# Patient Record
Sex: FEMALE | Race: WHITE | Employment: OTHER | URBAN - METROPOLITAN AREA
[De-identification: names, ages, dates, MRNs, and addresses within clinical notes are randomized per-mention and may not be internally consistent; named-entity substitution may affect disease eponyms.]

---

## 2018-03-09 ENCOUNTER — HOSPITAL ENCOUNTER (OUTPATIENT)
Dept: ULTRASOUND IMAGING | Age: 73
Discharge: HOME | End: 2018-03-09
Attending: INTERNAL MEDICINE
Payer: MEDICARE

## 2018-03-09 DIAGNOSIS — R74.8 ABNORMAL LEVELS OF OTHER SERUM ENZYMES: ICD-10-CM

## 2018-03-09 PROCEDURE — 76705 ECHO EXAM OF ABDOMEN: CPT

## 2018-09-29 ENCOUNTER — HOSPITAL ENCOUNTER (EMERGENCY)
Age: 73
Discharge: HOME | End: 2018-09-29
Attending: STUDENT IN AN ORGANIZED HEALTH CARE EDUCATION/TRAINING PROGRAM | Admitting: STUDENT IN AN ORGANIZED HEALTH CARE EDUCATION/TRAINING PROGRAM
Payer: MEDICARE

## 2018-09-29 VITALS
BODY MASS INDEX: 29.35 KG/M2 | SYSTOLIC BLOOD PRESSURE: 128 MMHG | HEIGHT: 67 IN | HEART RATE: 88 BPM | TEMPERATURE: 98.4 F | WEIGHT: 187 LBS | OXYGEN SATURATION: 99 % | RESPIRATION RATE: 16 BRPM | DIASTOLIC BLOOD PRESSURE: 72 MMHG

## 2018-09-29 DIAGNOSIS — E13.65 OTHER SPECIFIED DIABETES MELLITUS WITH HYPERGLYCEMIA, WITHOUT LONG-TERM CURRENT USE OF INSULIN (HCC): Primary | ICD-10-CM

## 2018-09-29 LAB
ALBUMIN SERPL-MCNC: 3.7 G/DL (ref 3.5–5)
ALBUMIN/GLOB SERPL: 0.9 {RATIO} (ref 1.1–2.2)
ALP SERPL-CCNC: 196 U/L (ref 45–117)
ALT SERPL-CCNC: 25 U/L (ref 12–78)
ANION GAP SERPL CALC-SCNC: 13 MMOL/L (ref 5–15)
ARTERIAL PATENCY WRIST A: ABNORMAL
AST SERPL-CCNC: 12 U/L (ref 15–37)
B-OH-BUTYR SERPL-SCNC: >4.42 MMOL/L
BASE EXCESS BLDV CALC-SCNC: 1 MMOL/L
BASOPHILS # BLD: 0 K/UL (ref 0–0.1)
BASOPHILS NFR BLD: 1 % (ref 0–1)
BDY SITE: ABNORMAL
BILIRUB SERPL-MCNC: 0.7 MG/DL (ref 0.2–1)
BUN SERPL-MCNC: 15 MG/DL (ref 6–20)
BUN/CREAT SERPL: 14 (ref 12–20)
CALCIUM SERPL-MCNC: 8.9 MG/DL (ref 8.5–10.1)
CHLORIDE SERPL-SCNC: 93 MMOL/L (ref 97–108)
CO2 SERPL-SCNC: 24 MMOL/L (ref 21–32)
COMMENT, HOLDF: NORMAL
CREAT SERPL-MCNC: 1.05 MG/DL (ref 0.55–1.02)
DIFFERENTIAL METHOD BLD: ABNORMAL
EOSINOPHIL # BLD: 0.1 K/UL (ref 0–0.4)
EOSINOPHIL NFR BLD: 1 % (ref 0–7)
ERYTHROCYTE [DISTWIDTH] IN BLOOD BY AUTOMATED COUNT: 12.9 % (ref 11.5–14.5)
EST. AVERAGE GLUCOSE BLD GHB EST-MCNC: 298 MG/DL
GAS FLOW.O2 O2 DELIVERY SYS: ABNORMAL L/MIN
GLOBULIN SER CALC-MCNC: 4.1 G/DL (ref 2–4)
GLUCOSE BLD STRIP.AUTO-MCNC: 445 MG/DL (ref 65–100)
GLUCOSE SERPL-MCNC: 440 MG/DL (ref 65–100)
HBA1C MFR BLD: 12 % (ref 4.2–6.3)
HCO3 BLDV-SCNC: 26.3 MMOL/L (ref 23–28)
HCT VFR BLD AUTO: 42.4 % (ref 35–47)
HGB BLD-MCNC: 14.3 G/DL (ref 11.5–16)
IMM GRANULOCYTES # BLD: 0 K/UL (ref 0–0.04)
IMM GRANULOCYTES NFR BLD AUTO: 0 % (ref 0–0.5)
LYMPHOCYTES # BLD: 1.1 K/UL (ref 0.8–3.5)
LYMPHOCYTES NFR BLD: 12 % (ref 12–49)
MCH RBC QN AUTO: 29.7 PG (ref 26–34)
MCHC RBC AUTO-ENTMCNC: 33.7 G/DL (ref 30–36.5)
MCV RBC AUTO: 88.1 FL (ref 80–99)
MONOCYTES # BLD: 0.6 K/UL (ref 0–1)
MONOCYTES NFR BLD: 8 % (ref 5–13)
NEUTS SEG # BLD: 6.7 K/UL (ref 1.8–8)
NEUTS SEG NFR BLD: 79 % (ref 32–75)
NRBC # BLD: 0 K/UL (ref 0–0.01)
NRBC BLD-RTO: 0 PER 100 WBC
O2/TOTAL GAS SETTING VFR VENT: 21 %
PCO2 BLDV: 46.2 MMHG (ref 41–51)
PH BLDV: 7.36 [PH] (ref 7.32–7.42)
PLATELET # BLD AUTO: 391 K/UL (ref 150–400)
PMV BLD AUTO: 11.8 FL (ref 8.9–12.9)
PO2 BLDV: 19 MMHG (ref 25–40)
POTASSIUM SERPL-SCNC: 3.6 MMOL/L (ref 3.5–5.1)
PROT SERPL-MCNC: 7.8 G/DL (ref 6.4–8.2)
RBC # BLD AUTO: 4.81 M/UL (ref 3.8–5.2)
SAMPLES BEING HELD,HOLD: NORMAL
SAO2 % BLDV: 26 % (ref 65–88)
SERVICE CMNT-IMP: ABNORMAL
SODIUM SERPL-SCNC: 130 MMOL/L (ref 136–145)
SPECIMEN TYPE: ABNORMAL
TOTAL RESP. RATE, ITRR: 16
UR CULT HOLD, URHOLD: NORMAL
WBC # BLD AUTO: 8.5 K/UL (ref 3.6–11)

## 2018-09-29 PROCEDURE — 83036 HEMOGLOBIN GLYCOSYLATED A1C: CPT | Performed by: STUDENT IN AN ORGANIZED HEALTH CARE EDUCATION/TRAINING PROGRAM

## 2018-09-29 PROCEDURE — 74011250636 HC RX REV CODE- 250/636: Performed by: STUDENT IN AN ORGANIZED HEALTH CARE EDUCATION/TRAINING PROGRAM

## 2018-09-29 PROCEDURE — 80053 COMPREHEN METABOLIC PANEL: CPT | Performed by: STUDENT IN AN ORGANIZED HEALTH CARE EDUCATION/TRAINING PROGRAM

## 2018-09-29 PROCEDURE — 36415 COLL VENOUS BLD VENIPUNCTURE: CPT | Performed by: STUDENT IN AN ORGANIZED HEALTH CARE EDUCATION/TRAINING PROGRAM

## 2018-09-29 PROCEDURE — 82010 KETONE BODYS QUAN: CPT | Performed by: STUDENT IN AN ORGANIZED HEALTH CARE EDUCATION/TRAINING PROGRAM

## 2018-09-29 PROCEDURE — 82962 GLUCOSE BLOOD TEST: CPT

## 2018-09-29 PROCEDURE — 99284 EMERGENCY DEPT VISIT MOD MDM: CPT

## 2018-09-29 PROCEDURE — 96360 HYDRATION IV INFUSION INIT: CPT

## 2018-09-29 PROCEDURE — 85025 COMPLETE CBC W/AUTO DIFF WBC: CPT | Performed by: STUDENT IN AN ORGANIZED HEALTH CARE EDUCATION/TRAINING PROGRAM

## 2018-09-29 PROCEDURE — 82803 BLOOD GASES ANY COMBINATION: CPT

## 2018-09-29 RX ORDER — METFORMIN HYDROCHLORIDE 500 MG/1
500 TABLET ORAL 2 TIMES DAILY WITH MEALS
Qty: 60 TAB | Refills: 0 | Status: SHIPPED | OUTPATIENT
Start: 2018-09-29 | End: 2018-10-08

## 2018-09-29 RX ADMIN — SODIUM CHLORIDE 1000 ML: 900 INJECTION, SOLUTION INTRAVENOUS at 13:13

## 2018-09-29 NOTE — ED PROVIDER NOTES
HPI Comments: 72 y.o. female with past medical history significant for HTN and hypercholesterolemia who presents from home with chief complaint of hyperglycemia. Patient states that she has had intermittent epigastric pain for the past several weeks. She notes that her symptoms became progressively worse 2 weeks ago and now complains of \"dry mouth\" and increased thirst.  She also notes that when bending over, she develops some light-headedness. Patient adds that she visited Dr. Maude Solares for her symptoms yesterday. She was advised to visit the ED today due to lab results indicating her blood sugar was 700. Patient notes that she had \"mashed potatoes\" and a \"frosty\" prior to having her blood work done yesterday. She claims that she has been informed that she is a pre-diabetic but denies h/o diabetes as well as family h/o diabetes. Patient claims she has a CT scheduled in 2 days. Of note, patient states that she has lost weight recently. She denies recent steroid medications, change in urinary frequency, dizziness, chest pain, and SOB. There are no other acute medical concerns at this time. Social hx: negative tobacco use; occasional alcohol use; negative drug use PCP: Zohra Shi MD 
 
Note written by Yoshi Chase, as dictated by Chen Cantu MD 10:19 AM 
 
The history is provided by the patient. No  was used. No past medical history on file. No past surgical history on file. No family history on file. Social History Social History  Marital status:  Spouse name: N/A  
 Number of children: N/A  
 Years of education: N/A Occupational History  Not on file. Social History Main Topics  Smoking status: Not on file  Smokeless tobacco: Not on file  Alcohol use Not on file  Drug use: Not on file  Sexual activity: Not on file Other Topics Concern  Not on file Social History Narrative ALLERGIES: Amoxicillin Review of Systems Constitutional: Positive for unexpected weight change. Negative for activity change, diaphoresis, fatigue and fever. HENT: Negative for congestion and sore throat. Eyes: Negative for photophobia and visual disturbance. Respiratory: Negative for chest tightness and shortness of breath. Cardiovascular: Negative for chest pain, palpitations and leg swelling. Gastrointestinal: Positive for abdominal pain (epigastric pain). Negative for blood in stool, constipation, diarrhea, nausea and vomiting. Endocrine: Positive for polydipsia. Genitourinary: Negative for difficulty urinating, dysuria, flank pain, frequency and hematuria. Musculoskeletal: Negative for back pain. Neurological: Negative for dizziness, syncope, numbness and headaches. All other systems reviewed and are negative. Vitals:  
 09/29/18 1013 BP: 128/81 Pulse: (!) 111 Resp: 16 Temp: 98.7 °F (37.1 °C) SpO2: 98% Weight: 84.8 kg (187 lb) Height: 5' 7\" (1.702 m) Physical Exam  
Constitutional: She is oriented to person, place, and time. She appears well-developed and well-nourished. No distress. HENT:  
Head: Normocephalic and atraumatic. Nose: Nose normal.  
Mouth/Throat: Oropharynx is clear and moist. No oropharyngeal exudate. Eyes: Conjunctivae and EOM are normal. Right eye exhibits no discharge. Left eye exhibits no discharge. No scleral icterus. Neck: Normal range of motion. Neck supple. No JVD present. No tracheal deviation present. No thyromegaly present. Cardiovascular: Normal rate, regular rhythm, normal heart sounds and intact distal pulses. Exam reveals no gallop and no friction rub. No murmur heard. Pulmonary/Chest: Effort normal and breath sounds normal. No stridor. No respiratory distress. She has no wheezes. She has no rales. She exhibits no tenderness.   
Abdominal: Bowel sounds are normal. She exhibits no distension and no mass. There is no tenderness. There is no rebound. Musculoskeletal: Normal range of motion. She exhibits no edema or tenderness. Lymphadenopathy:  
  She has no cervical adenopathy. Neurological: She is alert and oriented to person, place, and time. No cranial nerve deficit. Coordination normal.  
Skin: Skin is warm and dry. No rash noted. She is not diaphoretic. No erythema. No pallor. Psychiatric: She has a normal mood and affect. Her behavior is normal. Judgment and thought content normal.  
Nursing note and vitals reviewed. Note written by Yoshi Underwood, as dictated by Misha Meza MD 10:19 AM 
 
MDM Number of Diagnoses or Management Options Other specified diabetes mellitus with hyperglycemia, without long-term current use of insulin ([facility] [address]): Amount and/or Complexity of Data Reviewed Clinical lab tests: ordered and reviewed Review and summarize past medical records: yes Independent visualization of images, tracings, or specimens: yes Risk of Complications, Morbidity, and/or Mortality Presenting problems: moderate Diagnostic procedures: moderate Management options: moderate Patient Progress Patient progress: improved ED Course Procedures PROGRESS NOTE: 
10:19 AM 
Point of Care testing of blood sugar in ED was found to be 445. PROGRESS NOTE: 
3:29 PM 
Patient offered admission for further management and workup. Patient appreciates this but would prefer to go home. She will follow-up with PCP on Monday. In interim, she will be started on Metformin 500 mg BID.   
 
4:34 PM 
The patient has been reevaluated. The patient is ready for discharge. The patient's signs, symptoms, diagnosis, and discharge instructions have been discussed and the patient/ family has conveyed their understanding. The patient is to follow up as recommended or return to the ED should their symptoms worsen. Plan has been discussed and the patient is in agreement. LABORATORY TESTS: 
No results found for this or any previous visit (from the past 12 hour(s)). IMAGING RESULTS: 
No orders to display No results found. MEDICATIONS GIVEN: 
Medications  
sodium chloride 0.9 % bolus infusion 1,000 mL (0 mL IntraVENous IV Completed 9/29/18 1413) IMPRESSION: 
1. Other specified diabetes mellitus with hyperglycemia, without long-term current use of insulin ([facility] [address] PLAN: 
1. Discharge Medication List as of 9/29/2018  3:31 PM  
  
START taking these medications Details  
metFORMIN (GLUCOPHAGE) 500 mg tablet Take 1 Tab by mouth two (2) times daily (with meals) for 30 days. , Print, Disp-60 Tab, R-0  
  
  
 
2. Follow-up Information Follow up With Details Comments Contact Info Latanya Echevarria MD  If symptoms worsen [address] [facility] [address] [facility] [phone] 
[phone] [address] [place] [address] EMERGENCY DEP  If symptoms worsen [address] [place] [id number] 
[phone] Return to ED for new or worsening symptoms Aislinn Chilel MD

## 2018-09-29 NOTE — ED TRIAGE NOTES
Pt reports having blood work done yesterday at her PCP. Pt states she was called this mrpari and told to come to ER due to blood sugar of 700.

## 2018-09-29 NOTE — DISCHARGE INSTRUCTIONS
Noninsulin Medicines for Type 2 Diabetes: Care Instructions  Your Care Instructions    There are different types of noninsulin medicines for diabetes. Each works in a different way. But they all help you control your blood sugar. Some types help your body make insulin to lower your blood sugar. Others lower how much insulin your body needs. Some can slow how fast your body digests sugars. And some can remove extra glucose through your urine. · Alpha-glucosidase inhibitors. These keep starches from breaking down. This means that they lower the amount of glucose absorbed when you eat. They don't help your body make more insulin. So they will not cause low blood sugar unless you use them with other medicines for diabetes. They include acarbose and miglitol. · DPP-4 inhibitors. These help your body raise the level of insulin after you eat. They also help your body make less of a hormone that raises blood sugar. They include linagliptin, saxagliptin, and sitagliptin. · Incretin hormones (GLP-1 receptor agonists). Your body makes a protein that can raise your insulin level. It also can lower your blood sugar and make you less hungry. You can get shots of hormones that work the same way. They include exenatide and liraglutide. · Meglitinides. These help your body release insulin. They also help slow how your body digests sugars. So they can keep your blood sugar from rising too fast after you eat. They include nateglinide and repaglinide. · Metformin. This lowers how much glucose your liver makes. And it helps you respond better to insulin. It also lowers the amount of stored sugar that your liver releases when you are not eating. · SGLT2 inhibitors. These help to remove extra glucose through your urine. They may also help some people lose weight. They include canagliflozin, dapagliflozin, and empagliflozin. · Sulfonylureas. These help your body release more insulin. Some work for many hours.  They can cause low blood sugar if you don't eat as you planned. They include glipizide and glyburide. · Thiazolidinediones. These reduce the amount of blood glucose. They also help you respond better to insulin. They include pioglitazone and rosiglitazone. You may need to take more than one medicine for diabetes. Two or more medicines may work better to lower your blood sugar level than just one does. Follow-up care is a key part of your treatment and safety. Be sure to make and go to all appointments, and call your doctor if you are having problems. It's also a good idea to know your test results and keep a list of the medicines you take. How can you care for yourself at home? · Eat a healthy diet. Get some exercise each day. This may help you to reduce how much medicine you need. · Do not take other prescription or over-the-counter medicines, vitamins, herbal products, or supplements without talking to your doctor first. Some medicines for type 2 diabetes can cause problems with other medicines or supplements. · Tell your doctor if you plan to get pregnant. Some of these drugs are not safe for pregnant women. · Be safe with medicines. Take your medicines exactly as prescribed. Meglitinides and sulfonylureas can cause your blood sugar to drop very low. Call your doctor if you think you are having a problem with your medicine. · Check your blood sugar often. You can use a glucose monitor. Keeping track can help you know how certain foods, activities, and medicines affect your blood sugar. And it can help you keep your blood sugar from getting so low that it's not safe. When should you call for help? Call 911 anytime you think you may need emergency care.  For example, call if:    · You passed out (lost consciousness).     · You are confused or cannot think clearly.     · Your blood sugar is very high or very low.    Watch closely for changes in your health, and be sure to contact your doctor if:    · Your blood sugar stays outside the level your doctor set for you.     · You have any problems. Where can you learn more? Go to http://jovan-leslie.info/. Enter H153 in the search box to learn more about \"Noninsulin Medicines for Type 2 Diabetes: Care Instructions. \"  Current as of: December 7, 2017  Content Version: 11.7  © 1197-5403 [employer]. Care instructions adapted under license by [employer] (which disclaims liability or warranty for this information). If you have questions about a medical condition or this instruction, always ask your healthcare professional. [address] any warranty or liability for your use of this information. Learning About Diabetes Food Guidelines  Your Care Instructions    Meal planning is important to manage diabetes. It helps keep your blood sugar at a target level (which you set with your doctor). You don't have to eat special foods. You can eat what your family eats, including sweets once in a while. But you do have to pay attention to how often you eat and how much you eat of certain foods. You may want to work with a dietitian or a certified diabetes educator (CDE) to help you plan meals and snacks. A dietitian or CDE can also help you lose weight if that is one of your goals. What should you know about eating carbs? Managing the amount of carbohydrate (carbs) you eat is an important part of healthy meals when you have diabetes. Carbohydrate is found in many foods. · Learn which foods have carbs. And learn the amounts of carbs in different foods. ¨ Bread, cereal, pasta, and rice have about 15 grams of carbs in a serving. A serving is 1 slice of bread (1 ounce), ½ cup of cooked cereal, or 1/3 cup of cooked pasta or rice. ¨ Fruits have 15 grams of carbs in a serving.  A serving is 1 small fresh fruit, such as an apple or orange; ½ of a banana; ½ cup of cooked or canned fruit; ½ cup of fruit juice; 1 cup of melon or raspberries; or 2 tablespoons of dried fruit. ¨ Milk and no-sugar-added yogurt have 15 grams of carbs in a serving. A serving is 1 cup of milk or 2/3 cup of no-sugar-added yogurt. ¨ Starchy vegetables have 15 grams of carbs in a serving. A serving is ½ cup of mashed potatoes or sweet potato; 1 cup winter squash; ½ of a small baked potato; ½ cup of cooked beans; or ½ cup cooked corn or green peas. · Learn how much carbs to eat each day and at each meal. A dietitian or CDE can teach you how to keep track of the amount of carbs you eat. This is called carbohydrate counting. · If you are not sure how to count carbohydrate grams, use the Plate Method to plan meals. It is a good, quick way to make sure that you have a balanced meal. It also helps you spread carbs throughout the day. ¨ Divide your plate by types of foods. Put non-starchy vegetables on half the plate, meat or other protein food on one-quarter of the plate, and a grain or starchy vegetable in the final quarter of the plate. To this you can add a small piece of fruit and 1 cup of milk or yogurt, depending on how many carbs you are supposed to eat at a meal.  · Try to eat about the same amount of carbs at each meal. Do not \"save up\" your daily allowance of carbs to eat at one meal.  · Proteins have very little or no carbs per serving. Examples of proteins are beef, chicken, turkey, fish, eggs, tofu, cheese, cottage cheese, and peanut butter. A serving size of meat is 3 ounces, which is about the size of a deck of cards. Examples of meat substitute serving sizes (equal to 1 ounce of meat) are 1/4 cup of cottage cheese, 1 egg, 1 tablespoon of peanut butter, and ½ cup of tofu. How can you eat out and still eat healthy? · Learn to estimate the serving sizes of foods that have carbohydrate. If you measure food at home, it will be easier to estimate the amount in a serving of restaurant food.   · If the meal you order has too much carbohydrate (such as potatoes, corn, or baked beans), ask to have a low-carbohydrate food instead. Ask for a salad or green vegetables. · If you use insulin, check your blood sugar before and after eating out to help you plan how much to eat in the future. · If you eat more carbohydrate at a meal than you had planned, take a walk or do other exercise. This will help lower your blood sugar. What else should you know? · Limit saturated fat, such as the fat from meat and dairy products. This is a healthy choice because people who have diabetes are at higher risk of heart disease. So choose lean cuts of meat and nonfat or low-fat dairy products. Use olive or canola oil instead of butter or shortening when cooking. · Don't skip meals. Your blood sugar may drop too low if you skip meals and take insulin or certain medicines for diabetes. · Check with your doctor before you drink alcohol. Alcohol can cause your blood sugar to drop too low. Alcohol can also cause a bad reaction if you take certain diabetes medicines. Follow-up care is a key part of your treatment and safety. Be sure to make and go to all appointments, and call your doctor if you are having problems. It's also a good idea to know your test results and keep a list of the medicines you take. Where can you learn more? Go to http://jovan-leslie.info/. Enter S686 in the search box to learn more about \"Learning About Diabetes Food Guidelines. \"  Current as of: December 7, 2017  Content Version: 11.7  © 5385-1441 [employer]. Care instructions adapted under license by [employer] (which disclaims liability or warranty for this information). If you have questions about a medical condition or this instruction, always ask your healthcare professional. [place] [address] any warranty or liability for your use of this information.

## 2018-10-01 ENCOUNTER — HOSPITAL ENCOUNTER (OUTPATIENT)
Dept: CT IMAGING | Age: 73
Discharge: HOME | End: 2018-10-01
Attending: INTERNAL MEDICINE
Payer: MEDICARE

## 2018-10-01 DIAGNOSIS — R63.4 WEIGHT LOSS: ICD-10-CM

## 2018-10-01 DIAGNOSIS — R10.9 ABDOMINAL PAIN: ICD-10-CM

## 2018-10-01 PROCEDURE — 74011636320 HC RX REV CODE- 636/320: Performed by: INTERNAL MEDICINE

## 2018-10-01 PROCEDURE — 74011000258 HC RX REV CODE- 258: Performed by: INTERNAL MEDICINE

## 2018-10-01 PROCEDURE — 74160 CT ABDOMEN W/CONTRAST: CPT

## 2018-10-01 RX ORDER — SODIUM CHLORIDE 0.9 % (FLUSH) 0.9 %
10 SYRINGE (ML) INJECTION
Status: COMPLETED | OUTPATIENT
Start: 2018-10-01 | End: 2018-10-01

## 2018-10-01 RX ADMIN — SODIUM CHLORIDE 100 ML: 900 INJECTION, SOLUTION INTRAVENOUS at 14:02

## 2018-10-01 RX ADMIN — Medication 10 ML: at 14:02

## 2018-10-01 RX ADMIN — IOPAMIDOL 100 ML: 755 INJECTION, SOLUTION INTRAVENOUS at 14:02

## 2018-10-01 RX ADMIN — IOHEXOL 50 ML: 240 INJECTION, SOLUTION INTRATHECAL; INTRAVASCULAR; INTRAVENOUS; ORAL at 14:16

## 2018-10-03 ENCOUNTER — ANESTHESIA EVENT (OUTPATIENT)
Dept: ENDOSCOPY | Age: 73
End: 2018-10-03
Payer: MEDICARE

## 2018-10-04 ENCOUNTER — HOSPITAL ENCOUNTER (OUTPATIENT)
Age: 73
Setting detail: OUTPATIENT SURGERY
Discharge: HOME | End: 2018-10-04
Attending: INTERNAL MEDICINE | Admitting: INTERNAL MEDICINE
Payer: MEDICARE

## 2018-10-04 ENCOUNTER — APPOINTMENT (OUTPATIENT)
Dept: ULTRASOUND IMAGING | Age: 73
End: 2018-10-04
Attending: INTERNAL MEDICINE
Payer: MEDICARE

## 2018-10-04 ENCOUNTER — ANESTHESIA (OUTPATIENT)
Dept: ENDOSCOPY | Age: 73
End: 2018-10-04
Payer: MEDICARE

## 2018-10-04 VITALS
HEART RATE: 80 BPM | RESPIRATION RATE: 16 BRPM | BODY MASS INDEX: 28.41 KG/M2 | WEIGHT: 181 LBS | SYSTOLIC BLOOD PRESSURE: 134 MMHG | HEIGHT: 67 IN | TEMPERATURE: 98.5 F | OXYGEN SATURATION: 98 % | DIASTOLIC BLOOD PRESSURE: 79 MMHG

## 2018-10-04 DIAGNOSIS — K86.89 MASS OF PANCREAS: Primary | ICD-10-CM

## 2018-10-04 LAB
GLUCOSE BLD STRIP.AUTO-MCNC: 316 MG/DL (ref 65–100)
SERVICE CMNT-IMP: ABNORMAL

## 2018-10-04 PROCEDURE — 88173 CYTOPATH EVAL FNA REPORT: CPT | Performed by: INTERNAL MEDICINE

## 2018-10-04 PROCEDURE — 76040000007: Performed by: INTERNAL MEDICINE

## 2018-10-04 PROCEDURE — 88305 TISSUE EXAM BY PATHOLOGIST: CPT | Performed by: INTERNAL MEDICINE

## 2018-10-04 PROCEDURE — 88172 CYTP DX EVAL FNA 1ST EA SITE: CPT | Performed by: INTERNAL MEDICINE

## 2018-10-04 PROCEDURE — 74011250636 HC RX REV CODE- 250/636

## 2018-10-04 PROCEDURE — 82962 GLUCOSE BLOOD TEST: CPT

## 2018-10-04 PROCEDURE — 77030036673 HC NDL BIOP ENDOSC SHRKCOR PRELD COVD -E: Performed by: INTERNAL MEDICINE

## 2018-10-04 PROCEDURE — 76060000032 HC ANESTHESIA 0.5 TO 1 HR: Performed by: INTERNAL MEDICINE

## 2018-10-04 RX ORDER — OMEPRAZOLE 20 MG/1
20 TABLET, DELAYED RELEASE ORAL DAILY
COMMUNITY
End: 2018-10-18

## 2018-10-04 RX ORDER — SODIUM CHLORIDE 9 MG/ML
INJECTION, SOLUTION INTRAVENOUS
Status: DISCONTINUED | OUTPATIENT
Start: 2018-10-04 | End: 2018-10-04 | Stop reason: HOSPADM

## 2018-10-04 RX ORDER — SODIUM CHLORIDE 0.9 % (FLUSH) 0.9 %
5-10 SYRINGE (ML) INJECTION AS NEEDED
Status: DISCONTINUED | OUTPATIENT
Start: 2018-10-04 | End: 2018-10-04 | Stop reason: HOSPADM

## 2018-10-04 RX ORDER — SODIUM CHLORIDE 9 MG/ML
100 INJECTION, SOLUTION INTRAVENOUS CONTINUOUS
Status: DISCONTINUED | OUTPATIENT
Start: 2018-10-04 | End: 2018-10-04 | Stop reason: HOSPADM

## 2018-10-04 RX ORDER — FENTANYL CITRATE 50 UG/ML
200 INJECTION, SOLUTION INTRAMUSCULAR; INTRAVENOUS
Status: DISCONTINUED | OUTPATIENT
Start: 2018-10-04 | End: 2018-10-04 | Stop reason: HOSPADM

## 2018-10-04 RX ORDER — MIDAZOLAM HYDROCHLORIDE 1 MG/ML
.25-1 INJECTION, SOLUTION INTRAMUSCULAR; INTRAVENOUS
Status: DISCONTINUED | OUTPATIENT
Start: 2018-10-04 | End: 2018-10-04 | Stop reason: HOSPADM

## 2018-10-04 RX ORDER — HYDROCODONE BITARTRATE AND ACETAMINOPHEN 5; 325 MG/1; MG/1
1 TABLET ORAL
Qty: 20 TAB | Refills: 0 | Status: SHIPPED | OUTPATIENT
Start: 2018-10-04 | End: 2018-10-18

## 2018-10-04 RX ORDER — TELMISARTAN 80 MG/1
80 TABLET ORAL DAILY
COMMUNITY
End: 2019-01-01

## 2018-10-04 RX ORDER — EPINEPHRINE 0.1 MG/ML
1 INJECTION INTRACARDIAC; INTRAVENOUS
Status: DISCONTINUED | OUTPATIENT
Start: 2018-10-04 | End: 2018-10-04 | Stop reason: HOSPADM

## 2018-10-04 RX ORDER — SODIUM CHLORIDE 0.9 % (FLUSH) 0.9 %
5-10 SYRINGE (ML) INJECTION EVERY 8 HOURS
Status: DISCONTINUED | OUTPATIENT
Start: 2018-10-04 | End: 2018-10-04 | Stop reason: HOSPADM

## 2018-10-04 RX ORDER — SIMVASTATIN 10 MG/1
10 TABLET, FILM COATED ORAL
COMMUNITY
End: 2019-01-01

## 2018-10-04 RX ORDER — LIDOCAINE HYDROCHLORIDE 20 MG/ML
INJECTION, SOLUTION EPIDURAL; INFILTRATION; INTRACAUDAL; PERINEURAL AS NEEDED
Status: DISCONTINUED | OUTPATIENT
Start: 2018-10-04 | End: 2018-10-04 | Stop reason: HOSPADM

## 2018-10-04 RX ORDER — NALOXONE HYDROCHLORIDE 0.4 MG/ML
0.4 INJECTION, SOLUTION INTRAMUSCULAR; INTRAVENOUS; SUBCUTANEOUS
Status: DISCONTINUED | OUTPATIENT
Start: 2018-10-04 | End: 2018-10-04 | Stop reason: HOSPADM

## 2018-10-04 RX ORDER — FLUMAZENIL 0.1 MG/ML
0.2 INJECTION INTRAVENOUS
Status: DISCONTINUED | OUTPATIENT
Start: 2018-10-04 | End: 2018-10-04 | Stop reason: HOSPADM

## 2018-10-04 RX ORDER — PROPOFOL 10 MG/ML
INJECTION, EMULSION INTRAVENOUS AS NEEDED
Status: DISCONTINUED | OUTPATIENT
Start: 2018-10-04 | End: 2018-10-04 | Stop reason: HOSPADM

## 2018-10-04 RX ORDER — HYDROCHLOROTHIAZIDE 12.5 MG/1
12.5 CAPSULE ORAL DAILY
COMMUNITY
End: 2019-01-01

## 2018-10-04 RX ORDER — ATROPINE SULFATE 0.1 MG/ML
0.5 INJECTION INTRAVENOUS
Status: DISCONTINUED | OUTPATIENT
Start: 2018-10-04 | End: 2018-10-04 | Stop reason: HOSPADM

## 2018-10-04 RX ORDER — DEXTROMETHORPHAN/PSEUDOEPHED 2.5-7.5/.8
1.2 DROPS ORAL
Status: DISCONTINUED | OUTPATIENT
Start: 2018-10-04 | End: 2018-10-04 | Stop reason: HOSPADM

## 2018-10-04 RX ADMIN — PROPOFOL 30 MG: 10 INJECTION, EMULSION INTRAVENOUS at 16:20

## 2018-10-04 RX ADMIN — PROPOFOL 30 MG: 10 INJECTION, EMULSION INTRAVENOUS at 16:05

## 2018-10-04 RX ADMIN — PROPOFOL 40 MG: 10 INJECTION, EMULSION INTRAVENOUS at 16:02

## 2018-10-04 RX ADMIN — PROPOFOL 30 MG: 10 INJECTION, EMULSION INTRAVENOUS at 16:14

## 2018-10-04 RX ADMIN — LIDOCAINE HYDROCHLORIDE 40 MG: 20 INJECTION, SOLUTION EPIDURAL; INFILTRATION; INTRACAUDAL; PERINEURAL at 15:59

## 2018-10-04 RX ADMIN — PROPOFOL 30 MG: 10 INJECTION, EMULSION INTRAVENOUS at 16:23

## 2018-10-04 RX ADMIN — PROPOFOL 40 MG: 10 INJECTION, EMULSION INTRAVENOUS at 16:01

## 2018-10-04 RX ADMIN — PROPOFOL 30 MG: 10 INJECTION, EMULSION INTRAVENOUS at 16:08

## 2018-10-04 RX ADMIN — PROPOFOL 30 MG: 10 INJECTION, EMULSION INTRAVENOUS at 16:11

## 2018-10-04 RX ADMIN — SODIUM CHLORIDE: 9 INJECTION, SOLUTION INTRAVENOUS at 15:43

## 2018-10-04 RX ADMIN — PROPOFOL 30 MG: 10 INJECTION, EMULSION INTRAVENOUS at 16:26

## 2018-10-04 RX ADMIN — PROPOFOL 100 MG: 10 INJECTION, EMULSION INTRAVENOUS at 15:59

## 2018-10-04 RX ADMIN — PROPOFOL 30 MG: 10 INJECTION, EMULSION INTRAVENOUS at 16:17

## 2018-10-04 NOTE — ANESTHESIA PREPROCEDURE EVALUATION
Anesthetic History No history of anesthetic complications Review of Systems / Medical History Patient summary reviewed, nursing notes reviewed and pertinent labs reviewed Pulmonary Within defined limits Neuro/Psych Within defined limits Cardiovascular Hypertension Hyperlipidemia GI/Hepatic/Renal 
  
GERD Renal disease: CRI Endo/Other Diabetes: type 2 Other Findings Physical Exam 
 
Airway Mallampati: II 
TM Distance: > 6 cm Neck ROM: normal range of motion Mouth opening: Normal 
 
 Cardiovascular Regular rate and rhythm,  S1 and S2 normal,  no murmur, click, rub, or gallop Dental 
 
Dentition: Caps/crowns Pulmonary Breath sounds clear to auscultation Abdominal 
GI exam deferred Other Findings Anesthetic Plan ASA: 2 Anesthesia type: MAC Induction: Intravenous Anesthetic plan and risks discussed with: Patient

## 2018-10-04 NOTE — PROCEDURES
[address] Endoscopic Ultrasound NAME:  Milad Jones :   1945 MRN:   [id number] Procedure Type: Endoscopic Ultrasound Indications: Abnormal CT scan showing mass in the pancreas with liver spots, epigastric pain Pre-operative Diagnosis: see indication above Post-operative Diagnosis:  See findings below : Edyta Devlin MD 
 
Referring Provider: --Beatriz Onofre MD 
 
Procedure Details:   
 
Anethesia/Sedation:  MAC anesthesia Propofol Procedure Details After infom consent was obtained for the procedure, with all risks and benefits of procedure explained the patient was taken to the endoscopy suite and placed in the left lateral decubitus position. Following sequential administration of sedation as per above, the EGD then linear echoendoscope was inserted into the mouth and advanced under direct vision to third portion of the duodenum. A careful inspection was made as the gastroscope was withdrawn, including a retroflexed view of the proximal stomach; findings and interventions are described below. Findings:  
 
Endoscopic: 
 Esophagus:hiatal hernia 3 cm in size Stomach: normal  
 Duodenum/jejunum: normal 
 
 
Ultrasound: 
 Esophagus: normal findings Stomach: normal findings Pancreas:  
  Areas examined: the entire gland Parenchyma: -tumor/mass, located in  uncinate process, 3.5x2 cm in size, hypoechoic, no vascular invasion seen, then I performed FNB ( fine needle biopsy ) with 22 gauge then 25 gauge needle( QingCloud), multiple passes obtained, preliminary reading by Dr. Ashlyn Marshall= cancer cells consistent with adenocarcinoma Pancreatic Duct: normal findings Liver:  
  Parenchyma: normal examined portion of the liver Gallbladder: normal 
  Bile Duct: the entire biliary tree, normal 
             Lymph Node: no adenopathy Specimen Removed:  as above Complications: None. EBL:  None. Interventions: as above Recommendations: follow up on final pathology Will need medical oncology referral, will discuss with her PCP Discussed with pt's family Given norco for pain( 20 pills) Signed By: Anna Davison MD   
 10/4/2018  4:35 PM

## 2018-10-04 NOTE — IP AVS SNAPSHOT
[address] [facility]. [address] 
[phone] Patient: France Dubin MRN: [id number] LBI:[birth year] A check patel indicates which time of day the medication should be taken. My Medications START taking these medications Instructions Each Dose to Equal  
 Morning Noon Evening Bedtime HYDROcodone-acetaminophen 5-325 mg per tablet Commonly known as:  Ravenalec Jordan Your last dose was: Your next dose is: Take 1 Tab by mouth every six (6) hours as needed for Pain. Max Daily Amount: 4 Tabs. Indications: Pain 1 Tab CONTINUE taking these medications Instructions Each Dose to Equal  
 Morning Noon Evening Bedtime  
 hydroCHLOROthiazide 12.5 mg capsule Commonly known as:  Venice Millard Your last dose was: Your next dose is: Take 12.5 mg by mouth daily. 12.5 mg  
    
   
   
   
  
 metFORMIN 500 mg tablet Commonly known as:  GLUCOPHAGE Your last dose was: Your next dose is: Take 1 Tab by mouth two (2) times daily (with meals) for 30 days. 500 mg PriLOSEC OTC 20 mg tablet Generic drug:  omeprazole Your last dose was: Your next dose is: Take 20 mg by mouth daily. 20 mg  
    
   
   
   
  
 simvastatin 10 mg tablet Commonly known as:  ZOCOR Your last dose was: Your next dose is: Take  by mouth nightly. telmisartan 80 mg tablet Commonly known as:  Shania Letters Your last dose was: Your next dose is: Take 80 mg by mouth daily. 80 mg  
    
   
   
   
  
 TRESIBA FLEXTOUCH U-100 SC Your last dose was: Your next dose is:    
   
   
 10 Units by SubCUTAneous route. 10 Units Where to Get Your Medications Information on where to get these meds will be given to you by the nurse or doctor. ! Ask your nurse or doctor about these medications HYDROcodone-acetaminophen 5-325 mg per tablet

## 2018-10-04 NOTE — DISCHARGE INSTRUCTIONS
[address]  [address] [facility], [address] [place] [address]    Endoscopic ultrasound DISCHARGE INSTRUCTIONS    David Tesfaye  [id number]  1945    Discomfort:  Sore throat- throat lozenges or warm salt water gargle  redness at IV site- apply warm compress to area; if redness or soreness persist- contact your physician  Gaseous discomfort- walking, belching will help relieve any discomfort  You may not operate a vehicle for 12 hours  You may not engage in an occupation involving machinery or appliances for rest of today  You may not drink alcoholic beverages for at least 12 hours  Avoid making any critical decisions for at least 24 hour  DIET  You may eat and drink now and after you leave. You may resume your regular diet - however -  remember your colon is empty and a heavy meal will produce gas. Avoid these foods:  vegetables, fried / greasy foods, carbonated drinks    ACTIVITY  You may resume your normal daily activities   Spend the remainder of the day resting -  avoid any strenuous activity. CALL M.D. ANY SIGN OF   Increasing pain, nausea, vomiting  Abdominal distension (swelling)  New increased bleeding (oral or rectal)  Fever (chills)  Pain in chest area  Bloody discharge from nose or mouth  Shortness of breath    Follow-up Instructions:   Call Dr. Anna Davison for any questions or problems. Telephone # [phone]    ENDOSCOPY FINDINGS:   Your endoscopy showed pancreas mass, I biopsied.             Signed By: Anna Davison MD     10/4/2018  4:43 PM

## 2018-10-04 NOTE — IP AVS SNAPSHOT
[address] [facility] [address] 
[phone] Patient: Furman Sever MRN: [id number] ARQ:[id number] About your hospitalization You were admitted on:  October 4, 2018 You last received care in the:  [facility] ENDOSCOPY You were discharged on:  October 4, 2018 Why you were hospitalized Your primary diagnosis was:  Not on File Follow-up Information None Your Scheduled Appointments Monday October 08, 2018  4:00 PM EDT New Patient with Odessa Chowdary MD  
[facility] at [address] [facility], [address]  
[phone] Discharge Orders None A check patel indicates which time of day the medication should be taken. My Medications START taking these medications Instructions Each Dose to Equal  
 Morning Noon Evening Bedtime HYDROcodone-acetaminophen 5-325 mg per tablet Commonly known as:  Raman Uribe Your last dose was: Your next dose is: Take 1 Tab by mouth every six (6) hours as needed for Pain. Max Daily Amount: 4 Tabs. Indications: Pain 1 Tab CONTINUE taking these medications Instructions Each Dose to Equal  
 Morning Noon Evening Bedtime  
 hydroCHLOROthiazide 12.5 mg capsule Commonly known as:  Ermclary Suero Your last dose was: Your next dose is: Take 12.5 mg by mouth daily. 12.5 mg  
    
   
   
   
  
 metFORMIN 500 mg tablet Commonly known as:  GLUCOPHAGE Your last dose was: Your next dose is: Take 1 Tab by mouth two (2) times daily (with meals) for 30 days. 500 mg PriLOSEC OTC 20 mg tablet Generic drug:  omeprazole Your last dose was: Your next dose is: Take 20 mg by mouth daily. 20 mg  
    
   
   
   
  
 simvastatin 10 mg tablet Commonly known as:  ZOCOR Your last dose was: Your next dose is: Take  by mouth nightly. telmisartan 80 mg tablet Commonly known as:  Shania Holman Your last dose was: Your next dose is: Take 80 mg by mouth daily. 80 mg  
    
   
   
   
  
 TRESIBA FLEXTOUCH U-100 SC Your last dose was: Your next dose is:    
   
   
 10 Units by SubCUTAneous route. 10 Units Where to Get Your Medications Information on where to get these meds will be given to you by the nurse or doctor. ! Ask your nurse or doctor about these medications HYDROcodone-acetaminophen 5-325 mg per tablet Opioid Education Prescription Opioids: What You Need to Know: 
 
Prescription opioids can be used to help relieve moderate-to-severe pain and are often prescribed following a surgery or injury, or for certain health conditions. These medications can be an important part of treatment but also come with serious risks. Opioids are strong pain medicines. Examples include hydrocodone, oxycodone, fentanyl, and morphine. Heroin is an example of an illegal opioid. It is important to work with your health care provider to make sure you are getting the safest, most effective care. WHAT ARE THE RISKS AND SIDE EFFECTS OF OPIOID USE? Prescription opioids carry serious risks of addiction and overdose, especially with prolonged use. An opioid overdose, often marked by slow breathing, can cause sudden death. The use of prescription opioids can have a number of side effects as well, even when taken as directed. · Tolerance-meaning you might need to take more of a medication for the same pain relief · Physical dependence-meaning you have symptoms of withdrawal when the medication is stopped.   Withdrawal symptoms can include nausea, sweating, chills, diarrhea, stomach cramps, and muscle aches. Withdrawal can last up to several weeks, depending on which drug you took and how long you took it. · Increased sensitivity to pain · Constipation · Nausea, vomiting, and dry mouth · Sleepiness and dizziness · Confusion · Depression · Low levels of testosterone that can result in lower sex drive, energy, and strength · Itching and sweating RISKS ARE GREATER WITH:      
· History of drug misuse, substance use disorder, or overdose · Mental health conditions (such as depression or anxiety) · Sleep apnea · Older age (72 years or older) · Pregnancy Avoid alcohol while taking prescription opioids. Also, unless specifically advised by your health care provider, medications to avoid include: · Benzodiazepines (such as Xanax or Valium) · Muscle relaxants (such as Soma or Flexeril) · Hypnotics (such as Ambien or Lunesta) · Other prescription opioids KNOW YOUR OPTIONS Talk to your health care provider about ways to manage your pain that don't involve prescription opioids. Some of these options may actually work better and have fewer risks and side effects. Consult your physician before adding or stopping any medications, treatments, or physical activity. Options may include: 
· Pain relievers such as acetaminophen, ibuprofen, and naproxen · Some medications that are also used for depression or seizures · Physical therapy and exercise · Counseling to help patients learn how to cope better with triggers of pain and stress. · Application of heat or cold compress · Massage therapy · Relaxation techniques Be Informed Make sure you know the name of your medication, how much and how often to take it, and its potential risks & side effects. IF YOU ARE PRESCRIBED OPIOIDS FOR PAIN: 
· Never take opioids in greater amounts or more often than prescribed. Remember the goal is not to be pain-free but to manage your pain at a tolerable level. · Follow up with your primary care provider to: · Work together to create a plan on how to manage your pain. · Talk about ways to help manage your pain that don't involve prescription opioids. · Talk about any and all concerns and side effects. · Help prevent misuse and abuse. · Never sell or share prescription opioids · Help prevent misuse and abuse. · Store prescription opioids in a secure place and out of reach of others (this may include visitors, children, friends, and family). · Safely dispose of unused/unwanted prescription opioids: Find your community drug take-back program or your pharmacy mail-back program, or flush them down the toilet, following guidance from the Food and Drug Administration (www.fda.gov/Drugs/ResourcesForYou). · Visit www.cdc.gov/drugoverdose to learn about the risks of opioid abuse and overdose. · If you believe you may be struggling with addiction, tell your health care provider and ask for guidance or call [address] [place] [address] at 5-100-728-HELP. Discharge Instructions [address] 
[address] Endoscopic ultrasound DISCHARGE INSTRUCTIONS Amado Vital [id number] 
1945 Discomfort: 
Sore throat- throat lozenges or warm salt water gargle 
redness at IV site- apply warm compress to area; if redness or soreness persist- contact your physician Gaseous discomfort- walking, belching will help relieve any discomfort You may not operate a vehicle for 12 hours You may not engage in an occupation involving machinery or appliances for rest of today You may not drink alcoholic beverages for at least 12 hours Avoid making any critical decisions for at least 24 hour DIET You may eat and drink now and after you leave. You may resume your regular diet  however -  remember your colon is empty and a heavy meal will produce gas. Avoid these foods:  vegetables, fried / greasy foods, carbonated drinks ACTIVITY You may resume your normal daily activities Spend the remainder of the day resting -  avoid any strenuous activity. CALL M.D. ANY SIGN OF Increasing pain, nausea, vomiting Abdominal distension (swelling) New increased bleeding (oral or rectal) Fever (chills) Pain in chest area Bloody discharge from nose or mouth Shortness of breath Follow-up Instructions: 
 Call Dr. Farnaz Mccarthy for any questions or problems. Telephone # [phone] ENDOSCOPY FINDINGS: 
 Your endoscopy showed pancreas mass, I biopsied. Signed By: Farnaz Mccarthy MD   
 10/4/2018  4:43 PM 
  
 
 
 
 
  
  
  
Introducing [facility]! [employer] introduces ConferenceEdge patient portal. Now you can access parts of your medical record, email your doctor's office, and request medication refills online. 1. In your internet browser, go to [email or web address] Y[employer] Creationt 2. Click on the First Time User? Click Here link in the Sign In box. You will see the New Member Sign Up page. 3. Enter your ConferenceEdge Access Code exactly as it appears below. You will not need to use this code after youve completed the sign-up process. If you do not sign up before the expiration date, you must request a new code. · ConferenceEdge Access Code: [email or web address]- Expires: 12/27/2018  1:11 PM 
 
4. Enter the last four digits of your Social Security Number (xxxx) and Date of Birth (mm/dd/yyyy) as indicated and click Submit. You will be taken to the next sign-up page. 5. Create a FashFoliot ID. This will be your ConferenceEdge login ID and cannot be changed, so think of one that is secure and easy to remember. 6. Create a FashFoliot password. You can change your password at any time. 7. Enter your Password Reset Question and Answer. This can be used at a later time if you forget your password. 8. Enter your e-mail address. You will receive e-mail notification when new information is available in [address] 9. Click Sign Up. You can now view and download portions of your medical record. 10. Click the Download Summary menu link to download a portable copy of your medical information. If you have questions, please visit the Frequently Asked Questions section of the Sciencehart website. Remember, [email or web address] is NOT to be used for urgent needs. For medical emergencies, dial 911. Now available from your iPhone and Android! Introducing Mark Stewart As a [employer] patient, I wanted to make you aware of our electronic visit tool called Mark Stewart. [employer] 24/7 allows you to connect within minutes with a medical provider 24 hours a day, seven days a week via a mobile device or tablet or logging into a secure website from your computer. You can access Mark Stewart from anywhere in the [place]. A virtual visit might be right for you when you have a simple condition and feel like you just dont want to get out of bed, or cant get away from work for an appointment, when your regular [employer] provider is not available (evenings, weekends or holidays), or when youre out of town and need minor care. Electronic visits cost only $49 and if the [employer] 24/7 provider determines a prescription is needed to treat your condition, one can be electronically transmitted to a nearby pharmacy*. Please take a moment to enroll today if you have not already done so. The enrollment process is free and takes just a few minutes. To enroll, please download the [employer] 24/7 tunde to your tablet or phone, or visit [email or web address] to enroll on your computer.    
And, as an [address] [place] [address] patient with a [employer] account, the results of your visits will be scanned into your electronic medical record and your primary care provider will be able to view the scanned results. We urge you to continue to see your regular [employer] provider for your ongoing medical care. And while your primary care provider may not be the one available when you seek a [employer] virtual visit, the peace of mind you get from getting a real diagnosis real time can be priceless. For more information on [employer], view our Frequently Asked Questions (FAQs) at [email or web address]. Sincerely, 
 
Marcial Mace MD 
Chief Medical Officer [address] *:  certain medications cannot be prescribed via [employer] Unresulted Labs-Please follow up with your PCP about these lab tests Order Current Status US ENDO ENDOSCOPIC ULTRASOUND In process Providers Seen During Your Hospitalization Provider Specialty Primary office phone Damián Marion MD Gastroenterology [phone] Your Primary Care Physician (PCP) Primary Care Physician Office Phone Office Fax Vidient Proud [phone] You are allergic to the following Allergen Reactions Amoxicillin Hives Recent Documentation Height Weight BMI OB Status Smoking Status 1.702 m 82.1 kg 28.35 kg/m2 Menopause Never Smoker Emergency Contacts Name Discharge Info Relation Home Work Mobile Dejon Strange DISCHARGE CAREGIVER [3] Son [22] [phone] Patient Belongings The following personal items are in your possession at time of discharge: 
  Dental Appliances: None  Visual Aid: Glasses, Sent home Please provide this summary of care documentation to your next provider. Signatures-by signing, you are acknowledging that this After Visit Summary has been reviewed with you and you have received a copy. Patient Signature:  ____________________________________________________________ Date:  ____________________________________________________________  
  
Jilda Staple Provider Signature:  ____________________________________________________________ Date:  ____________________________________________________________

## 2018-10-04 NOTE — ANESTHESIA POSTPROCEDURE EVALUATION
Post-Anesthesia Evaluation and Assessment Patient: Aileen Baltazar MRN: [id number]  SSN: [id number] YOB: 1945  Age: 72 y.o. Sex: female Cardiovascular Function/Vital Signs Visit Vitals  /79  Pulse 80  Temp 36.9 °C (98.5 °F)  Resp 16  
 Ht 5' 7\" (1.702 m)  Wt 82.1 kg (181 lb)  SpO2 98%  BMI 28.35 kg/m2 Patient is status post MAC anesthesia for Procedure(s): ENDOSCOPIC ULTRASOUND (EUS) ESOPHAGOGASTRODUODENOSCOPY (EGD) FINE NEEDLE ASPIRATION. Nausea/Vomiting: None Postoperative hydration reviewed and adequate. Pain: 
Pain Scale 1: Numeric (0 - 10) (10/04/18 1645) Pain Intensity 1: 3 (10/04/18 1645) Managed Neurological Status: At baseline Mental Status and Level of Consciousness: Arousable Pulmonary Status:  
O2 Device: Room air (10/04/18 1707) Adequate oxygenation and airway patent Complications related to anesthesia: None Post-anesthesia assessment completed. No concerns Signed By: Guilherme Mane MD   
 October 4, 2018

## 2018-10-04 NOTE — PROGRESS NOTES

## 2018-10-04 NOTE — H&P
The patient is a 72year old female who presents with a complaint of Pancreatic Mass. The patient presents due to new symptoms. Surgical history: none. The pancreatic mass has been occurring for days. Associated features include The symptoms have been associated with abdominal pain, nausea, weight loss and change in bowel habits,  while the symptoms have not been associated with anemia, melena or history of acute pancreatitis. Aggravating factors include food. Previous diagnostic tests have included CT scan and Abdominal ultrasound. Medications have included PPI's. Note for \"Pancreatic Mass\": CT 10/1/18 3.8 x 1.9 cm mass involving the uncinate process and possibly invading the duodenum; 1 cm hypodensity in the dome of the liver Additional reasons for visit: 
 
Abdominal Pain is described as the following: The patient presents for new symptoms. The onset of the abdominal pain has been gradual and has been occurring in a persistent pattern for 1 month. The abdominal pain is described as moderate pressure sensation and is described as being located in the epigastrium . The abdominal pain does not radiate. The symptoms are aggravated by nothing. The symptoms have no relieving factors. The symptoms have been associated with constipation, early satiety and weight loss (25 pounds over the past 6 months; no appetite), while the symptoms have not been associated with bloody stools, diarrhea, heartburn, melena or nausea. The patient had a colonoscopy 5 years (Dr. Danny Bernardo; history of polyps) ago . The patient has been using omeprazole (Prilosec). Lab results show: CBC normal, Amylase/lipase and LFT's normal. Note for \"Abdominal Pain\": She reports decreased energy. Problem List/Past Medical (Zuly Krishnan; 10/3/2018 11:57 AM) Hyperlipidemia   
Hypertension   
 
Past Surgical History (Zuly Krishnan; 10/3/2018 11:45 AM) Tonsillectomy   
 
Allergies (Zuly Krishnan; 10/3/2018 11:45 AM) Amoxicillin *PENICILLINS*   Hives. Medication History (Zuly Krishnan; 10/3/2018 11:55 AM) Diclofenac Sodium  (1% Gel, Transdermal) Active. Fish Oil  (1200MG Capsule DR, Oral daily) Active. HydroCHLOROthiazide  (12.5MG Capsule, Oral daily) Active. Simvastatin  (20MG Tablet, Oral daily) Active. Telmisartan  (80MG Tablet, Oral daily) Active. Casimer Clas FlexTouch  (100UNIT/ML Soln Pen-inj, 10 units Subcutaneous daily) Active. Vitamin D  (2000UNIT Capsule, Oral daily) Active. PriLOSEC OTC  (20MG Tablet DR, Oral daily) Active. Social History (Zuly Krishnan; 10/3/2018 11:48 AM) Blood Transfusion   No. 
Alcohol Use   Occasional alcohol use. Tobacco Use   Never smoker. Diagnostic Studies History (Zuly Krishnan; 10/3/2018 11:48 AM) Colonoscopy   2013, Dr. Zhao Lind Review of Systems (Enxdebbi Krishnan; 10/3/2018 11:39 AM) General Not Present- Chronic Fatigue, Poor Appetite, Weight Gain and Weight Loss. Skin Not Present- Itching, Rash and Skin Color Changes. HEENT Not Present- Hearing Loss and Vertigo. Respiratory Not Present- Difficulty Breathing and TB exposure. Cardiovascular Not Present- Chest Pain, Use of Antibiotics before Dental Procedures and Use of Blood Thinners. Gastrointestinal Present- See HPI. Musculoskeletal Not Present- Arthritis, Hip Replacement Surgery and Knee Replacement Surgery. Neurological Not Present- Weakness. Psychiatric Not Present- Depression. Endocrine Not Present- Diabetes and Thyroid Problems. Hematology Not Present- Anemia. Vitals (Zuly Sandy; 10/3/2018 11:51 AM) 10/3/2018 11:49 AM 
Weight: 181 lb   Height: 67 in  
Body Surface Area: 1.94 m²   Body Mass Index: 28.35 kg/m²   
Pulse: 90 (Regular)    
BP: 112/70 (Sitting, Left Arm, Standard) Physical Exam Julianna Carey AGACNP; 10/3/2018 12:20 PM) General 
Mental Status - Alert. General Appearance - Cooperative, Pleasant, Not in acute distress. Orientation - Oriented X3. Build & Nutrition - Well nourished and Well developed. Integumentary General Characteristics Color - normal coloration of skin. Temperature - normal warmth is noted. Mobility & Turgor - normal mobility and turgor. Head and Neck Head - normocephalic, atraumatic with no lesions or palpable masses. Neck Global Assessment - full range of motion and supple. Trachea - midline. Eye Eyeball - Bilateral - No Exophthalmos. Sclera/Conjunctiva - Left - No Jaundice. Sclera/Conjunctiva - Right - No Jaundice. Chest and Lung Exam 
Chest and lung exam reveals  - quiet, even and easy respiratory effort with no use of accessory muscles. Auscultation Breath sounds - Normal. Adventitious sounds - No Adventitious sounds. Cardiovascular Auscultation Rhythm - Regular, No Tachycardia, No Bradycardia . Heart Sounds - Normal heart sounds , S1 WNL and S2 WNL, No S3, No Summation Gallop. Murmurs & Other Heart Sounds - Auscultation of the heart reveals - No Murmurs. Abdomen Inspection Inspection of the abdomen reveals - Soft. Palpation/Percussion Tenderness - Non-Tender. Rebound tenderness - No rebound. Rigidity (guarding) - No Rigidity. Dullness to percussion - No abnormal dullness to percussion. Liver - No hepatosplenomegaly. Abdominal Mass Palpable - No masses. Other Characteristics - No Ascites. Auscultation Auscultation of the abdomen reveals - Bowel sounds normal, No Abdominal bruits and No Succussion splash. Rectal - Did not examine. Peripheral Vascular Upper Extremity Inspection - Left - Normal - No Clubbing, No Cyanosis, No Edema, Pulses Intact. Right - Normal - No Clubbing, No Cyanosis, No Edema, Pulses Intact. Palpation - Edema - Left - No edema. Right - No edema. Neurologic Neurologic evaluation reveals  - Cranial nerves grossly intact, no focal neurologic deficits. Musculoskeletal 
Global Assessment Gait and Station - normal gait and station. Assessment & Plan (Shiv Pan Cherry AGACNP; 10/3/2018 2:31 PM) Pancreatic mass (577.9  K86.9) Story: CT 10/1/18 3.8 x 1.9 cm mass involving the uncinate process and possibly invading the duodenum; 1 cm hypodensity in the dome of the liver Colonoscopy 2013 by Dr. Lily Burton; history of polyps Impression: She reports generalized epigastric discomfort, decreased appetite, and 25 pound unintentional weight loss. Concern for pancreatic carcinoma on CT, will further evaluate with EUS. Current Plans Pt Education - How to access health information online: discussed with patient and provided information. EUS (Endoscopic Ultrasound) (17396) Patient is to call me for any questions or concerns. Case discussed personally with a physician in the office regarding the presentation, pertinent physical findings and development of a diagnostic and therapeutic plan. Follow up office visit PRN This patient was reviewed and seen in collaboration with Dr. Tana Cline. Constipation (564.00  K59.00) Epigastric discomfort (789.06  R10.13) Date of Surgery Update: Yrn Last was seen and examined. History and physical has been reviewed. The patient has been examined.  There have been no significant clinical changes since the completion of the originally dated History and Physical. 
 
Signed By: Tana Cline MD   
 October 4, 2018 3:46 PM

## 2018-10-08 ENCOUNTER — OFFICE VISIT (OUTPATIENT)
Dept: ONCOLOGY | Age: 73
End: 2018-10-08

## 2018-10-08 ENCOUNTER — DOCUMENTATION ONLY (OUTPATIENT)
Dept: ONCOLOGY | Age: 73
End: 2018-10-08

## 2018-10-08 VITALS
HEART RATE: 72 BPM | SYSTOLIC BLOOD PRESSURE: 140 MMHG | WEIGHT: 183.5 LBS | TEMPERATURE: 97.4 F | DIASTOLIC BLOOD PRESSURE: 81 MMHG | OXYGEN SATURATION: 98 % | BODY MASS INDEX: 28.8 KG/M2 | HEIGHT: 67 IN | RESPIRATION RATE: 16 BRPM

## 2018-10-08 DIAGNOSIS — C25.7 MALIGNANT NEOPLASM OF OTHER PARTS OF PANCREAS (HCC): Primary | ICD-10-CM

## 2018-10-08 RX ORDER — DICLOFENAC SODIUM 10 MG/G
GEL TOPICAL
COMMUNITY
End: 2018-10-18

## 2018-10-08 NOTE — PROGRESS NOTES
[facility] [place] [facility] [place] [address] [address] [facility] [address] [facility]  Patrice Edwardstz: [phone]  F: [phone]      Reason for Visit:   Donna Jimenez is a 72 y.o. female who is seen in self-referral for evaluation of pancreatic cancer. Treatment History:   · 10/4/18 pancreatic head mass FNA, pancreatic adenocarcinoma    History of Present Illness:   She started having abdominal pain, gradual and persistent, x 1 month, pressure sensation, located in epigastrium, no radiation, no aggravating symptoms, no relieving factors. 25 lb weight loss over 6 months.  + nausea. Complains of insomnia as well    Past Medical History:   Diagnosis Date    Arthritis     Arthritis     Constipation     Diabetes ([address]     Hemorrhoids     Hiatal hernia     High cholesterol     Hypertension     Poor appetite       Past Surgical History:   Procedure Laterality Date    HX KNEE ARTHROSCOPY        Social History   Substance Use Topics    Smoking status: Never Smoker    Smokeless tobacco: Never Used    Alcohol use 1.2 - 2.4 oz/week     1 - 2 Cans of beer, 1 - 2 Shots of liquor per week      Family History   Problem Relation Age of Onset    Cancer Mother     Hypertension Mother     Stroke Mother     Heart Disease Mother     Cancer Father     Heart Disease Father     Stroke Father      Current Outpatient Prescriptions   Medication Sig    diclofenac (VOLTAREN) 1 % gel diclofenac 1 % topical gel    simvastatin (ZOCOR) 10 mg tablet Take  by mouth nightly.  telmisartan (MICARDIS) 80 mg tablet Take 80 mg by mouth daily.  hydroCHLOROthiazide (MICROZIDE) 12.5 mg capsule Take 12.5 mg by mouth daily.  insulin degludec (TRESIBA FLEXTOUCH U-100 SC) 10 Units by SubCUTAneous route.  HYDROcodone-acetaminophen (NORCO) 5-325 mg per tablet Take 1 Tab by mouth every six (6) hours as needed for Pain. Max Daily Amount: 4 Tabs.  Indications: Pain    omeprazole (PRILOSEC OTC) 20 mg tablet Take 20 mg by mouth daily.  metFORMIN (GLUCOPHAGE) 500 mg tablet Take 1 Tab by mouth two (2) times daily (with meals) for 30 days. No current facility-administered medications for this visit. Allergies   Allergen Reactions    Amoxicillin Hives        Review of Systems: A complete review of systems was obtained, negative except as described above. Physical Exam:     Visit Vitals    /81 (BP 1 Location: Right arm, BP Patient Position: Sitting)    Pulse 72    Temp 97.4 °F (36.3 °C) (Oral)    Resp 16    Ht 5' 7\" (1.702 m)    Wt 183 lb 8 oz (83.2 kg)    SpO2 98%    BMI 28.74 kg/m2     ECOG PS: 0  General: No distress  Eyes: PERRLA, anicteric sclerae  HENT: Atraumatic, OP clear  Neck: Supple  Lymphatic: No cervical, supraclavicular, or inguinal adenopathy  Respiratory: CTAB, normal respiratory effort  CV: Normal rate, regular rhythm, no murmurs, no peripheral edema  GI: Soft, nontender, nondistended, no masses, no hepatomegaly, no splenomegaly  MS: Normal gait and station. Digits without clubbing or cyanosis. Skin: No rashes, ecchymoses, or petechiae. Normal temperature, turgor, and texture. Psych: Alert, oriented, appropriate affect, normal judgment/insight    Results:     Lab Results   Component Value Date/Time    WBC 8.5 09/29/2018 10:51 AM    HGB 14.3 09/29/2018 10:51 AM    HCT 42.4 09/29/2018 10:51 AM    PLATELET 835 19/57/8846 10:51 AM    MCV 88.1 09/29/2018 10:51 AM    ABS.  NEUTROPHILS 6.7 09/29/2018 10:51 AM     Lab Results   Component Value Date/Time    Sodium 130 (L) 09/29/2018 10:51 AM    Potassium 3.6 09/29/2018 10:51 AM    Chloride 93 (L) 09/29/2018 10:51 AM    CO2 24 09/29/2018 10:51 AM    Glucose 440 (H) 09/29/2018 10:51 AM    BUN 15 09/29/2018 10:51 AM    Creatinine 1.05 (H) 09/29/2018 10:51 AM    GFR est AA >60 09/29/2018 10:51 AM    GFR est non-AA 52 (L) 09/29/2018 10:51 AM    Calcium 8.9 09/29/2018 10:51 AM    Glucose (POC) 316 (H) 10/04/2018 03:45 PM     Lab Results   Component Value Date/Time    Bilirubin, total 0.7 09/29/2018 10:51 AM    ALT (SGPT) 25 09/29/2018 10:51 AM    AST (SGOT) 12 (L) 09/29/2018 10:51 AM    Alk. phosphatase 196 (H) 09/29/2018 10:51 AM    Protein, total 7.8 09/29/2018 10:51 AM    Albumin 3.7 09/29/2018 10:51 AM    Globulin 4.1 (H) 09/29/2018 10:51 AM     10/1/18 CT abd  There is a 3.8 x 1.9 cm mass involving the uncinate process and possibly  invading the duodenum. Findings are nonspecific but typical of pancreatic  carcinoma. There is no biliary or pancreatic ductal dilatation.     There is a poorly defined irregular hypodensity in segment IVb of the liver  measuring 3.7 x 1.9 cm. There appears to be focal biliary dilatation in the left  lobe behind this abnormality. Metastatic disease is suspected. There is a small,  1 cm hypodensity in the dome of the liver, likely segment 8. There is a 1 cm  hypodensity in segment 4 of the liver as well, also likely metastasis. Small  hypodensity measuring less than 1 cm in segment 6 at the tip laterally is also  nonspecific but probable metastasis.     Spleen kidneys and adrenals are unremarkable.     No free fluid or focal fluid collection.     Lung bases are clear.     Bone windows are unremarkable.     IMPRESSION  IMPRESSION:  1. 3.8 x 1.9 cm mass involving the uncinate process of the pancreas and possibly  invading the duodenum, this likely represents pancreatic carcinoma. 2. Likely metastatic disease in the liver. There is a 3.8 x 1.9 cm mass involving the uncinate process and possibly  invading the duodenum. Findings are nonspecific but typical of pancreatic  carcinoma. There is no biliary or pancreatic ductal dilatation.     There is a poorly defined irregular hypodensity in segment IVb of the liver  measuring 3.7 x 1.9 cm. There appears to be focal biliary dilatation in the left  lobe behind this abnormality. Metastatic disease is suspected.  There is a small,  1 cm hypodensity in the dome of the liver, likely segment 8. There is a 1 cm  hypodensity in segment 4 of the liver as well, also likely metastasis. Small  hypodensity measuring less than 1 cm in segment 6 at the tip laterally is also  nonspecific but probable metastasis.     Spleen kidneys and adrenals are unremarkable.     No free fluid or focal fluid collection.     Lung bases are clear.     Bone windows are unremarkable.     IMPRESSION  IMPRESSION:  1. 3.8 x 1.9 cm mass involving the uncinate process of the pancreas and possibly  invading the duodenum, this likely represents pancreatic carcinoma. 2. Likely metastatic disease in the liver. Records reviewed and summarized above. Pathology report(s) reviewed above. Radiology report(s) reviewed above. Assessment/plan:   1. Uncinate pancreatic adenocarcinoma, mets to liver, stage IV:  cT2 cN0 cM1    Discussed with radiology. Will order biopsy of liver, central segment IV. Discussed that if this is metastatic disease, while this is treatable, it is incurable, and the goal of therapy would be palliative. Will need a CT of chest and pelvis, but will hold off to see what biopsy results are, as she may be a candidate for the BBI-608 clinical trial, and I do not want to accidentally start a clock for expiration of scans and have to rescan her. Discussed that initial therapy would likely be with chemotherapy, IV, the exact nature somewhat dependent on the results of the liver biopsy above. As an example of likely chemotherapy, we discussed the risks and benefits of Gemcitabine and Abraxane chemotherapy. Potential side effects include, but are not limited to, nausea, vomiting, diarrhea, constipation, mucositis, taste changes, myelosuppression, infection, fatigue, alopecia, skin and nail changes, pulmonary toxicity, neuropathy, allergic reactions, infertility, and rarely, death. We also discussed the risks and benefits of FOLFIRINOX chemotherapy, including potential side effects.  These include but are not limited to fatigue, nausea vomiting, diarrhea, neuropathy, taste changes, cold intolerance, esophageal spasm, allergic reactions, alopecia, mucositis, myelosuppression, risk for infection, infertility, and rarely, death. Rarely, a patient may have a condition where they do not metabolize fluorouracil appropriately (called DPD deficiency), and they may have excessive toxicity. A Port-A-Cath will be required in order to deliver the continuous infusion. Discussed the BBI-608 study and gave her a sample consent form. Will see her back next week to finalize the therapy plan    2. DM2:  Holding metformin; on insulin    3. Emotional well being:  She has excellent support and is coping well with her disease    4. Abdominal pain:  Due to cancer, will monitor for now, may need palliative care referral    5. Nutrition:  Will have her meet Roya Pratt at her next visit    6. Insomnia:  She has lunesta; she may also try melatonin    > 60 min were spent with this patient with > 50% of that time spent in face to face counseling. I appreciate the opportunity to participate in Ms. Sadaf Strange's care.     Signed By: José Miguel Murdock MD

## 2018-10-08 NOTE — MR AVS SNAPSHOT
Tu Bee 
 
 
 [address] [facility], [address] 
[phone] Patient: [facility] MRN: [id number] VNZ:[id number] Visit Information Date & Time Provider Department Dept. Phone Encounter #  
 10/8/2018  4:00 PM Madhuri Hollingsworth MD [facility] at [address] [place] [address] [phone] [id number] Follow-up Instructions Return in about 9 days (around 10/17/2018). Follow-up and Disposition History Your Appointments 10/17/2018  2:30 PM  
Any with Dawna Barragan NP [facility] at [address] [place] [address]-[facility]) Appt Note:   
 [address] [facility], [address] [facility] [address]  
[phone]  
  
   
 [address] [place] [address], [facility] [address] Upcoming Health Maintenance Date Due Hepatitis C Screening 1945 DTaP/Tdap/Td series (1 - Tdap) 3/25/1968 Shingrix Vaccine Age 50> (1 of 2) 3/25/1997 BREAST CANCER SCRN MAMMOGRAM 3/25/1997 FOBT Q 1 YEAR AGE 50-75 3/25/1997 GLAUCOMA SCREENING Q2Y 3/25/2012 Bone Densitometry (Dexa) Screening 3/25/2012 Pneumococcal 65+ High/Highest Risk (1 of 2 - PCV13) 3/25/2012 MEDICARE YEARLY EXAM 3/20/2018 Influenza Age 5 to Adult 8/1/2018 Allergies as of 10/8/2018  Review Complete On: 10/8/2018 By: Madhuri Hollingsworth MD  
  
 Severity Noted Reaction Type Reactions Amoxicillin  09/29/2018    Hives Current Immunizations  Never Reviewed No immunizations on file. Not reviewed this visit You Were Diagnosed With   
  
 Codes Comments Malignant neoplasm of other parts of pancreas ([address]    -  Primary ICD-10-CM: C25.7 ICD-9-CM: 157.8 Vitals BP Pulse Temp Resp Height(growth percentile) Weight(growth percentile) 140/81 (BP 1 Location: Right arm, BP Patient Position: Sitting) 72 97.4 °F (36.3 °C) (Oral) 16 5' 7\" (1.702 m) 183 lb 8 oz (83.2 kg) SpO2 BMI OB Status Smoking Status 98% 28.74 kg/m2 Menopause Never Smoker Vitals History BMI and BSA Data Body Mass Index Body Surface Area 28.74 kg/m 2 1.98 m 2 Preferred Pharmacy Pharmacy Name Phone [facility]/PHARMACY #[facility] [phone] Your Updated Medication List  
  
   
This list is accurate as of 10/8/18  5:18 PM.  Always use your most recent med list.  
  
  
  
  
 diclofenac 1 % Gel Commonly known as:  VOLTAREN  
diclofenac 1 % topical gel  
  
 hydroCHLOROthiazide 12.5 mg capsule Commonly known as:  Bretta Mule Take 12.5 mg by mouth daily. HYDROcodone-acetaminophen 5-325 mg per tablet Commonly known as:  [address] Take 1 Tab by mouth every six (6) hours as needed for Pain. Max Daily Amount: 4 Tabs. Indications: Pain PriLOSEC OTC 20 mg tablet Generic drug:  omeprazole Take 20 mg by mouth daily. simvastatin 10 mg tablet Commonly known as:  ZOCOR Take  by mouth nightly. telmisartan 80 mg tablet Commonly known as:  Primo Porteous Take 80 mg by mouth daily. TRESIBA FLEXTOUCH U-100 SC  
10 Units by SubCUTAneous route. Follow-up Instructions Return in about 9 days (around 10/17/2018). To-Do List   
 10/10/2018 Imaging:  CT BX LIVER NDL PERC Patient Instructions Common Side Effects of Chemotherapy Decreased Blood Counts Your blood counts can decrease temporarily due to chemotherapy, they will recover over time. This is an expected side effect that your Doctor will be monitoring. 
- If you experience fevers (temperature >100.4°F), bleeding or unexplained bruising, please call the office right away Risk of Infection Your white blood cells can decrease temporarily due to chemotherapy and can put you at higher risk of infection. Washing hands frequently with soap and avoiding sick contacts can reduce your risk of infection. - If you experience fevers (temperature >100.4°F), shaking chills, or any signs of infection, please call the office immediately Anemia Chemotherapy can cause your red blood cells to temporarily decrease; this is an expected side effect that your Doctor will be monitoring. 
- You may experience fatigue if this occurs, please notify the office if you experience bleeding, shortness of breath with minimal exertion or at rest, rapid heartbeat, or feeling as though you may lose consciousness. Hair Loss Chemotherapy can affect your hair follicles and cause you to lose hair. This can occur on your scalp hair but also all over your body including eyebrows and eye lashes Nausea  You have been prescribed nausea medication to take if needed. Please follow the directions given to you by your Doctor. - Please call the office if the medications you have been given are not relieving nausea. Vomiting Make sure you are taking anti-nausea medication as prescribed. Eating small amounts of bland foods frequently can help. - Please call the office right away if you are vomiting more than 4 times per day or are unable to keep down food or fluids Diarrhea Eating small amounts of bland foods frequently can help, increase your fluid intake. It is usually ok to take Imodium for diarrhea. - Please call the office right away if you experience more than 4 episodes of watery diarrhea or if you are feeling dehydrated. Female patients of childbearing age need to avoid pregnancy during chemotherapy. You can reach Medical Oncology at [facility] with further questions or concerns at: [phone]. 
- Calls during normal business hours will reach our office. 
- Calls after hours or on the weekend will reach an answering service and the on-call Oncologist will return your call. Introducing [facility] [place] SERVICES!    
 Timothy Bird introduces [employer] patient portal. Now you can access parts of your medical record, email your doctor's office, and request medication refills online. 1. In your internet browser, go to [email or web address] 2. Click on the First Time User? Click Here link in the Sign In box. You will see the New Member Sign Up page. 3. Enter your [employer] Access Code exactly as it appears below. You will not need to use this code after youve completed the sign-up process. If you do not sign up before the expiration date, you must request a new code. · [employer] Access Code: [email or web address] Expires: 12/27/2018  1:11 PM 
 
4. Enter the last four digits of your Social Security Number (xxxx) and Date of Birth (mm/dd/yyyy) as indicated and click Submit. You will be taken to the next sign-up page. 5. Create a [employer] ID. This will be your [employer] login ID and cannot be changed, so think of one that is secure and easy to remember. 6. Create a [employer] password. You can change your password at any time. 7. Enter your Password Reset Question and Answer. This can be used at a later time if you forget your password. 8. Enter your e-mail address. You will receive e-mail notification when new information is available in [address] 9. Click Sign Up. You can now view and download portions of your medical record. 10. Click the Download Summary menu link to download a portable copy of your medical information. If you have questions, please visit the Frequently Asked Questions section of the [employer] website. Remember, [employer] is NOT to be used for urgent needs. For medical emergencies, dial 911. Now available from your iPhone and Android! Please provide this summary of care documentation to your next provider. Your primary care clinician is listed as Sarah Celaya. If you have any questions after today's visit, please call [phone].

## 2018-10-08 NOTE — PATIENT INSTRUCTIONS
Common Side Effects of Chemotherapy  Decreased Blood Counts Your blood counts can decrease temporarily due to chemotherapy, they will recover over time. This is an expected side effect that your Doctor will be monitoring.  - If you experience fevers (temperature >100.4°F), bleeding or unexplained bruising, please call the office right away   Risk of Infection Your white blood cells can decrease temporarily due to chemotherapy and can put you at higher risk of infection. Washing hands frequently with soap and avoiding sick contacts can reduce your risk of infection.  - If you experience fevers (temperature >100.4°F), shaking chills, or any signs of infection, please call the office immediately   Anemia Chemotherapy can cause your red blood cells to temporarily decrease; this is an expected side effect that your Doctor will be monitoring.  - You may experience fatigue if this occurs, please notify the office if you experience bleeding, shortness of breath with minimal exertion or at rest, rapid heartbeat, or feeling as though you may lose consciousness. Hair Loss Chemotherapy can affect your hair follicles and cause you to lose hair. This can occur on your scalp hair but also all over your body including eyebrows and eye lashes   Nausea  You have been prescribed nausea medication to take if needed. Please follow the directions given to you by your Doctor. - Please call the office if the medications you have been given are not relieving nausea. Vomiting Make sure you are taking anti-nausea medication as prescribed. Eating small amounts of bland foods frequently can help. - Please call the office right away if you are vomiting more than 4 times per day or are unable to keep down food or fluids   Diarrhea Eating small amounts of bland foods frequently can help, increase your fluid intake. It is usually ok to take Imodium for diarrhea.   - Please call the office right away if you experience more than 4 episodes of watery diarrhea or if you are feeling dehydrated. Female patients of childbearing age need to avoid pregnancy during chemotherapy. You can reach Medical Oncology at [address] [place] [address] with further questions or concerns at: [phone].  - Calls during normal business hours will reach our office.  - Calls after hours or on the weekend will reach an answering service and the on-call Oncologist will return your call.

## 2018-10-10 NOTE — PROGRESS NOTES
Oncology Navigator Psychosocial Assessment Reason for Assessment:   
[]Depression  []Anxiety  []Caregiver Price  []Maladaptive Coping with Serious Illness   [x]Other: new dx: pancreatic cancer Sources of Information:   
[x]Patient  [x]Family  [x]Staff  [x]Medical Record Advance Care Planning: No flowsheet data found. Mental Status:   
[x]Alert  []Lethargic  []Unresponsive Oriented to:  [x]Person  [x]Place  [x]Time  [x]Situation Barriers to Learning:   
[]Language  []Developmental  []Cognitive  []Altered Mental Status  []Visual/Hearing Impairment  []Unable to Read/Write  []Motivational   [x]No Barriers Identified  []Other: 
 
Relationship Status: 
[]Single  [x]  []Significant Other/Life Partner  []  []  [] Living Circumstances: 
[]Lives Alone  [x]Family/Significant Other in Household  []Roommates  []Children in the Home  []Paid Caregivers  []Assisted Living Facility/Group Home  []Skilled [address]  []Homeless  []Incarcerated  []Environmental/Care Concerns  []Other: 
 
Support System:   
[x]Strong  []Fair  []Limited Financial/Legal Concerns:   
[]Uninsured  []Limited Income/Resources  []Non-Citizen  [x]No Concerns Identified  []Financial POA:   
[]Other: 
 
[employer]/Spiritual/Existential: 
[]Strong Sense of Spirituality  []Involved in [employer] []Request  Visit  []Expressing Spiritual/Existential Angst  [x]No Concerns Identified Coping with Illness:       
 Patient: Family/Caregiver:  
Understanding and Acceptance of Illness/Prognosis  [x] [x] Strong Sense of Resilience [] []  
Self Reflection [] [] Engaged Support System [x] [] Does not Readily Discuss Illness [] [] Denial of Terminal Status [] [] Anger [] [] Depression [] [] Anxiety/Fear [] []  
Bargaining [] [] Recent Diagnosis/Prognosis [x] [] Difficulties with Body Image [] [] Loss of Identity [] [] Excessive Substance Use [] [] Mental Health History [] []  
Enmeshed Relationships [] [] History of Loss [] [] Anticipatory Grief [] [] Concern for Complicated Grief [] [] Suicidal Ideation or Plan [] [] Unable to assess [] []  
 
            
Narrative: Met with patient and her cousin, Jef Lewis, to introduce social work navigator role and supports. Patient live alone in a private residence. He son Enoch Whittaker) and cousin Winston Greenwood) live close to her. She tells me she is well supported by family and friends. Patient has adequate transportation to medical appointments. Patient has health insurance and rx coverage. Patient is actively coping with diagnosis and tells me kade is important to her. Reviewed supportive resources including ACS, integrative therapies offered at [facility], and the [facility]. Patient agrees no barriers at this times. Encouraged patient to contact me at needed and provided contact information. Referrals:  
 
I. Transportation Medicaid ([place]) [] ACS [facility] [] Regional organization  [] Financial Assistance/Medication Access Patient assistance program (Care Card) [] Co-pay assistance  [] Leukemia & Lymphoma Society [] [address]  [] Patient [address] [] CancerCare  [] Emotional support Peer support group [] Local counseling [] Online support group [] Coordination of psychiatry consult [] Goals/Plan:  
1. Information provided about integrative therapies at [facility] 2.  Ongoing psychosocial support as needed 
 
-JONE Artuhr

## 2018-10-15 ENCOUNTER — HOSPITAL ENCOUNTER (OUTPATIENT)
Dept: CT IMAGING | Age: 73
Discharge: HOME | End: 2018-10-15
Attending: INTERNAL MEDICINE
Payer: MEDICARE

## 2018-10-15 VITALS
WEIGHT: 184 LBS | HEIGHT: 67 IN | OXYGEN SATURATION: 96 % | HEART RATE: 78 BPM | SYSTOLIC BLOOD PRESSURE: 120 MMHG | BODY MASS INDEX: 28.88 KG/M2 | RESPIRATION RATE: 16 BRPM | DIASTOLIC BLOOD PRESSURE: 66 MMHG

## 2018-10-15 DIAGNOSIS — C25.7 MALIGNANT NEOPLASM OF OTHER PARTS OF PANCREAS (HCC): ICD-10-CM

## 2018-10-15 LAB
APTT PPP: 27.4 SEC (ref 22.1–32)
BASOPHILS # BLD: 0 K/UL (ref 0–0.1)
BASOPHILS NFR BLD: 1 % (ref 0–1)
DIFFERENTIAL METHOD BLD: NORMAL
EOSINOPHIL # BLD: 0.1 K/UL (ref 0–0.4)
EOSINOPHIL NFR BLD: 1 % (ref 0–7)
ERYTHROCYTE [DISTWIDTH] IN BLOOD BY AUTOMATED COUNT: 13.2 % (ref 11.5–14.5)
FIBRINOGEN PPP-MCNC: 596 MG/DL (ref 200–475)
HCT VFR BLD AUTO: 40.6 % (ref 35–47)
HGB BLD-MCNC: 13.8 G/DL (ref 11.5–16)
IMM GRANULOCYTES # BLD: 0 K/UL (ref 0–0.04)
IMM GRANULOCYTES NFR BLD AUTO: 0 % (ref 0–0.5)
INR PPP: 1 (ref 0.9–1.1)
LYMPHOCYTES # BLD: 1.1 K/UL (ref 0.8–3.5)
LYMPHOCYTES NFR BLD: 13 % (ref 12–49)
MCH RBC QN AUTO: 29 PG (ref 26–34)
MCHC RBC AUTO-ENTMCNC: 34 G/DL (ref 30–36.5)
MCV RBC AUTO: 85.3 FL (ref 80–99)
MONOCYTES # BLD: 0.9 K/UL (ref 0–1)
MONOCYTES NFR BLD: 11 % (ref 5–13)
NEUTS SEG # BLD: 6.3 K/UL (ref 1.8–8)
NEUTS SEG NFR BLD: 75 % (ref 32–75)
NRBC # BLD: 0 K/UL (ref 0–0.01)
NRBC BLD-RTO: 0 PER 100 WBC
PLATELET # BLD AUTO: 361 K/UL (ref 150–400)
PMV BLD AUTO: 10.7 FL (ref 8.9–12.9)
PROTHROMBIN TIME: 10 SEC (ref 9–11.1)
RBC # BLD AUTO: 4.76 M/UL (ref 3.8–5.2)
THERAPEUTIC RANGE,PTTT: ABNORMAL SECS (ref 58–77)
WBC # BLD AUTO: 8.4 K/UL (ref 3.6–11)

## 2018-10-15 PROCEDURE — 85025 COMPLETE CBC W/AUTO DIFF WBC: CPT | Performed by: RADIOLOGY

## 2018-10-15 PROCEDURE — 74011250636 HC RX REV CODE- 250/636: Performed by: RADIOLOGY

## 2018-10-15 PROCEDURE — 99153 MOD SED SAME PHYS/QHP EA: CPT

## 2018-10-15 PROCEDURE — 85610 PROTHROMBIN TIME: CPT | Performed by: RADIOLOGY

## 2018-10-15 PROCEDURE — 47000 NEEDLE BIOPSY OF LIVER PERQ: CPT

## 2018-10-15 PROCEDURE — 88173 CYTOPATH EVAL FNA REPORT: CPT | Performed by: INTERNAL MEDICINE

## 2018-10-15 PROCEDURE — 99152 MOD SED SAME PHYS/QHP 5/>YRS: CPT

## 2018-10-15 PROCEDURE — 88333 PATH CONSLTJ SURG CYTO XM 1: CPT | Performed by: INTERNAL MEDICINE

## 2018-10-15 PROCEDURE — 77030003503 HC NDL BIOP TISS BD -B

## 2018-10-15 PROCEDURE — 88334 PATH CONSLTJ SURG CYTO XM EA: CPT

## 2018-10-15 PROCEDURE — 36415 COLL VENOUS BLD VENIPUNCTURE: CPT | Performed by: RADIOLOGY

## 2018-10-15 PROCEDURE — 88307 TISSUE EXAM BY PATHOLOGIST: CPT | Performed by: INTERNAL MEDICINE

## 2018-10-15 PROCEDURE — 77030014115

## 2018-10-15 RX ORDER — FENTANYL CITRATE 50 UG/ML
100 INJECTION, SOLUTION INTRAMUSCULAR; INTRAVENOUS
Status: DISCONTINUED | OUTPATIENT
Start: 2018-10-15 | End: 2018-10-15

## 2018-10-15 RX ORDER — MIDAZOLAM HYDROCHLORIDE 1 MG/ML
5 INJECTION, SOLUTION INTRAMUSCULAR; INTRAVENOUS
Status: DISCONTINUED | OUTPATIENT
Start: 2018-10-15 | End: 2018-10-15

## 2018-10-15 RX ORDER — LIDOCAINE HYDROCHLORIDE 10 MG/ML
INJECTION, SOLUTION EPIDURAL; INFILTRATION; INTRACAUDAL; PERINEURAL
Status: DISPENSED
Start: 2018-10-15 | End: 2018-10-15

## 2018-10-15 RX ADMIN — FENTANYL CITRATE 25 MCG: 50 INJECTION, SOLUTION INTRAMUSCULAR; INTRAVENOUS at 10:06

## 2018-10-15 RX ADMIN — FENTANYL CITRATE 25 MCG: 50 INJECTION, SOLUTION INTRAMUSCULAR; INTRAVENOUS at 10:09

## 2018-10-15 RX ADMIN — MIDAZOLAM 1 MG: 1 INJECTION INTRAMUSCULAR; INTRAVENOUS at 10:09

## 2018-10-15 RX ADMIN — MIDAZOLAM 1 MG: 1 INJECTION INTRAMUSCULAR; INTRAVENOUS at 10:06

## 2018-10-15 RX ADMIN — MIDAZOLAM 1 MG: 1 INJECTION INTRAMUSCULAR; INTRAVENOUS at 10:19

## 2018-10-15 RX ADMIN — FENTANYL CITRATE 25 MCG: 50 INJECTION, SOLUTION INTRAMUSCULAR; INTRAVENOUS at 10:15

## 2018-10-15 RX ADMIN — MIDAZOLAM 1 MG: 1 INJECTION INTRAMUSCULAR; INTRAVENOUS at 10:39

## 2018-10-15 NOTE — DISCHARGE INSTRUCTIONS
Sedation for a Medical Procedure: After Your Visit  Instructions. Sedatives are used to relax you for a procedure. You may or may not be awake during the procedure. Common side effects of sedation medications include:                   Drowsiness, dizziness, euphoria, sleepiness or confusion. I              Unsteady gait. Loss of fine muscle control and delayed reaction time. Visual                     disturbances, difficulty focusing and blurred vision. Impaired memory recall. Follow-up care is a key component for your health and safety. Be sure to make and go to all medical appointments. Call your doctor if you are having problems. It's also a good idea to keep a list of your allergies, medicines with doses and test results on hand    Home care following your sedation procedure: You may experience some of these side effects or you may not be aware of subtle changes in your behavior or reaction time. Because you received these medications, we are giving you the following instructions: Activity   For your safety, you should not drive until the medicine wears off and you can think clearly and react easily. Do not drive for 24 hours. Rest when you feel tired. Getting enough sleep will help you recover. Diet    You can eat your normal diet. If your stomach is upset, try bland, low-fat foods like plain rice, broiled chicken, toast, and yogurt. Drink plenty of fluids unless your doctor advised you to limit your fluids. Do not consume alcoholic beverages for 24 hours. Instructions  Do not make important personal, business, or legal decisions for 24 hours. Move slowly and carefully, do not make sudden position changes. Be alert for dizziness or lightheadedness and move accordingly. Have a responsible person assist you. Do not drive for 24 hours. Do not operate equipment for 24 hours. Bramasol, power tools, kitchen appliances, etc.)    Discharge medications:  Resume prior to test medications as prescribed by your personal physician. If you have any questions or concerns call Radiology RN at [phone]  After hours call Radiology on-call at [phone]    Call 911 any time you think you may need emergency care. For example:                Call if you passed out (lost consciousness). The medicine is not wearing off and you cannot think clearly. Watch closely for changes in your health, and be sure to contact your doctor if                  you have any problems. Where can you learn more? Go to [email or web address]   Enter G817 in the search box to learn more about \"Sedation for a Medical Procedure: After Your Visit. \"         Percutaneous Liver Biopsy: What to Expect at [facility]  Percutaneous liver biopsy is a procedure to take a tiny sample (biopsy) of your liver tissue. Percutaneous (say \"per-bookerw-DALIA-nee-us) means \"through the skin. \" The procedure is also called aspiration biopsy or fine-needle aspiration. The tissue sample is looked at under a microscope. Your doctor can look for infection or other liver problems. You may have some pain where the biopsy needle entered your skin (the puncture site). You may also have pain in your shoulder. This is called referred pain. It is caused by pain traveling along a nerve near the biopsy site. The referred pain usually lasts less than 12 hours. You may have a small amount of bleeding from the puncture site. You will need to take it easy at home for 1 or 2 days after the procedure. You will probably be able to return to work and most of your usual activities after that. This care sheet gives you a general idea about how long it will take for you to recover. But each person recovers at a different pace. Follow the steps below to get better as quickly as possible.   How can you care for yourself at home? Activity    · Rest when you feel tired. Getting enough sleep will help you recover.     · Try to walk each day. Start by walking a little more than you did the day before. Bit by bit, increase the amount you walk. Walking boosts blood flow and helps prevent pneumonia and constipation.     · Avoid exercises that use your belly muscles and strenuous activities, such as bicycle riding, jogging, weight lifting, or aerobic exercise, for 1 week or until your doctor says it is okay.     · Ask your doctor when you can drive again.     · You will probably need to take 1 or 2 days off from work. It depends on the type of work you do and how you feel.     · You will probably be able to shower the same day as the test, if your doctor says it is okay. Pat the puncture site dry. Do not take a bath for at least 2 days after the test, or until your doctor tells you it is okay. Diet    · You can eat your normal diet. If your stomach is upset, try bland, low-fat foods like plain rice, broiled chicken, toast, and yogurt.     · Drink plenty of fluids (unless your doctor tells you not to). Medicines    · Your doctor will tell you if and when you can restart your medicines. He or she will also give you instructions about taking any new medicines.     · If you take blood thinners, such as warfarin (Coumadin), clopidogrel (Plavix), or aspirin, be sure to talk to your doctor. He or she will tell you if and when to start taking those medicines again. Make sure that you understand exactly what your doctor wants you to do.     · Be safe with medicines. Take pain medicines exactly as directed. ¨ If the doctor gave you a prescription medicine for pain, take it as prescribed. ¨ If you are not taking a prescription pain medicine, take an over-the-counter medicine that your doctor recommends. Read and follow all instructions on the label.   ¨ Do not take aspirin, ibuprofen (Advil, Motrin), naproxen (Aleve), or other nonsteroidal anti-inflammatory drugs (NSAIDs) unless your doctor says it is okay.     · If you think your pain medicine is making you sick to your stomach:  ¨ Take your medicine after meals (unless your doctor has told you not to). ¨ Ask your doctor for a different kind of pain medicine.    Care of the puncture site    · Keep a bandage over the puncture site for the first 1 or 2 days. Follow-up care is a key part of your treatment and safety. Be sure to make and go to all appointments, and call your doctor if you are having problems. It's also a good idea to know your test results and keep a list of the medicines you take. When should you call for help? Call 911 anytime you think you may need emergency care. For example, call if:    · You passed out (lost consciousness).     · You have severe trouble breathing.     · You have sudden chest pain and shortness of breath, or you cough up blood.     · You have severe pain in your chest, shoulder, or belly.    Call your doctor now or seek immediate medical care if:    · You have new or worse shortness of breath.     · Bright red blood has soaked through the bandage over the puncture site.     · You have pain that does not get better after you take your pain medicine.     · You are sick to your stomach or cannot keep fluids down.     · You have a fever, chills, or body aches.     · You have signs of infection, such as:  ¨ Increased pain, swelling, warmth, or redness. ¨ Red streaks leading from the puncture site. ¨ Pus draining from the puncture site. ¨ A fever.     · You have new or worse pain at the puncture site.     · You have new or worse belly swelling or bloating.     · You have trouble passing urine or stool.     · Your stools are black and tarlike or have streaks of blood.     · You have pale-colored stools along with dark urine and itching.    Watch closely for changes in your health, and be sure to contact your doctor if you have any problems.   Where can you learn more?  Go to http://jovan-leslie.info/. Enter L973 in the search box to learn more about \"Percutaneous Liver Biopsy: What to Expect at Home. \"  Current as of: September 10, 2017  Content Version: 11.8  © 0223-8001 [employer], [employer]. Care instructions adapted under license by [employer] (which disclaims liability or warranty for this information). If you have questions about a medical condition or this instruction, always ask your healthcare professional. [place] [address] any warranty or liability for your use of this information.

## 2018-10-15 NOTE — PROGRESS NOTES
Pt received to [facility] ambulatory. Changed to gown and assessed. IV started in L AC, blood drawn/sent. Vitals taken, stable. LS clear, HS S1, S2.  mallampati 2 with 3 fingers. Confirmed NPO and allergies. Dr Tristin Olvera here for consent, questions answered. Pt's son requesting copy of imagining from today and last appt. 1000--To CT, timeout at 1020. Start time [phone]. Stop time [id number], pt tolerated well, total of 4 mg versed and 75 mcg fentanyl. Dressing CDI applied to R upper abdomen. Returned to [facility] in stable condition, alert, family with pt.    1300--Pt spoke with Dr Kelsie Cespedes, IV removed, dressed, and reviewed discharge directions. Taken out to [facility] for discharge.

## 2018-10-15 NOTE — IP AVS SNAPSHOT
[address] [facility] 
 
 
 [address] [facility] 
[phone] Patient: Roberta Ybarra MRN: [id number] ETZ:[id number] About your hospitalization You were admitted on:  October 15, 2018 You last received care in the:  [facility] RAD CT You were discharged on:  October 15, 2018 Why you were hospitalized Your primary diagnosis was:  Not on File Follow-up Information None Your Scheduled Appointments Wednesday October 17, 2018  2:30 PM EDT Any with Zane Fan NP [employer] at [facility] [address] [facility], [address] [facility] [address] [facility]  
[phone] Discharge Orders None A check patel indicates which time of day the medication should be taken. My Medications ASK your doctor about these medications Instructions Each Dose to Equal  
 Morning Noon Evening Bedtime  
 diclofenac 1 % Gel Commonly known as:  VOLTAREN Your last dose was: Your next dose is:    
   
   
 diclofenac 1 % topical gel  
     
   
   
   
  
 hydroCHLOROthiazide 12.5 mg capsule Commonly known as:  Jhonathan Lemus Your last dose was: Your next dose is: Take 12.5 mg by mouth daily. 12.5 mg  
    
   
   
   
  
 HYDROcodone-acetaminophen 5-325 mg per tablet Commonly known as:  Li Shankar Your last dose was: Your next dose is: Take 1 Tab by mouth every six (6) hours as needed for Pain. Max Daily Amount: 4 Tabs. Indications: Pain 1 Tab PriLOSEC OTC 20 mg tablet Generic drug:  omeprazole Your last dose was: Your next dose is: Take 20 mg by mouth daily. 20 mg  
    
   
   
   
  
 simvastatin 10 mg tablet Commonly known as:  ZOCOR Your last dose was: Your next dose is: Take  by mouth nightly. telmisartan 80 mg tablet Commonly known as:  Anabel Nash Your last dose was: Your next dose is: Take 80 mg by mouth daily. 80 mg  
    
   
   
   
  
 TRESIBA FLEXTOUCH U-100 SC Your last dose was: Your next dose is:    
   
   
 10 Units by SubCUTAneous route. 10 Units Opioid Education Prescription Opioids: What You Need to Know: 
 
 
            Drowsiness, dizziness, euphoria, sleepiness or confusion. I 
            Unsteady gait. Loss of fine muscle control and delayed reaction time. Visual                     disturbances, difficulty focusing and blurred vision. Impaired memory recall. Follow-up care is a key component for your health and safety. Be sure to make and go to all medical appointments. Call your doctor if you are having problems. It's also a good idea to keep a list of your allergies, medicines with doses and test results on hand Home care following your sedation procedure: You may experience some of these side effects or you may not be aware of subtle changes in your behavior or reaction time. Because you received these medications, we are giving you the following instructions: Activity For your safety, you should not drive until the medicine wears off and you can think clearly and react easily. Do not drive for 24 hours. Rest when you feel tired. Getting enough sleep will help you recover. Diet You can eat your normal diet. If your stomach is upset, try bland, low-fat foods like plain rice, broiled chicken, toast, and yogurt. Drink plenty of fluids unless your doctor advised you to limit your fluids. Do not consume alcoholic beverages for 24 hours. Instructions Do not make important personal, business, or legal decisions for 24 hours. Move slowly and carefully, do not make sudden position changes. Be alert for dizziness or lightheadedness and move accordingly. Have a responsible person assist you. Do not drive for 24 hours. Do not operate equipment for 24 hours. [employer] mowers, power tools, kitchen appliances, etc.) Discharge medications: 
Resume prior to test medications as prescribed by your personal physician. If you have any questions or concerns call Radiology RN at [phone] After hours call Radiology on-call at [phone] Call [phone] any time you think you may need emergency care. For example:  
             Call if you passed out (lost consciousness). The medicine is not wearing off and you cannot think clearly. Watch closely for changes in your health, and be sure to contact your doctor if                  you have any problems. Where can you learn more? Go to [employer].be Enter U841 in the search box to learn more about \"Sedation for a Medical Procedure: After Your Visit. \" Percutaneous Liver Biopsy: What to Expect at Home Your Recovery Percutaneous liver biopsy is a procedure to take a tiny sample (biopsy) of your liver tissue. Percutaneous (say \"per-veronica-DALIA-nee-) means \"through the skin. \" The procedure is also called aspiration biopsy or fine-needle aspiration. The tissue sample is looked at under a microscope.  Your doctor can look for infection or other liver problems. You may have some pain where the biopsy needle entered your skin (the puncture site). You may also have pain in your shoulder. This is called referred pain. It is caused by pain traveling along a nerve near the biopsy site. The referred pain usually lasts less than 12 hours. You may have a small amount of bleeding from the puncture site. You will need to take it easy at home for 1 or 2 days after the procedure. You will probably be able to return to work and most of your usual activities after that. This care sheet gives you a general idea about how long it will take for you to recover. But each person recovers at a different pace. Follow the steps below to get better as quickly as possible. How can you care for yourself at home? Activity 
  · Rest when you feel tired. Getting enough sleep will help you recover.  
  · Try to walk each day. Start by walking a little more than you did the day before. Bit by bit, increase the amount you walk. Walking boosts blood flow and helps prevent pneumonia and constipation.  
  · Avoid exercises that use your belly muscles and strenuous activities, such as bicycle riding, jogging, weight lifting, or aerobic exercise, for 1 week or until your doctor says it is okay.  
  · Ask your doctor when you can drive again.  
  · You will probably need to take 1 or 2 days off from work. It depends on the type of work you do and how you feel.  
  · You will probably be able to shower the same day as the test, if your doctor says it is okay. Pat the puncture site dry. Do not take a bath for at least 2 days after the test, or until your doctor tells you it is okay. Diet 
  · You can eat your normal diet. If your stomach is upset, try bland, low-fat foods like plain rice, broiled chicken, toast, and yogurt.  
  · Drink plenty of fluids (unless your doctor tells you not to). Medicines   · Your doctor will tell you if and when you can restart your medicines. He or she will also give you instructions about taking any new medicines.  
  · If you take blood thinners, such as warfarin (Coumadin), clopidogrel (Plavix), or aspirin, be sure to talk to your doctor. He or she will tell you if and when to start taking those medicines again. Make sure that you understand exactly what your doctor wants you to do.  
  · Be safe with medicines. Take pain medicines exactly as directed. ¨ If the doctor gave you a prescription medicine for pain, take it as prescribed. ¨ If you are not taking a prescription pain medicine, take an over-the-counter medicine that your doctor recommends. Read and follow all instructions on the label. ¨ Do not take aspirin, ibuprofen (Advil, Motrin), naproxen (Aleve), or other nonsteroidal anti-inflammatory drugs (NSAIDs) unless your doctor says it is okay.  
  · If you think your pain medicine is making you sick to your stomach: 
¨ Take your medicine after meals (unless your doctor has told you not to). ¨ Ask your doctor for a different kind of pain medicine.  
 Care of the puncture site 
  · Keep a bandage over the puncture site for the first 1 or 2 days. Follow-up care is a key part of your treatment and safety. Be sure to make and go to all appointments, and call your doctor if you are having problems. It's also a good idea to know your test results and keep a list of the medicines you take. When should you call for help? Call 911 anytime you think you may need emergency care. For example, call if: 
  · You passed out (lost consciousness).  
  · You have severe trouble breathing.  
  · You have sudden chest pain and shortness of breath, or you cough up blood.  
  · You have severe pain in your chest, shoulder, or belly.  
 Call your doctor now or seek immediate medical care if: 
  · You have new or worse shortness of breath.   · Bright red blood has soaked through the bandage over the puncture site.  
  · You have pain that does not get better after you take your pain medicine.  
  · You are sick to your stomach or cannot keep fluids down.  
  · You have a fever, chills, or body aches.  
  · You have signs of infection, such as: 
¨ Increased pain, swelling, warmth, or redness. ¨ Red streaks leading from the puncture site. ¨ Pus draining from the puncture site. ¨ A fever.  
  · You have new or worse pain at the puncture site.  
  · You have new or worse belly swelling or bloating.  
  · You have trouble passing urine or stool.  
  · Your stools are black and tarlike or have streaks of blood.  
  · You have pale-colored stools along with dark urine and itching.  
 Watch closely for changes in your health, and be sure to contact your doctor if you have any problems. Where can you learn more? Go to http://jovan-leslie.info/. Enter A995 in the search box to learn more about \"Percutaneous Liver Biopsy: What to Expect at Home. \" Current as of: September 10, 2017 Content Version: 11.8 © 6627-3994 [employer]. Care instructions adapted under license by [employer] (which disclaims liability or warranty for this information). If you have questions about a medical condition or this instruction, always ask your healthcare professional. [address] any warranty or liability for your use of this information. Introducing [facility]! [employer] introduces [employer] patient portal. Now you can access parts of your medical record, email your doctor's office, and request medication refills online. 1. In your internet browser, go to [email or web address] 2. Click on the First Time User? Click Here link in the Sign In box. You will see the New Member Sign Up page. 3. Enter your [employer] Access Code exactly as it appears below.  You will not need to use this code after youve completed the sign-up process. If you do not sign up before the expiration date, you must request a new code. · [employer] Access Code: [email or web address] Expires: 12/27/2018  1:11 PM 
 
4. Enter the last four digits of your Social Security Number (xxxx) and Date of Birth (mm/dd/yyyy) as indicated and click Submit. You will be taken to the next sign-up page. 5. Create a [employer] ID. This will be your [employer] login ID and cannot be changed, so think of one that is secure and easy to remember. 6. Create a [employer] password. You can change your password at any time. 7. Enter your Password Reset Question and Answer. This can be used at a later time if you forget your password. 8. Enter your e-mail address. You will receive e-mail notification when new information is available in [address] 9. Click Sign Up. You can now view and download portions of your medical record. 10. Click the Download Summary menu link to download a portable copy of your medical information. If you have questions, please visit the Frequently Asked Questions section of the [employer] website. Remember, [employer] is NOT to be used for urgent needs. For medical emergencies, dial 911. Now available from your iPhone and Android! Introducing Mark Stewart As a [employer] patient, I wanted to make you aware of our electronic visit tool called Mark Terry. [employer] 24/7 allows you to connect within minutes with a medical provider 24 hours a day, seven days a week via a mobile device or tablet or logging into a secure website from your computer. You can access Mark Stewart from anywhere in the [place].  
 
A virtual visit might be right for you when you have a simple condition and feel like you just dont want to get out of bed, or cant get away from work for an appointment, when your regular [employer] provider is not available (evenings, weekends or holidays), or when youre out of town and need minor care. Electronic visits cost only $49 and if the [place] CARD[facility]com [facility] 24/7 provider determines a prescription is needed to treat your condition, one can be electronically transmitted to a nearby pharmacy*. Please take a moment to enroll today if you have not already done so. The enrollment process is free and takes just a few minutes. To enroll, please download the mFoundry tunde to your tablet or phone, or visit [email or web address] to enroll on your computer. And, as an [address] [place] [address] patient with a [employer] account, the results of your visits will be scanned into your electronic medical record and your primary care provider will be able to view the scanned results. We urge you to continue to see your regular [facility] provider for your ongoing medical care. And while your primary care provider may not be the one available when you seek a [facility] virtual visit, the peace of mind you get from getting a real diagnosis real time can be priceless. For more information on [employer], view our Frequently Asked Questions (FAQs) at [email or web address]. Sincerely, 
 
Willma Hodgkins, MD 
Chief Medical Officer [address] *:  certain medications cannot be prescribed via [facility] Unresulted Labs-Please follow up with your PCP about these lab tests Order Current Status CT BX LIVER NDL PERC In process Providers Seen During Your Hospitalization Provider Specialty Primary office phone Tejas Randolph MD Hematology and Oncology [phone] Your Primary Care Physician (PCP) Primary Care Physician Office Phone Office Fax Myra Garcia [phone] You are allergic to the following Allergen Reactions Amoxicillin Hives Recent Documentation Height Weight Breastfeeding? BMI OB Status Smoking Status 1.702 m 83.5 kg No 28.82 kg/m2 Menopause Never Smoker Emergency Contacts Name Discharge Info Relation Home Work Mobile Dejon Strange DISCHARGE CAREGIVER [3] Son [22] [phone] Patient Belongings The following personal items are in your possession at time of discharge: 
     Visual Aid: Glasses Please provide this summary of care documentation to your next provider. Signatures-by signing, you are acknowledging that this After Visit Summary has been reviewed with you and you have received a copy. Patient Signature:  ____________________________________________________________ Date:  ____________________________________________________________  
  
William Bone Provider Signature:  ____________________________________________________________ Date:  ____________________________________________________________

## 2018-10-16 DIAGNOSIS — C25.7 MALIGNANT NEOPLASM OF OTHER PARTS OF PANCREAS (HCC): Primary | ICD-10-CM

## 2018-10-17 ENCOUNTER — OFFICE VISIT (OUTPATIENT)
Dept: ONCOLOGY | Age: 73
End: 2018-10-17

## 2018-10-17 ENCOUNTER — TELEPHONE (OUTPATIENT)
Dept: ENDOCRINOLOGY | Age: 73
End: 2018-10-17

## 2018-10-17 VITALS
RESPIRATION RATE: 16 BRPM | TEMPERATURE: 98.1 F | HEIGHT: 67 IN | OXYGEN SATURATION: 98 % | HEART RATE: 80 BPM | WEIGHT: 183.8 LBS | DIASTOLIC BLOOD PRESSURE: 74 MMHG | BODY MASS INDEX: 28.85 KG/M2 | SYSTOLIC BLOOD PRESSURE: 128 MMHG

## 2018-10-17 DIAGNOSIS — C25.7 MALIGNANT NEOPLASM OF OTHER PARTS OF PANCREAS (HCC): Primary | ICD-10-CM

## 2018-10-17 RX ORDER — INSULIN DEGLUDEC 100 U/ML
24 INJECTION, SOLUTION SUBCUTANEOUS DAILY
COMMUNITY
Start: 2018-10-17 | End: 2018-10-18 | Stop reason: DRUGHIGH

## 2018-10-17 NOTE — H&P
Radiology History and Physical    Patient: Sonny Burk 72 y.o. female       Chief Complaint: No chief complaint on file. History of Present Illness: liver mass lesion    History:    Past Medical History:   Diagnosis Date    Arthritis     Arthritis     Constipation     Diabetes ([address]     Hemorrhoids     Hiatal hernia     High cholesterol     Hypertension     Poor appetite      Family History   Problem Relation Age of Onset    Cancer Mother     Hypertension Mother     Stroke Mother     Heart Disease Mother     Cancer Father     Heart Disease Father     Stroke Father      Social History     Socioeconomic History    Marital status:      Spouse name: Not on file    Number of children: Not on file    Years of education: Not on file    Highest education level: Not on file   Social Needs    Financial resource strain: Not on file    Food insecurity - worry: Not on file    Food insecurity - inability: Not on file   [employer] needs - medical: Not on file   [employer] needs - non-medical: Not on file   Occupational History    Not on file   Tobacco Use    Smoking status: Never Smoker    Smokeless tobacco: Never Used   Substance and Sexual Activity    Alcohol use: Yes     Alcohol/week: 1.2 - 2.4 oz     Types: 1 - 2 Cans of beer, 1 - 2 Shots of liquor per week    Drug use: No    Sexual activity: No   Other Topics Concern    Not on file   Social History Narrative    Not on file       Allergies: Allergies   Allergen Reactions    Amoxicillin Hives       Current Medications:  Current Outpatient Medications   Medication Sig    diclofenac (VOLTAREN) 1 % gel diclofenac 1 % topical gel    simvastatin (ZOCOR) 10 mg tablet Take  by mouth nightly.  omeprazole (PRILOSEC OTC) 20 mg tablet Take 20 mg by mouth daily.  telmisartan (MICARDIS) 80 mg tablet Take 80 mg by mouth daily.  hydroCHLOROthiazide (MICROZIDE) 12.5 mg capsule Take 12.5 mg by mouth daily.     insulin degludec (TRESIBA FLEXTOUCH U-100 SC) 10 Units by SubCUTAneous route.  HYDROcodone-acetaminophen (NORCO) 5-325 mg per tablet Take 1 Tab by mouth every six (6) hours as needed for Pain. Max Daily Amount: 4 Tabs. Indications: Pain     No current facility-administered medications for this encounter. Physical Exam:  Blood pressure 120/66, pulse 78, resp. rate 16, height 5' 7\" (1.702 m), weight 83.5 kg (184 lb), SpO2 96 %, not currently breastfeeding. GENERAL: alert, cooperative, no distress, appears stated age, LUNG: clear to auscultation bilaterally, HEART: regular rate and rhythm, S1, S2 normal, no murmur, click, rub or gallop      Alerts:    Hospital Problems  Date Reviewed: 10/17/2018    None          Laboratory:      Recent Labs     10/15/18  0909   HGB 13.8   HCT 40.6   WBC 8.4      INR 1.0         Plan of Care/Planned Procedure:  Risks, benefits, and alternatives reviewed with patient and she agrees to proceed with the procedure.        Terence Wilks MD

## 2018-10-17 NOTE — TELEPHONE ENCOUNTER
----- Message from Alexis Brooke MD sent at 10/17/2018  3:42 PM EDT -----  Ale Barba, this is a very sweet lady with labile BS due to pancreatic cancer. Her cousin is on our board of directors. Can you get her in to see you or someone in your office ASAP?   thanks

## 2018-10-17 NOTE — TELEPHONE ENCOUNTER
Spoke with her tonight. She states she never had a diagnosis of diabetes until recently when her sugar was over 700 on labs drawn at PCP's office and was started on Tresiba 10 units by her PCP and has progressively increased her dose up to 18 units and took this dose this morning but sugars are still staying over 200. I advised her to take a dose of 6 units now and then take 24 units tomorrow morning and come for a visit with me at 2:50pm tomorrow. she voiced understanding of this plan.

## 2018-10-17 NOTE — PROGRESS NOTES
[facility] [place] at [facility]  [address] [facility]  Stephie Basque: [phone]  F: [phone]    Medical Nutrition Therapy      Reason for nutrition visit:  Supportive visit with patient and family member to introduce self and discussed role of oncology dietitian in providing supportive nutrition care. Explained that RD is available to be a resource for managing symptoms, minimizing weight changes and maintaining optimal nutrition status during and after cancer treatment. She reports decreased appetite but making more effort to eat. Her biggest challenge has been elevated BG levels. She reports some mild constipation, has taken miralax occasionally. Denies any changes in stool. Results:   Diagnosis: Pancreatic cancer   Wt Readings from Last 8 Encounters:   10/17/18 183 lb 12.8 oz (83.4 kg)   10/15/18 184 lb (83.5 kg)   10/08/18 183 lb 8 oz (83.2 kg)   10/04/18 181 lb (82.1 kg)   09/29/18 187 lb (84.8 kg)       Estimated Nutrition Needs:   Calorie Range: 1660-2075kcal/day      Protein Range: 66-83     Fluid Needs: 2000ml     Assessment:   Altered GI Function related to pancreatic cancer as evidence by weight loss. Plan:   - Reviewed carbohydrate counting and provided handouts   - Will continue to be available for assisting in any nutrition related questions or concerns. I appreciate the opportunity to participate in Ms. Ysabel Strange's care.     Signed By: Alhaji Walter, [address] [place] [address], [address] [place] [address] , [address]     Contact: [phone]

## 2018-10-17 NOTE — TELEPHONE ENCOUNTER
Mullin,    Please schedule her for a new patient visit on 10/18/18 at 2:50pm for diabetes per Dr. Chris Whittaker. Thanks.

## 2018-10-17 NOTE — PROGRESS NOTES
[facility] [place] [facility] [place] [address] [address] [facility] [address] [facility]  Debby Bernal: [phone]  F: [phone]      Reason for Visit:   France Dubin is a 72 y.o. female who is seen in self-referral for evaluation of pancreatic cancer. Treatment History:   · 10/4/18 pancreatic head mass FNA, pancreatic adenocarcinoma    10/15/18  Liver, Core Biopsy w/Touch Prep CYTOLOGIC INTERPRETATION:   · Numerous cores and fragments of benign hepatic parenchyma     History of Present Illness:   She started having abdominal pain, gradual and persistent, x 1 month, pressure sensation, located in epigastrium, no radiation, no aggravating symptoms, no relieving factors. 25 lb weight loss over 6 months.  + nausea. Interval history:  Complains of gr 1 loss of appetite, gr 1 constipation, gr 1 fatigue, gr 1 insomnia, 1/10 abdominal pain    Having labile blood sugars    Past Medical History:   Diagnosis Date    Arthritis     Arthritis     Constipation     Diabetes ([address]     Hemorrhoids     Hiatal hernia     High cholesterol     Hypertension     Poor appetite       Past Surgical History:   Procedure Laterality Date    HX KNEE ARTHROSCOPY        Social History     Tobacco Use    Smoking status: Never Smoker    Smokeless tobacco: Never Used   Substance Use Topics    Alcohol use: Yes     Alcohol/week: 1.2 - 2.4 oz     Types: 1 - 2 Cans of beer, 1 - 2 Shots of liquor per week      Family History   Problem Relation Age of Onset    Cancer Mother     Hypertension Mother     Stroke Mother     Heart Disease Mother     Cancer Father     Heart Disease Father     Stroke Father      Current Outpatient Medications   Medication Sig    diclofenac (VOLTAREN) 1 % gel diclofenac 1 % topical gel    simvastatin (ZOCOR) 10 mg tablet Take  by mouth nightly.  telmisartan (MICARDIS) 80 mg tablet Take 80 mg by mouth daily.     hydroCHLOROthiazide (MICROZIDE) 12.5 mg capsule Take 12.5 mg by mouth daily.    insulin degludec (TRESIBA FLEXTOUCH U-100 SC) 10 Units by SubCUTAneous route.  HYDROcodone-acetaminophen (NORCO) 5-325 mg per tablet Take 1 Tab by mouth every six (6) hours as needed for Pain. Max Daily Amount: 4 Tabs. Indications: Pain    omeprazole (PRILOSEC OTC) 20 mg tablet Take 20 mg by mouth daily. No current facility-administered medications for this visit. Allergies   Allergen Reactions    Amoxicillin Hives        Review of Systems: A complete review of systems was obtained, negative except as described above. Physical Exam:     Visit Vitals  /74 (BP 1 Location: Right arm, BP Patient Position: Sitting)   Pulse 80   Temp 98.1 °F (36.7 °C) (Oral)   Resp 16   Ht 5' 7\" (1.702 m)   Wt 183 lb 12.8 oz (83.4 kg)   SpO2 98%   BMI 28.79 kg/m²     ECOG PS: 0  General: No distress  Respiratory: Normal respiratory effort  CV: No peripheral edema  Skin: No rashes, ecchymoses, or petechiae  Psych: Alert, oriented, normal mood/affect      Results:     Lab Results   Component Value Date/Time    WBC 8.4 10/15/2018 09:09 AM    HGB 13.8 10/15/2018 09:09 AM    HCT 40.6 10/15/2018 09:09 AM    PLATELET 282 22/94/4188 09:09 AM    MCV 85.3 10/15/2018 09:09 AM    ABS. NEUTROPHILS 6.3 10/15/2018 09:09 AM     Lab Results   Component Value Date/Time    Sodium 130 (L) 09/29/2018 10:51 AM    Potassium 3.6 09/29/2018 10:51 AM    Chloride 93 (L) 09/29/2018 10:51 AM    CO2 24 09/29/2018 10:51 AM    Glucose 440 (H) 09/29/2018 10:51 AM    BUN 15 09/29/2018 10:51 AM    Creatinine 1.05 (H) 09/29/2018 10:51 AM    GFR est AA >60 09/29/2018 10:51 AM    GFR est non-AA 52 (L) 09/29/2018 10:51 AM    Calcium 8.9 09/29/2018 10:51 AM    Glucose (POC) 316 (H) 10/04/2018 03:45 PM     Lab Results   Component Value Date/Time    Bilirubin, total 0.7 09/29/2018 10:51 AM    ALT (SGPT) 25 09/29/2018 10:51 AM    AST (SGOT) 12 (L) 09/29/2018 10:51 AM    Alk.  phosphatase 196 (H) 09/29/2018 10:51 AM    Protein, total 7.8 09/29/2018 10:51 AM    Albumin 3.7 09/29/2018 10:51 AM    Globulin 4.1 (H) 09/29/2018 10:51 AM     10/1/18 CT abd  There is a 3.8 x 1.9 cm mass involving the uncinate process and possibly  invading the duodenum. Findings are nonspecific but typical of pancreatic  carcinoma. There is no biliary or pancreatic ductal dilatation.     There is a poorly defined irregular hypodensity in segment IVb of the liver  measuring 3.7 x 1.9 cm. There appears to be focal biliary dilatation in the left  lobe behind this abnormality. Metastatic disease is suspected. There is a small,  1 cm hypodensity in the dome of the liver, likely segment 8. There is a 1 cm  hypodensity in segment 4 of the liver as well, also likely metastasis. Small  hypodensity measuring less than 1 cm in segment 6 at the tip laterally is also  nonspecific but probable metastasis.     Spleen kidneys and adrenals are unremarkable.     No free fluid or focal fluid collection.     Lung bases are clear.     Bone windows are unremarkable.     IMPRESSION  IMPRESSION:  1. 3.8 x 1.9 cm mass involving the uncinate process of the pancreas and possibly  invading the duodenum, this likely represents pancreatic carcinoma. 2. Likely metastatic disease in the liver. There is a 3.8 x 1.9 cm mass involving the uncinate process and possibly  invading the duodenum. Findings are nonspecific but typical of pancreatic  carcinoma. There is no biliary or pancreatic ductal dilatation.     There is a poorly defined irregular hypodensity in segment IVb of the liver  measuring 3.7 x 1.9 cm. There appears to be focal biliary dilatation in the left  lobe behind this abnormality. Metastatic disease is suspected. There is a small,  1 cm hypodensity in the dome of the liver, likely segment 8. There is a 1 cm  hypodensity in segment 4 of the liver as well, also likely metastasis.  Small  hypodensity measuring less than 1 cm in segment 6 at the tip laterally is also  nonspecific but probable metastasis.     Spleen kidneys and adrenals are unremarkable.     No free fluid or focal fluid collection.     Lung bases are clear.     Bone windows are unremarkable.     IMPRESSION  IMPRESSION:  1. 3.8 x 1.9 cm mass involving the uncinate process of the pancreas and possibly  invading the duodenum, this likely represents pancreatic carcinoma. 2. Likely metastatic disease in the liver. Records reviewed and summarized above. Pathology report(s) reviewed above. Radiology report(s) reviewed above. Assessment/plan:   1. Uncinate pancreatic adenocarcinoma, possible mets to liver, possible stage IV:  cT2 cN0 cM1    Discussed with Dr. Ashley Hua. FNA of liver was negative, will get MRI of liver, ordered yesterday. If still suspicious, will get US biopsy, if not suspicious, will move towards surgery consult. Will refer to Dr. Murray Meter    Will need a CT of chest and pelvis, but will hold off to see what biopsy results are, as she may be a candidate for the BBI-608 clinical trial, and I do not want to accidentally start a clock for expiration of scans and have to rescan her. Discussed that initial therapy would likely be with chemotherapy, IV, the exact nature somewhat dependent on the results of the MRI/liver biopsy above. As an example of likely chemotherapy, we discussed the risks and benefits of Gemcitabine and Abraxane chemotherapy. Potential side effects include, but are not limited to, nausea, vomiting, diarrhea, constipation, mucositis, taste changes, myelosuppression, infection, fatigue, alopecia, skin and nail changes, pulmonary toxicity, neuropathy, allergic reactions, infertility, and rarely, death. We also discussed the risks and benefits of FOLFIRINOX chemotherapy, including potential side effects.  These include but are not limited to fatigue, nausea vomiting, diarrhea, neuropathy, taste changes, cold intolerance, esophageal spasm, allergic reactions, alopecia, mucositis, myelosuppression, risk for infection, infertility, and rarely, death. Rarely, a patient may have a condition where they do not metabolize fluorouracil appropriately (called DPD deficiency), and they may have excessive toxicity. A Port-A-Cath will be required in order to deliver the continuous infusion. Discussed the BBI-608 study and gave her a sample consent form. Discussed 2nd opinion at [place], [place]. 2. DM2:  Holding metformin; on insulin; at her request, referred to endocrinology    3. Emotional well being:  She has excellent support and is coping well with her disease    4. Abdominal pain:  Due to cancer, will monitor for now, may need palliative care referral    5. Nutrition:  Forrest Romero RD met with her today    6. Insomnia:  She has lunesta; she may also try melatonin    > 40 min were spent with this patient with > 50% of that time spent in face to face counseling, including nutritional counseling. I appreciate the opportunity to participate in Ms. Rudi Strange's care.     Signed By: Burnard Babinski, MD

## 2018-10-18 ENCOUNTER — OFFICE VISIT (OUTPATIENT)
Dept: ENDOCRINOLOGY | Age: 73
End: 2018-10-18

## 2018-10-18 VITALS
HEIGHT: 67 IN | SYSTOLIC BLOOD PRESSURE: 115 MMHG | BODY MASS INDEX: 28.22 KG/M2 | HEART RATE: 75 BPM | WEIGHT: 179.8 LBS | DIASTOLIC BLOOD PRESSURE: 63 MMHG

## 2018-10-18 RX ORDER — INSULIN ASPART 100 [IU]/ML
INJECTION, SOLUTION INTRAVENOUS; SUBCUTANEOUS
Qty: 1 PEN | Refills: 0 | Status: SHIPPED | COMMUNITY
Start: 2018-10-18 | End: 2018-10-26 | Stop reason: SDUPTHER

## 2018-10-18 RX ORDER — INSULIN DEGLUDEC 200 U/ML
30 INJECTION, SOLUTION SUBCUTANEOUS DAILY
Qty: 1 PEN | Refills: 0 | Status: SHIPPED | COMMUNITY
Start: 2018-10-18 | End: 2018-10-26 | Stop reason: SDUPTHER

## 2018-10-18 NOTE — PATIENT INSTRUCTIONS
1) Tonight, take a dose of 6 more units of tresiba (blue/green pen--long acting) for a total of 30 units for the day. 2) Tomorrow, take 30 units of tresiba in the morning. Give this 4 days and if by Monday morning your sugar when you wake up is still over 130, plan on increasing your tresiba by 4 units every 4 days until you reach the dose that keeps your sugar under 130 every morning. 3) Check your sugar before breakfast and before dinner. We will use novolog (orange pen--fast acting) as needed for sugars over 180 as below: 
Blood sugar 181-230  4 units 231-280  6 units 281-330  8 units 331-380  10 units 381-430  12 units 4) Call your insurance and find out which long acting insulin is covered (tresiba, lantus, toujeo, basaglar or levemir) and which fast acting insulin is covered (novolog, humalog or apidra) 5) If you can, please sing up for [email or web address] so you can send me a message with your sugars and any questions you may have. Please let me know if your sugars are still over 200 by next Friday.

## 2018-10-18 NOTE — PROGRESS NOTES
Chief Complaint Patient presents with  Diabetes  
  pcp and pharmacy confirmed History of Present Illness: Roberta Ybarra is a 72 y.o. female who is a new patient for evaluation of diabetes. Prior to September was never told she had full blown diabetes but does recall Dr. Xenia Woodson stating her sugar was trending up slightly over the past 1-2 years. Three weeks ago was having some epigastric discomfort and fatigue and saw Dr. Debby Silva and she did labs and her partner was notified that her sugar was over 700 and was told to go to the ER which she did. Was treated with IVF and insulin and they wanted to admit her but she asked to go home. She saw Dr. Saba Hearn again on 10/2/18 and was started on tresiba 10 units in the morning and she has been in contact with her about her sugars as they have remained in the 200-400 range with one up to 453 on 10/7/18 and her dose has been progressivly increased up to 18 units as of yesterday. I called her last night to come in for a visit today per referral from Dr. Kareem Schulz and her sugar was 367 at 7:30pm and advised her to take 6 units last night and her sugar was 311 fasting this morning and she took 24 units of tresiba this morning. Aside from fatigue and dry mouth has not felt too thirsty or urinated too excessively. Checks blood sugars 1-2 times per day. Most recent Hgb A1c was 12% in 9/18 at the ER. A typical day is as follows: 
- breakfast: peanut butter on 1 slice of toast or eggs or leftovers 
- lunch: soup with 4 crackers or sandwich  
- dinner: chicken salad sandwich, not much rice or potato or pasta, may have had some dessert a few times a week but none since diagnosis - beverages: diet ginger ale and diet pepsi and water and unsweet tea 
- snacks: not much Exercise consists of mowing the lawn and taking care of the house. No history of vascular disease. No history of retinopathy, neuropathy, or nephropathy. Last eye exam was spring of 2018. Past Medical History:  
Diagnosis Date  Arthritis  Constipation  Diabetes ([facility]  Hemorrhoids  Hiatal hernia  High cholesterol  Hypertension  [facility] Past Surgical History:  
Procedure Laterality Date  HX KNEE ARTHROSCOPY Right  HX ORTHOPAEDIC    
 left wrist surgery  HX TONSILLECTOMY Current Outpatient Medications Medication Sig  
 insulin degludec (TRESIBA FLEXTOUCH U-100) 100 unit/mL (3 mL) inpn 24 Units daily.  simvastatin (ZOCOR) 10 mg tablet Take  by mouth nightly.  telmisartan (MICARDIS) 80 mg tablet Take 80 mg by mouth daily.  hydroCHLOROthiazide (MICROZIDE) 12.5 mg capsule Take 12.5 mg by mouth daily. No current facility-administered medications for this visit. Allergies Allergen Reactions  Amoxicillin Hives Family History Problem Relation Age of Onset  Cancer Mother   
     skin  Hypertension Mother  Heart Disease Mother  Cancer Father   
     skin  Heart Disease Father  Stroke Father  Diabetes Neg Hx Social History Socioeconomic History  Marital status:  Spouse name: Not on file  Number of children: Not on file  Years of education: Not on file  Highest education level: Not on file Social Needs  Financial resource strain: Not on file  Food insecurity - worry: Not on file  Food insecurity - inability: Not on file  Transportation needs - medical: Not on file  Transportation needs - non-medical: Not on file Occupational History  Not on file Tobacco Use  Smoking status: Never Smoker  Smokeless tobacco: Never Used Substance and Sexual Activity  Alcohol use: Yes Alcohol/week: 1.2 - 2.4 oz Types: 1 - 2 Cans of beer, 1 - 2 Shots of liquor per week Frequency: 2-4 times a month Drinks per session: 1 or 2  
 Drug use: No  
 Sexual activity: No  
Other Topics Concern  Not on file Social History Narrative Lives in [facility]. Has one son.  since 2007. Used to stay home with her son and fixed things around the house. Likes to garden. Review of Systems: 
- Constitutional Symptoms: no fevers, chills, (+) 30 lb weight loss over the past 5 months - Eyes: no blurry vision or double vision - Cardiovascular: no chest pain or palpitations - Respiratory: no cough or shortness of breath 
- Gastrointestinal: no dysphagia, (+) intermittent abdominal discomfort - Musculoskeletal: (+) joint pains in hands and feet - Integumentary: no rashes - Neurological: no numbness, tingling, or headaches - Psychiatric: no depression or anxiety - Endocrine: no heat or cold intolerance, no polyuria or polydipsia Physical Examination: 
Blood pressure 115/63, pulse 75, height 5' 7\" (1.702 m), weight 179 lb 12.8 oz (81.6 kg). - General: pleasant, no distress, good eye contact 
- HEENT: no exopthalmos, no periorbital edema, no scleral/conjunctival injection, EOMI, no lid lag or stare 
- Neck: supple, no thyromegaly, masses, lymph nodes, or carotid bruits, no supraclavicular or dorsocervical fat pads - Cardiovascular: regular, normal rate, normal S1 and S2, no murmurs/rubs/gallops, 
- Respiratory: clear to auscultation bilaterally - Gastrointestinal: soft, nontender, nondistended, no masses, no hepatosplenomegaly - Musculoskeletal: no proximal muscle weakness in upper or lower extremities - Integumentary: no acanthosis nigricans, no abdominal striae, no rashes, no edema, no foot ulcers - Neurological: see foot exam 
- Psychiatric: normal mood and affect Diabetic foot exam:  
 
Left Foot: 
 Visual Exam: normal  
 Pulse DP: 2+ (normal) Filament test: normal sensation Vibratory sensation: Vibratory sensation: normal  
   
Right Foot: 
 Visual Exam: normal  
 Pulse DP: 2+ (normal) Filament test: normal sensation Vibratory sensation: Vibratory sensation: normal 
 
 
 
 
 
Data Reviewed:  
Component Latest Ref Rng & Units 9/29/2018 9/29/2018 10:51 AM 10:51 AM  
Sodium 136 - 145 mmol/L 130 (L) Potassium 3.5 - 5.1 mmol/L 3.6 Chloride 97 - 108 mmol/L 93 (L)   
CO2 
    21 - 32 mmol/L 24 Anion gap 5 - 15 mmol/L 13 Glucose 65 - 100 mg/dL 440 (H) BUN 
    6 - 20 MG/DL 15 Creatinine 
    0.55 - 1.02 MG/DL 1.05 (H) BUN/Creatinine ratio 12 - 20   14 GFR est AA 
    >60 ml/min/1.73m2 >60   
GFR est non-AA 
    >60 ml/min/1.73m2 52 (L) Calcium 8.5 - 10.1 MG/DL 8.9 Bilirubin, total 
    0.2 - 1.0 MG/DL 0.7 ALT (SGPT) 12 - 78 U/L 25 AST 
    15 - 37 U/L 12 (L) Alk. phosphatase 45 - 117 U/L 196 (H) Protein, total 
    6.4 - 8.2 g/dL 7.8 Albumin 3.5 - 5.0 g/dL 3.7 Globulin 2.0 - 4.0 g/dL 4.1 (H) A-G Ratio 1.1 - 2.2   0.9 (L) Hemoglobin A1c, (calculated) 4.2 - 6.3 %  12.0 (H) Est. average glucose 
    mg/dL  298 Assessment/Plan: 1. Uncontrolled type 2 diabetes mellitus without complication, with long-term current use of insulin ([facility]): her most recent Hgb A1c was 12% in 9/18 when recently diagnosed with full blown diabetes due to newly diagnosed pancreatic cancer. I explained that the cancer has caused destruction of her beta cells and therefore will need more basal insulin to try and bring her sugars under control. She is also in need of fast-acting insulin to correct for high sugars and eventually may need this to be dosed before meals. I explained how to titrate her tresiba to get fasting sugars under 130 and will have her call her insurance to find out which insulins are covered under her insurance. We spent 60 minutes of face to face time together and > 50% of the time was spent in counseling on diabetes management and determining what changes to make to her insulin regimen. - increase tresiba to 30 units in the morning - begin novolog before breakfast and dinner for bs 181-230 = 4 units and up by 2 units for every 50 mg/dl - check bs bid before breakfast and dinner 
- foot exam done 10/18 
- will need eye exam in the future - will need microalbumin in the future - blood pressure at goal < 140/90 not on any meds Patient Instructions 1) Tonight, take a dose of 6 more units of tresiba (blue/green pen--long acting) for a total of 30 units for the day. 2) Tomorrow, take 30 units of tresiba in the morning. Give this 4 days and if by Monday morning your sugar when you wake up is still over 130, plan on increasing your tresiba by 4 units every 4 days until you reach the dose that keeps your sugar under 130 every morning. 3) Check your sugar before breakfast and before dinner. We will use novolog (orange pen--fast acting) as needed for sugars over 180 as below: 
Blood sugar 181-230  4 units 231-280  6 units 281-330  8 units 331-380  10 units 381-430  12 units 4) Call your insurance and find out which long acting insulin is covered (tresiba, lantus, toujeo, basaglar or levemir) and which fast acting insulin is covered (novolog, humalog or apidra) 5) If you can, please sing up for [email or web address] so you can send me a message with your sugars and any questions you may have. Please let me know if your sugars are still over 200 by next Friday. Follow-up Disposition: 
Return for 11/15/18 at 2:10pm. 
 
Copy sent to: 
Patient Care Team: 
Alvarado Helms MD as PCP - General (Internal Medicine) Miranda Gonzáles MD (Hematology and Oncology)

## 2018-10-18 NOTE — TELEPHONE ENCOUNTER
Patient has been scheduled as a New Patient for Diabetes in [address] [place] [address] today at 2:50 PM.

## 2018-10-22 ENCOUNTER — HOSPITAL ENCOUNTER (OUTPATIENT)
Dept: MRI IMAGING | Age: 73
Discharge: HOME | End: 2018-10-22
Payer: MEDICARE

## 2018-10-22 VITALS — BODY MASS INDEX: 28.19 KG/M2 | WEIGHT: 180 LBS

## 2018-10-22 DIAGNOSIS — C25.7 MALIGNANT NEOPLASM OF OTHER PARTS OF PANCREAS (HCC): ICD-10-CM

## 2018-10-22 PROCEDURE — 74011250636 HC RX REV CODE- 250/636: Performed by: INTERNAL MEDICINE

## 2018-10-22 PROCEDURE — A9585 GADOBUTROL INJECTION: HCPCS | Performed by: INTERNAL MEDICINE

## 2018-10-22 PROCEDURE — 74183 MRI ABD W/O CNTR FLWD CNTR: CPT

## 2018-10-22 RX ADMIN — GADOBUTROL 7 ML: 604.72 INJECTION INTRAVENOUS at 10:12

## 2018-10-23 DIAGNOSIS — C25.9 MALIGNANT NEOPLASM OF PANCREAS, UNSPECIFIED LOCATION OF MALIGNANCY (HCC): Primary | ICD-10-CM

## 2018-10-24 ENCOUNTER — HOSPITAL ENCOUNTER (OUTPATIENT)
Dept: ULTRASOUND IMAGING | Age: 73
Discharge: HOME | End: 2018-10-24
Attending: INTERNAL MEDICINE
Payer: MEDICARE

## 2018-10-24 ENCOUNTER — TELEPHONE (OUTPATIENT)
Dept: ONCOLOGY | Age: 73
End: 2018-10-24

## 2018-10-24 VITALS
BODY MASS INDEX: 28.72 KG/M2 | SYSTOLIC BLOOD PRESSURE: 104 MMHG | DIASTOLIC BLOOD PRESSURE: 59 MMHG | WEIGHT: 183 LBS | HEART RATE: 75 BPM | RESPIRATION RATE: 12 BRPM | HEIGHT: 67 IN | OXYGEN SATURATION: 96 %

## 2018-10-24 DIAGNOSIS — C25.9 MALIGNANT NEOPLASM OF PANCREAS, UNSPECIFIED LOCATION OF MALIGNANCY (HCC): ICD-10-CM

## 2018-10-24 PROCEDURE — 88307 TISSUE EXAM BY PATHOLOGIST: CPT | Performed by: INTERNAL MEDICINE

## 2018-10-24 PROCEDURE — 99152 MOD SED SAME PHYS/QHP 5/>YRS: CPT

## 2018-10-24 PROCEDURE — 77030003503 HC NDL BIOP TISS BD -B

## 2018-10-24 PROCEDURE — 47000 NEEDLE BIOPSY OF LIVER PERQ: CPT

## 2018-10-24 PROCEDURE — 88173 CYTOPATH EVAL FNA REPORT: CPT | Performed by: INTERNAL MEDICINE

## 2018-10-24 PROCEDURE — 99153 MOD SED SAME PHYS/QHP EA: CPT

## 2018-10-24 PROCEDURE — 74011250636 HC RX REV CODE- 250/636: Performed by: RADIOLOGY

## 2018-10-24 RX ORDER — FENTANYL CITRATE 50 UG/ML
100 INJECTION, SOLUTION INTRAMUSCULAR; INTRAVENOUS
Status: DISCONTINUED | OUTPATIENT
Start: 2018-10-24 | End: 2018-10-24

## 2018-10-24 RX ORDER — MIDAZOLAM HYDROCHLORIDE 1 MG/ML
5 INJECTION, SOLUTION INTRAMUSCULAR; INTRAVENOUS
Status: DISCONTINUED | OUTPATIENT
Start: 2018-10-24 | End: 2018-10-24

## 2018-10-24 RX ADMIN — FENTANYL CITRATE 25 MCG: 50 INJECTION, SOLUTION INTRAMUSCULAR; INTRAVENOUS at 11:05

## 2018-10-24 RX ADMIN — FENTANYL CITRATE 25 MCG: 50 INJECTION, SOLUTION INTRAMUSCULAR; INTRAVENOUS at 11:08

## 2018-10-24 RX ADMIN — MIDAZOLAM HYDROCHLORIDE 1 MG: 1 INJECTION, SOLUTION INTRAMUSCULAR; INTRAVENOUS at 11:17

## 2018-10-24 RX ADMIN — MIDAZOLAM HYDROCHLORIDE 1 MG: 1 INJECTION, SOLUTION INTRAMUSCULAR; INTRAVENOUS at 11:33

## 2018-10-24 RX ADMIN — FENTANYL CITRATE 25 MCG: 50 INJECTION, SOLUTION INTRAMUSCULAR; INTRAVENOUS at 11:17

## 2018-10-24 RX ADMIN — FENTANYL CITRATE 25 MCG: 50 INJECTION, SOLUTION INTRAMUSCULAR; INTRAVENOUS at 11:58

## 2018-10-24 RX ADMIN — MIDAZOLAM HYDROCHLORIDE 1 MG: 1 INJECTION, SOLUTION INTRAMUSCULAR; INTRAVENOUS at 11:05

## 2018-10-24 RX ADMIN — MIDAZOLAM HYDROCHLORIDE 1 MG: 1 INJECTION, SOLUTION INTRAMUSCULAR; INTRAVENOUS at 11:58

## 2018-10-24 RX ADMIN — FENTANYL CITRATE 25 MCG: 50 INJECTION, SOLUTION INTRAMUSCULAR; INTRAVENOUS at 11:33

## 2018-10-24 RX ADMIN — MIDAZOLAM HYDROCHLORIDE 1 MG: 1 INJECTION, SOLUTION INTRAMUSCULAR; INTRAVENOUS at 11:42

## 2018-10-24 RX ADMIN — MIDAZOLAM HYDROCHLORIDE 1 MG: 1 INJECTION, SOLUTION INTRAMUSCULAR; INTRAVENOUS at 11:08

## 2018-10-24 NOTE — DISCHARGE INSTRUCTIONS
Sedation for a Medical Procedure: After Your Visit  Instructions. Sedatives are used to relax you for a procedure. You may or may not be awake during the procedure. Common side effects of sedation medications include:                   Drowsiness, dizziness, euphoria, sleepiness or confusion. I              Unsteady gait. Loss of fine muscle control and delayed reaction time. Visual                     disturbances, difficulty focusing and blurred vision. Impaired memory recall. Follow-up care is a key component for your health and safety. Be sure to make and go to all medical appointments. Call your doctor if you are having problems. It's also a good idea to keep a list of your allergies, medicines with doses and test results on hand    Home care following your sedation procedure: You may experience some of these side effects or you may not be aware of subtle changes in your behavior or reaction time. Because you received these medications, we are giving you the following instructions: Activity   For your safety, you should not drive until the medicine wears off and you can think clearly and react easily. Do not drive for 24 hours. Rest when you feel tired. Getting enough sleep will help you recover. Diet    You can eat your normal diet. If your stomach is upset, try bland, low-fat foods like plain rice, broiled chicken, toast, and yogurt. Drink plenty of fluids unless your doctor advised you to limit your fluids. Do not consume alcoholic beverages for 24 hours. Instructions  Do not make important personal, business, or legal decisions for 24 hours. Move slowly and carefully, do not make sudden position changes. Be alert for dizziness or lightheadedness and move accordingly. Have a responsible person assist you. Do not drive for 24 hours. Do not operate equipment for 24 hours. [employer], power tools, kitchen appliances, etc.)    Discharge medications:  Resume prior to test medications as prescribed by your personal physician. If you have any questions or concerns call Radiology RN at [phone]  After hours call Radiology on-call at [phone]    Call [phone] any time you think you may need emergency care. For example:                Call if you passed out (lost consciousness). The medicine is not wearing off and you cannot think clearly. Watch closely for changes in your health, and be sure to contact your doctor if                  you have any problems. Where can you learn more? Go to [employer]   Enter G817 in the search box to learn more about \"Sedation for a Medical Procedure: After Your Visit. \"         Percutaneous Liver Biopsy: What to Expect at [facility]  Percutaneous liver biopsy is a procedure to take a tiny sample (biopsy) of your liver tissue. Percutaneous (say \"per-bookerw-DALIA-nee-us) means \"through the skin. \" The procedure is also called aspiration biopsy or fine-needle aspiration. The tissue sample is looked at under a microscope. Your doctor can look for infection or other liver problems. You may have some pain where the biopsy needle entered your skin (the puncture site). You may also have pain in your shoulder. This is called referred pain. It is caused by pain traveling along a nerve near the biopsy site. The referred pain usually lasts less than 12 hours. You may have a small amount of bleeding from the puncture site. You will need to take it easy at home for 1 or 2 days after the procedure. You will probably be able to return to work and most of your usual activities after that. This care sheet gives you a general idea about how long it will take for you to recover. But each person recovers at a different pace. Follow the steps below to get better as quickly as possible.   How can you care for yourself at home? Activity    · Rest when you feel tired. Getting enough sleep will help you recover.     · Try to walk each day. Start by walking a little more than you did the day before. Bit by bit, increase the amount you walk. Walking boosts blood flow and helps prevent pneumonia and constipation.     · Avoid exercises that use your belly muscles and strenuous activities, such as bicycle riding, jogging, weight lifting, or aerobic exercise, for 1 week or until your doctor says it is okay.     · Ask your doctor when you can drive again.     · You will probably need to take 1 or 2 days off from work. It depends on the type of work you do and how you feel.     · You will probably be able to shower the same day as the test, if your doctor says it is okay. Pat the puncture site dry. Do not take a bath for at least 2 days after the test, or until your doctor tells you it is okay. Diet    · You can eat your normal diet. If your stomach is upset, try bland, low-fat foods like plain rice, broiled chicken, toast, and yogurt.     · Drink plenty of fluids (unless your doctor tells you not to). Medicines    · Your doctor will tell you if and when you can restart your medicines. He or she will also give you instructions about taking any new medicines.     · If you take blood thinners, such as warfarin (Coumadin), clopidogrel (Plavix), or aspirin, be sure to talk to your doctor. He or she will tell you if and when to start taking those medicines again. Make sure that you understand exactly what your doctor wants you to do.     · Be safe with medicines. Take pain medicines exactly as directed. ? If the doctor gave you a prescription medicine for pain, take it as prescribed. ? If you are not taking a prescription pain medicine, take an over-the-counter medicine that your doctor recommends. Read and follow all instructions on the label.   ? Do not take aspirin, ibuprofen (Advil, Motrin), naproxen (Aleve), or other nonsteroidal anti-inflammatory drugs (NSAIDs) unless your doctor says it is okay.     · If you think your pain medicine is making you sick to your stomach:  ? Take your medicine after meals (unless your doctor has told you not to). ? Ask your doctor for a different kind of pain medicine.    Care of the puncture site    · Keep a bandage over the puncture site for the first 1 or 2 days. Follow-up care is a key part of your treatment and safety. Be sure to make and go to all appointments, and call your doctor if you are having problems. It's also a good idea to know your test results and keep a list of the medicines you take. When should you call for help? Call 911 anytime you think you may need emergency care. For example, call if:    · You passed out (lost consciousness).     · You have severe trouble breathing.     · You have sudden chest pain and shortness of breath, or you cough up blood.     · You have severe pain in your chest, shoulder, or belly.    Call your doctor now or seek immediate medical care if:    · You have new or worse shortness of breath.     · Bright red blood has soaked through the bandage over the puncture site.     · You have pain that does not get better after you take your pain medicine.     · You are sick to your stomach or cannot keep fluids down.     · You have a fever, chills, or body aches.     · You have signs of infection, such as:  ? Increased pain, swelling, warmth, or redness. ? Red streaks leading from the puncture site. ? Pus draining from the puncture site. ? A fever.     · You have new or worse pain at the puncture site.     · You have new or worse belly swelling or bloating.     · You have trouble passing urine or stool.     · Your stools are black and tarlike or have streaks of blood.     · You have pale-colored stools along with dark urine and itching.    Watch closely for changes in your health, and be sure to contact your doctor if you have any problems.   Where can you learn more?  Go to http://jovan-leslie.info/. Enter Q454 in the search box to learn more about \"Percutaneous Liver Biopsy: What to Expect at Home. \"  Current as of: September 10, 2017  Content Version: 11.8  © 5681-4743 Healthwise, [facility]. Care instructions adapted under license by [employer] (which disclaims liability or warranty for this information). If you have questions about a medical condition or this instruction, always ask your healthcare professional. [place] [address] any warranty or liability for your use of this information.

## 2018-10-24 NOTE — PROGRESS NOTES
Assumed care of Pt who is resting and reading a book. Pt taking fluids and waiting for lunch. VS being monitored and no complaints at this time.

## 2018-10-24 NOTE — TELEPHONE ENCOUNTER
Called the patient and verified ID x 2. Informed the patient that Dr. Winston Moya recommends that she cancel her appointment with Dr. Farzaneh Crockett since the final pathology from her liver biopsy is not back and that he will call as soon as the liver biopsy results are available. The patient verbalized understanding and stated that she will call and cancel her appointment with Dr. Farzaneh Crockett and denied any further questions or concerns.

## 2018-10-24 NOTE — PROGRESS NOTES
Pt received to [facility] ambulatory. Changed to gown and assessed. Confirmed allergies and NPO. Per Dr Pennie Thomas, no labs needed as they were drawn for same procedure last week. IV started in R AC, vitals taken, stable. Mallampati 2 with 3 fingers, LS clear HS S1, S2.  Dr Pennie Thomas here for consent. 1100--To US, timeout at 1143, start time 1144. Stop time 1210. Pt tolerated well, total of 6 mg versed and 125 mcg fentanyl given for procedure. Clean dressing applied to RUQ abdomen. Returned to [facility] in stable condition, tolerating PO, ordered lunch tray. No pain. Family notified of pt's status. 1330--Pt provided discharge directions, reviewed with son and pt. Lunch tray received. 1415--Pt IV removed, dressed, and taken out to [facility] for discharge.

## 2018-10-26 ENCOUNTER — PATIENT MESSAGE (OUTPATIENT)
Dept: ENDOCRINOLOGY | Age: 73
End: 2018-10-26

## 2018-10-26 RX ORDER — INSULIN DEGLUDEC 200 U/ML
42 INJECTION, SOLUTION SUBCUTANEOUS DAILY
Qty: 9 ML | Refills: 11 | Status: SHIPPED | OUTPATIENT
Start: 2018-10-26 | End: 2018-11-02

## 2018-10-26 RX ORDER — INSULIN ASPART 100 [IU]/ML
INJECTION, SOLUTION INTRAVENOUS; SUBCUTANEOUS
Qty: 15 ML | Refills: 11 | Status: SHIPPED | OUTPATIENT
Start: 2018-10-26 | End: 2018-11-02

## 2018-10-26 NOTE — TELEPHONE ENCOUNTER
From: Shahzad Strange  To: Vale Murdock MD  Sent: 10/26/2018 3:02 PM EDT  Subject: Update Medical Information    Dr. Dee Ruiz,  I have checked with my health insurance. Aragon Prosper and Novolog are covered. I will need you to call in those prescriptions to [facility]  My glucose levels are all over the place. Since my appointment last Thursday, the lowest reading has been 249 morning fasting and the highest 456 before dinner. a little over 50% have been in the low to mid and upper 300's. So, nowhere near under 200. Do I increase Tesiba to 38 units for the next 4 days?   Thank you,  Miguel Coles

## 2018-10-28 NOTE — H&P
Radiology History and Physical    Patient: Jessica Ferrera 72 y.o. female       Chief Complaint: No chief complaint on file. History of Present Illness: Liver mass    History:    Past Medical History:   Diagnosis Date    Arthritis     Constipation     Diabetes ([facility] [address]     Hemorrhoids     Hiatal hernia     High cholesterol     Hypertension     Pancreatic cancer ([facility] [address]      Family History   Problem Relation Age of Onset    Cancer Mother         skin    Hypertension Mother     Heart Disease Mother     Cancer Father         skin    Heart Disease Father     Stroke Father     Diabetes Neg Hx      Social History     Socioeconomic History    Marital status:      Spouse name: Not on file    Number of children: Not on file    Years of education: Not on file    Highest education level: Not on file   Social Needs    Financial resource strain: Not on file    Food insecurity - worry: Not on file    Food insecurity - inability: Not on file   [employer] needs - medical: Not on file   [employer] needs - non-medical: Not on file   Occupational History    Not on file   Tobacco Use    Smoking status: Never Smoker    Smokeless tobacco: Never Used   Substance and Sexual Activity    Alcohol use: Yes     Alcohol/week: 1.2 - 2.4 oz     Types: 1 - 2 Cans of beer, 1 - 2 Shots of liquor per week     Frequency: 2-4 times a month     Drinks per session: 1 or 2    Drug use: No    Sexual activity: No   Other Topics Concern    Not on file   Social History Narrative    Lives in [facility] alone. Has one son.  since 2007. Used to stay home with her son and fixed things around the house. Likes to garden. Allergies: Allergies   Allergen Reactions    Amoxicillin Hives       Current Medications:  Current Outpatient Medications   Medication Sig    insulin degludec (TRESIBA FLEXTOUCH U-200) 200 unit/mL (3 mL) inpn 42 Units by SubCUTAneous route daily.  Or as directed up to 60 units daily    insulin aspart U-100 (NOVOLOG FLEXPEN U-100 INSULIN) 100 unit/mL inpn Inject 10 units before breakfast and dinner + 2 units for every 50 mg/dl above 150 mg/dl. Max 50 units per day    simvastatin (ZOCOR) 10 mg tablet Take  by mouth nightly.  telmisartan (MICARDIS) 80 mg tablet Take 80 mg by mouth daily.  hydroCHLOROthiazide (MICROZIDE) 12.5 mg capsule Take 12.5 mg by mouth daily. No current facility-administered medications for this encounter. Physical Exam:  Blood pressure 104/59, pulse 75, resp. rate 12, height 5' 7\" (1.702 m), weight 83 kg (183 lb), SpO2 96 %, not currently breastfeeding. GENERAL: alert, cooperative, no distress, appears stated age, LUNG: clear to auscultation bilaterally, HEART: regular rate and rhythm, S1, S2 normal, no murmur, click, rub or gallop      Alerts:    Hospital Problems  Date Reviewed: 10/18/2018    None          Laboratory:    No results for input(s): HGB, HCT, WBC, PLT, INR, BUN, CREA, K, CRCLT, HGBEXT, HCTEXT, PLTEXT in the last 72 hours. No lab exists for component: PTT, PT, INREXT      Plan of Care/Planned Procedure:  Risks, benefits, and alternatives reviewed with patient and she agrees to proceed with the procedure.        Erin Staples MD

## 2018-10-30 ENCOUNTER — OFFICE VISIT (OUTPATIENT)
Dept: ONCOLOGY | Age: 73
End: 2018-10-30

## 2018-10-30 ENCOUNTER — RESEARCH ENCOUNTER (OUTPATIENT)
Dept: ONCOLOGY | Age: 73
End: 2018-10-30

## 2018-10-30 VITALS
WEIGHT: 183.6 LBS | BODY MASS INDEX: 28.82 KG/M2 | SYSTOLIC BLOOD PRESSURE: 131 MMHG | RESPIRATION RATE: 18 BRPM | HEIGHT: 67 IN | HEART RATE: 82 BPM | TEMPERATURE: 97.8 F | OXYGEN SATURATION: 98 % | DIASTOLIC BLOOD PRESSURE: 68 MMHG

## 2018-10-30 DIAGNOSIS — C25.7 MALIGNANT NEOPLASM OF OTHER PARTS OF PANCREAS (HCC): Primary | ICD-10-CM

## 2018-10-30 DIAGNOSIS — G47.00 INSOMNIA, UNSPECIFIED TYPE: ICD-10-CM

## 2018-10-30 DIAGNOSIS — R10.9 ABDOMINAL PAIN, UNSPECIFIED ABDOMINAL LOCATION: ICD-10-CM

## 2018-10-30 RX ORDER — ONDANSETRON HYDROCHLORIDE 8 MG/1
8 TABLET, FILM COATED ORAL
Qty: 24 TAB | Refills: 3 | Status: SHIPPED | OUTPATIENT
Start: 2018-10-30 | End: 2019-01-30 | Stop reason: SDUPTHER

## 2018-10-30 RX ORDER — PROCHLORPERAZINE MALEATE 10 MG
10 TABLET ORAL
Qty: 50 TAB | Refills: 5 | Status: SHIPPED | OUTPATIENT
Start: 2018-10-30 | End: 2018-12-13

## 2018-10-30 RX ORDER — LIDOCAINE AND PRILOCAINE 25; 25 MG/G; MG/G
CREAM TOPICAL AS NEEDED
Qty: 30 G | Refills: 0 | Status: SHIPPED | OUTPATIENT
Start: 2018-10-30 | End: 2019-01-01 | Stop reason: SDUPTHER

## 2018-10-30 NOTE — PROGRESS NOTES
Pt has been screened for all eligibility/ineligibility criteria and has been determined eligible for this protocol. Informed consent was reviewed by RN with patient prior to signing. Possible side effects were discussed in detail, with patient being advised to inform RN or Dr. Jonathan Sanchez immediately in the event of any side effects once drug is started. All questions were answered adequately by RN or Dr. Jonathan Sanchez. Patient signed consent today for study [id number]. A copy of ICF given to patient. No additional questions at this time. Usual wake up time- 0730 Usual bed time- 2200

## 2018-10-30 NOTE — PROGRESS NOTES
[facility] [place] at [address], [address] [facility]  Joshua Cirri: [phone]  F: [phone]      Reason for Visit:   Noemy Vasquez is a 72 y.o. female who is seen in self-referral for evaluation of pancreatic cancer. Treatment History:   · 10/4/18 pancreatic head mass FNA, pancreatic adenocarcinoma    10/15/18  Liver, Core Biopsy w/Touch Prep CYTOLOGIC INTERPRETATION:   · Numerous cores and fragments of benign hepatic parenchyma     10/24/18  Liver, Fine Needle Aspiration CYTOLOGIC INTERPRETATION:   Metastatic adenocarcinoma (see Comment). General Categorization   Positive for malignancy. Comment: The morphologic appearance is nonspecific with regard to site of origin but compatible with metastatic pancreatic carcinoma in this patient with known pancreatic adenocarcinoma ([id number]). History of Present Illness:   She started having abdominal pain, gradual and persistent, x 1 month, pressure sensation, located in epigastrium, no radiation, no aggravating symptoms, no relieving factors. 25 lb weight loss over 6 months.  + nausea. Interval history:  In today for follow up. Complains of gr 1 loss of appetite, gr 1 constipation, gr 1 fatigue, gr 1 insomnia, and reports 1/10 pain to abdomen. Past Medical History:   Diagnosis Date    Arthritis     Constipation     Diabetes ([address]     Hemorrhoids     Hiatal hernia     High cholesterol     Hypertension     Pancreatic cancer ([address]       Past Surgical History:   Procedure Laterality Date    HX KNEE ARTHROSCOPY Right     HX ORTHOPAEDIC      left wrist surgery    HX TONSILLECTOMY        Social History     Tobacco Use    Smoking status: Never Smoker    Smokeless tobacco: Never Used   Substance Use Topics    Alcohol use:  Yes     Alcohol/week: 1.2 - 2.4 oz     Types: 1 - 2 Cans of beer, 1 - 2 Shots of liquor per week     Frequency: 2-4 times a month     Drinks per session: 1 or 2      Family History   Problem Relation Age of Onset    Cancer Mother         skin    Hypertension Mother     Heart Disease Mother     Cancer Father         skin    Heart Disease Father     Stroke Father     Diabetes Neg Hx      Current Outpatient Medications   Medication Sig    insulin degludec (TRESIBA FLEXTOUCH U-200) 200 unit/mL (3 mL) inpn 42 Units by SubCUTAneous route daily. Or as directed up to 60 units daily    insulin aspart U-100 (NOVOLOG FLEXPEN U-100 INSULIN) 100 unit/mL inpn Inject 10 units before breakfast and dinner + 2 units for every 50 mg/dl above 150 mg/dl. Max 50 units per day    simvastatin (ZOCOR) 10 mg tablet Take  by mouth nightly.  telmisartan (MICARDIS) 80 mg tablet Take 80 mg by mouth daily.  hydroCHLOROthiazide (MICROZIDE) 12.5 mg capsule Take 12.5 mg by mouth daily. No current facility-administered medications for this visit. Allergies   Allergen Reactions    Amoxicillin Hives        Review of Systems: A comprehensive review of systems was performed and all systems were negative except for HPI and for the symptom review form, reviewed and scanned in. Physical Exam:     Visit Vitals  /68   Pulse 82   Temp 97.8 °F (36.6 °C) (Temporal)   Resp 18   Ht 5' 7\" (1.702 m)   Wt 183 lb 9.6 oz (83.3 kg)   SpO2 98%   BMI 28.76 kg/m²     ECOG PS: 0  General: No distress  Eyes: PERRLA, anicteric sclerae  HENT: Atraumatic, OP clear  Neck: Supple  Lymphatic: No cervical, supraclavicular, or inguinal adenopathy  Respiratory: CTAB, normal respiratory effort  CV: Normal rate, regular rhythm, no murmurs, no peripheral edema  GI: Soft, nontender, nondistended, no masses, no hepatomegaly, no splenomegaly  MS: Normal gait and station. Digits without clubbing or cyanosis. Skin: No rashes, ecchymoses, or petechiae. Normal temperature, turgor, and texture.   Psych: Alert, oriented, appropriate affect, normal judgment/insight        Results:     Lab Results   Component Value Date/Time WBC 8.4 10/15/2018 09:09 AM    HGB 13.8 10/15/2018 09:09 AM    HCT 40.6 10/15/2018 09:09 AM    PLATELET 464 78/22/6597 09:09 AM    MCV 85.3 10/15/2018 09:09 AM    ABS. NEUTROPHILS 6.3 10/15/2018 09:09 AM     Lab Results   Component Value Date/Time    Sodium 130 (L) 09/29/2018 10:51 AM    Potassium 3.6 09/29/2018 10:51 AM    Chloride 93 (L) 09/29/2018 10:51 AM    CO2 24 09/29/2018 10:51 AM    Glucose 440 (H) 09/29/2018 10:51 AM    BUN 15 09/29/2018 10:51 AM    Creatinine 1.05 (H) 09/29/2018 10:51 AM    GFR est AA >60 09/29/2018 10:51 AM    GFR est non-AA 52 (L) 09/29/2018 10:51 AM    Calcium 8.9 09/29/2018 10:51 AM    Glucose (POC) 316 (H) 10/04/2018 03:45 PM     Lab Results   Component Value Date/Time    Bilirubin, total 0.7 09/29/2018 10:51 AM    ALT (SGPT) 25 09/29/2018 10:51 AM    AST (SGOT) 12 (L) 09/29/2018 10:51 AM    Alk. phosphatase 196 (H) 09/29/2018 10:51 AM    Protein, total 7.8 09/29/2018 10:51 AM    Albumin 3.7 09/29/2018 10:51 AM    Globulin 4.1 (H) 09/29/2018 10:51 AM     10/1/18 CT abd  There is a 3.8 x 1.9 cm mass involving the uncinate process and possibly  invading the duodenum. Findings are nonspecific but typical of pancreatic  carcinoma. There is no biliary or pancreatic ductal dilatation.     There is a poorly defined irregular hypodensity in segment IVb of the liver  measuring 3.7 x 1.9 cm. There appears to be focal biliary dilatation in the left  lobe behind this abnormality. Metastatic disease is suspected. There is a small,  1 cm hypodensity in the dome of the liver, likely segment 8. There is a 1 cm  hypodensity in segment 4 of the liver as well, also likely metastasis.  Small  hypodensity measuring less than 1 cm in segment 6 at the tip laterally is also  nonspecific but probable metastasis.     Spleen kidneys and adrenals are unremarkable.     No free fluid or focal fluid collection.     Lung bases are clear.     Bone windows are unremarkable.     IMPRESSION  IMPRESSION:  1. 3.8 x 1.9 cm mass involving the uncinate process of the pancreas and possibly  invading the duodenum, this likely represents pancreatic carcinoma. 2. Likely metastatic disease in the liver. There is a 3.8 x 1.9 cm mass involving the uncinate process and possibly  invading the duodenum. Findings are nonspecific but typical of pancreatic  carcinoma. There is no biliary or pancreatic ductal dilatation.     There is a poorly defined irregular hypodensity in segment IVb of the liver  measuring 3.7 x 1.9 cm. There appears to be focal biliary dilatation in the left  lobe behind this abnormality. Metastatic disease is suspected. There is a small,  1 cm hypodensity in the dome of the liver, likely segment 8. There is a 1 cm  hypodensity in segment 4 of the liver as well, also likely metastasis. Small  hypodensity measuring less than 1 cm in segment 6 at the tip laterally is also  nonspecific but probable metastasis.     Spleen kidneys and adrenals are unremarkable.     No free fluid or focal fluid collection.     Lung bases are clear.     Bone windows are unremarkable.     IMPRESSION  IMPRESSION:  1. 3.8 x 1.9 cm mass involving the uncinate process of the pancreas and possibly  invading the duodenum, this likely represents pancreatic carcinoma. 2. Likely metastatic disease in the liver. Records reviewed and summarized above. Pathology report(s) reviewed above. Radiology report(s) reviewed above. MRI abdomen 10/22/18: IMPRESSION:       1. Pancreatic uncinate process mass suspicious for pancreatic neoplasm, possibly  adenocarcinoma. Mass abuts the superior mesenteric artery with about 20%  circumference involvement but no luminal distortion or perivascular stranding. 2. Multiple hepatic lesions suspicious for metastatic neoplasm with segment IVb  lesion showing patchy areas of surrounding steatosis likely secondary to locally  altered intrahepatic hemodynamics and likely responsible for biopsy result.   3. Cholecystolithiasis and small gallbladder polyp.     Assessment/plan:   1. Uncinate pancreatic adenocarcinoma,  mets to liver, stage IV:  cT2 cN0 pM1    Discussed that while this is treatable, it is not curable, the goal of therapy is palliative. Discussed that without therapy, life expectancy would be 3-6 months, with therapy 12-18 months on average. As an example of likely chemotherapy, we discussed the risks and benefits of Gemcitabine and Abraxane chemotherapy. Potential side effects include, but are not limited to, nausea, vomiting, diarrhea, constipation, mucositis, taste changes, myelosuppression, infection, fatigue, alopecia, skin and nail changes, pulmonary toxicity, neuropathy, allergic reactions, infertility, and rarely, death. We also discussed the risks and benefits of FOLFIRINOX chemotherapy, including potential side effects. These include but are not limited to fatigue, nausea vomiting, diarrhea, neuropathy, taste changes, cold intolerance, esophageal spasm, allergic reactions, alopecia, mucositis, myelosuppression, risk for infection, infertility, and rarely, death. Rarely, a patient may have a condition where they do not metabolize fluorouracil appropriately (called DPD deficiency), and they may have excessive toxicity. A Port-A-Cath will be required in order to deliver the continuous infusion. Discussed the [id number] study and the research nurse met with her today and she signed informed consent. Dr. Antonia Valenzuela also assessed her performance status. · Gemcitabine (1000 mg/m2) and Abraxane (125 mg/m2) given on days 1,8,15 every 28 days. · Labs: CBC, BMP prior to each treatment. Hepatic function panel every 4 weeks. CA 19.9 prior to day 1  · Antiemetic prophylaxis: Dexamethasone prior to each treatment  · PRN antiemetics: Ondansetron and Prochlorperazine at home  · EMLA cream for port    CT c/a/p ordered. Port ordered. PCP notified of study    rx emla cream, compazine, zofran    2.  DM2:  Holding metformin; on insulin; saw Dr. Veronica Mccullough    3. Emotional well being:  She has excellent support and is coping well with her disease    4. Abdominal pain:  Due to cancer, will monitor for now, palliative care referral    5. Nutrition:  Shahriar Vigil RD met with her today; holding on enzymes    6. Insomnia:  She has lunesta; she may also try melatonin    > 40 min were spent with this patient with > 50% of that time spent in face to face counseling, including nutritional counseling. I appreciate the opportunity to participate in Ms. Yogesh Strange's care.     Signed By: Jesenia Michel MD

## 2018-11-01 ENCOUNTER — NURSE NAVIGATOR (OUTPATIENT)
Dept: PALLATIVE CARE | Age: 73
End: 2018-11-01

## 2018-11-01 ENCOUNTER — APPOINTMENT (OUTPATIENT)
Dept: INFUSION THERAPY | Age: 73
End: 2018-11-01
Payer: MEDICARE

## 2018-11-02 ENCOUNTER — APPOINTMENT (OUTPATIENT)
Dept: INFUSION THERAPY | Age: 73
End: 2018-11-02
Payer: MEDICARE

## 2018-11-02 ENCOUNTER — HOSPITAL ENCOUNTER (OUTPATIENT)
Dept: NON INVASIVE DIAGNOSTICS | Age: 73
Discharge: HOME | End: 2018-11-02
Attending: INTERNAL MEDICINE
Payer: MEDICARE

## 2018-11-02 ENCOUNTER — HOSPITAL ENCOUNTER (OUTPATIENT)
Dept: INFUSION THERAPY | Age: 73
Discharge: HOME | End: 2018-11-02
Payer: MEDICARE

## 2018-11-02 ENCOUNTER — HOSPITAL ENCOUNTER (OUTPATIENT)
Dept: CT IMAGING | Age: 73
Discharge: HOME | End: 2018-11-02
Attending: INTERNAL MEDICINE
Payer: MEDICARE

## 2018-11-02 VITALS
TEMPERATURE: 97.7 F | RESPIRATION RATE: 18 BRPM | HEART RATE: 68 BPM | SYSTOLIC BLOOD PRESSURE: 122 MMHG | DIASTOLIC BLOOD PRESSURE: 79 MMHG

## 2018-11-02 DIAGNOSIS — C25.7 MALIGNANT NEOPLASM OF OTHER PARTS OF PANCREAS (HCC): ICD-10-CM

## 2018-11-02 LAB
ALBUMIN SERPL-MCNC: 3 G/DL (ref 3.5–5)
ALBUMIN/GLOB SERPL: 0.8 {RATIO} (ref 1.1–2.2)
ALP SERPL-CCNC: 130 U/L (ref 45–117)
ALT SERPL-CCNC: 42 U/L (ref 12–78)
ANION GAP SERPL CALC-SCNC: 9 MMOL/L (ref 5–15)
APPEARANCE UR: CLEAR
APTT PPP: 28.3 SEC (ref 22.1–32)
AST SERPL-CCNC: 31 U/L (ref 15–37)
BACTERIA URNS QL MICRO: NEGATIVE /HPF
BASOPHILS # BLD: 0 K/UL (ref 0–0.1)
BASOPHILS NFR BLD: 0 % (ref 0–1)
BILIRUB SERPL-MCNC: 0.6 MG/DL (ref 0.2–1)
BILIRUB UR QL: NEGATIVE
BUN SERPL-MCNC: 9 MG/DL (ref 6–20)
BUN/CREAT SERPL: 15 (ref 12–20)
CALCIUM SERPL-MCNC: 8.4 MG/DL (ref 8.5–10.1)
CHLORIDE SERPL-SCNC: 99 MMOL/L (ref 97–108)
CO2 SERPL-SCNC: 28 MMOL/L (ref 21–32)
COLOR UR: ABNORMAL
CREAT SERPL-MCNC: 0.62 MG/DL (ref 0.55–1.02)
DIFFERENTIAL METHOD BLD: NORMAL
EOSINOPHIL # BLD: 0.1 K/UL (ref 0–0.4)
EOSINOPHIL NFR BLD: 1 % (ref 0–7)
EPITH CASTS URNS QL MICRO: ABNORMAL /LPF
ERYTHROCYTE [DISTWIDTH] IN BLOOD BY AUTOMATED COUNT: 14.4 % (ref 11.5–14.5)
GLOBULIN SER CALC-MCNC: 3.7 G/DL (ref 2–4)
GLUCOSE SERPL-MCNC: 138 MG/DL (ref 65–100)
GLUCOSE UR STRIP.AUTO-MCNC: NEGATIVE MG/DL
HCT VFR BLD AUTO: 38.2 % (ref 35–47)
HGB BLD-MCNC: 12.8 G/DL (ref 11.5–16)
HGB UR QL STRIP: NEGATIVE
IMM GRANULOCYTES # BLD: 0 K/UL (ref 0–0.04)
IMM GRANULOCYTES NFR BLD AUTO: 0 % (ref 0–0.5)
INR PPP: 1 (ref 0.9–1.1)
KETONES UR QL STRIP.AUTO: NEGATIVE MG/DL
LDH SERPL L TO P-CCNC: 189 U/L (ref 81–246)
LEUKOCYTE ESTERASE UR QL STRIP.AUTO: ABNORMAL
LYMPHOCYTES # BLD: 1.5 K/UL (ref 0.8–3.5)
LYMPHOCYTES NFR BLD: 18 % (ref 12–49)
MAGNESIUM SERPL-MCNC: 2.1 MG/DL (ref 1.6–2.4)
MCH RBC QN AUTO: 30 PG (ref 26–34)
MCHC RBC AUTO-ENTMCNC: 33.5 G/DL (ref 30–36.5)
MCV RBC AUTO: 89.5 FL (ref 80–99)
MONOCYTES # BLD: 0.8 K/UL (ref 0–1)
MONOCYTES NFR BLD: 10 % (ref 5–13)
NEUTS SEG # BLD: 5.8 K/UL (ref 1.8–8)
NEUTS SEG NFR BLD: 71 % (ref 32–75)
NITRITE UR QL STRIP.AUTO: NEGATIVE
NRBC # BLD: 0 K/UL (ref 0–0.01)
NRBC BLD-RTO: 0 PER 100 WBC
PH UR STRIP: 6 [PH] (ref 5–8)
PHOSPHATE SERPL-MCNC: 2.7 MG/DL (ref 2.6–4.7)
PLATELET # BLD AUTO: 378 K/UL (ref 150–400)
PMV BLD AUTO: 10.8 FL (ref 8.9–12.9)
POTASSIUM SERPL-SCNC: 3.2 MMOL/L (ref 3.5–5.1)
PROT SERPL-MCNC: 6.7 G/DL (ref 6.4–8.2)
PROT UR STRIP-MCNC: NEGATIVE MG/DL
PROTHROMBIN TIME: 10 SEC (ref 9–11.1)
RBC # BLD AUTO: 4.27 M/UL (ref 3.8–5.2)
RBC #/AREA URNS HPF: ABNORMAL /HPF (ref 0–5)
SODIUM SERPL-SCNC: 136 MMOL/L (ref 136–145)
SP GR UR REFRACTOMETRY: 1.03 (ref 1–1.03)
THERAPEUTIC RANGE,PTTT: NORMAL SECS (ref 58–77)
UA: UC IF INDICATED,UAUC: ABNORMAL
UROBILINOGEN UR QL STRIP.AUTO: 0.2 EU/DL (ref 0.2–1)
WBC # BLD AUTO: 8.2 K/UL (ref 3.6–11)
WBC URNS QL MICRO: ABNORMAL /HPF (ref 0–4)

## 2018-11-02 PROCEDURE — 83615 LACTATE (LD) (LDH) ENZYME: CPT | Performed by: INTERNAL MEDICINE

## 2018-11-02 PROCEDURE — 71260 CT THORAX DX C+: CPT

## 2018-11-02 PROCEDURE — 36415 COLL VENOUS BLD VENIPUNCTURE: CPT | Performed by: INTERNAL MEDICINE

## 2018-11-02 PROCEDURE — 74011636320 HC RX REV CODE- 636/320: Performed by: INTERNAL MEDICINE

## 2018-11-02 PROCEDURE — 80053 COMPREHEN METABOLIC PANEL: CPT | Performed by: INTERNAL MEDICINE

## 2018-11-02 PROCEDURE — 87086 URINE CULTURE/COLONY COUNT: CPT | Performed by: INTERNAL MEDICINE

## 2018-11-02 PROCEDURE — 86301 IMMUNOASSAY TUMOR CA 19-9: CPT | Performed by: INTERNAL MEDICINE

## 2018-11-02 PROCEDURE — 36415 COLL VENOUS BLD VENIPUNCTURE: CPT

## 2018-11-02 PROCEDURE — 85730 THROMBOPLASTIN TIME PARTIAL: CPT | Performed by: INTERNAL MEDICINE

## 2018-11-02 PROCEDURE — 74011000258 HC RX REV CODE- 258: Performed by: INTERNAL MEDICINE

## 2018-11-02 PROCEDURE — 84100 ASSAY OF PHOSPHORUS: CPT | Performed by: INTERNAL MEDICINE

## 2018-11-02 PROCEDURE — 74177 CT ABD & PELVIS W/CONTRAST: CPT

## 2018-11-02 PROCEDURE — 85025 COMPLETE CBC W/AUTO DIFF WBC: CPT | Performed by: INTERNAL MEDICINE

## 2018-11-02 PROCEDURE — 85610 PROTHROMBIN TIME: CPT | Performed by: INTERNAL MEDICINE

## 2018-11-02 PROCEDURE — 93005 ELECTROCARDIOGRAM TRACING: CPT

## 2018-11-02 PROCEDURE — 83735 ASSAY OF MAGNESIUM: CPT | Performed by: INTERNAL MEDICINE

## 2018-11-02 PROCEDURE — 81001 URINALYSIS AUTO W/SCOPE: CPT | Performed by: INTERNAL MEDICINE

## 2018-11-02 RX ORDER — INSULIN ASPART 100 [IU]/ML
INJECTION, SOLUTION INTRAVENOUS; SUBCUTANEOUS
Qty: 15 ML | Refills: 11
Start: 2018-11-02 | End: 2018-11-08

## 2018-11-02 RX ORDER — SODIUM CHLORIDE 0.9 % (FLUSH) 0.9 %
10 SYRINGE (ML) INJECTION
Status: COMPLETED | OUTPATIENT
Start: 2018-11-02 | End: 2018-11-02

## 2018-11-02 RX ORDER — INSULIN DEGLUDEC 200 U/ML
46 INJECTION, SOLUTION SUBCUTANEOUS DAILY
Qty: 9 ML | Refills: 11 | COMMUNITY
Start: 2018-11-02 | End: 2018-12-13

## 2018-11-02 RX ADMIN — SODIUM CHLORIDE 100 ML: 900 INJECTION, SOLUTION INTRAVENOUS at 14:17

## 2018-11-02 RX ADMIN — IOPAMIDOL 100 ML: 755 INJECTION, SOLUTION INTRAVENOUS at 14:17

## 2018-11-02 RX ADMIN — Medication 10 ML: at 14:17

## 2018-11-02 NOTE — PROGRESS NOTES
[id number] Pt arrived to [facility] ambulatory and in no distress for peripheral lab work. Labs drawn peripherally with no difficulty. Pt tolerated well and discharged ambulatory and in no distress. Blood pressure 122/79, pulse 68, temperature 97.7 °F (36.5 °C), resp. rate 18. Recent Results (from the past 12 hour(s)) EKG, 12 LEAD, INITIAL Collection Time: 11/02/18  2:50 PM  
Result Value Ref Range Ventricular Rate 66 BPM  
 Atrial Rate 66 BPM  
 P-R Interval 172 ms QRS Duration 92 ms Q-T Interval 448 ms QTC Calculation (Bezet) 469 ms Calculated P Axis 40 degrees Calculated R Axis 11 degrees Calculated T Axis 15 degrees Diagnosis Normal sinus rhythm Normal ECG No previous ECGs available METABOLIC PANEL, COMPREHENSIVE Collection Time: 11/02/18  3:14 PM  
Result Value Ref Range Sodium 136 136 - 145 mmol/L Potassium 3.2 (L) 3.5 - 5.1 mmol/L Chloride 99 97 - 108 mmol/L  
 CO2 28 21 - 32 mmol/L Anion gap 9 5 - 15 mmol/L Glucose 138 (H) 65 - 100 mg/dL BUN 9 6 - 20 MG/DL Creatinine 0.62 0.55 - 1.02 MG/DL  
 BUN/Creatinine ratio 15 12 - 20 GFR est AA >60 >60 ml/min/1.73m2 GFR est non-AA >60 >60 ml/min/1.73m2 Calcium 8.4 (L) 8.5 - 10.1 MG/DL Bilirubin, total 0.6 0.2 - 1.0 MG/DL  
 ALT (SGPT) 42 12 - 78 U/L  
 AST (SGOT) 31 15 - 37 U/L Alk. phosphatase 130 (H) 45 - 117 U/L Protein, total 6.7 6.4 - 8.2 g/dL Albumin 3.0 (L) 3.5 - 5.0 g/dL Globulin 3.7 2.0 - 4.0 g/dL A-G Ratio 0.8 (L) 1.1 - 2.2    
CBC WITH AUTOMATED DIFF Collection Time: 11/02/18  3:14 PM  
Result Value Ref Range WBC 8.2 3.6 - 11.0 K/uL  
 RBC 4.27 3.80 - 5.20 M/uL  
 HGB 12.8 11.5 - 16.0 g/dL HCT 38.2 35.0 - 47.0 % MCV 89.5 80.0 - 99.0 FL  
 MCH 30.0 26.0 - 34.0 PG  
 MCHC 33.5 30.0 - 36.5 g/dL  
 RDW 14.4 11.5 - 14.5 % PLATELET 779 932 - 388 K/uL MPV 10.8 8.9 - 12.9 FL  
 NRBC 0.0 0  WBC ABSOLUTE NRBC 0.00 0.00 - 0.01 K/uL NEUTROPHILS 71 32 - 75 % LYMPHOCYTES 18 12 - 49 % MONOCYTES 10 5 - 13 % EOSINOPHILS 1 0 - 7 % BASOPHILS 0 0 - 1 % IMMATURE GRANULOCYTES 0 0.0 - 0.5 % ABS. NEUTROPHILS 5.8 1.8 - 8.0 K/UL  
 ABS. LYMPHOCYTES 1.5 0.8 - 3.5 K/UL  
 ABS. MONOCYTES 0.8 0.0 - 1.0 K/UL  
 ABS. EOSINOPHILS 0.1 0.0 - 0.4 K/UL  
 ABS. BASOPHILS 0.0 0.0 - 0.1 K/UL  
 ABS. IMM. GRANS. 0.0 0.00 - 0.04 K/UL  
 DF AUTOMATED MAGNESIUM Collection Time: 11/02/18  3:14 PM  
Result Value Ref Range Magnesium 2.1 1.6 - 2.4 mg/dL PHOSPHORUS Collection Time: 11/02/18  3:14 PM  
Result Value Ref Range Phosphorus 2.7 2.6 - 4.7 MG/DL  
LD Collection Time: 11/02/18  3:14 PM  
Result Value Ref Range  81 - 246 U/L  
PROTHROMBIN TIME + INR Collection Time: 11/02/18  3:14 PM  
Result Value Ref Range INR 1.0 0.9 - 1.1 Prothrombin time 10.0 9.0 - 11.1 sec PTT Collection Time: 11/02/18  3:14 PM  
Result Value Ref Range aPTT 28.3 22.1 - 32.0 sec  
 aPTT, therapeutic range     58.0 - 77.0 SECS  
URINALYSIS W/ REFLEX CULTURE Collection Time: 11/02/18  3:14 PM  
Result Value Ref Range Color YELLOW/STRAW Appearance CLEAR CLEAR Specific gravity 1.029 1.003 - 1.030    
 pH (UA) 6.0 5.0 - 8.0 Protein NEGATIVE  NEG mg/dL Glucose NEGATIVE  NEG mg/dL Ketone NEGATIVE  NEG mg/dL Bilirubin NEGATIVE  NEG Blood NEGATIVE  NEG Urobilinogen 0.2 0.2 - 1.0 EU/dL Nitrites NEGATIVE  NEG Leukocyte Esterase MODERATE (A) NEG    
 WBC 5-10 0 - 4 /hpf  
 RBC 0-5 0 - 5 /hpf Epithelial cells FEW FEW /lpf Bacteria NEGATIVE  NEG /hpf  
 UA:UC IF INDICATED URINE CULTURE ORDERED (A) CNI

## 2018-11-03 LAB
ATRIAL RATE: 66 BPM
CALCULATED P AXIS, ECG09: 40 DEGREES
CALCULATED R AXIS, ECG10: 11 DEGREES
CALCULATED T AXIS, ECG11: 15 DEGREES
DIAGNOSIS, 93000: NORMAL
P-R INTERVAL, ECG05: 172 MS
Q-T INTERVAL, ECG07: 448 MS
QRS DURATION, ECG06: 92 MS
QTC CALCULATION (BEZET), ECG08: 469 MS
VENTRICULAR RATE, ECG03: 66 BPM

## 2018-11-04 LAB
BACTERIA SPEC CULT: NORMAL
CANCER AG19-9 SERPL-ACNC: ABNORMAL U/ML (ref 0–35)
CC UR VC: NORMAL
SERVICE CMNT-IMP: NORMAL

## 2018-11-05 ENCOUNTER — HOSPITAL ENCOUNTER (OUTPATIENT)
Dept: INFUSION THERAPY | Age: 73
Discharge: HOME | End: 2018-11-05
Payer: MEDICARE

## 2018-11-05 ENCOUNTER — OFFICE VISIT (OUTPATIENT)
Dept: ONCOLOGY | Age: 73
End: 2018-11-05

## 2018-11-05 ENCOUNTER — RESEARCH ENCOUNTER (OUTPATIENT)
Dept: ONCOLOGY | Age: 73
End: 2018-11-05

## 2018-11-05 VITALS
SYSTOLIC BLOOD PRESSURE: 123 MMHG | WEIGHT: 186.4 LBS | OXYGEN SATURATION: 97 % | HEART RATE: 73 BPM | TEMPERATURE: 98 F | HEIGHT: 67 IN | BODY MASS INDEX: 29.26 KG/M2 | DIASTOLIC BLOOD PRESSURE: 71 MMHG | RESPIRATION RATE: 18 BRPM

## 2018-11-05 VITALS
TEMPERATURE: 98 F | HEART RATE: 73 BPM | SYSTOLIC BLOOD PRESSURE: 123 MMHG | RESPIRATION RATE: 18 BRPM | DIASTOLIC BLOOD PRESSURE: 71 MMHG

## 2018-11-05 DIAGNOSIS — R10.9 ABDOMINAL PAIN, UNSPECIFIED ABDOMINAL LOCATION: ICD-10-CM

## 2018-11-05 DIAGNOSIS — C25.7 MALIGNANT NEOPLASM OF OTHER PARTS OF PANCREAS (HCC): Primary | ICD-10-CM

## 2018-11-05 DIAGNOSIS — K59.00 CONSTIPATION, UNSPECIFIED CONSTIPATION TYPE: ICD-10-CM

## 2018-11-05 DIAGNOSIS — G47.00 INSOMNIA, UNSPECIFIED TYPE: ICD-10-CM

## 2018-11-05 LAB
ALBUMIN SERPL-MCNC: 3 G/DL (ref 3.5–5)
BILIRUB SERPL-MCNC: 0.5 MG/DL (ref 0.2–1)
POTASSIUM SERPL-SCNC: 3.5 MMOL/L (ref 3.5–5.1)

## 2018-11-05 PROCEDURE — 36415 COLL VENOUS BLD VENIPUNCTURE: CPT | Performed by: NURSE PRACTITIONER

## 2018-11-05 PROCEDURE — 84132 ASSAY OF SERUM POTASSIUM: CPT | Performed by: NURSE PRACTITIONER

## 2018-11-05 PROCEDURE — 82040 ASSAY OF SERUM ALBUMIN: CPT | Performed by: NURSE PRACTITIONER

## 2018-11-05 PROCEDURE — 82247 BILIRUBIN TOTAL: CPT | Performed by: NURSE PRACTITIONER

## 2018-11-05 RX ORDER — HYDROCODONE BITARTRATE AND ACETAMINOPHEN 5; 325 MG/1; MG/1
TABLET ORAL
Refills: 0 | COMMUNITY
Start: 2018-10-04 | End: 2018-12-05

## 2018-11-05 RX ORDER — INSULIN DEGLUDEC 100 U/ML
INJECTION, SOLUTION SUBCUTANEOUS
COMMUNITY
End: 2018-11-05 | Stop reason: ALTCHOICE

## 2018-11-05 NOTE — PROGRESS NOTES
[facility] Lab Visit: 
 
6309:  Pt arrived ambulatory and in no distress, labs drawn per peripheral stick with no difficulty. 1350: Departed [facility] ambulatory and in no distress. Visit Vitals /71 Pulse 73 Temp 98 °F (36.7 °C) Resp 18 Labs available in CC once resulted.

## 2018-11-05 NOTE — PROGRESS NOTES
[facility] [place] [facility] [place] [address] [facility]  Sabi Henle: [phone]  F: [phone]      Reason for Visit:   Lucia Butterfield is a 72 y.o. female who is seen in self-referral for evaluation of pancreatic cancer. Treatment History:   · 10/4/18 pancreatic head mass FNA, pancreatic adenocarcinoma    10/15/18  Liver, Core Biopsy w/Touch Prep CYTOLOGIC INTERPRETATION:   · Numerous cores and fragments of benign hepatic parenchyma     10/24/18  Liver, Fine Needle Aspiration CYTOLOGIC INTERPRETATION:   Metastatic adenocarcinoma (see Comment). General Categorization   Positive for malignancy. Comment: The morphologic appearance is nonspecific with regard to site of origin but compatible with metastatic pancreatic carcinoma in this patient with known pancreatic adenocarcinoma ([id number]). History of Present Illness:   She started having abdominal pain, gradual and persistent, x 1 month, pressure sensation, located in epigastrium, no radiation, no aggravating symptoms, no relieving factors. 25 lb weight loss over 6 months.  + nausea. Interval history:  In today for follow up. Complains of gr 1 loss of appetite, gr 1 constipation, gr 1 fatigue, gr 1 insomnia, 1/10 pain to abdomen. Past Medical History:   Diagnosis Date    Arthritis     Constipation     Diabetes ([address]     Hemorrhoids     Hiatal hernia     High cholesterol     Hypertension     Pancreatic cancer ([address]       Past Surgical History:   Procedure Laterality Date    HX KNEE ARTHROSCOPY Right     HX ORTHOPAEDIC      left wrist surgery    HX TONSILLECTOMY        Social History     Tobacco Use    Smoking status: Never Smoker    Smokeless tobacco: Never Used   Substance Use Topics    Alcohol use:  Yes     Alcohol/week: 1.2 - 2.4 oz     Types: 1 - 2 Cans of beer, 1 - 2 Shots of liquor per week     Frequency: 2-4 times a month     Drinks per session: 1 or 2      Family History   Problem Relation Age of Onset    Cancer Mother         skin    Hypertension Mother     Heart Disease Mother     Cancer Father         skin    Heart Disease Father     Stroke Father     Diabetes Neg Hx      Current Outpatient Medications   Medication Sig    insulin degludec (TRESIBA FLEXTOUCH U-200) 200 unit/mL (3 mL) inpn 46 Units by SubCUTAneous route daily. Or as directed up to 60 units daily--Dose change 11/2/18--updated med list--did not send prescription to the pharmacy    insulin aspart U-100 (NOVOLOG FLEXPEN U-100 INSULIN) 100 unit/mL inpn Inject 14 units before breakfast and dinner + 2 units for every 50 mg/dl above 150 mg/dl. Max 50 units per day--Dose change 11/2/18--updated med list--did not send prescription to the pharmacy    prochlorperazine (COMPAZINE) 10 mg tablet Take 1 Tab by mouth every six (6) hours as needed for Nausea.  ondansetron hcl (ZOFRAN) 8 mg tablet Take 1 Tab by mouth every eight (8) hours as needed for Nausea.  lidocaine-prilocaine (EMLA) topical cream Apply  to affected area as needed for Pain.  simvastatin (ZOCOR) 10 mg tablet Take  by mouth nightly.  telmisartan (MICARDIS) 80 mg tablet Take 80 mg by mouth daily.  hydroCHLOROthiazide (MICROZIDE) 12.5 mg capsule Take 12.5 mg by mouth daily. No current facility-administered medications for this visit. Allergies   Allergen Reactions    Amoxicillin Hives        Review of Systems: A comprehensive review of systems was performed and all systems were negative except for HPI and for the symptom review form, reviewed and scanned in.       Physical Exam:     Visit Vitals  /71   Pulse 73   Temp 98 °F (36.7 °C)   Resp 18   Ht 5' 7\" (1.702 m)   Wt 186 lb 6.4 oz (84.6 kg)   BMI 29.19 kg/m²     ECOG PS: 0  General: No distress  Eyes: PERRLA, anicteric sclerae  HENT: Atraumatic, OP clear  Neck: Supple  Lymphatic: No cervical, supraclavicular, or inguinal adenopathy  Respiratory: CTAB, normal respiratory effort  CV: Normal rate, regular rhythm, no murmurs, no peripheral edema  GI: Soft, nontender, nondistended, no masses, no hepatomegaly, no splenomegaly  MS: Normal gait and station. Digits without clubbing or cyanosis. Skin: No rashes, ecchymoses, or petechiae. Normal temperature, turgor, and texture. Psych: Alert, oriented, appropriate affect, normal judgment/insight        Results:     Lab Results   Component Value Date/Time    WBC 8.2 11/02/2018 03:14 PM    HGB 12.8 11/02/2018 03:14 PM    HCT 38.2 11/02/2018 03:14 PM    PLATELET 117 82/92/3890 03:14 PM    MCV 89.5 11/02/2018 03:14 PM    ABS. NEUTROPHILS 5.8 11/02/2018 03:14 PM     Lab Results   Component Value Date/Time    Sodium 136 11/02/2018 03:14 PM    Potassium 3.2 (L) 11/02/2018 03:14 PM    Chloride 99 11/02/2018 03:14 PM    CO2 28 11/02/2018 03:14 PM    Glucose 138 (H) 11/02/2018 03:14 PM    BUN 9 11/02/2018 03:14 PM    Creatinine 0.62 11/02/2018 03:14 PM    GFR est AA >60 11/02/2018 03:14 PM    GFR est non-AA >60 11/02/2018 03:14 PM    Calcium 8.4 (L) 11/02/2018 03:14 PM    Glucose (POC) 316 (H) 10/04/2018 03:45 PM     Lab Results   Component Value Date/Time    Bilirubin, total 0.6 11/02/2018 03:14 PM    ALT (SGPT) 42 11/02/2018 03:14 PM    AST (SGOT) 31 11/02/2018 03:14 PM    Alk. phosphatase 130 (H) 11/02/2018 03:14 PM    Protein, total 6.7 11/02/2018 03:14 PM    Albumin 3.0 (L) 11/02/2018 03:14 PM    Globulin 3.7 11/02/2018 03:14 PM     10/1/18 CT abd  There is a 3.8 x 1.9 cm mass involving the uncinate process and possibly  invading the duodenum. Findings are nonspecific but typical of pancreatic  carcinoma. There is no biliary or pancreatic ductal dilatation.     There is a poorly defined irregular hypodensity in segment IVb of the liver  measuring 3.7 x 1.9 cm. There appears to be focal biliary dilatation in the left  lobe behind this abnormality. Metastatic disease is suspected. There is a small,  1 cm hypodensity in the dome of the liver, likely segment 8. There is a 1 cm  hypodensity in segment 4 of the liver as well, also likely metastasis. Small  hypodensity measuring less than 1 cm in segment 6 at the tip laterally is also  nonspecific but probable metastasis.     Spleen kidneys and adrenals are unremarkable.     No free fluid or focal fluid collection.     Lung bases are clear.     Bone windows are unremarkable.     IMPRESSION  IMPRESSION:  1. 3.8 x 1.9 cm mass involving the uncinate process of the pancreas and possibly  invading the duodenum, this likely represents pancreatic carcinoma. 2. Likely metastatic disease in the liver. There is a 3.8 x 1.9 cm mass involving the uncinate process and possibly  invading the duodenum. Findings are nonspecific but typical of pancreatic  carcinoma. There is no biliary or pancreatic ductal dilatation.     There is a poorly defined irregular hypodensity in segment IVb of the liver  measuring 3.7 x 1.9 cm. There appears to be focal biliary dilatation in the left  lobe behind this abnormality. Metastatic disease is suspected. There is a small,  1 cm hypodensity in the dome of the liver, likely segment 8. There is a 1 cm  hypodensity in segment 4 of the liver as well, also likely metastasis. Small  hypodensity measuring less than 1 cm in segment 6 at the tip laterally is also  nonspecific but probable metastasis.     Spleen kidneys and adrenals are unremarkable.     No free fluid or focal fluid collection.     Lung bases are clear.     Bone windows are unremarkable.     IMPRESSION  IMPRESSION:  1. 3.8 x 1.9 cm mass involving the uncinate process of the pancreas and possibly  invading the duodenum, this likely represents pancreatic carcinoma. 2. Likely metastatic disease in the liver. Records reviewed and summarized above. Pathology report(s) reviewed above. Radiology report(s) reviewed above. MRI abdomen 10/22/18: IMPRESSION:       1.  Pancreatic uncinate process mass suspicious for pancreatic neoplasm, possibly  adenocarcinoma. Mass abuts the superior mesenteric artery with about 20%  circumference involvement but no luminal distortion or perivascular stranding. 2. Multiple hepatic lesions suspicious for metastatic neoplasm with segment IVb  lesion showing patchy areas of surrounding steatosis likely secondary to locally  altered intrahepatic hemodynamics and likely responsible for biopsy result. 3. Cholecystolithiasis and small gallbladder polyp. CT c/a/p 11/2/18:  RECIST   Baseline examination     TARGET LESIONS:         Lesion (description)         Location (series/slice)                Size                                              1. Pancreatic uncinate mass lesion                3-68   36 x 31 mm in size.     2. Segment IVb liver mass lesion mass         3-53 18 x 21 mm     3. Inferior right hepatic lobe lesion                   3-64 16 x 11 mm.     Of note areas of hepatic steatosis adjacent to the gallbladder fundus, and at  the hepatic hilum. IMPRESSION:  Pancreatic mass lesion with associated widespread hepatic metastatic disease. RECIST  criteria measurements are provided above.      Assessment/plan:   1. Uncinate pancreatic adenocarcinoma,  mets to liver, stage IV:  cT2 cN0 pM1    Discussed that while this is treatable, it is not curable, the goal of therapy is palliative. Discussed that without therapy, life expectancy would be 3-6 months, with therapy 12-18 months on average. As an example of likely chemotherapy, we discussed the risks and benefits of Gemcitabine and Abraxane chemotherapy. Potential side effects include, but are not limited to, nausea, vomiting, diarrhea, constipation, mucositis, taste changes, myelosuppression, infection, fatigue, alopecia, skin and nail changes, pulmonary toxicity, neuropathy, allergic reactions, infertility, and rarely, death. We also discussed the risks and benefits of FOLFIRINOX chemotherapy, including potential side effects.  These include but are not limited to fatigue, nausea vomiting, diarrhea, neuropathy, taste changes, cold intolerance, esophageal spasm, allergic reactions, alopecia, mucositis, myelosuppression, risk for infection, infertility, and rarely, death. Rarely, a patient may have a condition where they do not metabolize fluorouracil appropriately (called DPD deficiency), and they may have excessive toxicity. A Port-A-Cath will be required in order to deliver the continuous infusion. Discussed the BBI-608 study and the research nurse met with her today and she signed informed consent. Dr. Jessica Mar also assessed her performance status today. · Gemcitabine (1000 mg/m2) and Abraxane (125 mg/m2) given on days 1,8,15 every 28 days. · Labs: CBC, BMP prior to each treatment. Hepatic function panel every 4 weeks. CA 19.9 prior to day 1  · Antiemetic prophylaxis: Dexamethasone prior to each treatment  · PRN antiemetics: Ondansetron and Prochlorperazine at home  · EMLA cream for port     CT c/a/p on 11/2/18 shows pancreatic mass lesion with widespread hepatic disease. Port ordered, scheduled for 11/6/18. PCP has been notified of study    rx emla cream, compazine, zofran    2. DM2:  Holding metformin; on insulin; saw Dr. Sandee Diaz    3. Emotional well being:  She has excellent support and is coping well with her disease    4. Abdominal pain:  Due to cancer, will monitor for now, palliative care referral previously placed. Set for 11/8/18 with Dr. Yvon Dominguez    5. Nutrition:  Eyal Roth RD met with her today; holding on enzymes    6. Insomnia:  She has lunesta; she may also try melatonin    7. Constipation: Recommend taking Miralax every day as well as adding docusate/senna. Also encouraged patient to drink at least 1-1.5 L of water/day. However, also discussed with her that if she were to get the investigational drug in the study, she may have diarrhea. Will continue to monitor.      > 40 min were spent with this patient with > 50% of that time spent in face to face counseling, including nutritional counseling. I appreciate the opportunity to participate in Ms. Lucy Edyta Strange's care.     Signed By: Shanda Griffith MD

## 2018-11-06 ENCOUNTER — HOSPITAL ENCOUNTER (OUTPATIENT)
Dept: INTERVENTIONAL RADIOLOGY/VASCULAR | Age: 73
Discharge: HOME | End: 2018-11-06
Attending: INTERNAL MEDICINE | Admitting: INTERNAL MEDICINE
Payer: MEDICARE

## 2018-11-06 VITALS
WEIGHT: 186 LBS | BODY MASS INDEX: 29.19 KG/M2 | DIASTOLIC BLOOD PRESSURE: 60 MMHG | HEART RATE: 69 BPM | OXYGEN SATURATION: 99 % | TEMPERATURE: 98.2 F | RESPIRATION RATE: 20 BRPM | HEIGHT: 67 IN | SYSTOLIC BLOOD PRESSURE: 100 MMHG

## 2018-11-06 DIAGNOSIS — C25.7 MALIGNANT NEOPLASM OF OTHER PARTS OF PANCREAS (HCC): ICD-10-CM

## 2018-11-06 PROCEDURE — C1788 PORT, INDWELLING, IMP: HCPCS

## 2018-11-06 PROCEDURE — 77030011893 HC TY CUT DN TRIS -B

## 2018-11-06 PROCEDURE — 74011250636 HC RX REV CODE- 250/636

## 2018-11-06 PROCEDURE — C1894 INTRO/SHEATH, NON-LASER: HCPCS

## 2018-11-06 PROCEDURE — 77030031139 HC SUT VCRL2 J&J -A

## 2018-11-06 PROCEDURE — 76937 US GUIDE VASCULAR ACCESS: CPT

## 2018-11-06 PROCEDURE — 74011250636 HC RX REV CODE- 250/636: Performed by: RADIOLOGY

## 2018-11-06 PROCEDURE — 77030039266 HC ADH SKN EXOFIN S2SG -A

## 2018-11-06 PROCEDURE — 74011000250 HC RX REV CODE- 250: Performed by: RADIOLOGY

## 2018-11-06 RX ORDER — LIDOCAINE HYDROCHLORIDE 10 MG/ML
INJECTION, SOLUTION EPIDURAL; INFILTRATION; INTRACAUDAL; PERINEURAL
Status: COMPLETED
Start: 2018-11-06 | End: 2018-11-06

## 2018-11-06 RX ORDER — FENTANYL CITRATE 50 UG/ML
200 INJECTION, SOLUTION INTRAMUSCULAR; INTRAVENOUS
Status: DISCONTINUED | OUTPATIENT
Start: 2018-11-06 | End: 2018-11-06

## 2018-11-06 RX ORDER — LIDOCAINE HYDROCHLORIDE 20 MG/ML
10 INJECTION, SOLUTION INFILTRATION; PERINEURAL ONCE
Status: COMPLETED | OUTPATIENT
Start: 2018-11-06 | End: 2018-11-06

## 2018-11-06 RX ORDER — LIDOCAINE HYDROCHLORIDE AND EPINEPHRINE 10; 10 MG/ML; UG/ML
50 INJECTION, SOLUTION INFILTRATION; PERINEURAL ONCE
Status: COMPLETED | OUTPATIENT
Start: 2018-11-06 | End: 2018-11-06

## 2018-11-06 RX ORDER — HEPARIN 100 UNIT/ML
SYRINGE INTRAVENOUS
Status: COMPLETED
Start: 2018-11-06 | End: 2018-11-06

## 2018-11-06 RX ORDER — HEPARIN 100 UNIT/ML
300 SYRINGE INTRAVENOUS AS NEEDED
Status: DISCONTINUED | OUTPATIENT
Start: 2018-11-06 | End: 2018-11-06

## 2018-11-06 RX ORDER — SODIUM CHLORIDE 9 MG/ML
25 INJECTION, SOLUTION INTRAVENOUS CONTINUOUS
Status: DISCONTINUED | OUTPATIENT
Start: 2018-11-06 | End: 2018-11-06

## 2018-11-06 RX ORDER — LIDOCAINE HYDROCHLORIDE 20 MG/ML
INJECTION, SOLUTION INFILTRATION; PERINEURAL
Status: COMPLETED
Start: 2018-11-06 | End: 2018-11-06

## 2018-11-06 RX ORDER — MIDAZOLAM HYDROCHLORIDE 1 MG/ML
5 INJECTION, SOLUTION INTRAMUSCULAR; INTRAVENOUS
Status: DISCONTINUED | OUTPATIENT
Start: 2018-11-06 | End: 2018-11-06

## 2018-11-06 RX ORDER — CEFAZOLIN SODIUM/WATER 2 G/20 ML
2 SYRINGE (ML) INTRAVENOUS ONCE
Status: COMPLETED | OUTPATIENT
Start: 2018-11-06 | End: 2018-11-06

## 2018-11-06 RX ADMIN — MIDAZOLAM 1 MG: 1 INJECTION INTRAMUSCULAR; INTRAVENOUS at 07:59

## 2018-11-06 RX ADMIN — FENTANYL CITRATE 25 MCG: 50 INJECTION INTRAMUSCULAR; INTRAVENOUS at 08:08

## 2018-11-06 RX ADMIN — LIDOCAINE HYDROCHLORIDE 10 ML: 20 INJECTION, SOLUTION INFILTRATION; PERINEURAL at 08:02

## 2018-11-06 RX ADMIN — MIDAZOLAM 1 MG: 1 INJECTION INTRAMUSCULAR; INTRAVENOUS at 07:54

## 2018-11-06 RX ADMIN — FENTANYL CITRATE 25 MCG: 50 INJECTION INTRAMUSCULAR; INTRAVENOUS at 07:59

## 2018-11-06 RX ADMIN — SODIUM CHLORIDE 25 ML/HR: 900 INJECTION, SOLUTION INTRAVENOUS at 07:54

## 2018-11-06 RX ADMIN — FENTANYL CITRATE 50 MCG: 50 INJECTION INTRAMUSCULAR; INTRAVENOUS at 07:54

## 2018-11-06 RX ADMIN — Medication 500 UNITS: at 08:01

## 2018-11-06 RX ADMIN — Medication 2 G: at 07:53

## 2018-11-06 RX ADMIN — LIDOCAINE HYDROCHLORIDE,EPINEPHRINE BITARTRATE 10 ML: 10; .01 INJECTION, SOLUTION INFILTRATION; PERINEURAL at 08:01

## 2018-11-06 RX ADMIN — LIDOCAINE HYDROCHLORIDE: 10 INJECTION, SOLUTION EPIDURAL; INFILTRATION; INTRACAUDAL; PERINEURAL at 08:00

## 2018-11-06 RX ADMIN — HEPARIN 500 UNITS: 100 SYRINGE at 08:01

## 2018-11-06 RX ADMIN — MIDAZOLAM 1 MG: 1 INJECTION INTRAMUSCULAR; INTRAVENOUS at 08:07

## 2018-11-06 NOTE — PROGRESS NOTES
0710: pt to angio holding for portacath placement  0800: procedure start  0813: procedure complete. Pt tolerated well.  0900: I have reviewed discharge instructions with the patient. The patient verbalized understanding.

## 2018-11-06 NOTE — DISCHARGE INSTRUCTIONS
[address] [facility] of Interventional Radiology  [phone]     Implanted Port Discharge Instructions      General Instructions:   A port is like an implanted IV. They are usually ordered for patients who will be getting chemotherapy, but can also be used as an IV for long term antibiotics, large amounts of fluids, and/or blood products. Your blood can be drawn from your port for labs also. Those patients who do not have good veins find the ports convenient as they can get the IV they need with one stick. The port can be used long term, and the care is easy. The device is under the skin, and once the skin heals, care is minimal. All that is required is the nurse who accesses the port will need to flush it with heparinized saline after each use. Ports are usually placed in the chest wall, usually on the right side. But they can be place in the arms and in the abdomen. Home Care Instructions: If your port is in your arm, do not allow blood pressure or other IVs to be place in that arm. Do not allow bra straps or any clothing to rub the skin over the port. Do not bathe or swim until the skin has healed and if the port is accessed. Once it is healed, and when the port is not accessed, it is okay to bathe and swim. Restrict yourself to light activity for the first 5 days after getting the port put in, after that, resume normal activity slowly. You may resume your normal diet and medications. Follow-Up Instructions: Please see your oncologist, or whatever physician ordered the port as he/she has requested of you. Call If: You should call your Physician and/or the Radiology Nurse if you notice redness, pus, swelling, or pain from the area of your incision. Call if you should develop a fever. The nurses who access your port will know to call your doctor if the port does not seem to be working properly.  You need to tell the nurses who use the port if you should have any pain or swelling at the site during an infusion. To Reach Us: Side effects of sedation medications and other medications used today have been reviewed. Notify us of nausea, itching, hives, dizziness, or anything else out of the ordinary. Should you experience any of these significant changes, please call [phone] between the hours of 7:30 am and 10 pm or [phone] after hours.  After hours, ask the  to page the [address] Technologist, and describe the problem to the technologist.

## 2018-11-06 NOTE — H&P
Radiology History and Physical    Patient: France Dubin 72 y.o. female       Chief Complaint: No chief complaint on file. History of Present Illness: for port    History:    Past Medical History:   Diagnosis Date    Arthritis     Constipation     [facility] ([facility]     Hemorrhoids     Hiatal hernia     High cholesterol     Hypertension     Pancreatic cancer ([facility]      Family History   Problem Relation Age of Onset    Cancer Mother         skin    Hypertension Mother     Heart Disease Mother     Cancer Father         skin    Heart Disease Father     Stroke Father     Diabetes Neg Hx      Social History     Socioeconomic History    Marital status:      Spouse name: Not on file    Number of children: Not on file    Years of education: Not on file    Highest education level: Not on file   Social Needs    Financial resource strain: Not on file    Food insecurity - worry: Not on file    Food insecurity - inability: Not on file   [employer] needs - medical: Not on file   [employer] needs - non-medical: Not on file   Occupational History    Not on file   Tobacco Use    Smoking status: Never Smoker    Smokeless tobacco: Never Used   Substance and Sexual Activity    Alcohol use: Yes     Alcohol/week: 1.2 - 2.4 oz     Types: 1 - 2 Cans of beer, 1 - 2 Shots of liquor per week     Frequency: 2-4 times a month     Drinks per session: 1 or 2    Drug use: No    Sexual activity: No   Other Topics Concern    Not on file   Social History Narrative    Lives in [facility] alone. Has one son.  since 2007. Used to stay home with her son and fixed things around the house. Likes to garden. Allergies:    Allergies   Allergen Reactions    Amoxicillin Hives       Current Medications:  Current Facility-Administered Medications   Medication Dose Route Frequency    fentaNYL citrate (PF) injection 200 mcg  200 mcg IntraVENous Rad Multiple    midazolam (VERSED) injection 5 mg 5 mg IntraVENous Rad Multiple    0.9% sodium chloride infusion  25 mL/hr IntraVENous CONTINUOUS    heparin (porcine) pf 300 Units  300 Units InterCATHeter PRN        Physical Exam:  Blood pressure 100/60, pulse 69, temperature 98.2 °F (36.8 °C), resp. rate 20, height 5' 7\" (1.702 m), weight 84.4 kg (186 lb), SpO2 99 %. LUNG: clear to auscultation bilaterally, HEART: regular rate and rhythm      Alerts:    Hospital Problems  Date Reviewed: 11/5/2018    None          Laboratory:      Recent Labs     11/05/18  1350   K 3.5         Plan of Care/Planned Procedure:  Risks, benefits, and alternatives reviewed with patient and she agrees to proceed with the procedure.        Anyi Barber MD

## 2018-11-06 NOTE — PROGRESS NOTES
Problem: Patient Education: Go to Patient Education Activity  Goal: Patient/Family Education  Outcome: Progressing Towards Goal  Pt educated on moderate sedation and its purpose; medications and side effects described and patient verbalized understanding.

## 2018-11-07 DIAGNOSIS — C25.7 MALIGNANT NEOPLASM OF OTHER PARTS OF PANCREAS (HCC): Primary | ICD-10-CM

## 2018-11-08 ENCOUNTER — OFFICE VISIT (OUTPATIENT)
Dept: ENDOCRINOLOGY | Age: 73
End: 2018-11-08

## 2018-11-08 ENCOUNTER — HOSPITAL ENCOUNTER (OUTPATIENT)
Dept: NON INVASIVE DIAGNOSTICS | Age: 73
Discharge: HOME | End: 2018-11-08
Payer: MEDICARE

## 2018-11-08 ENCOUNTER — OFFICE VISIT (OUTPATIENT)
Dept: PALLATIVE CARE | Age: 73
End: 2018-11-08

## 2018-11-08 VITALS
HEIGHT: 67 IN | SYSTOLIC BLOOD PRESSURE: 116 MMHG | WEIGHT: 185 LBS | RESPIRATION RATE: 18 BRPM | TEMPERATURE: 98.7 F | OXYGEN SATURATION: 97 % | HEART RATE: 83 BPM | DIASTOLIC BLOOD PRESSURE: 74 MMHG | BODY MASS INDEX: 29.03 KG/M2

## 2018-11-08 VITALS
SYSTOLIC BLOOD PRESSURE: 108 MMHG | WEIGHT: 184 LBS | BODY MASS INDEX: 28.88 KG/M2 | DIASTOLIC BLOOD PRESSURE: 58 MMHG | HEART RATE: 88 BPM | HEIGHT: 67 IN

## 2018-11-08 DIAGNOSIS — I10 ESSENTIAL HYPERTENSION, BENIGN: ICD-10-CM

## 2018-11-08 DIAGNOSIS — C25.9 PANCREATIC CANCER METASTASIZED TO LIVER (HCC): ICD-10-CM

## 2018-11-08 DIAGNOSIS — E78.5 HYPERLIPIDEMIA WITH TARGET LDL LESS THAN 100: ICD-10-CM

## 2018-11-08 DIAGNOSIS — R11.0 NAUSEA WITHOUT VOMITING: ICD-10-CM

## 2018-11-08 DIAGNOSIS — C25.7 MALIGNANT NEOPLASM OF OTHER PARTS OF PANCREAS (HCC): ICD-10-CM

## 2018-11-08 DIAGNOSIS — R63.4 WEIGHT LOSS: ICD-10-CM

## 2018-11-08 DIAGNOSIS — R10.9 ABDOMINAL DISCOMFORT: Primary | ICD-10-CM

## 2018-11-08 DIAGNOSIS — C78.7 PANCREATIC CANCER METASTASIZED TO LIVER (HCC): ICD-10-CM

## 2018-11-08 LAB
ATRIAL RATE: 68 BPM
CALCULATED P AXIS, ECG09: 40 DEGREES
CALCULATED R AXIS, ECG10: 12 DEGREES
CALCULATED T AXIS, ECG11: 18 DEGREES
DIAGNOSIS, 93000: NORMAL
P-R INTERVAL, ECG05: 176 MS
Q-T INTERVAL, ECG07: 426 MS
QRS DURATION, ECG06: 88 MS
QTC CALCULATION (BEZET), ECG08: 452 MS
VENTRICULAR RATE, ECG03: 68 BPM

## 2018-11-08 PROCEDURE — 93005 ELECTROCARDIOGRAM TRACING: CPT

## 2018-11-08 RX ORDER — INSULIN ASPART 100 [IU]/ML
INJECTION, SOLUTION INTRAVENOUS; SUBCUTANEOUS
Qty: 15 ML | Refills: 11 | Status: SHIPPED | OUTPATIENT
Start: 2018-11-08 | End: 2019-01-24

## 2018-11-08 RX ORDER — DEXAMETHASONE SODIUM PHOSPHATE 4 MG/ML
8 INJECTION, SOLUTION INTRA-ARTICULAR; INTRALESIONAL; INTRAMUSCULAR; INTRAVENOUS; SOFT TISSUE ONCE
Status: COMPLETED | OUTPATIENT
Start: 2018-11-09 | End: 2018-11-09

## 2018-11-08 NOTE — PROGRESS NOTES
Chief Complaint   Patient presents with    Diabetes     PCP and Pharmacy confirmed     History of Present Illness: Cas Todd is a 72 y.o. female here for follow up of diabetes. Weight up 5 lbs since last visit in 10/18. She has been in touch with me over mychart and we have been adjusting her insulin and now have her on an intensive regimen with tresiba and meal time novolog. She does eat lunch and this usually has carbs and I don't have her taking any novolog for this meal and therefore her pre-dinner sugar is usually over 200 and sometimes over 300. Her fasting sugar was down to 138 today but most days is still in the 150-200 range. Having many fewer reading that are over 250 than prior to beginning meal time insulin. Will be starting chemo for metastatic pancreatic cancer tomorrow and has dex as part of her regimen so we discussed increasing her tresiba just on chemo days to help prevent the spikes in her sugar. Current Outpatient Medications   Medication Sig    HYDROcodone-acetaminophen (NORCO) 5-325 mg per tablet Take  by mouth.  insulin degludec (TRESIBA FLEXTOUCH U-200) 200 unit/mL (3 mL) inpn 46 Units by SubCUTAneous route daily. Or as directed up to 60 units daily--Dose change 11/2/18--updated med list--did not send prescription to the pharmacy    insulin aspart U-100 (NOVOLOG FLEXPEN U-100 INSULIN) 100 unit/mL inpn Inject 14 units before breakfast and dinner + 2 units for every 50 mg/dl above 150 mg/dl. Max 50 units per day--Dose change 11/2/18--updated med list--did not send prescription to the pharmacy    simvastatin (ZOCOR) 10 mg tablet Take  by mouth nightly.  telmisartan (MICARDIS) 80 mg tablet Take 80 mg by mouth daily.  hydroCHLOROthiazide (MICROZIDE) 12.5 mg capsule Take 12.5 mg by mouth daily.  prochlorperazine (COMPAZINE) 10 mg tablet Take 1 Tab by mouth every six (6) hours as needed for Nausea.     ondansetron hcl (ZOFRAN) 8 mg tablet Take 1 Tab by mouth every eight (8) hours as needed for Nausea.  lidocaine-prilocaine (EMLA) topical cream Apply  to affected area as needed for Pain. No current facility-administered medications for this visit. Facility-Administered Medications Ordered in Other Visits   Medication Dose Route Frequency    [START ON 11/9/2018] PACLitaxel-Protein Bound (ABRAXANE) 245 mg chemo infusion  245 mg IntraVENous ONCE    [START ON 11/9/2018] gemcitabine (GEMZAR) 1,960 mg in 0.9% sodium chloride 250 mL, overfill volume 25 mL chemo infusion  1,960 mg IntraVENous ONCE    [START ON 11/9/2018] dexamethasone (DECADRON) 4 mg/mL injection 8 mg  8 mg IntraVENous ONCE    [START ON 11/9/2018] prochlorperazine (COMPAZINE) with saline injection 10 mg  10 mg IntraVENous Q6H PRN     Allergies   Allergen Reactions    Amoxicillin Hives     Review of Systems:  - Eyes: no blurry vision or double vision  - Cardiovascular: no chest pain  - Respiratory: no shortness of breath  - Musculoskeletal: no myalgias  - Neurological: no numbness/tingling in extremities    Physical Examination:  Blood pressure 108/58, pulse 88, height 5' 7\" (1.702 m), weight 184 lb (83.5 kg). - General: pleasant, no distress, good eye contact   - Neck: no carotid bruits  - Cardiovascular: regular, normal rate, nl s1 and s2, no m/r/g,   - Respiratory: clear bilaterally  - Integumentary: no edema,   - Psychiatric: normal mood and affect    Data Reviewed:   - none new for review    Assessment/Plan:     1. Uncontrolled type 2 diabetes mellitus without complication, with long-term current use of insulin ([facility]): her most recent Hgb A1c was 12% in 9/18 when recently diagnosed with full blown diabetes due to newly diagnosed metastatic pancreatic cancer.  Her blood sugars are coming down nicely with tresiba and novolog but will have her take a shot at lunch and check 3 times daily to gain tighter control.  - cont tresiba 46 units in the morning  - cont novolog 14 units but take before all 3 meals + 2 units for every 50 mg/dl above 150 mg/dl  - check bs 3 times daily  - foot exam done 10/18  - will need eye exam in the future  - will need microalbumin in the future  - check A1c, cmp and microalbumin at next visit          2. Hypertension: her BP was at goal < 140/90  - cont current regimen    3. Hyperlipidemia: Goal LDL < 100  - cont simvastatin 10 mg daily  - check lipids at next visit      Patient Instructions   1) Cont tresiba 46 units in the morning. However, on chemo days, take 60 units of tresiba in the morning    2) Check blood sugar before breakfast, lunch and dinner and dose novolog as follows:   Blood sugar   Less than 90 12 units    14 units   151-200 16 units   201-250 18 units   251-300 20 units   301-350 22 units   351-400 24 units   401-450 26 units     3) If you start dosing novolog with lunch and are finding that your sugar is dropping under 90 more than 2 times a week, plan on decreasing your tresiba to 42 units in the morning. 4) Continue to be in touch over mychart with your readings if you are going under 90 or staying over 200 more than 3 times a week. 5) If you are away from home at lunch and can't check your sugar, you can still bring the novolog pen as it doesn't need to be refrigerated once it's been opened and just take 12 units to cover lunch.       Follow-up Disposition:  Return for 12/13/18 at 2:10pm.    Copy sent to:  Kristyn Phipps MD as PCP - General (Internal Medicine)  Porsche Burkett MD (Hematology and Oncology)
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
## 2018-11-08 NOTE — ACP (ADVANCE CARE PLANNING)
Advance Care Planning [facility] is in the process of having legal documents drawn up. She will bring her completed AMD for scanning when it is ready. She says she has named 2 healthcare agents: 
 
Nicola Chiu, son, [phone] Mone Tse, friend, [phone] Kassi Kern, FREDERIC, LMSW, [facility] Palliative  Social Work Supervisee for [place] [facility] 
[phone]

## 2018-11-08 NOTE — PROGRESS NOTES
Palliative Medicine Office Visit Palliative Medicine Nurse Check In 
[phone] ([phone]) Patient Name: Estiven Verdugo YOB: 1945 Date of Office Visit: 11/8/2018 Patient states: \"  \" 
 
From Check In Sheet (scanned in Media): 
Has a medical provider talked with you about cardiopulmonary resuscitation (CPR)? [x] Yes   [] No   [] Unable to obtain Nurse reminder to complete or update ACP FlowSheet: 
 
Is ACP on the Problem List?    [] Yes    [x] No 
IF ACP Document is ON FILE; Nurse to place ACP on Problem List  
 
Is there an ACP Note in Chart Review/Note? [x] Yes    [] No  
If NO: ALERT PROVIDER Primary Decision Maker [facility] Agent): [facility] Relationship to patient:Arsh Phone number: [phone] [] Named in a scanned document  
[x] Legal Next of Kin 
[] Guardian Is there anything that we should know about you as a person in order to provide you the best care possible? Have you been to the ER, urgent care clinic since your last visit? [] Yes   [x] No   [] Unable to obtain Have you been hospitalized since your last visit? [] Yes   [x] No   [] Unable to obtain Have you seen or consulted any other health care providers outside of the [facility] since your last visit? [] Yes   [x] No   [] Unable to obtain Functional status (describe):  
 
 
 
Last BM: 11/5/2018  accessed (date): 11/8/2018 Bottle review (for opioid pain medication): 
Medication 1:  
Date filled:  
Directions:  
# filled: # left: # pills taking per day: 
Last dose taken: 
 
Medication 2:  
Date filled:  
Directions:  
# filled: # left: # pills taking per day: 
Last dose taken: 
 
Medication 3:  
Date filled:  
Directions:  
# filled: # left: # pills taking per day: 
Last dose taken: 
 
Medication 4:  
Date filled:  
Directions:  
# filled: # left: # pills taking per day: 
Last dose taken:

## 2018-11-08 NOTE — PROGRESS NOTES
Palliative Medicine Outpatient Services Bhargav: [phone] ([phone]) Patient Name: Yrn Last YOB: 1945 Date of Current Visit: 18 Location of Current Visit:   
[x] [facility] Office 
[] [facility] Office [] [address] [place] [address] 
[] Home 
[] Other:   
 
Date of Initial Visit: 2018 Requesting Physician: Dr. Jakob Fitzgerald Primary Care Physician: Dwight Otoole MD 
  
 SUMMARY:  
Yrn Last is a 72y.o. year old with a  history of diabetes on insulin, newly diagnosed pancreatic cancer with metastases to the liver about to start chemotherapy with gemcitabine Abraxane as part of a clinical trial at [address] [place] [address], who was referred to Palliative Medicine by Dr. Jakob Fitzgerald for management of symptoms and support. 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 Son Leigh Syed lives nearby. Has very good friends and family support PALLIATIVE DIAGNOSES:  
 
  ICD-10-CM ICD-9-CM 1. Abdominal discomfort R10.9 789.00   
2. Nausea without vomiting R11.0 787.02   
3. Weight loss R63.4 783.21   
4. Pancreatic cancer metastasized to liver (HCC) C25.9 157.9 C78.7 197.7 PLAN:  
Patient Instructions Dear Yrn Last , It was a pleasure seeing you today at [facility] office Your described symptoms were: Fatigue: 2 Drowsiness: 1 Depression: 0 Pain: 1 Anxiety: 0 Nausea: 0 Anorexia: 1 Dyspnea: 0 Best Well-Bein Constipation: Yes Other Problem (Comment): 0 This is the plan we talked about: 1. Abdominal discomfort - You do not take any medicine for this on a regular basis. You have some Hydrocodone at home in case you need it. - We agreed that you will call us should the pain increase so we can treat you accordingly. 2. Constipation - You have trouble moving your bowels.  
- Please start stool softners- pericolace which you can purchase over the counter 1 tab two times a day. You can hold this should you have diarrhea with chemotherapy. 3. Chemo induced nausea - You are starting chemo tomorrow. In anticipation of this, we talked about ways to prevent nausea. You have Zofran 8 mg tablets at home. Please take 1 tablet 3 times a day the day after chemo for at least 3-4 days. You can take 1 extra tablet in between doses if you are still nauseated. Please call us so we can help guide you as well. 4.  Acid reflux 
-You may suffer for some acid reflux because we are starting you on steroids with the chemotherapy. You already have some gastritis. Please purchase Prilosec 20 mg 
-Over-the-counter and take 1 tablet in the morning daily. 5.  Goals of care 
-You feel good about starting chemotherapy. You have good support. 
-You are in the process of completing a medical directive along with living will etc. with a . Please bring us a copy when you have it. 
-We discussed your wishes regarding CODE STATUS and you want to remain full code for now. This is what you have shared with us about Advance Care Planning No flowsheet data found. The Palliative Medicine Team is here to support you and your family. We will see you again in 6 weeks Sincerely, 
 
 
Adan Smith MD and the Palliative Medicine Team 
 
 
Counseling and Coordination: See above GOALS OF CARE / TREATMENT PREFERENCES:  
[====Goals of Care====] GOALS OF CARE: 
Patient / health care proxy stated goals: Full code TREATMENT PREFERENCES:  
Code Status:  [x] Attempt Resuscitation       [] Do Not Attempt Resuscitation Advance Care Planning: No flowsheet data found. Other: 
(If patient appropriate for POST, consider using PALLPOST smart phrase here) The palliative care team has discussed with patient / health care proxy about goals of care / treatment preferences for patient. 
[====Goals of Care====]  PRESCRIPTIONS GIVEN:  
 No orders of the defined types were placed in this encounter. FOLLOW UP: Future Appointments Date Time Provider Shiva Zavala 11/8/2018  1:00 PM EKG ROOM 209 [facility]  
11/8/2018  2:50 PM [facility] Caputo [facility]   
11/9/2018  9:00 AM [facility]  
11/14/2018  1:30 PM [facility]  
11/16/2018  9:00 [facility] December 11/23/2018  9:00 AM SS INF2 CH3 <4H [facility] December 11/30/2018  9:00 AM SS INF2 CH3 <4H [facility] December 12/20/2018 10:30 AM Kenny Singh MD [address] [facility] PHYSICIANS INVOLVED IN CARE:  
Patient Care Team: 
Simi Henson MD as PCP - General (Internal Medicine) Silvana Armstrong MD (Hematology and Oncology) HISTORY:  
Nursing documentation from date of visit reviewed. Reviewed patient-completed ESAS and advance care planning form. Reviewed patient record in prescription monitoring program. 
 
CHIEF COMPLAINT: None HPI/SUBJECTIVE: The patient is: [x] Verbal / [] Nonverbal  
 
Pleasant woman, here alone, appears younger than stated age. Has no specific complaints. Has some mild abdominal discomfort for which he takes hydrocodone [place] occasionally. She tells me that she has handled the diagnosis of pancreatic cancer much better than most people expected. She wants to tolerate chemotherapy as well as she can. She has very good understanding of her disease. Clinical Pain Assessment (nonverbal scale for nonverbal patients):  
[++++ Clinical Pain Assessment++++] [++++Pain Severity++++]: Pain: 1 
[++++Pain Character++++]: deep gnawing 
[++++Pain Duration++++]: weeks [++++Pain Effect++++]: none in particular 
[++++Pain Factors++++]: in particular 
[++++Pain Frequency++++]: on and off 
[++++Pain Location++++]: upper and left-sided abdomen 
[++++ Clinical Pain Assessment++++]  FUNCTIONAL ASSESSMENT:  
 
 Palliative Performance Scale (PPS): 
 80 
 
 
 PSYCHOSOCIAL/SPIRITUAL SCREENING:  
 
Any spiritual / Scientologist concerns: 
[] Yes /  [x] No 
 
Caregiver Burnout: 
[] Yes /  [x] No /  [] No Caregiver Present Anticipatory grief assessment:  
[x] Normal  / [] Maladaptive ESAS Anxiety: Anxiety: 0 
 
ESAS Depression: Depression: 0 REVIEW OF SYSTEMS:  
 
The following systems were [] reviewed / [] unable to be reviewed Systems: constitutional, ears/nose/mouth/throat, respiratory, gastrointestinal, genitourinary, musculoskeletal, integumentary, neurologic, psychiatric, endocrine. Positive findings noted below. Modified ESAS Completed by: provider Fatigue: 2 Drowsiness: 1 Depression: 0 Pain: 1 Anxiety: 0 Nausea: 0 Anorexia: 1 Dyspnea: 0 Best Well-Bein Constipation: Yes Other Problem (Comment): 0 PHYSICAL EXAM:  
 
Wt Readings from Last 3 Encounters:  
18 185 lb (83.9 kg) 18 186 lb (84.4 kg) 18 186 lb 6.4 oz (84.6 kg) Blood pressure 116/74, pulse 83, temperature 98.7 °F (37.1 °C), temperature source Temporal, resp. rate 18, height 5' 7\" (1.702 m), weight 185 lb (83.9 kg), SpO2 97 %. Last bowel movement: See Nursing Note Constitutional: Alert, oriented, appears well Eyes: pupils equal, anicteric ENMT: no nasal discharge, moist mucous membranes Cardiovascular: regular rhythm, distal pulses intact Respiratory: breathing not labored, symmetric Gastrointestinal: soft non-tender, +bowel sounds Musculoskeletal: no deformity, no tenderness to palpation Skin: warm, dry Neurologic: following commands, moving all extremities Psychiatric: full affect, no hallucinations Other: 
 
 
 HISTORY:  
 
Past Medical History:  
Diagnosis Date  Arthritis  Constipation  Diabetes ([address]  Hemorrhoids  Hiatal hernia  High cholesterol  Hypertension  Pancreatic cancer ([facility] [address] Past Surgical History:  
Procedure Laterality Date  HX KNEE ARTHROSCOPY Right  HX ORTHOPAEDIC    
 left wrist surgery  HX TONSILLECTOMY Family History Problem Relation Age of Onset  Cancer Mother   
     skin  Hypertension Mother  Heart Disease Mother  Cancer Father   
     skin  Heart Disease Father  Stroke Father  Diabetes Neg Hx History reviewed, no pertinent family history. Social History Tobacco Use  Smoking status: Never Smoker  Smokeless tobacco: Never Used Substance Use Topics  Alcohol use: Yes Alcohol/week: 1.2 - 2.4 oz Types: 1 - 2 Cans of beer, 1 - 2 Shots of liquor per week Frequency: 2-4 times a month Drinks per session: 1 or 2 Allergies Allergen Reactions  Amoxicillin Hives Current Outpatient Medications Medication Sig  
 HYDROcodone-acetaminophen (NORCO) 5-325 mg per tablet Take  by mouth.  insulin degludec (TRESIBA FLEXTOUCH U-200) 200 unit/mL (3 mL) inpn 46 Units by SubCUTAneous route daily. Or as directed up to 60 units daily--Dose change 11/2/18--updated med list--did not send prescription to the pharmacy  insulin aspart U-100 (NOVOLOG FLEXPEN U-100 INSULIN) 100 unit/mL inpn Inject 14 units before breakfast and dinner + 2 units for every 50 mg/dl above 150 mg/dl. Max 50 units per day--Dose change 11/2/18--updated med list--did not send prescription to the pharmacy  prochlorperazine (COMPAZINE) 10 mg tablet Take 1 Tab by mouth every six (6) hours as needed for Nausea.  ondansetron hcl (ZOFRAN) 8 mg tablet Take 1 Tab by mouth every eight (8) hours as needed for Nausea.  lidocaine-prilocaine (EMLA) topical cream Apply  to affected area as needed for Pain.  simvastatin (ZOCOR) 10 mg tablet Take  by mouth nightly.  telmisartan (MICARDIS) 80 mg tablet Take 80 mg by mouth daily.  hydroCHLOROthiazide (MICROZIDE) 12.5 mg capsule Take 12.5 mg by mouth daily. No current facility-administered medications for this visit. Facility-Administered Medications Ordered in Other Visits Medication Dose Route Frequency  [START ON 11/9/2018] PACLitaxel-Protein Bound (ABRAXANE) 245 mg chemo infusion  245 mg IntraVENous ONCE  
 [START ON 11/9/2018] gemcitabine (GEMZAR) 1,960 mg in 0.9% sodium chloride 250 mL, overfill volume 25 mL chemo infusion  1,960 mg IntraVENous ONCE  
 [START ON 11/9/2018] dexamethasone (DECADRON) 4 mg/mL injection 8 mg  8 mg IntraVENous ONCE  
 [START ON 11/9/2018] prochlorperazine (COMPAZINE) with saline injection 10 mg  10 mg IntraVENous Q6H PRN  
 
 
 
 LAB DATA REVIEWED:  
 
Lab Results Component Value Date/Time WBC 8.2 11/02/2018 03:14 PM  
 HGB 12.8 11/02/2018 03:14 PM  
 PLATELET 885 80/64/9730 03:14 PM  
 
Lab Results Component Value Date/Time Sodium 136 11/02/2018 03:14 PM  
 Potassium 3.5 11/05/2018 01:50 PM  
 Chloride 99 11/02/2018 03:14 PM  
 CO2 28 11/02/2018 03:14 PM  
 BUN 9 11/02/2018 03:14 PM  
 Creatinine 0.62 11/02/2018 03:14 PM  
 Calcium 8.4 (L) 11/02/2018 03:14 PM  
 Magnesium 2.1 11/02/2018 03:14 PM  
 Phosphorus 2.7 11/02/2018 03:14 PM  
  
Lab Results Component Value Date/Time AST (SGOT) 31 11/02/2018 03:14 PM  
 Alk. phosphatase 130 (H) 11/02/2018 03:14 PM  
 Protein, total 6.7 11/02/2018 03:14 PM  
 Albumin 3.0 (L) 11/05/2018 01:50 PM  
 Globulin 3.7 11/02/2018 03:14 PM  
 
Lab Results Component Value Date/Time INR 1.0 11/02/2018 03:14 PM  
 Prothrombin time 10.0 11/02/2018 03:14 PM  
 aPTT 28.3 11/02/2018 03:14 PM  
  
No results found for: IRON, FE, TIBC, IBCT, PSAT, FERR  CONTROLLED SUBSTANCES SAFETY ASSESSMENT (IF ON CONTROLLED SUBSTANCES):  
 
Reviewed opioid safety handout:  [] Yes   [] No 
24 hour opioid dose >150mg morphine equivalent/day:  [] Yes   [] No 
Benzodiazepines:  [] Yes   [] No 
Sleep apnea:  [] Yes   [] No 
Urine Toxicology Testing within last 6 months:  [] Yes   [] No 
History of or new aberrant medication taking behaviors:  [] Yes   [] No 
 Total time: 70 minutes Counseling / coordination time: 60 minutes 
> 50% counseling / coordination?:  Yes

## 2018-11-08 NOTE — PATIENT INSTRUCTIONS
[facility] It was a pleasure seeing you today at [facility] office Your described symptoms were: Fatigue: 2 Drowsiness: 1 Depression: 0 Pain: 1 Anxiety: 0 Nausea: 0 Anorexia: 1 Dyspnea: 0 Best Well-Bein Constipation: Yes Other Problem (Comment): 0 This is the plan we talked about: 1. Abdominal discomfort - You do not take any medicine for this on a regular basis. You have some Hydrocodone at home in case you need it. - We agreed that you will call us should the pain increase so we can treat you accordingly. 2. Constipation - You have trouble moving your bowels. - Please start stool softners- pericolace which you can purchase over the counter 1 tab two times a day. You can hold this should you have diarrhea with chemotherapy. 3. Chemo induced nausea - You are starting chemo tomorrow. In anticipation of this, we talked about ways to prevent nausea. You have Zofran 8 mg tablets at home. Please take 1 tablet 3 times a day the day after chemo for at least 3-4 days. You can take 1 extra tablet in between doses if you are still nauseated. Please call us so we can help guide you as well. 4.  Acid reflux 
-You may suffer for some acid reflux because we are starting you on steroids with the chemotherapy. You already have some gastritis. Please purchase Prilosec 20 mg 
-Over-the-counter and take 1 tablet in the morning daily. 5.  Goals of care 
-You feel good about starting chemotherapy. You have good support. 
-You are in the process of completing a medical directive along with living will etc. with a . Please bring us a copy when you have it. 
-We discussed your wishes regarding CODE STATUS and you want to remain full code for now. This is what you have shared with us about Advance Care Planning No flowsheet data found. The Palliative Medicine Team is here to support you and your family. We will see you again in 6 weeks Sincerely, 
 
 
 Cailin Monzon MD and the Palliative Medicine Team

## 2018-11-08 NOTE — PATIENT INSTRUCTIONS
1) Cont tresiba 46 units in the morning. However, on chemo days, take 60 units of tresiba in the morning    2) Check blood sugar before breakfast, lunch and dinner and dose novolog as follows:   Blood sugar   Less than 90 12 units    14 units   151-200 16 units   201-250 18 units   251-300 20 units   301-350 22 units   351-400 24 units   401-450 26 units     3) If you start dosing novolog with lunch and are finding that your sugar is dropping under 90 more than 2 times a week, plan on decreasing your tresiba to 42 units in the morning. 4) Continue to be in touch over mychart with your readings if you are going under 90 or staying over 200 more than 3 times a week. 5) If you are away from home at lunch and can't check your sugar, you can still bring the novolog pen as it doesn't need to be refrigerated once it's been opened and just take 12 units to cover lunch.

## 2018-11-08 NOTE — PROGRESS NOTES
[address] Nancie Rivas at [facility]  [phone]    Patient independently examined by myself in preparation of trial enrollment. Based on my evaluation she has an ECOG performance status of 0.       Signed By: Inga Willson MD

## 2018-11-09 ENCOUNTER — RESEARCH ENCOUNTER (OUTPATIENT)
Dept: ONCOLOGY | Age: 73
End: 2018-11-09

## 2018-11-09 ENCOUNTER — HOSPITAL ENCOUNTER (OUTPATIENT)
Dept: INFUSION THERAPY | Age: 73
Discharge: HOME | End: 2018-11-09
Payer: MEDICARE

## 2018-11-09 VITALS
RESPIRATION RATE: 18 BRPM | BODY MASS INDEX: 29.02 KG/M2 | HEIGHT: 67 IN | TEMPERATURE: 98 F | WEIGHT: 184.9 LBS | SYSTOLIC BLOOD PRESSURE: 128 MMHG | HEART RATE: 77 BPM | DIASTOLIC BLOOD PRESSURE: 76 MMHG

## 2018-11-09 LAB
ALBUMIN SERPL-MCNC: 2.9 G/DL (ref 3.5–5)
ALBUMIN/GLOB SERPL: 0.9 {RATIO} (ref 1.1–2.2)
ALP SERPL-CCNC: 178 U/L (ref 45–117)
ALT SERPL-CCNC: 67 U/L (ref 12–78)
ANION GAP SERPL CALC-SCNC: 7 MMOL/L (ref 5–15)
APPEARANCE UR: ABNORMAL
AST SERPL-CCNC: 59 U/L (ref 15–37)
BACTERIA URNS QL MICRO: NEGATIVE /HPF
BASOPHILS # BLD: 0 K/UL (ref 0–0.1)
BASOPHILS NFR BLD: 0 % (ref 0–1)
BILIRUB SERPL-MCNC: 0.6 MG/DL (ref 0.2–1)
BILIRUB UR QL: NEGATIVE
BUN SERPL-MCNC: 16 MG/DL (ref 6–20)
BUN/CREAT SERPL: 23 (ref 12–20)
CALCIUM SERPL-MCNC: 8.3 MG/DL (ref 8.5–10.1)
CHLORIDE SERPL-SCNC: 102 MMOL/L (ref 97–108)
CO2 SERPL-SCNC: 30 MMOL/L (ref 21–32)
COLOR UR: ABNORMAL
CREAT SERPL-MCNC: 0.7 MG/DL (ref 0.55–1.02)
DIFFERENTIAL METHOD BLD: ABNORMAL
EOSINOPHIL # BLD: 0.1 K/UL (ref 0–0.4)
EOSINOPHIL NFR BLD: 1 % (ref 0–7)
EPITH CASTS URNS QL MICRO: ABNORMAL /LPF
ERYTHROCYTE [DISTWIDTH] IN BLOOD BY AUTOMATED COUNT: 14.2 % (ref 11.5–14.5)
GLOBULIN SER CALC-MCNC: 3.4 G/DL (ref 2–4)
GLUCOSE SERPL-MCNC: 204 MG/DL (ref 65–100)
GLUCOSE UR STRIP.AUTO-MCNC: NEGATIVE MG/DL
HCT VFR BLD AUTO: 37.4 % (ref 35–47)
HGB BLD-MCNC: 12.2 G/DL (ref 11.5–16)
HGB UR QL STRIP: NEGATIVE
HYALINE CASTS URNS QL MICRO: ABNORMAL /LPF (ref 0–5)
IMM GRANULOCYTES # BLD: 0 K/UL (ref 0–0.04)
IMM GRANULOCYTES NFR BLD AUTO: 0 % (ref 0–0.5)
KETONES UR QL STRIP.AUTO: NEGATIVE MG/DL
LDH SERPL L TO P-CCNC: 185 U/L (ref 81–246)
LEUKOCYTE ESTERASE UR QL STRIP.AUTO: ABNORMAL
LYMPHOCYTES # BLD: 1 K/UL (ref 0.8–3.5)
LYMPHOCYTES NFR BLD: 12 % (ref 12–49)
MAGNESIUM SERPL-MCNC: 1.9 MG/DL (ref 1.6–2.4)
MCH RBC QN AUTO: 29.3 PG (ref 26–34)
MCHC RBC AUTO-ENTMCNC: 32.6 G/DL (ref 30–36.5)
MCV RBC AUTO: 89.7 FL (ref 80–99)
MONOCYTES # BLD: 0.8 K/UL (ref 0–1)
MONOCYTES NFR BLD: 9 % (ref 5–13)
NEUTS SEG # BLD: 6.4 K/UL (ref 1.8–8)
NEUTS SEG NFR BLD: 77 % (ref 32–75)
NITRITE UR QL STRIP.AUTO: NEGATIVE
NRBC # BLD: 0 K/UL (ref 0–0.01)
NRBC BLD-RTO: 0 PER 100 WBC
PH UR STRIP: 6 [PH] (ref 5–8)
PHOSPHATE SERPL-MCNC: 3.2 MG/DL (ref 2.6–4.7)
PLATELET # BLD AUTO: 385 K/UL (ref 150–400)
PMV BLD AUTO: 10.9 FL (ref 8.9–12.9)
POTASSIUM SERPL-SCNC: 3.1 MMOL/L (ref 3.5–5.1)
PROT SERPL-MCNC: 6.3 G/DL (ref 6.4–8.2)
PROT UR STRIP-MCNC: NEGATIVE MG/DL
RBC # BLD AUTO: 4.17 M/UL (ref 3.8–5.2)
RBC #/AREA URNS HPF: ABNORMAL /HPF (ref 0–5)
SODIUM SERPL-SCNC: 139 MMOL/L (ref 136–145)
SP GR UR REFRACTOMETRY: 1.01 (ref 1–1.03)
UA: UC IF INDICATED,UAUC: ABNORMAL
URATE SERPL-MCNC: 3.8 MG/DL (ref 2.6–6)
UROBILINOGEN UR QL STRIP.AUTO: 0.2 EU/DL (ref 0.2–1)
WBC # BLD AUTO: 8.3 K/UL (ref 3.6–11)
WBC URNS QL MICRO: ABNORMAL /HPF (ref 0–4)

## 2018-11-09 PROCEDURE — 74011000250 HC RX REV CODE- 250: Performed by: INTERNAL MEDICINE

## 2018-11-09 PROCEDURE — 96417 CHEMO IV INFUS EACH ADDL SEQ: CPT

## 2018-11-09 PROCEDURE — 81001 URINALYSIS AUTO W/SCOPE: CPT

## 2018-11-09 PROCEDURE — 84100 ASSAY OF PHOSPHORUS: CPT

## 2018-11-09 PROCEDURE — 74011250636 HC RX REV CODE- 250/636: Performed by: INTERNAL MEDICINE

## 2018-11-09 PROCEDURE — 96375 TX/PRO/DX INJ NEW DRUG ADDON: CPT

## 2018-11-09 PROCEDURE — 80053 COMPREHEN METABOLIC PANEL: CPT

## 2018-11-09 PROCEDURE — 83615 LACTATE (LD) (LDH) ENZYME: CPT

## 2018-11-09 PROCEDURE — 84550 ASSAY OF BLOOD/URIC ACID: CPT

## 2018-11-09 PROCEDURE — 85025 COMPLETE CBC W/AUTO DIFF WBC: CPT

## 2018-11-09 PROCEDURE — 36415 COLL VENOUS BLD VENIPUNCTURE: CPT

## 2018-11-09 PROCEDURE — 74011000258 HC RX REV CODE- 258: Performed by: INTERNAL MEDICINE

## 2018-11-09 PROCEDURE — 87086 URINE CULTURE/COLONY COUNT: CPT

## 2018-11-09 PROCEDURE — 96413 CHEMO IV INFUSION 1 HR: CPT

## 2018-11-09 PROCEDURE — 74011250637 HC RX REV CODE- 250/637: Performed by: NURSE PRACTITIONER

## 2018-11-09 PROCEDURE — 77030012965 HC NDL HUBR BBMI -A

## 2018-11-09 PROCEDURE — 83735 ASSAY OF MAGNESIUM: CPT

## 2018-11-09 RX ORDER — SODIUM CHLORIDE 0.9 % (FLUSH) 0.9 %
10 SYRINGE (ML) INJECTION AS NEEDED
Status: ACTIVE | OUTPATIENT
Start: 2018-11-09 | End: 2018-11-10

## 2018-11-09 RX ORDER — OMEPRAZOLE 20 MG/1
20 CAPSULE, DELAYED RELEASE ORAL DAILY
COMMUNITY
End: 2020-01-01

## 2018-11-09 RX ORDER — POTASSIUM CHLORIDE 750 MG/1
40 TABLET, FILM COATED, EXTENDED RELEASE ORAL
Status: COMPLETED | OUTPATIENT
Start: 2018-11-09 | End: 2018-11-09

## 2018-11-09 RX ORDER — HEPARIN 100 UNIT/ML
500 SYRINGE INTRAVENOUS AS NEEDED
Status: ACTIVE | OUTPATIENT
Start: 2018-11-09 | End: 2018-11-10

## 2018-11-09 RX ORDER — SODIUM CHLORIDE 9 MG/ML
10 INJECTION INTRAMUSCULAR; INTRAVENOUS; SUBCUTANEOUS AS NEEDED
Status: ACTIVE | OUTPATIENT
Start: 2018-11-09 | End: 2018-11-10

## 2018-11-09 RX ADMIN — Medication 10 ML: at 09:04

## 2018-11-09 RX ADMIN — SODIUM CHLORIDE 10 ML: 9 INJECTION, SOLUTION INTRAMUSCULAR; INTRAVENOUS; SUBCUTANEOUS at 09:00

## 2018-11-09 RX ADMIN — SODIUM CHLORIDE, PRESERVATIVE FREE 500 UNITS: 5 INJECTION INTRAVENOUS at 14:25

## 2018-11-09 RX ADMIN — Medication 10 ML: at 09:05

## 2018-11-09 RX ADMIN — Medication 10 ML: at 14:25

## 2018-11-09 RX ADMIN — DEXAMETHASONE SODIUM PHOSPHATE 8 MG: 4 INJECTION, SOLUTION INTRA-ARTICULAR; INTRALESIONAL; INTRAMUSCULAR; INTRAVENOUS; SOFT TISSUE at 11:04

## 2018-11-09 RX ADMIN — POTASSIUM CHLORIDE 40 MEQ: 750 TABLET, FILM COATED, EXTENDED RELEASE ORAL at 11:03

## 2018-11-09 RX ADMIN — SODIUM CHLORIDE 1960 MG: 900 INJECTION, SOLUTION INTRAVENOUS at 13:45

## 2018-11-09 RX ADMIN — PACLITAXEL 245 MG: 100 INJECTION, POWDER, LYOPHILIZED, FOR SUSPENSION INTRAVENOUS at 12:40

## 2018-11-09 NOTE — PROGRESS NOTES
Problem: Chemotherapy Treatment Goal: *Chemotherapy regimen followed Outcome: Progressing Towards Goal 
Patient to receive C1D1 of Clinical Trial: Gemzar/Abraxane today as ordered.

## 2018-11-09 NOTE — PROGRESS NOTES
[facility] VISIT NOTE 
 
8436 Pt arrived at [facility] ambulatory and in no distress for C1D1 of Clinical Trial: Gemzar/Abraxane. Assessment completed, pt c/o mild fatigue and occasional epigastric discomfort. No other acute concerns voiced at this time. Blood pressure 105/66, pulse 88, temperature 98.5 °F (36.9 °C), resp. rate 18, height 5' 7.01\" (1.702 m), weight 83.9 kg (184 lb 14.4 oz), not currently breastfeeding. Right chest port accessed with 0.75 in garcia with no difficulty. Positive blood return noted and labs drawn. Recent Results (from the past 12 hour(s)) METABOLIC PANEL, COMPREHENSIVE Collection Time: 11/09/18  9:08 AM  
Result Value Ref Range Sodium 139 136 - 145 mmol/L Potassium 3.1 (L) 3.5 - 5.1 mmol/L Chloride 102 97 - 108 mmol/L  
 CO2 30 21 - 32 mmol/L Anion gap 7 5 - 15 mmol/L Glucose 204 (H) 65 - 100 mg/dL BUN 16 6 - 20 MG/DL Creatinine 0.70 0.55 - 1.02 MG/DL  
 BUN/Creatinine ratio 23 (H) 12 - 20 GFR est AA >60 >60 ml/min/1.73m2 GFR est non-AA >60 >60 ml/min/1.73m2 Calcium 8.3 (L) 8.5 - 10.1 MG/DL Bilirubin, total 0.6 0.2 - 1.0 MG/DL  
 ALT (SGPT) 67 12 - 78 U/L  
 AST (SGOT) 59 (H) 15 - 37 U/L Alk. phosphatase 178 (H) 45 - 117 U/L Protein, total 6.3 (L) 6.4 - 8.2 g/dL Albumin 2.9 (L) 3.5 - 5.0 g/dL Globulin 3.4 2.0 - 4.0 g/dL A-G Ratio 0.9 (L) 1.1 - 2.2 MAGNESIUM Collection Time: 11/09/18  9:08 AM  
Result Value Ref Range Magnesium 1.9 1.6 - 2.4 mg/dL PHOSPHORUS Collection Time: 11/09/18  9:08 AM  
Result Value Ref Range Phosphorus 3.2 2.6 - 4.7 MG/DL  
URIC ACID Collection Time: 11/09/18  9:08 AM  
Result Value Ref Range Uric acid 3.8 2.6 - 6.0 MG/DL  
CBC WITH AUTOMATED DIFF Collection Time: 11/09/18  9:08 AM  
Result Value Ref Range WBC 8.3 3.6 - 11.0 K/uL  
 RBC 4.17 3.80 - 5.20 M/uL  
 HGB 12.2 11.5 - 16.0 g/dL HCT 37.4 35.0 - 47.0 %  MCV 89.7 80.0 - 99.0 FL  
 MCH 29.3 26.0 - 34.0 PG  
 MCHC 32.6 30.0 - 36.5 g/dL  
 RDW 14.2 11.5 - 14.5 % PLATELET 170 915 - 344 K/uL MPV 10.9 8.9 - 12.9 FL  
 NRBC 0.0 0  WBC ABSOLUTE NRBC 0.00 0.00 - 0.01 K/uL NEUTROPHILS 77 (H) 32 - 75 % LYMPHOCYTES 12 12 - 49 % MONOCYTES 9 5 - 13 % EOSINOPHILS 1 0 - 7 % BASOPHILS 0 0 - 1 % IMMATURE GRANULOCYTES 0 0.0 - 0.5 % ABS. NEUTROPHILS 6.4 1.8 - 8.0 K/UL  
 ABS. LYMPHOCYTES 1.0 0.8 - 3.5 K/UL  
 ABS. MONOCYTES 0.8 0.0 - 1.0 K/UL  
 ABS. EOSINOPHILS 0.1 0.0 - 0.4 K/UL  
 ABS. BASOPHILS 0.0 0.0 - 0.1 K/UL  
 ABS. IMM. GRANS. 0.0 0.00 - 0.04 K/UL  
 DF AUTOMATED    
LD Collection Time: 11/09/18  9:08 AM  
Result Value Ref Range  81 - 246 U/L Additional orders obtained to administer 40mEq of PO potassium today in addition to treatment. Medications received: Potassium PO Decadron IVP Abraxane IV Gemzar IV Tolerated treatment well, no adverse reaction noted. Port de-accessed and flushed per protocol. Positive blood return noted post treatment. Blood pressure 128/76, pulse 77, temperature 98 °F (36.7 °C), resp. rate 18, height 5' 7.01\" (1.702 m), weight 83.9 kg (184 lb 14.4 oz), not currently breastfeeding. [address] [place] D/C'd from [facility] ambulatory and in no distress. Next appointment is 11/14/18 at 1330.

## 2018-11-09 NOTE — PROGRESS NOTES
Pt in for labs and C1D1 chemo visit. Patient reports the following adverse events at this time: gr 1 constipation. Vital signs are stable. Pt will receive gemcitabine and nab-Paclitaxel infusions today. RN reviewed potential s/e with patient, advised to call office in the event of any serious AEs. Pt verbalized understanding, no additional questions at this time.

## 2018-11-11 LAB
BACTERIA SPEC CULT: NORMAL
CC UR VC: NORMAL
SERVICE CMNT-IMP: NORMAL

## 2018-11-12 RX ORDER — DEXAMETHASONE SODIUM PHOSPHATE 4 MG/ML
8 INJECTION, SOLUTION INTRA-ARTICULAR; INTRALESIONAL; INTRAMUSCULAR; INTRAVENOUS; SOFT TISSUE ONCE
Status: COMPLETED | OUTPATIENT
Start: 2018-11-14 | End: 2018-11-14

## 2018-11-14 ENCOUNTER — HOSPITAL ENCOUNTER (OUTPATIENT)
Dept: INFUSION THERAPY | Age: 73
Discharge: HOME | End: 2018-11-14
Payer: MEDICARE

## 2018-11-14 ENCOUNTER — RESEARCH ENCOUNTER (OUTPATIENT)
Dept: ONCOLOGY | Age: 73
End: 2018-11-14

## 2018-11-14 VITALS
DIASTOLIC BLOOD PRESSURE: 76 MMHG | HEART RATE: 72 BPM | TEMPERATURE: 97.7 F | WEIGHT: 186 LBS | HEIGHT: 67 IN | SYSTOLIC BLOOD PRESSURE: 134 MMHG | BODY MASS INDEX: 29.19 KG/M2 | RESPIRATION RATE: 16 BRPM

## 2018-11-14 LAB
BASOPHILS # BLD: 0 K/UL (ref 0–0.1)
BASOPHILS NFR BLD: 0 % (ref 0–1)
DIFFERENTIAL METHOD BLD: ABNORMAL
EOSINOPHIL # BLD: 0 K/UL (ref 0–0.4)
EOSINOPHIL NFR BLD: 1 % (ref 0–7)
ERYTHROCYTE [DISTWIDTH] IN BLOOD BY AUTOMATED COUNT: 14 % (ref 11.5–14.5)
HCT VFR BLD AUTO: 33.8 % (ref 35–47)
HGB BLD-MCNC: 11 G/DL (ref 11.5–16)
IMM GRANULOCYTES # BLD: 0 K/UL (ref 0–0.04)
IMM GRANULOCYTES NFR BLD AUTO: 0 % (ref 0–0.5)
LDH SERPL L TO P-CCNC: 219 U/L (ref 81–246)
LYMPHOCYTES # BLD: 0.8 K/UL (ref 0.8–3.5)
LYMPHOCYTES NFR BLD: 24 % (ref 12–49)
MCH RBC QN AUTO: 29.2 PG (ref 26–34)
MCHC RBC AUTO-ENTMCNC: 32.5 G/DL (ref 30–36.5)
MCV RBC AUTO: 89.7 FL (ref 80–99)
MONOCYTES # BLD: 0.1 K/UL (ref 0–1)
MONOCYTES NFR BLD: 2 % (ref 5–13)
NEUTS SEG # BLD: 2.5 K/UL (ref 1.8–8)
NEUTS SEG NFR BLD: 73 % (ref 32–75)
NRBC # BLD: 0 K/UL (ref 0–0.01)
NRBC BLD-RTO: 0 PER 100 WBC
PLATELET # BLD AUTO: 292 K/UL (ref 150–400)
PMV BLD AUTO: 10.9 FL (ref 8.9–12.9)
RBC # BLD AUTO: 3.77 M/UL (ref 3.8–5.2)
WBC # BLD AUTO: 3.4 K/UL (ref 3.6–11)

## 2018-11-14 PROCEDURE — 96413 CHEMO IV INFUSION 1 HR: CPT

## 2018-11-14 PROCEDURE — 74011250636 HC RX REV CODE- 250/636: Performed by: INTERNAL MEDICINE

## 2018-11-14 PROCEDURE — 74011000250 HC RX REV CODE- 250: Performed by: INTERNAL MEDICINE

## 2018-11-14 PROCEDURE — 74011000258 HC RX REV CODE- 258: Performed by: INTERNAL MEDICINE

## 2018-11-14 PROCEDURE — 96417 CHEMO IV INFUS EACH ADDL SEQ: CPT

## 2018-11-14 PROCEDURE — 36415 COLL VENOUS BLD VENIPUNCTURE: CPT

## 2018-11-14 PROCEDURE — 85025 COMPLETE CBC W/AUTO DIFF WBC: CPT

## 2018-11-14 PROCEDURE — 83615 LACTATE (LD) (LDH) ENZYME: CPT

## 2018-11-14 PROCEDURE — 77030012965 HC NDL HUBR BBMI -A

## 2018-11-14 PROCEDURE — 96375 TX/PRO/DX INJ NEW DRUG ADDON: CPT

## 2018-11-14 RX ORDER — HEPARIN 100 UNIT/ML
500 SYRINGE INTRAVENOUS AS NEEDED
Status: ACTIVE | OUTPATIENT
Start: 2018-11-14 | End: 2018-11-15

## 2018-11-14 RX ORDER — SODIUM CHLORIDE 9 MG/ML
10 INJECTION INTRAMUSCULAR; INTRAVENOUS; SUBCUTANEOUS AS NEEDED
Status: ACTIVE | OUTPATIENT
Start: 2018-11-14 | End: 2018-11-15

## 2018-11-14 RX ORDER — SODIUM CHLORIDE 0.9 % (FLUSH) 0.9 %
5-10 SYRINGE (ML) INJECTION AS NEEDED
Status: ACTIVE | OUTPATIENT
Start: 2018-11-14 | End: 2018-11-15

## 2018-11-14 RX ADMIN — SODIUM CHLORIDE 10 ML: 9 INJECTION, SOLUTION INTRAMUSCULAR; INTRAVENOUS; SUBCUTANEOUS at 13:44

## 2018-11-14 RX ADMIN — PACLITAXEL 245 MG: 100 INJECTION, POWDER, LYOPHILIZED, FOR SUSPENSION INTRAVENOUS at 15:53

## 2018-11-14 RX ADMIN — Medication 10 ML: at 17:12

## 2018-11-14 RX ADMIN — HEPARIN 500 UNITS: 100 SYRINGE at 17:12

## 2018-11-14 RX ADMIN — GEMCITABINE 1960 MG: 38 INJECTION, SOLUTION INTRAVENOUS at 16:41

## 2018-11-14 RX ADMIN — DEXAMETHASONE SODIUM PHOSPHATE 8 MG: 4 INJECTION, SOLUTION INTRAMUSCULAR; INTRAVENOUS at 15:17

## 2018-11-14 NOTE — PROGRESS NOTES
Pt in for labs and infusion. This is C1D8 of treatment. Patient reports the following adverse events at this time: gr 1 constipation. Vital signs are stable. Next appt is scheduled for 11/21/18.

## 2018-11-14 NOTE — PROGRESS NOTES
[facility] VISIT NOTE 
 
1330. Pt arrived at [facility] ambulatory and in no distress for C1D8 Clinical Trial: Gemzar/Abraxane. Assessment completed, pt states no new complaints or concerns. RCW chest port accessed with . 75 in garcia with no difficulty. Positive blood return noted and labs drawn. Labs WL to treat. Medications received: 
[place] Dexamethasone IV Abraxane IV Gemzar IV Recent Results (from the past 12 hour(s)) CBC WITH AUTOMATED DIFF Collection Time: 11/14/18  1:42 PM  
Result Value Ref Range WBC 3.4 (L) 3.6 - 11.0 K/uL  
 RBC 3.77 (L) 3.80 - 5.20 M/uL  
 HGB 11.0 (L) 11.5 - 16.0 g/dL HCT 33.8 (L) 35.0 - 47.0 % MCV 89.7 80.0 - 99.0 FL  
 MCH 29.2 26.0 - 34.0 PG  
 MCHC 32.5 30.0 - 36.5 g/dL  
 RDW 14.0 11.5 - 14.5 % PLATELET 238 813 - 902 K/uL MPV 10.9 8.9 - 12.9 FL  
 NRBC 0.0 0  WBC ABSOLUTE NRBC 0.00 0.00 - 0.01 K/uL NEUTROPHILS 73 32 - 75 % LYMPHOCYTES 24 12 - 49 % MONOCYTES 2 (L) 5 - 13 % EOSINOPHILS 1 0 - 7 % BASOPHILS 0 0 - 1 % IMMATURE GRANULOCYTES 0 0.0 - 0.5 % ABS. NEUTROPHILS 2.5 1.8 - 8.0 K/UL  
 ABS. LYMPHOCYTES 0.8 0.8 - 3.5 K/UL  
 ABS. MONOCYTES 0.1 0.0 - 1.0 K/UL  
 ABS. EOSINOPHILS 0.0 0.0 - 0.4 K/UL  
 ABS. BASOPHILS 0.0 0.0 - 0.1 K/UL  
 ABS. IMM. GRANS. 0.0 0.00 - 0.04 K/UL  
 DF AUTOMATED    
LD Collection Time: 11/14/18  1:42 PM  
Result Value Ref Range  81 - 246 U/L Patient Vitals for the past 12 hrs: 
 Temp Pulse Resp BP  
11/14/18 1710 97.7 °F (36.5 °C) 72 16 134/76  
11/14/18 1332 97.2 °F (36.2 °C) 72 16 116/72 Tolerated treatment well, no adverse reaction noted. Port de-accessed and flushed per protocol. Positive blood return noted. 1715. D/C'd from [facility] ambulatory and in no distress.  Next appointment is 11/21/18 at 10:00 am.

## 2018-11-16 ENCOUNTER — APPOINTMENT (OUTPATIENT)
Dept: INFUSION THERAPY | Age: 73
End: 2018-11-16
Payer: MEDICARE

## 2018-11-16 NOTE — PROGRESS NOTES
Pt in for labs and office visit prior to randomization. ECOG PS 0 as stated by both docs. Usual wake up time- 0730 Usual bed time- 2200

## 2018-11-18 RX ORDER — DEXAMETHASONE SODIUM PHOSPHATE 4 MG/ML
8 INJECTION, SOLUTION INTRA-ARTICULAR; INTRALESIONAL; INTRAMUSCULAR; INTRAVENOUS; SOFT TISSUE ONCE
Status: COMPLETED | OUTPATIENT
Start: 2018-11-21 | End: 2018-11-21

## 2018-11-21 ENCOUNTER — RESEARCH ENCOUNTER (OUTPATIENT)
Dept: ONCOLOGY | Age: 73
End: 2018-11-21

## 2018-11-21 ENCOUNTER — HOSPITAL ENCOUNTER (OUTPATIENT)
Dept: INFUSION THERAPY | Age: 73
Discharge: HOME | End: 2018-11-21
Payer: MEDICARE

## 2018-11-21 ENCOUNTER — OFFICE VISIT (OUTPATIENT)
Dept: ONCOLOGY | Age: 73
End: 2018-11-21

## 2018-11-21 VITALS
HEIGHT: 67 IN | BODY MASS INDEX: 28.93 KG/M2 | HEART RATE: 75 BPM | RESPIRATION RATE: 16 BRPM | DIASTOLIC BLOOD PRESSURE: 78 MMHG | WEIGHT: 184.3 LBS | SYSTOLIC BLOOD PRESSURE: 133 MMHG | TEMPERATURE: 97.7 F

## 2018-11-21 VITALS
BODY MASS INDEX: 28.93 KG/M2 | SYSTOLIC BLOOD PRESSURE: 133 MMHG | RESPIRATION RATE: 18 BRPM | TEMPERATURE: 97.7 F | DIASTOLIC BLOOD PRESSURE: 78 MMHG | HEART RATE: 75 BPM | WEIGHT: 184.3 LBS | OXYGEN SATURATION: 98 % | HEIGHT: 67 IN

## 2018-11-21 DIAGNOSIS — D70.9 NEUTROPENIA, UNSPECIFIED TYPE (HCC): Primary | ICD-10-CM

## 2018-11-21 DIAGNOSIS — C25.9 MALIGNANT NEOPLASM OF PANCREAS, UNSPECIFIED LOCATION OF MALIGNANCY (HCC): Primary | ICD-10-CM

## 2018-11-21 DIAGNOSIS — D70.1 CHEMOTHERAPY-INDUCED NEUTROPENIA (HCC): ICD-10-CM

## 2018-11-21 DIAGNOSIS — T45.1X5A CHEMOTHERAPY-INDUCED NEUTROPENIA (HCC): ICD-10-CM

## 2018-11-21 DIAGNOSIS — C25.7 MALIGNANT NEOPLASM OF OTHER PARTS OF PANCREAS (HCC): Primary | ICD-10-CM

## 2018-11-21 DIAGNOSIS — D69.6 THROMBOCYTOPENIA (HCC): ICD-10-CM

## 2018-11-21 LAB
BASOPHILS # BLD: 0 K/UL (ref 0–0.1)
BASOPHILS NFR BLD: 0 % (ref 0–1)
DIFFERENTIAL METHOD BLD: ABNORMAL
EOSINOPHIL # BLD: 0 K/UL (ref 0–0.4)
EOSINOPHIL NFR BLD: 0 % (ref 0–7)
ERYTHROCYTE [DISTWIDTH] IN BLOOD BY AUTOMATED COUNT: 12.9 % (ref 11.5–14.5)
HCT VFR BLD AUTO: 30.3 % (ref 35–47)
HGB BLD-MCNC: 9.8 G/DL (ref 11.5–16)
IMM GRANULOCYTES # BLD: 0 K/UL
IMM GRANULOCYTES NFR BLD AUTO: 0 %
LDH SERPL L TO P-CCNC: 172 U/L (ref 81–246)
LYMPHOCYTES # BLD: 0.4 K/UL (ref 0.8–3.5)
LYMPHOCYTES NFR BLD: 31 % (ref 12–49)
MCH RBC QN AUTO: 28.6 PG (ref 26–34)
MCHC RBC AUTO-ENTMCNC: 32.3 G/DL (ref 30–36.5)
MCV RBC AUTO: 88.3 FL (ref 80–99)
MONOCYTES # BLD: 0 K/UL (ref 0–1)
MONOCYTES NFR BLD: 3 % (ref 5–13)
NEUTS SEG # BLD: 1 K/UL (ref 1.8–8)
NEUTS SEG NFR BLD: 66 % (ref 32–75)
NRBC # BLD: 0 K/UL (ref 0–0.01)
NRBC BLD-RTO: 0 PER 100 WBC
PLATELET # BLD AUTO: 140 K/UL (ref 150–400)
PMV BLD AUTO: 9.2 FL (ref 8.9–12.9)
RBC # BLD AUTO: 3.43 M/UL (ref 3.8–5.2)
RBC MORPH BLD: ABNORMAL
WBC # BLD AUTO: 1.4 K/UL (ref 3.6–11)
WBC MORPH BLD: ABNORMAL

## 2018-11-21 PROCEDURE — 74011000258 HC RX REV CODE- 258: Performed by: INTERNAL MEDICINE

## 2018-11-21 PROCEDURE — 83615 LACTATE (LD) (LDH) ENZYME: CPT

## 2018-11-21 PROCEDURE — 74011250636 HC RX REV CODE- 250/636: Performed by: INTERNAL MEDICINE

## 2018-11-21 PROCEDURE — 85025 COMPLETE CBC W/AUTO DIFF WBC: CPT

## 2018-11-21 PROCEDURE — 96413 CHEMO IV INFUSION 1 HR: CPT

## 2018-11-21 PROCEDURE — 96375 TX/PRO/DX INJ NEW DRUG ADDON: CPT

## 2018-11-21 PROCEDURE — 36415 COLL VENOUS BLD VENIPUNCTURE: CPT

## 2018-11-21 PROCEDURE — 96415 CHEMO IV INFUSION ADDL HR: CPT

## 2018-11-21 PROCEDURE — 77030012965 HC NDL HUBR BBMI -A

## 2018-11-21 RX ADMIN — PACLITAXEL 245 MG: 100 INJECTION, POWDER, LYOPHILIZED, FOR SUSPENSION INTRAVENOUS at 12:39

## 2018-11-21 RX ADMIN — GEMCITABINE 1960 MG: 38 INJECTION, SOLUTION INTRAVENOUS at 13:24

## 2018-11-21 RX ADMIN — DEXAMETHASONE SODIUM PHOSPHATE 8 MG: 4 INJECTION, SOLUTION INTRAMUSCULAR; INTRAVENOUS at 11:49

## 2018-11-21 NOTE — PROGRESS NOTES
[facility] VISIT NOTE 
 
1000 Pt arrived at [facility] ambulatory and in no distress for  Clinical trial:Abraxane/GemzarC1 D15  Assessment completed, pt w/o complaints. Right chest port accessed with . 75 in garcia with no difficulty. Positive blood return noted and labs drawn. Medications received: Abraxane IV Decadron IVP Gemzar IV Tolerated treatment well, no adverse reaction noted. Port de-accessed and flushed per protocol. Positive blood return noted. Patient Vitals for the past 12 hrs: 
 Temp Pulse Resp BP  
11/21/18 1410 97.7 °F (36.5 °C) 75 16 133/78  
11/21/18 0958 97.7 °F (36.5 °C) 74 16 103/63 Recent Results (from the past 12 hour(s)) CBC WITH AUTOMATED DIFF Collection Time: 11/21/18 10:12 AM  
Result Value Ref Range WBC 1.4 (L) 3.6 - 11.0 K/uL  
 RBC 3.43 (L) 3.80 - 5.20 M/uL HGB 9.8 (L) 11.5 - 16.0 g/dL HCT 30.3 (L) 35.0 - 47.0 % MCV 88.3 80.0 - 99.0 FL  
 MCH 28.6 26.0 - 34.0 PG  
 MCHC 32.3 30.0 - 36.5 g/dL  
 RDW 12.9 11.5 - 14.5 % PLATELET 666 (L) 715 - 400 K/uL MPV 9.2 8.9 - 12.9 FL  
 NRBC 0.0 0  WBC ABSOLUTE NRBC 0.00 0.00 - 0.01 K/uL NEUTROPHILS 66 32 - 75 % LYMPHOCYTES 31 12 - 49 % MONOCYTES 3 (L) 5 - 13 % EOSINOPHILS 0 0 - 7 % BASOPHILS 0 0 - 1 % IMMATURE GRANULOCYTES 0 %  
 ABS. NEUTROPHILS 1.0 (L) 1.8 - 8.0 K/UL  
 ABS. LYMPHOCYTES 0.4 (L) 0.8 - 3.5 K/UL  
 ABS. MONOCYTES 0.0 0.0 - 1.0 K/UL  
 ABS. EOSINOPHILS 0.0 0.0 - 0.4 K/UL  
 ABS. BASOPHILS 0.0 0.0 - 0.1 K/UL  
 ABS. IMM. GRANS. 0.0 K/UL  
 DF MANUAL    
 RBC COMMENTS NORMOCYTIC, NORMOCHROMIC    
 WBC COMMENTS TOXIC GRANULATION    
LD Collection Time: 11/21/18 10:12 AM  
Result Value Ref Range  81 - 246 U/L  
 
1410 D/C'd from [facility] ambulatory and in no distress accompanied by self. Next appointment is 12/5/18 at 1000.

## 2018-11-21 NOTE — PROGRESS NOTES
Pt in for labs. This is C1D15 of treatment. Patient reports the following adverse events at this time: gr 1 constipation. Vital signs are stable. Pt will receive gemcitabine/abraxane infusion. Next appt is scheduled for 12/5/18. Neupogen given per protocol.

## 2018-11-21 NOTE — PROGRESS NOTES
[facility] [place] at [address] [facility]  [place] Basque: [phone]  F: [phone]      Reason for Visit:   Roberta Ybarra is a 72 y.o. female who is seen in self-referral for evaluation of pancreatic cancer. Treatment History:   · 10/4/18 pancreatic head mass FNA, pancreatic adenocarcinoma    10/15/18  Liver, Core Biopsy w/Touch Prep CYTOLOGIC INTERPRETATION:   · Numerous cores and fragments of benign hepatic parenchyma     10/24/18  Liver, Fine Needle Aspiration CYTOLOGIC INTERPRETATION:   Metastatic adenocarcinoma (see Comment). General Categorization   Positive for malignancy. Comment: The morphologic appearance is nonspecific with regard to site of origin but compatible with metastatic pancreatic carcinoma in this patient with known pancreatic adenocarcinoma ([id number]). 11/9/18 abraxane/gemcitabine    History of Present Illness:   She started having abdominal pain, gradual and persistent, x 1 month, pressure sensation, located in epigastrium, no radiation, no aggravating symptoms, no relieving factors. 25 lb weight loss over 6 months.  + nausea. Interval history:  In today for follow up. Complains of gr 1 loss of appetite, gr 1 constipation, gr 1 fatigue, gr 1 insomnia. No pain. Past Medical History:   Diagnosis Date    Arthritis     Constipation     Diabetes ([address]     Hemorrhoids     Hiatal hernia     High cholesterol     Hypertension     Pancreatic cancer ([address]       Past Surgical History:   Procedure Laterality Date    HX KNEE ARTHROSCOPY Right     HX ORTHOPAEDIC      left wrist surgery    HX TONSILLECTOMY        Social History     Tobacco Use    Smoking status: Never Smoker    Smokeless tobacco: Never Used   Substance Use Topics    Alcohol use:  Yes     Alcohol/week: 1.2 - 2.4 oz     Types: 1 - 2 Cans of beer, 1 - 2 Shots of liquor per week     Frequency: 2-4 times a month     Drinks per session: 1 or 2 Family History   Problem Relation Age of Onset    Cancer Mother         skin    Hypertension Mother     Heart Disease Mother     Cancer Father         skin    Heart Disease Father     Stroke Father     Diabetes Neg Hx      Current Outpatient Medications   Medication Sig    omeprazole (PRILOSEC) 20 mg capsule Take 20 mg by mouth daily.  insulin aspart U-100 (NOVOLOG FLEXPEN U-100 INSULIN) 100 unit/mL inpn Inject 14 units before breakfast, lunch and dinner + 2 units for every 50 mg/dl above 150 mg/dl. Max 50 units per day    insulin degludec (TRESIBA FLEXTOUCH U-200) 200 unit/mL (3 mL) inpn 46 Units by SubCUTAneous route daily. Or as directed up to 60 units daily--Dose change 11/2/18--updated med list--did not send prescription to the pharmacy    simvastatin (ZOCOR) 10 mg tablet Take  by mouth nightly.  telmisartan (MICARDIS) 80 mg tablet Take 80 mg by mouth daily.  hydroCHLOROthiazide (MICROZIDE) 12.5 mg capsule Take 12.5 mg by mouth daily.  HYDROcodone-acetaminophen (NORCO) 5-325 mg per tablet Take  by mouth.  prochlorperazine (COMPAZINE) 10 mg tablet Take 1 Tab by mouth every six (6) hours as needed for Nausea.  ondansetron hcl (ZOFRAN) 8 mg tablet Take 1 Tab by mouth every eight (8) hours as needed for Nausea.  lidocaine-prilocaine (EMLA) topical cream Apply  to affected area as needed for Pain.      Current Facility-Administered Medications   Medication Dose Route Frequency    [START ON 11/22/2018] tbo-filgrastim (GRANIX) injection 300 mcg  300 mcg SubCUTAneous DAILY     Facility-Administered Medications Ordered in Other Visits   Medication Dose Route Frequency    prochlorperazine (COMPAZINE) with saline injection 10 mg  10 mg IntraVENous Q6H PRN      Allergies   Allergen Reactions    Amoxicillin Hives        Review of Systems: A comprehensive review of systems was performed and all systems were negative except for HPI and for the symptom review form, reviewed and scanned in.      Physical Exam:     Visit Vitals  /78   Pulse 75   Temp 97.7 °F (36.5 °C) (Temporal)   Resp 18   Ht 5' 7\" (1.702 m)   Wt 184 lb 4.8 oz (83.6 kg)   SpO2 98%   BMI 28.87 kg/m²     ECOG PS: 0  General: No distress  Eyes: PERRLA, anicteric sclerae  HENT: Atraumatic, OP clear  Neck: Supple  Lymphatic: No cervical, supraclavicular, or inguinal adenopathy  Respiratory: CTAB, normal respiratory effort  CV: Normal rate, regular rhythm, no murmurs, no peripheral edema  GI: Soft, nontender, nondistended, no masses, no hepatomegaly, no splenomegaly  MS: Normal gait and station. Digits without clubbing or cyanosis. Skin: No rashes, ecchymoses, or petechiae. Normal temperature, turgor, and texture. Psych: Alert, oriented, appropriate affect, normal judgment/insight        Results:     Lab Results   Component Value Date/Time    WBC 1.4 (L) 11/21/2018 10:12 AM    HGB 9.8 (L) 11/21/2018 10:12 AM    HCT 30.3 (L) 11/21/2018 10:12 AM    PLATELET 156 (L) 70/44/6784 10:12 AM    MCV 88.3 11/21/2018 10:12 AM    ABS. NEUTROPHILS 1.0 (L) 11/21/2018 10:12 AM     Lab Results   Component Value Date/Time    Sodium 139 11/09/2018 09:08 AM    Potassium 3.1 (L) 11/09/2018 09:08 AM    Chloride 102 11/09/2018 09:08 AM    CO2 30 11/09/2018 09:08 AM    Glucose 204 (H) 11/09/2018 09:08 AM    BUN 16 11/09/2018 09:08 AM    Creatinine 0.70 11/09/2018 09:08 AM    GFR est AA >60 11/09/2018 09:08 AM    GFR est non-AA >60 11/09/2018 09:08 AM    Calcium 8.3 (L) 11/09/2018 09:08 AM    Glucose (POC) 316 (H) 10/04/2018 03:45 PM     Lab Results   Component Value Date/Time    Bilirubin, total 0.6 11/09/2018 09:08 AM    ALT (SGPT) 67 11/09/2018 09:08 AM    AST (SGOT) 59 (H) 11/09/2018 09:08 AM    Alk.  phosphatase 178 (H) 11/09/2018 09:08 AM    Protein, total 6.3 (L) 11/09/2018 09:08 AM    Albumin 2.9 (L) 11/09/2018 09:08 AM    Globulin 3.4 11/09/2018 09:08 AM     10/1/18 CT abd  There is a 3.8 x 1.9 cm mass involving the uncinate process and possibly  invading the duodenum. Findings are nonspecific but typical of pancreatic  carcinoma. There is no biliary or pancreatic ductal dilatation.     There is a poorly defined irregular hypodensity in segment IVb of the liver  measuring 3.7 x 1.9 cm. There appears to be focal biliary dilatation in the left  lobe behind this abnormality. Metastatic disease is suspected. There is a small,  1 cm hypodensity in the dome of the liver, likely segment 8. There is a 1 cm  hypodensity in segment 4 of the liver as well, also likely metastasis. Small  hypodensity measuring less than 1 cm in segment 6 at the tip laterally is also  nonspecific but probable metastasis.     Spleen kidneys and adrenals are unremarkable.     No free fluid or focal fluid collection.     Lung bases are clear.     Bone windows are unremarkable.     IMPRESSION  IMPRESSION:  1. 3.8 x 1.9 cm mass involving the uncinate process of the pancreas and possibly  invading the duodenum, this likely represents pancreatic carcinoma. 2. Likely metastatic disease in the liver. There is a 3.8 x 1.9 cm mass involving the uncinate process and possibly  invading the duodenum. Findings are nonspecific but typical of pancreatic  carcinoma. There is no biliary or pancreatic ductal dilatation.     There is a poorly defined irregular hypodensity in segment IVb of the liver  measuring 3.7 x 1.9 cm. There appears to be focal biliary dilatation in the left  lobe behind this abnormality. Metastatic disease is suspected. There is a small,  1 cm hypodensity in the dome of the liver, likely segment 8. There is a 1 cm  hypodensity in segment 4 of the liver as well, also likely metastasis.  Small  hypodensity measuring less than 1 cm in segment 6 at the tip laterally is also  nonspecific but probable metastasis.     Spleen kidneys and adrenals are unremarkable.     No free fluid or focal fluid collection.     Lung bases are clear.     Bone windows are unremarkable.     IMPRESSION  IMPRESSION:  1. 3.8 x 1.9 cm mass involving the uncinate process of the pancreas and possibly  invading the duodenum, this likely represents pancreatic carcinoma. 2. Likely metastatic disease in the liver. Records reviewed and summarized above. Pathology report(s) reviewed above. Radiology report(s) reviewed above. MRI abdomen 10/22/18: IMPRESSION:       1. Pancreatic uncinate process mass suspicious for pancreatic neoplasm, possibly  adenocarcinoma. Mass abuts the superior mesenteric artery with about 20%  circumference involvement but no luminal distortion or perivascular stranding. 2. Multiple hepatic lesions suspicious for metastatic neoplasm with segment IVb  lesion showing patchy areas of surrounding steatosis likely secondary to locally  altered intrahepatic hemodynamics and likely responsible for biopsy result. 3. Cholecystolithiasis and small gallbladder polyp.     Assessment/plan:   1. Uncinate pancreatic adenocarcinoma,  mets to liver, stage IV:  cT2 cN0 pM1    Discussed that while this is treatable, it is not curable, the goal of therapy is palliative. Discussed that without therapy, life expectancy would be 3-6 months, with therapy 12-18 months on average. As an example of likely chemotherapy, we discussed the risks and benefits of Gemcitabine and Abraxane chemotherapy. Potential side effects include, but are not limited to, nausea, vomiting, diarrhea, constipation, mucositis, taste changes, myelosuppression, infection, fatigue, alopecia, skin and nail changes, pulmonary toxicity, neuropathy, allergic reactions, infertility, and rarely, death. Discussed the [id number] study and the research nurse met with her today and she signed informed consent. · Gemcitabine (1000 mg/m2) and Abraxane (125 mg/m2) given on days 1,8,15 every 28 days. · Labs: CBC, BMP prior to each treatment. Hepatic function panel every 4 weeks.  CA 19.9 prior to day 1  · Antiemetic prophylaxis: Dexamethasone prior to each treatment  · PRN antiemetics: Ondansetron and Prochlorperazine at home  · EMLA cream for port    On the control arm, cycle 1d15 today    2. DM2:  Holding metformin; on insulin; saw Dr. Raquel Martini    3. Emotional well being:  She has excellent support and is coping well with her disease    4. Abdominal pain:  Resolved;  palliative care has seen    5. Nutrition:  Darryl Kaufman RD has met with her; holding on enzymes    6. Insomnia:  She has lunesta; she may also try melatonin    7. Anemia:  Due to chemo and disease; monitor    8. Thrombocytopenia:  Due to chemo, mild    9. Neutropenia:  Due to chemo, will start granix 380 mcg for 4 days following chemo          I appreciate the opportunity to participate in MsLeslee Maximino Wu Strange's care.     Signed By: Marcellus Quinteros MD

## 2018-11-21 NOTE — PROGRESS NOTES
Problem: Chemotherapy Treatment Goal: *Chemotherapy regimen followed Outcome: Progressing Towards Goal 
Pt receiving Clinical trial : Abraxane/Gemzar C1 D15

## 2018-11-23 ENCOUNTER — APPOINTMENT (OUTPATIENT)
Dept: INFUSION THERAPY | Age: 73
End: 2018-11-23
Payer: MEDICARE

## 2018-11-26 ENCOUNTER — PATIENT MESSAGE (OUTPATIENT)
Dept: ONCOLOGY | Age: 73
End: 2018-11-26

## 2018-11-28 ENCOUNTER — APPOINTMENT (OUTPATIENT)
Dept: INFUSION THERAPY | Age: 73
End: 2018-11-28
Payer: MEDICARE

## 2018-11-30 ENCOUNTER — APPOINTMENT (OUTPATIENT)
Dept: INFUSION THERAPY | Age: 73
End: 2018-11-30
Payer: MEDICARE

## 2018-11-30 RX ORDER — DEXAMETHASONE SODIUM PHOSPHATE 4 MG/ML
8 INJECTION, SOLUTION INTRA-ARTICULAR; INTRALESIONAL; INTRAMUSCULAR; INTRAVENOUS; SOFT TISSUE ONCE
Status: COMPLETED | OUTPATIENT
Start: 2018-12-05 | End: 2018-12-05

## 2018-12-05 ENCOUNTER — RESEARCH ENCOUNTER (OUTPATIENT)
Dept: ONCOLOGY | Age: 73
End: 2018-12-05

## 2018-12-05 ENCOUNTER — OFFICE VISIT (OUTPATIENT)
Dept: ONCOLOGY | Age: 73
End: 2018-12-05

## 2018-12-05 ENCOUNTER — HOSPITAL ENCOUNTER (OUTPATIENT)
Dept: INFUSION THERAPY | Age: 73
Discharge: HOME | End: 2018-12-05
Payer: MEDICARE

## 2018-12-05 VITALS
SYSTOLIC BLOOD PRESSURE: 135 MMHG | DIASTOLIC BLOOD PRESSURE: 74 MMHG | OXYGEN SATURATION: 100 % | WEIGHT: 190.9 LBS | TEMPERATURE: 97.8 F | HEIGHT: 67 IN | BODY MASS INDEX: 29.96 KG/M2 | RESPIRATION RATE: 18 BRPM | HEART RATE: 62 BPM

## 2018-12-05 VITALS
RESPIRATION RATE: 18 BRPM | HEIGHT: 67 IN | DIASTOLIC BLOOD PRESSURE: 64 MMHG | OXYGEN SATURATION: 100 % | SYSTOLIC BLOOD PRESSURE: 137 MMHG | TEMPERATURE: 97.6 F | WEIGHT: 190.9 LBS | HEART RATE: 71 BPM | BODY MASS INDEX: 29.96 KG/M2

## 2018-12-05 DIAGNOSIS — C25.7 MALIGNANT NEOPLASM OF OTHER PARTS OF PANCREAS (HCC): Primary | ICD-10-CM

## 2018-12-05 DIAGNOSIS — K59.00 CONSTIPATION, UNSPECIFIED CONSTIPATION TYPE: ICD-10-CM

## 2018-12-05 DIAGNOSIS — G47.00 INSOMNIA, UNSPECIFIED TYPE: ICD-10-CM

## 2018-12-05 DIAGNOSIS — T45.1X5A CHEMOTHERAPY-INDUCED NEUTROPENIA (HCC): ICD-10-CM

## 2018-12-05 DIAGNOSIS — R10.9 ABDOMINAL PAIN, UNSPECIFIED ABDOMINAL LOCATION: ICD-10-CM

## 2018-12-05 DIAGNOSIS — D70.1 CHEMOTHERAPY-INDUCED NEUTROPENIA (HCC): ICD-10-CM

## 2018-12-05 DIAGNOSIS — D69.6 THROMBOCYTOPENIA (HCC): ICD-10-CM

## 2018-12-05 LAB
ALBUMIN SERPL-MCNC: 2.8 G/DL (ref 3.5–5)
ALBUMIN/GLOB SERPL: 0.8 {RATIO} (ref 1.1–2.2)
ALP SERPL-CCNC: 252 U/L (ref 45–117)
ALT SERPL-CCNC: 83 U/L (ref 12–78)
ANION GAP SERPL CALC-SCNC: 9 MMOL/L (ref 5–15)
APPEARANCE UR: CLEAR
AST SERPL-CCNC: 33 U/L (ref 15–37)
BACTERIA URNS QL MICRO: NEGATIVE /HPF
BASOPHILS # BLD: 0 K/UL (ref 0–0.1)
BASOPHILS NFR BLD: 0 % (ref 0–1)
BILIRUB SERPL-MCNC: 0.4 MG/DL (ref 0.2–1)
BILIRUB UR QL: NEGATIVE
BUN SERPL-MCNC: 15 MG/DL (ref 6–20)
BUN/CREAT SERPL: 20 (ref 12–20)
CALCIUM SERPL-MCNC: 8.6 MG/DL (ref 8.5–10.1)
CHLORIDE SERPL-SCNC: 105 MMOL/L (ref 97–108)
CO2 SERPL-SCNC: 25 MMOL/L (ref 21–32)
COLOR UR: ABNORMAL
CREAT SERPL-MCNC: 0.76 MG/DL (ref 0.55–1.02)
DIFFERENTIAL METHOD BLD: ABNORMAL
EOSINOPHIL # BLD: 0.2 K/UL (ref 0–0.4)
EOSINOPHIL NFR BLD: 2 % (ref 0–7)
EPITH CASTS URNS QL MICRO: ABNORMAL /LPF
ERYTHROCYTE [DISTWIDTH] IN BLOOD BY AUTOMATED COUNT: 16.4 % (ref 11.5–14.5)
GLOBULIN SER CALC-MCNC: 3.3 G/DL (ref 2–4)
GLUCOSE SERPL-MCNC: 179 MG/DL (ref 65–100)
GLUCOSE UR STRIP.AUTO-MCNC: NEGATIVE MG/DL
HCT VFR BLD AUTO: 31 % (ref 35–47)
HGB BLD-MCNC: 10.1 G/DL (ref 11.5–16)
HGB UR QL STRIP: NEGATIVE
HYALINE CASTS URNS QL MICRO: ABNORMAL /LPF (ref 0–5)
IMM GRANULOCYTES # BLD: 0 K/UL
IMM GRANULOCYTES NFR BLD AUTO: 0 %
KETONES UR QL STRIP.AUTO: NEGATIVE MG/DL
LDH SERPL L TO P-CCNC: 220 U/L (ref 81–246)
LEUKOCYTE ESTERASE UR QL STRIP.AUTO: ABNORMAL
LYMPHOCYTES # BLD: 1.4 K/UL (ref 0.8–3.5)
LYMPHOCYTES NFR BLD: 15 % (ref 12–49)
MAGNESIUM SERPL-MCNC: 1.8 MG/DL (ref 1.6–2.4)
MCH RBC QN AUTO: 30.2 PG (ref 26–34)
MCHC RBC AUTO-ENTMCNC: 32.6 G/DL (ref 30–36.5)
MCV RBC AUTO: 92.8 FL (ref 80–99)
MONOCYTES # BLD: 0.7 K/UL (ref 0–1)
MONOCYTES NFR BLD: 8 % (ref 5–13)
MYELOCYTES NFR BLD MANUAL: 1 %
NEUTS SEG # BLD: 6.7 K/UL (ref 1.8–8)
NEUTS SEG NFR BLD: 74 % (ref 32–75)
NITRITE UR QL STRIP.AUTO: NEGATIVE
NRBC # BLD: 0 K/UL (ref 0–0.01)
NRBC BLD-RTO: 0 PER 100 WBC
PH UR STRIP: 6 [PH] (ref 5–8)
PHOSPHATE SERPL-MCNC: 3.5 MG/DL (ref 2.6–4.7)
PLATELET # BLD AUTO: 475 K/UL (ref 150–400)
PLATELET COMMENTS,PCOM: ABNORMAL
PMV BLD AUTO: 9.9 FL (ref 8.9–12.9)
POTASSIUM SERPL-SCNC: 3.8 MMOL/L (ref 3.5–5.1)
PROT SERPL-MCNC: 6.1 G/DL (ref 6.4–8.2)
PROT UR STRIP-MCNC: NEGATIVE MG/DL
RBC # BLD AUTO: 3.34 M/UL (ref 3.8–5.2)
RBC #/AREA URNS HPF: ABNORMAL /HPF (ref 0–5)
RBC MORPH BLD: ABNORMAL
SODIUM SERPL-SCNC: 139 MMOL/L (ref 136–145)
SP GR UR REFRACTOMETRY: 1.01 (ref 1–1.03)
UA: UC IF INDICATED,UAUC: ABNORMAL
URATE SERPL-MCNC: 4.9 MG/DL (ref 2.6–6)
UROBILINOGEN UR QL STRIP.AUTO: 0.2 EU/DL (ref 0.2–1)
WBC # BLD AUTO: 9 K/UL (ref 3.6–11)
WBC MORPH BLD: ABNORMAL
WBC URNS QL MICRO: ABNORMAL /HPF (ref 0–4)

## 2018-12-05 PROCEDURE — 96413 CHEMO IV INFUSION 1 HR: CPT

## 2018-12-05 PROCEDURE — 80061 LIPID PANEL: CPT

## 2018-12-05 PROCEDURE — 83735 ASSAY OF MAGNESIUM: CPT

## 2018-12-05 PROCEDURE — 80053 COMPREHEN METABOLIC PANEL: CPT

## 2018-12-05 PROCEDURE — 74011000258 HC RX REV CODE- 258: Performed by: INTERNAL MEDICINE

## 2018-12-05 PROCEDURE — 83036 HEMOGLOBIN GLYCOSYLATED A1C: CPT

## 2018-12-05 PROCEDURE — 83615 LACTATE (LD) (LDH) ENZYME: CPT

## 2018-12-05 PROCEDURE — 74011250636 HC RX REV CODE- 250/636

## 2018-12-05 PROCEDURE — 74011250636 HC RX REV CODE- 250/636: Performed by: INTERNAL MEDICINE

## 2018-12-05 PROCEDURE — 96375 TX/PRO/DX INJ NEW DRUG ADDON: CPT

## 2018-12-05 PROCEDURE — 74011000250 HC RX REV CODE- 250: Performed by: INTERNAL MEDICINE

## 2018-12-05 PROCEDURE — 85025 COMPLETE CBC W/AUTO DIFF WBC: CPT

## 2018-12-05 PROCEDURE — 77030012965 HC NDL HUBR BBMI -A

## 2018-12-05 PROCEDURE — 81001 URINALYSIS AUTO W/SCOPE: CPT

## 2018-12-05 PROCEDURE — 84550 ASSAY OF BLOOD/URIC ACID: CPT

## 2018-12-05 PROCEDURE — 36415 COLL VENOUS BLD VENIPUNCTURE: CPT

## 2018-12-05 PROCEDURE — 87086 URINE CULTURE/COLONY COUNT: CPT

## 2018-12-05 PROCEDURE — 96417 CHEMO IV INFUS EACH ADDL SEQ: CPT

## 2018-12-05 PROCEDURE — 84100 ASSAY OF PHOSPHORUS: CPT

## 2018-12-05 RX ORDER — HEPARIN 100 UNIT/ML
500 SYRINGE INTRAVENOUS AS NEEDED
Status: ACTIVE | OUTPATIENT
Start: 2018-12-05 | End: 2018-12-06

## 2018-12-05 RX ORDER — SODIUM CHLORIDE 9 MG/ML
10 INJECTION INTRAMUSCULAR; INTRAVENOUS; SUBCUTANEOUS AS NEEDED
Status: ACTIVE | OUTPATIENT
Start: 2018-12-05 | End: 2018-12-06

## 2018-12-05 RX ORDER — SODIUM CHLORIDE 0.9 % (FLUSH) 0.9 %
5-10 SYRINGE (ML) INJECTION AS NEEDED
Status: ACTIVE | OUTPATIENT
Start: 2018-12-05 | End: 2018-12-06

## 2018-12-05 RX ADMIN — Medication 10 ML: at 11:19

## 2018-12-05 RX ADMIN — GEMCITABINE 1960 MG: 38 INJECTION, SOLUTION INTRAVENOUS at 14:47

## 2018-12-05 RX ADMIN — SODIUM CHLORIDE 10 ML: 9 INJECTION, SOLUTION INTRAMUSCULAR; INTRAVENOUS; SUBCUTANEOUS at 15:19

## 2018-12-05 RX ADMIN — SODIUM CHLORIDE 10 ML: 9 INJECTION, SOLUTION INTRAMUSCULAR; INTRAVENOUS; SUBCUTANEOUS at 11:19

## 2018-12-05 RX ADMIN — Medication 500 UNITS: at 15:19

## 2018-12-05 RX ADMIN — DEXAMETHASONE SODIUM PHOSPHATE 8 MG: 4 INJECTION, SOLUTION INTRAMUSCULAR; INTRAVENOUS at 13:29

## 2018-12-05 RX ADMIN — PACLITAXEL 245 MG: 100 INJECTION, POWDER, LYOPHILIZED, FOR SUSPENSION INTRAVENOUS at 14:02

## 2018-12-05 NOTE — PROGRESS NOTES
Pt in for labs and follow up visit today per protocol with Dr. Yoni Fuller and RN. This is C2D1 of treatment. Patient reports the following adverse events at this time: gr 1 constipation, gr 1 fatigue, gr 2 hair loss, gr 1 insomnia. Vital signs are stable. Patient to go to infusion center after appointment to receive gemcitabine/abraxane treatment. Next appt is scheduled for 12/12/18. QOL and correlative blood drawn today per protocol.

## 2018-12-05 NOTE — PROGRESS NOTES
Problem: Chemotherapy Treatment Goal: *Chemotherapy regimen followed Outcome: Progressing Towards Goal 
Patient here for C2D1 Clinical trial; Gemzar/Abraxane

## 2018-12-05 NOTE — PROGRESS NOTES
[facility] Progress Note Date: 2018 Name: Khushboo Kahn MRN: [id number] : 1945 Ms. Strange arrived ambulatory at 1100 and in no distress for C2D1 Abraxane/Gemzar. Assessment was completed, no acute issues at this time, no new complaints voiced. RCW port accessed without difficulty with 0.75 garcia needle; + blood return noted, labs drawn and sent. Proceeded to appt with Dr. Guadarrama Alexandr's vitals were reviewed. Patient Vitals for the past 12 hrs: 
 Temp Pulse Resp BP SpO2  
18 1525 97.8 °F (36.6 °C) 62 18 135/74 100 % 18 1105 97.6 °F (36.4 °C) 71 16 137/64 100 % Lab results were obtained and reviewed. Recent Results (from the past 12 hour(s)) CBC WITH AUTOMATED DIFF Collection Time: 18 11:12 AM  
Result Value Ref Range WBC 9.0 3.6 - 11.0 K/uL  
 RBC 3.34 (L) 3.80 - 5.20 M/uL  
 HGB 10.1 (L) 11.5 - 16.0 g/dL HCT 31.0 (L) 35.0 - 47.0 % MCV 92.8 80.0 - 99.0 FL  
 MCH 30.2 26.0 - 34.0 PG  
 MCHC 32.6 30.0 - 36.5 g/dL  
 RDW 16.4 (H) 11.5 - 14.5 % PLATELET 445 (H) 400 - 400 K/uL MPV 9.9 8.9 - 12.9 FL  
 NRBC 0.0 0  WBC ABSOLUTE NRBC 0.00 0.00 - 0.01 K/uL NEUTROPHILS 74 32 - 75 % LYMPHOCYTES 15 12 - 49 % MONOCYTES 8 5 - 13 % EOSINOPHILS 2 0 - 7 % BASOPHILS 0 0 - 1 % MYELOCYTES 1 (H) 0 % IMMATURE GRANULOCYTES 0 %  
 ABS. NEUTROPHILS 6.7 1.8 - 8.0 K/UL  
 ABS. LYMPHOCYTES 1.4 0.8 - 3.5 K/UL  
 ABS. MONOCYTES 0.7 0.0 - 1.0 K/UL  
 ABS. EOSINOPHILS 0.2 0.0 - 0.4 K/UL  
 ABS. BASOPHILS 0.0 0.0 - 0.1 K/UL  
 ABS. IMM. GRANS. 0.0 K/UL  
 DF MANUAL PLATELET COMMENTS Large Platelets RBC COMMENTS NORMOCYTIC, NORMOCHROMIC    
 WBC COMMENTS TOXIC GRANULATION    
METABOLIC PANEL, COMPREHENSIVE Collection Time: 18 11:12 AM  
Result Value Ref Range Sodium 139 136 - 145 mmol/L Potassium 3.8 3.5 - 5.1 mmol/L  Chloride 105 97 - 108 mmol/L  
 CO2 25 21 - 32 mmol/L  
 Anion gap 9 5 - 15 mmol/L Glucose 179 (H) 65 - 100 mg/dL BUN 15 6 - 20 MG/DL Creatinine 0.76 0.55 - 1.02 MG/DL  
 BUN/Creatinine ratio 20 12 - 20 GFR est AA >60 >60 ml/min/1.73m2 GFR est non-AA >60 >60 ml/min/1.73m2 Calcium 8.6 8.5 - 10.1 MG/DL Bilirubin, total 0.4 0.2 - 1.0 MG/DL  
 ALT (SGPT) 83 (H) 12 - 78 U/L  
 AST (SGOT) 33 15 - 37 U/L Alk. phosphatase 252 (H) 45 - 117 U/L Protein, total 6.1 (L) 6.4 - 8.2 g/dL Albumin 2.8 (L) 3.5 - 5.0 g/dL Globulin 3.3 2.0 - 4.0 g/dL A-G Ratio 0.8 (L) 1.1 - 2.2 LD Collection Time: 12/05/18 11:12 AM  
Result Value Ref Range  81 - 246 U/L  
MAGNESIUM Collection Time: 12/05/18 11:12 AM  
Result Value Ref Range Magnesium 1.8 1.6 - 2.4 mg/dL PHOSPHORUS Collection Time: 12/05/18 11:12 AM  
Result Value Ref Range Phosphorus 3.5 2.6 - 4.7 MG/DL URINALYSIS W/ REFLEX CULTURE Collection Time: 12/05/18 11:12 AM  
Result Value Ref Range Color YELLOW/STRAW Appearance CLEAR CLEAR Specific gravity 1.007 1.003 - 1.030    
 pH (UA) 6.0 5.0 - 8.0 Protein NEGATIVE  NEG mg/dL Glucose NEGATIVE  NEG mg/dL Ketone NEGATIVE  NEG mg/dL Bilirubin NEGATIVE  NEG Blood NEGATIVE  NEG Urobilinogen 0.2 0.2 - 1.0 EU/dL Nitrites NEGATIVE  NEG Leukocyte Esterase TRACE (A) NEG    
 WBC 5-10 0 - 4 /hpf  
 RBC 0-5 0 - 5 /hpf Epithelial cells FEW FEW /lpf Bacteria NEGATIVE  NEG /hpf  
 UA:UC IF INDICATED URINE CULTURE ORDERED (A) CNI Hyaline cast 0-2 0 - 5 /lpf URIC ACID Collection Time: 12/05/18 11:32 AM  
Result Value Ref Range Uric acid 4.9 2.6 - 6.0 MG/DL Patient labs within parameters for treatment. Pre-medications  were administered as ordered and chemotherapy was initiated. Medication: 
Decadron IVP Abraxane IV Gemzar IV Patient port flushed; heparinized; and de-accessed per protocol. Ms. Teddy Cagle tolerated treatment well and was discharged from [place] [id number] in stable condition at 1530. She is to return on 12/12/2018 at 11:00am for her next appointment. Frannie Langston December 5, 2018

## 2018-12-05 NOTE — PROGRESS NOTES
[facility] [place] [address] [address] [facility] [address] [facility]  Dana Ayalater: [phone]  F: [phone]      Reason for Visit:   Jazmin Roberts is a 72 y.o. female who is seen in self-referral for evaluation of pancreatic cancer. Treatment History:   · 10/4/18 pancreatic head mass FNA, pancreatic adenocarcinoma    10/15/18  Liver, Core Biopsy w/Touch Prep CYTOLOGIC INTERPRETATION:   · Numerous cores and fragments of benign hepatic parenchyma     10/24/18  Liver, Fine Needle Aspiration CYTOLOGIC INTERPRETATION:   Metastatic adenocarcinoma (see Comment). General Categorization   Positive for malignancy. Comment: The morphologic appearance is nonspecific with regard to site of origin but compatible with metastatic pancreatic carcinoma in this patient with known pancreatic adenocarcinoma ([id number]). 11/9/18 abraxane/gemcitabine    History of Present Illness:   She started having abdominal pain, gradual and persistent, x 1 month, pressure sensation, located in epigastrium, no radiation, no aggravating symptoms, no relieving factors. 25 lb weight loss over 6 months.  + nausea. Interval history:  In today for follow up. Complains of gr 1 constipation, gr 1 fatigue, gr 2 hair loss, gr 1 insomnia. She also reports mild night sweats but denies fever and chills. Past Medical History:   Diagnosis Date    Arthritis     Constipation     Diabetes ([address]     Hemorrhoids     Hiatal hernia     High cholesterol     Hypertension     Pancreatic cancer ([address]       Past Surgical History:   Procedure Laterality Date    HX KNEE ARTHROSCOPY Right     HX ORTHOPAEDIC      left wrist surgery    HX TONSILLECTOMY        Social History     Tobacco Use    Smoking status: Never Smoker    Smokeless tobacco: Never Used   Substance Use Topics    Alcohol use:  Yes     Alcohol/week: 1.2 - 2.4 oz     Types: 1 - 2 Cans of beer, 1 - 2 Shots of liquor per week     Frequency: 2-4 times a month     Drinks per session: 1 or 2      Family History   Problem Relation Age of Onset    Cancer Mother         skin    Hypertension Mother     Heart Disease Mother     Cancer Father         skin    Heart Disease Father     Stroke Father     Diabetes Neg Hx      Current Outpatient Medications   Medication Sig    OTHER Cranial prosthesis    Dx C25.7    omeprazole (PRILOSEC) 20 mg capsule Take 20 mg by mouth daily.  insulin aspart U-100 (NOVOLOG FLEXPEN U-100 INSULIN) 100 unit/mL inpn Inject 14 units before breakfast, lunch and dinner + 2 units for every 50 mg/dl above 150 mg/dl. Max 50 units per day    insulin degludec (TRESIBA FLEXTOUCH U-200) 200 unit/mL (3 mL) inpn 46 Units by SubCUTAneous route daily. Or as directed up to 60 units daily--Dose change 11/2/18--updated med list--did not send prescription to the pharmacy    simvastatin (ZOCOR) 10 mg tablet Take  by mouth nightly.  telmisartan (MICARDIS) 80 mg tablet Take 80 mg by mouth daily.  hydroCHLOROthiazide (MICROZIDE) 12.5 mg capsule Take 12.5 mg by mouth daily.  prochlorperazine (COMPAZINE) 10 mg tablet Take 1 Tab by mouth every six (6) hours as needed for Nausea.  ondansetron hcl (ZOFRAN) 8 mg tablet Take 1 Tab by mouth every eight (8) hours as needed for Nausea.  lidocaine-prilocaine (EMLA) topical cream Apply  to affected area as needed for Pain. No current facility-administered medications for this visit.       Facility-Administered Medications Ordered in Other Visits   Medication Dose Route Frequency    sodium chloride 0.9% injection 10 mL  10 mL IntraVENous PRN    heparin (porcine) pf 500 Units  500 Units IntraVENous PRN    sodium chloride (NS) flush 5-10 mL  5-10 mL IntraVENous PRN    gemcitabine (GEMZAR) 1,960 mg in 0.9% sodium chloride 250 mL, overfill volume 25 mL chemo infusion  1,960 mg IntraVENous ONCE    PACLitaxel-Protein Bound (ABRAXANE) 245 mg chemo infusion  245 mg IntraVENous ONCE    dexamethasone (DECADRON) 4 mg/mL injection 8 mg  8 mg IntraVENous ONCE    prochlorperazine (COMPAZINE) with saline injection 10 mg  10 mg IntraVENous Q6H PRN      Allergies   Allergen Reactions    Amoxicillin Hives        Review of Systems: A comprehensive review of systems was performed and all systems were negative except for HPI and for the symptom review form, reviewed and scanned in. Physical Exam:     Visit Vitals  /64   Pulse 71   Temp 97.6 °F (36.4 °C) (Temporal)   Resp 18   Ht 5' 7\" (1.702 m)   Wt 190 lb 14.4 oz (86.6 kg)   SpO2 100%   BMI 29.90 kg/m²     ECOG PS: 0  General: No distress  Eyes: PERRLA, anicteric sclerae  HENT: Atraumatic, OP clear  Neck: Supple  Lymphatic: No cervical, supraclavicular, or inguinal adenopathy  Respiratory: CTAB, normal respiratory effort  CV: Normal rate, regular rhythm, no murmurs, no peripheral edema  GI: Soft, nontender, nondistended, no masses, no hepatomegaly, no splenomegaly  MS: Normal gait and station. Digits without clubbing or cyanosis. Skin: No rashes, ecchymoses, or petechiae. Normal temperature, turgor, and texture. Psych: Alert, oriented, appropriate affect, normal judgment/insight        Results:     Lab Results   Component Value Date/Time    WBC 9.0 12/05/2018 11:12 AM    HGB 10.1 (L) 12/05/2018 11:12 AM    HCT 31.0 (L) 12/05/2018 11:12 AM    PLATELET 445 (H) 84/84/8180 11:12 AM    MCV 92.8 12/05/2018 11:12 AM    ABS.  NEUTROPHILS 6.7 12/05/2018 11:12 AM     Lab Results   Component Value Date/Time    Sodium 139 12/05/2018 11:12 AM    Potassium 3.8 12/05/2018 11:12 AM    Chloride 105 12/05/2018 11:12 AM    CO2 25 12/05/2018 11:12 AM    Glucose 179 (H) 12/05/2018 11:12 AM    BUN 15 12/05/2018 11:12 AM    Creatinine 0.76 12/05/2018 11:12 AM    GFR est AA >60 12/05/2018 11:12 AM    GFR est non-AA >60 12/05/2018 11:12 AM    Calcium 8.6 12/05/2018 11:12 AM    Glucose (POC) 316 (H) 10/04/2018 03:45 PM     Lab Results   Component Value Date/Time    Bilirubin, total 0.4 12/05/2018 11:12 AM    ALT (SGPT) 83 (H) 12/05/2018 11:12 AM    AST (SGOT) 33 12/05/2018 11:12 AM    Alk. phosphatase 252 (H) 12/05/2018 11:12 AM    Protein, total 6.1 (L) 12/05/2018 11:12 AM    Albumin 2.8 (L) 12/05/2018 11:12 AM    Globulin 3.3 12/05/2018 11:12 AM     10/1/18 CT abd  There is a 3.8 x 1.9 cm mass involving the uncinate process and possibly  invading the duodenum. Findings are nonspecific but typical of pancreatic  carcinoma. There is no biliary or pancreatic ductal dilatation.     There is a poorly defined irregular hypodensity in segment IVb of the liver  measuring 3.7 x 1.9 cm. There appears to be focal biliary dilatation in the left  lobe behind this abnormality. Metastatic disease is suspected. There is a small,  1 cm hypodensity in the dome of the liver, likely segment 8. There is a 1 cm  hypodensity in segment 4 of the liver as well, also likely metastasis. Small  hypodensity measuring less than 1 cm in segment 6 at the tip laterally is also  nonspecific but probable metastasis.     Spleen kidneys and adrenals are unremarkable.     No free fluid or focal fluid collection.     Lung bases are clear.     Bone windows are unremarkable.     IMPRESSION  IMPRESSION:  1. 3.8 x 1.9 cm mass involving the uncinate process of the pancreas and possibly  invading the duodenum, this likely represents pancreatic carcinoma. 2. Likely metastatic disease in the liver. There is a 3.8 x 1.9 cm mass involving the uncinate process and possibly  invading the duodenum. Findings are nonspecific but typical of pancreatic  carcinoma. There is no biliary or pancreatic ductal dilatation.     There is a poorly defined irregular hypodensity in segment IVb of the liver  measuring 3.7 x 1.9 cm. There appears to be focal biliary dilatation in the left  lobe behind this abnormality. Metastatic disease is suspected. There is a small,  1 cm hypodensity in the dome of the liver, likely segment 8.  There is a 1 cm  hypodensity in segment 4 of the liver as well, also likely metastasis. Small  hypodensity measuring less than 1 cm in segment 6 at the tip laterally is also  nonspecific but probable metastasis.     Spleen kidneys and adrenals are unremarkable.     No free fluid or focal fluid collection.     Lung bases are clear.     Bone windows are unremarkable.     IMPRESSION  IMPRESSION:  1. 3.8 x 1.9 cm mass involving the uncinate process of the pancreas and possibly  invading the duodenum, this likely represents pancreatic carcinoma. 2. Likely metastatic disease in the liver. Records reviewed and summarized above. Pathology report(s) reviewed above. Radiology report(s) reviewed above. MRI abdomen 10/22/18: IMPRESSION:       1. Pancreatic uncinate process mass suspicious for pancreatic neoplasm, possibly  adenocarcinoma. Mass abuts the superior mesenteric artery with about 20%  circumference involvement but no luminal distortion or perivascular stranding. 2. Multiple hepatic lesions suspicious for metastatic neoplasm with segment IVb  lesion showing patchy areas of surrounding steatosis likely secondary to locally  altered intrahepatic hemodynamics and likely responsible for biopsy result. 3. Cholecystolithiasis and small gallbladder polyp.     Assessment/plan:   1. Uncinate pancreatic adenocarcinoma,  mets to liver, stage IV:  cT2 cN0 pM1    Discussed that while this is treatable, it is not curable, the goal of therapy is palliative. Discussed that without therapy, life expectancy would be 3-6 months, with therapy 12-18 months on average. As an example of likely chemotherapy, we discussed the risks and benefits of Gemcitabine and Abraxane chemotherapy.  Potential side effects include, but are not limited to, nausea, vomiting, diarrhea, constipation, mucositis, taste changes, myelosuppression, infection, fatigue, alopecia, skin and nail changes, pulmonary toxicity, neuropathy, allergic reactions, infertility, and rarely, death.     Discussed the [id number] study and the research nurse met with her today and she signed informed consent. · Gemcitabine (1000 mg/m2) and Abraxane (125 mg/m2) given on days 1,8,15 every 28 days. · Labs: CBC, BMP prior to each treatment. Hepatic function panel every 4 weeks. CA 19.9 prior to day 1  · Antiemetic prophylaxis: Dexamethasone prior to each treatment  · PRN antiemetics: Ondansetron and Prochlorperazine at home  · EMLA cream for port    On the control arm, C2d1 today, will see her back in 2 weeks for C2D15    2. DM2:  Holding metformin; on insulin; saw Dr. Miguel Reyes    3. Emotional well being:  She has excellent support and is coping well with her disease    4. Abdominal pain:  Resolved;  palliative care has seen    5. Nutrition:  Juliet Islas RD has met with her; holding on enzymes    6. Insomnia: Has improved. She has lunesta if needed; she may also try melatonin    7. Anemia:  Due to chemo and disease; monitor    8. Thrombocytopenia:  Due to chemo, mild    9. Neutropenia:  Due to chemo, started granix 380 mcg for 4 days following chemo with C1D15      Patient was seen in conjunction with Hector Mccauley NP.       Signed By: Zachery Phelps MD

## 2018-12-06 LAB
BACTERIA SPEC CULT: NORMAL
CC UR VC: NORMAL
CHOLEST SERPL-MCNC: 131 MG/DL
EST. AVERAGE GLUCOSE BLD GHB EST-MCNC: 232 MG/DL
HBA1C MFR BLD: 9.7 % (ref 4.2–6.3)
HDLC SERPL-MCNC: 48 MG/DL
HDLC SERPL: 2.7 {RATIO} (ref 0–5)
LDLC SERPL CALC-MCNC: 66.6 MG/DL (ref 0–100)
LIPID PROFILE,FLP: NORMAL
SERVICE CMNT-IMP: NORMAL
TRIGL SERPL-MCNC: 82 MG/DL (ref ?–150)
VLDLC SERPL CALC-MCNC: 16.4 MG/DL

## 2018-12-09 RX ORDER — DEXAMETHASONE SODIUM PHOSPHATE 4 MG/ML
8 INJECTION, SOLUTION INTRA-ARTICULAR; INTRALESIONAL; INTRAMUSCULAR; INTRAVENOUS; SOFT TISSUE ONCE
Status: COMPLETED | OUTPATIENT
Start: 2018-12-12 | End: 2018-12-12

## 2018-12-12 ENCOUNTER — RESEARCH ENCOUNTER (OUTPATIENT)
Dept: ONCOLOGY | Age: 73
End: 2018-12-12

## 2018-12-12 ENCOUNTER — HOSPITAL ENCOUNTER (OUTPATIENT)
Dept: INFUSION THERAPY | Age: 73
Discharge: HOME | End: 2018-12-12
Payer: MEDICARE

## 2018-12-12 VITALS
TEMPERATURE: 98.4 F | SYSTOLIC BLOOD PRESSURE: 121 MMHG | HEART RATE: 82 BPM | RESPIRATION RATE: 18 BRPM | DIASTOLIC BLOOD PRESSURE: 62 MMHG | OXYGEN SATURATION: 98 %

## 2018-12-12 LAB — LDH SERPL L TO P-CCNC: 371 U/L (ref 81–246)

## 2018-12-12 PROCEDURE — 96413 CHEMO IV INFUSION 1 HR: CPT

## 2018-12-12 PROCEDURE — 83615 LACTATE (LD) (LDH) ENZYME: CPT

## 2018-12-12 PROCEDURE — 96417 CHEMO IV INFUS EACH ADDL SEQ: CPT

## 2018-12-12 PROCEDURE — 96375 TX/PRO/DX INJ NEW DRUG ADDON: CPT

## 2018-12-12 PROCEDURE — 74011000258 HC RX REV CODE- 258: Performed by: INTERNAL MEDICINE

## 2018-12-12 PROCEDURE — 74011250636 HC RX REV CODE- 250/636: Performed by: INTERNAL MEDICINE

## 2018-12-12 PROCEDURE — 85025 COMPLETE CBC W/AUTO DIFF WBC: CPT

## 2018-12-12 PROCEDURE — 77030012965 HC NDL HUBR BBMI -A

## 2018-12-12 PROCEDURE — 74011250636 HC RX REV CODE- 250/636

## 2018-12-12 PROCEDURE — 36415 COLL VENOUS BLD VENIPUNCTURE: CPT

## 2018-12-12 RX ORDER — HEPARIN 100 UNIT/ML
500 SYRINGE INTRAVENOUS AS NEEDED
Status: ACTIVE | OUTPATIENT
Start: 2018-12-12 | End: 2018-12-13

## 2018-12-12 RX ORDER — SODIUM CHLORIDE 9 MG/ML
10 INJECTION INTRAMUSCULAR; INTRAVENOUS; SUBCUTANEOUS AS NEEDED
Status: ACTIVE | OUTPATIENT
Start: 2018-12-12 | End: 2018-12-13

## 2018-12-12 RX ADMIN — PACLITAXEL 245 MG: 100 INJECTION, POWDER, LYOPHILIZED, FOR SUSPENSION INTRAVENOUS at 14:03

## 2018-12-12 RX ADMIN — DEXAMETHASONE SODIUM PHOSPHATE 8 MG: 4 INJECTION, SOLUTION INTRAMUSCULAR; INTRAVENOUS at 13:01

## 2018-12-12 RX ADMIN — SODIUM CHLORIDE 10 ML: 9 INJECTION INTRAMUSCULAR; INTRAVENOUS; SUBCUTANEOUS at 11:35

## 2018-12-12 RX ADMIN — SODIUM CHLORIDE 1960 MG: 900 INJECTION, SOLUTION INTRAVENOUS at 14:44

## 2018-12-12 RX ADMIN — SODIUM CHLORIDE, PRESERVATIVE FREE 500 UNITS: 5 INJECTION INTRAVENOUS at 15:20

## 2018-12-12 NOTE — PROGRESS NOTES
[facility] VISIT NOTE    1120  Pt arrived at [facility] ambulatory and in no distress for C2D8 Clinical Trial;Gemzar/Abraxane. Assessment completed, pt w/o complaints. .    Right chest port accessed with . 75 in garcia with no difficulty. Positive blood return noted and labs drawn. Medications received:  Decadron IVP  Gemzar IV  Abraxane IV  NS KVO    Tolerated treatment well, no adverse reaction noted. Port de-accessed and flushed per protocol. Positive blood return noted. Recent Results (from the past 12 hour(s))   CBC WITH AUTOMATED DIFF    Collection Time: 12/12/18 11:42 AM   Result Value Ref Range    WBC 31.6 (H) 3.6 - 11.0 K/uL    RBC 3.28 (L) 3.80 - 5.20 M/uL    HGB 10.0 (L) 11.5 - 16.0 g/dL    HCT 31.0 (L) 35.0 - 47.0 %    MCV 94.5 80.0 - 99.0 FL    MCH 30.5 26.0 - 34.0 PG    MCHC 32.3 30.0 - 36.5 g/dL    RDW 16.6 (H) 11.5 - 14.5 %    PLATELET 940 (H) 698 - 400 K/uL    MPV 9.3 8.9 - 12.9 FL    NRBC 0.1 (H) 0  WBC    ABSOLUTE NRBC 0.02 (H) 0.00 - 0.01 K/uL    NEUTROPHILS 58 32 - 75 %    BAND NEUTROPHILS 8 (H) 0 - 6 %    LYMPHOCYTES 8 (L) 12 - 49 %    MONOCYTES 7 5 - 13 %    EOSINOPHILS 0 0 - 7 %    BASOPHILS 0 0 - 1 %    METAMYELOCYTES 7 (H) 0 %    MYELOCYTES 9 (H) 0 %    PROMYELOCYTES 3 (H) 0 %    IMMATURE GRANULOCYTES 0 %    ABS. NEUTROPHILS 20.9 (H) 1.8 - 8.0 K/UL    ABS. LYMPHOCYTES 2.5 0.8 - 3.5 K/UL    ABS. MONOCYTES 2.2 (H) 0.0 - 1.0 K/UL    ABS. EOSINOPHILS 0.0 0.0 - 0.4 K/UL    ABS. BASOPHILS 0.0 0.0 - 0.1 K/UL    ABS. IMM. GRANS. 0.0 K/UL    DF MANUAL      RBC COMMENTS ANISOCYTOSIS  1+        WBC COMMENTS Pathology Review Requested     LD    Collection Time: 12/12/18 11:42 AM   Result Value Ref Range     (H) 81 - 246 U/L     Patient Vitals for the past 12 hrs:   Temp Pulse Resp BP SpO2   12/12/18 1131 98.4 °F (36.9 °C) 82 18 121/62 98 %     1520  D/C'd from [facility] ambulatory and in no distress accompanied by self. Next appointment is 12/19/18 at 1000.

## 2018-12-12 NOTE — PROGRESS NOTES
Pt in for labs. This is C2D8 of treatment. Patient reports the following adverse events at this time: gr 1 constipation, gr 1 neuropathy, gr 2 alopecia. Vital signs are stable. Patient to receive gemcitabine/abraxane infusion. Next appt is scheduled for 12/19/18.

## 2018-12-13 ENCOUNTER — OFFICE VISIT (OUTPATIENT)
Dept: ENDOCRINOLOGY | Age: 73
End: 2018-12-13

## 2018-12-13 VITALS
WEIGHT: 186.2 LBS | HEIGHT: 67 IN | HEART RATE: 85 BPM | BODY MASS INDEX: 29.22 KG/M2 | SYSTOLIC BLOOD PRESSURE: 113 MMHG | DIASTOLIC BLOOD PRESSURE: 62 MMHG

## 2018-12-13 DIAGNOSIS — I10 ESSENTIAL HYPERTENSION, BENIGN: ICD-10-CM

## 2018-12-13 DIAGNOSIS — E78.5 HYPERLIPIDEMIA WITH TARGET LDL LESS THAN 100: ICD-10-CM

## 2018-12-13 LAB
BASOPHILS # BLD: 0 K/UL (ref 0–0.1)
BASOPHILS NFR BLD: 0 % (ref 0–1)
DIFFERENTIAL METHOD BLD: ABNORMAL
EOSINOPHIL # BLD: 0 K/UL (ref 0–0.4)
EOSINOPHIL NFR BLD: 0 % (ref 0–7)
ERYTHROCYTE [DISTWIDTH] IN BLOOD BY AUTOMATED COUNT: 16.6 % (ref 11.5–14.5)
HCT VFR BLD AUTO: 31 % (ref 35–47)
HGB BLD-MCNC: 10 G/DL (ref 11.5–16)
IMM GRANULOCYTES # BLD: 0 K/UL
IMM GRANULOCYTES NFR BLD AUTO: 0 %
LYMPHOCYTES # BLD: 2.5 K/UL (ref 0.8–3.5)
LYMPHOCYTES NFR BLD: 8 % (ref 12–49)
MCH RBC QN AUTO: 30.5 PG (ref 26–34)
MCHC RBC AUTO-ENTMCNC: 32.3 G/DL (ref 30–36.5)
MCV RBC AUTO: 94.5 FL (ref 80–99)
METAMYELOCYTES NFR BLD MANUAL: 7 %
MONOCYTES # BLD: 2.2 K/UL (ref 0–1)
MONOCYTES NFR BLD: 7 % (ref 5–13)
MYELOCYTES NFR BLD MANUAL: 9 %
NEUTS BAND NFR BLD MANUAL: 8 % (ref 0–6)
NEUTS SEG # BLD: 20.9 K/UL (ref 1.8–8)
NEUTS SEG NFR BLD: 58 % (ref 32–75)
NRBC # BLD: 0.02 K/UL (ref 0–0.01)
NRBC BLD-RTO: 0.1 PER 100 WBC
PATH REV BLD -IMP: ABNORMAL
PLATELET # BLD AUTO: 875 K/UL (ref 150–400)
PMV BLD AUTO: 9.3 FL (ref 8.9–12.9)
PROMYELOCYTES NFR BLD MANUAL: 3 %
RBC # BLD AUTO: 3.28 M/UL (ref 3.8–5.2)
RBC MORPH BLD: ABNORMAL
WBC # BLD AUTO: 31.6 K/UL (ref 3.6–11)
WBC MORPH BLD: ABNORMAL

## 2018-12-13 RX ORDER — INSULIN DEGLUDEC 200 U/ML
42 INJECTION, SOLUTION SUBCUTANEOUS DAILY
Qty: 9 ML | Refills: 11
Start: 2018-12-13 | End: 2018-12-13

## 2018-12-13 RX ORDER — INSULIN DEGLUDEC 200 U/ML
42 INJECTION, SOLUTION SUBCUTANEOUS DAILY
Qty: 9 ML | Refills: 11
Start: 2018-12-13 | End: 2019-02-03

## 2018-12-13 NOTE — PROGRESS NOTES
Chief Complaint   Patient presents with    Diabetes     pcp and pharmacy confirmed    Other     consent form signed to obtain eye report     History of Present Illness: Amy Freeman is a 72 y.o. female here for follow up of diabetes. Weight up 2 lbs since last visit in 11/18. Has been taking insulin as listed below and is able to keep the majority of her sugars over the past month in the 100-150 range and only rarely going under 80. She has been taking 60 units of tresiba on chemo days but still tends to go over 200 with some over 300 by dinner on these days but does state they feed her a lot of carbs like chips and pretzels while at chemo. Overall is feeling pretty good. Current Outpatient Medications   Medication Sig    insulin degludec (TRESIBA FLEXTOUCH U-200) 200 unit/mL (3 mL) inpn 42 Units by SubCUTAneous route daily. Or as directed up to 60 units daily on chemo days--Dose change 12/13/18--updated med list--did not send prescription to the pharmacy    OTHER Cranial prosthesis    Dx C25.7    omeprazole (PRILOSEC) 20 mg capsule Take 20 mg by mouth daily.  insulin aspart U-100 (NOVOLOG FLEXPEN U-100 INSULIN) 100 unit/mL inpn Inject 14 units before breakfast, lunch and dinner + 2 units for every 50 mg/dl above 150 mg/dl. Max 50 units per day    ondansetron hcl (ZOFRAN) 8 mg tablet Take 1 Tab by mouth every eight (8) hours as needed for Nausea.  lidocaine-prilocaine (EMLA) topical cream Apply  to affected area as needed for Pain.  simvastatin (ZOCOR) 10 mg tablet Take  by mouth nightly.  telmisartan (MICARDIS) 80 mg tablet Take 80 mg by mouth daily.  hydroCHLOROthiazide (MICROZIDE) 12.5 mg capsule Take 12.5 mg by mouth daily. No current facility-administered medications for this visit.       Allergies   Allergen Reactions    Amoxicillin Hives     Review of Systems:  - Eyes: no blurry vision or double vision  - Cardiovascular: no chest pain  - Respiratory: no shortness of breath  - Musculoskeletal: no myalgias  - Neurological: no numbness/tingling in extremities    Physical Examination:  Blood pressure 113/62, pulse 85, height 5' 7\" (1.702 m), weight 186 lb 3.2 oz (84.5 kg). - General: pleasant, no distress, good eye contact   - Neck: no carotid bruits  - Cardiovascular: regular, normal rate, nl s1 and s2, no m/r/g,   - Respiratory: clear bilaterally  - Integumentary: no edema,   - Psychiatric: normal mood and affect    Data Reviewed:   Component      Latest Ref Rng & Units 12/5/2018 12/5/2018 12/5/2018          11:12 AM 11:12 AM 11:12 AM   Sodium      136 - 145 mmol/L   139   Potassium      3.5 - 5.1 mmol/L   3.8   Chloride      97 - 108 mmol/L   105   CO2      21 - 32 mmol/L   25   Anion gap      5 - 15 mmol/L   9   Glucose      65 - 100 mg/dL   179 (H)   BUN      6 - 20 MG/DL   15   Creatinine      0.55 - 1.02 MG/DL   0.76   BUN/Creatinine ratio      12 - 20     20   GFR est AA      >60 ml/min/1.73m2   >60   GFR est non-AA      >60 ml/min/1.73m2   >60   Calcium      8.5 - 10.1 MG/DL   8.6   Bilirubin, total      0.2 - 1.0 MG/DL   0.4   ALT (SGPT)      12 - 78 U/L   83 (H)   AST      15 - 37 U/L   33   Alk. phosphatase      45 - 117 U/L   252 (H)   Protein, total      6.4 - 8.2 g/dL   6.1 (L)   Albumin      3.5 - 5.0 g/dL   2.8 (L)   Globulin      2.0 - 4.0 g/dL   3.3   A-G Ratio      1.1 - 2.2     0.8 (L)   Cholesterol, total      <200 MG/DL  131    Triglyceride      <150 MG/DL  82    HDL Cholesterol      MG/DL  48    LDL, calculated      0 - 100 MG/DL  66.6    VLDL, calculated      MG/DL  16.4    CHOL/HDL Ratio      0 - 5.0    2.7    Hemoglobin A1c, (calculated)      4.2 - 6.3 % 9.7 (H)     Est. average glucose      mg/dL 232         Assessment/Plan:     1.  Uncontrolled type 2 diabetes mellitus without complication, with long-term current use of insulin ([facility] [address]): her most recent Hgb A1c was 9.7% in 12/18 down from 12% in 9/18 when recently diagnosed with full blown diabetes due to newly diagnosed metastatic pancreatic cancer. Her blood sugars are coming down nicely with tresiba and novolog so will keep her doses the same as this A1c really only reflects 6 weeks of better control and expect next one to be closer to 7% or less. Will increase her dose on chemo days. - cont tresiba 42 units in the morning on non-chemo days and 70 units on chemo days  - cont novolog 14 units but take before all 3 meals + 2 units for every 50 mg/dl above 150 mg/dl  - check bs 3 times daily  - foot exam done 10/18  - will need eye exam in the future  - will need microalbumin in the future  - check A1c, cmp and microalbumin at next visit          2. Hypertension: her BP was at goal < 140/90  - cont current regimen    3. Hyperlipidemia: Goal LDL < 100. LDL 66 in 12/18 on simva 10 daily  - cont simvastatin 10 mg daily  - check lipids in summer 2019    Patient Instructions   1) Your Hemoglobin A1c (3 month test of blood sugar) has come down from 12% in 9/18 when diagnosed down to 9.7%. Goal is under 7% but this is a 90 day average and you have only had better control over the past 6 weeks so I expect your next A1c to be much better as your home readings are very good aside from chemo days. 2) On your next chemo day, increase your tresiba to 70 units that morning. Continue 42 units all other days and continue the same novolog scale. 3) Your cholesterol and kidney function and blood pressure all look great. 4)  Check blood sugar before breakfast, lunch and dinner and dose novolog as follows:   Blood sugar   Less than 90 12 units    14 units   151-200 16 units   201-250 18 units   251-300 20 units   301-350 22 units   351-400 24 units   401-450 26 units     5) If you are away from home at lunch and can't check your sugar, you can still bring the novolog pen as it doesn't need to be refrigerated once it's been opened and just take 12 units to cover lunch.       Follow-up Disposition:  Return for 3/19/19 at 12:10pm.    Copy sent to:  Madeleine Wright MD as PCP - General (Internal Medicine)  Yovany Knutson MD (Hematology and Oncology)

## 2018-12-13 NOTE — PATIENT INSTRUCTIONS
1) Your Hemoglobin A1c (3 month test of blood sugar) has come down from 12% in 9/18 when diagnosed down to 9.7%. Goal is under 7% but this is a 90 day average and you have only had better control over the past 6 weeks so I expect your next A1c to be much better as your home readings are very good aside from chemo days. 2) On your next chemo day, increase your tresiba to 70 units that morning. Continue 42 units all other days and continue the same novolog scale. 3) Your cholesterol and kidney function and blood pressure all look great. 4)  Check blood sugar before breakfast, lunch and dinner and dose novolog as follows:   Blood sugar   Less than 90 12 units    14 units   151-200 16 units   201-250 18 units   251-300 20 units   301-350 22 units   351-400 24 units   401-450 26 units     5) If you are away from home at lunch and can't check your sugar, you can still bring the novolog pen as it doesn't need to be refrigerated once it's been opened and just take 12 units to cover lunch.

## 2018-12-14 RX ORDER — DEXAMETHASONE SODIUM PHOSPHATE 4 MG/ML
8 INJECTION, SOLUTION INTRA-ARTICULAR; INTRALESIONAL; INTRAMUSCULAR; INTRAVENOUS; SOFT TISSUE ONCE
Status: COMPLETED | OUTPATIENT
Start: 2018-12-19 | End: 2018-12-19

## 2018-12-17 ENCOUNTER — TELEPHONE (OUTPATIENT)
Dept: PALLATIVE CARE | Age: 73
End: 2018-12-17

## 2018-12-19 ENCOUNTER — HOSPITAL ENCOUNTER (OUTPATIENT)
Dept: INFUSION THERAPY | Age: 73
Discharge: HOME | End: 2018-12-19
Payer: MEDICARE

## 2018-12-19 ENCOUNTER — OFFICE VISIT (OUTPATIENT)
Dept: ONCOLOGY | Age: 73
End: 2018-12-19

## 2018-12-19 ENCOUNTER — RESEARCH ENCOUNTER (OUTPATIENT)
Dept: ONCOLOGY | Age: 73
End: 2018-12-19

## 2018-12-19 VITALS
SYSTOLIC BLOOD PRESSURE: 145 MMHG | BODY MASS INDEX: 29.85 KG/M2 | HEIGHT: 67 IN | DIASTOLIC BLOOD PRESSURE: 78 MMHG | TEMPERATURE: 98.1 F | RESPIRATION RATE: 18 BRPM | HEART RATE: 69 BPM | WEIGHT: 190.2 LBS

## 2018-12-19 VITALS
TEMPERATURE: 98 F | HEART RATE: 62 BPM | SYSTOLIC BLOOD PRESSURE: 131 MMHG | BODY MASS INDEX: 29.85 KG/M2 | DIASTOLIC BLOOD PRESSURE: 73 MMHG | RESPIRATION RATE: 18 BRPM | OXYGEN SATURATION: 99 % | HEIGHT: 67 IN | WEIGHT: 190.2 LBS

## 2018-12-19 DIAGNOSIS — D70.1 CHEMOTHERAPY-INDUCED NEUTROPENIA (HCC): ICD-10-CM

## 2018-12-19 DIAGNOSIS — G47.00 INSOMNIA, UNSPECIFIED TYPE: ICD-10-CM

## 2018-12-19 DIAGNOSIS — C25.7 MALIGNANT NEOPLASM OF OTHER PARTS OF PANCREAS (HCC): Primary | ICD-10-CM

## 2018-12-19 DIAGNOSIS — T45.1X5A CHEMOTHERAPY-INDUCED NEUTROPENIA (HCC): ICD-10-CM

## 2018-12-19 DIAGNOSIS — R10.9 ABDOMINAL PAIN, UNSPECIFIED ABDOMINAL LOCATION: ICD-10-CM

## 2018-12-19 DIAGNOSIS — K59.00 CONSTIPATION, UNSPECIFIED CONSTIPATION TYPE: ICD-10-CM

## 2018-12-19 DIAGNOSIS — D69.6 THROMBOCYTOPENIA (HCC): ICD-10-CM

## 2018-12-19 LAB
BASOPHILS # BLD: 0 K/UL (ref 0–0.1)
BASOPHILS NFR BLD: 0 % (ref 0–1)
DIFFERENTIAL METHOD BLD: ABNORMAL
EOSINOPHIL # BLD: 0 K/UL (ref 0–0.4)
EOSINOPHIL NFR BLD: 0 % (ref 0–7)
ERYTHROCYTE [DISTWIDTH] IN BLOOD BY AUTOMATED COUNT: 16.5 % (ref 11.5–14.5)
HCT VFR BLD AUTO: 29.5 % (ref 35–47)
HGB BLD-MCNC: 9.6 G/DL (ref 11.5–16)
IMM GRANULOCYTES # BLD: 0 K/UL
IMM GRANULOCYTES NFR BLD AUTO: 0 %
LDH SERPL L TO P-CCNC: 328 U/L (ref 81–246)
LYMPHOCYTES # BLD: 4 K/UL (ref 0.8–3.5)
LYMPHOCYTES NFR BLD: 11 % (ref 12–49)
MCH RBC QN AUTO: 30.6 PG (ref 26–34)
MCHC RBC AUTO-ENTMCNC: 32.5 G/DL (ref 30–36.5)
MCV RBC AUTO: 93.9 FL (ref 80–99)
METAMYELOCYTES NFR BLD MANUAL: 8 %
MONOCYTES # BLD: 2.6 K/UL (ref 0–1)
MONOCYTES NFR BLD: 7 % (ref 5–13)
MYELOCYTES NFR BLD MANUAL: 3 %
NEUTS BAND NFR BLD MANUAL: 4 % (ref 0–6)
NEUTS SEG # BLD: 25.3 K/UL (ref 1.8–8)
NEUTS SEG NFR BLD: 65 % (ref 32–75)
NRBC # BLD: 0.05 K/UL (ref 0–0.01)
NRBC BLD-RTO: 0.1 PER 100 WBC
PLATELET # BLD AUTO: 259 K/UL (ref 150–400)
PMV BLD AUTO: 9.3 FL (ref 8.9–12.9)
PROMYELOCYTES NFR BLD MANUAL: 2 %
RBC # BLD AUTO: 3.14 M/UL (ref 3.8–5.2)
RBC MORPH BLD: ABNORMAL
RBC MORPH BLD: ABNORMAL
WBC # BLD AUTO: 36.6 K/UL (ref 3.6–11)

## 2018-12-19 PROCEDURE — 36415 COLL VENOUS BLD VENIPUNCTURE: CPT

## 2018-12-19 PROCEDURE — 74011250636 HC RX REV CODE- 250/636: Performed by: INTERNAL MEDICINE

## 2018-12-19 PROCEDURE — 74011000258 HC RX REV CODE- 258: Performed by: INTERNAL MEDICINE

## 2018-12-19 PROCEDURE — 83615 LACTATE (LD) (LDH) ENZYME: CPT

## 2018-12-19 PROCEDURE — 77030012965 HC NDL HUBR BBMI -A

## 2018-12-19 PROCEDURE — 96375 TX/PRO/DX INJ NEW DRUG ADDON: CPT

## 2018-12-19 PROCEDURE — 74011250636 HC RX REV CODE- 250/636

## 2018-12-19 PROCEDURE — 96413 CHEMO IV INFUSION 1 HR: CPT

## 2018-12-19 PROCEDURE — 96415 CHEMO IV INFUSION ADDL HR: CPT

## 2018-12-19 PROCEDURE — 85025 COMPLETE CBC W/AUTO DIFF WBC: CPT

## 2018-12-19 RX ADMIN — DEXAMETHASONE SODIUM PHOSPHATE 8 MG: 4 INJECTION, SOLUTION INTRAMUSCULAR; INTRAVENOUS at 12:09

## 2018-12-19 RX ADMIN — PACLITAXEL 245 MG: 100 INJECTION, POWDER, LYOPHILIZED, FOR SUSPENSION INTRAVENOUS at 13:25

## 2018-12-19 RX ADMIN — GEMCITABINE 1960 MG: 38 INJECTION, SOLUTION INTRAVENOUS at 14:35

## 2018-12-19 NOTE — PROGRESS NOTES
[facility] [place] [facility]  [address] [facility], [address]  Kit Batters: [phone]  F: [phone]      Reason for Visit:   Janine Betancourt is a 72 y.o. female who is seen in self-referral for evaluation of pancreatic cancer. Treatment History:   · 10/4/18 pancreatic head mass FNA, pancreatic adenocarcinoma    10/15/18  Liver, Core Biopsy w/Touch Prep CYTOLOGIC INTERPRETATION:   · Numerous cores and fragments of benign hepatic parenchyma     10/24/18  Liver, Fine Needle Aspiration CYTOLOGIC INTERPRETATION:   Metastatic adenocarcinoma (see Comment). General Categorization   Positive for malignancy. Comment: The morphologic appearance is nonspecific with regard to site of origin but compatible with metastatic pancreatic carcinoma in this patient with known pancreatic adenocarcinoma ([id number]). 11/9/18 abraxane/gemcitabine    History of Present Illness:   She started having abdominal pain, gradual and persistent, x 1 month, pressure sensation, located in epigastrium, no radiation, no aggravating symptoms, no relieving factors. 25 lb weight loss over 6 months.  + nausea. Interval history:  In today for follow up. Complains of gr 1 constipation, gr 1 fatigue, gr 1 hair loss, gr 1 insomnia, gr 1 neuropathy. Past Medical History:   Diagnosis Date    Arthritis     Constipation     Diabetes ([address]     Hemorrhoids     Hiatal hernia     High cholesterol     Hypertension     Pancreatic cancer ([address]       Past Surgical History:   Procedure Laterality Date    HX KNEE ARTHROSCOPY Right     HX ORTHOPAEDIC      left wrist surgery    HX TONSILLECTOMY        Social History     Tobacco Use    Smoking status: Never Smoker    Smokeless tobacco: Never Used   Substance Use Topics    Alcohol use:  Yes     Alcohol/week: 1.2 - 2.4 oz     Types: 1 - 2 Cans of beer, 1 - 2 Shots of liquor per week     Frequency: 2-4 times a month     Drinks per session: 1 or 2 Family History   Problem Relation Age of Onset    Cancer Mother         skin    Hypertension Mother     Heart Disease Mother     Cancer Father         skin    Heart Disease Father     Stroke Father     Diabetes Neg Hx      Current Outpatient Medications   Medication Sig    insulin degludec (TRESIBA FLEXTOUCH U-200) 200 unit/mL (3 mL) inpn 42 Units by SubCUTAneous route daily. Or as directed up to 70 units daily on chemo days--Dose change 12/13/18--updated med list--did not send prescription to the pharmacy    OTHER Cranial prosthesis    Dx C25.7    omeprazole (PRILOSEC) 20 mg capsule Take 20 mg by mouth daily.  insulin aspart U-100 (NOVOLOG FLEXPEN U-100 INSULIN) 100 unit/mL inpn Inject 14 units before breakfast, lunch and dinner + 2 units for every 50 mg/dl above 150 mg/dl. Max 50 units per day    simvastatin (ZOCOR) 10 mg tablet Take  by mouth nightly.  telmisartan (MICARDIS) 80 mg tablet Take 80 mg by mouth daily.  hydroCHLOROthiazide (MICROZIDE) 12.5 mg capsule Take 12.5 mg by mouth daily.  ondansetron hcl (ZOFRAN) 8 mg tablet Take 1 Tab by mouth every eight (8) hours as needed for Nausea.  lidocaine-prilocaine (EMLA) topical cream Apply  to affected area as needed for Pain. No current facility-administered medications for this visit.       Facility-Administered Medications Ordered in Other Visits   Medication Dose Route Frequency    gemcitabine (GEMZAR) 1,960 mg in 0.9% sodium chloride 250 mL, overfill volume 25 mL chemo infusion  1,960 mg IntraVENous ONCE    PACLitaxel-Protein Bound (ABRAXANE) 245 mg chemo infusion  245 mg IntraVENous ONCE    dexamethasone (DECADRON) 4 mg/mL injection 8 mg  8 mg IntraVENous ONCE    prochlorperazine (COMPAZINE) with saline injection 10 mg  10 mg IntraVENous Q6H PRN      Allergies   Allergen Reactions    Amoxicillin Hives        Review of Systems: A comprehensive review of systems was performed and all systems were negative except for HPI and for the symptom review form, reviewed and scanned in. Physical Exam:     Visit Vitals  /73   Pulse 62   Temp 98 °F (36.7 °C) (Temporal)   Resp 18   Ht 5' 7\" (1.702 m)   Wt 190 lb 3.2 oz (86.3 kg)   SpO2 99%   BMI 29.79 kg/m²     ECOG PS: 0  General: No distress  Eyes: PERRLA, anicteric sclerae  HENT: Atraumatic, OP clear  Neck: Supple  Lymphatic: No cervical, supraclavicular, or inguinal adenopathy  Respiratory: CTAB, normal respiratory effort  CV: Normal rate, regular rhythm, no murmurs, no peripheral edema  GI: Soft, nontender, nondistended, no masses, no hepatomegaly, no splenomegaly  MS: Normal gait and station. Digits without clubbing or cyanosis. Skin: No rashes, ecchymoses, or petechiae. Normal temperature, turgor, and texture. Psych: Alert, oriented, appropriate affect, normal judgment/insight        Results:     Lab Results   Component Value Date/Time    WBC 36.6 (H) 12/19/2018 10:11 AM    HGB 9.6 (L) 12/19/2018 10:11 AM    HCT 29.5 (L) 12/19/2018 10:11 AM    PLATELET 361 35/29/4076 10:11 AM    MCV 93.9 12/19/2018 10:11 AM    ABS. NEUTROPHILS 25.3 (H) 12/19/2018 10:11 AM     Lab Results   Component Value Date/Time    Sodium 139 12/05/2018 11:12 AM    Potassium 3.8 12/05/2018 11:12 AM    Chloride 105 12/05/2018 11:12 AM    CO2 25 12/05/2018 11:12 AM    Glucose 179 (H) 12/05/2018 11:12 AM    BUN 15 12/05/2018 11:12 AM    Creatinine 0.76 12/05/2018 11:12 AM    GFR est AA >60 12/05/2018 11:12 AM    GFR est non-AA >60 12/05/2018 11:12 AM    Calcium 8.6 12/05/2018 11:12 AM    Glucose (POC) 316 (H) 10/04/2018 03:45 PM     Lab Results   Component Value Date/Time    Bilirubin, total 0.4 12/05/2018 11:12 AM    ALT (SGPT) 83 (H) 12/05/2018 11:12 AM    AST (SGOT) 33 12/05/2018 11:12 AM    Alk.  phosphatase 252 (H) 12/05/2018 11:12 AM    Protein, total 6.1 (L) 12/05/2018 11:12 AM    Albumin 2.8 (L) 12/05/2018 11:12 AM    Globulin 3.3 12/05/2018 11:12 AM     10/1/18 CT abd  There is a 3.8 x 1.9 cm mass involving the uncinate process and possibly  invading the duodenum. Findings are nonspecific but typical of pancreatic  carcinoma. There is no biliary or pancreatic ductal dilatation.     There is a poorly defined irregular hypodensity in segment IVb of the liver  measuring 3.7 x 1.9 cm. There appears to be focal biliary dilatation in the left  lobe behind this abnormality. Metastatic disease is suspected. There is a small,  1 cm hypodensity in the dome of the liver, likely segment 8. There is a 1 cm  hypodensity in segment 4 of the liver as well, also likely metastasis. Small  hypodensity measuring less than 1 cm in segment 6 at the tip laterally is also  nonspecific but probable metastasis.     Spleen kidneys and adrenals are unremarkable.     No free fluid or focal fluid collection.     Lung bases are clear.     Bone windows are unremarkable.     IMPRESSION  IMPRESSION:  1. 3.8 x 1.9 cm mass involving the uncinate process of the pancreas and possibly  invading the duodenum, this likely represents pancreatic carcinoma. 2. Likely metastatic disease in the liver. There is a 3.8 x 1.9 cm mass involving the uncinate process and possibly  invading the duodenum. Findings are nonspecific but typical of pancreatic  carcinoma. There is no biliary or pancreatic ductal dilatation.     There is a poorly defined irregular hypodensity in segment IVb of the liver  measuring 3.7 x 1.9 cm. There appears to be focal biliary dilatation in the left  lobe behind this abnormality. Metastatic disease is suspected. There is a small,  1 cm hypodensity in the dome of the liver, likely segment 8. There is a 1 cm  hypodensity in segment 4 of the liver as well, also likely metastasis.  Small  hypodensity measuring less than 1 cm in segment 6 at the tip laterally is also  nonspecific but probable metastasis.     Spleen kidneys and adrenals are unremarkable.     No free fluid or focal fluid collection.     Lung bases are clear.     Bone windows are unremarkable.     IMPRESSION  IMPRESSION:  1. 3.8 x 1.9 cm mass involving the uncinate process of the pancreas and possibly  invading the duodenum, this likely represents pancreatic carcinoma. 2. Likely metastatic disease in the liver. Records reviewed and summarized above. Pathology report(s) reviewed above. Radiology report(s) reviewed above. MRI abdomen 10/22/18: IMPRESSION:       1. Pancreatic uncinate process mass suspicious for pancreatic neoplasm, possibly  adenocarcinoma. Mass abuts the superior mesenteric artery with about 20%  circumference involvement but no luminal distortion or perivascular stranding. 2. Multiple hepatic lesions suspicious for metastatic neoplasm with segment IVb  lesion showing patchy areas of surrounding steatosis likely secondary to locally  altered intrahepatic hemodynamics and likely responsible for biopsy result. 3. Cholecystolithiasis and small gallbladder polyp.     Assessment/plan:   1. Uncinate pancreatic adenocarcinoma,  mets to liver, stage IV:  cT2 cN0 pM1    Discussed that while this is treatable, it is not curable, the goal of therapy is palliative. Discussed that without therapy, life expectancy would be 3-6 months, with therapy 12-18 months on average. As an example of likely chemotherapy, we discussed the risks and benefits of Gemcitabine and Abraxane chemotherapy. Potential side effects include, but are not limited to, nausea, vomiting, diarrhea, constipation, mucositis, taste changes, myelosuppression, infection, fatigue, alopecia, skin and nail changes, pulmonary toxicity, neuropathy, allergic reactions, infertility, and rarely, death. Discussed the [id number] study and the research nurse met with her today and she signed informed consent. · Gemcitabine (1000 mg/m2) and Abraxane (125 mg/m2) given on days 1,8,15 every 28 days. · Labs: CBC, BMP prior to each treatment. Hepatic function panel every 4 weeks.  CA 19.9 prior to day 1  · Antiemetic prophylaxis: Dexamethasone prior to each treatment  · PRN antiemetics: Ondansetron and Prochlorperazine at home  · EMLA cream for port    On the control arm, C2d15 today, will see her back in 2 weeks for C3D1. Scans ordered, due before next treatment. 2. DM2:  Holding metformin; on insulin; saw Dr. Indra Monge    3. Emotional well being:  She has excellent support and is coping well with her disease    4. Abdominal pain:  Resolved;  palliative care has seen    5. Nutrition:  Lanie Mayfield RD has met with her; holding on enzymes    6. Insomnia: Has improved. She has lunesta if needed; she may also try melatonin    7. Anemia:  Due to chemo and disease; monitor    8. Thrombocytopenia:  resolved    9. Neutropenia:  Due to chemo, started granix 480 mcg for 4 days following chemo with C1D15. Does report some pain with granix. Tried claritin, however this kept her awake. Recommend trying tylenol and advil PRN. Will change to 3 days following chemo      Patient was seen in conjunction with Griselda Bergeron NP.       Signed By: Shona Fischer MD

## 2018-12-19 NOTE — PROGRESS NOTES
[facility] - Chemotherapy Progress Note    3288 Pt admit to [facility] for Clinical Trial:  Abraxane/Gemzar C2D15 ambulatory in stable condition. Assessment completed. No new concerns voiced. Port accessed with positive blood return. Labs drawn per order and sent. Line flushed, clamped, Curos Cap applied to end clave. Pt to MD for appointment  Labs reviewed, chemo ordered    Visit Vitals  /73 (BP 1 Location: Right arm, BP Patient Position: Sitting)   Pulse 62   Temp 98 °F (36.7 °C)   Resp 18   Ht 5' 7\" (1.702 m)   Wt 86.3 kg (190 lb 3.2 oz)   Breastfeeding? No   BMI 29.79 kg/m²       Medications:  NS @ KVO  Decadron  Abraxane  Gemzar    1510 Pt tolerated treatment well. Port maintained positive blood return throughout treatment, flushed with positive blood return at conclusion and heparinized and de-accessed. D/c home ambulatory in no distress. Pt aware of next [facility] appointment scheduled for 1/2/19. Recent Results (from the past 12 hour(s))   CBC WITH AUTOMATED DIFF    Collection Time: 12/19/18 10:11 AM   Result Value Ref Range    WBC 36.6 (H) 3.6 - 11.0 K/uL    RBC 3.14 (L) 3.80 - 5.20 M/uL    HGB 9.6 (L) 11.5 - 16.0 g/dL    HCT 29.5 (L) 35.0 - 47.0 %    MCV 93.9 80.0 - 99.0 FL    MCH 30.6 26.0 - 34.0 PG    MCHC 32.5 30.0 - 36.5 g/dL    RDW 16.5 (H) 11.5 - 14.5 %    PLATELET 367 933 - 149 K/uL    MPV 9.3 8.9 - 12.9 FL    NRBC 0.1 (H) 0  WBC    ABSOLUTE NRBC 0.05 (H) 0.00 - 0.01 K/uL    NEUTROPHILS 65 32 - 75 %    BAND NEUTROPHILS 4 0 - 6 %    LYMPHOCYTES 11 (L) 12 - 49 %    MONOCYTES 7 5 - 13 %    EOSINOPHILS 0 0 - 7 %    BASOPHILS 0 0 - 1 %    METAMYELOCYTES 8 (H) 0 %    MYELOCYTES 3 (H) 0 %    PROMYELOCYTES 2 (H) 0 %    IMMATURE GRANULOCYTES 0 %    ABS. NEUTROPHILS 25.3 (H) 1.8 - 8.0 K/UL    ABS. LYMPHOCYTES 4.0 (H) 0.8 - 3.5 K/UL    ABS. MONOCYTES 2.6 (H) 0.0 - 1.0 K/UL    ABS. EOSINOPHILS 0.0 0.0 - 0.4 K/UL    ABS. BASOPHILS 0.0 0.0 - 0.1 K/UL    ABS. IMM.  GRANS. 0.0 K/UL    DF MANUAL      RBC COMMENTS ANISOCYTOSIS  1+        RBC COMMENTS POLYCHROMASIA  PRESENT       LD    Collection Time: 12/19/18 10:11 AM   Result Value Ref Range     (H) 81 - 246 U/L

## 2018-12-19 NOTE — PROGRESS NOTES
Pt in for labs and follow up visit today per protocol with Dr. Toney Esparza and RN. This is C2D15 of treatment. Patient reports the following adverse events at this time: gr 1 constipation, gr 1 fatigue, gr 1 hair loss, gr 1 insomnia, gr 1 neuropathy. .  Vital signs are stable. Patient to go to infusion center after appointment to receive gemcitabine/abraxane. Next appt is scheduled for 1/2/19 w/ scans.

## 2018-12-27 RX ORDER — DEXAMETHASONE SODIUM PHOSPHATE 4 MG/ML
8 INJECTION, SOLUTION INTRA-ARTICULAR; INTRALESIONAL; INTRAMUSCULAR; INTRAVENOUS; SOFT TISSUE ONCE
Status: COMPLETED | OUTPATIENT
Start: 2019-01-02 | End: 2019-01-02

## 2018-12-28 ENCOUNTER — HOSPITAL ENCOUNTER (OUTPATIENT)
Dept: CT IMAGING | Age: 73
Discharge: HOME | End: 2018-12-28
Attending: INTERNAL MEDICINE
Payer: MEDICARE

## 2018-12-28 DIAGNOSIS — C25.7 MALIGNANT NEOPLASM OF OTHER PARTS OF PANCREAS (HCC): ICD-10-CM

## 2018-12-28 PROCEDURE — 74011636320 HC RX REV CODE- 636/320: Performed by: RADIOLOGY

## 2018-12-28 PROCEDURE — 74177 CT ABD & PELVIS W/CONTRAST: CPT

## 2018-12-28 RX ADMIN — IOPAMIDOL 100 ML: 755 INJECTION, SOLUTION INTRAVENOUS at 09:07

## 2019-01-01 ENCOUNTER — TELEPHONE (OUTPATIENT)
Dept: ONCOLOGY | Age: 74
End: 2019-01-01

## 2019-01-01 ENCOUNTER — APPOINTMENT (OUTPATIENT)
Dept: INFUSION THERAPY | Age: 74
End: 2019-01-01

## 2019-01-01 ENCOUNTER — HOSPITAL ENCOUNTER (OUTPATIENT)
Dept: CT IMAGING | Age: 74
Discharge: HOME | End: 2019-06-17
Attending: INTERNAL MEDICINE
Payer: MEDICARE

## 2019-01-01 ENCOUNTER — APPOINTMENT (OUTPATIENT)
Dept: INFUSION THERAPY | Age: 74
End: 2019-01-01
Payer: MEDICARE

## 2019-01-01 ENCOUNTER — OFFICE VISIT (OUTPATIENT)
Dept: ONCOLOGY | Age: 74
End: 2019-01-01

## 2019-01-01 ENCOUNTER — RESEARCH ENCOUNTER (OUTPATIENT)
Dept: ONCOLOGY | Age: 74
End: 2019-01-01

## 2019-01-01 ENCOUNTER — HOSPITAL ENCOUNTER (OUTPATIENT)
Dept: INFUSION THERAPY | Age: 74
Discharge: HOME | End: 2019-12-04
Payer: MEDICARE

## 2019-01-01 ENCOUNTER — HOSPITAL ENCOUNTER (OUTPATIENT)
Dept: INFUSION THERAPY | Age: 74
Discharge: HOME | End: 2019-09-11
Payer: MEDICARE

## 2019-01-01 ENCOUNTER — HOSPITAL ENCOUNTER (OUTPATIENT)
Dept: INFUSION THERAPY | Age: 74
Discharge: HOME | End: 2019-06-19
Payer: MEDICARE

## 2019-01-01 ENCOUNTER — OFFICE VISIT (OUTPATIENT)
Dept: ENDOCRINOLOGY | Age: 74
End: 2019-01-01

## 2019-01-01 ENCOUNTER — HOSPITAL ENCOUNTER (OUTPATIENT)
Dept: INFUSION THERAPY | Age: 74
Discharge: HOME | End: 2019-06-05
Payer: MEDICARE

## 2019-01-01 ENCOUNTER — HOSPITAL ENCOUNTER (OUTPATIENT)
Dept: INFUSION THERAPY | Age: 74
Discharge: HOME | End: 2019-08-28
Payer: MEDICARE

## 2019-01-01 ENCOUNTER — TELEPHONE (OUTPATIENT)
Dept: PALLATIVE CARE | Age: 74
End: 2019-01-01

## 2019-01-01 ENCOUNTER — HOSPITAL ENCOUNTER (OUTPATIENT)
Dept: INFUSION THERAPY | Age: 74
Discharge: HOME | End: 2019-04-03
Payer: MEDICARE

## 2019-01-01 ENCOUNTER — HOSPITAL ENCOUNTER (OUTPATIENT)
Dept: INFUSION THERAPY | Age: 74
Discharge: HOME | End: 2019-11-20
Payer: MEDICARE

## 2019-01-01 ENCOUNTER — HOSPITAL ENCOUNTER (OUTPATIENT)
Dept: CT IMAGING | Age: 74
Discharge: HOME | End: 2019-08-12
Attending: INTERNAL MEDICINE
Payer: MEDICARE

## 2019-01-01 ENCOUNTER — OFFICE VISIT (OUTPATIENT)
Dept: PALLATIVE CARE | Age: 74
End: 2019-01-01

## 2019-01-01 ENCOUNTER — HOSPITAL ENCOUNTER (OUTPATIENT)
Dept: INFUSION THERAPY | Age: 74
Discharge: HOME | End: 2019-04-24
Payer: MEDICARE

## 2019-01-01 ENCOUNTER — HOSPITAL ENCOUNTER (OUTPATIENT)
Dept: INFUSION THERAPY | Age: 74
Discharge: HOME | End: 2019-12-02
Payer: MEDICARE

## 2019-01-01 ENCOUNTER — HOSPITAL ENCOUNTER (OUTPATIENT)
Dept: INFUSION THERAPY | Age: 74
Discharge: HOME | End: 2019-07-24
Payer: MEDICARE

## 2019-01-01 ENCOUNTER — HOSPITAL ENCOUNTER (OUTPATIENT)
Dept: INFUSION THERAPY | Age: 74
Discharge: HOME | End: 2019-12-27
Payer: MEDICARE

## 2019-01-01 ENCOUNTER — HOSPITAL ENCOUNTER (OUTPATIENT)
Dept: CT IMAGING | Age: 74
Discharge: HOME | End: 2019-10-07
Attending: INTERNAL MEDICINE
Payer: MEDICARE

## 2019-01-01 ENCOUNTER — HOSPITAL ENCOUNTER (OUTPATIENT)
Dept: INFUSION THERAPY | Age: 74
Discharge: HOME | End: 2019-09-25
Payer: MEDICARE

## 2019-01-01 ENCOUNTER — HOSPITAL ENCOUNTER (OUTPATIENT)
Dept: INFUSION THERAPY | Age: 74
Discharge: HOME | End: 2019-05-22
Payer: MEDICARE

## 2019-01-01 ENCOUNTER — HOSPITAL ENCOUNTER (OUTPATIENT)
Dept: INFUSION THERAPY | Age: 74
Discharge: HOME | End: 2019-11-22
Payer: MEDICARE

## 2019-01-01 ENCOUNTER — HOSPITAL ENCOUNTER (OUTPATIENT)
Dept: INFUSION THERAPY | Age: 74
Discharge: HOME | End: 2019-10-09
Payer: MEDICARE

## 2019-01-01 ENCOUNTER — HOSPITAL ENCOUNTER (OUTPATIENT)
Dept: INFUSION THERAPY | Age: 74
Discharge: HOME | End: 2019-11-01
Payer: MEDICARE

## 2019-01-01 ENCOUNTER — HOSPITAL ENCOUNTER (OUTPATIENT)
Dept: INFUSION THERAPY | Age: 74
Discharge: HOME | End: 2019-05-01
Payer: MEDICARE

## 2019-01-01 ENCOUNTER — HOSPITAL ENCOUNTER (OUTPATIENT)
Dept: CT IMAGING | Age: 74
Discharge: HOME | End: 2019-04-22
Attending: INTERNAL MEDICINE
Payer: MEDICARE

## 2019-01-01 ENCOUNTER — HOSPITAL ENCOUNTER (OUTPATIENT)
Dept: INTERVENTIONAL RADIOLOGY/VASCULAR | Age: 74
Discharge: HOME | End: 2019-06-19
Attending: NURSE PRACTITIONER | Admitting: RADIOLOGY
Payer: MEDICARE

## 2019-01-01 ENCOUNTER — DOCUMENTATION ONLY (OUTPATIENT)
Dept: ONCOLOGY | Age: 74
End: 2019-01-01

## 2019-01-01 ENCOUNTER — HOSPITAL ENCOUNTER (OUTPATIENT)
Dept: INFUSION THERAPY | Age: 74
Discharge: HOME | End: 2019-11-07
Payer: MEDICARE

## 2019-01-01 ENCOUNTER — APPOINTMENT (OUTPATIENT)
Dept: GENERAL RADIOLOGY | Age: 74
End: 2019-01-01
Attending: RADIOLOGY
Payer: MEDICARE

## 2019-01-01 ENCOUNTER — HOSPITAL ENCOUNTER (OUTPATIENT)
Dept: INFUSION THERAPY | Age: 74
Discharge: HOME | End: 2019-11-13
Payer: MEDICARE

## 2019-01-01 ENCOUNTER — HOSPITAL ENCOUNTER (INPATIENT)
Age: 74
LOS: 1 days | Discharge: HOME | DRG: 445 | End: 2019-12-28
Attending: EMERGENCY MEDICINE | Admitting: INTERNAL MEDICINE
Payer: MEDICARE

## 2019-01-01 ENCOUNTER — HOSPITAL ENCOUNTER (OUTPATIENT)
Dept: INFUSION THERAPY | Age: 74
Discharge: HOME | End: 2019-07-31
Payer: MEDICARE

## 2019-01-01 ENCOUNTER — HOSPITAL ENCOUNTER (OUTPATIENT)
Dept: INFUSION THERAPY | Age: 74
Discharge: HOME | End: 2019-08-14
Payer: MEDICARE

## 2019-01-01 ENCOUNTER — HOSPITAL ENCOUNTER (OUTPATIENT)
Dept: INFUSION THERAPY | Age: 74
Discharge: HOME | End: 2019-11-15
Payer: MEDICARE

## 2019-01-01 ENCOUNTER — PATIENT MESSAGE (OUTPATIENT)
Dept: ONCOLOGY | Age: 74
End: 2019-01-01

## 2019-01-01 ENCOUNTER — HOSPITAL ENCOUNTER (OUTPATIENT)
Dept: INFUSION THERAPY | Age: 74
Discharge: HOME | End: 2019-04-10
Payer: MEDICARE

## 2019-01-01 ENCOUNTER — HOSPITAL ENCOUNTER (OUTPATIENT)
Dept: INFUSION THERAPY | Age: 74
Discharge: HOME | End: 2019-10-18
Payer: MEDICARE

## 2019-01-01 ENCOUNTER — DOCUMENTATION ONLY (OUTPATIENT)
Dept: PALLATIVE CARE | Age: 74
End: 2019-01-01

## 2019-01-01 ENCOUNTER — HOSPITAL ENCOUNTER (OUTPATIENT)
Dept: INFUSION THERAPY | Age: 74
Discharge: HOME | End: 2019-05-29
Payer: MEDICARE

## 2019-01-01 ENCOUNTER — APPOINTMENT (OUTPATIENT)
Dept: MRI IMAGING | Age: 74
DRG: 445 | End: 2019-01-01
Attending: INTERNAL MEDICINE
Payer: MEDICARE

## 2019-01-01 ENCOUNTER — HOSPITAL ENCOUNTER (OUTPATIENT)
Dept: INFUSION THERAPY | Age: 74
Discharge: HOME | End: 2019-10-16
Payer: MEDICARE

## 2019-01-01 ENCOUNTER — HOSPITAL ENCOUNTER (OUTPATIENT)
Dept: INFUSION THERAPY | Age: 74
Discharge: HOME | End: 2019-11-18
Payer: MEDICARE

## 2019-01-01 ENCOUNTER — HOSPITAL ENCOUNTER (OUTPATIENT)
Dept: INTERVENTIONAL RADIOLOGY/VASCULAR | Age: 74
Discharge: HOME | End: 2019-04-15
Attending: INTERNAL MEDICINE | Admitting: INTERNAL MEDICINE
Payer: MEDICARE

## 2019-01-01 ENCOUNTER — HOSPITAL ENCOUNTER (OUTPATIENT)
Dept: INFUSION THERAPY | Age: 74
Discharge: HOME | End: 2019-11-26
Payer: MEDICARE

## 2019-01-01 ENCOUNTER — HOSPITAL ENCOUNTER (OUTPATIENT)
Dept: INFUSION THERAPY | Age: 74
Discharge: HOME | End: 2019-05-08
Payer: MEDICARE

## 2019-01-01 ENCOUNTER — HOSPITAL ENCOUNTER (OUTPATIENT)
Dept: INFUSION THERAPY | Age: 74
Discharge: HOME | End: 2019-10-30
Payer: MEDICARE

## 2019-01-01 ENCOUNTER — HOSPITAL ENCOUNTER (OUTPATIENT)
Dept: INFUSION THERAPY | Age: 74
Discharge: HOME | End: 2019-03-27
Payer: MEDICARE

## 2019-01-01 ENCOUNTER — HOSPITAL ENCOUNTER (OUTPATIENT)
Dept: INFUSION THERAPY | Age: 74
Discharge: HOME | End: 2019-07-03
Payer: MEDICARE

## 2019-01-01 ENCOUNTER — HOSPITAL ENCOUNTER (OUTPATIENT)
Dept: INFUSION THERAPY | Age: 74
Discharge: HOME | End: 2019-07-17
Payer: MEDICARE

## 2019-01-01 ENCOUNTER — HOSPITAL ENCOUNTER (OUTPATIENT)
Dept: VASCULAR SURGERY | Age: 74
Discharge: HOME | End: 2019-08-15
Attending: NURSE PRACTITIONER
Payer: MEDICARE

## 2019-01-01 ENCOUNTER — HOSPITAL ENCOUNTER (OUTPATIENT)
Dept: INFUSION THERAPY | Age: 74
Discharge: HOME | End: 2019-09-18
Payer: MEDICARE

## 2019-01-01 ENCOUNTER — HOSPITAL ENCOUNTER (OUTPATIENT)
Dept: INFUSION THERAPY | Age: 74
Discharge: HOME | End: 2019-06-26
Payer: MEDICARE

## 2019-01-01 ENCOUNTER — APPOINTMENT (OUTPATIENT)
Dept: CT IMAGING | Age: 74
DRG: 445 | End: 2019-01-01
Attending: PHYSICIAN ASSISTANT
Payer: MEDICARE

## 2019-01-01 ENCOUNTER — HOSPITAL ENCOUNTER (OUTPATIENT)
Dept: NON INVASIVE DIAGNOSTICS | Age: 74
Discharge: HOME | End: 2019-10-23
Payer: MEDICARE

## 2019-01-01 ENCOUNTER — HOSPITAL ENCOUNTER (OUTPATIENT)
Dept: INFUSION THERAPY | Age: 74
Discharge: HOME | End: 2019-08-21
Payer: MEDICARE

## 2019-01-01 VITALS
HEART RATE: 73 BPM | DIASTOLIC BLOOD PRESSURE: 81 MMHG | WEIGHT: 155 LBS | BODY MASS INDEX: 24.33 KG/M2 | TEMPERATURE: 98.5 F | HEIGHT: 67 IN | RESPIRATION RATE: 18 BRPM | OXYGEN SATURATION: 99 % | SYSTOLIC BLOOD PRESSURE: 195 MMHG

## 2019-01-01 VITALS
BODY MASS INDEX: 29.07 KG/M2 | SYSTOLIC BLOOD PRESSURE: 168 MMHG | HEIGHT: 67 IN | RESPIRATION RATE: 16 BRPM | HEART RATE: 70 BPM | TEMPERATURE: 97.7 F | OXYGEN SATURATION: 100 % | WEIGHT: 185.2 LBS | DIASTOLIC BLOOD PRESSURE: 69 MMHG

## 2019-01-01 VITALS
RESPIRATION RATE: 18 BRPM | BODY MASS INDEX: 30.34 KG/M2 | HEIGHT: 67 IN | HEART RATE: 73 BPM | OXYGEN SATURATION: 97 % | TEMPERATURE: 98.4 F | SYSTOLIC BLOOD PRESSURE: 130 MMHG | DIASTOLIC BLOOD PRESSURE: 73 MMHG | WEIGHT: 193.3 LBS

## 2019-01-01 VITALS
HEIGHT: 67 IN | WEIGHT: 185.3 LBS | HEART RATE: 71 BPM | HEIGHT: 67 IN | OXYGEN SATURATION: 100 % | BODY MASS INDEX: 28.97 KG/M2 | SYSTOLIC BLOOD PRESSURE: 109 MMHG | DIASTOLIC BLOOD PRESSURE: 59 MMHG | SYSTOLIC BLOOD PRESSURE: 129 MMHG | RESPIRATION RATE: 18 BRPM | DIASTOLIC BLOOD PRESSURE: 62 MMHG | HEART RATE: 86 BPM | RESPIRATION RATE: 14 BRPM | OXYGEN SATURATION: 97 % | WEIGHT: 184.6 LBS | TEMPERATURE: 96.1 F | BODY MASS INDEX: 29.08 KG/M2 | TEMPERATURE: 97.6 F

## 2019-01-01 VITALS
HEART RATE: 74 BPM | WEIGHT: 182.7 LBS | RESPIRATION RATE: 18 BRPM | TEMPERATURE: 98 F | BODY MASS INDEX: 28.67 KG/M2 | OXYGEN SATURATION: 99 % | DIASTOLIC BLOOD PRESSURE: 62 MMHG | HEIGHT: 67 IN | SYSTOLIC BLOOD PRESSURE: 122 MMHG

## 2019-01-01 VITALS
OXYGEN SATURATION: 99 % | TEMPERATURE: 98.9 F | WEIGHT: 177.4 LBS | HEART RATE: 80 BPM | TEMPERATURE: 97.4 F | SYSTOLIC BLOOD PRESSURE: 167 MMHG | RESPIRATION RATE: 18 BRPM | HEIGHT: 67 IN | RESPIRATION RATE: 18 BRPM | OXYGEN SATURATION: 98 % | HEIGHT: 67 IN | DIASTOLIC BLOOD PRESSURE: 83 MMHG | DIASTOLIC BLOOD PRESSURE: 77 MMHG | WEIGHT: 165 LBS | SYSTOLIC BLOOD PRESSURE: 176 MMHG | HEART RATE: 71 BPM | BODY MASS INDEX: 25.9 KG/M2 | BODY MASS INDEX: 27.84 KG/M2

## 2019-01-01 VITALS
OXYGEN SATURATION: 97 % | HEIGHT: 67 IN | RESPIRATION RATE: 14 BRPM | DIASTOLIC BLOOD PRESSURE: 59 MMHG | SYSTOLIC BLOOD PRESSURE: 116 MMHG | TEMPERATURE: 97.5 F | HEART RATE: 83 BPM | BODY MASS INDEX: 27.54 KG/M2 | WEIGHT: 175.49 LBS

## 2019-01-01 VITALS
TEMPERATURE: 97.4 F | HEIGHT: 67 IN | BODY MASS INDEX: 25.9 KG/M2 | HEART RATE: 76 BPM | RESPIRATION RATE: 18 BRPM | WEIGHT: 165 LBS | DIASTOLIC BLOOD PRESSURE: 65 MMHG | OXYGEN SATURATION: 99 % | SYSTOLIC BLOOD PRESSURE: 137 MMHG

## 2019-01-01 VITALS
BODY MASS INDEX: 24.71 KG/M2 | TEMPERATURE: 98.1 F | WEIGHT: 157.8 LBS | OXYGEN SATURATION: 98 % | SYSTOLIC BLOOD PRESSURE: 139 MMHG | HEART RATE: 77 BPM | RESPIRATION RATE: 16 BRPM | DIASTOLIC BLOOD PRESSURE: 72 MMHG

## 2019-01-01 VITALS
HEART RATE: 73 BPM | DIASTOLIC BLOOD PRESSURE: 81 MMHG | BODY MASS INDEX: 28.96 KG/M2 | WEIGHT: 184.5 LBS | SYSTOLIC BLOOD PRESSURE: 169 MMHG | HEIGHT: 67 IN | TEMPERATURE: 98.1 F | RESPIRATION RATE: 18 BRPM

## 2019-01-01 VITALS
OXYGEN SATURATION: 99 % | TEMPERATURE: 98.1 F | RESPIRATION RATE: 18 BRPM | HEIGHT: 67 IN | WEIGHT: 185 LBS | BODY MASS INDEX: 29.03 KG/M2 | HEART RATE: 84 BPM | SYSTOLIC BLOOD PRESSURE: 115 MMHG | DIASTOLIC BLOOD PRESSURE: 59 MMHG

## 2019-01-01 VITALS
WEIGHT: 142.7 LBS | BODY MASS INDEX: 22.35 KG/M2 | HEART RATE: 78 BPM | SYSTOLIC BLOOD PRESSURE: 112 MMHG | TEMPERATURE: 98.3 F | DIASTOLIC BLOOD PRESSURE: 57 MMHG | RESPIRATION RATE: 18 BRPM | OXYGEN SATURATION: 96 %

## 2019-01-01 VITALS
HEIGHT: 67 IN | HEART RATE: 70 BPM | SYSTOLIC BLOOD PRESSURE: 161 MMHG | RESPIRATION RATE: 18 BRPM | TEMPERATURE: 96.8 F | DIASTOLIC BLOOD PRESSURE: 68 MMHG | WEIGHT: 177.2 LBS | WEIGHT: 184.7 LBS | TEMPERATURE: 97.9 F | BODY MASS INDEX: 27.81 KG/M2 | OXYGEN SATURATION: 100 % | HEIGHT: 67 IN | DIASTOLIC BLOOD PRESSURE: 73 MMHG | BODY MASS INDEX: 28.99 KG/M2 | HEART RATE: 76 BPM | SYSTOLIC BLOOD PRESSURE: 151 MMHG | RESPIRATION RATE: 18 BRPM | OXYGEN SATURATION: 97 %

## 2019-01-01 VITALS
OXYGEN SATURATION: 100 % | DIASTOLIC BLOOD PRESSURE: 68 MMHG | HEART RATE: 70 BPM | HEIGHT: 67 IN | RESPIRATION RATE: 18 BRPM | TEMPERATURE: 96.1 F | BODY MASS INDEX: 28.97 KG/M2 | WEIGHT: 184.6 LBS | SYSTOLIC BLOOD PRESSURE: 147 MMHG

## 2019-01-01 VITALS
RESPIRATION RATE: 18 BRPM | SYSTOLIC BLOOD PRESSURE: 160 MMHG | WEIGHT: 185 LBS | TEMPERATURE: 97.8 F | BODY MASS INDEX: 29.03 KG/M2 | HEART RATE: 72 BPM | HEIGHT: 67 IN | DIASTOLIC BLOOD PRESSURE: 76 MMHG

## 2019-01-01 VITALS
HEART RATE: 74 BPM | DIASTOLIC BLOOD PRESSURE: 62 MMHG | HEIGHT: 67 IN | OXYGEN SATURATION: 99 % | RESPIRATION RATE: 18 BRPM | WEIGHT: 182.7 LBS | BODY MASS INDEX: 28.67 KG/M2 | TEMPERATURE: 98 F | SYSTOLIC BLOOD PRESSURE: 122 MMHG

## 2019-01-01 VITALS
SYSTOLIC BLOOD PRESSURE: 160 MMHG | RESPIRATION RATE: 18 BRPM | HEART RATE: 73 BPM | HEIGHT: 67 IN | TEMPERATURE: 98.6 F | WEIGHT: 185.2 LBS | BODY MASS INDEX: 29.07 KG/M2 | DIASTOLIC BLOOD PRESSURE: 82 MMHG | OXYGEN SATURATION: 98 %

## 2019-01-01 VITALS
TEMPERATURE: 97.7 F | SYSTOLIC BLOOD PRESSURE: 132 MMHG | OXYGEN SATURATION: 98 % | RESPIRATION RATE: 18 BRPM | BODY MASS INDEX: 24.11 KG/M2 | HEIGHT: 67 IN | HEART RATE: 87 BPM | DIASTOLIC BLOOD PRESSURE: 78 MMHG | WEIGHT: 153.6 LBS

## 2019-01-01 VITALS
HEIGHT: 67 IN | HEART RATE: 63 BPM | SYSTOLIC BLOOD PRESSURE: 164 MMHG | OXYGEN SATURATION: 100 % | WEIGHT: 158.2 LBS | DIASTOLIC BLOOD PRESSURE: 67 MMHG | RESPIRATION RATE: 18 BRPM | TEMPERATURE: 97.1 F | BODY MASS INDEX: 24.83 KG/M2

## 2019-01-01 VITALS
TEMPERATURE: 97.9 F | OXYGEN SATURATION: 97 % | BODY MASS INDEX: 30.34 KG/M2 | WEIGHT: 193.3 LBS | SYSTOLIC BLOOD PRESSURE: 161 MMHG | HEIGHT: 67 IN | DIASTOLIC BLOOD PRESSURE: 80 MMHG | HEART RATE: 74 BPM | RESPIRATION RATE: 18 BRPM

## 2019-01-01 VITALS
DIASTOLIC BLOOD PRESSURE: 80 MMHG | SYSTOLIC BLOOD PRESSURE: 161 MMHG | HEIGHT: 67 IN | HEART RATE: 75 BPM | BODY MASS INDEX: 29.34 KG/M2 | TEMPERATURE: 97.6 F | WEIGHT: 186.9 LBS | OXYGEN SATURATION: 100 % | RESPIRATION RATE: 18 BRPM

## 2019-01-01 VITALS
OXYGEN SATURATION: 99 % | SYSTOLIC BLOOD PRESSURE: 170 MMHG | DIASTOLIC BLOOD PRESSURE: 73 MMHG | WEIGHT: 184.7 LBS | RESPIRATION RATE: 18 BRPM | HEIGHT: 67 IN | TEMPERATURE: 96.8 F | HEART RATE: 66 BPM | BODY MASS INDEX: 28.99 KG/M2

## 2019-01-01 VITALS
SYSTOLIC BLOOD PRESSURE: 149 MMHG | RESPIRATION RATE: 18 BRPM | HEART RATE: 68 BPM | HEIGHT: 67 IN | BODY MASS INDEX: 28.79 KG/M2 | OXYGEN SATURATION: 99 % | TEMPERATURE: 97.8 F | DIASTOLIC BLOOD PRESSURE: 70 MMHG | WEIGHT: 183.4 LBS

## 2019-01-01 VITALS
DIASTOLIC BLOOD PRESSURE: 75 MMHG | TEMPERATURE: 98.4 F | SYSTOLIC BLOOD PRESSURE: 142 MMHG | HEART RATE: 82 BPM | RESPIRATION RATE: 18 BRPM

## 2019-01-01 VITALS
TEMPERATURE: 97.4 F | SYSTOLIC BLOOD PRESSURE: 141 MMHG | RESPIRATION RATE: 16 BRPM | BODY MASS INDEX: 24.03 KG/M2 | DIASTOLIC BLOOD PRESSURE: 67 MMHG | HEART RATE: 67 BPM | WEIGHT: 153.4 LBS

## 2019-01-01 VITALS
SYSTOLIC BLOOD PRESSURE: 158 MMHG | BODY MASS INDEX: 28.49 KG/M2 | DIASTOLIC BLOOD PRESSURE: 75 MMHG | TEMPERATURE: 96.6 F | RESPIRATION RATE: 18 BRPM | OXYGEN SATURATION: 100 % | HEIGHT: 67 IN | HEART RATE: 74 BPM | WEIGHT: 181.5 LBS

## 2019-01-01 VITALS
RESPIRATION RATE: 18 BRPM | HEART RATE: 71 BPM | OXYGEN SATURATION: 97 % | TEMPERATURE: 97.6 F | HEIGHT: 67 IN | WEIGHT: 182 LBS | BODY MASS INDEX: 28.56 KG/M2 | SYSTOLIC BLOOD PRESSURE: 151 MMHG | DIASTOLIC BLOOD PRESSURE: 75 MMHG

## 2019-01-01 VITALS
WEIGHT: 177.2 LBS | HEIGHT: 67 IN | HEART RATE: 79 BPM | SYSTOLIC BLOOD PRESSURE: 146 MMHG | DIASTOLIC BLOOD PRESSURE: 75 MMHG | BODY MASS INDEX: 27.81 KG/M2

## 2019-01-01 VITALS
WEIGHT: 185.3 LBS | DIASTOLIC BLOOD PRESSURE: 64 MMHG | RESPIRATION RATE: 18 BRPM | HEIGHT: 67 IN | BODY MASS INDEX: 29.08 KG/M2 | HEART RATE: 68 BPM | TEMPERATURE: 97.9 F | SYSTOLIC BLOOD PRESSURE: 140 MMHG

## 2019-01-01 VITALS
HEART RATE: 88 BPM | HEIGHT: 67 IN | SYSTOLIC BLOOD PRESSURE: 115 MMHG | WEIGHT: 182 LBS | DIASTOLIC BLOOD PRESSURE: 55 MMHG | TEMPERATURE: 97.7 F | RESPIRATION RATE: 18 BRPM | OXYGEN SATURATION: 98 % | BODY MASS INDEX: 28.56 KG/M2

## 2019-01-01 VITALS
RESPIRATION RATE: 18 BRPM | DIASTOLIC BLOOD PRESSURE: 58 MMHG | BODY MASS INDEX: 22.4 KG/M2 | WEIGHT: 142.7 LBS | HEIGHT: 67 IN | SYSTOLIC BLOOD PRESSURE: 117 MMHG | OXYGEN SATURATION: 96 % | TEMPERATURE: 97.8 F | HEART RATE: 82 BPM

## 2019-01-01 VITALS
DIASTOLIC BLOOD PRESSURE: 68 MMHG | RESPIRATION RATE: 18 BRPM | OXYGEN SATURATION: 98 % | WEIGHT: 177.4 LBS | HEART RATE: 82 BPM | SYSTOLIC BLOOD PRESSURE: 156 MMHG | TEMPERATURE: 97.9 F | BODY MASS INDEX: 27.84 KG/M2 | HEIGHT: 67 IN

## 2019-01-01 VITALS
DIASTOLIC BLOOD PRESSURE: 79 MMHG | OXYGEN SATURATION: 76 % | RESPIRATION RATE: 18 BRPM | WEIGHT: 187.9 LBS | HEIGHT: 67 IN | TEMPERATURE: 98.2 F | BODY MASS INDEX: 29.49 KG/M2 | HEART RATE: 98 BPM | SYSTOLIC BLOOD PRESSURE: 154 MMHG

## 2019-01-01 VITALS
OXYGEN SATURATION: 100 % | HEIGHT: 67 IN | BODY MASS INDEX: 29.34 KG/M2 | HEART RATE: 85 BPM | SYSTOLIC BLOOD PRESSURE: 128 MMHG | TEMPERATURE: 96.9 F | WEIGHT: 186.9 LBS | DIASTOLIC BLOOD PRESSURE: 69 MMHG | RESPIRATION RATE: 18 BRPM

## 2019-01-01 VITALS
BODY MASS INDEX: 29.3 KG/M2 | TEMPERATURE: 97.7 F | DIASTOLIC BLOOD PRESSURE: 77 MMHG | WEIGHT: 186.7 LBS | SYSTOLIC BLOOD PRESSURE: 163 MMHG | HEART RATE: 71 BPM | HEIGHT: 67 IN | RESPIRATION RATE: 18 BRPM

## 2019-01-01 VITALS
TEMPERATURE: 97.7 F | DIASTOLIC BLOOD PRESSURE: 60 MMHG | RESPIRATION RATE: 18 BRPM | HEART RATE: 67 BPM | SYSTOLIC BLOOD PRESSURE: 128 MMHG

## 2019-01-01 VITALS
WEIGHT: 182.8 LBS | BODY MASS INDEX: 28.69 KG/M2 | HEIGHT: 67 IN | RESPIRATION RATE: 18 BRPM | SYSTOLIC BLOOD PRESSURE: 154 MMHG | DIASTOLIC BLOOD PRESSURE: 79 MMHG | HEART RATE: 65 BPM | TEMPERATURE: 96.1 F | OXYGEN SATURATION: 98 %

## 2019-01-01 VITALS
SYSTOLIC BLOOD PRESSURE: 133 MMHG | TEMPERATURE: 99 F | BODY MASS INDEX: 22.98 KG/M2 | OXYGEN SATURATION: 97 % | DIASTOLIC BLOOD PRESSURE: 63 MMHG | WEIGHT: 146.7 LBS | HEART RATE: 62 BPM

## 2019-01-01 VITALS
SYSTOLIC BLOOD PRESSURE: 141 MMHG | RESPIRATION RATE: 18 BRPM | BODY MASS INDEX: 25.02 KG/M2 | HEIGHT: 67 IN | DIASTOLIC BLOOD PRESSURE: 67 MMHG | OXYGEN SATURATION: 98 % | WEIGHT: 159.4 LBS | TEMPERATURE: 98.1 F | HEART RATE: 67 BPM

## 2019-01-01 VITALS
TEMPERATURE: 97 F | HEART RATE: 79 BPM | SYSTOLIC BLOOD PRESSURE: 113 MMHG | WEIGHT: 187.1 LBS | BODY MASS INDEX: 29.37 KG/M2 | RESPIRATION RATE: 18 BRPM | DIASTOLIC BLOOD PRESSURE: 62 MMHG | HEIGHT: 67 IN | OXYGEN SATURATION: 98 %

## 2019-01-01 VITALS
WEIGHT: 175.8 LBS | DIASTOLIC BLOOD PRESSURE: 60 MMHG | OXYGEN SATURATION: 97 % | HEART RATE: 67 BPM | RESPIRATION RATE: 18 BRPM | BODY MASS INDEX: 27.59 KG/M2 | TEMPERATURE: 97.7 F | SYSTOLIC BLOOD PRESSURE: 128 MMHG | HEIGHT: 67 IN

## 2019-01-01 VITALS
BODY MASS INDEX: 24.82 KG/M2 | OXYGEN SATURATION: 98 % | TEMPERATURE: 98.1 F | DIASTOLIC BLOOD PRESSURE: 70 MMHG | WEIGHT: 158.5 LBS | HEART RATE: 61 BPM | SYSTOLIC BLOOD PRESSURE: 165 MMHG | RESPIRATION RATE: 18 BRPM

## 2019-01-01 VITALS
RESPIRATION RATE: 18 BRPM | HEIGHT: 67 IN | BODY MASS INDEX: 25.02 KG/M2 | TEMPERATURE: 98.1 F | HEART RATE: 91 BPM | SYSTOLIC BLOOD PRESSURE: 167 MMHG | OXYGEN SATURATION: 98 % | WEIGHT: 159.4 LBS | DIASTOLIC BLOOD PRESSURE: 73 MMHG

## 2019-01-01 VITALS
BODY MASS INDEX: 29.37 KG/M2 | WEIGHT: 187.1 LBS | DIASTOLIC BLOOD PRESSURE: 64 MMHG | SYSTOLIC BLOOD PRESSURE: 135 MMHG | RESPIRATION RATE: 18 BRPM | TEMPERATURE: 96.3 F | HEART RATE: 68 BPM | HEIGHT: 67 IN

## 2019-01-01 VITALS
HEIGHT: 67 IN | SYSTOLIC BLOOD PRESSURE: 164 MMHG | DIASTOLIC BLOOD PRESSURE: 79 MMHG | BODY MASS INDEX: 29.16 KG/M2 | RESPIRATION RATE: 18 BRPM | HEART RATE: 71 BPM | WEIGHT: 185.8 LBS | TEMPERATURE: 96.6 F

## 2019-01-01 VITALS
BODY MASS INDEX: 28.66 KG/M2 | RESPIRATION RATE: 18 BRPM | DIASTOLIC BLOOD PRESSURE: 76 MMHG | HEART RATE: 71 BPM | SYSTOLIC BLOOD PRESSURE: 152 MMHG | TEMPERATURE: 97.7 F | HEIGHT: 67 IN | WEIGHT: 182.6 LBS

## 2019-01-01 VITALS
SYSTOLIC BLOOD PRESSURE: 132 MMHG | RESPIRATION RATE: 20 BRPM | TEMPERATURE: 96.5 F | WEIGHT: 149 LBS | DIASTOLIC BLOOD PRESSURE: 70 MMHG | HEIGHT: 67 IN | OXYGEN SATURATION: 99 % | HEART RATE: 90 BPM | BODY MASS INDEX: 23.39 KG/M2

## 2019-01-01 VITALS
SYSTOLIC BLOOD PRESSURE: 133 MMHG | HEART RATE: 87 BPM | HEIGHT: 67 IN | WEIGHT: 153.6 LBS | DIASTOLIC BLOOD PRESSURE: 71 MMHG | OXYGEN SATURATION: 98 % | RESPIRATION RATE: 18 BRPM | BODY MASS INDEX: 24.11 KG/M2

## 2019-01-01 VITALS
RESPIRATION RATE: 18 BRPM | HEART RATE: 73 BPM | WEIGHT: 185.5 LBS | DIASTOLIC BLOOD PRESSURE: 83 MMHG | SYSTOLIC BLOOD PRESSURE: 180 MMHG | BODY MASS INDEX: 29.11 KG/M2 | TEMPERATURE: 97.7 F | HEIGHT: 67 IN

## 2019-01-01 VITALS
SYSTOLIC BLOOD PRESSURE: 181 MMHG | HEART RATE: 65 BPM | BODY MASS INDEX: 27.81 KG/M2 | TEMPERATURE: 98 F | RESPIRATION RATE: 16 BRPM | DIASTOLIC BLOOD PRESSURE: 82 MMHG | WEIGHT: 177.2 LBS | HEIGHT: 67 IN

## 2019-01-01 VITALS
RESPIRATION RATE: 15 BRPM | BODY MASS INDEX: 29.19 KG/M2 | SYSTOLIC BLOOD PRESSURE: 129 MMHG | HEIGHT: 67 IN | OXYGEN SATURATION: 98 % | WEIGHT: 186 LBS | TEMPERATURE: 97.9 F | DIASTOLIC BLOOD PRESSURE: 73 MMHG | HEART RATE: 62 BPM

## 2019-01-01 VITALS
RESPIRATION RATE: 16 BRPM | WEIGHT: 158.1 LBS | HEIGHT: 67 IN | OXYGEN SATURATION: 97 % | DIASTOLIC BLOOD PRESSURE: 70 MMHG | TEMPERATURE: 98.3 F | HEART RATE: 64 BPM | BODY MASS INDEX: 24.81 KG/M2 | SYSTOLIC BLOOD PRESSURE: 148 MMHG

## 2019-01-01 VITALS
HEART RATE: 74 BPM | WEIGHT: 182 LBS | SYSTOLIC BLOOD PRESSURE: 124 MMHG | RESPIRATION RATE: 18 BRPM | HEIGHT: 67 IN | DIASTOLIC BLOOD PRESSURE: 70 MMHG | TEMPERATURE: 98 F | BODY MASS INDEX: 28.56 KG/M2 | OXYGEN SATURATION: 99 %

## 2019-01-01 VITALS
SYSTOLIC BLOOD PRESSURE: 120 MMHG | BODY MASS INDEX: 28.63 KG/M2 | WEIGHT: 182.4 LBS | RESPIRATION RATE: 16 BRPM | DIASTOLIC BLOOD PRESSURE: 85 MMHG | HEART RATE: 78 BPM | HEIGHT: 67 IN | OXYGEN SATURATION: 98 % | TEMPERATURE: 98 F

## 2019-01-01 VITALS
OXYGEN SATURATION: 98 % | TEMPERATURE: 97.9 F | SYSTOLIC BLOOD PRESSURE: 154 MMHG | BODY MASS INDEX: 24.28 KG/M2 | HEART RATE: 70 BPM | DIASTOLIC BLOOD PRESSURE: 75 MMHG | RESPIRATION RATE: 18 BRPM | WEIGHT: 155 LBS

## 2019-01-01 VITALS
OXYGEN SATURATION: 100 % | DIASTOLIC BLOOD PRESSURE: 66 MMHG | SYSTOLIC BLOOD PRESSURE: 120 MMHG | TEMPERATURE: 96.6 F | WEIGHT: 185 LBS | HEIGHT: 67 IN | BODY MASS INDEX: 29.03 KG/M2 | HEART RATE: 90 BPM

## 2019-01-01 VITALS
WEIGHT: 182.8 LBS | TEMPERATURE: 96.1 F | BODY MASS INDEX: 28.69 KG/M2 | HEART RATE: 66 BPM | HEIGHT: 67 IN | DIASTOLIC BLOOD PRESSURE: 72 MMHG | RESPIRATION RATE: 18 BRPM | SYSTOLIC BLOOD PRESSURE: 172 MMHG

## 2019-01-01 DIAGNOSIS — R09.81 SINUS CONGESTION: ICD-10-CM

## 2019-01-01 DIAGNOSIS — C25.7 MALIGNANT NEOPLASM OF OTHER PARTS OF PANCREAS (HCC): Primary | ICD-10-CM

## 2019-01-01 DIAGNOSIS — D64.9 ANEMIA, UNSPECIFIED TYPE: Primary | ICD-10-CM

## 2019-01-01 DIAGNOSIS — H93.8X3 EAR CONGESTION, BILATERAL: ICD-10-CM

## 2019-01-01 DIAGNOSIS — G47.00 INSOMNIA, UNSPECIFIED TYPE: ICD-10-CM

## 2019-01-01 DIAGNOSIS — R63.4 WEIGHT LOSS: ICD-10-CM

## 2019-01-01 DIAGNOSIS — R53.83 FATIGUE, UNSPECIFIED TYPE: ICD-10-CM

## 2019-01-01 DIAGNOSIS — K59.00 CONSTIPATION, UNSPECIFIED CONSTIPATION TYPE: ICD-10-CM

## 2019-01-01 DIAGNOSIS — Z51.11 CHEMOTHERAPY MANAGEMENT, ENCOUNTER FOR: ICD-10-CM

## 2019-01-01 DIAGNOSIS — D69.6 THROMBOCYTOPENIA (HCC): ICD-10-CM

## 2019-01-01 DIAGNOSIS — R10.9 ABDOMINAL PAIN, UNSPECIFIED ABDOMINAL LOCATION: ICD-10-CM

## 2019-01-01 DIAGNOSIS — R19.7 DIARRHEA, UNSPECIFIED TYPE: ICD-10-CM

## 2019-01-01 DIAGNOSIS — R10.9 ABDOMINAL DISCOMFORT: Primary | ICD-10-CM

## 2019-01-01 DIAGNOSIS — C25.7 MALIGNANT NEOPLASM OF OTHER PARTS OF PANCREAS (HCC): ICD-10-CM

## 2019-01-01 DIAGNOSIS — G62.0 NEUROPATHY DUE TO CHEMOTHERAPEUTIC DRUG (HCC): ICD-10-CM

## 2019-01-01 DIAGNOSIS — D70.1 CHEMOTHERAPY-INDUCED NEUTROPENIA (HCC): ICD-10-CM

## 2019-01-01 DIAGNOSIS — C78.7 PANCREATIC CANCER METASTASIZED TO LIVER (HCC): ICD-10-CM

## 2019-01-01 DIAGNOSIS — H93.8X3 SENSATION OF FULLNESS IN BOTH EARS: ICD-10-CM

## 2019-01-01 DIAGNOSIS — R53.0 NEOPLASTIC MALIGNANT RELATED FATIGUE: ICD-10-CM

## 2019-01-01 DIAGNOSIS — K83.1 OBSTRUCTIVE JAUNDICE: ICD-10-CM

## 2019-01-01 DIAGNOSIS — R63.0 LOSS OF APPETITE: ICD-10-CM

## 2019-01-01 DIAGNOSIS — G62.0 PERIPHERAL NEUROPATHY DUE TO CHEMOTHERAPY (HCC): ICD-10-CM

## 2019-01-01 DIAGNOSIS — G62.0 PERIPHERAL NEUROPATHY DUE TO CHEMOTHERAPY (HCC): Primary | ICD-10-CM

## 2019-01-01 DIAGNOSIS — T45.1X5A CHEMOTHERAPY-INDUCED NEUTROPENIA (HCC): ICD-10-CM

## 2019-01-01 DIAGNOSIS — I10 ESSENTIAL HYPERTENSION, BENIGN: ICD-10-CM

## 2019-01-01 DIAGNOSIS — R17 JAUNDICE: Primary | ICD-10-CM

## 2019-01-01 DIAGNOSIS — C78.7 LIVER METASTASES (HCC): ICD-10-CM

## 2019-01-01 DIAGNOSIS — R60.9 SWELLING: ICD-10-CM

## 2019-01-01 DIAGNOSIS — T45.1X5A NEUROPATHY DUE TO CHEMOTHERAPEUTIC DRUG (HCC): ICD-10-CM

## 2019-01-01 DIAGNOSIS — T45.1X5A PERIPHERAL NEUROPATHY DUE TO CHEMOTHERAPY (HCC): ICD-10-CM

## 2019-01-01 DIAGNOSIS — T45.1X5A PERIPHERAL NEUROPATHY DUE TO CHEMOTHERAPY (HCC): Primary | ICD-10-CM

## 2019-01-01 DIAGNOSIS — C25.7 MALIGNANT NEOPLASM OF ECTOPIC PANCREATIC TISSUE (HCC): ICD-10-CM

## 2019-01-01 DIAGNOSIS — R11.0 NAUSEA WITHOUT VOMITING: ICD-10-CM

## 2019-01-01 DIAGNOSIS — C25.9 MALIGNANT NEOPLASM OF PANCREAS, UNSPECIFIED LOCATION OF MALIGNANCY (HCC): Primary | ICD-10-CM

## 2019-01-01 DIAGNOSIS — E78.5 HYPERLIPIDEMIA WITH TARGET LDL LESS THAN 100: ICD-10-CM

## 2019-01-01 DIAGNOSIS — R53.0 NEOPLASTIC MALIGNANT RELATED FATIGUE: Primary | ICD-10-CM

## 2019-01-01 DIAGNOSIS — C25.9 PANCREATIC CANCER METASTASIZED TO LIVER (HCC): ICD-10-CM

## 2019-01-01 DIAGNOSIS — I10 BENIGN ESSENTIAL HTN: ICD-10-CM

## 2019-01-01 LAB
ALBUMIN SERPL-MCNC: 1.9 G/DL (ref 3.5–5)
ALBUMIN SERPL-MCNC: 2 G/DL (ref 3.5–5)
ALBUMIN SERPL-MCNC: 2.3 G/DL (ref 3.5–5)
ALBUMIN SERPL-MCNC: 2.6 G/DL (ref 3.5–5)
ALBUMIN SERPL-MCNC: 2.7 G/DL (ref 3.5–5)
ALBUMIN SERPL-MCNC: 2.8 G/DL (ref 3.5–5)
ALBUMIN SERPL-MCNC: 2.9 G/DL (ref 3.5–5)
ALBUMIN SERPL-MCNC: 3 G/DL (ref 3.5–5)
ALBUMIN SERPL-MCNC: 3 G/DL (ref 3.5–5)
ALBUMIN SERPL-MCNC: 3.1 G/DL (ref 3.5–5)
ALBUMIN SERPL-MCNC: 3.2 G/DL (ref 3.5–5)
ALBUMIN/GLOB SERPL: 0.6 {RATIO} (ref 1.1–2.2)
ALBUMIN/GLOB SERPL: 0.6 {RATIO} (ref 1.1–2.2)
ALBUMIN/GLOB SERPL: 0.7 {RATIO} (ref 1.1–2.2)
ALBUMIN/GLOB SERPL: 0.7 {RATIO} (ref 1.1–2.2)
ALBUMIN/GLOB SERPL: 0.8 {RATIO} (ref 1.1–2.2)
ALBUMIN/GLOB SERPL: 1 {RATIO} (ref 1.1–2.2)
ALBUMIN/GLOB SERPL: 1.1 {RATIO} (ref 1.1–2.2)
ALBUMIN/GLOB SERPL: 1.2 {RATIO} (ref 1.1–2.2)
ALP SERPL-CCNC: 1001 U/L (ref 45–117)
ALP SERPL-CCNC: 123 U/L (ref 45–117)
ALP SERPL-CCNC: 124 U/L (ref 45–117)
ALP SERPL-CCNC: 125 U/L (ref 45–117)
ALP SERPL-CCNC: 129 U/L (ref 45–117)
ALP SERPL-CCNC: 132 U/L (ref 45–117)
ALP SERPL-CCNC: 137 U/L (ref 45–117)
ALP SERPL-CCNC: 140 U/L (ref 45–117)
ALP SERPL-CCNC: 148 U/L (ref 45–117)
ALP SERPL-CCNC: 149 U/L (ref 45–117)
ALP SERPL-CCNC: 162 U/L (ref 45–117)
ALP SERPL-CCNC: 177 U/L (ref 45–117)
ALP SERPL-CCNC: 90 U/L (ref 45–117)
ALP SERPL-CCNC: 938 U/L (ref 45–117)
ALT SERPL-CCNC: 12 U/L (ref 12–78)
ALT SERPL-CCNC: 14 U/L (ref 12–78)
ALT SERPL-CCNC: 15 U/L (ref 12–78)
ALT SERPL-CCNC: 18 U/L (ref 12–78)
ALT SERPL-CCNC: 19 U/L (ref 12–78)
ALT SERPL-CCNC: 19 U/L (ref 12–78)
ALT SERPL-CCNC: 207 U/L (ref 12–78)
ALT SERPL-CCNC: 21 U/L (ref 12–78)
ALT SERPL-CCNC: 217 U/L (ref 12–78)
ALT SERPL-CCNC: 22 U/L (ref 12–78)
ALT SERPL-CCNC: 36 U/L (ref 12–78)
ALT SERPL-CCNC: 9 U/L (ref 12–78)
ANION GAP SERPL CALC-SCNC: 11 MMOL/L (ref 5–15)
ANION GAP SERPL CALC-SCNC: 11 MMOL/L (ref 5–15)
ANION GAP SERPL CALC-SCNC: 2 MMOL/L (ref 5–15)
ANION GAP SERPL CALC-SCNC: 3 MMOL/L (ref 5–15)
ANION GAP SERPL CALC-SCNC: 4 MMOL/L (ref 5–15)
ANION GAP SERPL CALC-SCNC: 5 MMOL/L (ref 5–15)
ANION GAP SERPL CALC-SCNC: 6 MMOL/L (ref 5–15)
ANION GAP SERPL CALC-SCNC: 7 MMOL/L (ref 5–15)
ANION GAP SERPL CALC-SCNC: 8 MMOL/L (ref 5–15)
ANION GAP SERPL CALC-SCNC: 8 MMOL/L (ref 5–15)
ANION GAP SERPL CALC-SCNC: 9 MMOL/L (ref 5–15)
ANION GAP SERPL CALC-SCNC: 9 MMOL/L (ref 5–15)
APPEARANCE UR: ABNORMAL
APPEARANCE UR: CLEAR
APTT PPP: 25.3 SEC (ref 22.1–32)
AST SERPL-CCNC: 11 U/L (ref 15–37)
AST SERPL-CCNC: 13 U/L (ref 15–37)
AST SERPL-CCNC: 13 U/L (ref 15–37)
AST SERPL-CCNC: 14 U/L (ref 15–37)
AST SERPL-CCNC: 14 U/L (ref 15–37)
AST SERPL-CCNC: 16 U/L (ref 15–37)
AST SERPL-CCNC: 17 U/L (ref 15–37)
AST SERPL-CCNC: 17 U/L (ref 15–37)
AST SERPL-CCNC: 18 U/L (ref 15–37)
AST SERPL-CCNC: 199 U/L (ref 15–37)
AST SERPL-CCNC: 219 U/L (ref 15–37)
AST SERPL-CCNC: 25 U/L (ref 15–37)
AST SERPL-CCNC: 8 U/L (ref 15–37)
AST SERPL-CCNC: 9 U/L (ref 15–37)
ATRIAL RATE: 75 BPM
BACTERIA SPEC CULT: NORMAL
BACTERIA SPEC CULT: NORMAL
BACTERIA URNS QL MICRO: ABNORMAL /HPF
BACTERIA URNS QL MICRO: NEGATIVE /HPF
BASO+EOS+MONOS # BLD AUTO: 0.8 K/UL (ref 0.2–1.2)
BASO+EOS+MONOS NFR BLD AUTO: 11 % (ref 3.2–16.9)
BASOPHILS # BLD: 0 K/UL (ref 0–0.1)
BASOPHILS # BLD: 0.1 K/UL (ref 0–0.1)
BASOPHILS # BLD: 0.2 K/UL (ref 0–0.1)
BASOPHILS # BLD: 0.4 K/UL (ref 0–0.1)
BASOPHILS NFR BLD: 0 % (ref 0–1)
BASOPHILS NFR BLD: 1 % (ref 0–1)
BASOPHILS NFR BLD: 2 % (ref 0–1)
BILIRUB SERPL-MCNC: 0.2 MG/DL (ref 0.2–1)
BILIRUB SERPL-MCNC: 0.2 MG/DL (ref 0.2–1)
BILIRUB SERPL-MCNC: 0.3 MG/DL (ref 0.2–1)
BILIRUB SERPL-MCNC: 0.4 MG/DL (ref 0.2–1)
BILIRUB SERPL-MCNC: 0.5 MG/DL (ref 0.2–1)
BILIRUB SERPL-MCNC: 15.5 MG/DL (ref 0.2–1)
BILIRUB SERPL-MCNC: 15.6 MG/DL (ref 0.2–1)
BILIRUB UR QL: NEGATIVE
BUN SERPL-MCNC: 10 MG/DL (ref 6–20)
BUN SERPL-MCNC: 11 MG/DL (ref 6–20)
BUN SERPL-MCNC: 12 MG/DL (ref 6–20)
BUN SERPL-MCNC: 18 MG/DL (ref 6–20)
BUN SERPL-MCNC: 21 MG/DL (ref 6–20)
BUN SERPL-MCNC: 7 MG/DL (ref 6–20)
BUN SERPL-MCNC: 8 MG/DL (ref 6–20)
BUN SERPL-MCNC: 9 MG/DL (ref 6–20)
BUN/CREAT SERPL: 10 (ref 12–20)
BUN/CREAT SERPL: 11 (ref 12–20)
BUN/CREAT SERPL: 12 (ref 12–20)
BUN/CREAT SERPL: 13 (ref 12–20)
BUN/CREAT SERPL: 13 (ref 12–20)
BUN/CREAT SERPL: 14 (ref 12–20)
BUN/CREAT SERPL: 16 (ref 12–20)
BUN/CREAT SERPL: 16 (ref 12–20)
BUN/CREAT SERPL: 19 (ref 12–20)
BUN/CREAT SERPL: 9 (ref 12–20)
BUN/CREAT SERPL: 9 (ref 12–20)
CALCIUM SERPL-MCNC: 7.7 MG/DL (ref 8.5–10.1)
CALCIUM SERPL-MCNC: 7.9 MG/DL (ref 8.5–10.1)
CALCIUM SERPL-MCNC: 8 MG/DL (ref 8.5–10.1)
CALCIUM SERPL-MCNC: 8.2 MG/DL (ref 8.5–10.1)
CALCIUM SERPL-MCNC: 8.3 MG/DL (ref 8.5–10.1)
CALCIUM SERPL-MCNC: 8.4 MG/DL (ref 8.5–10.1)
CALCIUM SERPL-MCNC: 8.7 MG/DL (ref 8.5–10.1)
CALCIUM SERPL-MCNC: 8.8 MG/DL (ref 8.5–10.1)
CALCULATED P AXIS, ECG09: 49 DEGREES
CALCULATED R AXIS, ECG10: 19 DEGREES
CALCULATED T AXIS, ECG11: 52 DEGREES
CC UR VC: NORMAL
CC UR VC: NORMAL
CHLORIDE SERPL-SCNC: 100 MMOL/L (ref 97–108)
CHLORIDE SERPL-SCNC: 100 MMOL/L (ref 97–108)
CHLORIDE SERPL-SCNC: 101 MMOL/L (ref 97–108)
CHLORIDE SERPL-SCNC: 102 MMOL/L (ref 97–108)
CHLORIDE SERPL-SCNC: 102 MMOL/L (ref 97–108)
CHLORIDE SERPL-SCNC: 103 MMOL/L (ref 97–108)
CHLORIDE SERPL-SCNC: 103 MMOL/L (ref 97–108)
CHLORIDE SERPL-SCNC: 104 MMOL/L (ref 97–108)
CHLORIDE SERPL-SCNC: 106 MMOL/L (ref 97–108)
CHLORIDE SERPL-SCNC: 96 MMOL/L (ref 97–108)
CHLORIDE SERPL-SCNC: 96 MMOL/L (ref 97–108)
CHLORIDE SERPL-SCNC: 97 MMOL/L (ref 97–108)
CHLORIDE SERPL-SCNC: 97 MMOL/L (ref 97–108)
CHLORIDE SERPL-SCNC: 98 MMOL/L (ref 97–108)
CHLORIDE SERPL-SCNC: 98 MMOL/L (ref 97–108)
CHLORIDE SERPL-SCNC: 99 MMOL/L (ref 97–108)
CO2 SERPL-SCNC: 24 MMOL/L (ref 21–32)
CO2 SERPL-SCNC: 26 MMOL/L (ref 21–32)
CO2 SERPL-SCNC: 27 MMOL/L (ref 21–32)
CO2 SERPL-SCNC: 28 MMOL/L (ref 21–32)
CO2 SERPL-SCNC: 29 MMOL/L (ref 21–32)
CO2 SERPL-SCNC: 29 MMOL/L (ref 21–32)
CO2 SERPL-SCNC: 31 MMOL/L (ref 21–32)
COLOR UR: ABNORMAL
COLOR UR: NORMAL
COLOR UR: NORMAL
COMMENT, HOLDF: NORMAL
CREAT SERPL-MCNC: 0.6 MG/DL (ref 0.55–1.02)
CREAT SERPL-MCNC: 0.64 MG/DL (ref 0.55–1.02)
CREAT SERPL-MCNC: 0.65 MG/DL (ref 0.55–1.02)
CREAT SERPL-MCNC: 0.72 MG/DL (ref 0.55–1.02)
CREAT SERPL-MCNC: 0.73 MG/DL (ref 0.55–1.02)
CREAT SERPL-MCNC: 0.77 MG/DL (ref 0.55–1.02)
CREAT SERPL-MCNC: 0.77 MG/DL (ref 0.55–1.02)
CREAT SERPL-MCNC: 0.79 MG/DL (ref 0.55–1.02)
CREAT SERPL-MCNC: 0.79 MG/DL (ref 0.55–1.02)
CREAT SERPL-MCNC: 0.84 MG/DL (ref 0.55–1.02)
CREAT SERPL-MCNC: 0.88 MG/DL (ref 0.55–1.02)
CREAT SERPL-MCNC: 0.91 MG/DL (ref 0.55–1.02)
CREAT SERPL-MCNC: 0.91 MG/DL (ref 0.55–1.02)
CREAT SERPL-MCNC: 0.93 MG/DL (ref 0.55–1.02)
CREAT SERPL-MCNC: 0.94 MG/DL (ref 0.55–1.02)
CREAT SERPL-MCNC: 1.31 MG/DL (ref 0.55–1.02)
DIAGNOSIS, 93000: NORMAL
DIFFERENTIAL METHOD BLD: ABNORMAL
EOSINOPHIL # BLD: 0 K/UL (ref 0–0.4)
EOSINOPHIL # BLD: 0.1 K/UL (ref 0–0.4)
EOSINOPHIL # BLD: 0.2 K/UL (ref 0–0.4)
EOSINOPHIL # BLD: 0.2 K/UL (ref 0–0.4)
EOSINOPHIL # BLD: 0.4 K/UL (ref 0–0.4)
EOSINOPHIL NFR BLD: 0 % (ref 0–7)
EOSINOPHIL NFR BLD: 1 % (ref 0–7)
EOSINOPHIL NFR BLD: 2 % (ref 0–7)
EOSINOPHIL NFR BLD: 3 % (ref 0–7)
EOSINOPHIL NFR BLD: 3 % (ref 0–7)
EOSINOPHIL NFR BLD: 4 % (ref 0–7)
EOSINOPHIL NFR BLD: 9 % (ref 0–7)
EPITH CASTS URNS QL MICRO: ABNORMAL /LPF
EPITH CASTS URNS QL MICRO: NORMAL /LPF
EPITH CASTS URNS QL MICRO: NORMAL /LPF
ERYTHROCYTE [DISTWIDTH] IN BLOOD BY AUTOMATED COUNT: 17.8 % (ref 11.5–14.5)
ERYTHROCYTE [DISTWIDTH] IN BLOOD BY AUTOMATED COUNT: 18.5 % (ref 11.5–14.5)
ERYTHROCYTE [DISTWIDTH] IN BLOOD BY AUTOMATED COUNT: 18.5 % (ref 11.5–14.5)
ERYTHROCYTE [DISTWIDTH] IN BLOOD BY AUTOMATED COUNT: 19.7 % (ref 11.5–14.5)
ERYTHROCYTE [DISTWIDTH] IN BLOOD BY AUTOMATED COUNT: 19.7 % (ref 11.5–14.5)
ERYTHROCYTE [DISTWIDTH] IN BLOOD BY AUTOMATED COUNT: 19.9 % (ref 11.5–14.5)
ERYTHROCYTE [DISTWIDTH] IN BLOOD BY AUTOMATED COUNT: 20 % (ref 11.8–15.8)
ERYTHROCYTE [DISTWIDTH] IN BLOOD BY AUTOMATED COUNT: 20.1 % (ref 11.5–14.5)
ERYTHROCYTE [DISTWIDTH] IN BLOOD BY AUTOMATED COUNT: 20.2 % (ref 11.5–14.5)
ERYTHROCYTE [DISTWIDTH] IN BLOOD BY AUTOMATED COUNT: 20.4 % (ref 11.5–14.5)
ERYTHROCYTE [DISTWIDTH] IN BLOOD BY AUTOMATED COUNT: 20.5 % (ref 11.5–14.5)
ERYTHROCYTE [DISTWIDTH] IN BLOOD BY AUTOMATED COUNT: 20.6 % (ref 11.5–14.5)
ERYTHROCYTE [DISTWIDTH] IN BLOOD BY AUTOMATED COUNT: 20.6 % (ref 11.5–14.5)
ERYTHROCYTE [DISTWIDTH] IN BLOOD BY AUTOMATED COUNT: 20.8 % (ref 11.5–14.5)
ERYTHROCYTE [DISTWIDTH] IN BLOOD BY AUTOMATED COUNT: 21 % (ref 11.5–14.5)
ERYTHROCYTE [DISTWIDTH] IN BLOOD BY AUTOMATED COUNT: 21.2 % (ref 11.5–14.5)
ERYTHROCYTE [DISTWIDTH] IN BLOOD BY AUTOMATED COUNT: 21.3 % (ref 11.5–14.5)
ERYTHROCYTE [DISTWIDTH] IN BLOOD BY AUTOMATED COUNT: 21.3 % (ref 11.5–14.5)
ERYTHROCYTE [DISTWIDTH] IN BLOOD BY AUTOMATED COUNT: 21.7 % (ref 11.5–14.5)
ERYTHROCYTE [DISTWIDTH] IN BLOOD BY AUTOMATED COUNT: 21.8 % (ref 11.5–14.5)
ERYTHROCYTE [DISTWIDTH] IN BLOOD BY AUTOMATED COUNT: 22.2 % (ref 11.5–14.5)
ERYTHROCYTE [DISTWIDTH] IN BLOOD BY AUTOMATED COUNT: 22.5 % (ref 11.5–14.5)
ERYTHROCYTE [DISTWIDTH] IN BLOOD BY AUTOMATED COUNT: 22.7 % (ref 11.5–14.5)
EST. AVERAGE GLUCOSE BLD GHB EST-MCNC: 137 MG/DL
EST. AVERAGE GLUCOSE BLD GHB EST-MCNC: 148 MG/DL
FERRITIN SERPL-MCNC: 386 NG/ML (ref 8–252)
GLOBULIN SER CALC-MCNC: 2.4 G/DL (ref 2–4)
GLOBULIN SER CALC-MCNC: 2.6 G/DL (ref 2–4)
GLOBULIN SER CALC-MCNC: 2.7 G/DL (ref 2–4)
GLOBULIN SER CALC-MCNC: 2.8 G/DL (ref 2–4)
GLOBULIN SER CALC-MCNC: 2.9 G/DL (ref 2–4)
GLOBULIN SER CALC-MCNC: 2.9 G/DL (ref 2–4)
GLOBULIN SER CALC-MCNC: 3 G/DL (ref 2–4)
GLOBULIN SER CALC-MCNC: 3 G/DL (ref 2–4)
GLOBULIN SER CALC-MCNC: 3.2 G/DL (ref 2–4)
GLOBULIN SER CALC-MCNC: 3.3 G/DL (ref 2–4)
GLOBULIN SER CALC-MCNC: 3.3 G/DL (ref 2–4)
GLOBULIN SER CALC-MCNC: 3.5 G/DL (ref 2–4)
GLUCOSE BLD STRIP.AUTO-MCNC: 168 MG/DL (ref 65–100)
GLUCOSE SERPL-MCNC: 127 MG/DL (ref 65–100)
GLUCOSE SERPL-MCNC: 129 MG/DL (ref 65–100)
GLUCOSE SERPL-MCNC: 129 MG/DL (ref 65–100)
GLUCOSE SERPL-MCNC: 134 MG/DL (ref 65–100)
GLUCOSE SERPL-MCNC: 162 MG/DL (ref 65–100)
GLUCOSE SERPL-MCNC: 174 MG/DL (ref 65–100)
GLUCOSE SERPL-MCNC: 181 MG/DL (ref 65–100)
GLUCOSE SERPL-MCNC: 181 MG/DL (ref 65–100)
GLUCOSE SERPL-MCNC: 187 MG/DL (ref 65–100)
GLUCOSE SERPL-MCNC: 190 MG/DL (ref 65–100)
GLUCOSE SERPL-MCNC: 213 MG/DL (ref 65–100)
GLUCOSE SERPL-MCNC: 224 MG/DL (ref 65–100)
GLUCOSE SERPL-MCNC: 230 MG/DL (ref 65–100)
GLUCOSE SERPL-MCNC: 252 MG/DL (ref 65–100)
GLUCOSE SERPL-MCNC: 307 MG/DL (ref 65–100)
GLUCOSE SERPL-MCNC: 472 MG/DL (ref 65–100)
GLUCOSE UR STRIP.AUTO-MCNC: 100 MG/DL
GLUCOSE UR STRIP.AUTO-MCNC: NEGATIVE MG/DL
HBA1C MFR BLD: 6.4 % (ref 4–5.6)
HBA1C MFR BLD: 6.8 % (ref 4.2–6.3)
HCT VFR BLD AUTO: 24.9 % (ref 35–47)
HCT VFR BLD AUTO: 25.3 % (ref 35–47)
HCT VFR BLD AUTO: 25.7 % (ref 35–47)
HCT VFR BLD AUTO: 26 % (ref 35–47)
HCT VFR BLD AUTO: 26.3 % (ref 35–47)
HCT VFR BLD AUTO: 26.5 % (ref 35–47)
HCT VFR BLD AUTO: 26.5 % (ref 35–47)
HCT VFR BLD AUTO: 26.9 % (ref 35–47)
HCT VFR BLD AUTO: 26.9 % (ref 35–47)
HCT VFR BLD AUTO: 27 % (ref 35–47)
HCT VFR BLD AUTO: 27 % (ref 35–47)
HCT VFR BLD AUTO: 27.2 % (ref 35–47)
HCT VFR BLD AUTO: 27.4 % (ref 35–47)
HCT VFR BLD AUTO: 27.8 % (ref 35–47)
HCT VFR BLD AUTO: 28 % (ref 35–47)
HCT VFR BLD AUTO: 28 % (ref 35–47)
HCT VFR BLD AUTO: 28.1 % (ref 35–47)
HCT VFR BLD AUTO: 28.1 % (ref 35–47)
HCT VFR BLD AUTO: 28.4 % (ref 35–47)
HCT VFR BLD AUTO: 28.6 % (ref 35–47)
HCT VFR BLD AUTO: 28.7 % (ref 35–47)
HCT VFR BLD AUTO: 28.9 % (ref 35–47)
HCT VFR BLD AUTO: 29.3 % (ref 35–47)
HCT VFR BLD AUTO: 29.5 % (ref 35–47)
HCT VFR BLD AUTO: 29.6 % (ref 35–47)
HCT VFR BLD AUTO: 30.1 % (ref 35–47)
HGB BLD-MCNC: 7.9 G/DL (ref 11.5–16)
HGB BLD-MCNC: 8 G/DL (ref 11.5–16)
HGB BLD-MCNC: 8.2 G/DL (ref 11.5–16)
HGB BLD-MCNC: 8.2 G/DL (ref 11.5–16)
HGB BLD-MCNC: 8.3 G/DL (ref 11.5–16)
HGB BLD-MCNC: 8.4 G/DL (ref 11.5–16)
HGB BLD-MCNC: 8.5 G/DL (ref 11.5–16)
HGB BLD-MCNC: 8.6 G/DL (ref 11.5–16)
HGB BLD-MCNC: 8.7 G/DL (ref 11.5–16)
HGB BLD-MCNC: 8.8 G/DL (ref 11.5–16)
HGB BLD-MCNC: 9 G/DL (ref 11.5–16)
HGB BLD-MCNC: 9 G/DL (ref 11.5–16)
HGB BLD-MCNC: 9.1 G/DL (ref 11.5–16)
HGB BLD-MCNC: 9.2 G/DL (ref 11.5–16)
HGB BLD-MCNC: 9.4 G/DL (ref 11.5–16)
HGB BLD-MCNC: 9.6 G/DL (ref 11.5–16)
HGB BLD-MCNC: 9.6 G/DL (ref 11.5–16)
HGB BLD-MCNC: 9.9 G/DL (ref 11.5–16)
HGB UR QL STRIP: ABNORMAL
HGB UR QL STRIP: NEGATIVE
HYALINE CASTS URNS QL MICRO: >20 /LPF (ref 0–5)
HYALINE CASTS URNS QL MICRO: ABNORMAL /LPF (ref 0–5)
HYALINE CASTS URNS QL MICRO: NORMAL /LPF (ref 0–5)
HYALINE CASTS URNS QL MICRO: NORMAL /LPF (ref 0–5)
IMM GRANULOCYTES # BLD AUTO: 0 K/UL
IMM GRANULOCYTES # BLD AUTO: 0 K/UL (ref 0–0.04)
IMM GRANULOCYTES # BLD AUTO: 0.1 K/UL (ref 0–0.04)
IMM GRANULOCYTES NFR BLD AUTO: 0 %
IMM GRANULOCYTES NFR BLD AUTO: 0 % (ref 0–0.5)
IMM GRANULOCYTES NFR BLD AUTO: 1 % (ref 0–0.5)
IMM GRANULOCYTES NFR BLD AUTO: 3 % (ref 0–0.5)
INR PPP: 1.1 (ref 0.9–1.1)
IRON SATN MFR SERPL: 18 % (ref 20–50)
IRON SERPL-MCNC: 42 UG/DL (ref 35–150)
KETONES UR QL STRIP.AUTO: ABNORMAL MG/DL
KETONES UR QL STRIP.AUTO: ABNORMAL MG/DL
KETONES UR QL STRIP.AUTO: NEGATIVE MG/DL
LDH SERPL L TO P-CCNC: 153 U/L (ref 81–246)
LDH SERPL L TO P-CCNC: 182 U/L (ref 81–246)
LDH SERPL L TO P-CCNC: 183 U/L (ref 81–246)
LDH SERPL L TO P-CCNC: 191 U/L (ref 81–246)
LDH SERPL L TO P-CCNC: 195 U/L (ref 81–246)
LDH SERPL L TO P-CCNC: 202 U/L (ref 81–246)
LDH SERPL L TO P-CCNC: 205 U/L (ref 81–246)
LDH SERPL L TO P-CCNC: 210 U/L (ref 81–246)
LDH SERPL L TO P-CCNC: 210 U/L (ref 81–246)
LDH SERPL L TO P-CCNC: 222 U/L (ref 81–246)
LDH SERPL L TO P-CCNC: 223 U/L (ref 81–246)
LDH SERPL L TO P-CCNC: 223 U/L (ref 81–246)
LDH SERPL L TO P-CCNC: 242 U/L (ref 81–246)
LDH SERPL L TO P-CCNC: 253 U/L (ref 81–246)
LDH SERPL L TO P-CCNC: 257 U/L (ref 81–246)
LDH SERPL L TO P-CCNC: 268 U/L (ref 81–246)
LDH SERPL L TO P-CCNC: 303 U/L (ref 81–246)
LDH SERPL L TO P-CCNC: 305 U/L (ref 81–246)
LEUKOCYTE ESTERASE UR QL STRIP.AUTO: ABNORMAL
LEUKOCYTE ESTERASE UR QL STRIP.AUTO: NEGATIVE
LYMPHOCYTES # BLD: 0.1 K/UL (ref 0.8–3.5)
LYMPHOCYTES # BLD: 0.3 K/UL (ref 0.8–3.5)
LYMPHOCYTES # BLD: 0.3 K/UL (ref 0.8–3.5)
LYMPHOCYTES # BLD: 0.4 K/UL (ref 0.8–3.5)
LYMPHOCYTES # BLD: 0.5 K/UL (ref 0.8–3.5)
LYMPHOCYTES # BLD: 0.6 K/UL (ref 0.8–3.5)
LYMPHOCYTES # BLD: 0.7 K/UL (ref 0.8–3.5)
LYMPHOCYTES # BLD: 0.8 K/UL (ref 0.8–3.5)
LYMPHOCYTES # BLD: 0.9 K/UL (ref 0.8–3.5)
LYMPHOCYTES # BLD: 1 K/UL (ref 0.8–3.5)
LYMPHOCYTES # BLD: 1.1 K/UL (ref 0.8–3.5)
LYMPHOCYTES # BLD: 1.1 K/UL (ref 0.8–3.5)
LYMPHOCYTES # BLD: 1.2 K/UL (ref 0.8–3.5)
LYMPHOCYTES # BLD: 1.2 K/UL (ref 0.8–3.5)
LYMPHOCYTES # BLD: 1.3 K/UL (ref 0.8–3.5)
LYMPHOCYTES # BLD: 2 K/UL (ref 0.8–3.5)
LYMPHOCYTES NFR BLD: 10 % (ref 12–49)
LYMPHOCYTES NFR BLD: 11 % (ref 12–49)
LYMPHOCYTES NFR BLD: 12 % (ref 12–49)
LYMPHOCYTES NFR BLD: 12 % (ref 12–49)
LYMPHOCYTES NFR BLD: 13 % (ref 12–49)
LYMPHOCYTES NFR BLD: 14 % (ref 12–49)
LYMPHOCYTES NFR BLD: 14 % (ref 12–49)
LYMPHOCYTES NFR BLD: 17 % (ref 12–49)
LYMPHOCYTES NFR BLD: 18 % (ref 12–49)
LYMPHOCYTES NFR BLD: 19 % (ref 12–49)
LYMPHOCYTES NFR BLD: 2 % (ref 12–49)
LYMPHOCYTES NFR BLD: 20 % (ref 12–49)
LYMPHOCYTES NFR BLD: 21 % (ref 12–49)
LYMPHOCYTES NFR BLD: 5 % (ref 12–49)
LYMPHOCYTES NFR BLD: 6 % (ref 12–49)
LYMPHOCYTES NFR BLD: 7 % (ref 12–49)
LYMPHOCYTES NFR BLD: 7 % (ref 12–49)
LYMPHOCYTES NFR BLD: 8 % (ref 12–49)
LYMPHOCYTES NFR BLD: 8 % (ref 12–49)
LYMPHOCYTES NFR BLD: 9 % (ref 12–49)
LYMPHOCYTES NFR BLD: 9 % (ref 12–49)
MAGNESIUM SERPL-MCNC: 1.7 MG/DL (ref 1.6–2.4)
MAGNESIUM SERPL-MCNC: 1.8 MG/DL (ref 1.6–2.4)
MAGNESIUM SERPL-MCNC: 1.8 MG/DL (ref 1.6–2.4)
MAGNESIUM SERPL-MCNC: 1.9 MG/DL (ref 1.6–2.4)
MAGNESIUM SERPL-MCNC: 2 MG/DL (ref 1.6–2.4)
MAGNESIUM SERPL-MCNC: 2.1 MG/DL (ref 1.6–2.4)
MAGNESIUM SERPL-MCNC: 2.1 MG/DL (ref 1.6–2.4)
MCH RBC QN AUTO: 28.6 PG (ref 26–34)
MCH RBC QN AUTO: 28.8 PG (ref 26–34)
MCH RBC QN AUTO: 28.9 PG (ref 26–34)
MCH RBC QN AUTO: 28.9 PG (ref 26–34)
MCH RBC QN AUTO: 29 PG (ref 26–34)
MCH RBC QN AUTO: 29.3 PG (ref 26–34)
MCH RBC QN AUTO: 29.5 PG (ref 26–34)
MCH RBC QN AUTO: 29.6 PG (ref 26–34)
MCH RBC QN AUTO: 29.7 PG (ref 26–34)
MCH RBC QN AUTO: 29.8 PG (ref 26–34)
MCH RBC QN AUTO: 29.8 PG (ref 26–34)
MCH RBC QN AUTO: 29.9 PG (ref 26–34)
MCH RBC QN AUTO: 30 PG (ref 26–34)
MCH RBC QN AUTO: 30.1 PG (ref 26–34)
MCH RBC QN AUTO: 30.2 PG (ref 26–34)
MCH RBC QN AUTO: 30.3 PG (ref 26–34)
MCH RBC QN AUTO: 30.3 PG (ref 26–34)
MCH RBC QN AUTO: 30.5 PG (ref 26–34)
MCH RBC QN AUTO: 30.5 PG (ref 26–34)
MCH RBC QN AUTO: 30.6 PG (ref 26–34)
MCH RBC QN AUTO: 31.4 PG (ref 26–34)
MCHC RBC AUTO-ENTMCNC: 30.6 G/DL (ref 30–36.5)
MCHC RBC AUTO-ENTMCNC: 30.7 G/DL (ref 30–36.5)
MCHC RBC AUTO-ENTMCNC: 30.7 G/DL (ref 30–36.5)
MCHC RBC AUTO-ENTMCNC: 31.2 G/DL (ref 30–36.5)
MCHC RBC AUTO-ENTMCNC: 31.2 G/DL (ref 30–36.5)
MCHC RBC AUTO-ENTMCNC: 31.3 G/DL (ref 30–36.5)
MCHC RBC AUTO-ENTMCNC: 31.3 G/DL (ref 30–36.5)
MCHC RBC AUTO-ENTMCNC: 31.4 G/DL (ref 30–36.5)
MCHC RBC AUTO-ENTMCNC: 31.5 G/DL (ref 30–36.5)
MCHC RBC AUTO-ENTMCNC: 31.5 G/DL (ref 30–36.5)
MCHC RBC AUTO-ENTMCNC: 31.6 G/DL (ref 30–36.5)
MCHC RBC AUTO-ENTMCNC: 31.6 G/DL (ref 30–36.5)
MCHC RBC AUTO-ENTMCNC: 31.7 G/DL (ref 30–36.5)
MCHC RBC AUTO-ENTMCNC: 31.7 G/DL (ref 30–36.5)
MCHC RBC AUTO-ENTMCNC: 31.9 G/DL (ref 30–36.5)
MCHC RBC AUTO-ENTMCNC: 31.9 G/DL (ref 30–36.5)
MCHC RBC AUTO-ENTMCNC: 32 G/DL (ref 30–36.5)
MCHC RBC AUTO-ENTMCNC: 32 G/DL (ref 30–36.5)
MCHC RBC AUTO-ENTMCNC: 32.1 G/DL (ref 30–36.5)
MCHC RBC AUTO-ENTMCNC: 32.3 G/DL (ref 30–36.5)
MCHC RBC AUTO-ENTMCNC: 32.3 G/DL (ref 30–36.5)
MCHC RBC AUTO-ENTMCNC: 32.4 G/DL (ref 30–36.5)
MCHC RBC AUTO-ENTMCNC: 32.5 G/DL (ref 30–36.5)
MCHC RBC AUTO-ENTMCNC: 32.9 G/DL (ref 30–36.5)
MCHC RBC AUTO-ENTMCNC: 33.4 G/DL (ref 30–36.5)
MCHC RBC AUTO-ENTMCNC: 33.6 G/DL (ref 30–36.5)
MCV RBC AUTO: 86.8 FL (ref 80–99)
MCV RBC AUTO: 89.6 FL (ref 80–99)
MCV RBC AUTO: 91 FL (ref 80–99)
MCV RBC AUTO: 91.2 FL (ref 80–99)
MCV RBC AUTO: 91.3 FL (ref 80–99)
MCV RBC AUTO: 92.1 FL (ref 80–99)
MCV RBC AUTO: 92.1 FL (ref 80–99)
MCV RBC AUTO: 92.5 FL (ref 80–99)
MCV RBC AUTO: 92.9 FL (ref 80–99)
MCV RBC AUTO: 93.2 FL (ref 80–99)
MCV RBC AUTO: 93.4 FL (ref 80–99)
MCV RBC AUTO: 93.4 FL (ref 80–99)
MCV RBC AUTO: 93.5 FL (ref 80–99)
MCV RBC AUTO: 94.1 FL (ref 80–99)
MCV RBC AUTO: 94.2 FL (ref 80–99)
MCV RBC AUTO: 94.3 FL (ref 80–99)
MCV RBC AUTO: 94.3 FL (ref 80–99)
MCV RBC AUTO: 94.4 FL (ref 80–99)
MCV RBC AUTO: 94.4 FL (ref 80–99)
MCV RBC AUTO: 94.5 FL (ref 80–99)
MCV RBC AUTO: 94.5 FL (ref 80–99)
MCV RBC AUTO: 94.6 FL (ref 80–99)
MCV RBC AUTO: 94.9 FL (ref 80–99)
MCV RBC AUTO: 95.5 FL (ref 80–99)
MCV RBC AUTO: 95.6 FL (ref 80–99)
MCV RBC AUTO: 95.6 FL (ref 80–99)
MCV RBC AUTO: 95.7 FL (ref 80–99)
MCV RBC AUTO: 96 FL (ref 80–99)
METAMYELOCYTES NFR BLD MANUAL: 1 %
METAMYELOCYTES NFR BLD MANUAL: 11 %
METAMYELOCYTES NFR BLD MANUAL: 2 %
METAMYELOCYTES NFR BLD MANUAL: 3 %
METAMYELOCYTES NFR BLD MANUAL: 4 %
METAMYELOCYTES NFR BLD MANUAL: 6 %
METAMYELOCYTES NFR BLD MANUAL: 6 %
MONOCYTES # BLD: 0 K/UL (ref 0–1)
MONOCYTES # BLD: 0 K/UL (ref 0–1)
MONOCYTES # BLD: 0.1 K/UL (ref 0–1)
MONOCYTES # BLD: 0.2 K/UL (ref 0–1)
MONOCYTES # BLD: 0.4 K/UL (ref 0–1)
MONOCYTES # BLD: 0.5 K/UL (ref 0–1)
MONOCYTES # BLD: 0.5 K/UL (ref 0–1)
MONOCYTES # BLD: 0.6 K/UL (ref 0–1)
MONOCYTES # BLD: 0.7 K/UL (ref 0–1)
MONOCYTES # BLD: 0.8 K/UL (ref 0–1)
MONOCYTES # BLD: 0.9 K/UL (ref 0–1)
MONOCYTES # BLD: 1 K/UL (ref 0–1)
MONOCYTES # BLD: 1 K/UL (ref 0–1)
MONOCYTES # BLD: 1.1 K/UL (ref 0–1)
MONOCYTES # BLD: 1.3 K/UL (ref 0–1)
MONOCYTES # BLD: 2 K/UL (ref 0–1)
MONOCYTES NFR BLD: 1 % (ref 5–13)
MONOCYTES NFR BLD: 1 % (ref 5–13)
MONOCYTES NFR BLD: 10 % (ref 5–13)
MONOCYTES NFR BLD: 15 % (ref 5–13)
MONOCYTES NFR BLD: 15 % (ref 5–13)
MONOCYTES NFR BLD: 16 % (ref 5–13)
MONOCYTES NFR BLD: 17 % (ref 5–13)
MONOCYTES NFR BLD: 18 % (ref 5–13)
MONOCYTES NFR BLD: 2 % (ref 5–13)
MONOCYTES NFR BLD: 2 % (ref 5–13)
MONOCYTES NFR BLD: 22 % (ref 5–13)
MONOCYTES NFR BLD: 26 % (ref 5–13)
MONOCYTES NFR BLD: 3 % (ref 5–13)
MONOCYTES NFR BLD: 4 % (ref 5–13)
MONOCYTES NFR BLD: 6 % (ref 5–13)
MONOCYTES NFR BLD: 7 % (ref 5–13)
MONOCYTES NFR BLD: 8 % (ref 5–13)
MONOCYTES NFR BLD: 9 % (ref 5–13)
MUCOUS THREADS URNS QL MICRO: ABNORMAL /LPF
MUCOUS THREADS URNS QL MICRO: ABNORMAL /LPF
MYELOCYTES NFR BLD MANUAL: 1 %
MYELOCYTES NFR BLD MANUAL: 2 %
MYELOCYTES NFR BLD MANUAL: 2 %
MYELOCYTES NFR BLD MANUAL: 3 %
MYELOCYTES NFR BLD MANUAL: 3 %
MYELOCYTES NFR BLD MANUAL: 4 %
MYELOCYTES NFR BLD MANUAL: 4 %
MYELOCYTES NFR BLD MANUAL: 6 %
MYELOCYTES NFR BLD MANUAL: 7 %
NEUTS BAND NFR BLD MANUAL: 1 %
NEUTS BAND NFR BLD MANUAL: 1 %
NEUTS BAND NFR BLD MANUAL: 1 % (ref 0–6)
NEUTS BAND NFR BLD MANUAL: 1 % (ref 0–6)
NEUTS BAND NFR BLD MANUAL: 2 % (ref 0–6)
NEUTS BAND NFR BLD MANUAL: 2 % (ref 0–6)
NEUTS BAND NFR BLD MANUAL: 3 % (ref 0–6)
NEUTS BAND NFR BLD MANUAL: 4 % (ref 0–6)
NEUTS BAND NFR BLD MANUAL: 5 % (ref 0–6)
NEUTS SEG # BLD: 10.1 K/UL (ref 1.8–8)
NEUTS SEG # BLD: 10.2 K/UL (ref 1.8–8)
NEUTS SEG # BLD: 12.6 K/UL (ref 1.8–8)
NEUTS SEG # BLD: 12.8 K/UL (ref 1.8–8)
NEUTS SEG # BLD: 15.6 K/UL (ref 1.8–8)
NEUTS SEG # BLD: 2.1 K/UL (ref 1.8–8)
NEUTS SEG # BLD: 2.4 K/UL (ref 1.8–8)
NEUTS SEG # BLD: 2.5 K/UL (ref 1.8–8)
NEUTS SEG # BLD: 2.6 K/UL (ref 1.8–8)
NEUTS SEG # BLD: 2.6 K/UL (ref 1.8–8)
NEUTS SEG # BLD: 2.8 K/UL (ref 1.8–8)
NEUTS SEG # BLD: 3.2 K/UL (ref 1.8–8)
NEUTS SEG # BLD: 3.3 K/UL (ref 1.8–8)
NEUTS SEG # BLD: 3.4 K/UL (ref 1.8–8)
NEUTS SEG # BLD: 3.4 K/UL (ref 1.8–8)
NEUTS SEG # BLD: 3.6 K/UL (ref 1.8–8)
NEUTS SEG # BLD: 3.7 K/UL (ref 1.8–8)
NEUTS SEG # BLD: 4 K/UL (ref 1.8–8)
NEUTS SEG # BLD: 4 K/UL (ref 1.8–8)
NEUTS SEG # BLD: 4.3 K/UL (ref 1.8–8)
NEUTS SEG # BLD: 4.7 K/UL (ref 1.8–8)
NEUTS SEG # BLD: 4.8 K/UL (ref 1.8–8)
NEUTS SEG # BLD: 4.8 K/UL (ref 1.8–8)
NEUTS SEG # BLD: 5.8 K/UL (ref 1.8–8)
NEUTS SEG # BLD: 5.9 K/UL (ref 1.8–8)
NEUTS SEG # BLD: 6.1 K/UL (ref 1.8–8)
NEUTS SEG # BLD: 6.3 K/UL (ref 1.8–8)
NEUTS SEG # BLD: 7.9 K/UL (ref 1.8–8)
NEUTS SEG NFR BLD: 54 % (ref 32–75)
NEUTS SEG NFR BLD: 60 % (ref 32–75)
NEUTS SEG NFR BLD: 62 % (ref 32–75)
NEUTS SEG NFR BLD: 63 % (ref 32–75)
NEUTS SEG NFR BLD: 64 % (ref 32–75)
NEUTS SEG NFR BLD: 64 % (ref 32–75)
NEUTS SEG NFR BLD: 65 % (ref 32–75)
NEUTS SEG NFR BLD: 67 % (ref 32–75)
NEUTS SEG NFR BLD: 68 % (ref 32–75)
NEUTS SEG NFR BLD: 68 % (ref 32–75)
NEUTS SEG NFR BLD: 70 % (ref 32–75)
NEUTS SEG NFR BLD: 71 % (ref 32–75)
NEUTS SEG NFR BLD: 72 % (ref 32–75)
NEUTS SEG NFR BLD: 73 % (ref 32–75)
NEUTS SEG NFR BLD: 74 % (ref 32–75)
NEUTS SEG NFR BLD: 75 % (ref 32–75)
NEUTS SEG NFR BLD: 78 % (ref 32–75)
NEUTS SEG NFR BLD: 80 % (ref 32–75)
NEUTS SEG NFR BLD: 80 % (ref 32–75)
NEUTS SEG NFR BLD: 81 % (ref 32–75)
NEUTS SEG NFR BLD: 81 % (ref 32–75)
NEUTS SEG NFR BLD: 82 % (ref 32–75)
NEUTS SEG NFR BLD: 84 % (ref 32–75)
NEUTS SEG NFR BLD: 85 % (ref 32–75)
NEUTS SEG NFR BLD: 93 % (ref 32–75)
NITRITE UR QL STRIP.AUTO: NEGATIVE
NRBC # BLD: 0 K/UL (ref 0–0.01)
NRBC # BLD: 0.03 K/UL (ref 0–0.01)
NRBC # BLD: 0.03 K/UL (ref 0–0.01)
NRBC # BLD: 0.05 K/UL (ref 0–0.01)
NRBC # BLD: 0.05 K/UL (ref 0–0.01)
NRBC BLD-RTO: 0 PER 100 WBC
NRBC BLD-RTO: 0.2 PER 100 WBC
NRBC BLD-RTO: 0.3 PER 100 WBC
P-R INTERVAL, ECG05: 186 MS
PH UR STRIP: 5.5 [PH] (ref 5–8)
PH UR STRIP: 6 [PH] (ref 5–8)
PH UR STRIP: 6.5 [PH] (ref 5–8)
PH UR STRIP: 6.5 [PH] (ref 5–8)
PH UR STRIP: 7 [PH] (ref 5–8)
PHOSPHATE SERPL-MCNC: 3.3 MG/DL (ref 2.6–4.7)
PHOSPHATE SERPL-MCNC: 3.7 MG/DL (ref 2.6–4.7)
PHOSPHATE SERPL-MCNC: 3.9 MG/DL (ref 2.6–4.7)
PHOSPHATE SERPL-MCNC: 4.1 MG/DL (ref 2.6–4.7)
PHOSPHATE SERPL-MCNC: 4.2 MG/DL (ref 2.6–4.7)
PHOSPHATE SERPL-MCNC: 4.3 MG/DL (ref 2.6–4.7)
PHOSPHATE SERPL-MCNC: 4.4 MG/DL (ref 2.6–4.7)
PLATELET # BLD AUTO: 213 K/UL (ref 150–400)
PLATELET # BLD AUTO: 231 K/UL (ref 150–400)
PLATELET # BLD AUTO: 232 K/UL (ref 150–400)
PLATELET # BLD AUTO: 235 K/UL (ref 150–400)
PLATELET # BLD AUTO: 242 K/UL (ref 150–400)
PLATELET # BLD AUTO: 249 K/UL (ref 150–400)
PLATELET # BLD AUTO: 259 K/UL (ref 150–400)
PLATELET # BLD AUTO: 270 K/UL (ref 150–400)
PLATELET # BLD AUTO: 307 K/UL (ref 150–400)
PLATELET # BLD AUTO: 324 K/UL (ref 150–400)
PLATELET # BLD AUTO: 357 K/UL (ref 150–400)
PLATELET # BLD AUTO: 475 K/UL (ref 150–400)
PLATELET # BLD AUTO: 621 K/UL (ref 150–400)
PLATELET # BLD AUTO: 626 K/UL (ref 150–400)
PLATELET # BLD AUTO: 641 K/UL (ref 150–400)
PLATELET # BLD AUTO: 675 K/UL (ref 150–400)
PLATELET # BLD AUTO: 735 K/UL (ref 150–400)
PLATELET # BLD AUTO: 792 K/UL (ref 150–400)
PLATELET # BLD AUTO: 794 K/UL (ref 150–400)
PLATELET # BLD AUTO: 814 K/UL (ref 150–400)
PLATELET # BLD AUTO: 822 K/UL (ref 150–400)
PLATELET # BLD AUTO: 828 K/UL (ref 150–400)
PLATELET # BLD AUTO: 851 K/UL (ref 150–400)
PLATELET # BLD AUTO: 870 K/UL (ref 150–400)
PLATELET # BLD AUTO: 893 K/UL (ref 150–400)
PLATELET # BLD AUTO: 907 K/UL (ref 150–400)
PLATELET # BLD AUTO: 985 K/UL (ref 150–400)
PLATELET # BLD AUTO: 990 K/UL (ref 150–400)
PLATELET COMMENTS,PCOM: ABNORMAL
PMV BLD AUTO: 10 FL (ref 8.9–12.9)
PMV BLD AUTO: 10.1 FL (ref 8.9–12.9)
PMV BLD AUTO: 10.2 FL (ref 8.9–12.9)
PMV BLD AUTO: 11.9 FL (ref 8.9–12.9)
PMV BLD AUTO: 8.9 FL (ref 8.9–12.9)
PMV BLD AUTO: 8.9 FL (ref 8.9–12.9)
PMV BLD AUTO: 9 FL (ref 8.9–12.9)
PMV BLD AUTO: 9 FL (ref 8.9–12.9)
PMV BLD AUTO: 9.1 FL (ref 8.9–12.9)
PMV BLD AUTO: 9.2 FL (ref 8.9–12.9)
PMV BLD AUTO: 9.3 FL (ref 8.9–12.9)
PMV BLD AUTO: 9.3 FL (ref 8.9–12.9)
PMV BLD AUTO: 9.4 FL (ref 8.9–12.9)
PMV BLD AUTO: 9.5 FL (ref 8.9–12.9)
PMV BLD AUTO: 9.5 FL (ref 8.9–12.9)
PMV BLD AUTO: 9.6 FL (ref 8.9–12.9)
PMV BLD AUTO: 9.7 FL (ref 8.9–12.9)
PMV BLD AUTO: 9.8 FL (ref 8.9–12.9)
PMV BLD AUTO: 9.8 FL (ref 8.9–12.9)
PMV BLD AUTO: 9.9 FL (ref 8.9–12.9)
PMV BLD AUTO: 9.9 FL (ref 8.9–12.9)
POTASSIUM SERPL-SCNC: 2.2 MMOL/L (ref 3.5–5.1)
POTASSIUM SERPL-SCNC: 2.7 MMOL/L (ref 3.5–5.1)
POTASSIUM SERPL-SCNC: 2.7 MMOL/L (ref 3.5–5.1)
POTASSIUM SERPL-SCNC: 2.8 MMOL/L (ref 3.5–5.1)
POTASSIUM SERPL-SCNC: 3.2 MMOL/L (ref 3.5–5.1)
POTASSIUM SERPL-SCNC: 3.2 MMOL/L (ref 3.5–5.1)
POTASSIUM SERPL-SCNC: 3.8 MMOL/L (ref 3.5–5.1)
POTASSIUM SERPL-SCNC: 3.8 MMOL/L (ref 3.5–5.1)
POTASSIUM SERPL-SCNC: 3.9 MMOL/L (ref 3.5–5.1)
POTASSIUM SERPL-SCNC: 3.9 MMOL/L (ref 3.5–5.1)
POTASSIUM SERPL-SCNC: 4 MMOL/L (ref 3.5–5.1)
POTASSIUM SERPL-SCNC: 4 MMOL/L (ref 3.5–5.1)
POTASSIUM SERPL-SCNC: 4.1 MMOL/L (ref 3.5–5.1)
POTASSIUM SERPL-SCNC: 4.1 MMOL/L (ref 3.5–5.1)
POTASSIUM SERPL-SCNC: 4.4 MMOL/L (ref 3.5–5.1)
POTASSIUM SERPL-SCNC: 4.7 MMOL/L (ref 3.5–5.1)
PROT SERPL-MCNC: 4.9 G/DL (ref 6.4–8.2)
PROT SERPL-MCNC: 5.3 G/DL (ref 6.4–8.2)
PROT SERPL-MCNC: 5.3 G/DL (ref 6.4–8.2)
PROT SERPL-MCNC: 5.4 G/DL (ref 6.4–8.2)
PROT SERPL-MCNC: 5.5 G/DL (ref 6.4–8.2)
PROT SERPL-MCNC: 5.5 G/DL (ref 6.4–8.2)
PROT SERPL-MCNC: 5.6 G/DL (ref 6.4–8.2)
PROT SERPL-MCNC: 5.7 G/DL (ref 6.4–8.2)
PROT SERPL-MCNC: 5.8 G/DL (ref 6.4–8.2)
PROT SERPL-MCNC: 5.8 G/DL (ref 6.4–8.2)
PROT SERPL-MCNC: 6.1 G/DL (ref 6.4–8.2)
PROT UR STRIP-MCNC: ABNORMAL MG/DL
PROT UR STRIP-MCNC: ABNORMAL MG/DL
PROT UR STRIP-MCNC: NEGATIVE MG/DL
PROTHROMBIN TIME: 11 SEC (ref 9–11.1)
Q-T INTERVAL, ECG07: 410 MS
QRS DURATION, ECG06: 86 MS
QTC CALCULATION (BEZET), ECG08: 457 MS
RBC # BLD AUTO: 2.64 M/UL (ref 3.8–5.2)
RBC # BLD AUTO: 2.72 M/UL (ref 3.8–5.2)
RBC # BLD AUTO: 2.74 M/UL (ref 3.8–5.2)
RBC # BLD AUTO: 2.75 M/UL (ref 3.8–5.2)
RBC # BLD AUTO: 2.76 M/UL (ref 3.8–5.2)
RBC # BLD AUTO: 2.78 M/UL (ref 3.8–5.2)
RBC # BLD AUTO: 2.8 M/UL (ref 3.8–5.2)
RBC # BLD AUTO: 2.81 M/UL (ref 3.8–5.2)
RBC # BLD AUTO: 2.81 M/UL (ref 3.8–5.2)
RBC # BLD AUTO: 2.85 M/UL (ref 3.8–5.2)
RBC # BLD AUTO: 2.86 M/UL (ref 3.8–5.2)
RBC # BLD AUTO: 2.87 M/UL (ref 3.8–5.2)
RBC # BLD AUTO: 2.88 M/UL (ref 3.8–5.2)
RBC # BLD AUTO: 2.9 M/UL (ref 3.8–5.2)
RBC # BLD AUTO: 2.93 M/UL (ref 3.8–5.2)
RBC # BLD AUTO: 2.94 M/UL (ref 3.8–5.2)
RBC # BLD AUTO: 2.95 M/UL (ref 3.8–5.2)
RBC # BLD AUTO: 2.97 M/UL (ref 3.8–5.2)
RBC # BLD AUTO: 3.04 M/UL (ref 3.8–5.2)
RBC # BLD AUTO: 3.04 M/UL (ref 3.8–5.2)
RBC # BLD AUTO: 3.05 M/UL (ref 3.8–5.2)
RBC # BLD AUTO: 3.06 M/UL (ref 3.8–5.2)
RBC # BLD AUTO: 3.06 M/UL (ref 3.8–5.2)
RBC # BLD AUTO: 3.08 M/UL (ref 3.8–5.2)
RBC # BLD AUTO: 3.09 M/UL (ref 3.8–5.2)
RBC # BLD AUTO: 3.21 M/UL (ref 3.8–5.2)
RBC # BLD AUTO: 3.36 M/UL (ref 3.8–5.2)
RBC # BLD AUTO: 3.41 M/UL (ref 3.8–5.2)
RBC #/AREA URNS HPF: ABNORMAL /HPF (ref 0–5)
RBC #/AREA URNS HPF: NORMAL /HPF (ref 0–5)
RBC #/AREA URNS HPF: NORMAL /HPF (ref 0–5)
RBC MORPH BLD: ABNORMAL
RETICS # AUTO: 0.01 M/UL (ref 0.02–0.08)
RETICS/RBC NFR AUTO: 0.4 % (ref 0.7–2.1)
SAMPLES BEING HELD,HOLD: NORMAL
SERVICE CMNT-IMP: ABNORMAL
SERVICE CMNT-IMP: NORMAL
SERVICE CMNT-IMP: NORMAL
SODIUM SERPL-SCNC: 130 MMOL/L (ref 136–145)
SODIUM SERPL-SCNC: 131 MMOL/L (ref 136–145)
SODIUM SERPL-SCNC: 132 MMOL/L (ref 136–145)
SODIUM SERPL-SCNC: 132 MMOL/L (ref 136–145)
SODIUM SERPL-SCNC: 133 MMOL/L (ref 136–145)
SODIUM SERPL-SCNC: 133 MMOL/L (ref 136–145)
SODIUM SERPL-SCNC: 135 MMOL/L (ref 136–145)
SODIUM SERPL-SCNC: 135 MMOL/L (ref 136–145)
SODIUM SERPL-SCNC: 136 MMOL/L (ref 136–145)
SODIUM SERPL-SCNC: 138 MMOL/L (ref 136–145)
SODIUM SERPL-SCNC: 139 MMOL/L (ref 136–145)
SP GR UR REFRACTOMETRY: 1.01 (ref 1–1.03)
SP GR UR REFRACTOMETRY: 1.02 (ref 1–1.03)
SP GR UR REFRACTOMETRY: 1.02 (ref 1–1.03)
SP GR UR REFRACTOMETRY: <1.005 (ref 1–1.03)
THERAPEUTIC RANGE,PTTT: NORMAL SECS (ref 58–77)
TIBC SERPL-MCNC: 237 UG/DL (ref 250–450)
UA: UC IF INDICATED,UAUC: ABNORMAL
UA: UC IF INDICATED,UAUC: NORMAL
UA: UC IF INDICATED,UAUC: NORMAL
UR CULT HOLD, URHOLD: NORMAL
URATE SERPL-MCNC: 4.6 MG/DL (ref 2.6–6)
URATE SERPL-MCNC: 5 MG/DL (ref 2.6–6)
URATE SERPL-MCNC: 5.1 MG/DL (ref 2.6–6)
URATE SERPL-MCNC: 5.2 MG/DL (ref 2.6–6)
URATE SERPL-MCNC: 5.2 MG/DL (ref 2.6–6)
URATE SERPL-MCNC: 5.4 MG/DL (ref 2.6–6)
URATE SERPL-MCNC: 6.4 MG/DL (ref 2.6–6)
URATE SERPL-MCNC: 6.9 MG/DL (ref 2.6–6)
UROBILINOGEN UR QL STRIP.AUTO: 0.2 EU/DL (ref 0.2–1)
UROBILINOGEN UR QL STRIP.AUTO: 1 EU/DL (ref 0.2–1)
VENTRICULAR RATE, ECG03: 75 BPM
VWF MULTIMERS PPP IB: NORMAL
WBC # BLD AUTO: 12.4 K/UL (ref 3.6–11)
WBC # BLD AUTO: 13.7 K/UL (ref 3.6–11)
WBC # BLD AUTO: 16.8 K/UL (ref 3.6–11)
WBC # BLD AUTO: 18.5 K/UL (ref 3.6–11)
WBC # BLD AUTO: 20.2 K/UL (ref 3.6–11)
WBC # BLD AUTO: 3.1 K/UL (ref 3.6–11)
WBC # BLD AUTO: 3.1 K/UL (ref 3.6–11)
WBC # BLD AUTO: 3.3 K/UL (ref 3.6–11)
WBC # BLD AUTO: 3.4 K/UL (ref 3.6–11)
WBC # BLD AUTO: 3.6 K/UL (ref 3.6–11)
WBC # BLD AUTO: 4.3 K/UL (ref 3.6–11)
WBC # BLD AUTO: 4.8 K/UL (ref 3.6–11)
WBC # BLD AUTO: 5.2 K/UL (ref 3.6–11)
WBC # BLD AUTO: 5.3 K/UL (ref 3.6–11)
WBC # BLD AUTO: 5.3 K/UL (ref 3.6–11)
WBC # BLD AUTO: 5.5 K/UL (ref 3.6–11)
WBC # BLD AUTO: 5.5 K/UL (ref 3.6–11)
WBC # BLD AUTO: 5.7 K/UL (ref 3.6–11)
WBC # BLD AUTO: 5.7 K/UL (ref 3.6–11)
WBC # BLD AUTO: 5.9 K/UL (ref 3.6–11)
WBC # BLD AUTO: 6.2 K/UL (ref 3.6–11)
WBC # BLD AUTO: 6.3 K/UL (ref 3.6–11)
WBC # BLD AUTO: 6.6 K/UL (ref 3.6–11)
WBC # BLD AUTO: 6.7 K/UL (ref 3.6–11)
WBC # BLD AUTO: 7.4 K/UL (ref 3.6–11)
WBC # BLD AUTO: 7.5 K/UL (ref 3.6–11)
WBC # BLD AUTO: 7.8 K/UL (ref 3.6–11)
WBC # BLD AUTO: 9.5 K/UL (ref 3.6–11)
WBC MORPH BLD: ABNORMAL
WBC URNS QL MICRO: ABNORMAL /HPF (ref 0–4)
WBC URNS QL MICRO: NORMAL /HPF (ref 0–4)
WBC URNS QL MICRO: NORMAL /HPF (ref 0–4)

## 2019-01-01 PROCEDURE — 74011250636 HC RX REV CODE- 250/636: Performed by: INTERNAL MEDICINE

## 2019-01-01 PROCEDURE — 84100 ASSAY OF PHOSPHORUS: CPT

## 2019-01-01 PROCEDURE — 81001 URINALYSIS AUTO W/SCOPE: CPT

## 2019-01-01 PROCEDURE — 85025 COMPLETE CBC W/AUTO DIFF WBC: CPT

## 2019-01-01 PROCEDURE — 36415 COLL VENOUS BLD VENIPUNCTURE: CPT

## 2019-01-01 PROCEDURE — 83735 ASSAY OF MAGNESIUM: CPT

## 2019-01-01 PROCEDURE — 77030012965 HC NDL HUBR BBMI -A

## 2019-01-01 PROCEDURE — 96417 CHEMO IV INFUS EACH ADDL SEQ: CPT

## 2019-01-01 PROCEDURE — 74011000250 HC RX REV CODE- 250: Performed by: INTERNAL MEDICINE

## 2019-01-01 PROCEDURE — 65270000029 HC RM PRIVATE

## 2019-01-01 PROCEDURE — 80053 COMPREHEN METABOLIC PANEL: CPT

## 2019-01-01 PROCEDURE — 96415 CHEMO IV INFUSION ADDL HR: CPT

## 2019-01-01 PROCEDURE — 84550 ASSAY OF BLOOD/URIC ACID: CPT

## 2019-01-01 PROCEDURE — 99284 EMERGENCY DEPT VISIT MOD MDM: CPT

## 2019-01-01 PROCEDURE — 74011000258 HC RX REV CODE- 258: Performed by: INTERNAL MEDICINE

## 2019-01-01 PROCEDURE — 96361 HYDRATE IV INFUSION ADD-ON: CPT

## 2019-01-01 PROCEDURE — 96416 CHEMO PROLONG INFUSE W/PUMP: CPT

## 2019-01-01 PROCEDURE — 74011000250 HC RX REV CODE- 250: Performed by: NURSE PRACTITIONER

## 2019-01-01 PROCEDURE — 96375 TX/PRO/DX INJ NEW DRUG ADDON: CPT

## 2019-01-01 PROCEDURE — 74011636320 HC RX REV CODE- 636/320: Performed by: RADIOLOGY

## 2019-01-01 PROCEDURE — 82962 GLUCOSE BLOOD TEST: CPT

## 2019-01-01 PROCEDURE — 74011250636 HC RX REV CODE- 250/636: Performed by: NURSE PRACTITIONER

## 2019-01-01 PROCEDURE — 96374 THER/PROPH/DIAG INJ IV PUSH: CPT

## 2019-01-01 PROCEDURE — 96366 THER/PROPH/DIAG IV INF ADDON: CPT

## 2019-01-01 PROCEDURE — 87086 URINE CULTURE/COLONY COUNT: CPT

## 2019-01-01 PROCEDURE — 96367 TX/PROPH/DG ADDL SEQ IV INF: CPT

## 2019-01-01 PROCEDURE — 96413 CHEMO IV INFUSION 1 HR: CPT

## 2019-01-01 PROCEDURE — 74011250636 HC RX REV CODE- 250/636

## 2019-01-01 PROCEDURE — 74011000258 HC RX REV CODE- 258: Performed by: RADIOLOGY

## 2019-01-01 PROCEDURE — 96365 THER/PROPH/DIAG IV INF INIT: CPT

## 2019-01-01 PROCEDURE — 71260 CT THORAX DX C+: CPT

## 2019-01-01 PROCEDURE — 83615 LACTATE (LD) (LDH) ENZYME: CPT

## 2019-01-01 PROCEDURE — 74177 CT ABD & PELVIS W/CONTRAST: CPT

## 2019-01-01 PROCEDURE — 96360 HYDRATION IV INFUSION INIT: CPT

## 2019-01-01 PROCEDURE — 74011250637 HC RX REV CODE- 250/637: Performed by: INTERNAL MEDICINE

## 2019-01-01 PROCEDURE — A9270 NON-COVERED ITEM OR SERVICE: HCPCS

## 2019-01-01 PROCEDURE — 85730 THROMBOPLASTIN TIME PARTIAL: CPT

## 2019-01-01 PROCEDURE — 74011250637 HC RX REV CODE- 250/637: Performed by: NURSE PRACTITIONER

## 2019-01-01 PROCEDURE — 36598 INJ W/FLUOR EVAL CV DEVICE: CPT

## 2019-01-01 PROCEDURE — 83036 HEMOGLOBIN GLYCOSYLATED A1C: CPT

## 2019-01-01 PROCEDURE — 96368 THER/DIAG CONCURRENT INF: CPT

## 2019-01-01 PROCEDURE — 85610 PROTHROMBIN TIME: CPT

## 2019-01-01 PROCEDURE — 85247 CLOT FACTOR VIII MULTIMETRIC: CPT

## 2019-01-01 PROCEDURE — 96372 THER/PROPH/DIAG INJ SC/IM: CPT

## 2019-01-01 PROCEDURE — 77030003560 HC NDL HUBR BARD -A: Performed by: RADIOLOGY

## 2019-01-01 PROCEDURE — 82728 ASSAY OF FERRITIN: CPT

## 2019-01-01 PROCEDURE — 85045 AUTOMATED RETICULOCYTE COUNT: CPT

## 2019-01-01 PROCEDURE — 80048 BASIC METABOLIC PNL TOTAL CA: CPT

## 2019-01-01 PROCEDURE — 93005 ELECTROCARDIOGRAM TRACING: CPT

## 2019-01-01 PROCEDURE — 83540 ASSAY OF IRON: CPT

## 2019-01-01 PROCEDURE — 93970 EXTREMITY STUDY: CPT

## 2019-01-01 PROCEDURE — 74182 MRI ABDOMEN W/CONTRAST: CPT

## 2019-01-01 PROCEDURE — 71046 X-RAY EXAM CHEST 2 VIEWS: CPT

## 2019-01-01 PROCEDURE — 36591 DRAW BLOOD OFF VENOUS DEVICE: CPT

## 2019-01-01 PROCEDURE — A9585 GADOBUTROL INJECTION: HCPCS | Performed by: INTERNAL MEDICINE

## 2019-01-01 RX ORDER — ACETAMINOPHEN 325 MG/1
650 TABLET ORAL AS NEEDED
Status: CANCELLED
Start: 2019-01-01

## 2019-01-01 RX ORDER — SODIUM CHLORIDE 9 MG/ML
25 INJECTION, SOLUTION INTRAVENOUS AS NEEDED
Status: DISCONTINUED | OUTPATIENT
Start: 2019-01-01 | End: 2019-01-01 | Stop reason: HOSPADM

## 2019-01-01 RX ORDER — POTASSIUM CHLORIDE 7.45 MG/ML
10 INJECTION INTRAVENOUS
Status: DISPENSED | OUTPATIENT
Start: 2019-01-01 | End: 2019-01-01

## 2019-01-01 RX ORDER — POTASSIUM CHLORIDE 750 MG/1
40 TABLET, FILM COATED, EXTENDED RELEASE ORAL
Status: COMPLETED | OUTPATIENT
Start: 2019-01-01 | End: 2019-01-01

## 2019-01-01 RX ORDER — HEPARIN 100 UNIT/ML
500 SYRINGE INTRAVENOUS AS NEEDED
Status: CANCELLED | OUTPATIENT
Start: 2019-01-01

## 2019-01-01 RX ORDER — PALONOSETRON 0.05 MG/ML
0.25 INJECTION, SOLUTION INTRAVENOUS ONCE
Status: CANCELLED
Start: 2019-01-01

## 2019-01-01 RX ORDER — SODIUM CHLORIDE 0.9 % (FLUSH) 0.9 %
5-10 SYRINGE (ML) INJECTION AS NEEDED
Status: DISCONTINUED | OUTPATIENT
Start: 2019-01-01 | End: 2019-01-01 | Stop reason: HOSPADM

## 2019-01-01 RX ORDER — HEPARIN 100 UNIT/ML
300-500 SYRINGE INTRAVENOUS AS NEEDED
Status: DISCONTINUED | OUTPATIENT
Start: 2019-01-01 | End: 2019-01-01 | Stop reason: HOSPADM

## 2019-01-01 RX ORDER — ATROPINE SULFATE 0.4 MG/ML
0.4 INJECTION, SOLUTION ENDOTRACHEAL; INTRAMEDULLARY; INTRAMUSCULAR; INTRAVENOUS; SUBCUTANEOUS
Status: CANCELLED | OUTPATIENT
Start: 2019-01-01

## 2019-01-01 RX ORDER — LIDOCAINE AND PRILOCAINE 25; 25 MG/G; MG/G
CREAM TOPICAL AS NEEDED
Qty: 30 G | Refills: 0 | Status: SHIPPED | OUTPATIENT
Start: 2019-01-01

## 2019-01-01 RX ORDER — DIPHENOXYLATE HYDROCHLORIDE AND ATROPINE SULFATE 2.5; .025 MG/1; MG/1
1 TABLET ORAL
Qty: 45 TAB | Refills: 1 | Status: SHIPPED | OUTPATIENT
Start: 2019-01-01

## 2019-01-01 RX ORDER — SODIUM CHLORIDE 9 MG/ML
10 INJECTION INTRAMUSCULAR; INTRAVENOUS; SUBCUTANEOUS AS NEEDED
Status: DISCONTINUED | OUTPATIENT
Start: 2019-01-01 | End: 2019-01-01 | Stop reason: HOSPADM

## 2019-01-01 RX ORDER — DIPHENHYDRAMINE HYDROCHLORIDE 50 MG/ML
50 INJECTION, SOLUTION INTRAMUSCULAR; INTRAVENOUS AS NEEDED
Status: CANCELLED
Start: 2019-01-01

## 2019-01-01 RX ORDER — PROCHLORPERAZINE EDISYLATE 5 MG/ML
10 INJECTION INTRAMUSCULAR; INTRAVENOUS SEE ADMIN INSTRUCTIONS
Status: CANCELLED | OUTPATIENT
Start: 2019-01-01

## 2019-01-01 RX ORDER — ONDANSETRON HYDROCHLORIDE 8 MG/1
8 TABLET, FILM COATED ORAL
Qty: 24 TAB | Refills: 3 | Status: ON HOLD | OUTPATIENT
Start: 2019-01-01 | End: 2020-01-01 | Stop reason: SDUPTHER

## 2019-01-01 RX ORDER — SODIUM CHLORIDE 9 MG/ML
10 INJECTION INTRAMUSCULAR; INTRAVENOUS; SUBCUTANEOUS AS NEEDED
Status: CANCELLED | OUTPATIENT
Start: 2019-01-01

## 2019-01-01 RX ORDER — SODIUM CHLORIDE 9 MG/ML
25 INJECTION, SOLUTION INTRAVENOUS CONTINUOUS
Status: CANCELLED
Start: 2019-01-01

## 2019-01-01 RX ORDER — SODIUM CHLORIDE 0.9 % (FLUSH) 0.9 %
10 SYRINGE (ML) INJECTION
Status: COMPLETED | OUTPATIENT
Start: 2019-01-01 | End: 2019-01-01

## 2019-01-01 RX ORDER — DIPHENHYDRAMINE HYDROCHLORIDE 50 MG/ML
50 INJECTION, SOLUTION INTRAMUSCULAR; INTRAVENOUS AS NEEDED
Status: ACTIVE | OUTPATIENT
Start: 2019-01-01 | End: 2019-01-01

## 2019-01-01 RX ORDER — SODIUM CHLORIDE 0.9 % (FLUSH) 0.9 %
10 SYRINGE (ML) INJECTION AS NEEDED
Status: ACTIVE | OUTPATIENT
Start: 2019-01-01 | End: 2019-01-01

## 2019-01-01 RX ORDER — PROCHLORPERAZINE EDISYLATE 5 MG/ML
10 INJECTION INTRAMUSCULAR; INTRAVENOUS SEE ADMIN INSTRUCTIONS
Status: DISCONTINUED | OUTPATIENT
Start: 2019-01-01 | End: 2019-01-01 | Stop reason: HOSPADM

## 2019-01-01 RX ORDER — PROCHLORPERAZINE MALEATE 10 MG
10 TABLET ORAL
Qty: 50 TAB | Refills: 5 | Status: SHIPPED | OUTPATIENT
Start: 2019-01-01 | End: 2020-01-01

## 2019-01-01 RX ORDER — ONDANSETRON 2 MG/ML
8 INJECTION INTRAMUSCULAR; INTRAVENOUS AS NEEDED
Status: CANCELLED | OUTPATIENT
Start: 2019-01-01

## 2019-01-01 RX ORDER — POTASSIUM CHLORIDE 750 MG/1
40 TABLET, FILM COATED, EXTENDED RELEASE ORAL EVERY 4 HOURS
Status: DISCONTINUED | OUTPATIENT
Start: 2019-01-01 | End: 2019-01-01 | Stop reason: HOSPADM

## 2019-01-01 RX ORDER — ONDANSETRON 2 MG/ML
8 INJECTION INTRAMUSCULAR; INTRAVENOUS SEE ADMIN INSTRUCTIONS
Status: DISCONTINUED | OUTPATIENT
Start: 2019-01-01 | End: 2019-01-01 | Stop reason: HOSPADM

## 2019-01-01 RX ORDER — HEPARIN 100 UNIT/ML
300-500 SYRINGE INTRAVENOUS AS NEEDED
Status: CANCELLED
Start: 2019-01-01

## 2019-01-01 RX ORDER — HEPARIN 100 UNIT/ML
500 SYRINGE INTRAVENOUS AS NEEDED
Status: ACTIVE | OUTPATIENT
Start: 2019-01-01 | End: 2019-01-01

## 2019-01-01 RX ORDER — SODIUM CHLORIDE 9 MG/ML
25 INJECTION, SOLUTION INTRAVENOUS ONCE
Status: COMPLETED | OUTPATIENT
Start: 2019-01-01 | End: 2019-01-01

## 2019-01-01 RX ORDER — ALBUTEROL SULFATE 0.83 MG/ML
2.5 SOLUTION RESPIRATORY (INHALATION) AS NEEDED
Status: CANCELLED
Start: 2019-01-01

## 2019-01-01 RX ORDER — SODIUM CHLORIDE 0.9 % (FLUSH) 0.9 %
10 SYRINGE (ML) INJECTION AS NEEDED
Status: CANCELLED
Start: 2019-01-01

## 2019-01-01 RX ORDER — HYDROCORTISONE SODIUM SUCCINATE 100 MG/2ML
100 INJECTION, POWDER, FOR SOLUTION INTRAMUSCULAR; INTRAVENOUS AS NEEDED
Status: ACTIVE | OUTPATIENT
Start: 2019-01-01 | End: 2019-01-01

## 2019-01-01 RX ORDER — PROCHLORPERAZINE EDISYLATE 5 MG/ML
10 INJECTION INTRAMUSCULAR; INTRAVENOUS SEE ADMIN INSTRUCTIONS
Status: CANCELLED
Start: 2019-01-01

## 2019-01-01 RX ORDER — DEXTROSE MONOHYDRATE 50 MG/ML
25 INJECTION, SOLUTION INTRAVENOUS CONTINUOUS
Status: CANCELLED
Start: 2019-01-01

## 2019-01-01 RX ORDER — SODIUM CHLORIDE 0.9 % (FLUSH) 0.9 %
5-10 SYRINGE (ML) INJECTION AS NEEDED
Status: CANCELLED | OUTPATIENT
Start: 2019-01-01

## 2019-01-01 RX ORDER — SODIUM CHLORIDE 9 MG/ML
25 INJECTION, SOLUTION INTRAVENOUS ONCE
Status: DISPENSED | OUTPATIENT
Start: 2019-01-01 | End: 2019-01-01

## 2019-01-01 RX ORDER — POTASSIUM CHLORIDE 20 MEQ/1
TABLET, EXTENDED RELEASE ORAL
Qty: 30 TAB | Refills: 0 | Status: SHIPPED | OUTPATIENT
Start: 2019-01-01 | End: 2019-01-01

## 2019-01-01 RX ORDER — ONDANSETRON HYDROCHLORIDE 8 MG/1
8 TABLET, FILM COATED ORAL
Qty: 24 TAB | Refills: 3 | Status: SHIPPED | OUTPATIENT
Start: 2019-01-01 | End: 2019-01-01 | Stop reason: SDUPTHER

## 2019-01-01 RX ORDER — SODIUM CHLORIDE 9 MG/ML
10 INJECTION INTRAMUSCULAR; INTRAVENOUS; SUBCUTANEOUS AS NEEDED
Status: ACTIVE | OUTPATIENT
Start: 2019-01-01 | End: 2019-01-01

## 2019-01-01 RX ORDER — DEXAMETHASONE SODIUM PHOSPHATE 4 MG/ML
8 INJECTION, SOLUTION INTRA-ARTICULAR; INTRALESIONAL; INTRAMUSCULAR; INTRAVENOUS; SOFT TISSUE ONCE
Status: COMPLETED | OUTPATIENT
Start: 2019-01-01 | End: 2019-01-01

## 2019-01-01 RX ORDER — SODIUM CHLORIDE 0.9 % (FLUSH) 0.9 %
5-40 SYRINGE (ML) INJECTION AS NEEDED
Status: DISCONTINUED | OUTPATIENT
Start: 2019-01-01 | End: 2019-01-01 | Stop reason: HOSPADM

## 2019-01-01 RX ORDER — HEPARIN 100 UNIT/ML
300-500 SYRINGE INTRAVENOUS AS NEEDED
Status: ACTIVE | OUTPATIENT
Start: 2019-01-01 | End: 2019-01-01

## 2019-01-01 RX ORDER — HEPARIN 100 UNIT/ML
500 SYRINGE INTRAVENOUS AS NEEDED
Status: DISCONTINUED | OUTPATIENT
Start: 2019-01-01 | End: 2019-01-01 | Stop reason: HOSPADM

## 2019-01-01 RX ORDER — EPINEPHRINE 1 MG/ML
0.3 INJECTION, SOLUTION, CONCENTRATE INTRAVENOUS AS NEEDED
Status: CANCELLED | OUTPATIENT
Start: 2019-01-01

## 2019-01-01 RX ORDER — HYDROCORTISONE SODIUM SUCCINATE 100 MG/2ML
100 INJECTION, POWDER, FOR SOLUTION INTRAMUSCULAR; INTRAVENOUS AS NEEDED
Status: CANCELLED | OUTPATIENT
Start: 2019-01-01

## 2019-01-01 RX ORDER — ONDANSETRON 2 MG/ML
8 INJECTION INTRAMUSCULAR; INTRAVENOUS SEE ADMIN INSTRUCTIONS
Status: CANCELLED | OUTPATIENT
Start: 2019-01-01

## 2019-01-01 RX ORDER — PROCHLORPERAZINE EDISYLATE 5 MG/ML
10 INJECTION INTRAMUSCULAR; INTRAVENOUS
Status: COMPLETED | OUTPATIENT
Start: 2019-01-01 | End: 2019-01-01

## 2019-01-01 RX ORDER — SODIUM CHLORIDE 0.9 % (FLUSH) 0.9 %
5-10 SYRINGE (ML) INJECTION AS NEEDED
Status: ACTIVE | OUTPATIENT
Start: 2019-01-01 | End: 2019-01-01

## 2019-01-01 RX ORDER — DEXTROSE MONOHYDRATE 50 MG/ML
25 INJECTION, SOLUTION INTRAVENOUS CONTINUOUS
Status: DISPENSED | OUTPATIENT
Start: 2019-01-01 | End: 2019-01-01

## 2019-01-01 RX ORDER — POTASSIUM CHLORIDE 750 MG/1
60 TABLET, FILM COATED, EXTENDED RELEASE ORAL
Status: COMPLETED | OUTPATIENT
Start: 2019-01-01 | End: 2019-01-01

## 2019-01-01 RX ORDER — SODIUM CHLORIDE 0.9 % (FLUSH) 0.9 %
5-40 SYRINGE (ML) INJECTION EVERY 8 HOURS
Status: DISCONTINUED | OUTPATIENT
Start: 2019-01-01 | End: 2019-01-01 | Stop reason: HOSPADM

## 2019-01-01 RX ORDER — POTASSIUM CHLORIDE AND SODIUM CHLORIDE 900; 300 MG/100ML; MG/100ML
INJECTION, SOLUTION INTRAVENOUS CONTINUOUS
Status: DISCONTINUED | OUTPATIENT
Start: 2019-01-01 | End: 2019-01-01 | Stop reason: HOSPADM

## 2019-01-01 RX ORDER — HYDROCHLOROTHIAZIDE 12.5 MG/1
6.25 TABLET ORAL DAILY
Qty: 45 TAB | Refills: 3 | Status: SHIPPED | OUTPATIENT
Start: 2019-01-01 | End: 2019-01-01

## 2019-01-01 RX ORDER — ATROPINE SULFATE 0.4 MG/ML
0.4 INJECTION, SOLUTION ENDOTRACHEAL; INTRAMEDULLARY; INTRAMUSCULAR; INTRAVENOUS; SUBCUTANEOUS
Status: COMPLETED | OUTPATIENT
Start: 2019-01-01 | End: 2019-01-01

## 2019-01-01 RX ORDER — INSULIN DEGLUDEC INJECTION 200 U/ML
INJECTION, SOLUTION SUBCUTANEOUS
Refills: 11 | COMMUNITY
Start: 2019-02-28 | End: 2020-01-01

## 2019-01-01 RX ORDER — SODIUM CHLORIDE 0.9 % (FLUSH) 0.9 %
10 SYRINGE (ML) INJECTION AS NEEDED
Status: DISCONTINUED | OUTPATIENT
Start: 2019-01-01 | End: 2019-01-01 | Stop reason: HOSPADM

## 2019-01-01 RX ORDER — ONDANSETRON 2 MG/ML
8 INJECTION INTRAMUSCULAR; INTRAVENOUS ONCE
Status: COMPLETED | OUTPATIENT
Start: 2019-01-01 | End: 2019-01-01

## 2019-01-01 RX ORDER — DEXAMETHASONE SODIUM PHOSPHATE 4 MG/ML
8 INJECTION, SOLUTION INTRA-ARTICULAR; INTRALESIONAL; INTRAMUSCULAR; INTRAVENOUS; SOFT TISSUE ONCE
Status: ACTIVE | OUTPATIENT
Start: 2019-01-01 | End: 2019-01-01

## 2019-01-01 RX ORDER — DULOXETIN HYDROCHLORIDE 30 MG/1
30 CAPSULE, DELAYED RELEASE ORAL DAILY
Qty: 30 CAP | Refills: 3 | Status: SHIPPED | OUTPATIENT
Start: 2019-01-01 | End: 2019-01-01

## 2019-01-01 RX ORDER — DEXAMETHASONE SODIUM PHOSPHATE 4 MG/ML
8 INJECTION, SOLUTION INTRA-ARTICULAR; INTRALESIONAL; INTRAMUSCULAR; INTRAVENOUS; SOFT TISSUE ONCE
Status: DISCONTINUED | OUTPATIENT
Start: 2019-01-01 | End: 2019-01-01

## 2019-01-01 RX ORDER — PALONOSETRON 0.05 MG/ML
0.25 INJECTION, SOLUTION INTRAVENOUS ONCE
Status: COMPLETED | OUTPATIENT
Start: 2019-01-01 | End: 2019-01-01

## 2019-01-01 RX ORDER — MAGNESIUM SULFATE 100 %
4 CRYSTALS MISCELLANEOUS AS NEEDED
Status: DISCONTINUED | OUTPATIENT
Start: 2019-01-01 | End: 2019-01-01 | Stop reason: HOSPADM

## 2019-01-01 RX ORDER — POTASSIUM CHLORIDE 14.9 MG/ML
20 INJECTION INTRAVENOUS
Status: COMPLETED | OUTPATIENT
Start: 2019-01-01 | End: 2019-01-01

## 2019-01-01 RX ORDER — ATROPINE SULFATE 0.4 MG/ML
0.4 INJECTION, SOLUTION ENDOTRACHEAL; INTRAMEDULLARY; INTRAMUSCULAR; INTRAVENOUS; SUBCUTANEOUS
Status: DISCONTINUED | OUTPATIENT
Start: 2019-01-01 | End: 2019-01-01

## 2019-01-01 RX ORDER — SODIUM CHLORIDE 9 MG/ML
100 INJECTION, SOLUTION INTRAVENOUS CONTINUOUS
Status: DISCONTINUED | OUTPATIENT
Start: 2019-01-01 | End: 2019-01-01

## 2019-01-01 RX ORDER — HYDROCHLOROTHIAZIDE 12.5 MG/1
6.25 TABLET ORAL DAILY
COMMUNITY
End: 2019-01-01 | Stop reason: SDUPTHER

## 2019-01-01 RX ORDER — ONDANSETRON 2 MG/ML
8 INJECTION INTRAMUSCULAR; INTRAVENOUS SEE ADMIN INSTRUCTIONS
Status: CANCELLED
Start: 2019-01-01

## 2019-01-01 RX ORDER — OCTREOTIDE ACETATE 100 UG/ML
100 INJECTION, SOLUTION INTRAVENOUS; SUBCUTANEOUS
Status: COMPLETED | OUTPATIENT
Start: 2019-01-01 | End: 2019-01-01

## 2019-01-01 RX ORDER — PREDNISONE 10 MG/1
TABLET ORAL SEE ADMIN INSTRUCTIONS
COMMUNITY
End: 2019-01-01 | Stop reason: ALTCHOICE

## 2019-01-01 RX ORDER — LIDOCAINE AND PRILOCAINE 25; 25 MG/G; MG/G
CREAM TOPICAL AS NEEDED
Status: DISCONTINUED | OUTPATIENT
Start: 2019-01-01 | End: 2019-01-01 | Stop reason: HOSPADM

## 2019-01-01 RX ORDER — POTASSIUM CHLORIDE 1500 MG/1
20 TABLET, FILM COATED, EXTENDED RELEASE ORAL 2 TIMES DAILY
Qty: 20 TAB | Refills: 0 | Status: SHIPPED | OUTPATIENT
Start: 2019-01-01 | End: 2020-01-01

## 2019-01-01 RX ORDER — DEXTROSE MONOHYDRATE 100 MG/ML
0-250 INJECTION, SOLUTION INTRAVENOUS AS NEEDED
Status: DISCONTINUED | OUTPATIENT
Start: 2019-01-01 | End: 2019-01-01 | Stop reason: HOSPADM

## 2019-01-01 RX ORDER — PANTOPRAZOLE SODIUM 40 MG/1
40 TABLET, DELAYED RELEASE ORAL
Status: DISCONTINUED | OUTPATIENT
Start: 2019-01-01 | End: 2019-01-01 | Stop reason: HOSPADM

## 2019-01-01 RX ORDER — HYDROCHLOROTHIAZIDE 12.5 MG/1
12.5 TABLET ORAL DAILY
COMMUNITY
End: 2019-01-01

## 2019-01-01 RX ORDER — LIDOCAINE AND PRILOCAINE 25; 25 MG/G; MG/G
CREAM TOPICAL AS NEEDED
Qty: 30 G | Refills: 0 | Status: SHIPPED | OUTPATIENT
Start: 2019-01-01 | End: 2019-01-01 | Stop reason: SDUPTHER

## 2019-01-01 RX ADMIN — SODIUM CHLORIDE 25 ML/HR: 900 INJECTION, SOLUTION INTRAVENOUS at 12:25

## 2019-01-01 RX ADMIN — GEMCITABINE 1960 MG: 38 INJECTION, SOLUTION INTRAVENOUS at 15:11

## 2019-01-01 RX ADMIN — DEXAMETHASONE SODIUM PHOSPHATE 8 MG: 4 INJECTION, SOLUTION INTRA-ARTICULAR; INTRALESIONAL; INTRAMUSCULAR; INTRAVENOUS; SOFT TISSUE at 12:25

## 2019-01-01 RX ADMIN — PACLITAXEL 245 MG: 100 INJECTION, POWDER, LYOPHILIZED, FOR SUSPENSION INTRAVENOUS at 12:55

## 2019-01-01 RX ADMIN — FERRIC CARBOXYMALTOSE INJECTION 750 MG: 50 INJECTION, SOLUTION INTRAVENOUS at 14:41

## 2019-01-01 RX ADMIN — DEXAMETHASONE SODIUM PHOSPHATE 8 MG: 4 INJECTION, SOLUTION INTRA-ARTICULAR; INTRALESIONAL; INTRAMUSCULAR; INTRAVENOUS; SOFT TISSUE at 13:20

## 2019-01-01 RX ADMIN — PACLITAXEL 245 MG: 100 INJECTION, POWDER, LYOPHILIZED, FOR SUSPENSION INTRAVENOUS at 13:04

## 2019-01-01 RX ADMIN — GEMCITABINE HYDROCHLORIDE 1990 MG: 2 INJECTION, SOLUTION INTRAVENOUS at 14:06

## 2019-01-01 RX ADMIN — PACLITAXEL 250 MG: 100 INJECTION, POWDER, LYOPHILIZED, FOR SUSPENSION INTRAVENOUS at 13:35

## 2019-01-01 RX ADMIN — GEMCITABINE 1960 MG: 38 INJECTION, SOLUTION INTRAVENOUS at 14:30

## 2019-01-01 RX ADMIN — DEXTROSE MONOHYDRATE 291 MG: 5 INJECTION, SOLUTION INTRAVENOUS at 12:44

## 2019-01-01 RX ADMIN — POTASSIUM CHLORIDE 60 MEQ: 750 TABLET, FILM COATED, EXTENDED RELEASE ORAL at 13:43

## 2019-01-01 RX ADMIN — Medication 10 ML: at 10:21

## 2019-01-01 RX ADMIN — Medication 10 ML: at 14:40

## 2019-01-01 RX ADMIN — SODIUM CHLORIDE 1000 ML: 900 INJECTION, SOLUTION INTRAVENOUS at 11:26

## 2019-01-01 RX ADMIN — POTASSIUM CHLORIDE 40 MEQ: 750 TABLET, FILM COATED, EXTENDED RELEASE ORAL at 13:57

## 2019-01-01 RX ADMIN — Medication 10 ML: at 15:35

## 2019-01-01 RX ADMIN — GEMCITABINE 1960 MG: 38 INJECTION, SOLUTION INTRAVENOUS at 15:06

## 2019-01-01 RX ADMIN — GEMCITABINE 1960 MG: 38 INJECTION, SOLUTION INTRAVENOUS at 14:14

## 2019-01-01 RX ADMIN — SODIUM CHLORIDE 1000 ML: 900 INJECTION, SOLUTION INTRAVENOUS at 11:51

## 2019-01-01 RX ADMIN — POTASSIUM CHLORIDE 10 MEQ: 7.46 INJECTION, SOLUTION INTRAVENOUS at 10:48

## 2019-01-01 RX ADMIN — Medication 500 UNITS: at 14:45

## 2019-01-01 RX ADMIN — DEXAMETHASONE SODIUM PHOSPHATE 8 MG: 4 INJECTION, SOLUTION INTRA-ARTICULAR; INTRALESIONAL; INTRAMUSCULAR; INTRAVENOUS; SOFT TISSUE at 12:26

## 2019-01-01 RX ADMIN — SODIUM CHLORIDE 10 ML: 9 INJECTION, SOLUTION INTRAMUSCULAR; INTRAVENOUS; SUBCUTANEOUS at 10:05

## 2019-01-01 RX ADMIN — SODIUM CHLORIDE 10 ML: 9 INJECTION INTRAMUSCULAR; INTRAVENOUS; SUBCUTANEOUS at 15:45

## 2019-01-01 RX ADMIN — Medication 10 ML: at 12:20

## 2019-01-01 RX ADMIN — Medication 10 ML: at 16:20

## 2019-01-01 RX ADMIN — Medication 10 ML: at 10:06

## 2019-01-01 RX ADMIN — Medication 10 ML: at 00:30

## 2019-01-01 RX ADMIN — PACLITAXEL 250 MG: 100 INJECTION, POWDER, LYOPHILIZED, FOR SUSPENSION INTRAVENOUS at 13:05

## 2019-01-01 RX ADMIN — GEMCITABINE HYDROCHLORIDE 1960 MG: 2 INJECTION, SOLUTION INTRAVENOUS at 14:02

## 2019-01-01 RX ADMIN — POTASSIUM CHLORIDE 10 MEQ: 7.46 INJECTION, SOLUTION INTRAVENOUS at 08:34

## 2019-01-01 RX ADMIN — SODIUM CHLORIDE 1000 ML: 900 INJECTION, SOLUTION INTRAVENOUS at 13:30

## 2019-01-01 RX ADMIN — DEXAMETHASONE SODIUM PHOSPHATE 8 MG: 4 INJECTION, SOLUTION INTRAMUSCULAR; INTRAVENOUS at 13:43

## 2019-01-01 RX ADMIN — ONDANSETRON 8 MG: 2 INJECTION, SOLUTION INTRAMUSCULAR; INTRAVENOUS at 11:19

## 2019-01-01 RX ADMIN — IOPAMIDOL 100 ML: 755 INJECTION, SOLUTION INTRAVENOUS at 13:33

## 2019-01-01 RX ADMIN — DEXAMETHASONE SODIUM PHOSPHATE 8 MG: 4 INJECTION, SOLUTION INTRAMUSCULAR; INTRAVENOUS at 13:45

## 2019-01-01 RX ADMIN — SODIUM CHLORIDE 10 ML: 9 INJECTION, SOLUTION INTRAMUSCULAR; INTRAVENOUS; SUBCUTANEOUS at 11:41

## 2019-01-01 RX ADMIN — OCTREOTIDE ACETATE 100 MCG: 100 INJECTION, SOLUTION INTRAVENOUS; SUBCUTANEOUS at 09:56

## 2019-01-01 RX ADMIN — DEXAMETHASONE SODIUM PHOSPHATE 8 MG: 4 INJECTION, SOLUTION INTRAMUSCULAR; INTRAVENOUS at 12:26

## 2019-01-01 RX ADMIN — HEPARIN 500 UNITS: 100 SYRINGE at 15:45

## 2019-01-01 RX ADMIN — GEMCITABINE 1960 MG: 38 INJECTION, SOLUTION INTRAVENOUS at 14:44

## 2019-01-01 RX ADMIN — Medication 500 UNITS: at 15:35

## 2019-01-01 RX ADMIN — SODIUM CHLORIDE 100 ML: 900 INJECTION, SOLUTION INTRAVENOUS at 11:36

## 2019-01-01 RX ADMIN — SODIUM CHLORIDE 10 ML: 9 INJECTION, SOLUTION INTRAMUSCULAR; INTRAVENOUS; SUBCUTANEOUS at 11:50

## 2019-01-01 RX ADMIN — GADOBUTROL 6 ML: 604.72 INJECTION INTRAVENOUS at 11:53

## 2019-01-01 RX ADMIN — SODIUM CHLORIDE 1000 ML: 900 INJECTION, SOLUTION INTRAVENOUS at 11:00

## 2019-01-01 RX ADMIN — PACLITAXEL 245 MG: 100 INJECTION, POWDER, LYOPHILIZED, FOR SUSPENSION INTRAVENOUS at 14:20

## 2019-01-01 RX ADMIN — DEXTROSE MONOHYDRATE 25 ML/HR: 5 INJECTION, SOLUTION INTRAVENOUS at 11:39

## 2019-01-01 RX ADMIN — Medication 10 ML: at 14:10

## 2019-01-01 RX ADMIN — Medication 10 ML: at 14:37

## 2019-01-01 RX ADMIN — GEMCITABINE HYDROCHLORIDE 1960 MG: 2 INJECTION, SOLUTION INTRAVENOUS at 16:00

## 2019-01-01 RX ADMIN — SODIUM CHLORIDE 1000 ML: 900 INJECTION, SOLUTION INTRAVENOUS at 09:00

## 2019-01-01 RX ADMIN — SODIUM CHLORIDE 1000 ML: 900 INJECTION, SOLUTION INTRAVENOUS at 14:45

## 2019-01-01 RX ADMIN — PACLITAXEL 250 MG: 100 INJECTION, POWDER, LYOPHILIZED, FOR SUSPENSION INTRAVENOUS at 13:41

## 2019-01-01 RX ADMIN — DEXTROSE MONOHYDRATE 25 ML/HR: 5 INJECTION, SOLUTION INTRAVENOUS at 10:30

## 2019-01-01 RX ADMIN — IOPAMIDOL 100 ML: 755 INJECTION, SOLUTION INTRAVENOUS at 14:37

## 2019-01-01 RX ADMIN — ONDANSETRON 8 MG: 2 INJECTION, SOLUTION INTRAMUSCULAR; INTRAVENOUS at 09:40

## 2019-01-01 RX ADMIN — DEXAMETHASONE SODIUM PHOSPHATE 8 MG: 4 INJECTION, SOLUTION INTRA-ARTICULAR; INTRALESIONAL; INTRAMUSCULAR; INTRAVENOUS; SOFT TISSUE at 12:35

## 2019-01-01 RX ADMIN — DEXAMETHASONE SODIUM PHOSPHATE 12 MG: 4 INJECTION, SOLUTION INTRA-ARTICULAR; INTRALESIONAL; INTRAMUSCULAR; INTRAVENOUS; SOFT TISSUE at 10:50

## 2019-01-01 RX ADMIN — GEMCITABINE 1960 MG: 38 INJECTION, SOLUTION INTRAVENOUS at 15:18

## 2019-01-01 RX ADMIN — POTASSIUM CHLORIDE 20 MEQ: 14.9 INJECTION, SOLUTION INTRAVENOUS at 13:10

## 2019-01-01 RX ADMIN — SODIUM CHLORIDE 10 ML: 9 INJECTION, SOLUTION INTRAMUSCULAR; INTRAVENOUS; SUBCUTANEOUS at 09:22

## 2019-01-01 RX ADMIN — PACLITAXEL 245 MG: 100 INJECTION, POWDER, LYOPHILIZED, FOR SUSPENSION INTRAVENOUS at 14:21

## 2019-01-01 RX ADMIN — PACLITAXEL 245 MG: 100 INJECTION, POWDER, LYOPHILIZED, FOR SUSPENSION INTRAVENOUS at 15:12

## 2019-01-01 RX ADMIN — SODIUM CHLORIDE 10 ML: 9 INJECTION, SOLUTION INTRAMUSCULAR; INTRAVENOUS; SUBCUTANEOUS at 12:24

## 2019-01-01 RX ADMIN — PROCHLORPERAZINE EDISYLATE 10 MG: 5 INJECTION INTRAMUSCULAR; INTRAVENOUS at 12:04

## 2019-01-01 RX ADMIN — Medication 500 UNITS: at 15:40

## 2019-01-01 RX ADMIN — Medication 10 ML: at 15:08

## 2019-01-01 RX ADMIN — Medication 500 UNITS: at 14:50

## 2019-01-01 RX ADMIN — DEXAMETHASONE SODIUM PHOSPHATE 8 MG: 4 INJECTION, SOLUTION INTRAMUSCULAR; INTRAVENOUS at 14:09

## 2019-01-01 RX ADMIN — GEMCITABINE HYDROCHLORIDE 1990 MG: 2 INJECTION, SOLUTION INTRAVENOUS at 15:00

## 2019-01-01 RX ADMIN — SODIUM CHLORIDE 10 ML: 9 INJECTION INTRAMUSCULAR; INTRAVENOUS; SUBCUTANEOUS at 13:32

## 2019-01-01 RX ADMIN — OXALIPLATIN 165 MG: 5 INJECTION, SOLUTION, CONCENTRATE INTRAVENOUS at 10:36

## 2019-01-01 RX ADMIN — PACLITAXEL 245 MG: 100 INJECTION, POWDER, LYOPHILIZED, FOR SUSPENSION INTRAVENOUS at 13:53

## 2019-01-01 RX ADMIN — Medication 10 ML: at 10:00

## 2019-01-01 RX ADMIN — GEMCITABINE HYDROCHLORIDE 1960 MG: 2 INJECTION, SOLUTION INTRAVENOUS at 15:15

## 2019-01-01 RX ADMIN — HEPARIN 500 UNITS: 100 SYRINGE at 10:00

## 2019-01-01 RX ADMIN — Medication 10 ML: at 11:15

## 2019-01-01 RX ADMIN — HEPARIN 500 UNITS: 100 SYRINGE at 12:20

## 2019-01-01 RX ADMIN — Medication 500 UNITS: at 14:16

## 2019-01-01 RX ADMIN — SODIUM CHLORIDE 10 ML: 9 INJECTION, SOLUTION INTRAMUSCULAR; INTRAVENOUS; SUBCUTANEOUS at 12:25

## 2019-01-01 RX ADMIN — SODIUM CHLORIDE 1000 ML: 900 INJECTION, SOLUTION INTRAVENOUS at 09:40

## 2019-01-01 RX ADMIN — POTASSIUM CHLORIDE 20 MEQ: 14.9 INJECTION, SOLUTION INTRAVENOUS at 11:13

## 2019-01-01 RX ADMIN — Medication 10 ML: at 15:40

## 2019-01-01 RX ADMIN — DEXAMETHASONE SODIUM PHOSPHATE 12 MG: 4 INJECTION, SOLUTION INTRA-ARTICULAR; INTRALESIONAL; INTRAMUSCULAR; INTRAVENOUS; SOFT TISSUE at 09:35

## 2019-01-01 RX ADMIN — PACLITAXEL 245 MG: 100 INJECTION, POWDER, LYOPHILIZED, FOR SUSPENSION INTRAVENOUS at 14:02

## 2019-01-01 RX ADMIN — Medication 10 ML: at 13:33

## 2019-01-01 RX ADMIN — Medication 10 ML: at 13:18

## 2019-01-01 RX ADMIN — PANTOPRAZOLE SODIUM 40 MG: 40 TABLET, DELAYED RELEASE ORAL at 13:57

## 2019-01-01 RX ADMIN — GEMCITABINE HYDROCHLORIDE 1960 MG: 2 INJECTION, SOLUTION INTRAVENOUS at 13:53

## 2019-01-01 RX ADMIN — Medication 10 ML: at 14:00

## 2019-01-01 RX ADMIN — SODIUM CHLORIDE 25 ML/HR: 900 INJECTION, SOLUTION INTRAVENOUS at 12:30

## 2019-01-01 RX ADMIN — ONDANSETRON 8 MG: 2 INJECTION, SOLUTION INTRAMUSCULAR; INTRAVENOUS at 12:04

## 2019-01-01 RX ADMIN — Medication 500 UNITS: at 12:54

## 2019-01-01 RX ADMIN — Medication 10 ML: at 15:57

## 2019-01-01 RX ADMIN — SODIUM CHLORIDE 25 ML/HR: 0.9 INJECTION, SOLUTION INTRAVENOUS at 12:55

## 2019-01-01 RX ADMIN — Medication 500 UNITS: at 15:57

## 2019-01-01 RX ADMIN — DEXAMETHASONE SODIUM PHOSPHATE 8 MG: 4 INJECTION INTRA-ARTICULAR; INTRALESIONAL; INTRAMUSCULAR; INTRAVENOUS; SOFT TISSUE at 12:38

## 2019-01-01 RX ADMIN — DEXAMETHASONE SODIUM PHOSPHATE 8 MG: 4 INJECTION, SOLUTION INTRA-ARTICULAR; INTRALESIONAL; INTRAMUSCULAR; INTRAVENOUS; SOFT TISSUE at 11:53

## 2019-01-01 RX ADMIN — GEMCITABINE 1960 MG: 38 INJECTION, SOLUTION INTRAVENOUS at 14:08

## 2019-01-01 RX ADMIN — PACLITAXEL 245 MG: 100 INJECTION, POWDER, LYOPHILIZED, FOR SUSPENSION INTRAVENOUS at 13:12

## 2019-01-01 RX ADMIN — SODIUM CHLORIDE 1000 ML: 900 INJECTION, SOLUTION INTRAVENOUS at 11:44

## 2019-01-01 RX ADMIN — PACLITAXEL 245 MG: 100 INJECTION, POWDER, LYOPHILIZED, FOR SUSPENSION INTRAVENOUS at 12:57

## 2019-01-01 RX ADMIN — DEXAMETHASONE SODIUM PHOSPHATE 8 MG: 4 INJECTION, SOLUTION INTRA-ARTICULAR; INTRALESIONAL; INTRAMUSCULAR; INTRAVENOUS; SOFT TISSUE at 13:29

## 2019-01-01 RX ADMIN — SODIUM CHLORIDE 10 ML: 9 INJECTION INTRAMUSCULAR; INTRAVENOUS; SUBCUTANEOUS at 10:22

## 2019-01-01 RX ADMIN — Medication 10 ML: at 12:05

## 2019-01-01 RX ADMIN — SODIUM CHLORIDE 25 ML/HR: 900 INJECTION, SOLUTION INTRAVENOUS at 12:21

## 2019-01-01 RX ADMIN — DEXAMETHASONE SODIUM PHOSPHATE 8 MG: 4 INJECTION, SOLUTION INTRAMUSCULAR; INTRAVENOUS at 12:21

## 2019-01-01 RX ADMIN — IOPAMIDOL 100 ML: 755 INJECTION, SOLUTION INTRAVENOUS at 16:51

## 2019-01-01 RX ADMIN — SODIUM CHLORIDE, PRESERVATIVE FREE 500 UNITS: 5 INJECTION INTRAVENOUS at 14:40

## 2019-01-01 RX ADMIN — SODIUM CHLORIDE 10 ML: 9 INJECTION, SOLUTION INTRAMUSCULAR; INTRAVENOUS; SUBCUTANEOUS at 12:26

## 2019-01-01 RX ADMIN — PROCHLORPERAZINE EDISYLATE 10 MG: 5 INJECTION INTRAMUSCULAR; INTRAVENOUS at 11:58

## 2019-01-01 RX ADMIN — LEUCOVORIN CALCIUM 776 MG: 500 INJECTION, POWDER, LYOPHILIZED, FOR SOLUTION INTRAMUSCULAR; INTRAVENOUS at 14:00

## 2019-01-01 RX ADMIN — HEPARIN 500 UNITS: 100 SYRINGE at 13:00

## 2019-01-01 RX ADMIN — IOPAMIDOL 100 ML: 755 INJECTION, SOLUTION INTRAVENOUS at 13:41

## 2019-01-01 RX ADMIN — DEXAMETHASONE SODIUM PHOSPHATE 8 MG: 4 INJECTION INTRA-ARTICULAR; INTRALESIONAL; INTRAMUSCULAR; INTRAVENOUS; SOFT TISSUE at 12:25

## 2019-01-01 RX ADMIN — PALONSETRON HYDROCHLORIDE 0.25 MG: 0.25 INJECTION, SOLUTION INTRAVENOUS at 10:48

## 2019-01-01 RX ADMIN — SODIUM CHLORIDE 10 ML: 9 INJECTION INTRAMUSCULAR; INTRAVENOUS; SUBCUTANEOUS at 11:15

## 2019-01-01 RX ADMIN — Medication 500 UNITS: at 16:20

## 2019-01-01 RX ADMIN — POTASSIUM CHLORIDE 40 MEQ: 750 TABLET, FILM COATED, EXTENDED RELEASE ORAL at 11:08

## 2019-01-01 RX ADMIN — DEXAMETHASONE SODIUM PHOSPHATE 8 MG: 4 INJECTION, SOLUTION INTRAMUSCULAR; INTRAVENOUS at 12:29

## 2019-01-01 RX ADMIN — Medication 10 ML: at 15:25

## 2019-01-01 RX ADMIN — GEMCITABINE HYDROCHLORIDE 1960 MG: 2 INJECTION, SOLUTION INTRAVENOUS at 13:40

## 2019-01-01 RX ADMIN — ONDANSETRON 8 MG: 2 INJECTION, SOLUTION INTRAMUSCULAR; INTRAVENOUS at 13:21

## 2019-01-01 RX ADMIN — SODIUM CHLORIDE 10 ML: 9 INJECTION, SOLUTION INTRAMUSCULAR; INTRAVENOUS; SUBCUTANEOUS at 14:50

## 2019-01-01 RX ADMIN — GEMCITABINE 1960 MG: 38 INJECTION, SOLUTION INTRAVENOUS at 15:09

## 2019-01-01 RX ADMIN — SODIUM CHLORIDE 1000 ML: 900 INJECTION, SOLUTION INTRAVENOUS at 13:15

## 2019-01-01 RX ADMIN — DEXAMETHASONE SODIUM PHOSPHATE 8 MG: 4 INJECTION, SOLUTION INTRAMUSCULAR; INTRAVENOUS at 12:55

## 2019-01-01 RX ADMIN — SODIUM CHLORIDE 1000 ML: 900 INJECTION, SOLUTION INTRAVENOUS at 14:27

## 2019-01-01 RX ADMIN — PACLITAXEL 245 MG: 100 INJECTION, POWDER, LYOPHILIZED, FOR SUSPENSION INTRAVENOUS at 14:40

## 2019-01-01 RX ADMIN — Medication 10 ML: at 11:41

## 2019-01-01 RX ADMIN — SODIUM CHLORIDE AND POTASSIUM CHLORIDE: 9; 2.98 INJECTION, SOLUTION INTRAVENOUS at 08:04

## 2019-01-01 RX ADMIN — SODIUM CHLORIDE 10 ML: 9 INJECTION, SOLUTION INTRAMUSCULAR; INTRAVENOUS; SUBCUTANEOUS at 10:06

## 2019-01-01 RX ADMIN — PALONOSETRON 0.25 MG: 0.05 INJECTION, SOLUTION INTRAVENOUS at 09:22

## 2019-01-01 RX ADMIN — HEPARIN 500 UNITS: 100 SYRINGE at 16:49

## 2019-01-01 RX ADMIN — PACLITAXEL 245 MG: 100 INJECTION, POWDER, LYOPHILIZED, FOR SUSPENSION INTRAVENOUS at 13:48

## 2019-01-01 RX ADMIN — SODIUM CHLORIDE 100 ML: 900 INJECTION, SOLUTION INTRAVENOUS at 13:41

## 2019-01-01 RX ADMIN — Medication 10 ML: at 14:45

## 2019-01-01 RX ADMIN — SODIUM CHLORIDE 10 ML: 9 INJECTION, SOLUTION INTRAMUSCULAR; INTRAVENOUS; SUBCUTANEOUS at 10:57

## 2019-01-01 RX ADMIN — SODIUM CHLORIDE 10 ML: 9 INJECTION INTRAMUSCULAR; INTRAVENOUS; SUBCUTANEOUS at 10:20

## 2019-01-01 RX ADMIN — ATROPINE SULFATE 0.4 MG: 0.4 INJECTION, SOLUTION INTRAMUSCULAR; INTRAVENOUS; SUBCUTANEOUS at 14:55

## 2019-01-01 RX ADMIN — Medication 10 ML: at 11:36

## 2019-01-01 RX ADMIN — ONDANSETRON 8 MG: 2 INJECTION, SOLUTION INTRAMUSCULAR; INTRAVENOUS at 11:44

## 2019-01-01 RX ADMIN — SODIUM CHLORIDE 10 ML: 9 INJECTION, SOLUTION INTRAMUSCULAR; INTRAVENOUS; SUBCUTANEOUS at 11:19

## 2019-01-01 RX ADMIN — ATROPINE SULFATE 0.4 MG: 0.4 INJECTION, SOLUTION INTRAMUSCULAR; INTRAVENOUS; SUBCUTANEOUS at 12:50

## 2019-01-01 RX ADMIN — Medication 10 ML: at 16:49

## 2019-01-01 RX ADMIN — Medication 10 ML: at 14:16

## 2019-01-01 RX ADMIN — POTASSIUM CHLORIDE 40 MEQ: 750 TABLET, FILM COATED, EXTENDED RELEASE ORAL at 13:22

## 2019-01-01 RX ADMIN — SODIUM CHLORIDE 1000 ML: 900 INJECTION, SOLUTION INTRAVENOUS at 11:15

## 2019-01-01 RX ADMIN — GEMCITABINE 1960 MG: 38 INJECTION, SOLUTION INTRAVENOUS at 14:06

## 2019-01-01 RX ADMIN — DEXAMETHASONE SODIUM PHOSPHATE 8 MG: 4 INJECTION, SOLUTION INTRA-ARTICULAR; INTRALESIONAL; INTRAMUSCULAR; INTRAVENOUS; SOFT TISSUE at 13:46

## 2019-01-01 RX ADMIN — DEXAMETHASONE SODIUM PHOSPHATE 8 MG: 4 INJECTION, SOLUTION INTRAMUSCULAR; INTRAVENOUS at 11:56

## 2019-01-01 RX ADMIN — IOPAMIDOL 100 ML: 755 INJECTION, SOLUTION INTRAVENOUS at 11:36

## 2019-01-01 RX ADMIN — SODIUM CHLORIDE 10 ML: 9 INJECTION INTRAMUSCULAR; INTRAVENOUS; SUBCUTANEOUS at 10:00

## 2019-01-01 RX ADMIN — SODIUM CHLORIDE 100 ML/HR: 900 INJECTION, SOLUTION INTRAVENOUS at 00:29

## 2019-01-01 RX ADMIN — Medication 500 UNITS: at 15:08

## 2019-01-01 RX ADMIN — SODIUM CHLORIDE 1000 ML: 900 INJECTION, SOLUTION INTRAVENOUS at 12:30

## 2019-01-01 RX ADMIN — SODIUM CHLORIDE 1000 ML: 900 INJECTION, SOLUTION INTRAVENOUS at 13:44

## 2019-01-01 RX ADMIN — POTASSIUM CHLORIDE 40 MEQ: 750 TABLET, FILM COATED, EXTENDED RELEASE ORAL at 14:10

## 2019-01-01 RX ADMIN — OXALIPLATIN 165 MG: 5 INJECTION, SOLUTION, CONCENTRATE INTRAVENOUS at 11:45

## 2019-01-01 RX ADMIN — Medication 10 ML: at 07:52

## 2019-01-01 RX ADMIN — Medication 500 UNITS: at 12:06

## 2019-01-01 RX ADMIN — LEUCOVORIN CALCIUM 776 MG: 500 INJECTION, POWDER, LYOPHILIZED, FOR SOLUTION INTRAMUSCULAR; INTRAVENOUS at 12:44

## 2019-01-01 RX ADMIN — SODIUM CHLORIDE 10 ML: 9 INJECTION, SOLUTION INTRAMUSCULAR; INTRAVENOUS; SUBCUTANEOUS at 07:52

## 2019-01-01 RX ADMIN — GEMCITABINE 1960 MG: 38 INJECTION, SOLUTION INTRAVENOUS at 15:00

## 2019-01-01 RX ADMIN — Medication 10 ML: at 13:41

## 2019-01-01 RX ADMIN — FLUOROURACIL 4656 MG: 50 INJECTION, SOLUTION INTRAVENOUS at 15:36

## 2019-01-01 RX ADMIN — Medication 10 ML: at 15:45

## 2019-01-01 RX ADMIN — HEPARIN 500 UNITS: 100 SYRINGE at 14:15

## 2019-01-01 RX ADMIN — Medication 10 ML: at 10:57

## 2019-01-01 RX ADMIN — Medication 500 UNITS: at 13:32

## 2019-01-01 RX ADMIN — GEMCITABINE HYDROCHLORIDE 1960 MG: 2 INJECTION, SOLUTION INTRAVENOUS at 14:16

## 2019-01-01 RX ADMIN — SODIUM CHLORIDE 1000 ML: 900 INJECTION, SOLUTION INTRAVENOUS at 11:47

## 2019-01-01 RX ADMIN — IOPAMIDOL 8 ML: 755 INJECTION, SOLUTION INTRAVENOUS at 16:31

## 2019-01-01 RX ADMIN — SODIUM CHLORIDE 100 ML: 900 INJECTION, SOLUTION INTRAVENOUS at 13:33

## 2019-01-01 RX ADMIN — DEXAMETHASONE SODIUM PHOSPHATE 8 MG: 4 INJECTION, SOLUTION INTRAMUSCULAR; INTRAVENOUS at 12:23

## 2019-01-01 RX ADMIN — SODIUM CHLORIDE 25 ML/HR: 900 INJECTION, SOLUTION INTRAVENOUS at 12:52

## 2019-01-01 RX ADMIN — DEXAMETHASONE SODIUM PHOSPHATE 8 MG: 4 INJECTION, SOLUTION INTRA-ARTICULAR; INTRALESIONAL; INTRAMUSCULAR; INTRAVENOUS; SOFT TISSUE at 13:58

## 2019-01-01 RX ADMIN — SODIUM CHLORIDE 100 ML: 900 INJECTION, SOLUTION INTRAVENOUS at 14:37

## 2019-01-01 RX ADMIN — SODIUM CHLORIDE 25 ML/HR: 900 INJECTION, SOLUTION INTRAVENOUS at 12:38

## 2019-01-01 RX ADMIN — GEMCITABINE HYDROCHLORIDE 1960 MG: 2 INJECTION, SOLUTION INTRAVENOUS at 15:40

## 2019-01-01 RX ADMIN — Medication 500 UNITS: at 15:25

## 2019-01-01 RX ADMIN — PROCHLORPERAZINE EDISYLATE 10 MG: 5 INJECTION INTRAMUSCULAR; INTRAVENOUS at 12:55

## 2019-01-01 RX ADMIN — GEMCITABINE HYDROCHLORIDE 1990 MG: 2 INJECTION, SOLUTION INTRAVENOUS at 14:27

## 2019-01-01 RX ADMIN — DEXTROSE MONOHYDRATE 291 MG: 5 INJECTION, SOLUTION INTRAVENOUS at 14:00

## 2019-01-01 RX ADMIN — PACLITAXEL 245 MG: 100 INJECTION, POWDER, LYOPHILIZED, FOR SUSPENSION INTRAVENOUS at 14:09

## 2019-01-01 RX ADMIN — GEMCITABINE HYDROCHLORIDE 1960 MG: 2 INJECTION, SOLUTION INTRAVENOUS at 14:01

## 2019-01-01 RX ADMIN — PACLITAXEL 245 MG: 100 INJECTION, POWDER, LYOPHILIZED, FOR SUSPENSION INTRAVENOUS at 14:50

## 2019-01-01 RX ADMIN — SODIUM CHLORIDE 25 ML/HR: 900 INJECTION, SOLUTION INTRAVENOUS at 13:15

## 2019-01-01 RX ADMIN — DEXAMETHASONE SODIUM PHOSPHATE 8 MG: 4 INJECTION INTRA-ARTICULAR; INTRALESIONAL; INTRAMUSCULAR; INTRAVENOUS; SOFT TISSUE at 12:44

## 2019-01-01 RX ADMIN — PACLITAXEL 245 MG: 100 INJECTION, POWDER, LYOPHILIZED, FOR SUSPENSION INTRAVENOUS at 13:17

## 2019-01-01 RX ADMIN — PACLITAXEL 245 MG: 100 INJECTION, POWDER, LYOPHILIZED, FOR SUSPENSION INTRAVENOUS at 13:11

## 2019-01-01 RX ADMIN — FLUOROURACIL 4656 MG: 50 INJECTION, SOLUTION INTRAVENOUS at 14:30

## 2019-01-01 RX ADMIN — SODIUM CHLORIDE 25 ML/HR: 900 INJECTION, SOLUTION INTRAVENOUS at 12:44

## 2019-01-02 ENCOUNTER — RESEARCH ENCOUNTER (OUTPATIENT)
Dept: ONCOLOGY | Age: 74
End: 2019-01-02

## 2019-01-02 ENCOUNTER — HOSPITAL ENCOUNTER (OUTPATIENT)
Dept: INFUSION THERAPY | Age: 74
Discharge: HOME | End: 2019-01-02
Payer: MEDICARE

## 2019-01-02 ENCOUNTER — OFFICE VISIT (OUTPATIENT)
Dept: ONCOLOGY | Age: 74
End: 2019-01-02

## 2019-01-02 VITALS
HEIGHT: 67 IN | TEMPERATURE: 96.9 F | DIASTOLIC BLOOD PRESSURE: 62 MMHG | WEIGHT: 194.8 LBS | HEART RATE: 67 BPM | OXYGEN SATURATION: 97 % | RESPIRATION RATE: 18 BRPM | BODY MASS INDEX: 30.57 KG/M2 | SYSTOLIC BLOOD PRESSURE: 116 MMHG

## 2019-01-02 VITALS
SYSTOLIC BLOOD PRESSURE: 149 MMHG | TEMPERATURE: 98.6 F | HEIGHT: 67 IN | DIASTOLIC BLOOD PRESSURE: 71 MMHG | RESPIRATION RATE: 16 BRPM | BODY MASS INDEX: 30.53 KG/M2 | WEIGHT: 194.5 LBS | OXYGEN SATURATION: 97 % | HEART RATE: 71 BPM

## 2019-01-02 DIAGNOSIS — G47.00 INSOMNIA, UNSPECIFIED TYPE: ICD-10-CM

## 2019-01-02 DIAGNOSIS — T45.1X5A CHEMOTHERAPY-INDUCED NEUTROPENIA (HCC): ICD-10-CM

## 2019-01-02 DIAGNOSIS — Z51.11 CHEMOTHERAPY MANAGEMENT, ENCOUNTER FOR: ICD-10-CM

## 2019-01-02 DIAGNOSIS — R10.9 ABDOMINAL PAIN, UNSPECIFIED ABDOMINAL LOCATION: ICD-10-CM

## 2019-01-02 DIAGNOSIS — D69.6 THROMBOCYTOPENIA (HCC): ICD-10-CM

## 2019-01-02 DIAGNOSIS — D70.1 CHEMOTHERAPY-INDUCED NEUTROPENIA (HCC): ICD-10-CM

## 2019-01-02 DIAGNOSIS — C25.7 MALIGNANT NEOPLASM OF OTHER PARTS OF PANCREAS (HCC): Primary | ICD-10-CM

## 2019-01-02 LAB
ALBUMIN SERPL-MCNC: 3 G/DL (ref 3.5–5)
ALBUMIN/GLOB SERPL: 0.9 {RATIO} (ref 1.1–2.2)
ALP SERPL-CCNC: 173 U/L (ref 45–117)
ALT SERPL-CCNC: 47 U/L (ref 12–78)
ANION GAP SERPL CALC-SCNC: 7 MMOL/L (ref 5–15)
APPEARANCE UR: CLEAR
AST SERPL-CCNC: 22 U/L (ref 15–37)
BACTERIA URNS QL MICRO: NEGATIVE /HPF
BASOPHILS # BLD: 0 K/UL (ref 0–0.1)
BASOPHILS NFR BLD: 0 % (ref 0–1)
BILIRUB SERPL-MCNC: 0.5 MG/DL (ref 0.2–1)
BILIRUB UR QL: NEGATIVE
BUN SERPL-MCNC: 16 MG/DL (ref 6–20)
BUN/CREAT SERPL: 22 (ref 12–20)
CALCIUM SERPL-MCNC: 8.5 MG/DL (ref 8.5–10.1)
CHLORIDE SERPL-SCNC: 104 MMOL/L (ref 97–108)
CO2 SERPL-SCNC: 28 MMOL/L (ref 21–32)
COLOR UR: NORMAL
CREAT SERPL-MCNC: 0.72 MG/DL (ref 0.55–1.02)
DIFFERENTIAL METHOD BLD: ABNORMAL
EOSINOPHIL # BLD: 0 K/UL (ref 0–0.4)
EOSINOPHIL NFR BLD: 0 % (ref 0–7)
EPITH CASTS URNS QL MICRO: NORMAL /LPF
ERYTHROCYTE [DISTWIDTH] IN BLOOD BY AUTOMATED COUNT: 18.9 % (ref 11.5–14.5)
GLOBULIN SER CALC-MCNC: 3.2 G/DL (ref 2–4)
GLUCOSE SERPL-MCNC: 119 MG/DL (ref 65–100)
GLUCOSE UR STRIP.AUTO-MCNC: NEGATIVE MG/DL
HCT VFR BLD AUTO: 29.6 % (ref 35–47)
HGB BLD-MCNC: 9.6 G/DL (ref 11.5–16)
HGB UR QL STRIP: NEGATIVE
HYALINE CASTS URNS QL MICRO: NORMAL /LPF (ref 0–5)
IMM GRANULOCYTES # BLD: 0 K/UL
IMM GRANULOCYTES NFR BLD AUTO: 0 %
KETONES UR QL STRIP.AUTO: NEGATIVE MG/DL
LDH SERPL L TO P-CCNC: 221 U/L (ref 81–246)
LEUKOCYTE ESTERASE UR QL STRIP.AUTO: NEGATIVE
LYMPHOCYTES # BLD: 1.2 K/UL (ref 0.8–3.5)
LYMPHOCYTES NFR BLD: 12 % (ref 12–49)
MAGNESIUM SERPL-MCNC: 1.8 MG/DL (ref 1.6–2.4)
MCH RBC QN AUTO: 31.1 PG (ref 26–34)
MCHC RBC AUTO-ENTMCNC: 32.4 G/DL (ref 30–36.5)
MCV RBC AUTO: 95.8 FL (ref 80–99)
METAMYELOCYTES NFR BLD MANUAL: 4 %
MONOCYTES # BLD: 1 K/UL (ref 0–1)
MONOCYTES NFR BLD: 10 % (ref 5–13)
MYELOCYTES NFR BLD MANUAL: 3 %
NEUTS BAND NFR BLD MANUAL: 2 % (ref 0–6)
NEUTS SEG # BLD: 7.2 K/UL (ref 1.8–8)
NEUTS SEG NFR BLD: 69 % (ref 32–75)
NITRITE UR QL STRIP.AUTO: NEGATIVE
NRBC # BLD: 0 K/UL (ref 0–0.01)
NRBC BLD-RTO: 0 PER 100 WBC
PH UR STRIP: 6 [PH] (ref 5–8)
PHOSPHATE SERPL-MCNC: 3.8 MG/DL (ref 2.6–4.7)
PLATELET # BLD AUTO: 527 K/UL (ref 150–400)
PMV BLD AUTO: 9.9 FL (ref 8.9–12.9)
POTASSIUM SERPL-SCNC: 4.1 MMOL/L (ref 3.5–5.1)
PROT SERPL-MCNC: 6.2 G/DL (ref 6.4–8.2)
PROT UR STRIP-MCNC: NEGATIVE MG/DL
RBC # BLD AUTO: 3.09 M/UL (ref 3.8–5.2)
RBC #/AREA URNS HPF: NORMAL /HPF (ref 0–5)
RBC MORPH BLD: ABNORMAL
SODIUM SERPL-SCNC: 139 MMOL/L (ref 136–145)
SP GR UR REFRACTOMETRY: 1.01 (ref 1–1.03)
UA: UC IF INDICATED,UAUC: NORMAL
URATE SERPL-MCNC: 5.7 MG/DL (ref 2.6–6)
UROBILINOGEN UR QL STRIP.AUTO: 1 EU/DL (ref 0.2–1)
WBC # BLD AUTO: 10.1 K/UL (ref 3.6–11)
WBC URNS QL MICRO: NORMAL /HPF (ref 0–4)

## 2019-01-02 PROCEDURE — 96417 CHEMO IV INFUS EACH ADDL SEQ: CPT

## 2019-01-02 PROCEDURE — 83615 LACTATE (LD) (LDH) ENZYME: CPT

## 2019-01-02 PROCEDURE — 84100 ASSAY OF PHOSPHORUS: CPT

## 2019-01-02 PROCEDURE — 81001 URINALYSIS AUTO W/SCOPE: CPT

## 2019-01-02 PROCEDURE — 96413 CHEMO IV INFUSION 1 HR: CPT

## 2019-01-02 PROCEDURE — 74011250636 HC RX REV CODE- 250/636

## 2019-01-02 PROCEDURE — 74011250636 HC RX REV CODE- 250/636: Performed by: INTERNAL MEDICINE

## 2019-01-02 PROCEDURE — 96375 TX/PRO/DX INJ NEW DRUG ADDON: CPT

## 2019-01-02 PROCEDURE — 85025 COMPLETE CBC W/AUTO DIFF WBC: CPT

## 2019-01-02 PROCEDURE — 77030012965 HC NDL HUBR BBMI -A

## 2019-01-02 PROCEDURE — 84550 ASSAY OF BLOOD/URIC ACID: CPT

## 2019-01-02 PROCEDURE — 36415 COLL VENOUS BLD VENIPUNCTURE: CPT

## 2019-01-02 PROCEDURE — 80053 COMPREHEN METABOLIC PANEL: CPT

## 2019-01-02 PROCEDURE — 74011000258 HC RX REV CODE- 258: Performed by: INTERNAL MEDICINE

## 2019-01-02 PROCEDURE — 83735 ASSAY OF MAGNESIUM: CPT

## 2019-01-02 RX ADMIN — PACLITAXEL 245 MG: 100 INJECTION, POWDER, LYOPHILIZED, FOR SUSPENSION INTRAVENOUS at 14:30

## 2019-01-02 RX ADMIN — DEXAMETHASONE SODIUM PHOSPHATE 8 MG: 4 INJECTION, SOLUTION INTRAMUSCULAR; INTRAVENOUS at 13:31

## 2019-01-02 RX ADMIN — GEMCITABINE 1960 MG: 38 INJECTION, SOLUTION INTRAVENOUS at 15:25

## 2019-01-02 NOTE — PROGRESS NOTES
Problem: Knowledge Deficit Goal: *Verbalizes understanding of procedures and medications Patient is in clinical trial, C3 D1 Abraxane and Gemzar

## 2019-01-02 NOTE — PROGRESS NOTES
Pt in for labs and follow up visit today per protocol with Dr. Trav Meyer and RN. This is C3D1 of treatment. Patient reports the following adverse events at this time: gr 1 fatigue, gr 1 hair loss, gr 1 insomnia, gr 1 neuropathy. Vital signs are stable. Patient to go to infusion center after appointment to receive gemcitabine/abraxane infusion. Next appt is scheduled for 1/9/19. Scans show stable disease.

## 2019-01-02 NOTE — PROGRESS NOTES
Tricia Orourke is a 72 y.o. female    Chief Complaint   Patient presents with    Pancreatic Cancer       1. Have you been to the ER, urgent care clinic since your last visit? Hospitalized since your last visit? No  M  2. Have you seen or consulted any other health care providers outside of the [address] [place] [address] since your last visit? Include any pap smears or colon screening. No      Visit Vitals  /62 (BP 1 Location: Left arm, BP Patient Position: Sitting)   Pulse 67   Temp 96.9 °F (36.1 °C) (Oral)   Resp 18   Ht 5' 7\" (1.702 m)   Wt 194 lb 12.8 oz (88.4 kg)   SpO2 97%   BMI 30.51 kg/m²           Health Maintenance Due   Topic Date Due    Hepatitis C Screening  1945    MICROALBUMIN Q1  03/25/1957    EYE EXAM RETINAL OR DILATED  03/25/1957    Shingrix Vaccine Age 50> (1 of 2) 03/25/1997    BREAST CANCER SCRN MAMMOGRAM  03/25/1997    FOBT Q 1 YEAR AGE 50-75  03/25/1997    GLAUCOMA SCREENING Q2Y  03/25/2012    Bone Densitometry (Dexa) Screening  03/25/2012    Pneumococcal 65+ High/Highest Risk (1 of 2 - PCV13) 03/25/2012    MEDICARE YEARLY EXAM  03/20/2018         Medication Reconciliation completed, changes noted.   Please  Update medication list.

## 2019-01-02 NOTE — PROGRESS NOTES
[facility] [place] [facility]  [address] [facility], [address] [facility]  Millie [phone]  F: [phone]      Reason for Visit:   Neeraj Mao is a 72 y.o. female who is seen in self-referral for evaluation of pancreatic cancer. Treatment History:   · 10/4/18 pancreatic head mass FNA, pancreatic adenocarcinoma    10/15/18  Liver, Core Biopsy w/Touch Prep CYTOLOGIC INTERPRETATION:   · Numerous cores and fragments of benign hepatic parenchyma     10/24/18  Liver, Fine Needle Aspiration CYTOLOGIC INTERPRETATION:   Metastatic adenocarcinoma (see Comment). General Categorization   Positive for malignancy. Comment: The morphologic appearance is nonspecific with regard to site of origin but compatible with metastatic pancreatic carcinoma in this patient with known pancreatic adenocarcinoma ([id number]). 18 abraxane/gemcitabine    History of Present Illness:   She started having abdominal pain, gradual and persistent, x 1 month, pressure sensation, located in epigastrium, no radiation, no aggravating symptoms, no relieving factors. 25 lb weight loss over 6 months.  + nausea. Interval history:  In today for follow up. Complains of gr 1 fatigue, gr 1 hair loss, gr 1 insomnia, gr 1 neuropathy. Past Medical History:   Diagnosis Date    Arthritis     Constipation     Diabetes ([address]     Hemorrhoids     Hiatal hernia     High cholesterol     Hypertension     [address] [address]       Past Surgical History:   Procedure Laterality Date    HX KNEE ARTHROSCOPY Right     HX ORTHOPAEDIC      left wrist surgery    HX TONSILLECTOMY        Social History     Tobacco Use    Smoking status: Never Smoker    Smokeless tobacco: Never Used   Substance Use Topics    Alcohol use:  Yes     Alcohol/week: 1.2 - 2.4 oz     Types: 1 - 2 Cans of beer, 1 - 2 Shots of liquor per week     Frequency: 2-4 times a month     Drinks per session: 1 or 2      Family History   Problem Relation Age of Onset    Cancer Mother         skin    Hypertension Mother     Heart Disease Mother     Cancer Father         skin    Heart Disease Father     Stroke Father     Diabetes Neg Hx      Current Outpatient Medications   Medication Sig    glucose blood VI test strips (ACCU-CHEK RICHIE PLUS TEST STRP) strip Accu-Chek Richie Plus test strips   Check BS BID    insulin degludec (TRESIBA FLEXTOUCH U-200) 200 unit/mL (3 mL) inpn 42 Units by SubCUTAneous route daily. Or as directed up to 70 units daily on chemo days--Dose change 12/13/18--updated med list--did not send prescription to the pharmacy    OTHER Cranial prosthesis    Dx C25.7    omeprazole (PRILOSEC) 20 mg capsule Take 20 mg by mouth daily.  insulin aspart U-100 (NOVOLOG FLEXPEN U-100 INSULIN) 100 unit/mL inpn Inject 14 units before breakfast, lunch and dinner + 2 units for every 50 mg/dl above 150 mg/dl. Max 50 units per day    ondansetron hcl (ZOFRAN) 8 mg tablet Take 1 Tab by mouth every eight (8) hours as needed for Nausea.  lidocaine-prilocaine (EMLA) topical cream Apply  to affected area as needed for Pain.  simvastatin (ZOCOR) 10 mg tablet Take  by mouth nightly.  telmisartan (MICARDIS) 80 mg tablet Take 80 mg by mouth daily.  hydroCHLOROthiazide (MICROZIDE) 12.5 mg capsule Take 12.5 mg by mouth daily. No current facility-administered medications for this visit.       Facility-Administered Medications Ordered in Other Visits   Medication Dose Route Frequency    gemcitabine (GEMZAR) 1,960 mg in 0.9% sodium chloride 250 mL, overfill volume 25 mL chemo infusion  1,960 mg IntraVENous ONCE    PACLitaxel-Protein Bound (ABRAXANE) 245 mg chemo infusion  245 mg IntraVENous ONCE    dexamethasone (DECADRON) 4 mg/mL injection 8 mg  8 mg IntraVENous ONCE    prochlorperazine (COMPAZINE) with saline injection 10 mg  10 mg IntraVENous Q6H PRN      Allergies   Allergen Reactions    Amoxicillin Hives        Review of Systems: A comprehensive review of systems was performed and all systems were negative except for HPI and for the symptom review form, reviewed and scanned in. Physical Exam:     Visit Vitals  /62 (BP 1 Location: Left arm, BP Patient Position: Sitting)   Pulse 67   Temp 96.9 °F (36.1 °C) (Oral)   Resp 18   Ht 5' 7\" (1.702 m)   Wt 194 lb 12.8 oz (88.4 kg)   SpO2 97%   BMI 30.51 kg/m²     ECOG PS: 0  General: No distress  Eyes: PERRLA, anicteric sclerae  HENT: Atraumatic, OP clear  Neck: Supple  Lymphatic: No cervical, supraclavicular, or inguinal adenopathy  Respiratory: CTAB, normal respiratory effort  CV: Normal rate, regular rhythm, no murmurs, no peripheral edema  GI: Soft, nontender, nondistended, no masses, no hepatomegaly, no splenomegaly  MS: Normal gait and station. Digits without clubbing or cyanosis. Skin: No rashes, ecchymoses, or petechiae. Normal temperature, turgor, and texture. Psych: Alert, oriented, appropriate affect, normal judgment/insight        Results:     Lab Results   Component Value Date/Time    WBC 36.6 (H) 12/19/2018 10:11 AM    HGB 9.6 (L) 12/19/2018 10:11 AM    HCT 29.5 (L) 12/19/2018 10:11 AM    PLATELET 625 10/77/0080 10:11 AM    MCV 93.9 12/19/2018 10:11 AM    ABS. NEUTROPHILS 25.3 (H) 12/19/2018 10:11 AM     Lab Results   Component Value Date/Time    Sodium 139 12/05/2018 11:12 AM    Potassium 3.8 12/05/2018 11:12 AM    Chloride 105 12/05/2018 11:12 AM    CO2 25 12/05/2018 11:12 AM    Glucose 179 (H) 12/05/2018 11:12 AM    BUN 15 12/05/2018 11:12 AM    Creatinine 0.76 12/05/2018 11:12 AM    GFR est AA >60 12/05/2018 11:12 AM    GFR est non-AA >60 12/05/2018 11:12 AM    Calcium 8.6 12/05/2018 11:12 AM    Glucose (POC) 316 (H) 10/04/2018 03:45 PM     Lab Results   Component Value Date/Time    Bilirubin, total 0.4 12/05/2018 11:12 AM    ALT (SGPT) 83 (H) 12/05/2018 11:12 AM    AST (SGOT) 33 12/05/2018 11:12 AM    Alk.  phosphatase 252 (H) 12/05/2018 11:12 AM    Protein, total 6.1 (L) 12/05/2018 11:12 AM    Albumin 2.8 (L) 12/05/2018 11:12 AM    Globulin 3.3 12/05/2018 11:12 AM     10/1/18 CT abd  There is a 3.8 x 1.9 cm mass involving the uncinate process and possibly  invading the duodenum. Findings are nonspecific but typical of pancreatic  carcinoma. There is no biliary or pancreatic ductal dilatation.     There is a poorly defined irregular hypodensity in segment IVb of the liver  measuring 3.7 x 1.9 cm. There appears to be focal biliary dilatation in the left  lobe behind this abnormality. Metastatic disease is suspected. There is a small,  1 cm hypodensity in the dome of the liver, likely segment 8. There is a 1 cm  hypodensity in segment 4 of the liver as well, also likely metastasis. Small  hypodensity measuring less than 1 cm in segment 6 at the tip laterally is also  nonspecific but probable metastasis.     Spleen kidneys and adrenals are unremarkable.     No free fluid or focal fluid collection.     Lung bases are clear.     Bone windows are unremarkable.     IMPRESSION  IMPRESSION:  1. 3.8 x 1.9 cm mass involving the uncinate process of the pancreas and possibly  invading the duodenum, this likely represents pancreatic carcinoma. 2. Likely metastatic disease in the liver. There is a 3.8 x 1.9 cm mass involving the uncinate process and possibly  invading the duodenum. Findings are nonspecific but typical of pancreatic  carcinoma. There is no biliary or pancreatic ductal dilatation.     There is a poorly defined irregular hypodensity in segment IVb of the liver  measuring 3.7 x 1.9 cm. There appears to be focal biliary dilatation in the left  lobe behind this abnormality. Metastatic disease is suspected. There is a small,  1 cm hypodensity in the dome of the liver, likely segment 8. There is a 1 cm  hypodensity in segment 4 of the liver as well, also likely metastasis.  Small  hypodensity measuring less than 1 cm in segment 6 at the tip laterally is also  nonspecific but probable metastasis.     Spleen kidneys and adrenals are unremarkable.     No free fluid or focal fluid collection.     Lung bases are clear.     Bone windows are unremarkable.     IMPRESSION  IMPRESSION:  1. 3.8 x 1.9 cm mass involving the uncinate process of the pancreas and possibly  invading the duodenum, this likely represents pancreatic carcinoma. 2. Likely metastatic disease in the liver. Records reviewed and summarized above. Pathology report(s) reviewed above. Radiology report(s) reviewed above. MRI abdomen 10/22/18: IMPRESSION:       1. Pancreatic uncinate process mass suspicious for pancreatic neoplasm, possibly  adenocarcinoma. Mass abuts the superior mesenteric artery with about 20%  circumference involvement but no luminal distortion or perivascular stranding. 2. Multiple hepatic lesions suspicious for metastatic neoplasm with segment IVb  lesion showing patchy areas of surrounding steatosis likely secondary to locally  altered intrahepatic hemodynamics and likely responsible for biopsy result. 3. Cholecystolithiasis and small gallbladder polyp. CT c/a/p 12/28/18: IMPRESSION:  Interval decrease in size of pancreatic mass. Slight interval decrease in size of hepatic metastases; these now demonstrate central low attenuation suggestive of necrosis. No evidence of new metastatic disease in the chest, abdomen, or pelvis.     Assessment/plan:   1. Uncinate pancreatic adenocarcinoma,  mets to liver, stage IV:  cT2 cN0 pM1    Discussed that while this is treatable, it is not curable, the goal of therapy is palliative. Discussed that without therapy, life expectancy would be 3-6 months, with therapy 12-18 months on average. As an example of likely chemotherapy, we discussed the risks and benefits of Gemcitabine and Abraxane chemotherapy.  Potential side effects include, but are not limited to, nausea, vomiting, diarrhea, constipation, mucositis, taste changes, myelosuppression, infection, fatigue, alopecia, skin and nail changes, pulmonary toxicity, neuropathy, allergic reactions, infertility, and rarely, death. Discussed the [id number] study and the research nurse met with her today and she signed informed consent. · Gemcitabine (1000 mg/m2) and Abraxane (125 mg/m2) given on days 1,8,15 every 28 days. · Labs: CBC, BMP prior to each treatment. Hepatic function panel every 4 weeks. CA 19.9 prior to day 1  · Antiemetic prophylaxis: Dexamethasone prior to each treatment  · PRN antiemetics: Ondansetron and Prochlorperazine at home  · EMLA cream for port    On the control arm, C3d1 today, will see her back in 2 weeks for C3D15. CT c/a/p on 12/28/18 show response to treatment. 2. DM2:  Holding metformin; on insulin; saw Dr. Lane Nixon    3. Emotional well being:  She has excellent support and is coping well with her disease    4. Abdominal pain:  Resolved;  palliative care has seen    5. Nutrition:  Talha Diaz RD has met with her; holding on enzymes    6. Insomnia: Has improved. She has lunesta if needed; she may also try melatonin    7. Anemia:  Due to chemo and disease; monitor    8. Neutropenia:  Due to chemo, started granix 480 mcg for 4 days following chemo with C1D15. Does report some pain with granix. Tried claritin, however this kept her awake. Recommend trying tylenol and advil PRN.  Now at 3 days following chemo            Signed By: Lucia Grove MD

## 2019-01-02 NOTE — PROGRESS NOTES
[facility] VISIT NOTE Pt arrived at [facility] ambulatory and in no distress for Clinical Trial, C3 D1 Gemzar Abraxane. Assessment completed, pt has no new complaints. Patient Vitals for the past 12 hrs: 
 Temp Pulse Resp BP SpO2  
01/02/19 1600 98.6 °F (37 °C) 71 16 149/71 97 % Right chest port accessed with 0.75 in garcia no difficulty. Positive blood return noted and labs drawn. Recent Results (from the past 12 hour(s)) CBC WITH AUTOMATED DIFF Collection Time: 01/02/19 11:24 AM  
Result Value Ref Range WBC 10.1 3.6 - 11.0 K/uL  
 RBC 3.09 (L) 3.80 - 5.20 M/uL HGB 9.6 (L) 11.5 - 16.0 g/dL HCT 29.6 (L) 35.0 - 47.0 % MCV 95.8 80.0 - 99.0 FL  
 MCH 31.1 26.0 - 34.0 PG  
 MCHC 32.4 30.0 - 36.5 g/dL  
 RDW 18.9 (H) 11.5 - 14.5 % PLATELET 349 (H) 231 - 400 K/uL MPV 9.9 8.9 - 12.9 FL  
 NRBC 0.0 0  WBC ABSOLUTE NRBC 0.00 0.00 - 0.01 K/uL NEUTROPHILS 69 32 - 75 % BAND NEUTROPHILS 2 0 - 6 % LYMPHOCYTES 12 12 - 49 % MONOCYTES 10 5 - 13 % EOSINOPHILS 0 0 - 7 % BASOPHILS 0 0 - 1 % METAMYELOCYTES 4 (H) 0 % MYELOCYTES 3 (H) 0 % IMMATURE GRANULOCYTES 0 %  
 ABS. NEUTROPHILS 7.2 1.8 - 8.0 K/UL  
 ABS. LYMPHOCYTES 1.2 0.8 - 3.5 K/UL  
 ABS. MONOCYTES 1.0 0.0 - 1.0 K/UL  
 ABS. EOSINOPHILS 0.0 0.0 - 0.4 K/UL  
 ABS. BASOPHILS 0.0 0.0 - 0.1 K/UL  
 ABS. IMM. GRANS. 0.0 K/UL  
 DF MANUAL    
 RBC COMMENTS NORMOCYTIC, NORMOCHROMIC METABOLIC PANEL, COMPREHENSIVE Collection Time: 01/02/19 11:24 AM  
Result Value Ref Range Sodium 139 136 - 145 mmol/L Potassium 4.1 3.5 - 5.1 mmol/L Chloride 104 97 - 108 mmol/L  
 CO2 28 21 - 32 mmol/L Anion gap 7 5 - 15 mmol/L Glucose 119 (H) 65 - 100 mg/dL BUN 16 6 - 20 MG/DL Creatinine 0.72 0.55 - 1.02 MG/DL  
 BUN/Creatinine ratio 22 (H) 12 - 20 GFR est AA >60 >60 ml/min/1.73m2 GFR est non-AA >60 >60 ml/min/1.73m2 Calcium 8.5 8.5 - 10.1 MG/DL  Bilirubin, total 0.5 0.2 - 1.0 MG/DL  
 ALT (SGPT) 47 12 - 78 U/L  
 AST (SGOT) 22 15 - 37 U/L Alk. phosphatase 173 (H) 45 - 117 U/L Protein, total 6.2 (L) 6.4 - 8.2 g/dL Albumin 3.0 (L) 3.5 - 5.0 g/dL Globulin 3.2 2.0 - 4.0 g/dL A-G Ratio 0.9 (L) 1.1 - 2.2 MAGNESIUM Collection Time: 01/02/19 11:24 AM  
Result Value Ref Range Magnesium 1.8 1.6 - 2.4 mg/dL LD Collection Time: 01/02/19 11:24 AM  
Result Value Ref Range  81 - 246 U/L  
PHOSPHORUS Collection Time: 01/02/19 11:24 AM  
Result Value Ref Range Phosphorus 3.8 2.6 - 4.7 MG/DL URINALYSIS W/ REFLEX CULTURE Collection Time: 01/02/19 11:24 AM  
Result Value Ref Range Color YELLOW/STRAW Appearance CLEAR CLEAR Specific gravity 1.011 1.003 - 1.030    
 pH (UA) 6.0 5.0 - 8.0 Protein NEGATIVE  NEG mg/dL Glucose NEGATIVE  NEG mg/dL Ketone NEGATIVE  NEG mg/dL Bilirubin NEGATIVE  NEG Blood NEGATIVE  NEG Urobilinogen 1.0 0.2 - 1.0 EU/dL Nitrites NEGATIVE  NEG Leukocyte Esterase NEGATIVE  NEG    
 WBC 0-4 0 - 4 /hpf  
 RBC 0-5 0 - 5 /hpf Epithelial cells FEW FEW /lpf Bacteria NEGATIVE  NEG /hpf  
 UA:UC IF INDICATED CULTURE NOT INDICATED BY UA RESULT CNI Hyaline cast 0-2 0 - 5 /lpf URIC ACID Collection Time: 01/02/19 11:24 AM  
Result Value Ref Range Uric acid 5.7 2.6 - 6.0 MG/DL Medications received: 
Decadron IV Gemzar IV Abraxane IV Tolerated treatment well, no adverse reaction noted. Port de-accessed and flushed per protocol. Positive blood return noted. D/C'd from [facility] ambulatory and in no distress accompanied by self. Next appointment is 1/9/19 at 1100

## 2019-01-04 RX ORDER — DEXAMETHASONE SODIUM PHOSPHATE 4 MG/ML
8 INJECTION, SOLUTION INTRA-ARTICULAR; INTRALESIONAL; INTRAMUSCULAR; INTRAVENOUS; SOFT TISSUE ONCE
Status: COMPLETED | OUTPATIENT
Start: 2019-01-09 | End: 2019-01-09

## 2019-01-08 ENCOUNTER — OFFICE VISIT (OUTPATIENT)
Dept: PALLATIVE CARE | Age: 74
End: 2019-01-08

## 2019-01-08 VITALS
HEIGHT: 67 IN | HEART RATE: 89 BPM | DIASTOLIC BLOOD PRESSURE: 74 MMHG | TEMPERATURE: 98.6 F | WEIGHT: 193.6 LBS | RESPIRATION RATE: 18 BRPM | SYSTOLIC BLOOD PRESSURE: 125 MMHG | OXYGEN SATURATION: 96 % | BODY MASS INDEX: 30.39 KG/M2

## 2019-01-08 DIAGNOSIS — C25.9 PANCREATIC CANCER METASTASIZED TO LIVER (HCC): ICD-10-CM

## 2019-01-08 DIAGNOSIS — R11.0 NAUSEA WITHOUT VOMITING: ICD-10-CM

## 2019-01-08 DIAGNOSIS — R10.9 ABDOMINAL DISCOMFORT: Primary | ICD-10-CM

## 2019-01-08 DIAGNOSIS — C78.7 PANCREATIC CANCER METASTASIZED TO LIVER (HCC): ICD-10-CM

## 2019-01-08 DIAGNOSIS — R53.0 NEOPLASTIC MALIGNANT RELATED FATIGUE: ICD-10-CM

## 2019-01-08 NOTE — PROGRESS NOTES
Palliative Medicine Outpatient Services [place]: [phone]) Patient Name: Kenny Pineda YOB: 1945 Date of Current Visit: 19 Location of Current Visit:   
[x] [facility] Office 
[] [facility] Office [] [address] [place] [address] 
[] Home 
[] Other:   
 
Date of Initial Visit: 2018 Requesting Physician: Dr. Abimbola Roe Primary Care Physician: Portia Traylor MD 
  
 SUMMARY:  
[place] is a 72y.o. year old with a  history of diabetes on insulin, newly diagnosed pancreatic cancer with metastases to the liver on chemotherapy with gemcitabine Abraxane as part of a clinical trial at [address] [place] [address], who was referred to Palliative Medicine by Dr. Abimobla Roe for management of symptoms and support. 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 Son Sulaiman Cameron lives nearby. Has very good friends and family support PALLIATIVE DIAGNOSES:  
 
  ICD-10-CM ICD-9-CM 1. Abdominal discomfort R10.9 789.00   
2. Nausea without vomiting R11.0 787.02   
3. Neoplastic malignant related fatigue R53.0 780.79   
4. Pancreatic cancer metastasized to liver (HCC) C25.9 157.9 C78.7 197.7 PLAN:  
Patient Instructions Dear Kenny Pineda , It was a pleasure seeing you today at [facility] office Your described symptoms were: Fatigue: 1 Drowsiness: 1 Depression: 0 Pain: 1 Anxiety: 0 Nausea: 0 Anorexia: 0 Dyspnea: 0 Best Well-Bein Constipation: No  
Other Problem (Comment): 0 This is the plan we talked about: 1. Abdominal discomfort - This is no longer a problem. You have not needed any pain medicine in a while. 2. Muscle and bony pain with Granix - We discussed how this is very common. 
- You are drinking very little water- 16oz per day which is very low for you. We agreed you will slowly increase your water consumption to a daily normal of 80 ounces per day. 3. Chemo induced nausea - You are very well controlled with your current regimen. 4.  Acid reflux 
-You are on Prilosec daily, please continue that. 5.  Goals of care 
-You feel good about starting chemotherapy. You have good support. 
-You are in the process of completing a medical directive along with living will etc. with a . Please bring us a copy when you have it. 
-We discussed your wishes regarding CODE STATUS and you want to remain full code for now. This is what you have shared with us about Advance Care Planning No flowsheet data found. The Palliative Medicine Team is here to support you and your family. We will see you again in 6 weeks Sincerely, 
 
 
Maliha Biggs MD and the Palliative Medicine Team 
 
 
Counseling and Coordination: See above 
- 12/28 CT scan reviewed in detail GOALS OF CARE / TREATMENT PREFERENCES:  
[====Goals of Care====] GOALS OF CARE: 
Patient / health care proxy stated goals: Full code TREATMENT PREFERENCES:  
Code Status:  [x] Attempt Resuscitation       [] Do Not Attempt Resuscitation Advance Care Planning: No flowsheet data found. Other: 
(If patient appropriate for POST, consider using PALLPOST smart phrase here) The palliative care team has discussed with patient / health care proxy about goals of care / treatment preferences for patient. 
[====Goals of Care====] PRESCRIPTIONS GIVEN:  
No orders of the defined types were placed in this encounter. FOLLOW UP: Future Appointments Date Time Provider Shiva Zavala 1/16/2019 11:00 AM SS INF5 CH4 <1H [facility]  
1/16/2019 11:30 AM Carlyn Welsh NP [facility] SCHED  
3/5/2019 10:30 AM Maliha Biggs MD [address]  
3/19/2019 12:10 PM Ramakrishna Purdy MD RDE YOGESH [address] [facility] [address] PHYSICIANS INVOLVED IN CARE:  
Patient Care Team: 
Dwayne Lubin MD as PCP - General (Internal Medicine) Lola Jhaveri MD (Hematology and Oncology) HISTORY:  
Nursing documentation from date of visit reviewed. Reviewed patient-completed ESAS and advance care planning form. Reviewed patient record in prescription monitoring program. 
 
CHIEF COMPLAINT: None HPI/SUBJECTIVE: The patient is: [x] Verbal / [] Nonverbal  
 
Patient here alone. She looks really well, feels well, tolerating chemo well except for days on Granix where she has muscle aches. She is not on any opioids, does not need them. Drinks very little water so encouraged to drink more. 
 
----------- Pleasant woman, here alone, appears younger than stated age. Has no specific complaints. Has some mild abdominal discomfort for which he takes hydrocodone [place] occasionally. She tells me that she has handled the diagnosis of pancreatic cancer much better than most people expected. She wants to tolerate chemotherapy as well as she can. She has very good understanding of her disease. Clinical Pain Assessment (nonverbal scale for nonverbal patients):  
[++++ Clinical Pain Assessment++++] [++++Pain Severity++++]: Pain: 1 
[++++Pain Character++++]: deep gnawing 
[++++Pain Duration++++]: weeks [++++Pain Effect++++]: none in particular 
[++++Pain Factors++++]: in particular 
[++++Pain Frequency++++]: on and off 
[++++Pain Location++++]: upper and left-sided abdomen 
[++++ Clinical Pain Assessment++++] FUNCTIONAL ASSESSMENT:  
 
Palliative Performance Scale (PPS): PPS: 1385 PSYCHOSOCIAL/SPIRITUAL SCREENING:  
 
Any spiritual / Hindu concerns: 
[] Yes /  [x] No 
 
Caregiver Burnout: 
[] Yes /  [x] No /  [] No Caregiver Present Anticipatory grief assessment:  
[x] Normal  / [] Maladaptive ESAS Anxiety: Anxiety: 0 
 
ESAS Depression: Depression: 0 REVIEW OF SYSTEMS:  
 
The following systems were [] reviewed / [] unable to be reviewed Systems: constitutional, ears/nose/mouth/throat, respiratory, gastrointestinal, genitourinary, musculoskeletal, integumentary, neurologic, psychiatric, endocrine. Positive findings noted below. Modified ESAS Completed by: provider Fatigue: 1 Drowsiness: 1 Depression: 0 Pain: 1 Anxiety: 0 Nausea: 0 Anorexia: 0 Dyspnea: 0 Best Well-Bein Constipation: No  
Other Problem (Comment): 0 PHYSICAL EXAM:  
 
Wt Readings from Last 3 Encounters:  
19 194 lb 9.6 oz (88.3 kg) 19 193 lb 9.6 oz (87.8 kg) 19 194 lb 8 oz (88.2 kg) Blood pressure 125/74, pulse 89, temperature 98.6 °F (37 °C), temperature source Oral, resp. rate 18, height 5' 7\" (1.702 m), weight 193 lb 9.6 oz (87.8 kg), SpO2 96 %. Last bowel movement: See Nursing Note Constitutional: Alert, oriented, appears well Eyes: pupils equal, anicteric ENMT: no nasal discharge, moist mucous membranes Cardiovascular: regular rhythm, distal pulses intact Respiratory: breathing not labored, symmetric Gastrointestinal: soft non-tender, +bowel sounds Musculoskeletal: no deformity, no tenderness to palpation Skin: warm, dry Neurologic: following commands, moving all extremities Psychiatric: full affect, no hallucinations Other: 
 
 
 HISTORY:  
 
Past Medical History:  
Diagnosis Date  Arthritis  Constipation  Diabetes ([address]  Hemorrhoids  Hiatal hernia  High cholesterol  Hypertension  Pancreatic cancer ([address] Past Surgical History:  
Procedure Laterality Date  HX KNEE ARTHROSCOPY Right  HX ORTHOPAEDIC    
 left wrist surgery  HX TONSILLECTOMY Family History Problem Relation Age of Onset  Cancer Mother   
     skin  Hypertension Mother  Heart Disease Mother  Cancer Father   
     skin  Heart Disease Father  Stroke Father  Diabetes Neg Hx History reviewed, no pertinent family history. Social History Tobacco Use  Smoking status: Never Smoker  Smokeless tobacco: Never Used Substance Use Topics  Alcohol use: Yes Alcohol/week: 1.2 - 2.4 oz Types: 1 - 2 Cans of beer, 1 - 2 Shots of liquor per week Frequency: 2-4 times a month Drinks per session: 1 or 2 Allergies Allergen Reactions  Amoxicillin Hives Current Outpatient Medications Medication Sig  
 glucose blood VI test strips (ACCU-CHEK RICHIE PLUS TEST STRP) strip Accu-Chek Richie Plus test strips Check BS BID  insulin degludec (TRESIBA FLEXTOUCH U-200) 200 unit/mL (3 mL) inpn 42 Units by SubCUTAneous route daily. Or as directed up to 70 units daily on chemo days--Dose change 12/13/18--updated med list--did not send prescription to the pharmacy  OTHER Cranial prosthesis Dx C25.7  omeprazole (PRILOSEC) 20 mg capsule Take 20 mg by mouth daily.  insulin aspart U-100 (NOVOLOG FLEXPEN U-100 INSULIN) 100 unit/mL inpn Inject 14 units before breakfast, lunch and dinner + 2 units for every 50 mg/dl above 150 mg/dl. Max 50 units per day  ondansetron hcl (ZOFRAN) 8 mg tablet Take 1 Tab by mouth every eight (8) hours as needed for Nausea.  lidocaine-prilocaine (EMLA) topical cream Apply  to affected area as needed for Pain.  simvastatin (ZOCOR) 10 mg tablet Take  by mouth nightly.  telmisartan (MICARDIS) 80 mg tablet Take 80 mg by mouth daily.  hydroCHLOROthiazide (MICROZIDE) 12.5 mg capsule Take 12.5 mg by mouth daily. No current facility-administered medications for this visit. Facility-Administered Medications Ordered in Other Visits Medication Dose Route Frequency  [START ON 1/16/2019] gemcitabine (GEMZAR) 1,960 mg in 0.9% sodium chloride 250 mL, overfill volume 25 mL chemo infusion  1,960 mg IntraVENous ONCE  
 [START ON 1/16/2019] PACLitaxel-Protein Bound (ABRAXANE) 245 mg in 0.9% sodium chloride 49 mL chemo infusion  245 mg IntraVENous ONCE  
 [START ON 1/16/2019] dexamethasone (DECADRON) 4 mg/mL injection 8 mg  8 mg IntraVENous ONCE  
  [START ON 1/16/2019] prochlorperazine (COMPAZINE) with saline injection 10 mg  10 mg IntraVENous Q6H PRN  
 
 
 
 LAB DATA REVIEWED:  
 
Lab Results Component Value Date/Time WBC 20.8 (H) 01/09/2019 11:32 AM  
 HGB 9.4 (L) 01/09/2019 11:32 AM  
 PLATELET 431 (H) 76/41/5670 11:32 AM  
 
Lab Results Component Value Date/Time Sodium 139 01/02/2019 11:24 AM  
 Potassium 4.1 01/02/2019 11:24 AM  
 Chloride 104 01/02/2019 11:24 AM  
 CO2 28 01/02/2019 11:24 AM  
 BUN 16 01/02/2019 11:24 AM  
 Creatinine 0.72 01/02/2019 11:24 AM  
 Calcium 8.5 01/02/2019 11:24 AM  
 Magnesium 1.8 01/02/2019 11:24 AM  
 Phosphorus 3.8 01/02/2019 11:24 AM  
  
Lab Results Component Value Date/Time AST (SGOT) 22 01/02/2019 11:24 AM  
 Alk. phosphatase 173 (H) 01/02/2019 11:24 AM  
 Protein, total 6.2 (L) 01/02/2019 11:24 AM  
 Albumin 3.0 (L) 01/02/2019 11:24 AM  
 Globulin 3.2 01/02/2019 11:24 AM  
 
Lab Results Component Value Date/Time INR 1.0 11/02/2018 03:14 PM  
 Prothrombin time 10.0 11/02/2018 03:14 PM  
 aPTT 28.3 11/02/2018 03:14 PM  
  
No results found for: IRON, FE, TIBC, IBCT, PSAT, FERR  
 
12/28/18- CT C/A/P IMPRESSION: 
Interval decrease in size of pancreatic mass. Slight interval decrease in size 
of hepatic metastases; these now demonstrate central low attenuation suggestive 
of necrosis. No evidence of new metastatic disease in the chest, abdomen, or 
pelvis. CONTROLLED SUBSTANCES SAFETY ASSESSMENT (IF ON CONTROLLED SUBSTANCES):  
 
Reviewed opioid safety handout:  [] Yes   [] No 
24 hour opioid dose >150mg morphine equivalent/day:  [] Yes   [] No 
Benzodiazepines:  [] Yes   [] No 
Sleep apnea:  [] Yes   [] No 
Urine Toxicology Testing within last 6 months:  [] Yes   [] No 
History of or new aberrant medication taking behaviors:  [] Yes   [] No 
 
 
   
 
Total time: 70 minutes Counseling / coordination time: 60 minutes 
> 50% counseling / coordination?:  Yes

## 2019-01-08 NOTE — PATIENT INSTRUCTIONS
Dear Carmen Curtis , It was a pleasure seeing you today at [facility] office Your described symptoms were: Fatigue: 1 Drowsiness: 1 Depression: 0 Pain: 1 Anxiety: 0 Nausea: 0 Anorexia: 0 Dyspnea: 0 Best Well-Bein Constipation: No  
Other Problem (Comment): 0 This is the plan we talked about: 1. Abdominal discomfort - This is no longer a problem. You have not needed any pain medicine in a while. 2. Muscle and bony pain with Granix - We discussed how this is very common. 
- You are drinking very little water- 16oz per day which is very low for you. We agreed you will slowly increase your water consumption to a daily normal of 80 ounces per day. 3. Chemo induced nausea - You are very well controlled with your current regimen. 4.  Acid reflux 
-You are on Prilosec daily, please continue that. 5.  Goals of care 
-You feel good about starting chemotherapy. You have good support. 
-You are in the process of completing a medical directive along with living will etc. with a . Please bring us a copy when you have it. 
-We discussed your wishes regarding CODE STATUS and you want to remain full code for now. This is what you have shared with us about Advance Care Planning No flowsheet data found. The Palliative Medicine Team is here to support you and your family. We will see you again in 6 weeks Sincerely, 
 
 
Aubree Lynn MD and the Palliative Medicine Team

## 2019-01-08 NOTE — PROGRESS NOTES
Palliative Medicine Office Visit Palliative Medicine Nurse Check In 
[phone] Patient Name: Francis Franco YOB: 1945 Date of Office Visit:  
 
Patient states: \"  \" 
 
From Check In Sheet (scanned in Media): 
Has a medical provider talked with you about cardiopulmonary resuscitation (CPR)? [] Yes   [] No   [] Unable to obtain Nurse reminder to complete or update ACP FlowSheet: 
 
Is ACP on the Problem List?    [] Yes    [] No 
IF ACP Document is ON FILE; Nurse to place ACP on Problem List  
 
Is there an ACP Note in Chart Review/Note? [] Yes    [] No  
If NO: ALERT PROVIDER No flowsheet data found. Is there anything that we should know about you as a person in order to provide you the best care possible? Have you been to the ER, urgent care clinic since your last visit? [] Yes   [x] No   [] Unable to obtain Have you been hospitalized since your last visit? [] Yes   [x] No   [] Unable to obtain Have you seen or consulted any other health care providers outside of the [address] [place] [address] since your last visit? [] Yes   [x] No   [] Unable to obtain Functional status (describe):  
 
 
 
Last BM: 1/7/2019  accessed (date): 1/8/2019 Bottle review (for opioid pain medication): 
Medication 1:  
Date filled:  
Directions:  
# filled: # left: # pills taking per day: 
Last dose taken: 
 
Medication 2:  
Date filled:  
Directions:  
# filled: # left: # pills taking per day: 
Last dose taken: 
 
Medication 3:  
Date filled:  
Directions:  
# filled: # left: # pills taking per day: 
Last dose taken: 
 
Medication 4:  
Date filled:  
Directions:  
# filled: # left: # pills taking per day: 
Last dose taken:

## 2019-01-09 ENCOUNTER — HOSPITAL ENCOUNTER (OUTPATIENT)
Dept: INFUSION THERAPY | Age: 74
Discharge: HOME | End: 2019-01-09
Payer: MEDICARE

## 2019-01-09 ENCOUNTER — RESEARCH ENCOUNTER (OUTPATIENT)
Dept: ONCOLOGY | Age: 74
End: 2019-01-09

## 2019-01-09 ENCOUNTER — NURSE NAVIGATOR (OUTPATIENT)
Dept: PALLATIVE CARE | Age: 74
End: 2019-01-09

## 2019-01-09 VITALS
DIASTOLIC BLOOD PRESSURE: 66 MMHG | BODY MASS INDEX: 30.54 KG/M2 | HEART RATE: 72 BPM | HEIGHT: 67 IN | TEMPERATURE: 98.2 F | SYSTOLIC BLOOD PRESSURE: 107 MMHG | WEIGHT: 194.6 LBS | RESPIRATION RATE: 14 BRPM | OXYGEN SATURATION: 99 %

## 2019-01-09 LAB
BASOPHILS # BLD: 0.2 K/UL (ref 0–0.1)
BASOPHILS NFR BLD: 1 % (ref 0–1)
DIFFERENTIAL METHOD BLD: ABNORMAL
EOSINOPHIL # BLD: 0 K/UL (ref 0–0.4)
EOSINOPHIL NFR BLD: 0 % (ref 0–7)
ERYTHROCYTE [DISTWIDTH] IN BLOOD BY AUTOMATED COUNT: 18.3 % (ref 11.5–14.5)
HCT VFR BLD AUTO: 29 % (ref 35–47)
HGB BLD-MCNC: 9.4 G/DL (ref 11.5–16)
IMM GRANULOCYTES # BLD AUTO: 0 K/UL
IMM GRANULOCYTES NFR BLD AUTO: 0 %
LDH SERPL L TO P-CCNC: 278 U/L (ref 81–246)
LYMPHOCYTES # BLD: 2.7 K/UL (ref 0.8–3.5)
LYMPHOCYTES NFR BLD: 13 % (ref 12–49)
MCH RBC QN AUTO: 30.8 PG (ref 26–34)
MCHC RBC AUTO-ENTMCNC: 32.4 G/DL (ref 30–36.5)
MCV RBC AUTO: 95.1 FL (ref 80–99)
METAMYELOCYTES NFR BLD MANUAL: 6 %
MONOCYTES # BLD: 2.1 K/UL (ref 0–1)
MONOCYTES NFR BLD: 10 % (ref 5–13)
MYELOCYTES NFR BLD MANUAL: 3 %
NEUTS BAND NFR BLD MANUAL: 1 % (ref 0–6)
NEUTS SEG # BLD: 13.9 K/UL (ref 1.8–8)
NEUTS SEG NFR BLD: 66 % (ref 32–75)
NRBC # BLD: 0.03 K/UL (ref 0–0.01)
NRBC BLD-RTO: 0.1 PER 100 WBC
PLATELET # BLD AUTO: 841 K/UL (ref 150–400)
PMV BLD AUTO: 9.8 FL (ref 8.9–12.9)
RBC # BLD AUTO: 3.05 M/UL (ref 3.8–5.2)
RBC MORPH BLD: ABNORMAL
RBC MORPH BLD: ABNORMAL
WBC # BLD AUTO: 20.8 K/UL (ref 3.6–11)
WBC MORPH BLD: ABNORMAL

## 2019-01-09 PROCEDURE — 85025 COMPLETE CBC W/AUTO DIFF WBC: CPT

## 2019-01-09 PROCEDURE — 96417 CHEMO IV INFUS EACH ADDL SEQ: CPT

## 2019-01-09 PROCEDURE — 74011250636 HC RX REV CODE- 250/636: Performed by: INTERNAL MEDICINE

## 2019-01-09 PROCEDURE — 96413 CHEMO IV INFUSION 1 HR: CPT

## 2019-01-09 PROCEDURE — 83615 LACTATE (LD) (LDH) ENZYME: CPT

## 2019-01-09 PROCEDURE — 96375 TX/PRO/DX INJ NEW DRUG ADDON: CPT

## 2019-01-09 PROCEDURE — 36415 COLL VENOUS BLD VENIPUNCTURE: CPT

## 2019-01-09 PROCEDURE — 74011000258 HC RX REV CODE- 258: Performed by: INTERNAL MEDICINE

## 2019-01-09 PROCEDURE — 77030012965 HC NDL HUBR BBMI -A

## 2019-01-09 PROCEDURE — 74011250636 HC RX REV CODE- 250/636

## 2019-01-09 RX ADMIN — DEXAMETHASONE SODIUM PHOSPHATE 8 MG: 4 INJECTION, SOLUTION INTRAMUSCULAR; INTRAVENOUS at 14:20

## 2019-01-09 RX ADMIN — PACLITAXEL 245 MG: 100 INJECTION, POWDER, LYOPHILIZED, FOR SUSPENSION INTRAVENOUS at 14:58

## 2019-01-09 RX ADMIN — GEMCITABINE 1960 MG: 38 INJECTION, SOLUTION INTRAVENOUS at 15:55

## 2019-01-09 NOTE — PROGRESS NOTES
Palliative Medicine  Nursing Note  [phone]  Fax [phone]        Clinic Office Visit  Patient Name: Angel Antunez  YOB: 1945    1/9/2019      No flowsheet data found. Plan per Dr. Quincy Marcelino,    AVS reviewed with patient, verbalized an understanding of material discussed. Instructions given to patient to call the Palliative Medicine Office at [phone] for any questions or concerns 24 hours a day, 7 days a weeks. Follow up appointment scheduled for 6 weeks. Nurse Navigator will provide a follow up phone call as needed. Ms. Joanne Mcmanus was doing well yesterday, verbalized understanding of the information discussed with Quincy Rivas and will call PM nurse if she has any questions or concerns.     Denise Goddard, CYRUSN, RN, OCN, VIA [facility]  Palliative Medicine

## 2019-01-09 NOTE — PROGRESS NOTES
Pt in for labs and infusion. This is C3D8 of treatment. Patient reports the following adverse events at this time: 1 fatigue, gr 1 hair loss, gr 1 insomnia, gr 1 neuropathy. Vital signs are stable. Patient to go to infusion center after appointment to receive gem/abraxane infusion. Next appt is scheduled for 1/16/19.

## 2019-01-09 NOTE — PROGRESS NOTES
Problem: Knowledge Deficit Goal: *Verbalizes understanding of procedures and medications Here today for clinical trial Gemzar and Abraxane

## 2019-01-09 NOTE — PROGRESS NOTES
[facility] VISIT NOTE Pt arrived at [facility] ambulatory and in no distress for Gemzar/Abraxane clinical trial.  Assessment completed, pt has no new complaints. Vital Signs were as follows: 
Patient Vitals for the past 12 hrs: 
 Temp Pulse Resp BP SpO2  
01/09/19 1616 98.2 °F (36.8 °C) 72 14 107/66 99 % 01/09/19 1120 98.4 °F (36.9 °C) 77 14 117/67 98 %  
 
 
 
right chest port accessed with 0.75 in garcia no difficulty. Positive blood return noted and labs drawn. Lab values were as follows: 
Recent Results (from the past 12 hour(s)) CBC WITH AUTOMATED DIFF Collection Time: 01/09/19 11:32 AM  
Result Value Ref Range WBC 20.8 (H) 3.6 - 11.0 K/uL  
 RBC 3.05 (L) 3.80 - 5.20 M/uL HGB 9.4 (L) 11.5 - 16.0 g/dL HCT 29.0 (L) 35.0 - 47.0 % MCV 95.1 80.0 - 99.0 FL  
 MCH 30.8 26.0 - 34.0 PG  
 MCHC 32.4 30.0 - 36.5 g/dL  
 RDW 18.3 (H) 11.5 - 14.5 % PLATELET 546 (H) 544 - 400 K/uL MPV 9.8 8.9 - 12.9 FL  
 NRBC 0.1 (H) 0  WBC ABSOLUTE NRBC 0.03 (H) 0.00 - 0.01 K/uL NEUTROPHILS 66 32 - 75 % BAND NEUTROPHILS 1 0 - 6 % LYMPHOCYTES 13 12 - 49 % MONOCYTES 10 5 - 13 % EOSINOPHILS 0 0 - 7 % BASOPHILS 1 0 - 1 % METAMYELOCYTES 6 (H) 0 % MYELOCYTES 3 (H) 0 % IMMATURE GRANULOCYTES 0 %  
 ABS. NEUTROPHILS 13.9 (H) 1.8 - 8.0 K/UL  
 ABS. LYMPHOCYTES 2.7 0.8 - 3.5 K/UL  
 ABS. MONOCYTES 2.1 (H) 0.0 - 1.0 K/UL  
 ABS. EOSINOPHILS 0.0 0.0 - 0.4 K/UL  
 ABS. BASOPHILS 0.2 (H) 0.0 - 0.1 K/UL  
 ABS. IMM. GRANS. 0.0 K/UL  
 DF MANUAL    
 RBC COMMENTS ANISOCYTOSIS 1+ 
    
 RBC COMMENTS POLYCHROMASIA PRESENT 
    
 WBC COMMENTS TOXIC GRANULATION    
LD Collection Time: 01/09/19 11:32 AM  
Result Value Ref Range  (H) 81 - 246 U/L Medications received: 
Decadron IV Gemzar IV Abraxane IV Tolerated treatment well, no adverse reaction noted. Port de-accessed and flushed per protocol. Positive blood return noted. D/C'd from [facility] ambulatory and in no distress accompanied by self. Next appointment is 1/16/19 at 1100.

## 2019-01-11 RX ORDER — DEXAMETHASONE SODIUM PHOSPHATE 4 MG/ML
8 INJECTION, SOLUTION INTRA-ARTICULAR; INTRALESIONAL; INTRAMUSCULAR; INTRAVENOUS; SOFT TISSUE ONCE
Status: COMPLETED | OUTPATIENT
Start: 2019-01-16 | End: 2019-01-16

## 2019-01-16 ENCOUNTER — OFFICE VISIT (OUTPATIENT)
Dept: ONCOLOGY | Age: 74
End: 2019-01-16

## 2019-01-16 ENCOUNTER — HOSPITAL ENCOUNTER (OUTPATIENT)
Dept: INFUSION THERAPY | Age: 74
Discharge: HOME | End: 2019-01-16
Payer: MEDICARE

## 2019-01-16 ENCOUNTER — RESEARCH ENCOUNTER (OUTPATIENT)
Dept: ONCOLOGY | Age: 74
End: 2019-01-16

## 2019-01-16 VITALS
RESPIRATION RATE: 18 BRPM | OXYGEN SATURATION: 98 % | TEMPERATURE: 97.8 F | HEART RATE: 87 BPM | SYSTOLIC BLOOD PRESSURE: 129 MMHG | WEIGHT: 195.2 LBS | DIASTOLIC BLOOD PRESSURE: 78 MMHG | HEIGHT: 67 IN | BODY MASS INDEX: 30.64 KG/M2

## 2019-01-16 VITALS
DIASTOLIC BLOOD PRESSURE: 67 MMHG | RESPIRATION RATE: 18 BRPM | HEIGHT: 67 IN | HEART RATE: 71 BPM | SYSTOLIC BLOOD PRESSURE: 136 MMHG | TEMPERATURE: 97.7 F | WEIGHT: 195.2 LBS | BODY MASS INDEX: 30.64 KG/M2

## 2019-01-16 DIAGNOSIS — C25.7 MALIGNANT NEOPLASM OF OTHER PARTS OF PANCREAS (HCC): Primary | ICD-10-CM

## 2019-01-16 LAB
BASOPHILS # BLD: 0 K/UL (ref 0–0.1)
BASOPHILS NFR BLD: 0 % (ref 0–1)
BLASTS NFR BLD MANUAL: 1 %
DIFFERENTIAL METHOD BLD: ABNORMAL
EOSINOPHIL # BLD: 0.2 K/UL (ref 0–0.4)
EOSINOPHIL NFR BLD: 1 % (ref 0–7)
ERYTHROCYTE [DISTWIDTH] IN BLOOD BY AUTOMATED COUNT: 18.3 % (ref 11.5–14.5)
HCT VFR BLD AUTO: 28.9 % (ref 35–47)
HGB BLD-MCNC: 9.6 G/DL (ref 11.5–16)
IMM GRANULOCYTES # BLD AUTO: 0 K/UL
IMM GRANULOCYTES NFR BLD AUTO: 0 %
LDH SERPL L TO P-CCNC: 230 U/L (ref 81–246)
LYMPHOCYTES # BLD: 1 K/UL (ref 0.8–3.5)
LYMPHOCYTES NFR BLD: 6 % (ref 12–49)
MCH RBC QN AUTO: 31.4 PG (ref 26–34)
MCHC RBC AUTO-ENTMCNC: 33.2 G/DL (ref 30–36.5)
MCV RBC AUTO: 94.4 FL (ref 80–99)
METAMYELOCYTES NFR BLD MANUAL: 5 %
MONOCYTES # BLD: 0.3 K/UL (ref 0–1)
MONOCYTES NFR BLD: 2 % (ref 5–13)
MYELOCYTES NFR BLD MANUAL: 6 %
NEUTS BAND NFR BLD MANUAL: 12 % (ref 0–6)
NEUTS SEG # BLD: 12.5 K/UL (ref 1.8–8)
NEUTS SEG NFR BLD: 64 % (ref 32–75)
NRBC # BLD: 0.03 K/UL (ref 0–0.01)
NRBC BLD-RTO: 0.2 PER 100 WBC
PLATELET # BLD AUTO: 232 K/UL (ref 150–400)
PMV BLD AUTO: 9 FL (ref 8.9–12.9)
PROMYELOCYTES NFR BLD MANUAL: 3 %
RBC # BLD AUTO: 3.06 M/UL (ref 3.8–5.2)
RBC MORPH BLD: ABNORMAL
WBC # BLD AUTO: 16.4 K/UL (ref 3.6–11)

## 2019-01-16 PROCEDURE — 96375 TX/PRO/DX INJ NEW DRUG ADDON: CPT

## 2019-01-16 PROCEDURE — 74011250636 HC RX REV CODE- 250/636: Performed by: INTERNAL MEDICINE

## 2019-01-16 PROCEDURE — 85025 COMPLETE CBC W/AUTO DIFF WBC: CPT

## 2019-01-16 PROCEDURE — 83615 LACTATE (LD) (LDH) ENZYME: CPT

## 2019-01-16 PROCEDURE — 96413 CHEMO IV INFUSION 1 HR: CPT

## 2019-01-16 PROCEDURE — 36415 COLL VENOUS BLD VENIPUNCTURE: CPT

## 2019-01-16 PROCEDURE — 96417 CHEMO IV INFUS EACH ADDL SEQ: CPT

## 2019-01-16 PROCEDURE — 74011250636 HC RX REV CODE- 250/636

## 2019-01-16 PROCEDURE — 74011000258 HC RX REV CODE- 258: Performed by: INTERNAL MEDICINE

## 2019-01-16 PROCEDURE — 77030012965 HC NDL HUBR BBMI -A

## 2019-01-16 RX ADMIN — GEMCITABINE 1960 MG: 38 INJECTION, SOLUTION INTRAVENOUS at 14:18

## 2019-01-16 RX ADMIN — PACLITAXEL 245 MG: 100 INJECTION, POWDER, LYOPHILIZED, FOR SUSPENSION INTRAVENOUS at 13:26

## 2019-01-16 RX ADMIN — DEXAMETHASONE SODIUM PHOSPHATE 8 MG: 4 INJECTION, SOLUTION INTRAMUSCULAR; INTRAVENOUS at 12:55

## 2019-01-16 NOTE — PROGRESS NOTES
Pt in for labs and follow up visit today per protocol with Dr. Angi Vizcarra and RN. This is C3D15 of treatment. Patient reports the following adverse events at this time: gr 1 fatigue, gr 1 insomnia, gr 1 neuropathy. .  Vital signs are stable. Patient to go to infusion center after appointment to receive gemcitabine/abraxane. Next appt is scheduled for 1/30/19.

## 2019-01-16 NOTE — PROGRESS NOTES
Outpatient Infusion Center - Chemotherapy Progress Note 1110 Pt admit to [facility] for Clinical Trial:  Abraxane/Gemzar C3D15 ambulatory in stable condition. Assessment completed. No new concerns voiced. Port accessed with positive blood return. Labs drawn per order and sent. Line flushed, clamped, Curos Cap applied to end clave. Pt to MD for appointment Labs reviewed, chemo ordered Visit Vitals /78 (BP 1 Location: Right arm, BP Patient Position: Sitting) Pulse 87 Temp 97.8 °F (36.6 °C) Resp 18 Ht 5' 7\" (1.702 m) Wt 88.5 kg (195 lb 3.2 oz) BMI 30.57 kg/m² Medications: 
NS @ [facility] Decadron Abraxane Gemzar 1450Pt tolerated treatment well. Port maintained positive blood return throughout treatment, flushed with positive blood return at conclusion and heparinized and de-accessed. D/c home ambulatory in no distress. Pt aware of next [facility] appointment scheduled for 1/30/19. Recent Results (from the past 12 hour(s)) CBC WITH AUTOMATED DIFF Collection Time: 01/16/19 11:16 AM  
Result Value Ref Range WBC 16.4 (H) 3.6 - 11.0 K/uL  
 RBC 3.06 (L) 3.80 - 5.20 M/uL HGB 9.6 (L) 11.5 - 16.0 g/dL HCT 28.9 (L) 35.0 - 47.0 % MCV 94.4 80.0 - 99.0 FL  
 MCH 31.4 26.0 - 34.0 PG  
 MCHC 33.2 30.0 - 36.5 g/dL  
 RDW 18.3 (H) 11.5 - 14.5 % PLATELET 272 516 - 459 K/uL MPV 9.0 8.9 - 12.9 FL  
 NRBC 0.2 (H) 0  WBC ABSOLUTE NRBC 0.03 (H) 0.00 - 0.01 K/uL NEUTROPHILS 64 32 - 75 % BAND NEUTROPHILS 12 (H) 0 - 6 % LYMPHOCYTES 6 (L) 12 - 49 % MONOCYTES 2 (L) 5 - 13 % EOSINOPHILS 1 0 - 7 % BASOPHILS 0 0 - 1 % METAMYELOCYTES 5 (H) 0 % MYELOCYTES 6 (H) 0 % PROMYELOCYTES 3 (H) 0 % BLASTS 1 (H) 0 % IMMATURE GRANULOCYTES 0 %  
 ABS. NEUTROPHILS 12.5 (H) 1.8 - 8.0 K/UL  
 ABS. LYMPHOCYTES 1.0 0.8 - 3.5 K/UL  
 ABS. MONOCYTES 0.3 0.0 - 1.0 K/UL  
 ABS. EOSINOPHILS 0.2 0.0 - 0.4 K/UL  
 ABS. BASOPHILS 0.0 0.0 - 0.1 K/UL  
 ABS. IMM. GRANS. 0.0 K/UL DF MANUAL    
 RBC COMMENTS ANISOCYTOSIS 1+ 
    
LD Collection Time: 01/16/19 11:16 AM  
Result Value Ref Range   81 - 246 U/L

## 2019-01-16 NOTE — PROGRESS NOTES
[facility] [place] at [address], [address] [facility]  Sofia Dory: [phone]  F: [phone]      Reason for Visit:   Daryl Ybarra is a 72 y.o. female who is seen in self-referral for evaluation of pancreatic cancer. Treatment History:   · 10/4/18 pancreatic head mass FNA, pancreatic adenocarcinoma    10/15/18  Liver, Core Biopsy w/Touch Prep CYTOLOGIC INTERPRETATION:   · Numerous cores and fragments of benign hepatic parenchyma     10/24/18  Liver, Fine Needle Aspiration CYTOLOGIC INTERPRETATION:   Metastatic adenocarcinoma (see Comment). General Categorization   Positive for malignancy. Comment: The morphologic appearance is nonspecific with regard to site of origin but compatible with metastatic pancreatic carcinoma in this patient with known pancreatic adenocarcinoma ([id number]). 11/9/18 abraxane/gemcitabine    History of Present Illness:   She started having abdominal pain, gradual and persistent, x 1 month, pressure sensation, located in epigastrium, no radiation, no aggravating symptoms, no relieving factors. 25 lb weight loss over 6 months.  + nausea. Interval history:  In today for follow up. Complains of gr 1 fatigue, gr 1 insomnia, gr 1 neuropathy. Past Medical History:   Diagnosis Date    Arthritis     Constipation     Diabetes ([address]     Hemorrhoids     Hiatal hernia     High cholesterol     Hypertension     Pancreatic cancer ([address]       Past Surgical History:   Procedure Laterality Date    HX KNEE ARTHROSCOPY Right     HX ORTHOPAEDIC      left wrist surgery    HX TONSILLECTOMY        Social History     Tobacco Use    Smoking status: Never Smoker    Smokeless tobacco: Never Used   Substance Use Topics    Alcohol use:  Yes     Alcohol/week: 1.2 - 2.4 oz     Types: 1 - 2 Cans of beer, 1 - 2 Shots of liquor per week     Frequency: 2-4 times a month     Drinks per session: 1 or 2      Family History   Problem Relation Age of Onset    Cancer Mother         skin    Hypertension Mother     Heart Disease Mother     Cancer Father         skin    Heart Disease Father     Stroke Father     Diabetes Neg Hx      Current Outpatient Medications   Medication Sig    glucose blood VI test strips (ACCU-CHEK RICHIE PLUS TEST STRP) strip Accu-Chek Richie Plus test strips   Check BS BID    insulin degludec (TRESIBA FLEXTOUCH U-200) 200 unit/mL (3 mL) inpn 42 Units by SubCUTAneous route daily. Or as directed up to 70 units daily on chemo days--Dose change 12/13/18--updated med list--did not send prescription to the pharmacy    OTHER Cranial prosthesis    Dx C25.7    omeprazole (PRILOSEC) 20 mg capsule Take 20 mg by mouth daily.  insulin aspart U-100 (NOVOLOG FLEXPEN U-100 INSULIN) 100 unit/mL inpn Inject 14 units before breakfast, lunch and dinner + 2 units for every 50 mg/dl above 150 mg/dl. Max 50 units per day    simvastatin (ZOCOR) 10 mg tablet Take  by mouth nightly.  telmisartan (MICARDIS) 80 mg tablet Take 80 mg by mouth daily.  hydroCHLOROthiazide (MICROZIDE) 12.5 mg capsule Take 12.5 mg by mouth daily.  ondansetron hcl (ZOFRAN) 8 mg tablet Take 1 Tab by mouth every eight (8) hours as needed for Nausea.  lidocaine-prilocaine (EMLA) topical cream Apply  to affected area as needed for Pain. No current facility-administered medications for this visit.       Facility-Administered Medications Ordered in Other Visits   Medication Dose Route Frequency    gemcitabine (GEMZAR) 1,960 mg in 0.9% sodium chloride 250 mL, overfill volume 25 mL chemo infusion  1,960 mg IntraVENous ONCE    PACLitaxel-Protein Bound (ABRAXANE) 245 mg in 0.9% sodium chloride 49 mL chemo infusion  245 mg IntraVENous ONCE    dexamethasone (DECADRON) 4 mg/mL injection 8 mg  8 mg IntraVENous ONCE    prochlorperazine (COMPAZINE) with saline injection 10 mg  10 mg IntraVENous Q6H PRN      Allergies   Allergen Reactions    Amoxicillin Hives        Review of Systems: A comprehensive review of systems was performed and all systems were negative except for HPI and for the symptom review form, reviewed and scanned in. Physical Exam:     Visit Vitals  /78   Pulse 87   Temp 97.8 °F (36.6 °C) (Temporal)   Resp 18   Ht 5' 7\" (1.702 m)   Wt 195 lb 3.2 oz (88.5 kg)   SpO2 98%   BMI 30.57 kg/m²     ECOG PS: 0  General: No distress  Eyes: PERRLA, anicteric sclerae  HENT: Atraumatic, OP clear  Neck: Supple  Lymphatic: No cervical, supraclavicular, or inguinal adenopathy  Respiratory: CTAB, normal respiratory effort  CV: Normal rate, regular rhythm, no murmurs, no peripheral edema  GI: Soft, nontender, nondistended, no masses, no hepatomegaly, no splenomegaly  MS: Normal gait and station. Digits without clubbing or cyanosis. Skin: No rashes, ecchymoses, or petechiae. Normal temperature, turgor, and texture. Psych: Alert, oriented, appropriate affect, normal judgment/insight        Results:     Lab Results   Component Value Date/Time    WBC 16.4 (H) 01/16/2019 11:16 AM    HGB 9.6 (L) 01/16/2019 11:16 AM    HCT 28.9 (L) 01/16/2019 11:16 AM    PLATELET 733 54/79/3440 11:16 AM    MCV 94.4 01/16/2019 11:16 AM    ABS. NEUTROPHILS PENDING 01/16/2019 11:16 AM     Lab Results   Component Value Date/Time    Sodium 139 01/02/2019 11:24 AM    Potassium 4.1 01/02/2019 11:24 AM    Chloride 104 01/02/2019 11:24 AM    CO2 28 01/02/2019 11:24 AM    Glucose 119 (H) 01/02/2019 11:24 AM    BUN 16 01/02/2019 11:24 AM    Creatinine 0.72 01/02/2019 11:24 AM    GFR est AA >60 01/02/2019 11:24 AM    GFR est non-AA >60 01/02/2019 11:24 AM    Calcium 8.5 01/02/2019 11:24 AM    Glucose (POC) 316 (H) 10/04/2018 03:45 PM     Lab Results   Component Value Date/Time    Bilirubin, total 0.5 01/02/2019 11:24 AM    ALT (SGPT) 47 01/02/2019 11:24 AM    AST (SGOT) 22 01/02/2019 11:24 AM    Alk.  phosphatase 173 (H) 01/02/2019 11:24 AM    Protein, total 6.2 (L) 01/02/2019 11:24 AM    Albumin 3.0 (L) 01/02/2019 11:24 AM    Globulin 3.2 01/02/2019 11:24 AM     10/1/18 CT abd  There is a 3.8 x 1.9 cm mass involving the uncinate process and possibly  invading the duodenum. Findings are nonspecific but typical of pancreatic  carcinoma. There is no biliary or pancreatic ductal dilatation.     There is a poorly defined irregular hypodensity in segment IVb of the liver  measuring 3.7 x 1.9 cm. There appears to be focal biliary dilatation in the left  lobe behind this abnormality. Metastatic disease is suspected. There is a small,  1 cm hypodensity in the dome of the liver, likely segment 8. There is a 1 cm  hypodensity in segment 4 of the liver as well, also likely metastasis. Small  hypodensity measuring less than 1 cm in segment 6 at the tip laterally is also  nonspecific but probable metastasis.     Spleen kidneys and adrenals are unremarkable.     No free fluid or focal fluid collection.     Lung bases are clear.     Bone windows are unremarkable.     IMPRESSION  IMPRESSION:  1. 3.8 x 1.9 cm mass involving the uncinate process of the pancreas and possibly  invading the duodenum, this likely represents pancreatic carcinoma. 2. Likely metastatic disease in the liver. There is a 3.8 x 1.9 cm mass involving the uncinate process and possibly  invading the duodenum. Findings are nonspecific but typical of pancreatic  carcinoma. There is no biliary or pancreatic ductal dilatation.     There is a poorly defined irregular hypodensity in segment IVb of the liver  measuring 3.7 x 1.9 cm. There appears to be focal biliary dilatation in the left  lobe behind this abnormality. Metastatic disease is suspected. There is a small,  1 cm hypodensity in the dome of the liver, likely segment 8. There is a 1 cm  hypodensity in segment 4 of the liver as well, also likely metastasis.  Small  hypodensity measuring less than 1 cm in segment 6 at the tip laterally is also  nonspecific but probable metastasis.     Spleen kidneys and adrenals are unremarkable.     No free fluid or focal fluid collection.     Lung bases are clear.     Bone windows are unremarkable.     IMPRESSION  IMPRESSION:  1. 3.8 x 1.9 cm mass involving the uncinate process of the pancreas and possibly  invading the duodenum, this likely represents pancreatic carcinoma. 2. Likely metastatic disease in the liver. Records reviewed and summarized above. Pathology report(s) reviewed above. Radiology report(s) reviewed above. MRI abdomen 10/22/18: IMPRESSION:       1. Pancreatic uncinate process mass suspicious for pancreatic neoplasm, possibly  adenocarcinoma. Mass abuts the superior mesenteric artery with about 20%  circumference involvement but no luminal distortion or perivascular stranding. 2. Multiple hepatic lesions suspicious for metastatic neoplasm with segment IVb  lesion showing patchy areas of surrounding steatosis likely secondary to locally  altered intrahepatic hemodynamics and likely responsible for biopsy result. 3. Cholecystolithiasis and small gallbladder polyp. CT c/a/p 12/28/18: IMPRESSION:  Interval decrease in size of pancreatic mass. Slight interval decrease in size of hepatic metastases; these now demonstrate central low attenuation suggestive of necrosis. No evidence of new metastatic disease in the chest, abdomen, or pelvis.     Assessment/plan:   1. Uncinate pancreatic adenocarcinoma,  mets to liver, stage IV:  cT2 cN0 pM1    Discussed that while this is treatable, it is not curable, the goal of therapy is palliative. Discussed that without therapy, life expectancy would be 3-6 months, with therapy 12-18 months on average. As an example of likely chemotherapy, we discussed the risks and benefits of Gemcitabine and Abraxane chemotherapy.  Potential side effects include, but are not limited to, nausea, vomiting, diarrhea, constipation, mucositis, taste changes, myelosuppression, infection, fatigue, alopecia, skin and nail changes, pulmonary toxicity, neuropathy, allergic reactions, infertility, and rarely, death. Discussed the [id number] study and the research nurse met with her today and she signed informed consent. · Gemcitabine (1000 mg/m2) and Abraxane (125 mg/m2) given on days 1,8,15 every 28 days. · Labs: CBC, BMP prior to each treatment. Hepatic function panel every 4 weeks. CA 19.9 prior to day 1  · Antiemetic prophylaxis: Dexamethasone prior to each treatment  · PRN antiemetics: Ondansetron and Prochlorperazine at home  · EMLA cream for port    On the control arm, C3d15 today, will see her back in 2 weeks for C4D1. CT c/a/p on 12/28/18 show response to treatment. 2. DM2:  Holding metformin; on insulin; saw Dr. Shannan Hastings; her BS are improving    3. Emotional well being:  She has excellent support and is coping well with her disease    4. Abdominal pain:  Resolved;  palliative care has seen    5. Nutrition:  Chelly Greenwood RD has met with her; holding on enzymes    6. Insomnia: Has improved. She has lunesta if needed; she may also try melatonin    7. Anemia:  Due to chemo and disease; monitor    8. Neutropenia:  Due to chemo, started granix 480 mcg for 4 days following chemo with C1D15. Does report some pain with granix. Tried claritin, however this kept her awake. Recommend trying tylenol and advil PRN.  Now at 3 days following chemo, will go down to 2 days            Signed By: Noemi Borrero MD

## 2019-01-24 RX ORDER — INSULIN ASPART 100 [IU]/ML
INJECTION, SOLUTION INTRAVENOUS; SUBCUTANEOUS
Qty: 15 ML | Refills: 11
Start: 2019-01-24 | End: 2019-02-28

## 2019-01-25 RX ORDER — DEXAMETHASONE SODIUM PHOSPHATE 4 MG/ML
8 INJECTION, SOLUTION INTRA-ARTICULAR; INTRALESIONAL; INTRAMUSCULAR; INTRAVENOUS; SOFT TISSUE ONCE
Status: COMPLETED | OUTPATIENT
Start: 2019-01-30 | End: 2019-01-30

## 2019-01-30 ENCOUNTER — OFFICE VISIT (OUTPATIENT)
Dept: ONCOLOGY | Age: 74
End: 2019-01-30

## 2019-01-30 ENCOUNTER — HOSPITAL ENCOUNTER (OUTPATIENT)
Dept: INFUSION THERAPY | Age: 74
Discharge: HOME | End: 2019-01-30
Payer: MEDICARE

## 2019-01-30 ENCOUNTER — RESEARCH ENCOUNTER (OUTPATIENT)
Dept: ONCOLOGY | Age: 74
End: 2019-01-30

## 2019-01-30 VITALS
HEIGHT: 67 IN | BODY MASS INDEX: 30.98 KG/M2 | DIASTOLIC BLOOD PRESSURE: 63 MMHG | WEIGHT: 197.4 LBS | HEART RATE: 75 BPM | TEMPERATURE: 98 F | SYSTOLIC BLOOD PRESSURE: 135 MMHG | RESPIRATION RATE: 18 BRPM

## 2019-01-30 VITALS
HEART RATE: 86 BPM | DIASTOLIC BLOOD PRESSURE: 61 MMHG | RESPIRATION RATE: 18 BRPM | HEIGHT: 67 IN | SYSTOLIC BLOOD PRESSURE: 134 MMHG | WEIGHT: 197.4 LBS | TEMPERATURE: 98 F | OXYGEN SATURATION: 97 % | BODY MASS INDEX: 30.98 KG/M2

## 2019-01-30 DIAGNOSIS — C25.7 MALIGNANT NEOPLASM OF OTHER PARTS OF PANCREAS (HCC): Primary | ICD-10-CM

## 2019-01-30 LAB
ALBUMIN SERPL-MCNC: 2.8 G/DL (ref 3.5–5)
ALBUMIN/GLOB SERPL: 0.8 {RATIO} (ref 1.1–2.2)
ALP SERPL-CCNC: 234 U/L (ref 45–117)
ALT SERPL-CCNC: 38 U/L (ref 12–78)
ANION GAP SERPL CALC-SCNC: 8 MMOL/L (ref 5–15)
APPEARANCE UR: ABNORMAL
AST SERPL-CCNC: 20 U/L (ref 15–37)
BACTERIA URNS QL MICRO: NEGATIVE /HPF
BASOPHILS # BLD: 0.1 K/UL (ref 0–0.1)
BASOPHILS NFR BLD: 1 % (ref 0–1)
BILIRUB SERPL-MCNC: 0.4 MG/DL (ref 0.2–1)
BILIRUB UR QL: NEGATIVE
BUN SERPL-MCNC: 14 MG/DL (ref 6–20)
BUN/CREAT SERPL: 18 (ref 12–20)
CALCIUM SERPL-MCNC: 8.6 MG/DL (ref 8.5–10.1)
CHLORIDE SERPL-SCNC: 101 MMOL/L (ref 97–108)
CO2 SERPL-SCNC: 28 MMOL/L (ref 21–32)
COLOR UR: ABNORMAL
COMMENT, HOLDF: NORMAL
CREAT SERPL-MCNC: 0.78 MG/DL (ref 0.55–1.02)
DIFFERENTIAL METHOD BLD: ABNORMAL
EOSINOPHIL # BLD: 0.2 K/UL (ref 0–0.4)
EOSINOPHIL NFR BLD: 2 % (ref 0–7)
EPITH CASTS URNS QL MICRO: ABNORMAL /LPF
ERYTHROCYTE [DISTWIDTH] IN BLOOD BY AUTOMATED COUNT: 19.4 % (ref 11.5–14.5)
GLOBULIN SER CALC-MCNC: 3.5 G/DL (ref 2–4)
GLUCOSE SERPL-MCNC: 132 MG/DL (ref 65–100)
GLUCOSE UR STRIP.AUTO-MCNC: NEGATIVE MG/DL
HCT VFR BLD AUTO: 27.1 % (ref 35–47)
HGB BLD-MCNC: 8.8 G/DL (ref 11.5–16)
HGB UR QL STRIP: NEGATIVE
HYALINE CASTS URNS QL MICRO: ABNORMAL /LPF (ref 0–5)
IMM GRANULOCYTES # BLD AUTO: 0.1 K/UL (ref 0–0.04)
IMM GRANULOCYTES NFR BLD AUTO: 1 % (ref 0–0.5)
KETONES UR QL STRIP.AUTO: NEGATIVE MG/DL
LDH SERPL L TO P-CCNC: 212 U/L (ref 81–246)
LEUKOCYTE ESTERASE UR QL STRIP.AUTO: ABNORMAL
LYMPHOCYTES # BLD: 0.8 K/UL (ref 0.8–3.5)
LYMPHOCYTES NFR BLD: 7 % (ref 12–49)
MAGNESIUM SERPL-MCNC: 1.7 MG/DL (ref 1.6–2.4)
MCH RBC QN AUTO: 30.8 PG (ref 26–34)
MCHC RBC AUTO-ENTMCNC: 32.5 G/DL (ref 30–36.5)
MCV RBC AUTO: 94.8 FL (ref 80–99)
MONOCYTES # BLD: 1.3 K/UL (ref 0–1)
MONOCYTES NFR BLD: 12 % (ref 5–13)
MUCOUS THREADS URNS QL MICRO: ABNORMAL /LPF
NEUTS SEG # BLD: 8.2 K/UL (ref 1.8–8)
NEUTS SEG NFR BLD: 77 % (ref 32–75)
NITRITE UR QL STRIP.AUTO: NEGATIVE
NRBC # BLD: 0 K/UL (ref 0–0.01)
NRBC BLD-RTO: 0 PER 100 WBC
PH UR STRIP: 5.5 [PH] (ref 5–8)
PHOSPHATE SERPL-MCNC: 3.9 MG/DL (ref 2.6–4.7)
PLATELET # BLD AUTO: 902 K/UL (ref 150–400)
PMV BLD AUTO: 9.1 FL (ref 8.9–12.9)
POTASSIUM SERPL-SCNC: 3.8 MMOL/L (ref 3.5–5.1)
PROT SERPL-MCNC: 6.3 G/DL (ref 6.4–8.2)
PROT UR STRIP-MCNC: NEGATIVE MG/DL
RBC # BLD AUTO: 2.86 M/UL (ref 3.8–5.2)
RBC #/AREA URNS HPF: ABNORMAL /HPF (ref 0–5)
RBC MORPH BLD: ABNORMAL
SAMPLES BEING HELD,HOLD: NORMAL
SODIUM SERPL-SCNC: 137 MMOL/L (ref 136–145)
SP GR UR REFRACTOMETRY: 1.02 (ref 1–1.03)
UA: UC IF INDICATED,UAUC: ABNORMAL
URATE SERPL-MCNC: 5 MG/DL (ref 2.6–6)
UROBILINOGEN UR QL STRIP.AUTO: 1 EU/DL (ref 0.2–1)
WBC # BLD AUTO: 10.7 K/UL (ref 3.6–11)
WBC MORPH BLD: ABNORMAL
WBC URNS QL MICRO: ABNORMAL /HPF (ref 0–4)

## 2019-01-30 PROCEDURE — 85025 COMPLETE CBC W/AUTO DIFF WBC: CPT

## 2019-01-30 PROCEDURE — 74011000258 HC RX REV CODE- 258: Performed by: INTERNAL MEDICINE

## 2019-01-30 PROCEDURE — 74011250636 HC RX REV CODE- 250/636

## 2019-01-30 PROCEDURE — 36415 COLL VENOUS BLD VENIPUNCTURE: CPT

## 2019-01-30 PROCEDURE — 84550 ASSAY OF BLOOD/URIC ACID: CPT

## 2019-01-30 PROCEDURE — 96417 CHEMO IV INFUS EACH ADDL SEQ: CPT

## 2019-01-30 PROCEDURE — 80053 COMPREHEN METABOLIC PANEL: CPT

## 2019-01-30 PROCEDURE — 81001 URINALYSIS AUTO W/SCOPE: CPT

## 2019-01-30 PROCEDURE — 74011250636 HC RX REV CODE- 250/636: Performed by: INTERNAL MEDICINE

## 2019-01-30 PROCEDURE — 83735 ASSAY OF MAGNESIUM: CPT

## 2019-01-30 PROCEDURE — 96375 TX/PRO/DX INJ NEW DRUG ADDON: CPT

## 2019-01-30 PROCEDURE — 96413 CHEMO IV INFUSION 1 HR: CPT

## 2019-01-30 PROCEDURE — 84100 ASSAY OF PHOSPHORUS: CPT

## 2019-01-30 PROCEDURE — 77030012965 HC NDL HUBR BBMI -A

## 2019-01-30 PROCEDURE — 83615 LACTATE (LD) (LDH) ENZYME: CPT

## 2019-01-30 RX ORDER — HEPARIN 100 UNIT/ML
500 SYRINGE INTRAVENOUS AS NEEDED
Status: ACTIVE | OUTPATIENT
Start: 2019-01-30 | End: 2019-01-31

## 2019-01-30 RX ORDER — ONDANSETRON HYDROCHLORIDE 8 MG/1
8 TABLET, FILM COATED ORAL
Qty: 24 TAB | Refills: 3 | Status: SHIPPED | OUTPATIENT
Start: 2019-01-30 | End: 2019-01-01 | Stop reason: SDUPTHER

## 2019-01-30 RX ORDER — SODIUM CHLORIDE 0.9 % (FLUSH) 0.9 %
5-10 SYRINGE (ML) INJECTION AS NEEDED
Status: ACTIVE | OUTPATIENT
Start: 2019-01-30 | End: 2019-01-31

## 2019-01-30 RX ADMIN — Medication 10 ML: at 15:10

## 2019-01-30 RX ADMIN — PACLITAXEL 245 MG: 100 INJECTION, POWDER, LYOPHILIZED, FOR SUSPENSION INTRAVENOUS at 13:39

## 2019-01-30 RX ADMIN — GEMCITABINE 1960 MG: 38 INJECTION, SOLUTION INTRAVENOUS at 14:35

## 2019-01-30 RX ADMIN — Medication 10 ML: at 11:15

## 2019-01-30 RX ADMIN — Medication 500 UNITS: at 15:10

## 2019-01-30 RX ADMIN — DEXAMETHASONE SODIUM PHOSPHATE 8 MG: 4 INJECTION, SOLUTION INTRAMUSCULAR; INTRAVENOUS at 12:48

## 2019-01-30 NOTE — PROGRESS NOTES
Pt in for labs. Pt in for follow up visit today per protocol with Dr. Corinna Maldonado and RN. This is C4D1 of treatment. Patient reports the following adverse events at this time: gr 1 constipation, gr 1 fatigue, gr 1 hot flashes, gr 1 insomnia, 1/10 pain around rib cage, gr 1 neuropathy. Vital signs are stable. Patient to go to infusion center after appointment to receive gemcitabine/abraxane. Next appt is scheduled for 2/6/19.

## 2019-01-30 NOTE — PROGRESS NOTES
[facility] Progress Note Date: 2019 Name: Wayne Coffey MRN: [id number] : 1945 
 
1105. Ms. Teddy Cagle Arrived ambulatory and in no distress for C4D1 Clinical Trial: Gemzar/Abraxane. Assessment was completed, no acute issues at this time, no new complaints voiced. R chest wall port accessed without difficulty using 0.75 inch garcia needle, labs drawn and in process. Chemotherapy Flowsheet 2019 Cycle C4D1 Date 2019 Drug / Regimen Clinical Trial: Gemzar/Abraxane Pre Meds -  
Notes -  
 
 
1130:  Proceeded to appt with Dr. Keiko Ambrocio. Ms. Strange's vitals were reviewed. Visit Vitals /63 (BP 1 Location: Right arm, BP Patient Position: Sitting) Pulse 75 Temp 98 °F (36.7 °C) Resp 18 Ht 5' 7\" (1.702 m) Wt 89.5 kg (197 lb 6.4 oz) Breastfeeding? No  
BMI 30.92 kg/m² Lab results were obtained and reviewed. Recent Results (from the past 12 hour(s)) METABOLIC PANEL, COMPREHENSIVE Collection Time: 19 11:16 AM  
Result Value Ref Range Sodium 137 136 - 145 mmol/L Potassium 3.8 3.5 - 5.1 mmol/L Chloride 101 97 - 108 mmol/L  
 CO2 28 21 - 32 mmol/L Anion gap 8 5 - 15 mmol/L Glucose 132 (H) 65 - 100 mg/dL BUN 14 6 - 20 MG/DL Creatinine 0.78 0.55 - 1.02 MG/DL  
 BUN/Creatinine ratio 18 12 - 20 GFR est AA >60 >60 ml/min/1.73m2 GFR est non-AA >60 >60 ml/min/1.73m2 Calcium 8.6 8.5 - 10.1 MG/DL Bilirubin, total 0.4 0.2 - 1.0 MG/DL  
 ALT (SGPT) 38 12 - 78 U/L  
 AST (SGOT) 20 15 - 37 U/L Alk. phosphatase 234 (H) 45 - 117 U/L Protein, total 6.3 (L) 6.4 - 8.2 g/dL Albumin 2.8 (L) 3.5 - 5.0 g/dL Globulin 3.5 2.0 - 4.0 g/dL A-G Ratio 0.8 (L) 1.1 - 2.2 MAGNESIUM Collection Time: 19 11:16 AM  
Result Value Ref Range Magnesium 1.7 1.6 - 2.4 mg/dL PHOSPHORUS Collection Time: 19 11:16 AM  
Result Value Ref Range  Phosphorus 3.9 2.6 - 4.7 MG/DL  
URIC ACID  
 Collection Time: 01/30/19 11:16 AM  
Result Value Ref Range Uric acid 5.0 2.6 - 6.0 MG/DL  
CBC WITH AUTOMATED DIFF Collection Time: 01/30/19 11:16 AM  
Result Value Ref Range WBC 10.7 3.6 - 11.0 K/uL  
 RBC 2.86 (L) 3.80 - 5.20 M/uL HGB 8.8 (L) 11.5 - 16.0 g/dL HCT 27.1 (L) 35.0 - 47.0 % MCV 94.8 80.0 - 99.0 FL  
 MCH 30.8 26.0 - 34.0 PG  
 MCHC 32.5 30.0 - 36.5 g/dL  
 RDW 19.4 (H) 11.5 - 14.5 % PLATELET 409 (H) 362 - 400 K/uL MPV 9.1 8.9 - 12.9 FL  
 NRBC 0.0 0  WBC ABSOLUTE NRBC 0.00 0.00 - 0.01 K/uL NEUTROPHILS 77 (H) 32 - 75 % LYMPHOCYTES 7 (L) 12 - 49 % MONOCYTES 12 5 - 13 % EOSINOPHILS 2 0 - 7 % BASOPHILS 1 0 - 1 % IMMATURE GRANULOCYTES 1 (H) 0.0 - 0.5 % ABS. NEUTROPHILS 8.2 (H) 1.8 - 8.0 K/UL  
 ABS. LYMPHOCYTES 0.8 0.8 - 3.5 K/UL  
 ABS. MONOCYTES 1.3 (H) 0.0 - 1.0 K/UL  
 ABS. EOSINOPHILS 0.2 0.0 - 0.4 K/UL  
 ABS. BASOPHILS 0.1 0.0 - 0.1 K/UL  
 ABS. IMM. GRANS. 0.1 (H) 0.00 - 0.04 K/UL  
 DF SMEAR SCANNED    
 RBC COMMENTS ANISOCYTOSIS 1+ 
    
 RBC COMMENTS OVALOCYTES PRESENT 
    
 RBC COMMENTS POLYCHROMASIA PRESENT 
    
 WBC COMMENTS TOXIC GRANULATION    
LD Collection Time: 01/30/19 11:16 AM  
Result Value Ref Range  81 - 246 U/L  
URINALYSIS W/ REFLEX CULTURE Collection Time: 01/30/19 11:16 AM  
Result Value Ref Range Color YELLOW/STRAW Appearance CLOUDY (A) CLEAR Specific gravity 1.019 1.003 - 1.030    
 pH (UA) 5.5 5.0 - 8.0 Protein NEGATIVE  NEG mg/dL Glucose NEGATIVE  NEG mg/dL Ketone NEGATIVE  NEG mg/dL Bilirubin NEGATIVE  NEG Blood NEGATIVE  NEG Urobilinogen 1.0 0.2 - 1.0 EU/dL Nitrites NEGATIVE  NEG Leukocyte Esterase TRACE (A) NEG    
 WBC 0-4 0 - 4 /hpf  
 RBC 0-5 0 - 5 /hpf Epithelial cells MODERATE (A) FEW /lpf Bacteria NEGATIVE  NEG /hpf  
 UA:UC IF INDICATED CULTURE NOT INDICATED BY UA RESULT CNI Mucus 1+ (A) NEG /lpf Hyaline cast 2-5 0 - 5 /lpf SAMPLES BEING HELD Collection Time: 01/30/19 11:16 AM  
Result Value Ref Range SAMPLES BEING HELD 1SST COMMENT Add-on orders for these samples will be processed based on acceptable specimen integrity and analyte stability, which may vary by analyte. Pre-medications  were administered as ordered and chemotherapy was initiated. Medications: 
Dexamethasone IVP Abraxane IV Gemzar IV 
NS KVO 
 
1510. Ms. Joel Oliva tolerated treatment well and was discharged from [place] [id number] in stable condition. Port de-accessed, flushed & heparinized per protocol. She is to return on 02/06/19 for her next appointment. Mukesh Perez RN 
January 30, 2019

## 2019-01-30 NOTE — PROGRESS NOTES
Cancer [place] at [facility] 88  [address] [facility], [address]  Yahir Aguilarra: [phone]  F: [phone]      Reason for Visit:   Romayne Bunkers is a 72 y.o. female who is seen in self-referral for evaluation of pancreatic cancer. Treatment History:   · 10/4/18 pancreatic head mass FNA, pancreatic adenocarcinoma    10/15/18  Liver, Core Biopsy w/Touch Prep CYTOLOGIC INTERPRETATION:   · Numerous cores and fragments of benign hepatic parenchyma     10/24/18  Liver, Fine Needle Aspiration CYTOLOGIC INTERPRETATION:   Metastatic adenocarcinoma (see Comment). General Categorization   Positive for malignancy. Comment: The morphologic appearance is nonspecific with regard to site of origin but compatible with metastatic pancreatic carcinoma in this patient with known pancreatic adenocarcinoma ([id number]). 11/9/18 abraxane/gemcitabine    History of Present Illness:   She started having abdominal pain, gradual and persistent, x 1 month, pressure sensation, located in epigastrium, no radiation, no aggravating symptoms, no relieving factors. 25 lb weight loss over 6 months.  + nausea. Interval history:  In today for follow up. Complains of gr 1 constipation, gr 1 fatigue, gr 1 hot flashes, gr 1 insomnia, 1/10 pain around rib cage, gr 1 neuropathy    Past Medical History:   Diagnosis Date    Arthritis     Constipation     Diabetes ([address]     Hemorrhoids     Hiatal hernia     High cholesterol     Hypertension     Pancreatic cancer (HCC)       Past Surgical History:   Procedure Laterality Date    HX KNEE ARTHROSCOPY Right     HX ORTHOPAEDIC      left wrist surgery    HX TONSILLECTOMY        Social History     Tobacco Use    Smoking status: Never Smoker    Smokeless tobacco: Never Used   Substance Use Topics    Alcohol use:  Yes     Alcohol/week: 1.2 - 2.4 oz     Types: 1 - 2 Cans of beer, 1 - 2 Shots of liquor per week     Frequency: 2-4 times a month     Drinks per session: 1 or 2      Family History   Problem Relation Age of Onset    Cancer Mother         skin    Hypertension Mother     Heart Disease Mother     Cancer Father         skin    Heart Disease Father     Stroke Father     Diabetes Neg Hx      Current Outpatient Medications   Medication Sig    glucose blood VI test strips (ACCU-CHEK RICHIE PLUS TEST STRP) strip Accu-Chek Richie Plus test strips   Check BS BID    OTHER Cranial prosthesis    Dx C25.7    omeprazole (PRILOSEC) 20 mg capsule Take 20 mg by mouth daily.  simvastatin (ZOCOR) 10 mg tablet Take  by mouth nightly.  telmisartan (MICARDIS) 80 mg tablet Take 80 mg by mouth daily.  hydroCHLOROthiazide (MICROZIDE) 12.5 mg capsule Take 12.5 mg by mouth daily.  insulin aspart U-100 (NOVOLOG FLEXPEN U-100 INSULIN) 100 unit/mL inpn Inject 12 units before breakfast, lunch and dinner + 2 units for every 50 mg/dl above 150 mg/dl. Max 50 units per day--Dose change 1/24/19--updated med list--did not send prescription to the pharmacy    insulin degludec (TRESIBA FLEXTOUCH U-200) 200 unit/mL (3 mL) inpn 42 Units by SubCUTAneous route daily. Or as directed up to 70 units daily on chemo days--Dose change 12/13/18--updated med list--did not send prescription to the pharmacy    ondansetron hcl (ZOFRAN) 8 mg tablet Take 1 Tab by mouth every eight (8) hours as needed for Nausea.  lidocaine-prilocaine (EMLA) topical cream Apply  to affected area as needed for Pain. No current facility-administered medications for this visit.       Facility-Administered Medications Ordered in Other Visits   Medication Dose Route Frequency    dexamethasone (DECADRON) 4 mg/mL injection 8 mg  8 mg IntraVENous ONCE    gemcitabine (GEMZAR) 1,960 mg in 0.9% sodium chloride 250 mL, overfill volume 25 mL chemo infusion  1,960 mg IntraVENous ONCE    PACLitaxel-Protein Bound (ABRAXANE) 245 mg chemo infusion  245 mg IntraVENous ONCE    prochlorperazine (COMPAZINE) with saline injection 10 mg  10 mg IntraVENous Q6H PRN      Allergies   Allergen Reactions    Amoxicillin Hives        Review of Systems: A comprehensive review of systems was performed and all systems were negative except for HPI and for the symptom review form, reviewed and scanned in. Physical Exam:     Visit Vitals  /61   Pulse 86   Temp 98 °F (36.7 °C) (Temporal)     ECOG PS: 0  General: No distress  Eyes: PERRLA, anicteric sclerae  HENT: Atraumatic, OP clear  Neck: Supple  Lymphatic: No cervical, supraclavicular, or inguinal adenopathy  Respiratory: CTAB, normal respiratory effort  CV: Normal rate, regular rhythm, no murmurs, no peripheral edema  GI: Soft, nontender, nondistended, no masses, no hepatomegaly, no splenomegaly  MS: Normal gait and station. Digits without clubbing or cyanosis. Skin: No rashes, ecchymoses, or petechiae. Normal temperature, turgor, and texture. Psych: Alert, oriented, appropriate affect, normal judgment/insight        Results:     Lab Results   Component Value Date/Time    WBC 16.4 (H) 01/16/2019 11:16 AM    HGB 9.6 (L) 01/16/2019 11:16 AM    HCT 28.9 (L) 01/16/2019 11:16 AM    PLATELET 555 38/57/6104 11:16 AM    MCV 94.4 01/16/2019 11:16 AM    ABS. NEUTROPHILS 12.5 (H) 01/16/2019 11:16 AM     Lab Results   Component Value Date/Time    Sodium 139 01/02/2019 11:24 AM    Potassium 4.1 01/02/2019 11:24 AM    Chloride 104 01/02/2019 11:24 AM    CO2 28 01/02/2019 11:24 AM    Glucose 119 (H) 01/02/2019 11:24 AM    BUN 16 01/02/2019 11:24 AM    Creatinine 0.72 01/02/2019 11:24 AM    GFR est AA >60 01/02/2019 11:24 AM    GFR est non-AA >60 01/02/2019 11:24 AM    Calcium 8.5 01/02/2019 11:24 AM    Glucose (POC) 316 (H) 10/04/2018 03:45 PM     Lab Results   Component Value Date/Time    Bilirubin, total 0.5 01/02/2019 11:24 AM    ALT (SGPT) 47 01/02/2019 11:24 AM    AST (SGOT) 22 01/02/2019 11:24 AM    Alk.  phosphatase 173 (H) 01/02/2019 11:24 AM    Protein, total 6.2 (L) 01/02/2019 11:24 AM    Albumin 3.0 (L) 01/02/2019 11:24 AM    Globulin 3.2 01/02/2019 11:24 AM     10/1/18 CT abd  There is a 3.8 x 1.9 cm mass involving the uncinate process and possibly  invading the duodenum. Findings are nonspecific but typical of pancreatic  carcinoma. There is no biliary or pancreatic ductal dilatation.     There is a poorly defined irregular hypodensity in segment IVb of the liver  measuring 3.7 x 1.9 cm. There appears to be focal biliary dilatation in the left  lobe behind this abnormality. Metastatic disease is suspected. There is a small,  1 cm hypodensity in the dome of the liver, likely segment 8. There is a 1 cm  hypodensity in segment 4 of the liver as well, also likely metastasis. Small  hypodensity measuring less than 1 cm in segment 6 at the tip laterally is also  nonspecific but probable metastasis.     Spleen kidneys and adrenals are unremarkable.     No free fluid or focal fluid collection.     Lung bases are clear.     Bone windows are unremarkable.     IMPRESSION  IMPRESSION:  1. 3.8 x 1.9 cm mass involving the uncinate process of the pancreas and possibly  invading the duodenum, this likely represents pancreatic carcinoma. 2. Likely metastatic disease in the liver. There is a 3.8 x 1.9 cm mass involving the uncinate process and possibly  invading the duodenum. Findings are nonspecific but typical of pancreatic  carcinoma. There is no biliary or pancreatic ductal dilatation.     There is a poorly defined irregular hypodensity in segment IVb of the liver  measuring 3.7 x 1.9 cm. There appears to be focal biliary dilatation in the left  lobe behind this abnormality. Metastatic disease is suspected. There is a small,  1 cm hypodensity in the dome of the liver, likely segment 8. There is a 1 cm  hypodensity in segment 4 of the liver as well, also likely metastasis.  Small  hypodensity measuring less than 1 cm in segment 6 at the tip laterally is also  nonspecific but probable metastasis.     Spleen kidneys and adrenals are unremarkable.     No free fluid or focal fluid collection.     Lung bases are clear.     Bone windows are unremarkable.     IMPRESSION  IMPRESSION:  1. 3.8 x 1.9 cm mass involving the uncinate process of the pancreas and possibly  invading the duodenum, this likely represents pancreatic carcinoma. 2. Likely metastatic disease in the liver. Records reviewed and summarized above. Pathology report(s) reviewed above. Radiology report(s) reviewed above. MRI abdomen 10/22/18: IMPRESSION:       1. Pancreatic uncinate process mass suspicious for pancreatic neoplasm, possibly  adenocarcinoma. Mass abuts the superior mesenteric artery with about 20%  circumference involvement but no luminal distortion or perivascular stranding. 2. Multiple hepatic lesions suspicious for metastatic neoplasm with segment IVb  lesion showing patchy areas of surrounding steatosis likely secondary to locally  altered intrahepatic hemodynamics and likely responsible for biopsy result. 3. Cholecystolithiasis and small gallbladder polyp. CT c/a/p 12/28/18: IMPRESSION:  Interval decrease in size of pancreatic mass. Slight interval decrease in size of hepatic metastases; these now demonstrate central low attenuation suggestive of necrosis. No evidence of new metastatic disease in the chest, abdomen, or pelvis.     Assessment/plan:   1. Uncinate pancreatic adenocarcinoma,  mets to liver, stage IV:  cT2 cN0 pM1    Discussed that while this is treatable, it is not curable, the goal of therapy is palliative. Discussed that without therapy, life expectancy would be 3-6 months, with therapy 12-18 months on average. As an example of likely chemotherapy, we discussed the risks and benefits of Gemcitabine and Abraxane chemotherapy.  Potential side effects include, but are not limited to, nausea, vomiting, diarrhea, constipation, mucositis, taste changes, myelosuppression, infection, fatigue, alopecia, skin and nail changes, pulmonary toxicity, neuropathy, allergic reactions, infertility, and rarely, death. Discussed the [id number] study and the research nurse met with her today and she signed informed consent. · Gemcitabine (1000 mg/m2) and Abraxane (125 mg/m2) given on days 1,8,15 every 28 days. · Labs: CBC, BMP prior to each treatment. Hepatic function panel every 4 weeks. CA 19.9 prior to day 1  · Antiemetic prophylaxis: Dexamethasone prior to each treatment  · PRN antiemetics: Ondansetron and Prochlorperazine at home  · EMLA cream for port    On the control arm, C4d1 today, will see her back in 2 weeks for C4D15. CT c/a/p on 12/28/18 show response to treatment. 2. DM2:  Holding metformin; on insulin; saw Dr. Marlee Hernandez; her BS are improving; she is now taking less insulin    3. Emotional well being:  She has excellent support and is coping well with her disease    4. Abdominal pain:  Minor, may be due to constipation;  palliative care has seen    5. Nutrition:  Eather Dakin RD has met with her; holding on enzymes    6. Insomnia: Has improved. She has lunesta if needed; she may also try melatonin    7. Anemia:  Due to chemo and disease; monitor    8. Neutropenia:  Due to chemo, started granix 480 mcg for 4 days following chemo with C1D15. Does report some pain with granix. Tried claritin, however this kept her awake. Recommend trying tylenol and advil PRN. Now at 2 days following chemo    9.  Constipation:  Has not had BM in 5 days, had backed off her regimen, has restarted, will monitor            Signed By: Cecy Crowder MD

## 2019-02-01 RX ORDER — DEXAMETHASONE SODIUM PHOSPHATE 4 MG/ML
8 INJECTION, SOLUTION INTRA-ARTICULAR; INTRALESIONAL; INTRAMUSCULAR; INTRAVENOUS; SOFT TISSUE ONCE
Status: COMPLETED | OUTPATIENT
Start: 2019-02-06 | End: 2019-02-06

## 2019-02-03 RX ORDER — INSULIN DEGLUDEC 200 U/ML
40 INJECTION, SOLUTION SUBCUTANEOUS DAILY
Qty: 9 ML | Refills: 11
Start: 2019-02-03 | End: 2019-02-11

## 2019-02-06 ENCOUNTER — RESEARCH ENCOUNTER (OUTPATIENT)
Dept: ONCOLOGY | Age: 74
End: 2019-02-06

## 2019-02-06 ENCOUNTER — HOSPITAL ENCOUNTER (OUTPATIENT)
Dept: INFUSION THERAPY | Age: 74
Discharge: HOME | End: 2019-02-06
Payer: MEDICARE

## 2019-02-06 VITALS
SYSTOLIC BLOOD PRESSURE: 129 MMHG | BODY MASS INDEX: 30.76 KG/M2 | TEMPERATURE: 98 F | HEART RATE: 78 BPM | WEIGHT: 196 LBS | RESPIRATION RATE: 18 BRPM | HEIGHT: 67 IN | DIASTOLIC BLOOD PRESSURE: 72 MMHG

## 2019-02-06 LAB
BASOPHILS # BLD: 0 K/UL (ref 0–0.1)
BASOPHILS NFR BLD: 0 % (ref 0–1)
DIFFERENTIAL METHOD BLD: ABNORMAL
EOSINOPHIL # BLD: 0 K/UL (ref 0–0.4)
EOSINOPHIL NFR BLD: 0 % (ref 0–7)
ERYTHROCYTE [DISTWIDTH] IN BLOOD BY AUTOMATED COUNT: 19 % (ref 11.5–14.5)
HCT VFR BLD AUTO: 28.4 % (ref 35–47)
HGB BLD-MCNC: 8.9 G/DL (ref 11.5–16)
IMM GRANULOCYTES # BLD AUTO: 0 K/UL
IMM GRANULOCYTES NFR BLD AUTO: 0 %
LDH SERPL L TO P-CCNC: 318 U/L (ref 81–246)
LYMPHOCYTES # BLD: 2.3 K/UL (ref 0.8–3.5)
LYMPHOCYTES NFR BLD: 11 % (ref 12–49)
MCH RBC QN AUTO: 29.7 PG (ref 26–34)
MCHC RBC AUTO-ENTMCNC: 31.3 G/DL (ref 30–36.5)
MCV RBC AUTO: 94.7 FL (ref 80–99)
METAMYELOCYTES NFR BLD MANUAL: 8 %
MONOCYTES # BLD: 1.9 K/UL (ref 0–1)
MONOCYTES NFR BLD: 9 % (ref 5–13)
MYELOCYTES NFR BLD MANUAL: 2 %
NEUTS BAND NFR BLD MANUAL: 5 % (ref 0–6)
NEUTS SEG # BLD: 14.3 K/UL (ref 1.8–8)
NEUTS SEG NFR BLD: 63 % (ref 32–75)
NRBC # BLD: 0.08 K/UL (ref 0–0.01)
NRBC BLD-RTO: 0.4 PER 100 WBC
PLATELET # BLD AUTO: 997 K/UL (ref 150–400)
PMV BLD AUTO: 8.5 FL (ref 8.9–12.9)
PROMYELOCYTES NFR BLD MANUAL: 2 %
RBC # BLD AUTO: 3 M/UL (ref 3.8–5.2)
RBC MORPH BLD: ABNORMAL
WBC # BLD AUTO: 21 K/UL (ref 3.6–11)

## 2019-02-06 PROCEDURE — 85025 COMPLETE CBC W/AUTO DIFF WBC: CPT

## 2019-02-06 PROCEDURE — 96417 CHEMO IV INFUS EACH ADDL SEQ: CPT

## 2019-02-06 PROCEDURE — 77030012965 HC NDL HUBR BBMI -A

## 2019-02-06 PROCEDURE — 74011250636 HC RX REV CODE- 250/636: Performed by: INTERNAL MEDICINE

## 2019-02-06 PROCEDURE — 74011250636 HC RX REV CODE- 250/636

## 2019-02-06 PROCEDURE — 96375 TX/PRO/DX INJ NEW DRUG ADDON: CPT

## 2019-02-06 PROCEDURE — 83615 LACTATE (LD) (LDH) ENZYME: CPT

## 2019-02-06 PROCEDURE — 36415 COLL VENOUS BLD VENIPUNCTURE: CPT

## 2019-02-06 PROCEDURE — 74011000258 HC RX REV CODE- 258: Performed by: INTERNAL MEDICINE

## 2019-02-06 PROCEDURE — 96413 CHEMO IV INFUSION 1 HR: CPT

## 2019-02-06 RX ORDER — SODIUM CHLORIDE 0.9 % (FLUSH) 0.9 %
5-10 SYRINGE (ML) INJECTION AS NEEDED
Status: ACTIVE | OUTPATIENT
Start: 2019-02-06 | End: 2019-02-07

## 2019-02-06 RX ORDER — HEPARIN 100 UNIT/ML
500 SYRINGE INTRAVENOUS AS NEEDED
Status: ACTIVE | OUTPATIENT
Start: 2019-02-06 | End: 2019-02-07

## 2019-02-06 RX ADMIN — GEMCITABINE 1960 MG: 38 INJECTION, SOLUTION INTRAVENOUS at 13:48

## 2019-02-06 RX ADMIN — DEXAMETHASONE SODIUM PHOSPHATE 8 MG: 4 INJECTION, SOLUTION INTRAMUSCULAR; INTRAVENOUS at 11:56

## 2019-02-06 RX ADMIN — Medication 10 ML: at 14:25

## 2019-02-06 RX ADMIN — PACLITAXEL 245 MG: 100 INJECTION, POWDER, LYOPHILIZED, FOR SUSPENSION INTRAVENOUS at 12:45

## 2019-02-06 RX ADMIN — Medication 500 UNITS: at 14:25

## 2019-02-06 RX ADMIN — Medication 10 ML: at 10:55

## 2019-02-06 NOTE — PROGRESS NOTES
\[facility]\"" Progress Note Date: 2019 Name: Yuly Iverson MRN: [id number] : 1945 
 
1045. Ms. LOZANO [facility] Arrived ambulatory and in no distress for C4D8 Clinical Trial: Gemzar/Abraxane. Assessment was completed, no acute issues at this time, no new complaints voiced. R chest wall port accessed without difficulty using 0.75 inch garcia needle, labs drawn and in process. Chemotherapy Flowsheet 2019 Cycle C4D8 Date 2019 Drug / Regimen Clinical Trial: Gemzar/Abraxane Pre Meds -  
Notes -  
 
 
 
Ms. Strange's vitals were reviewed. Patient Vitals for the past 12 hrs: 
 Temp Pulse Resp BP  
19 1418 98 °F (36.7 °C) 78 18 129/72  
19 1054 97.2 °F (36.2 °C) 72 18 137/77 Lab results were obtained and reviewed. Recent Results (from the past 12 hour(s)) CBC WITH AUTOMATED DIFF Collection Time: 19 10:53 AM  
Result Value Ref Range WBC 21.0 (H) 3.6 - 11.0 K/uL  
 RBC 3.00 (L) 3.80 - 5.20 M/uL HGB 8.9 (L) 11.5 - 16.0 g/dL HCT 28.4 (L) 35.0 - 47.0 % MCV 94.7 80.0 - 99.0 FL  
 MCH 29.7 26.0 - 34.0 PG  
 MCHC 31.3 30.0 - 36.5 g/dL  
 RDW 19.0 (H) 11.5 - 14.5 % PLATELET 100 (H) 699 - 400 K/uL MPV 8.5 (L) 8.9 - 12.9 FL  
 NRBC 0.4 (H) 0  WBC ABSOLUTE NRBC 0.08 (H) 0.00 - 0.01 K/uL NEUTROPHILS 63 32 - 75 % BAND NEUTROPHILS 5 0 - 6 % LYMPHOCYTES 11 (L) 12 - 49 % MONOCYTES 9 5 - 13 % EOSINOPHILS 0 0 - 7 % BASOPHILS 0 0 - 1 % METAMYELOCYTES 8 (H) 0 % MYELOCYTES 2 (H) 0 % PROMYELOCYTES 2 (H) 0 % IMMATURE GRANULOCYTES 0 %  
 ABS. NEUTROPHILS 14.3 (H) 1.8 - 8.0 K/UL  
 ABS. LYMPHOCYTES 2.3 0.8 - 3.5 K/UL  
 ABS. MONOCYTES 1.9 (H) 0.0 - 1.0 K/UL  
 ABS. EOSINOPHILS 0.0 0.0 - 0.4 K/UL  
 ABS. BASOPHILS 0.0 0.0 - 0.1 K/UL  
 ABS. IMM. GRANS. 0.0 K/UL  
 DF MANUAL    
 RBC COMMENTS NORMOCYTIC, NORMOCHROMIC    
LD Collection Time: 19 10:53 AM  
Result Value Ref Range   (H) 81 - 246 U/L  
 
 Pre-medications  were administered as ordered and chemotherapy was initiated. Medications: 
Dexamethasone IVP Abraxane IV Gemzar IV 
NS KVO 
 
1425 Ms. Strange tolerated treatment well and was discharged from [place] [address] in stable condition. Port de-accessed, flushed & heparinized per protocol. She is to return on 02/13/19 for her next appointment. Johnnie Funes RN February 6, 2019

## 2019-02-07 RX ORDER — DEXAMETHASONE SODIUM PHOSPHATE 4 MG/ML
8 INJECTION, SOLUTION INTRA-ARTICULAR; INTRALESIONAL; INTRAMUSCULAR; INTRAVENOUS; SOFT TISSUE ONCE
Status: COMPLETED | OUTPATIENT
Start: 2019-02-13 | End: 2019-02-13

## 2019-02-11 RX ORDER — INSULIN DEGLUDEC 200 U/ML
36 INJECTION, SOLUTION SUBCUTANEOUS DAILY
Qty: 9 ML | Refills: 11
Start: 2019-02-11 | End: 2019-03-19

## 2019-02-13 ENCOUNTER — HOSPITAL ENCOUNTER (OUTPATIENT)
Dept: INFUSION THERAPY | Age: 74
Discharge: HOME | End: 2019-02-13
Payer: MEDICARE

## 2019-02-13 ENCOUNTER — OFFICE VISIT (OUTPATIENT)
Dept: ONCOLOGY | Age: 74
End: 2019-02-13

## 2019-02-13 VITALS
TEMPERATURE: 97.9 F | WEIGHT: 193.5 LBS | HEIGHT: 67 IN | DIASTOLIC BLOOD PRESSURE: 61 MMHG | SYSTOLIC BLOOD PRESSURE: 112 MMHG | BODY MASS INDEX: 30.37 KG/M2 | RESPIRATION RATE: 18 BRPM | HEART RATE: 65 BPM

## 2019-02-13 VITALS
WEIGHT: 193.5 LBS | TEMPERATURE: 98.2 F | RESPIRATION RATE: 18 BRPM | SYSTOLIC BLOOD PRESSURE: 113 MMHG | DIASTOLIC BLOOD PRESSURE: 59 MMHG | HEIGHT: 67 IN | BODY MASS INDEX: 30.37 KG/M2 | HEART RATE: 84 BPM | OXYGEN SATURATION: 97 %

## 2019-02-13 DIAGNOSIS — C25.7 MALIGNANT NEOPLASM OF OTHER PARTS OF PANCREAS (HCC): Primary | ICD-10-CM

## 2019-02-13 LAB
BASOPHILS # BLD: 0 K/UL (ref 0–0.1)
BASOPHILS NFR BLD: 0 % (ref 0–1)
DIFFERENTIAL METHOD BLD: ABNORMAL
EOSINOPHIL # BLD: 0.1 K/UL (ref 0–0.4)
EOSINOPHIL NFR BLD: 1 % (ref 0–7)
ERYTHROCYTE [DISTWIDTH] IN BLOOD BY AUTOMATED COUNT: 19 % (ref 11.5–14.5)
HCT VFR BLD AUTO: 26.6 % (ref 35–47)
HGB BLD-MCNC: 8.7 G/DL (ref 11.5–16)
IMM GRANULOCYTES # BLD AUTO: 0 K/UL
IMM GRANULOCYTES NFR BLD AUTO: 0 %
LDH SERPL L TO P-CCNC: 196 U/L (ref 81–246)
LYMPHOCYTES # BLD: 0.9 K/UL (ref 0.8–3.5)
LYMPHOCYTES NFR BLD: 15 % (ref 12–49)
MCH RBC QN AUTO: 30.9 PG (ref 26–34)
MCHC RBC AUTO-ENTMCNC: 32.7 G/DL (ref 30–36.5)
MCV RBC AUTO: 94.3 FL (ref 80–99)
METAMYELOCYTES NFR BLD MANUAL: 2 %
MONOCYTES # BLD: 0.4 K/UL (ref 0–1)
MONOCYTES NFR BLD: 6 % (ref 5–13)
NEUTS BAND NFR BLD MANUAL: 1 % (ref 0–6)
NEUTS SEG # BLD: 4.6 K/UL (ref 1.8–8)
NEUTS SEG NFR BLD: 75 % (ref 32–75)
NRBC # BLD: 0 K/UL (ref 0–0.01)
NRBC BLD-RTO: 0 PER 100 WBC
PLATELET # BLD AUTO: 216 K/UL (ref 150–400)
PMV BLD AUTO: 8.8 FL (ref 8.9–12.9)
RBC # BLD AUTO: 2.82 M/UL (ref 3.8–5.2)
RBC MORPH BLD: ABNORMAL
RBC MORPH BLD: ABNORMAL
WBC # BLD AUTO: 6 K/UL (ref 3.6–11)

## 2019-02-13 PROCEDURE — 77030012965 HC NDL HUBR BBMI -A

## 2019-02-13 PROCEDURE — 96417 CHEMO IV INFUS EACH ADDL SEQ: CPT

## 2019-02-13 PROCEDURE — 74011000250 HC RX REV CODE- 250: Performed by: INTERNAL MEDICINE

## 2019-02-13 PROCEDURE — 74011000258 HC RX REV CODE- 258: Performed by: INTERNAL MEDICINE

## 2019-02-13 PROCEDURE — 74011250636 HC RX REV CODE- 250/636: Performed by: INTERNAL MEDICINE

## 2019-02-13 PROCEDURE — 74011250636 HC RX REV CODE- 250/636

## 2019-02-13 PROCEDURE — 96413 CHEMO IV INFUSION 1 HR: CPT

## 2019-02-13 PROCEDURE — 96375 TX/PRO/DX INJ NEW DRUG ADDON: CPT

## 2019-02-13 PROCEDURE — 36415 COLL VENOUS BLD VENIPUNCTURE: CPT

## 2019-02-13 PROCEDURE — 83615 LACTATE (LD) (LDH) ENZYME: CPT

## 2019-02-13 PROCEDURE — 85025 COMPLETE CBC W/AUTO DIFF WBC: CPT

## 2019-02-13 RX ORDER — HEPARIN 100 UNIT/ML
500 SYRINGE INTRAVENOUS AS NEEDED
Status: ACTIVE | OUTPATIENT
Start: 2019-02-13 | End: 2019-02-14

## 2019-02-13 RX ORDER — SODIUM CHLORIDE 9 MG/ML
10 INJECTION INTRAMUSCULAR; INTRAVENOUS; SUBCUTANEOUS AS NEEDED
Status: ACTIVE | OUTPATIENT
Start: 2019-02-13 | End: 2019-02-14

## 2019-02-13 RX ORDER — SODIUM CHLORIDE 0.9 % (FLUSH) 0.9 %
5-10 SYRINGE (ML) INJECTION AS NEEDED
Status: ACTIVE | OUTPATIENT
Start: 2019-02-13 | End: 2019-02-14

## 2019-02-13 RX ADMIN — PACLITAXEL 245 MG: 100 INJECTION, POWDER, LYOPHILIZED, FOR SUSPENSION INTRAVENOUS at 13:22

## 2019-02-13 RX ADMIN — Medication 10 ML: at 14:38

## 2019-02-13 RX ADMIN — DEXAMETHASONE SODIUM PHOSPHATE 8 MG: 4 INJECTION, SOLUTION INTRAMUSCULAR; INTRAVENOUS at 12:46

## 2019-02-13 RX ADMIN — SODIUM CHLORIDE 10 ML: 9 INJECTION, SOLUTION INTRAMUSCULAR; INTRAVENOUS; SUBCUTANEOUS at 11:09

## 2019-02-13 RX ADMIN — Medication 500 UNITS: at 14:38

## 2019-02-13 RX ADMIN — GEMCITABINE 1960 MG: 38 INJECTION, SOLUTION INTRAVENOUS at 14:05

## 2019-02-13 NOTE — PROGRESS NOTES
[facility] VISIT NOTE 
 
1055. Pt arrived at [facility] ambulatory and in no distress for C4D15 Clinical Trial: Gemzar/Abraxane. Assessment completed, pt without complaints or concerns. RCW chest port accessed with . 75 in garcia with no difficulty. Positive blood return noted and labs drawn. Pt to see MD. 
 
Labs resulted and within parameters to treat. Medications received: 
Shandra Fulling Dexamethasone IV Abraxane IV Gemzar IV Tolerated treatment well, no adverse reaction noted. Port de-accessed and flushed per protocol. Positive blood return noted. Patient Vitals for the past 12 hrs: 
 Temp Pulse Resp BP  
02/13/19 1435 97.9 °F (36.6 °C) 65 18 112/61  
02/13/19 1059 98.2 °F (36.8 °C) 84 18 113/59 Recent Results (from the past 12 hour(s)) CBC WITH AUTOMATED DIFF Collection Time: 02/13/19 11:08 AM  
Result Value Ref Range WBC 6.0 3.6 - 11.0 K/uL  
 RBC 2.82 (L) 3.80 - 5.20 M/uL HGB 8.7 (L) 11.5 - 16.0 g/dL HCT 26.6 (L) 35.0 - 47.0 % MCV 94.3 80.0 - 99.0 FL  
 MCH 30.9 26.0 - 34.0 PG  
 MCHC 32.7 30.0 - 36.5 g/dL  
 RDW 19.0 (H) 11.5 - 14.5 % PLATELET 866 561 - 572 K/uL MPV 8.8 (L) 8.9 - 12.9 FL  
 NRBC 0.0 0  WBC ABSOLUTE NRBC 0.00 0.00 - 0.01 K/uL NEUTROPHILS 75 32 - 75 % BAND NEUTROPHILS 1 0 - 6 % LYMPHOCYTES 15 12 - 49 % MONOCYTES 6 5 - 13 % EOSINOPHILS 1 0 - 7 % BASOPHILS 0 0 - 1 % METAMYELOCYTES 2 (H) 0 % IMMATURE GRANULOCYTES 0 %  
 ABS. NEUTROPHILS 4.6 1.8 - 8.0 K/UL  
 ABS. LYMPHOCYTES 0.9 0.8 - 3.5 K/UL  
 ABS. MONOCYTES 0.4 0.0 - 1.0 K/UL  
 ABS. EOSINOPHILS 0.1 0.0 - 0.4 K/UL  
 ABS. BASOPHILS 0.0 0.0 - 0.1 K/UL  
 ABS. IMM. GRANS. 0.0 K/UL  
 DF MANUAL    
 RBC COMMENTS ANISOCYTOSIS 1+ 
    
 RBC COMMENTS OVALOCYTES PRESENT 
    
LD Collection Time: 02/13/19 11:08 AM  
Result Value Ref Range  81 - 246 U/L  
 
1440. D/C'd from [facility] ambulatory and in no distress.  Next appointment is 2/27/19 at 11:00 am.

## 2019-02-13 NOTE — PROGRESS NOTES
[facility] [place]  [address] [facility], [address]  Kevin Silvan: [phone]  F: [phone]      Reason for Visit:   Alejandro Bales is a 72 y.o. female who is seen in self-referral for evaluation of pancreatic cancer. Treatment History:   · 10/4/18 pancreatic head mass FNA, pancreatic adenocarcinoma    10/15/18  Liver, Core Biopsy w/Touch Prep CYTOLOGIC INTERPRETATION:   · Numerous cores and fragments of benign hepatic parenchyma     10/24/18  Liver, Fine Needle Aspiration CYTOLOGIC INTERPRETATION:   Metastatic adenocarcinoma (see Comment). General Categorization   Positive for malignancy. Comment: The morphologic appearance is nonspecific with regard to site of origin but compatible with metastatic pancreatic carcinoma in this patient with known pancreatic adenocarcinoma ([id number]). 11/9/18 abraxane/gemcitabine    History of Present Illness:   She started having abdominal pain, gradual and persistent, x 1 month, pressure sensation, located in epigastrium, no radiation, no aggravating symptoms, no relieving factors. 25 lb weight loss over 6 months.  + nausea. Interval history:  In today for follow up. Complains of gr 1 constipation, gr 1 fatigue, gr 1 hair loss, gr 1 insomnia. Past Medical History:   Diagnosis Date    Arthritis     Constipation     Diabetes ([address]     Hemorrhoids     Hiatal hernia     High cholesterol     Hypertension     Pancreatic cancer ([address]       Past Surgical History:   Procedure Laterality Date    HX KNEE ARTHROSCOPY Right     HX ORTHOPAEDIC      left wrist surgery    HX TONSILLECTOMY        Social History     Tobacco Use    Smoking status: Never Smoker    Smokeless tobacco: Never Used   Substance Use Topics    Alcohol use:  Yes     Alcohol/week: 1.2 - 2.4 oz     Types: 1 - 2 Cans of beer, 1 - 2 Shots of liquor per week     Frequency: 2-4 times a month     Drinks per session: 1 or 2      Family History Problem Relation Age of Onset    Cancer Mother         skin    Hypertension Mother     Heart Disease Mother     Cancer Father         skin    Heart Disease Father     Stroke Father     Diabetes Neg Hx      Current Outpatient Medications   Medication Sig    insulin degludec (TRESIBA FLEXTOUCH U-200) 200 unit/mL (3 mL) inpn 36 Units by SubCUTAneous route daily. Or as directed up to 70 units daily on chemo days--Dose change 2/11/19--updated med list--did not send prescription to the pharmacy    insulin aspart U-100 (NOVOLOG FLEXPEN U-100 INSULIN) 100 unit/mL inpn Inject 12 units before breakfast, lunch and dinner + 2 units for every 50 mg/dl above 150 mg/dl. Max 50 units per day--Dose change 1/24/19--updated med list--did not send prescription to the pharmacy    glucose blood VI test strips (ACCU-CHEK RICHIE PLUS TEST STRP) strip Accu-Chek Richie Plus test strips   Check BS BID    OTHER Cranial prosthesis    Dx C25.7    omeprazole (PRILOSEC) 20 mg capsule Take 20 mg by mouth daily.  simvastatin (ZOCOR) 10 mg tablet Take  by mouth nightly.  telmisartan (MICARDIS) 80 mg tablet Take 80 mg by mouth daily.  hydroCHLOROthiazide (MICROZIDE) 12.5 mg capsule Take 12.5 mg by mouth daily.  ondansetron hcl (ZOFRAN) 8 mg tablet Take 1 Tab by mouth every eight (8) hours as needed for Nausea.  lidocaine-prilocaine (EMLA) topical cream Apply  to affected area as needed for Pain. No current facility-administered medications for this visit.       Facility-Administered Medications Ordered in Other Visits   Medication Dose Route Frequency    sodium chloride 0.9% injection 10 mL  10 mL IntraVENous PRN    heparin (porcine) pf 500 Units  500 Units IntraVENous PRN    sodium chloride (NS) flush 5-10 mL  5-10 mL IntraVENous PRN    PACLitaxel-Protein Bound (ABRAXANE) 245 mg chemo infusion  245 mg IntraVENous ONCE    gemcitabine (GEMZAR) 1,960 mg in 0.9% sodium chloride 250 mL, overfill volume 25 mL chemo infusion  1,960 mg IntraVENous ONCE    dexamethasone (DECADRON) 4 mg/mL injection 8 mg  8 mg IntraVENous ONCE    prochlorperazine (COMPAZINE) with saline injection 10 mg  10 mg IntraVENous Q6H PRN      Allergies   Allergen Reactions    Amoxicillin Hives        Review of Systems: A comprehensive review of systems was performed and all systems were negative except for HPI and for the symptom review form, reviewed and scanned in. Physical Exam:     Visit Vitals  /59   Pulse 84   Temp 98.2 °F (36.8 °C) (Temporal)   Resp 18   Ht 5' 7.01\" (1.702 m)   Wt 193 lb 8 oz (87.8 kg)   SpO2 97%   BMI 30.30 kg/m²     ECOG PS: 0  General: No distress  Eyes: PERRLA, anicteric sclerae  HENT: Atraumatic, OP clear  Neck: Supple  Lymphatic: No cervical, supraclavicular, or inguinal adenopathy  Respiratory: CTAB, normal respiratory effort  CV: Normal rate, regular rhythm, no murmurs, no peripheral edema  GI: Soft, nontender, nondistended, no masses, no hepatomegaly, no splenomegaly  MS: Normal gait and station. Digits without clubbing or cyanosis. Skin: No rashes, ecchymoses, or petechiae. Normal temperature, turgor, and texture. Psych: Alert, oriented, appropriate affect, normal judgment/insight        Results:     Lab Results   Component Value Date/Time    WBC 6.0 02/13/2019 11:08 AM    HGB 8.7 (L) 02/13/2019 11:08 AM    HCT 26.6 (L) 02/13/2019 11:08 AM    PLATELET 264 61/88/3551 11:08 AM    MCV 94.3 02/13/2019 11:08 AM    ABS.  NEUTROPHILS 4.6 02/13/2019 11:08 AM     Lab Results   Component Value Date/Time    Sodium 137 01/30/2019 11:16 AM    Potassium 3.8 01/30/2019 11:16 AM    Chloride 101 01/30/2019 11:16 AM    CO2 28 01/30/2019 11:16 AM    Glucose 132 (H) 01/30/2019 11:16 AM    BUN 14 01/30/2019 11:16 AM    Creatinine 0.78 01/30/2019 11:16 AM    GFR est AA >60 01/30/2019 11:16 AM    GFR est non-AA >60 01/30/2019 11:16 AM    Calcium 8.6 01/30/2019 11:16 AM    Glucose (POC) 316 (H) 10/04/2018 03:45 PM     Lab Results Component Value Date/Time    Bilirubin, total 0.4 01/30/2019 11:16 AM    ALT (SGPT) 38 01/30/2019 11:16 AM    AST (SGOT) 20 01/30/2019 11:16 AM    Alk. phosphatase 234 (H) 01/30/2019 11:16 AM    Protein, total 6.3 (L) 01/30/2019 11:16 AM    Albumin 2.8 (L) 01/30/2019 11:16 AM    Globulin 3.5 01/30/2019 11:16 AM     10/1/18 CT abd  There is a 3.8 x 1.9 cm mass involving the uncinate process and possibly  invading the duodenum. Findings are nonspecific but typical of pancreatic  carcinoma. There is no biliary or pancreatic ductal dilatation.     There is a poorly defined irregular hypodensity in segment IVb of the liver  measuring 3.7 x 1.9 cm. There appears to be focal biliary dilatation in the left  lobe behind this abnormality. Metastatic disease is suspected. There is a small,  1 cm hypodensity in the dome of the liver, likely segment 8. There is a 1 cm  hypodensity in segment 4 of the liver as well, also likely metastasis. Small  hypodensity measuring less than 1 cm in segment 6 at the tip laterally is also  nonspecific but probable metastasis.     Spleen kidneys and adrenals are unremarkable.     No free fluid or focal fluid collection.     Lung bases are clear.     Bone windows are unremarkable.     IMPRESSION  IMPRESSION:  1. 3.8 x 1.9 cm mass involving the uncinate process of the pancreas and possibly  invading the duodenum, this likely represents pancreatic carcinoma. 2. Likely metastatic disease in the liver. There is a 3.8 x 1.9 cm mass involving the uncinate process and possibly  invading the duodenum. Findings are nonspecific but typical of pancreatic  carcinoma. There is no biliary or pancreatic ductal dilatation.     There is a poorly defined irregular hypodensity in segment IVb of the liver  measuring 3.7 x 1.9 cm. There appears to be focal biliary dilatation in the left  lobe behind this abnormality. Metastatic disease is suspected.  There is a small,  1 cm hypodensity in the dome of the liver, likely segment 8. There is a 1 cm  hypodensity in segment 4 of the liver as well, also likely metastasis. Small  hypodensity measuring less than 1 cm in segment 6 at the tip laterally is also  nonspecific but probable metastasis.     Spleen kidneys and adrenals are unremarkable.     No free fluid or focal fluid collection.     Lung bases are clear.     Bone windows are unremarkable.     IMPRESSION  IMPRESSION:  1. 3.8 x 1.9 cm mass involving the uncinate process of the pancreas and possibly  invading the duodenum, this likely represents pancreatic carcinoma. 2. Likely metastatic disease in the liver. Records reviewed and summarized above. Pathology report(s) reviewed above. Radiology report(s) reviewed above. MRI abdomen 10/22/18: IMPRESSION:       1. Pancreatic uncinate process mass suspicious for pancreatic neoplasm, possibly  adenocarcinoma. Mass abuts the superior mesenteric artery with about 20%  circumference involvement but no luminal distortion or perivascular stranding. 2. Multiple hepatic lesions suspicious for metastatic neoplasm with segment IVb  lesion showing patchy areas of surrounding steatosis likely secondary to locally  altered intrahepatic hemodynamics and likely responsible for biopsy result. 3. Cholecystolithiasis and small gallbladder polyp. CT c/a/p 12/28/18: IMPRESSION:  Interval decrease in size of pancreatic mass. Slight interval decrease in size of hepatic metastases; these now demonstrate central low attenuation suggestive of necrosis. No evidence of new metastatic disease in the chest, abdomen, or pelvis.     Assessment/plan:   1. Uncinate pancreatic adenocarcinoma,  mets to liver, stage IV:  cT2 cN0 pM1    Discussed that while this is treatable, it is not curable, the goal of therapy is palliative. Discussed that without therapy, life expectancy would be 3-6 months, with therapy 12-18 months on average.     As an example of likely chemotherapy, we discussed the risks and benefits of Gemcitabine and Abraxane chemotherapy. Potential side effects include, but are not limited to, nausea, vomiting, diarrhea, constipation, mucositis, taste changes, myelosuppression, infection, fatigue, alopecia, skin and nail changes, pulmonary toxicity, neuropathy, allergic reactions, infertility, and rarely, death. Discussed the [id number] study and the research nurse met with her today and she signed informed consent. · Gemcitabine (1000 mg/m2) and Abraxane (125 mg/m2) given on days 1,8,15 every 28 days. · Labs: CBC, BMP prior to each treatment. Hepatic function panel every 4 weeks. CA 19.9 prior to day 1  · Antiemetic prophylaxis: Dexamethasone prior to each treatment  · PRN antiemetics: Ondansetron and Prochlorperazine at home  · EMLA cream for port    On the control arm, C4d15 today, will see her back in 2 weeks for C5D11. CT c/a/p on 12/28/18 show response to treatment. We will plan to see the patient in follow up at least once per cycle, or sooner if symptoms warrant. 2. DM2:  Holding metformin; on insulin; saw Dr. Lieutenant Gonzalez; her BS are improving; she is now taking less insulin    3. Emotional well being:  She has excellent support and is coping well with her disease    4. Abdominal pain:  Minor, may be due to constipation;  palliative care has seen    5. Nutrition:  Stephan Gonzáles RD has met with her; holding on enzymes    6. Insomnia: Has improved. She has lunesta if needed; she may also try melatonin    7. Anemia:  Due to chemo and disease; monitor    8. Neutropenia:  Due to chemo, started granix 480 mcg for 4 days following chemo with C1D15. Does report some pain with granix. Tried claritin, however this kept her awake. Recommend trying tylenol and advil PRN. Now at 2 days following chemo    9.  Constipation:  improved            Signed By: Marcelle Ndiaye MD

## 2019-02-15 DIAGNOSIS — C25.7 MALIGNANT NEOPLASM OF OTHER PARTS OF PANCREAS (HCC): Primary | ICD-10-CM

## 2019-02-15 NOTE — PROGRESS NOTES
Pt in for labs and treatment. This is C4D8 of treatment. Patient reports no new adverse events at this time. Vital signs are stable. Patient to receive gemcitabine/abraxane infusion. Next appt is scheduled for 2/13/19.

## 2019-02-20 ENCOUNTER — APPOINTMENT (OUTPATIENT)
Dept: INFUSION THERAPY | Age: 74
End: 2019-02-20
Payer: MEDICARE

## 2019-02-25 ENCOUNTER — HOSPITAL ENCOUNTER (OUTPATIENT)
Dept: CT IMAGING | Age: 74
Discharge: HOME | End: 2019-02-25
Attending: INTERNAL MEDICINE
Payer: MEDICARE

## 2019-02-25 DIAGNOSIS — C25.7 MALIGNANT NEOPLASM OF OTHER PARTS OF PANCREAS (HCC): ICD-10-CM

## 2019-02-25 PROCEDURE — 74011636320 HC RX REV CODE- 636/320: Performed by: RADIOLOGY

## 2019-02-25 PROCEDURE — 71260 CT THORAX DX C+: CPT

## 2019-02-25 PROCEDURE — 74177 CT ABD & PELVIS W/CONTRAST: CPT

## 2019-02-25 PROCEDURE — 74011000258 HC RX REV CODE- 258: Performed by: RADIOLOGY

## 2019-02-25 RX ORDER — DEXAMETHASONE SODIUM PHOSPHATE 4 MG/ML
8 INJECTION, SOLUTION INTRA-ARTICULAR; INTRALESIONAL; INTRAMUSCULAR; INTRAVENOUS; SOFT TISSUE ONCE
Status: COMPLETED | OUTPATIENT
Start: 2019-02-27 | End: 2019-02-27

## 2019-02-25 RX ORDER — SODIUM CHLORIDE 0.9 % (FLUSH) 0.9 %
10 SYRINGE (ML) INJECTION
Status: COMPLETED | OUTPATIENT
Start: 2019-02-25 | End: 2019-02-25

## 2019-02-25 RX ADMIN — SODIUM CHLORIDE 100 ML: 900 INJECTION, SOLUTION INTRAVENOUS at 09:34

## 2019-02-25 RX ADMIN — Medication 10 ML: at 09:34

## 2019-02-25 RX ADMIN — IOPAMIDOL 100 ML: 755 INJECTION, SOLUTION INTRAVENOUS at 09:34

## 2019-02-27 ENCOUNTER — HOSPITAL ENCOUNTER (OUTPATIENT)
Dept: INFUSION THERAPY | Age: 74
Discharge: HOME | End: 2019-02-27
Payer: MEDICARE

## 2019-02-27 ENCOUNTER — RESEARCH ENCOUNTER (OUTPATIENT)
Dept: ONCOLOGY | Age: 74
End: 2019-02-27

## 2019-02-27 ENCOUNTER — OFFICE VISIT (OUTPATIENT)
Dept: ONCOLOGY | Age: 74
End: 2019-02-27

## 2019-02-27 VITALS
DIASTOLIC BLOOD PRESSURE: 75 MMHG | TEMPERATURE: 97.9 F | RESPIRATION RATE: 18 BRPM | WEIGHT: 196 LBS | SYSTOLIC BLOOD PRESSURE: 112 MMHG | OXYGEN SATURATION: 98 % | HEIGHT: 67 IN | BODY MASS INDEX: 30.76 KG/M2 | HEART RATE: 77 BPM

## 2019-02-27 VITALS
OXYGEN SATURATION: 96 % | TEMPERATURE: 97.9 F | HEIGHT: 67 IN | BODY MASS INDEX: 30.76 KG/M2 | RESPIRATION RATE: 18 BRPM | HEART RATE: 78 BPM | WEIGHT: 196 LBS | DIASTOLIC BLOOD PRESSURE: 59 MMHG | SYSTOLIC BLOOD PRESSURE: 101 MMHG

## 2019-02-27 DIAGNOSIS — D69.6 THROMBOCYTOPENIA (HCC): ICD-10-CM

## 2019-02-27 DIAGNOSIS — C25.7 MALIGNANT NEOPLASM OF OTHER PARTS OF PANCREAS (HCC): Primary | ICD-10-CM

## 2019-02-27 DIAGNOSIS — Z51.11 CHEMOTHERAPY MANAGEMENT, ENCOUNTER FOR: ICD-10-CM

## 2019-02-27 DIAGNOSIS — K59.00 CONSTIPATION, UNSPECIFIED CONSTIPATION TYPE: ICD-10-CM

## 2019-02-27 DIAGNOSIS — G47.00 INSOMNIA, UNSPECIFIED TYPE: ICD-10-CM

## 2019-02-27 DIAGNOSIS — R10.9 ABDOMINAL PAIN, UNSPECIFIED ABDOMINAL LOCATION: ICD-10-CM

## 2019-02-27 LAB
ALBUMIN SERPL-MCNC: 2.9 G/DL (ref 3.5–5)
ALBUMIN/GLOB SERPL: 0.9 {RATIO} (ref 1.1–2.2)
ALP SERPL-CCNC: 163 U/L (ref 45–117)
ALT SERPL-CCNC: 24 U/L (ref 12–78)
ANION GAP SERPL CALC-SCNC: 9 MMOL/L (ref 5–15)
APPEARANCE UR: CLEAR
AST SERPL-CCNC: 18 U/L (ref 15–37)
BACTERIA URNS QL MICRO: NEGATIVE /HPF
BASOPHILS # BLD: 0.1 K/UL (ref 0–0.1)
BASOPHILS NFR BLD: 1 % (ref 0–1)
BILIRUB SERPL-MCNC: 0.4 MG/DL (ref 0.2–1)
BILIRUB UR QL: NEGATIVE
BUN SERPL-MCNC: 14 MG/DL (ref 6–20)
BUN/CREAT SERPL: 15 (ref 12–20)
CALCIUM SERPL-MCNC: 8.5 MG/DL (ref 8.5–10.1)
CHLORIDE SERPL-SCNC: 101 MMOL/L (ref 97–108)
CO2 SERPL-SCNC: 27 MMOL/L (ref 21–32)
COLOR UR: NORMAL
CREAT SERPL-MCNC: 0.93 MG/DL (ref 0.55–1.02)
DIFFERENTIAL METHOD BLD: ABNORMAL
EOSINOPHIL # BLD: 0.3 K/UL (ref 0–0.4)
EOSINOPHIL NFR BLD: 3 % (ref 0–7)
EPITH CASTS URNS QL MICRO: NORMAL /LPF
ERYTHROCYTE [DISTWIDTH] IN BLOOD BY AUTOMATED COUNT: 20.2 % (ref 11.5–14.5)
FERRITIN SERPL-MCNC: 174 NG/ML (ref 8–252)
GLOBULIN SER CALC-MCNC: 3.3 G/DL (ref 2–4)
GLUCOSE SERPL-MCNC: 143 MG/DL (ref 65–100)
GLUCOSE UR STRIP.AUTO-MCNC: NEGATIVE MG/DL
HCT VFR BLD AUTO: 27.6 % (ref 35–47)
HGB BLD-MCNC: 8.7 G/DL (ref 11.5–16)
HGB UR QL STRIP: NEGATIVE
HYALINE CASTS URNS QL MICRO: NORMAL /LPF (ref 0–5)
IMM GRANULOCYTES # BLD AUTO: 0.1 K/UL (ref 0–0.04)
IMM GRANULOCYTES NFR BLD AUTO: 1 % (ref 0–0.5)
IRON SATN MFR SERPL: 12 % (ref 20–50)
IRON SERPL-MCNC: 35 UG/DL (ref 35–150)
KETONES UR QL STRIP.AUTO: NEGATIVE MG/DL
LDH SERPL L TO P-CCNC: 241 U/L (ref 81–246)
LEUKOCYTE ESTERASE UR QL STRIP.AUTO: NEGATIVE
LYMPHOCYTES # BLD: 0.8 K/UL (ref 0.8–3.5)
LYMPHOCYTES NFR BLD: 9 % (ref 12–49)
MAGNESIUM SERPL-MCNC: 1.8 MG/DL (ref 1.6–2.4)
MCH RBC QN AUTO: 30.2 PG (ref 26–34)
MCHC RBC AUTO-ENTMCNC: 31.5 G/DL (ref 30–36.5)
MCV RBC AUTO: 95.8 FL (ref 80–99)
MONOCYTES # BLD: 1.3 K/UL (ref 0–1)
MONOCYTES NFR BLD: 14 % (ref 5–13)
NEUTS SEG # BLD: 6.6 K/UL (ref 1.8–8)
NEUTS SEG NFR BLD: 72 % (ref 32–75)
NITRITE UR QL STRIP.AUTO: NEGATIVE
NRBC # BLD: 0 K/UL (ref 0–0.01)
NRBC BLD-RTO: 0 PER 100 WBC
PH UR STRIP: 6.5 [PH] (ref 5–8)
PHOSPHATE SERPL-MCNC: 4 MG/DL (ref 2.6–4.7)
PLATELET # BLD AUTO: 665 K/UL (ref 150–400)
PLATELET COMMENTS,PCOM: ABNORMAL
PMV BLD AUTO: 9.5 FL (ref 8.9–12.9)
POTASSIUM SERPL-SCNC: 4.2 MMOL/L (ref 3.5–5.1)
PROT SERPL-MCNC: 6.2 G/DL (ref 6.4–8.2)
PROT UR STRIP-MCNC: NEGATIVE MG/DL
RBC # BLD AUTO: 2.88 M/UL (ref 3.8–5.2)
RBC #/AREA URNS HPF: NORMAL /HPF (ref 0–5)
RBC MORPH BLD: ABNORMAL
SODIUM SERPL-SCNC: 137 MMOL/L (ref 136–145)
SP GR UR REFRACTOMETRY: 1.01 (ref 1–1.03)
TIBC SERPL-MCNC: 292 UG/DL (ref 250–450)
UA: UC IF INDICATED,UAUC: NORMAL
UROBILINOGEN UR QL STRIP.AUTO: 1 EU/DL (ref 0.2–1)
WBC # BLD AUTO: 9.2 K/UL (ref 3.6–11)
WBC URNS QL MICRO: NORMAL /HPF (ref 0–4)

## 2019-02-27 PROCEDURE — 96417 CHEMO IV INFUS EACH ADDL SEQ: CPT

## 2019-02-27 PROCEDURE — 74011000258 HC RX REV CODE- 258: Performed by: INTERNAL MEDICINE

## 2019-02-27 PROCEDURE — 74011250636 HC RX REV CODE- 250/636: Performed by: INTERNAL MEDICINE

## 2019-02-27 PROCEDURE — 74011000250 HC RX REV CODE- 250: Performed by: INTERNAL MEDICINE

## 2019-02-27 PROCEDURE — 84100 ASSAY OF PHOSPHORUS: CPT

## 2019-02-27 PROCEDURE — 74011250636 HC RX REV CODE- 250/636

## 2019-02-27 PROCEDURE — 36415 COLL VENOUS BLD VENIPUNCTURE: CPT

## 2019-02-27 PROCEDURE — 83735 ASSAY OF MAGNESIUM: CPT

## 2019-02-27 PROCEDURE — 96375 TX/PRO/DX INJ NEW DRUG ADDON: CPT

## 2019-02-27 PROCEDURE — 81001 URINALYSIS AUTO W/SCOPE: CPT

## 2019-02-27 PROCEDURE — 96413 CHEMO IV INFUSION 1 HR: CPT

## 2019-02-27 PROCEDURE — 85025 COMPLETE CBC W/AUTO DIFF WBC: CPT

## 2019-02-27 PROCEDURE — 80053 COMPREHEN METABOLIC PANEL: CPT

## 2019-02-27 PROCEDURE — 83540 ASSAY OF IRON: CPT

## 2019-02-27 PROCEDURE — 83615 LACTATE (LD) (LDH) ENZYME: CPT

## 2019-02-27 PROCEDURE — 82728 ASSAY OF FERRITIN: CPT

## 2019-02-27 PROCEDURE — 77030012965 HC NDL HUBR BBMI -A

## 2019-02-27 RX ORDER — IBUPROFEN 200 MG
200 TABLET ORAL
COMMUNITY

## 2019-02-27 RX ORDER — SODIUM CHLORIDE 9 MG/ML
10 INJECTION INTRAMUSCULAR; INTRAVENOUS; SUBCUTANEOUS AS NEEDED
Status: ACTIVE | OUTPATIENT
Start: 2019-02-27 | End: 2019-02-28

## 2019-02-27 RX ORDER — HEPARIN 100 UNIT/ML
500 SYRINGE INTRAVENOUS AS NEEDED
Status: ACTIVE | OUTPATIENT
Start: 2019-02-27 | End: 2019-02-28

## 2019-02-27 RX ORDER — LANOLIN ALCOHOL/MO/W.PET/CERES
12 CREAM (GRAM) TOPICAL
COMMUNITY
End: 2019-01-01

## 2019-02-27 RX ORDER — SODIUM CHLORIDE 0.9 % (FLUSH) 0.9 %
5-10 SYRINGE (ML) INJECTION AS NEEDED
Status: ACTIVE | OUTPATIENT
Start: 2019-02-27 | End: 2019-02-28

## 2019-02-27 RX ADMIN — PACLITAXEL 245 MG: 100 INJECTION, POWDER, LYOPHILIZED, FOR SUSPENSION INTRAVENOUS at 13:43

## 2019-02-27 RX ADMIN — DEXAMETHASONE SODIUM PHOSPHATE 8 MG: 4 INJECTION, SOLUTION INTRAMUSCULAR; INTRAVENOUS at 13:04

## 2019-02-27 RX ADMIN — Medication 500 UNITS: at 15:06

## 2019-02-27 RX ADMIN — GEMCITABINE 1960 MG: 38 INJECTION, SOLUTION INTRAVENOUS at 14:26

## 2019-02-27 RX ADMIN — Medication 10 ML: at 15:06

## 2019-02-27 RX ADMIN — SODIUM CHLORIDE 10 ML: 9 INJECTION, SOLUTION INTRAMUSCULAR; INTRAVENOUS; SUBCUTANEOUS at 11:21

## 2019-02-27 NOTE — PROGRESS NOTES
[facility] [place] [address]  [address] [facility], [address] [facility] 100[address] [facility]  [place]: [phone]  F: [phone]      Reason for Visit:   John Winter is a 72 y.o. female who is seen in self-referral for evaluation of pancreatic cancer. Treatment History:   · 10/4/18 pancreatic head mass FNA, pancreatic adenocarcinoma    10/15/18  Liver, Core Biopsy w/Touch Prep CYTOLOGIC INTERPRETATION:   · Numerous cores and fragments of benign hepatic parenchyma     10/24/18  Liver, Fine Needle Aspiration CYTOLOGIC INTERPRETATION:   Metastatic adenocarcinoma (see Comment). General Categorization   Positive for malignancy. Comment: The morphologic appearance is nonspecific with regard to site of origin but compatible with metastatic pancreatic carcinoma in this patient with known pancreatic adenocarcinoma ([id number]). 11/9/18 abraxane/gemcitabine    History of Present Illness:   She started having abdominal pain, gradual and persistent, x 1 month, pressure sensation, located in epigastrium, no radiation, no aggravating symptoms, no relieving factors. 25 lb weight loss over 6 months.  + nausea. Interval history:  In today for follow up. Complains of gr 1 constipation, gr 1 fatigue, gr 1 hair loss, gr 1 insomnia. Past Medical History:   Diagnosis Date    Arthritis     Constipation     Diabetes ([address]     Hemorrhoids     Hiatal hernia     High cholesterol     Hypertension     Pancreatic cancer ([address]       Past Surgical History:   Procedure Laterality Date    HX KNEE ARTHROSCOPY Right     HX ORTHOPAEDIC      left wrist surgery    HX TONSILLECTOMY        Social History     Tobacco Use    Smoking status: Never Smoker    Smokeless tobacco: Never Used   Substance Use Topics    Alcohol use:  Yes     Alcohol/week: 1.2 - 2.4 oz     Types: 1 - 2 Cans of beer, 1 - 2 Shots of liquor per week     Frequency: 2-4 times a month     Drinks per session: 1 or 2      Family History Problem Relation Age of Onset    Cancer Mother         skin    Hypertension Mother     Heart Disease Mother     Cancer Father         skin    Heart Disease Father     Stroke Father     Diabetes Neg Hx      Current Outpatient Medications   Medication Sig    IBUPROFEN IB PO Take  by mouth as needed.  melatonin 3 mg tablet Take 12 mg by mouth nightly.  GRANIX 480 mcg/0.8 mL syrg injection INJECT CONTENTS OF 1 SYRINGE UNDER THE SKIN FOR 3 DAYS ONCE WEEKLY    insulin degludec (TRESIBA FLEXTOUCH U-200) 200 unit/mL (3 mL) inpn 36 Units by SubCUTAneous route daily. Or as directed up to 70 units daily on chemo days--Dose change 2/11/19--updated med list--did not send prescription to the pharmacy    insulin aspart U-100 (NOVOLOG FLEXPEN U-100 INSULIN) 100 unit/mL inpn Inject 12 units before breakfast, lunch and dinner + 2 units for every 50 mg/dl above 150 mg/dl. Max 50 units per day--Dose change 1/24/19--updated med list--did not send prescription to the pharmacy    glucose blood VI test strips (ACCU-CHEK RICHIE PLUS TEST STRP) strip Accu-Chek Richie Plus test strips   Check BS BID    OTHER Cranial prosthesis    Dx C25.7    omeprazole (PRILOSEC) 20 mg capsule Take 20 mg by mouth daily.  simvastatin (ZOCOR) 10 mg tablet Take  by mouth nightly.  telmisartan (MICARDIS) 80 mg tablet Take 80 mg by mouth daily.  hydroCHLOROthiazide (MICROZIDE) 12.5 mg capsule Take 12.5 mg by mouth daily.  ondansetron hcl (ZOFRAN) 8 mg tablet Take 1 Tab by mouth every eight (8) hours as needed for Nausea.  lidocaine-prilocaine (EMLA) topical cream Apply  to affected area as needed for Pain. No current facility-administered medications for this visit.       Facility-Administered Medications Ordered in Other Visits   Medication Dose Route Frequency    sodium chloride 0.9% injection 10 mL  10 mL IntraVENous PRN    heparin (porcine) pf 500 Units  500 Units IntraVENous PRN    sodium chloride (NS) flush 5-10 mL 5-10 mL IntraVENous PRN    PACLitaxel-Protein Bound (ABRAXANE) 245 mg chemo infusion  245 mg IntraVENous ONCE    gemcitabine (GEMZAR) 1,960 mg in 0.9% sodium chloride 250 mL, overfill volume 25 mL chemo infusion  1,960 mg IntraVENous ONCE    dexamethasone (DECADRON) 4 mg/mL injection 8 mg  8 mg IntraVENous ONCE    prochlorperazine (COMPAZINE) with saline injection 10 mg  10 mg IntraVENous Q6H PRN      Allergies   Allergen Reactions    Amoxicillin Hives        Review of Systems: A comprehensive review of systems was performed and all systems were negative except for HPI and for the symptom review form, reviewed and scanned in. Physical Exam:     Visit Vitals  /59   Pulse 78   Temp 97.9 °F (36.6 °C)   Resp 18   Ht 5' 7.01\" (1.702 m)   Wt 196 lb (88.9 kg)   SpO2 96%   BMI 30.69 kg/m²     ECOG PS: 0  General: No distress  Eyes: PERRLA, anicteric sclerae  HENT: Atraumatic, OP clear  Neck: Supple  Lymphatic: No cervical, supraclavicular, or inguinal adenopathy  Respiratory: CTAB, normal respiratory effort  CV: Normal rate, regular rhythm, no murmurs, no peripheral edema  GI: Soft, nontender, nondistended, no masses, no hepatomegaly, no splenomegaly  MS: Normal gait and station. Digits without clubbing or cyanosis. Skin: No rashes, ecchymoses, or petechiae. Normal temperature, turgor, and texture. Psych: Alert, oriented, appropriate affect, normal judgment/insight        Results:     Lab Results   Component Value Date/Time    WBC 9.2 02/27/2019 11:22 AM    HGB 8.7 (L) 02/27/2019 11:22 AM    HCT 27.6 (L) 02/27/2019 11:22 AM    PLATELET 401 (H) 77/88/0269 11:22 AM    MCV 95.8 02/27/2019 11:22 AM    ABS.  NEUTROPHILS 6.6 02/27/2019 11:22 AM     Lab Results   Component Value Date/Time    Sodium 137 02/27/2019 11:22 AM    Potassium 4.2 02/27/2019 11:22 AM    Chloride 101 02/27/2019 11:22 AM    CO2 27 02/27/2019 11:22 AM    Glucose 143 (H) 02/27/2019 11:22 AM    BUN 14 02/27/2019 11:22 AM    Creatinine 0.93 02/27/2019 11:22 AM    GFR est AA >60 02/27/2019 11:22 AM    GFR est non-AA 59 (L) 02/27/2019 11:22 AM    Calcium 8.5 02/27/2019 11:22 AM    Glucose (POC) 316 (H) 10/04/2018 03:45 PM     Lab Results   Component Value Date/Time    Bilirubin, total 0.4 02/27/2019 11:22 AM    ALT (SGPT) 24 02/27/2019 11:22 AM    AST (SGOT) 18 02/27/2019 11:22 AM    Alk. phosphatase 163 (H) 02/27/2019 11:22 AM    Protein, total 6.2 (L) 02/27/2019 11:22 AM    Albumin 2.9 (L) 02/27/2019 11:22 AM    Globulin 3.3 02/27/2019 11:22 AM     10/1/18 CT abd  There is a 3.8 x 1.9 cm mass involving the uncinate process and possibly  invading the duodenum. Findings are nonspecific but typical of pancreatic  carcinoma. There is no biliary or pancreatic ductal dilatation.     There is a poorly defined irregular hypodensity in segment IVb of the liver  measuring 3.7 x 1.9 cm. There appears to be focal biliary dilatation in the left  lobe behind this abnormality. Metastatic disease is suspected. There is a small,  1 cm hypodensity in the dome of the liver, likely segment 8. There is a 1 cm  hypodensity in segment 4 of the liver as well, also likely metastasis. Small  hypodensity measuring less than 1 cm in segment 6 at the tip laterally is also  nonspecific but probable metastasis.     Spleen kidneys and adrenals are unremarkable.     No free fluid or focal fluid collection.     Lung bases are clear.     Bone windows are unremarkable.     IMPRESSION  IMPRESSION:  1. 3.8 x 1.9 cm mass involving the uncinate process of the pancreas and possibly  invading the duodenum, this likely represents pancreatic carcinoma. 2. Likely metastatic disease in the liver. There is a 3.8 x 1.9 cm mass involving the uncinate process and possibly  invading the duodenum. Findings are nonspecific but typical of pancreatic  carcinoma.  There is no biliary or pancreatic ductal dilatation.     There is a poorly defined irregular hypodensity in segment IVb of the liver  measuring 3.7 x 1.9 cm. There appears to be focal biliary dilatation in the left  lobe behind this abnormality. Metastatic disease is suspected. There is a small,  1 cm hypodensity in the dome of the liver, likely segment 8. There is a 1 cm  hypodensity in segment 4 of the liver as well, also likely metastasis. Small  hypodensity measuring less than 1 cm in segment 6 at the tip laterally is also  nonspecific but probable metastasis.     Spleen kidneys and adrenals are unremarkable.     No free fluid or focal fluid collection.     Lung bases are clear.     Bone windows are unremarkable.     IMPRESSION  IMPRESSION:  1. 3.8 x 1.9 cm mass involving the uncinate process of the pancreas and possibly  invading the duodenum, this likely represents pancreatic carcinoma. 2. Likely metastatic disease in the liver. Records reviewed and summarized above. Pathology report(s) reviewed above. Radiology report(s) reviewed above. MRI abdomen 10/22/18: IMPRESSION:       1. Pancreatic uncinate process mass suspicious for pancreatic neoplasm, possibly  adenocarcinoma. Mass abuts the superior mesenteric artery with about 20%  circumference involvement but no luminal distortion or perivascular stranding. 2. Multiple hepatic lesions suspicious for metastatic neoplasm with segment IVb  lesion showing patchy areas of surrounding steatosis likely secondary to locally  altered intrahepatic hemodynamics and likely responsible for biopsy result. 3. Cholecystolithiasis and small gallbladder polyp. CT c/a/p 12/28/18: IMPRESSION:  Interval decrease in size of pancreatic mass. Slight interval decrease in size of hepatic metastases; these now demonstrate central low attenuation suggestive of necrosis. No evidence of new metastatic disease in the chest, abdomen, or pelvis. CT c/a/p 2/25/19:  LIVER: The lesions described on the prior examination have improved in the  interval. 19 mm lesion in the dome of the liver now measures 1 cm.  2.0 x 1.5 cm  lesion in the left hepatic lobe now measures 1.3 x 1.2 cm. Other smaller lesions  are no longer visualized. GALLBLADDER: Unremarkable. SPLEEN: No mass. PANCREAS: Mass lesion in the uncinate process now measures 1.7 x 2.0 cm,  previously measuring 2.0 x 3.0 cm. ADRENALS: Unremarkable. KIDNEYS: No mass, calculus, or hydronephrosis. STOMACH: Unremarkable. SMALL BOWEL: No dilatation or wall thickening. COLON: No dilatation or wall thickening. APPENDIX: Unremarkable. PERITONEUM: No ascites or pneumoperitoneum. RETROPERITONEUM: No lymphadenopathy or aortic aneurysm. REPRODUCTIVE ORGANS: Intrauterine device projects in satisfactory position. URINARY BLADDER: No mass or calculus. BONES: Degenerative changes are seen in the thoracic and lumbar spine. ADDITIONAL COMMENTS: N/A     IMPRESSION  IMPRESSION:  Interval decrease in the size of the hepatic metastases. Decrease in the size of  the mass lesion involving the uncinate process.     RECIST:  Pancreatic uncinate mass: Current: (68) 1.7X2.0cm     12/28/2018: (71) 3.0 x  2.0 cm   Segment 4B liver mass: Current: (56) 1.3 x 1.2 cm 12/28/2018: (57) 2.0 x 1.5 cm   Segment 5 liver mass: Current: (68) 4 x 7 mm 12/28/2018: (70) 1.4 x 0.9 cm     Assessment/plan:   1. Uncinate pancreatic adenocarcinoma,  mets to liver, stage IV:  cT2 cN0 pM1    Discussed that while this is treatable, it is not curable, the goal of therapy is palliative. Discussed that without therapy, life expectancy would be 3-6 months, with therapy 12-18 months on average. As an example of likely chemotherapy, we discussed the risks and benefits of Gemcitabine and Abraxane chemotherapy. Potential side effects include, but are not limited to, nausea, vomiting, diarrhea, constipation, mucositis, taste changes, myelosuppression, infection, fatigue, alopecia, skin and nail changes, pulmonary toxicity, neuropathy, allergic reactions, infertility, and rarely, death.      Discussed the [id number] study and the research nurse met with her today and she signed informed consent. · Gemcitabine (1000 mg/m2) and Abraxane (125 mg/m2) given on days 1,8,15 every 28 days. · Labs: CBC, BMP prior to each treatment. Hepatic function panel every 4 weeks. CA 19.9 prior to day 1  · Antiemetic prophylaxis: Dexamethasone prior to each treatment  · PRN antiemetics: Ondansetron and Prochlorperazine at home  · EMLA cream for port    On the control arm, C5d1 today, will see her back in 2 weeks for C5D15. CT c/a/p on 2/25/19 show response to treatment, next scans due in April. We will plan to see the patient in follow up at least once per cycle, or sooner if symptoms warrant. 2. DM2:  Holding metformin; on insulin; saw Dr. Michael Magaña; her BS are improving; she is now taking less insulin    3. Emotional well being:  She has excellent support and is coping well with her disease    4. Abdominal pain:  Minor, may be due to constipation;  palliative care has seen    5. Nutrition:  Alisha Nava RD has met with her; holding on enzymes    6. Insomnia: Has improved, currently taking melatonin. She has lunesta if needed     7. Anemia:  Due to chemo and disease; monitor; will check iron profile and ferritin    8. Neutropenia:  Due to chemo, started granix 480 mcg for 4 days following chemo with C1D15. Does report some pain with granix. Tried claritin, however this kept her awake. Recommend trying tylenol and advil PRN. Now at 2 days following chemo    9.  Constipation:  improved    Signed By: Jael Martini MD

## 2019-02-27 NOTE — PROGRESS NOTES
Problem: Chemotherapy Treatment Goal: *Chemotherapy regimen followed Outcome: Progressing Towards Goal 
Patient here for C5D1 Clinical Trial Gemzar/Abraxane

## 2019-02-27 NOTE — PROGRESS NOTES
Lizbeth Zaira is a 72 y.o. female  Chief Complaint   Patient presents with    Follow-up     pancreatic cancer

## 2019-02-27 NOTE — PROGRESS NOTES
Pt in for labs. Pt in for follow up visit today per protocol with Dr. Marco Winston and RN. This is C5D1 of treatment. Patient reports the following adverse events at this time: gr 1 constipation, gr 1 fatigue, gr 1 hair loss, gr 1 insomnia. Vital signs are stable. Patient to go to infusion center after appointment to receive gemcitabine/abraxane infusion. Next appt is scheduled for 3/6/19.

## 2019-02-27 NOTE — PROGRESS NOTES
[facility] Progress Note Date: 2019 Name: Karol Tan MRN: [id number] : 1945 Ms. Strange arrived ambulatory at 1115 and in no distress for C5D1 Clinical Trial:Abraxane/Gemzar. Assessment was completed, no acute issues at this time, no new complaints voiced. RCW port accessed without difficulty with 0.75 garcia needle; + blood return noted, labs drawn and sent. Proceeded to appt with Dr. Ms. Strange's vitals were reviewed. Patient Vitals for the past 12 hrs: 
 Temp Pulse Resp BP SpO2  
19 1515 97.9 °F (36.6 °C) 77 18 112/75 98 % 19 1118 97.9 °F (36.6 °C) 78 18 101/59 96 % Lab results were obtained and reviewed. Recent Results (from the past 12 hour(s)) CBC WITH AUTOMATED DIFF Collection Time: 19 11:22 AM  
Result Value Ref Range WBC 9.2 3.6 - 11.0 K/uL  
 RBC 2.88 (L) 3.80 - 5.20 M/uL HGB 8.7 (L) 11.5 - 16.0 g/dL HCT 27.6 (L) 35.0 - 47.0 % MCV 95.8 80.0 - 99.0 FL  
 MCH 30.2 26.0 - 34.0 PG  
 MCHC 31.5 30.0 - 36.5 g/dL RDW 20.2 (H) 11.5 - 14.5 % PLATELET 819 (H) 146 - 400 K/uL MPV 9.5 8.9 - 12.9 FL  
 NRBC 0.0 0  WBC ABSOLUTE NRBC 0.00 0.00 - 0.01 K/uL NEUTROPHILS 72 32 - 75 % LYMPHOCYTES 9 (L) 12 - 49 % MONOCYTES 14 (H) 5 - 13 % EOSINOPHILS 3 0 - 7 % BASOPHILS 1 0 - 1 % IMMATURE GRANULOCYTES 1 (H) 0.0 - 0.5 % ABS. NEUTROPHILS 6.6 1.8 - 8.0 K/UL  
 ABS. LYMPHOCYTES 0.8 0.8 - 3.5 K/UL  
 ABS. MONOCYTES 1.3 (H) 0.0 - 1.0 K/UL  
 ABS. EOSINOPHILS 0.3 0.0 - 0.4 K/UL  
 ABS. BASOPHILS 0.1 0.0 - 0.1 K/UL  
 ABS. IMM. GRANS. 0.1 (H) 0.00 - 0.04 K/UL  
 DF SMEAR SCANNED    
 PLATELET COMMENTS Large Platelets RBC COMMENTS POLYCHROMASIA 1+ 
    
 RBC COMMENTS OVALOCYTES PRESENT 
    
 RBC COMMENTS ANISOCYTOSIS 
1+ METABOLIC PANEL, COMPREHENSIVE Collection Time: 19 11:22 AM  
Result Value Ref Range Sodium 137 136 - 145 mmol/L  Potassium 4.2 3.5 - 5.1 mmol/L  
 Chloride 101 97 - 108 mmol/L  
 CO2 27 21 - 32 mmol/L Anion gap 9 5 - 15 mmol/L Glucose 143 (H) 65 - 100 mg/dL BUN 14 6 - 20 MG/DL Creatinine 0.93 0.55 - 1.02 MG/DL  
 BUN/Creatinine ratio 15 12 - 20 GFR est AA >60 >60 ml/min/1.73m2 GFR est non-AA 59 (L) >60 ml/min/1.73m2 Calcium 8.5 8.5 - 10.1 MG/DL Bilirubin, total 0.4 0.2 - 1.0 MG/DL  
 ALT (SGPT) 24 12 - 78 U/L  
 AST (SGOT) 18 15 - 37 U/L Alk. phosphatase 163 (H) 45 - 117 U/L Protein, total 6.2 (L) 6.4 - 8.2 g/dL Albumin 2.9 (L) 3.5 - 5.0 g/dL Globulin 3.3 2.0 - 4.0 g/dL A-G Ratio 0.9 (L) 1.1 - 2.2 LD Collection Time: 02/27/19 11:22 AM  
Result Value Ref Range  81 - 246 U/L  
MAGNESIUM Collection Time: 02/27/19 11:22 AM  
Result Value Ref Range Magnesium 1.8 1.6 - 2.4 mg/dL PHOSPHORUS Collection Time: 02/27/19 11:22 AM  
Result Value Ref Range Phosphorus 4.0 2.6 - 4.7 MG/DL URINALYSIS W/ REFLEX CULTURE Collection Time: 02/27/19 11:22 AM  
Result Value Ref Range Color YELLOW/STRAW Appearance CLEAR CLEAR Specific gravity 1.008 1.003 - 1.030    
 pH (UA) 6.5 5.0 - 8.0 Protein NEGATIVE  NEG mg/dL Glucose NEGATIVE  NEG mg/dL Ketone NEGATIVE  NEG mg/dL Bilirubin NEGATIVE  NEG Blood NEGATIVE  NEG Urobilinogen 1.0 0.2 - 1.0 EU/dL Nitrites NEGATIVE  NEG Leukocyte Esterase NEGATIVE  NEG    
 WBC 0-4 0 - 4 /hpf  
 RBC 0-5 0 - 5 /hpf Epithelial cells FEW FEW /lpf Bacteria NEGATIVE  NEG /hpf  
 UA:UC IF INDICATED CULTURE NOT INDICATED BY UA RESULT CNI Hyaline cast 0-2 0 - 5 /lpf Labs within parameters for treatment. Pre-medications  were administered as ordered and chemotherapy was initiated. Medication: 
Decadron IVP Abraxane IV (30 mins) Gemzar IV (30mins) Pipo Maldonado Patient port flushed; heparinized; and de-accessed per protocol.  
 
Ms. Hunter Bal tolerated treatment well and was discharged from [facility] in stable condition at 1515. She is to return on 3/06/2019 at 11:00am for her next appointment. Frannie Patel February 27, 2019

## 2019-02-28 RX ORDER — INSULIN ASPART 100 [IU]/ML
INJECTION, SOLUTION INTRAVENOUS; SUBCUTANEOUS
Qty: 15 ML | Refills: 11
Start: 2019-02-28 | End: 2019-03-19

## 2019-03-05 ENCOUNTER — OFFICE VISIT (OUTPATIENT)
Dept: PALLATIVE CARE | Age: 74
End: 2019-03-05

## 2019-03-05 VITALS
TEMPERATURE: 97.7 F | HEART RATE: 79 BPM | DIASTOLIC BLOOD PRESSURE: 76 MMHG | OXYGEN SATURATION: 97 % | HEIGHT: 67 IN | RESPIRATION RATE: 18 BRPM | BODY MASS INDEX: 30.79 KG/M2 | WEIGHT: 196.2 LBS | SYSTOLIC BLOOD PRESSURE: 124 MMHG

## 2019-03-05 DIAGNOSIS — R53.0 NEOPLASTIC MALIGNANT RELATED FATIGUE: ICD-10-CM

## 2019-03-05 DIAGNOSIS — R10.9 ABDOMINAL DISCOMFORT: Primary | ICD-10-CM

## 2019-03-05 DIAGNOSIS — C78.7 PANCREATIC CANCER METASTASIZED TO LIVER (HCC): ICD-10-CM

## 2019-03-05 DIAGNOSIS — C25.9 PANCREATIC CANCER METASTASIZED TO LIVER (HCC): ICD-10-CM

## 2019-03-05 DIAGNOSIS — R11.0 NAUSEA WITHOUT VOMITING: ICD-10-CM

## 2019-03-05 RX ORDER — LANOLIN ALCOHOL/MO/W.PET/CERES
CREAM (GRAM) TOPICAL
Status: ON HOLD | COMMUNITY
End: 2019-01-01 | Stop reason: CLARIF

## 2019-03-05 NOTE — PROGRESS NOTES
Palliative Medicine Office Visit  Palliative Medicine Nurse Check In  [phone]    Patient Name: Francis Franco  YOB: 1945      Date of Office Visit: 3/5/2019   Patient states: \"  \"    From Check In Sheet (scanned in Media):  Has a medical provider talked with you about cardiopulmonary resuscitation (CPR)? [x] Yes   [] No   [] Unable to obtain    Nurse reminder to complete or update ACP FlowSheet:    Is ACP on the Problem List?    [x] Yes    [] No  IF ACP Document is ON FILE; Nurse to place ACP on Problem List     Is there an ACP Note in Chart Review/Note? [] Yes    [x] No   If NO: [address] [place] [address] Avenue: [facility] [phone]    [facility] Alternate Health Care Agent: Duncan Ayala  [phone]  Advance Care Planning 3/5/2019   Patient's Healthcare Decision Maker is: Verbal statement (Legal Next of Kin remains as decision maker)   Confirm Advance Directive None   Patient Would Like to Complete Advance Directive Yes         Is there anything that we should know about you as a person in order to provide you the best care possible? Have you been to the ER, urgent care clinic since your last visit? [] Yes   [x] No   [] Unable to obtain    Have you been hospitalized since your last visit? [] Yes   [x] No   [] Unable to obtain    Have you seen or consulted any other health care providers outside of the [address] [place] [address] since your last visit?    [] Yes   [x] No   [] Unable to obtain    Functional status (describe):         Last BM: 3/2/2019      accessed (date): 3/5/2019    Bottle review (for opioid pain medication):  Medication 1:   Date filled:   Directions:   # filled:   # left:   # pills taking per day:  Last dose taken:    Medication 2:   Date filled:   Directions:   # filled:   # left:   # pills taking per day:  Last dose taken:    Medication 3:   Date filled:   Directions:   # filled:   # left:   # pills taking per day:  Last dose taken:    Medication 4:   Date filled:   Directions:   # filled:   # left:   # pills taking per day:  Last dose taken:

## 2019-03-05 NOTE — PROGRESS NOTES
Palliative Medicine Outpatient Services  [facility]: [id number] ([id number])    Patient Name: Fredy Cheema  YOB: 1945    Date of Current Visit: 19  Location of Current Visit:    [x] [facility] Office  [] [facility] Office  [] [facility] Office  [] Home  [] Other:      Date of Initial Visit: 2018  Requesting Physician: Dr. Diana Hayward  Primary Care Physician: Yesenia Hayward MD      SUMMARY:   Fredy Cheema is a 72y.o. year old with a  history of diabetes on insulin, newly diagnosed pancreatic cancer with metastases to the liver on chemotherapy with gemcitabine Abraxane as part of a clinical trial at [address] [place] [address], who was referred to Palliative Medicine by Dr. Diana Hayward for management of symptoms and support. 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 Son Chinyere Michael lives nearby. Has very good friends and family support    CT from 2019 shows good response to chemotherapy   PALLIATIVE DIAGNOSES:       ICD-10-CM ICD-9-CM    1. Abdominal discomfort R10.9 789.00    2. Nausea without vomiting R11.0 787.02    3. Neoplastic malignant related fatigue R53.0 780.79    4. Pancreatic cancer metastasized to liver (HCC) C25.9 157.9     C78.7 197.7           PLAN:   Patient Instructions     Dear Fredy Cheema ,    It was a pleasure seeing you today at [facility] office        Your described symptoms were: Fatigue: 2 Drowsiness: 0   Depression: 0 Pain: 0   Anxiety: 0 Nausea: 0   Anorexia: 0 Dyspnea: 0   Best Well-Bein Constipation: Yes   Other Problem (Comment): 0       This is the plan we talked about:     1. Abdominal discomfort  - This is no longer a problem. You have not needed any pain medicine in a while. 2. Muscle and bony pain with Granix  - We discussed how this is very common.  - You are drinking very little water- 16oz per day which is very low for you.  We agreed you will slowly increase your water consumption to a daily normal of 80 ounces per day. 3. Chemo induced nausea  - You are very well controlled with your current regimen.  -We reviewed the images from your recent scans on February 25 and I am glad the disease is responding really well to chemotherapy. Your next scans are in April. 4.  Acid reflux  -You are on Prilosec daily, please continue that. 5.  Goals of care  -You feel good about starting chemotherapy. You have good support.  -You are in the process of completing a medical directive along with living will etc. with a . Please bring us a copy when you have it.  -We discussed your wishes regarding CODE STATUS and you want to remain full code for now. This is what you have shared with us about Advance Care Planning  Advance Care Planning 3/5/2019   Patient's Healthcare Decision Maker is: Verbal statement (Legal Next of Kin remains as decision maker)   Confirm Advance Directive None   Patient Would Like to Complete Advance Directive Yes         The Palliative Medicine Team is here to support you and your family.      We will see you again in 3 months    Sincerely,      Justina Devlin MD and the Palliative Medicine Team      Counseling and Coordination: See above  - 12/28 CT scan reviewed in detail       GOALS OF CARE / TREATMENT PREFERENCES:   [====Goals of Care====]  GOALS OF CARE:  Patient / health care proxy stated goals: Full code      TREATMENT PREFERENCES:   Code Status:  [x] Attempt Resuscitation       [] Do Not Attempt Resuscitation    Advance Care Planning:  Advance Care Planning 3/5/2019   Patient's Healthcare Decision Maker is: Verbal statement (Legal Next of Kin remains as decision maker)   Confirm Advance Directive None   Patient Would Like to Complete Advance Directive Yes       Other:  (If patient appropriate for POST, consider using PALLPOST smart phrase here)    The palliative care team has discussed with patient / health care proxy about goals of care / treatment preferences for patient.  [====Goals of Care====]         PRESCRIPTIONS GIVEN:   No orders of the defined types were placed in this encounter. FOLLOW UP:     Future Appointments   Date Time Provider Shiva Zavala   3/6/2019 11:00 AM [facility]   3/13/2019 11:00 AM SS INF4 [facility]   3/13/2019 11:30 AM Wander Moser NP [facility] SCHED   3/19/2019 12:10 PM Kalee Marie MD RDE [facility]   5/28/2019 10:30 AM Chauncey Lozano MD [address] [place] [address] IN CARE:   Patient Care Team:  Vanessa Crowe MD as PCP - General (Internal Medicine)  Beverly Duffy MD (Hematology and Oncology)       HISTORY:   Nursing documentation from date of visit reviewed. Reviewed patient-completed ESAS and advance care planning form. Reviewed patient record in prescription monitoring program.    CHIEF COMPLAINT: None    HPI/SUBJECTIVE:    The patient is: [x] Verbal / [] Nonverbal     Patient here alone. She looks really well, feels well, tolerating chemo well except for days on Granix where she has muscle aches. She is not on any opioids, does not need them. She is very happy because her recent scans show very good response to chemotherapy. We went over the images of the CT scan to help understand. She has more energy than she did a month ago and is able to work on house projects.    -----------    Pleasant woman, here alone, appears younger than stated age. Has no specific complaints. Has some mild abdominal discomfort for which he takes hydrocodone [place] occasionally. She tells me that she has handled the diagnosis of pancreatic cancer much better than most people expected. She wants to tolerate chemotherapy as well as she can. She has very good understanding of her disease.       Clinical Pain Assessment (nonverbal scale for nonverbal patients):   [++++ Clinical Pain Assessment++++]  [++++Pain Severity++++]: Pain: 0  [++++Pain Character++++]: deep gnawing  [++++Pain Duration++++]: weeks  [++++Pain Effect++++]: none in particular  [++++Pain Factors++++]: in particular  [++++Pain Frequency++++]: on and off  [++++Pain Location++++]: upper and left-sided abdomen  [++++ Clinical Pain Assessment++++]       FUNCTIONAL ASSESSMENT:     Palliative Performance Scale (PPS):  PPS: 8080       PSYCHOSOCIAL/SPIRITUAL SCREENING:     Any spiritual / Scientology concerns:  [] Yes /  [x] No    Caregiver Burnout:  [] Yes /  [x] No /  [] No Caregiver Present      Anticipatory grief assessment:   [x] Normal  / [] Maladaptive       ESAS Anxiety: Anxiety: 0    ESAS Depression: Depression: 0       REVIEW OF SYSTEMS:     The following systems were [] reviewed / [] unable to be reviewed  Systems: constitutional, ears/nose/mouth/throat, respiratory, gastrointestinal, genitourinary, musculoskeletal, integumentary, neurologic, psychiatric, endocrine. Positive findings noted below. Modified ESAS Completed by: provider   Fatigue: 2 Drowsiness: 0   Depression: 0 Pain: 0   Anxiety: 0 Nausea: 0   Anorexia: 0 Dyspnea: 0   Best Well-Bein Constipation: Yes   Other Problem (Comment): 0          PHYSICAL EXAM:     Wt Readings from Last 3 Encounters:   19 196 lb 3.2 oz (89 kg)   19 196 lb (88.9 kg)   19 196 lb (88.9 kg)     Blood pressure 124/76, pulse 79, temperature 97.7 °F (36.5 °C), temperature source Oral, resp. rate 18, height 5' 7\" (1.702 m), weight 196 lb 3.2 oz (89 kg), SpO2 97 %.   Last bowel movement: See Nursing Note    Constitutional: Alert, oriented, appears well  Eyes: pupils equal, anicteric  ENMT: no nasal discharge, moist mucous membranes  Cardiovascular: regular rhythm, distal pulses intact  Respiratory: breathing not labored, symmetric  Gastrointestinal: soft non-tender, +bowel sounds  Musculoskeletal: no deformity, no tenderness to palpation  Skin: warm, dry  Neurologic: following commands, moving all extremities  Psychiatric: full affect, no hallucinations  Other:       HISTORY:     Past Medical History:   Diagnosis Date    Arthritis     Constipation     Diabetes ([address]     Hemorrhoids     Hiatal hernia     High cholesterol     Hypertension     [facility]       Past Surgical History:   Procedure Laterality Date    HX KNEE ARTHROSCOPY Right     HX ORTHOPAEDIC      left wrist surgery    HX TONSILLECTOMY        Family History   Problem Relation Age of Onset    Cancer Mother         skin    Hypertension Mother     Heart Disease Mother     Cancer Father         skin    Heart Disease Father     Stroke Father     Diabetes Neg Hx       History reviewed, no pertinent family history. Social History     Tobacco Use    Smoking status: Never Smoker    Smokeless tobacco: Never Used   Substance Use Topics    Alcohol use: Yes     Alcohol/week: 1.2 - 2.4 oz     Types: 1 - 2 Cans of beer, 1 - 2 Shots of liquor per week     Frequency: 2-4 times a month     Drinks per session: 1 or 2     Allergies   Allergen Reactions    Amoxicillin Hives      Current Outpatient Medications   Medication Sig    ferrous sulfate (IRON) 325 mg (65 mg iron) tablet Take  by mouth Daily (before breakfast).  insulin aspart U-100 (NOVOLOG FLEXPEN U-100 INSULIN) 100 unit/mL inpn Inject 10 units before breakfast, lunch and dinner + 2 units for every 50 mg/dl above 150 mg/dl. Max 50 units per day--Dose change 2/28/19--updated med list--did not send prescription to the pharmacy    IBUPROFEN IB PO Take  by mouth as needed.  melatonin 3 mg tablet Take 12 mg by mouth nightly.  GRANIX 480 mcg/0.8 mL syrg injection INJECT CONTENTS OF 1 SYRINGE UNDER THE SKIN FOR 3 DAYS ONCE WEEKLY    insulin degludec (TRESIBA FLEXTOUCH U-200) 200 unit/mL (3 mL) inpn 36 Units by SubCUTAneous route daily.  Or as directed up to 70 units daily on chemo days--Dose change 2/11/19--updated med list--did not send prescription to the pharmacy    ondansetron hcl (ZOFRAN) 8 mg tablet Take 1 Tab by mouth every eight (8) hours as needed for Nausea.  glucose blood VI test strips (ACCU-CHEK RICHIE PLUS TEST STRP) strip Accu-Chek Richie Plus test strips   Check BS BID    OTHER Cranial prosthesis    Dx C25.7    omeprazole (PRILOSEC) 20 mg capsule Take 20 mg by mouth daily.  lidocaine-prilocaine (EMLA) topical cream Apply  to affected area as needed for Pain.  simvastatin (ZOCOR) 10 mg tablet Take  by mouth nightly.  telmisartan (MICARDIS) 80 mg tablet Take 80 mg by mouth daily.  hydroCHLOROthiazide (MICROZIDE) 12.5 mg capsule Take 12.5 mg by mouth daily. No current facility-administered medications for this visit. LAB DATA REVIEWED:     Lab Results   Component Value Date/Time    WBC 9.2 02/27/2019 11:22 AM    HGB 8.7 (L) 02/27/2019 11:22 AM    PLATELET 405 (H) 04/36/0930 11:22 AM     Lab Results   Component Value Date/Time    Sodium 137 02/27/2019 11:22 AM    Potassium 4.2 02/27/2019 11:22 AM    Chloride 101 02/27/2019 11:22 AM    CO2 27 02/27/2019 11:22 AM    BUN 14 02/27/2019 11:22 AM    Creatinine 0.93 02/27/2019 11:22 AM    Calcium 8.5 02/27/2019 11:22 AM    Magnesium 1.8 02/27/2019 11:22 AM    Phosphorus 4.0 02/27/2019 11:22 AM      Lab Results   Component Value Date/Time    AST (SGOT) 18 02/27/2019 11:22 AM    Alk. phosphatase 163 (H) 02/27/2019 11:22 AM    Protein, total 6.2 (L) 02/27/2019 11:22 AM    Albumin 2.9 (L) 02/27/2019 11:22 AM    Globulin 3.3 02/27/2019 11:22 AM     Lab Results   Component Value Date/Time    INR 1.0 11/02/2018 03:14 PM    Prothrombin time 10.0 11/02/2018 03:14 PM    aPTT 28.3 11/02/2018 03:14 PM      Lab Results   Component Value Date/Time    Iron 35 02/27/2019 12:55 PM    TIBC 292 02/27/2019 12:55 PM    Iron % saturation 12 (L) 02/27/2019 12:55 PM    Ferritin 174 02/27/2019 12:55 PM        12/28/18- CT C/A/P  IMPRESSION:  Interval decrease in size of pancreatic mass.  Slight interval decrease in size  of hepatic metastases; these now demonstrate central low attenuation suggestive  of necrosis. No evidence of new metastatic disease in the chest, abdomen, or  pelvis.    CONTROLLED SUBSTANCES SAFETY ASSESSMENT (IF ON CONTROLLED SUBSTANCES):     Reviewed opioid safety handout:  [] Yes   [] No  24 hour opioid dose >150mg morphine equivalent/day:  [] Yes   [] No  Benzodiazepines:  [] Yes   [] No  Sleep apnea:  [] Yes   [] No  Urine Toxicology Testing within last 6 months:  [] Yes   [] No  History of or new aberrant medication taking behaviors:  [] Yes   [] No            Total time: 70 minutes  Counseling / coordination time: 60 minutes  > 50% counseling / coordination?:  Yes

## 2019-03-05 NOTE — PATIENT INSTRUCTIONS
Dear Viral Negrete ,    It was a pleasure seeing you today at [facility] office        Your described symptoms were: Fatigue: 2 Drowsiness: 0   Depression: 0 Pain: 0   Anxiety: 0 Nausea: 0   Anorexia: 0 Dyspnea: 0   Best Well-Bein Constipation: Yes   Other Problem (Comment): 0       This is the plan we talked about:     1. Abdominal discomfort  - This is no longer a problem. You have not needed any pain medicine in a while. 2. Muscle and bony pain with Granix  - We discussed how this is very common.  - You are drinking very little water- 16oz per day which is very low for you. We agreed you will slowly increase your water consumption to a daily normal of 80 ounces per day. 3. Chemo induced nausea  - You are very well controlled with your current regimen.  -We reviewed the images from your recent scans on  and I am glad the disease is responding really well to chemotherapy. Your next scans are in April. 4.  Acid reflux  -You are on Prilosec daily, please continue that. 5.  Goals of care  -You feel good about starting chemotherapy. You have good support.  -You are in the process of completing a medical directive along with living will etc. with a . Please bring us a copy when you have it.  -We discussed your wishes regarding CODE STATUS and you want to remain full code for now. This is what you have shared with us about Advance Care Planning  Advance Care Planning 3/5/2019   Patient's Healthcare Decision Maker is: Verbal statement (Legal Next of Kin remains as decision maker)   Confirm Advance Directive None   Patient Would Like to Complete Advance Directive Yes         The Palliative Medicine Team is here to support you and your family.      We will see you again in 3 months    Sincerely,      Jony Oliva MD and the Palliative Medicine Team

## 2019-03-06 ENCOUNTER — HOSPITAL ENCOUNTER (OUTPATIENT)
Dept: INFUSION THERAPY | Age: 74
Discharge: HOME | End: 2019-03-06
Payer: MEDICARE

## 2019-03-06 ENCOUNTER — RESEARCH ENCOUNTER (OUTPATIENT)
Dept: ONCOLOGY | Age: 74
End: 2019-03-06

## 2019-03-06 VITALS
BODY MASS INDEX: 30.9 KG/M2 | SYSTOLIC BLOOD PRESSURE: 150 MMHG | HEART RATE: 69 BPM | HEIGHT: 67 IN | RESPIRATION RATE: 18 BRPM | TEMPERATURE: 97.5 F | WEIGHT: 196.9 LBS | DIASTOLIC BLOOD PRESSURE: 67 MMHG

## 2019-03-06 LAB
ALBUMIN SERPL-MCNC: 3.1 G/DL (ref 3.5–5)
ALBUMIN/GLOB SERPL: 0.9 {RATIO} (ref 1.1–2.2)
ALP SERPL-CCNC: 183 U/L (ref 45–117)
ALT SERPL-CCNC: 36 U/L (ref 12–78)
ANION GAP SERPL CALC-SCNC: 7 MMOL/L (ref 5–15)
AST SERPL-CCNC: 26 U/L (ref 15–37)
BASOPHILS # BLD: 0.1 K/UL (ref 0–0.1)
BASOPHILS NFR BLD: 1 % (ref 0–1)
BILIRUB SERPL-MCNC: 0.3 MG/DL (ref 0.2–1)
BUN SERPL-MCNC: 15 MG/DL (ref 6–20)
BUN/CREAT SERPL: 17 (ref 12–20)
CALCIUM SERPL-MCNC: 8.3 MG/DL (ref 8.5–10.1)
CHLORIDE SERPL-SCNC: 101 MMOL/L (ref 97–108)
CO2 SERPL-SCNC: 28 MMOL/L (ref 21–32)
CREAT SERPL-MCNC: 0.88 MG/DL (ref 0.55–1.02)
DIFFERENTIAL METHOD BLD: ABNORMAL
EOSINOPHIL # BLD: 0.3 K/UL (ref 0–0.4)
EOSINOPHIL NFR BLD: 2 % (ref 0–7)
ERYTHROCYTE [DISTWIDTH] IN BLOOD BY AUTOMATED COUNT: 19.2 % (ref 11.5–14.5)
GLOBULIN SER CALC-MCNC: 3.3 G/DL (ref 2–4)
GLUCOSE SERPL-MCNC: 169 MG/DL (ref 65–100)
HCT VFR BLD AUTO: 29.7 % (ref 35–47)
HGB BLD-MCNC: 9.5 G/DL (ref 11.5–16)
IMM GRANULOCYTES # BLD AUTO: 0 K/UL
IMM GRANULOCYTES NFR BLD AUTO: 0 %
LDH SERPL L TO P-CCNC: 253 U/L (ref 81–246)
LYMPHOCYTES # BLD: 0.9 K/UL (ref 0.8–3.5)
LYMPHOCYTES NFR BLD: 6 % (ref 12–49)
MAGNESIUM SERPL-MCNC: 2.2 MG/DL (ref 1.6–2.4)
MCH RBC QN AUTO: 30.8 PG (ref 26–34)
MCHC RBC AUTO-ENTMCNC: 32 G/DL (ref 30–36.5)
MCV RBC AUTO: 96.4 FL (ref 80–99)
METAMYELOCYTES NFR BLD MANUAL: 6 %
MONOCYTES # BLD: 0.9 K/UL (ref 0–1)
MONOCYTES NFR BLD: 6 % (ref 5–13)
MYELOCYTES NFR BLD MANUAL: 3 %
NEUTS BAND NFR BLD MANUAL: 1 % (ref 0–6)
NEUTS SEG # BLD: 11.2 K/UL (ref 1.8–8)
NEUTS SEG NFR BLD: 75 % (ref 32–75)
NRBC # BLD: 0.02 K/UL (ref 0–0.01)
NRBC BLD-RTO: 0.1 PER 100 WBC
PHOSPHATE SERPL-MCNC: 3.9 MG/DL (ref 2.6–4.7)
PLATELET # BLD AUTO: 933 K/UL (ref 150–400)
PMV BLD AUTO: 9.2 FL (ref 8.9–12.9)
POTASSIUM SERPL-SCNC: 4.4 MMOL/L (ref 3.5–5.1)
PROT SERPL-MCNC: 6.4 G/DL (ref 6.4–8.2)
RBC # BLD AUTO: 3.08 M/UL (ref 3.8–5.2)
RBC MORPH BLD: ABNORMAL
RBC MORPH BLD: ABNORMAL
SODIUM SERPL-SCNC: 136 MMOL/L (ref 136–145)
WBC # BLD AUTO: 14.7 K/UL (ref 3.6–11)
WBC MORPH BLD: ABNORMAL

## 2019-03-06 PROCEDURE — 80053 COMPREHEN METABOLIC PANEL: CPT

## 2019-03-06 PROCEDURE — 96415 CHEMO IV INFUSION ADDL HR: CPT

## 2019-03-06 PROCEDURE — 74011250636 HC RX REV CODE- 250/636

## 2019-03-06 PROCEDURE — 36415 COLL VENOUS BLD VENIPUNCTURE: CPT

## 2019-03-06 PROCEDURE — 85025 COMPLETE CBC W/AUTO DIFF WBC: CPT

## 2019-03-06 PROCEDURE — 77030012965 HC NDL HUBR BBMI -A

## 2019-03-06 PROCEDURE — 96413 CHEMO IV INFUSION 1 HR: CPT

## 2019-03-06 PROCEDURE — 83615 LACTATE (LD) (LDH) ENZYME: CPT

## 2019-03-06 PROCEDURE — 74011000258 HC RX REV CODE- 258: Performed by: INTERNAL MEDICINE

## 2019-03-06 PROCEDURE — 83735 ASSAY OF MAGNESIUM: CPT

## 2019-03-06 PROCEDURE — 74011250636 HC RX REV CODE- 250/636: Performed by: INTERNAL MEDICINE

## 2019-03-06 PROCEDURE — 84100 ASSAY OF PHOSPHORUS: CPT

## 2019-03-06 PROCEDURE — 96375 TX/PRO/DX INJ NEW DRUG ADDON: CPT

## 2019-03-06 RX ORDER — DEXAMETHASONE SODIUM PHOSPHATE 4 MG/ML
8 INJECTION, SOLUTION INTRA-ARTICULAR; INTRALESIONAL; INTRAMUSCULAR; INTRAVENOUS; SOFT TISSUE ONCE
Status: COMPLETED | OUTPATIENT
Start: 2019-03-06 | End: 2019-03-06

## 2019-03-06 RX ADMIN — PACLITAXEL 245 MG: 100 INJECTION, POWDER, LYOPHILIZED, FOR SUSPENSION INTRAVENOUS at 13:19

## 2019-03-06 RX ADMIN — GEMCITABINE 1960 MG: 38 INJECTION, SOLUTION INTRAVENOUS at 14:12

## 2019-03-06 RX ADMIN — DEXAMETHASONE SODIUM PHOSPHATE 8 MG: 4 INJECTION, SOLUTION INTRAMUSCULAR; INTRAVENOUS at 12:38

## 2019-03-06 NOTE — PROGRESS NOTES
Outpatient Infusion Center - Chemotherapy Progress Note    2006 Pt admit to [facility] for Clinical Trial:  Gemzar/Abraxane C5D8 ambulatory in stable condition. Assessment completed. No new concerns voiced. Port accessed with positive blood return. Labs drawn per order and sent. Line flushed, clamped, Curos Cap applied to end clave. Labs reviewed, chemo ordered    Visit Vitals  /60 (BP 1 Location: Right arm, BP Patient Position: Sitting)   Pulse 63   Temp 97 °F (36.1 °C)   Resp 18   Ht 5' 7.01\" (1.702 m)   Wt 89.3 kg (196 lb 14.4 oz)   BMI 30.83 kg/m²       Medications:  NS @ KVO  Decadron  Abraxane  Gemzar    1445 Pt tolerated treatment well. Port maintained positive blood return throughout treatment, flushed with positive blood return at conclusion and heparinized and de-accessed. D/c home ambulatory in no distress. Pt aware of next [facility] appointment scheduled for 3/13/19. Recent Results (from the past 12 hour(s))   CBC WITH AUTOMATED DIFF    Collection Time: 03/06/19 11:12 AM   Result Value Ref Range    WBC 14.7 (H) 3.6 - 11.0 K/uL    RBC 3.08 (L) 3.80 - 5.20 M/uL    HGB 9.5 (L) 11.5 - 16.0 g/dL    HCT 29.7 (L) 35.0 - 47.0 %    MCV 96.4 80.0 - 99.0 FL    MCH 30.8 26.0 - 34.0 PG    MCHC 32.0 30.0 - 36.5 g/dL    RDW 19.2 (H) 11.5 - 14.5 %    PLATELET 390 (H) 093 - 400 K/uL    MPV 9.2 8.9 - 12.9 FL    NRBC 0.1 (H) 0  WBC    ABSOLUTE NRBC 0.02 (H) 0.00 - 0.01 K/uL    NEUTROPHILS 75 32 - 75 %    BAND NEUTROPHILS 1 0 - 6 %    LYMPHOCYTES 6 (L) 12 - 49 %    MONOCYTES 6 5 - 13 %    EOSINOPHILS 2 0 - 7 %    BASOPHILS 1 0 - 1 %    METAMYELOCYTES 6 (H) 0 %    MYELOCYTES 3 (H) 0 %    IMMATURE GRANULOCYTES 0 %    ABS. NEUTROPHILS 11.2 (H) 1.8 - 8.0 K/UL    ABS. LYMPHOCYTES 0.9 0.8 - 3.5 K/UL    ABS. MONOCYTES 0.9 0.0 - 1.0 K/UL    ABS. EOSINOPHILS 0.3 0.0 - 0.4 K/UL    ABS. BASOPHILS 0.1 0.0 - 0.1 K/UL    ABS. IMM.  GRANS. 0.0 K/UL    DF MANUAL      RBC COMMENTS ANISOCYTOSIS  1+        RBC COMMENTS POLYCHROMASIA  PRESENT        WBC COMMENTS TOXIC GRANULATION     LD    Collection Time: 03/06/19 11:12 AM   Result Value Ref Range     (H) 81 - 923 U/L   METABOLIC PANEL, COMPREHENSIVE    Collection Time: 03/06/19 11:12 AM   Result Value Ref Range    Sodium 136 136 - 145 mmol/L    Potassium 4.4 3.5 - 5.1 mmol/L    Chloride 101 97 - 108 mmol/L    CO2 28 21 - 32 mmol/L    Anion gap 7 5 - 15 mmol/L    Glucose 169 (H) 65 - 100 mg/dL    BUN 15 6 - 20 MG/DL    Creatinine 0.88 0.55 - 1.02 MG/DL    BUN/Creatinine ratio 17 12 - 20      GFR est AA >60 >60 ml/min/1.73m2    GFR est non-AA >60 >60 ml/min/1.73m2    Calcium 8.3 (L) 8.5 - 10.1 MG/DL    Bilirubin, total 0.3 0.2 - 1.0 MG/DL    ALT (SGPT) 36 12 - 78 U/L    AST (SGOT) 26 15 - 37 U/L    Alk.  phosphatase 183 (H) 45 - 117 U/L    Protein, total 6.4 6.4 - 8.2 g/dL    Albumin 3.1 (L) 3.5 - 5.0 g/dL    Globulin 3.3 2.0 - 4.0 g/dL    A-G Ratio 0.9 (L) 1.1 - 2.2     MAGNESIUM    Collection Time: 03/06/19 11:12 AM   Result Value Ref Range    Magnesium 2.2 1.6 - 2.4 mg/dL   PHOSPHORUS    Collection Time: 03/06/19 11:12 AM   Result Value Ref Range    Phosphorus 3.9 2.6 - 4.7 MG/DL

## 2019-03-06 NOTE — PROGRESS NOTES
Pt in for labs and chemotherapy C5D8 of treatment. Patient reports the following adverse events at this time: gr 1 constipation, gr 1 fatigue, gr 1 hair loss, gr 1 insomnia. Vital signs are stable. Patient to receive gemcitabine/abraxane infusion. Next appt is scheduled for 3/13/19.

## 2019-03-13 ENCOUNTER — OFFICE VISIT (OUTPATIENT)
Dept: ONCOLOGY | Age: 74
End: 2019-03-13

## 2019-03-13 ENCOUNTER — HOSPITAL ENCOUNTER (OUTPATIENT)
Dept: INFUSION THERAPY | Age: 74
Discharge: HOME | End: 2019-03-13
Payer: MEDICARE

## 2019-03-13 ENCOUNTER — RESEARCH ENCOUNTER (OUTPATIENT)
Dept: ONCOLOGY | Age: 74
End: 2019-03-13

## 2019-03-13 VITALS
OXYGEN SATURATION: 99 % | RESPIRATION RATE: 18 BRPM | HEART RATE: 72 BPM | SYSTOLIC BLOOD PRESSURE: 138 MMHG | WEIGHT: 196.6 LBS | BODY MASS INDEX: 30.86 KG/M2 | HEIGHT: 67 IN | TEMPERATURE: 98.4 F | DIASTOLIC BLOOD PRESSURE: 64 MMHG

## 2019-03-13 VITALS
TEMPERATURE: 98.4 F | WEIGHT: 196.6 LBS | OXYGEN SATURATION: 99 % | DIASTOLIC BLOOD PRESSURE: 61 MMHG | HEART RATE: 70 BPM | BODY MASS INDEX: 30.86 KG/M2 | RESPIRATION RATE: 18 BRPM | SYSTOLIC BLOOD PRESSURE: 97 MMHG | HEIGHT: 67 IN

## 2019-03-13 DIAGNOSIS — C25.7 MALIGNANT NEOPLASM OF OTHER PARTS OF PANCREAS (HCC): Primary | ICD-10-CM

## 2019-03-13 LAB
BASOPHILS # BLD: 0 K/UL (ref 0–0.1)
BASOPHILS NFR BLD: 0 % (ref 0–1)
DIFFERENTIAL METHOD BLD: ABNORMAL
EOSINOPHIL # BLD: 0 K/UL (ref 0–0.4)
EOSINOPHIL NFR BLD: 0 % (ref 0–7)
ERYTHROCYTE [DISTWIDTH] IN BLOOD BY AUTOMATED COUNT: 19 % (ref 11.5–14.5)
HCT VFR BLD AUTO: 26.9 % (ref 35–47)
HGB BLD-MCNC: 8.6 G/DL (ref 11.5–16)
IMM GRANULOCYTES # BLD AUTO: 0 K/UL (ref 0–0.04)
IMM GRANULOCYTES NFR BLD AUTO: 0 % (ref 0–0.5)
LDH SERPL L TO P-CCNC: 188 U/L (ref 81–246)
LYMPHOCYTES # BLD: 0.7 K/UL (ref 0.8–3.5)
LYMPHOCYTES NFR BLD: 14 % (ref 12–49)
MCH RBC QN AUTO: 30 PG (ref 26–34)
MCHC RBC AUTO-ENTMCNC: 32 G/DL (ref 30–36.5)
MCV RBC AUTO: 93.7 FL (ref 80–99)
METAMYELOCYTES NFR BLD MANUAL: 1 %
MONOCYTES # BLD: 0.2 K/UL (ref 0–1)
MONOCYTES NFR BLD: 4 % (ref 5–13)
MYELOCYTES NFR BLD MANUAL: 1 %
NEUTS SEG # BLD: 4 K/UL (ref 1.8–8)
NEUTS SEG NFR BLD: 80 % (ref 32–75)
NRBC # BLD: 0 K/UL (ref 0–0.01)
NRBC BLD-RTO: 0 PER 100 WBC
PLATELET # BLD AUTO: 268 K/UL (ref 150–400)
PMV BLD AUTO: 9.5 FL (ref 8.9–12.9)
RBC # BLD AUTO: 2.87 M/UL (ref 3.8–5.2)
RBC MORPH BLD: ABNORMAL
RBC MORPH BLD: ABNORMAL
WBC # BLD AUTO: 5 K/UL (ref 3.6–11)
WBC MORPH BLD: ABNORMAL

## 2019-03-13 PROCEDURE — 96411 CHEMO IV PUSH ADDL DRUG: CPT

## 2019-03-13 PROCEDURE — 74011000258 HC RX REV CODE- 258: Performed by: INTERNAL MEDICINE

## 2019-03-13 PROCEDURE — 77030012965 HC NDL HUBR BBMI -A

## 2019-03-13 PROCEDURE — 74011000250 HC RX REV CODE- 250: Performed by: INTERNAL MEDICINE

## 2019-03-13 PROCEDURE — 83615 LACTATE (LD) (LDH) ENZYME: CPT

## 2019-03-13 PROCEDURE — 74011250636 HC RX REV CODE- 250/636: Performed by: INTERNAL MEDICINE

## 2019-03-13 PROCEDURE — 36415 COLL VENOUS BLD VENIPUNCTURE: CPT

## 2019-03-13 PROCEDURE — 74011250636 HC RX REV CODE- 250/636

## 2019-03-13 PROCEDURE — 96413 CHEMO IV INFUSION 1 HR: CPT

## 2019-03-13 PROCEDURE — 85025 COMPLETE CBC W/AUTO DIFF WBC: CPT

## 2019-03-13 PROCEDURE — 96375 TX/PRO/DX INJ NEW DRUG ADDON: CPT

## 2019-03-13 RX ORDER — HEPARIN 100 UNIT/ML
500 SYRINGE INTRAVENOUS AS NEEDED
Status: ACTIVE | OUTPATIENT
Start: 2019-03-13 | End: 2019-03-14

## 2019-03-13 RX ORDER — DEXAMETHASONE SODIUM PHOSPHATE 4 MG/ML
8 INJECTION, SOLUTION INTRA-ARTICULAR; INTRALESIONAL; INTRAMUSCULAR; INTRAVENOUS; SOFT TISSUE ONCE
Status: COMPLETED | OUTPATIENT
Start: 2019-03-13 | End: 2019-03-13

## 2019-03-13 RX ORDER — SODIUM CHLORIDE 0.9 % (FLUSH) 0.9 %
5-10 SYRINGE (ML) INJECTION AS NEEDED
Status: ACTIVE | OUTPATIENT
Start: 2019-03-13 | End: 2019-03-14

## 2019-03-13 RX ADMIN — GEMCITABINE 1960 MG: 38 INJECTION, SOLUTION INTRAVENOUS at 14:49

## 2019-03-13 RX ADMIN — Medication 10 ML: at 11:15

## 2019-03-13 RX ADMIN — PACLITAXEL 245 MG: 100 INJECTION, POWDER, LYOPHILIZED, FOR SUSPENSION INTRAVENOUS at 13:55

## 2019-03-13 RX ADMIN — Medication 10 ML: at 15:25

## 2019-03-13 RX ADMIN — Medication 500 UNITS: at 15:25

## 2019-03-13 RX ADMIN — DEXAMETHASONE SODIUM PHOSPHATE 8 MG: 4 INJECTION, SOLUTION INTRAMUSCULAR; INTRAVENOUS at 13:13

## 2019-03-13 RX ADMIN — SODIUM CHLORIDE 10 ML: 9 INJECTION, SOLUTION INTRAMUSCULAR; INTRAVENOUS; SUBCUTANEOUS at 11:20

## 2019-03-13 NOTE — PROGRESS NOTES
[facility] Progress Note    Date: 2019    Name: Konrad Hurtado    MRN: [id number]         : 1945    1110. Ms. Maggy Valdes Arrived ambulatory and in no distress for C5D15 Clinical Trial: Gemzar/Abraxane. Assessment was completed, no acute issues at this time, no new complaints voiced. R chest wall port accessed without difficulty using 0.75 inch garcia needle, labs drawn and in process. Chemotherapy Flowsheet 3/13/2019   Cycle C5D15   Date 3/13/2019   Drug / Regimen Clinical Trial: Gemzar/Abraxane   Pre Meds -   Notes -         Ms. Strange's vitals were reviewed. Patient Vitals for the past 12 hrs:   Temp Pulse Resp BP SpO2   19 1511  72 18 138/64    19 1112 98.4 °F (36.9 °C) 70 18 97/61 99 %       Lab results were obtained and reviewed. Recent Results (from the past 12 hour(s))   CBC WITH AUTOMATED DIFF    Collection Time: 19 11:16 AM   Result Value Ref Range    WBC 5.0 3.6 - 11.0 K/uL    RBC 2.87 (L) 3.80 - 5.20 M/uL    HGB 8.6 (L) 11.5 - 16.0 g/dL    HCT 26.9 (L) 35.0 - 47.0 %    MCV 93.7 80.0 - 99.0 FL    MCH 30.0 26.0 - 34.0 PG    MCHC 32.0 30.0 - 36.5 g/dL    RDW 19.0 (H) 11.5 - 14.5 %    PLATELET 780 337 - 555 K/uL    MPV 9.5 8.9 - 12.9 FL    NRBC 0.0 0  WBC    ABSOLUTE NRBC 0.00 0.00 - 0.01 K/uL    NEUTROPHILS 80 (H) 32 - 75 %    LYMPHOCYTES 14 12 - 49 %    MONOCYTES 4 (L) 5 - 13 %    EOSINOPHILS 0 0 - 7 %    BASOPHILS 0 0 - 1 %    METAMYELOCYTES 1 %    MYELOCYTES 1 %    IMMATURE GRANULOCYTES 0 0.0 - 0.5 %    ABS. NEUTROPHILS 4.0 1.8 - 8.0 K/UL    ABS. LYMPHOCYTES 0.7 (L) 0.8 - 3.5 K/UL    ABS. MONOCYTES 0.2 0.0 - 1.0 K/UL    ABS. EOSINOPHILS 0.0 0.0 - 0.4 K/UL    ABS. BASOPHILS 0.0 0.0 - 0.1 K/UL    ABS. IMM.  GRANS. 0.0 0.00 - 0.04 K/UL    DF MANUAL      RBC COMMENTS ANISOCYTOSIS  1+        RBC COMMENTS OVALOCYTES  PRESENT        WBC COMMENTS TOXIC GRANULATION     LD    Collection Time: 19 11:16 AM   Result Value Ref Range     81 - 246 U/L Pre-medications  were administered as ordered and chemotherapy was initiated. Medications:  Dexamethasone IVP  Abraxane IV  Gemzar IV  NS KVO    1525. Ms. Kartik Wolff tolerated treatment well and was discharged from [place] [address] in stable condition. Port de-accessed, flushed & heparinized per protocol. She is to return on 03/27/19 for her next appointment.     Frank Patel RN  March 13, 2019

## 2019-03-13 NOTE — PROGRESS NOTES
Cathryn Amador is a 72 y.o. female here for follow up of pancreatic cancer. 1. Have you been to the ER, urgent care clinic since your last visit?: No.  Hospitalized since your last visit?: No.    2. Have you seen or consulted any other health care providers outside of the [address] [place] [address] since your last visit?   Include any pap smears or colon screening.: No.

## 2019-03-13 NOTE — PROGRESS NOTES
[facility] at [address], [address] [facility] [address] [facility]  Corinne Zarco: [phone]  F: [phone]      Reason for Visit:   Madelyn Peña is a 72 y.o. female who is seen in self-referral for evaluation of pancreatic cancer. Treatment History:   · 10/4/18 pancreatic head mass FNA, pancreatic adenocarcinoma    10/15/18  Liver, Core Biopsy w/Touch Prep CYTOLOGIC INTERPRETATION:   · Numerous cores and fragments of benign hepatic parenchyma     10/24/18  Liver, Fine Needle Aspiration CYTOLOGIC INTERPRETATION:   Metastatic adenocarcinoma (see Comment). General Categorization   Positive for malignancy. Comment: The morphologic appearance is nonspecific with regard to site of origin but compatible with metastatic pancreatic carcinoma in this patient with known pancreatic adenocarcinoma ([id number]). 11/9/18 abraxane/gemcitabine    History of Present Illness:   She started having abdominal pain, gradual and persistent, x 1 month, pressure sensation, located in epigastrium, no radiation, no aggravating symptoms, no relieving factors. 25 lb weight loss over 6 months.  + nausea. Interval history:  In today for follow up. Complains of gr 1 constipation, gr 1 fatigue, gr 1 hair loss, gr 1 insomnia    Past Medical History:   Diagnosis Date    Arthritis     Constipation     Diabetes ([address]     Hemorrhoids     Hiatal hernia     High cholesterol     Hypertension     Pancreatic cancer ([address]       Past Surgical History:   Procedure Laterality Date    HX KNEE ARTHROSCOPY Right     HX ORTHOPAEDIC      left wrist surgery    HX TONSILLECTOMY        Social History     Tobacco Use    Smoking status: Never Smoker    Smokeless tobacco: Never Used   Substance Use Topics    Alcohol use:  Yes     Alcohol/week: 1.2 - 2.4 oz     Types: 1 - 2 Cans of beer, 1 - 2 Shots of liquor per week     Frequency: 2-4 times a month     Drinks per session: 1 or 2      Family History Problem Relation Age of Onset    Cancer Mother         skin    Hypertension Mother     Heart Disease Mother     Cancer Father         skin    Heart Disease Father     Stroke Father     Diabetes Neg Hx      Current Outpatient Medications   Medication Sig    ferrous sulfate (IRON) 325 mg (65 mg iron) tablet Take  by mouth Daily (before breakfast).  insulin aspart U-100 (NOVOLOG FLEXPEN U-100 INSULIN) 100 unit/mL inpn Inject 10 units before breakfast, lunch and dinner + 2 units for every 50 mg/dl above 150 mg/dl. Max 50 units per day--Dose change 2/28/19--updated med list--did not send prescription to the pharmacy    IBUPROFEN IB PO Take  by mouth as needed.  melatonin 3 mg tablet Take 12 mg by mouth nightly.  GRANIX 480 mcg/0.8 mL syrg injection INJECT CONTENTS OF 1 SYRINGE UNDER THE SKIN FOR 3 DAYS ONCE WEEKLY    insulin degludec (TRESIBA FLEXTOUCH U-200) 200 unit/mL (3 mL) inpn 36 Units by SubCUTAneous route daily. Or as directed up to 70 units daily on chemo days--Dose change 2/11/19--updated med list--did not send prescription to the pharmacy    ondansetron hcl (ZOFRAN) 8 mg tablet Take 1 Tab by mouth every eight (8) hours as needed for Nausea.  glucose blood VI test strips (ACCU-CHEK RICHIE PLUS TEST STRP) strip Accu-Chek Richie Plus test strips   Check BS BID    OTHER Cranial prosthesis    Dx C25.7    omeprazole (PRILOSEC) 20 mg capsule Take 20 mg by mouth daily.  lidocaine-prilocaine (EMLA) topical cream Apply  to affected area as needed for Pain.  simvastatin (ZOCOR) 10 mg tablet Take  by mouth nightly.  telmisartan (MICARDIS) 80 mg tablet Take 80 mg by mouth daily.  hydroCHLOROthiazide (MICROZIDE) 12.5 mg capsule Take 12.5 mg by mouth daily. No current facility-administered medications for this visit.       Facility-Administered Medications Ordered in Other Visits   Medication Dose Route Frequency    heparin (porcine) pf 500 Units  500 Units IntraVENous PRN    sodium chloride (NS) flush 5-10 mL  5-10 mL IntraVENous PRN    gemcitabine (GEMZAR) 1,960 mg in 0.9% sodium chloride 250 mL, overfill volume 25 mL chemo infusion  1,960 mg IntraVENous ONCE    PACLitaxel-Protein Bound (ABRAXANE) 245 mg chemo infusion  245 mg IntraVENous ONCE    dexamethasone (DECADRON) 4 mg/mL injection 8 mg  8 mg IntraVENous ONCE    prochlorperazine (COMPAZINE) with saline injection 10 mg  10 mg IntraVENous Q6H PRN      Allergies   Allergen Reactions    Amoxicillin Hives        Review of Systems: A comprehensive review of systems was performed and all systems were negative except for HPI and for the symptom review form, reviewed and scanned in. Physical Exam:     Visit Vitals  BP 97/61   Pulse 70   Temp 98.4 °F (36.9 °C)   Resp 18   Ht 5' 7\" (1.702 m)   Wt 196 lb 9.6 oz (89.2 kg)   SpO2 99%   BMI 30.79 kg/m²     ECOG PS: 0  General: No distress  Eyes: PERRLA, anicteric sclerae  HENT: Atraumatic, OP clear  Neck: Supple  Lymphatic: No cervical, supraclavicular, or inguinal adenopathy  Respiratory: CTAB, normal respiratory effort  CV: Normal rate, regular rhythm, no murmurs, no peripheral edema  GI: Soft, nontender, nondistended, no masses, no hepatomegaly, no splenomegaly  MS: Normal gait and station. Digits without clubbing or cyanosis. Skin: No rashes, ecchymoses, or petechiae. Normal temperature, turgor, and texture. Psych: Alert, oriented, appropriate affect, normal judgment/insight        Results:     Lab Results   Component Value Date/Time    WBC 5.0 03/13/2019 11:16 AM    HGB 8.6 (L) 03/13/2019 11:16 AM    HCT 26.9 (L) 03/13/2019 11:16 AM    PLATELET 096 02/29/4405 11:16 AM    MCV 93.7 03/13/2019 11:16 AM    ABS.  NEUTROPHILS 4.0 03/13/2019 11:16 AM     Lab Results   Component Value Date/Time    Sodium 136 03/06/2019 11:12 AM    Potassium 4.4 03/06/2019 11:12 AM    Chloride 101 03/06/2019 11:12 AM    CO2 28 03/06/2019 11:12 AM    Glucose 169 (H) 03/06/2019 11:12 AM    BUN 15 03/06/2019 11:12 AM    Creatinine 0.88 03/06/2019 11:12 AM    GFR est AA >60 03/06/2019 11:12 AM    GFR est non-AA >60 03/06/2019 11:12 AM    Calcium 8.3 (L) 03/06/2019 11:12 AM    Glucose (POC) 316 (H) 10/04/2018 03:45 PM     Lab Results   Component Value Date/Time    Bilirubin, total 0.3 03/06/2019 11:12 AM    ALT (SGPT) 36 03/06/2019 11:12 AM    AST (SGOT) 26 03/06/2019 11:12 AM    Alk. phosphatase 183 (H) 03/06/2019 11:12 AM    Protein, total 6.4 03/06/2019 11:12 AM    Albumin 3.1 (L) 03/06/2019 11:12 AM    Globulin 3.3 03/06/2019 11:12 AM     10/1/18 CT abd  There is a 3.8 x 1.9 cm mass involving the uncinate process and possibly  invading the duodenum. Findings are nonspecific but typical of pancreatic  carcinoma. There is no biliary or pancreatic ductal dilatation.     There is a poorly defined irregular hypodensity in segment IVb of the liver  measuring 3.7 x 1.9 cm. There appears to be focal biliary dilatation in the left  lobe behind this abnormality. Metastatic disease is suspected. There is a small,  1 cm hypodensity in the dome of the liver, likely segment 8. There is a 1 cm  hypodensity in segment 4 of the liver as well, also likely metastasis. Small  hypodensity measuring less than 1 cm in segment 6 at the tip laterally is also  nonspecific but probable metastasis.     Spleen kidneys and adrenals are unremarkable.     No free fluid or focal fluid collection.     Lung bases are clear.     Bone windows are unremarkable.     IMPRESSION  IMPRESSION:  1. 3.8 x 1.9 cm mass involving the uncinate process of the pancreas and possibly  invading the duodenum, this likely represents pancreatic carcinoma. 2. Likely metastatic disease in the liver. There is a 3.8 x 1.9 cm mass involving the uncinate process and possibly  invading the duodenum. Findings are nonspecific but typical of pancreatic  carcinoma.  There is no biliary or pancreatic ductal dilatation.     There is a poorly defined irregular hypodensity in segment IVb of the liver  measuring 3.7 x 1.9 cm. There appears to be focal biliary dilatation in the left  lobe behind this abnormality. Metastatic disease is suspected. There is a small,  1 cm hypodensity in the dome of the liver, likely segment 8. There is a 1 cm  hypodensity in segment 4 of the liver as well, also likely metastasis. Small  hypodensity measuring less than 1 cm in segment 6 at the tip laterally is also  nonspecific but probable metastasis.     Spleen kidneys and adrenals are unremarkable.     No free fluid or focal fluid collection.     Lung bases are clear.     Bone windows are unremarkable.     IMPRESSION  IMPRESSION:  1. 3.8 x 1.9 cm mass involving the uncinate process of the pancreas and possibly  invading the duodenum, this likely represents pancreatic carcinoma. 2. Likely metastatic disease in the liver. Records reviewed and summarized above. Pathology report(s) reviewed above. Radiology report(s) reviewed above. MRI abdomen 10/22/18: IMPRESSION:       1. Pancreatic uncinate process mass suspicious for pancreatic neoplasm, possibly  adenocarcinoma. Mass abuts the superior mesenteric artery with about 20%  circumference involvement but no luminal distortion or perivascular stranding. 2. Multiple hepatic lesions suspicious for metastatic neoplasm with segment IVb  lesion showing patchy areas of surrounding steatosis likely secondary to locally  altered intrahepatic hemodynamics and likely responsible for biopsy result. 3. Cholecystolithiasis and small gallbladder polyp. CT c/a/p 12/28/18: IMPRESSION:  Interval decrease in size of pancreatic mass. Slight interval decrease in size of hepatic metastases; these now demonstrate central low attenuation suggestive of necrosis. No evidence of new metastatic disease in the chest, abdomen, or pelvis.     CT c/a/p 2/25/19:  LIVER: The lesions described on the prior examination have improved in the  interval. 19 mm lesion in the dome of the liver now measures 1 cm. 2.0 x 1.5 cm  lesion in the left hepatic lobe now measures 1.3 x 1.2 cm. Other smaller lesions  are no longer visualized. GALLBLADDER: Unremarkable. SPLEEN: No mass. PANCREAS: Mass lesion in the uncinate process now measures 1.7 x 2.0 cm,  previously measuring 2.0 x 3.0 cm. ADRENALS: Unremarkable. KIDNEYS: No mass, calculus, or hydronephrosis. STOMACH: Unremarkable. SMALL BOWEL: No dilatation or wall thickening. COLON: No dilatation or wall thickening. APPENDIX: Unremarkable. PERITONEUM: No ascites or pneumoperitoneum. RETROPERITONEUM: No lymphadenopathy or aortic aneurysm. REPRODUCTIVE ORGANS: Intrauterine device projects in satisfactory position. URINARY BLADDER: No mass or calculus. BONES: Degenerative changes are seen in the thoracic and lumbar spine. ADDITIONAL COMMENTS: N/A     IMPRESSION  IMPRESSION:  Interval decrease in the size of the hepatic metastases. Decrease in the size of  the mass lesion involving the uncinate process.     RECIST:  Pancreatic uncinate mass: Current: (68) 1.7X2.0cm     12/28/2018: (71) 3.0 x  2.0 cm   Segment 4B liver mass: Current: (56) 1.3 x 1.2 cm 12/28/2018: (57) 2.0 x 1.5 cm   Segment 5 liver mass: Current: (68) 4 x 7 mm 12/28/2018: (70) 1.4 x 0.9 cm     Assessment/plan:   1. Uncinate pancreatic adenocarcinoma,  mets to liver, stage IV:  cT2 cN0 pM1    Discussed that while this is treatable, it is not curable, the goal of therapy is palliative. Discussed that without therapy, life expectancy would be 3-6 months, with therapy 12-18 months on average. As an example of likely chemotherapy, we discussed the risks and benefits of Gemcitabine and Abraxane chemotherapy. Potential side effects include, but are not limited to, nausea, vomiting, diarrhea, constipation, mucositis, taste changes, myelosuppression, infection, fatigue, alopecia, skin and nail changes, pulmonary toxicity, neuropathy, allergic reactions, infertility, and rarely, death. Discussed the [id number] study and the research nurse met with her today and she signed informed consent. · Gemcitabine (1000 mg/m2) and Abraxane (125 mg/m2) given on days 1,8,15 every 28 days. · Labs: CBC, BMP prior to each treatment. Hepatic function panel every 4 weeks. CA 19.9 prior to day 1  · Antiemetic prophylaxis: Dexamethasone prior to each treatment  · PRN antiemetics: Ondansetron and Prochlorperazine at home  · EMLA cream for port    On the control arm, C5d15 today, will see her back in 2 weeks for C6D1. CT c/a/p on 2/25/19 show response to treatment, next scans due in April. We will plan to see the patient in follow up at least once per cycle, or sooner if symptoms warrant. 2. DM2:  Holding metformin; on insulin; saw Dr. Shannan Hastings; her BS are improving; she is now taking less insulin    3. Emotional well being:  She has excellent support and is coping well with her disease    4. Abdominal pain:  Minor, may be due to constipation;  palliative care has seen    5. Nutrition:  Chelly Greenwood RD has met with her; holding on enzymes    6. Insomnia: Was improving however worse recently, states her son is getting  this weekend and moving Monday. Currently taking melatonin. She has lunesta if needed. 7. Anemia:  Due to chemo and disease; monitor; will check iron profile and ferritin    8. Neutropenia:  Due to chemo, started granix 480 mcg for 4 days following chemo with C1D15. Does report some pain with granix. Tried claritin, however this kept her awake. Recommend trying tylenol and advil PRN. Now at 2 days following chemo    9. Constipation:  Improved, now stools are little softer, so she is holding off on her stool softener, but she continues with Miralax. 10. Sinus congestion: Clear/bloody sinus drainage. Recommend OTC sudafed and saline spray.      Signed By: Noemi Borrero MD

## 2019-03-13 NOTE — PROGRESS NOTES
Pt in for labs and follow up visit today per protocol with Dr. Henna Fisher and RN. This is C5D15 of treatment. Patient reports the following adverse events at this time: gr 1 constipation, gr 1 fatigue, gr 1 hair loss, gr 1 insomnia. Vital signs are stable. Patient to go to infusion center after appointment to receive gemcitabine/abraxane. Next appt is scheduled for 3/27/19.

## 2019-03-19 ENCOUNTER — OFFICE VISIT (OUTPATIENT)
Dept: ENDOCRINOLOGY | Age: 74
End: 2019-03-19

## 2019-03-19 VITALS
SYSTOLIC BLOOD PRESSURE: 117 MMHG | HEIGHT: 67 IN | BODY MASS INDEX: 30.48 KG/M2 | DIASTOLIC BLOOD PRESSURE: 54 MMHG | WEIGHT: 194.2 LBS | HEART RATE: 94 BPM

## 2019-03-19 DIAGNOSIS — I10 ESSENTIAL HYPERTENSION, BENIGN: ICD-10-CM

## 2019-03-19 DIAGNOSIS — E78.5 HYPERLIPIDEMIA WITH TARGET LDL LESS THAN 100: ICD-10-CM

## 2019-03-19 LAB — HBA1C MFR BLD HPLC: 6 %

## 2019-03-19 RX ORDER — INSULIN ASPART 100 [IU]/ML
INJECTION, SOLUTION INTRAVENOUS; SUBCUTANEOUS
Qty: 15 ML | Refills: 11
Start: 2019-03-19 | End: 2019-01-01

## 2019-03-19 NOTE — PROGRESS NOTES
Chief Complaint   Patient presents with    Diabetes     pcp and pharmacy confirmed     History of Present Illness: Elias Alvarado is a 72 y.o. female here for follow up of diabetes. Weight up 8 lbs since last visit in 12/18. She and I have e-mailed about her sugars and she has been able to decrease her tresiba from 42 units to 36 units daily and her novolog from 14 units with meals to 10 units with meals. Despite this, she continues to have low sugars in the 60-70s several times a week but the majority of her sugars are in the  range. She only goes over 150 on chemo days but is able to stay under 200 with taking 70 units of tresiba on these days. She is currently on her off week of chemo. Her most recent scans in 2/19 showed a decrease in size of the liver and pancreatic lesions. She last took a dose of 36 units of tresiba and 10 units of novolog on Sunday morning 3/17/19 and no insulin since then as she wanted to see if she needs insulin any more and her sugars have all been in the 80-90s over the past 48 hours so we agreed to hold on any further insulin for now aside from chemo days. Current Outpatient Medications   Medication Sig    ferrous sulfate (IRON) 325 mg (65 mg iron) tablet Take  by mouth Daily (before breakfast).  insulin aspart U-100 (NOVOLOG FLEXPEN U-100 INSULIN) 100 unit/mL inpn Inject 10 units before breakfast, lunch and dinner + 2 units for every 50 mg/dl above 150 mg/dl. Max 50 units per day--Dose change 2/28/19--updated med list--did not send prescription to the pharmacy    IBUPROFEN IB PO Take  by mouth as needed.  melatonin 3 mg tablet Take 12 mg by mouth nightly.  GRANIX 480 mcg/0.8 mL syrg injection INJECT CONTENTS OF 1 SYRINGE UNDER THE SKIN FOR 3 DAYS ONCE WEEKLY    insulin degludec (TRESIBA FLEXTOUCH U-200) 200 unit/mL (3 mL) inpn 36 Units by SubCUTAneous route daily.  Or as directed up to 70 units daily on chemo days--Dose change 2/11/19--updated med list--did not send prescription to the pharmacy    ondansetron hcl (ZOFRAN) 8 mg tablet Take 1 Tab by mouth every eight (8) hours as needed for Nausea.  glucose blood VI test strips (ACCU-CHEK RICHIE PLUS TEST STRP) strip Accu-Chek Richie Plus test strips   Check BS BID    OTHER Cranial prosthesis    Dx C25.7    omeprazole (PRILOSEC) 20 mg capsule Take 20 mg by mouth daily.  lidocaine-prilocaine (EMLA) topical cream Apply  to affected area as needed for Pain.  simvastatin (ZOCOR) 10 mg tablet Take  by mouth nightly.  telmisartan (MICARDIS) 80 mg tablet Take 80 mg by mouth daily.  hydroCHLOROthiazide (MICROZIDE) 12.5 mg capsule Take 12.5 mg by mouth daily. No current facility-administered medications for this visit. Allergies   Allergen Reactions    Amoxicillin Hives     Review of Systems:  - Eyes: no blurry vision or double vision  - Cardiovascular: no chest pain  - Respiratory: no shortness of breath  - Musculoskeletal: no myalgias  - Neurological: no numbness/tingling in extremities    Physical Examination:  Blood pressure 117/54, pulse 94, height 5' 7\" (1.702 m), weight 194 lb 3.2 oz (88.1 kg).   - General: pleasant, no distress, good eye contact   - Neck: no carotid bruits  - Cardiovascular: regular, normal rate, nl s1 and s2, no m/r/g,   - Respiratory: clear bilaterally  - Integumentary: no edema,   - Psychiatric: normal mood and affect    Data Reviewed:   Component      Latest Ref Rng & Units 3/19/2019 3/6/2019          12:40 PM 11:12 AM   Sodium      136 - 145 mmol/L  136   Potassium      3.5 - 5.1 mmol/L  4.4   Chloride      97 - 108 mmol/L  101   CO2      21 - 32 mmol/L  28   Anion gap      5 - 15 mmol/L  7   Glucose      65 - 100 mg/dL  169 (H)   BUN      6 - 20 MG/DL  15   Creatinine      0.55 - 1.02 MG/DL  0.88   BUN/Creatinine ratio      12 - 20    17   GFR est AA      >60 ml/min/1.73m2  >60   GFR est non-AA      >60 ml/min/1.73m2  >60   Calcium      8.5 - 10.1 MG/DL  8.3 (L) Bilirubin, total      0.2 - 1.0 MG/DL  0.3   ALT (SGPT)      12 - 78 U/L  36   AST      15 - 37 U/L  26   Alk. phosphatase      45 - 117 U/L  183 (H)   Protein, total      6.4 - 8.2 g/dL  6.4   Albumin      3.5 - 5.0 g/dL  3.1 (L)   Globulin      2.0 - 4.0 g/dL  3.3   A-G Ratio      1.1 - 2.2    0.9 (L)   Hemoglobin A1c (POC)      % 6.0        Assessment/Plan:     1. Uncontrolled type 2 diabetes mellitus without complication, with long-term current use of insulin ([facility] [address]): her most recent Hgb A1c was 6% in 3/19 down from 9.7% in 12/18 down from 12% in 9/18 when recently diagnosed with full blown diabetes due to newly diagnosed metastatic pancreatic cancer. Her blood sugars are very well controlled and I wonder if she has improved beta cell function given her cancer has shrunk over the past few months. We have agreed to stop the tresiba and novolog and monitor her sugars just once daily and if they are staying under 130, it's fine to stay off the tresiba aside from on chemo days. - stop the tresiba on non-chemo days but still take 70 units on chemo days  - take novolog 3 units only if bs > 200  - check bs once daily  - foot exam done 10/18  - will need eye exam in the future  - check microalbumin today  - check A1c by POC at next visit          2. Hypertension: her BP was at goal < 140/90  - cont current regimen    3. Hyperlipidemia: Goal LDL < 100. LDL 66 in 12/18 on simva 10 daily  - cont simvastatin 10 mg daily  - check lipids in summer 2019    Patient Instructions   1) Your A1c has dropped all the way down to 6% from 9.7% in 12/18 and 12% in 9/18 when diagnosed. 2) I don't know if your pancreas is now able to make more insulin with the tumor shrinking so I'm okay with holding on any tresiba and novolog for now. You can decrease to just one test per day in the morning before food.   If your fasting sugar rises over 130, then try taking just 10 units of tresiba in the morning to see if this keeps you under 130.  If after 3 days, it's still over 130, then try going up by 2 units every 3 days to get to the dose that keeps you under 130.    3) Still plan on taking 70 units of tresiba on chemo days. You can hold on taking any novolog unless your sugar happens to be over 200 and if so just take 3 units for a sugar over 200. Follow-up Disposition:  Return in about 6 months (around 9/19/2019). Copy sent to:  Radha Torres MD as PCP - General (Internal Medicine)  Marilou Fleischer, MD (Hematology and Oncology)    Lab follow up: 3/22/19  Component      Latest Ref Rng & Units 3/19/2019           4:13 PM   Creatinine, urine      Not Estab. mg/dL 172.5   Microalbumin, urine      Not Estab. ug/mL 13.8   Microalbumin/Creat. Ratio      0.0 - 30.0 mg/g creat 8.0     Sent her the following message through [employer]:  Microalbumin/creatinine ratio is a marker of the amount of protein in your urine. Goal is less than 30. Your value is normal. This indicates that your kidneys are not being affected by your diabetes and/or blood pressure.

## 2019-03-19 NOTE — PATIENT INSTRUCTIONS
1) Your A1c has dropped all the way down to 6% from 9.7% in 12/18 and 12% in 9/18 when diagnosed. 2) I don't know if your pancreas is now able to make more insulin with the tumor shrinking so I'm okay with holding on any tresiba and novolog for now. You can decrease to just one test per day in the morning before food. If your fasting sugar rises over 130, then try taking just 10 units of tresiba in the morning to see if this keeps you under 130. If after 3 days, it's still over 130, then try going up by 2 units every 3 days to get to the dose that keeps you under 130.    3) Still plan on taking 70 units of tresiba on chemo days. You can hold on taking any novolog unless your sugar happens to be over 200 and if so just take 3 units for a sugar over 200.

## 2019-03-20 LAB
ALBUMIN/CREAT UR: 8 MG/G CREAT (ref 0–30)
CREAT UR-MCNC: 172.5 MG/DL
MICROALBUMIN UR-MCNC: 13.8 UG/ML

## 2019-03-21 RX ORDER — DEXAMETHASONE SODIUM PHOSPHATE 4 MG/ML
8 INJECTION, SOLUTION INTRA-ARTICULAR; INTRALESIONAL; INTRAMUSCULAR; INTRAVENOUS; SOFT TISSUE ONCE
Status: COMPLETED | OUTPATIENT
Start: 2019-01-01 | End: 2019-01-01

## 2019-03-27 NOTE — PROGRESS NOTES
1130: Pt arrived at [facility] ambulatory and in no distress for C6D1 Clinical trial of Gemzar/Abraxane. Assessment completed, no new complaints voiced. Right chest port accessed with positive blood return. Labs sent per orders. Visit Vitals /73 Pulse 73 Temp 98.4 °F (36.9 °C) Resp 18 Wt 87.7 kg (193 lb 4.8 oz) SpO2 97% Breastfeeding? No  
BMI 30.28 kg/m² Labs Recent Results (from the past 12 hour(s)) METABOLIC PANEL, COMPREHENSIVE Collection Time: 03/27/19 11:13 AM  
Result Value Ref Range Sodium 130 (L) 136 - 145 mmol/L Potassium 3.2 (L) 3.5 - 5.1 mmol/L Chloride 97 97 - 108 mmol/L  
 CO2 31 21 - 32 mmol/L Anion gap 2 (L) 5 - 15 mmol/L Glucose 190 (H) 65 - 100 mg/dL BUN 8 6 - 20 MG/DL Creatinine 0.93 0.55 - 1.02 MG/DL  
 BUN/Creatinine ratio 9 (L) 12 - 20 GFR est AA >60 >60 ml/min/1.73m2 GFR est non-AA 59 (L) >60 ml/min/1.73m2 Calcium 8.0 (L) 8.5 - 10.1 MG/DL Bilirubin, total 0.4 0.2 - 1.0 MG/DL  
 ALT (SGPT) 21 12 - 78 U/L  
 AST (SGOT) 18 15 - 37 U/L Alk. phosphatase 177 (H) 45 - 117 U/L Protein, total 6.1 (L) 6.4 - 8.2 g/dL Albumin 2.8 (L) 3.5 - 5.0 g/dL Globulin 3.3 2.0 - 4.0 g/dL A-G Ratio 0.8 (L) 1.1 - 2.2 MAGNESIUM Collection Time: 03/27/19 11:13 AM  
Result Value Ref Range Magnesium 1.9 1.6 - 2.4 mg/dL PHOSPHORUS Collection Time: 03/27/19 11:13 AM  
Result Value Ref Range Phosphorus 3.3 2.6 - 4.7 MG/DL  
URIC ACID Collection Time: 03/27/19 11:13 AM  
Result Value Ref Range Uric acid 6.9 (H) 2.6 - 6.0 MG/DL  
CBC WITH AUTOMATED DIFF Collection Time: 03/27/19 11:13 AM  
Result Value Ref Range WBC 20.2 (H) 3.6 - 11.0 K/uL  
 RBC 2.74 (L) 3.80 - 5.20 M/uL HGB 8.2 (L) 11.5 - 16.0 g/dL HCT 26.0 (L) 35.0 - 47.0 % MCV 94.9 80.0 - 99.0 FL  
 MCH 29.9 26.0 - 34.0 PG  
 MCHC 31.5 30.0 - 36.5 g/dL RDW 21.2 (H) 11.5 - 14.5 % PLATELET 159 (H) 536 - 400 K/uL  MPV 8.9 8.9 - 12.9 FL  
 NRBC 0.2 (H) 0  WBC ABSOLUTE NRBC 0.05 (H) 0.00 - 0.01 K/uL NEUTROPHILS 75 32 - 75 % BAND NEUTROPHILS 2 0 - 6 % LYMPHOCYTES 10 (L) 12 - 49 % MONOCYTES 10 5 - 13 % EOSINOPHILS 0 0 - 7 % BASOPHILS 0 0 - 1 % METAMYELOCYTES 2 (H) 0 % MYELOCYTES 1 (H) 0 % IMMATURE GRANULOCYTES 0 %  
 ABS. NEUTROPHILS 15.6 (H) 1.8 - 8.0 K/UL  
 ABS. LYMPHOCYTES 2.0 0.8 - 3.5 K/UL  
 ABS. MONOCYTES 2.0 (H) 0.0 - 1.0 K/UL  
 ABS. EOSINOPHILS 0.0 0.0 - 0.4 K/UL  
 ABS. BASOPHILS 0.0 0.0 - 0.1 K/UL  
 ABS. IMM. GRANS. 0.0 K/UL  
 DF MANUAL    
 RBC COMMENTS ANISOCYTOSIS 1+ 
    
 RBC COMMENTS POLYCHROMASIA 1+ 
    
LD Collection Time: 03/27/19 11:13 AM  
Result Value Ref Range  (H) 81 - 246 U/L  
URINALYSIS W/ REFLEX CULTURE Collection Time: 03/27/19 11:13 AM  
Result Value Ref Range Color YELLOW/STRAW Appearance CLEAR CLEAR Specific gravity 1.009 1.003 - 1.030    
 pH (UA) 6.0 5.0 - 8.0 Protein NEGATIVE  NEG mg/dL Glucose NEGATIVE  NEG mg/dL Ketone NEGATIVE  NEG mg/dL Bilirubin NEGATIVE  NEG Blood NEGATIVE  NEG Urobilinogen 1.0 0.2 - 1.0 EU/dL Nitrites NEGATIVE  NEG Leukocyte Esterase NEGATIVE  NEG    
 WBC 0-4 0 - 4 /hpf  
 RBC 0-5 0 - 5 /hpf Epithelial cells FEW FEW /lpf Bacteria NEGATIVE  NEG /hpf  
 UA:UC IF INDICATED CULTURE NOT INDICATED BY UA RESULT CNI Hyaline cast 2-5 0 - 5 /lpf Medications received: 
Decadron 8 mg IVP KCL 60mEq po Gemzar 1960mg IV over 30 mins Abraxane 245mg IV over 30 mins [facility], no adverse reaction noted. D/Cd from [facility] ambulatory and in no distress.   Next appt 4/3/19 at 11am.

## 2019-03-27 NOTE — PROGRESS NOTES
Pt in for fasting labs. Pt in for follow up visit today per protocol with Dr. Lisa Pina and RN. This is C6D1 of treatment. Patient reports the following adverse events at this time: gr 1 loss of appetite, gr 1 constipation, gr 1 fatigue, gr 1 hair loss, gr 1 insomnia, gr 1 cough, gr 1 sob, gr 1 urinary leakage. Vital signs are stable. Patient to go to infusion center after appointment to receive . Next appt is scheduled for 4/3/19 .

## 2019-04-01 NOTE — TELEPHONE ENCOUNTER
Patient called and stated that she has some head congestion, and was told that if she was not feeling better today to call the office and she would be referred to an ENT.  # [phone]

## 2019-04-01 NOTE — TELEPHONE ENCOUNTER
4/1/19 5:01 PM: Called patient to advise that Vinita Washington NP, will be referring her to Dr. Marcello Calle for continuing head congestion. No answer; left brief voicemail advising of this and encouraged patient to call back with questions or concerns.

## 2019-04-03 NOTE — PROGRESS NOTES
[facility] Progress Note Date: April 3, 2019 Name: Lashae Varghese MRN: [id number] : 1945 
 
1105. Ms. Lorie Mcmillan Arrived ambulatory and in no distress for C6D8 Clinical Trial: Gemzar/Abraxane. Assessment was completed, patient reports ear fullness & difficulty hearing to BL ears. MD office aware- ENT appointment made per MD office. R chest wall port accessed without difficulty using 0.75 inch garcia needle, labs drawn and in process. Chemotherapy Flowsheet 4/3/2019 Cycle C6D8 Date 4/3/2019 Drug / Regimen Clinical Trial: Gemzar + Abraxane Pre Meds -  
Notes -  
 
 
 
Ms. Strange's vitals were reviewed. Patient Vitals for the past 12 hrs: 
 Temp Pulse Resp BP  
19 1533  71  164/79  
19 1108 96.6 °F (35.9 °C) 92 18 146/75 Lab results were obtained and reviewed. Recent Results (from the past 12 hour(s)) CBC WITH AUTOMATED DIFF Collection Time: 19 11:26 AM  
Result Value Ref Range WBC 12.4 (H) 3.6 - 11.0 K/uL  
 RBC 3.08 (L) 3.80 - 5.20 M/uL HGB 9.2 (L) 11.5 - 16.0 g/dL HCT 28.7 (L) 35.0 - 47.0 % MCV 93.2 80.0 - 99.0 FL  
 MCH 29.9 26.0 - 34.0 PG  
 MCHC 32.1 30.0 - 36.5 g/dL RDW 20.1 (H) 11.5 - 14.5 % PLATELET 299 (H) 478 - 400 K/uL MPV 8.9 8.9 - 12.9 FL  
 NRBC 0.0 0  WBC ABSOLUTE NRBC 0.00 0.00 - 0.01 K/uL NEUTROPHILS 81 (H) 32 - 75 % BAND NEUTROPHILS 1 0 - 6 % LYMPHOCYTES 9 (L) 12 - 49 % MONOCYTES 2 (L) 5 - 13 % EOSINOPHILS 0 0 - 7 % BASOPHILS 1 0 - 1 % METAMYELOCYTES 3 (H) 0 % MYELOCYTES 3 (H) 0 % IMMATURE GRANULOCYTES 0 %  
 ABS. NEUTROPHILS 10.2 (H) 1.8 - 8.0 K/UL  
 ABS. LYMPHOCYTES 1.1 0.8 - 3.5 K/UL  
 ABS. MONOCYTES 0.2 0.0 - 1.0 K/UL  
 ABS. EOSINOPHILS 0.0 0.0 - 0.4 K/UL  
 ABS. BASOPHILS 0.1 0.0 - 0.1 K/UL  
 ABS. IMM. GRANS. 0.0 K/UL  
 DF MANUAL    
 RBC COMMENTS ANISOCYTOSIS 1+ 
    
 RBC COMMENTS OVALOCYTES PRESENT 
    
 WBC COMMENTS TOXIC GRANULATION    
LD  
 Collection Time: 04/03/19 11:26 AM  
Result Value Ref Range  (H) 81 - 246 U/L Pre-medications  were administered as ordered and chemotherapy was initiated. Medications: 
Dexamethasone IVP Abraxane IV Gemzar IV 
NS KVO 
 
1525. Ms. Dimitri Evans tolerated treatment well and was discharged from [place] [id number] in stable condition. Port de-accessed, flushed & heparinized per protocol. She is to return on 04/10/19 for her next appointment. Rodrick Kuo RN April 3, 2019

## 2019-04-03 NOTE — PROGRESS NOTES
Pt in for labs, and follow up chemo. This is C6D8 of treatment. Patient reports the following adverse events at this time: gr1 constipation, gr1 loss of appetite, gr1 fatigue. Vital signs are stable. Patient to go to infusion center receive Paclitaxel and Gemcitabine. Next appt is scheduled for 4/10/19.

## 2019-04-09 NOTE — PROGRESS NOTES
[facility] [place] [facility] [place] [address] [address] [facility] [address] [facility]  Kathy Forward: [phone]  F: [phone]      Reason for Visit:   Ever Vazquez is a 69 y.o. female who is seen in self-referral for evaluation of pancreatic cancer. Treatment History:   · 10/4/18 pancreatic head mass FNA, pancreatic adenocarcinoma    10/15/18  Liver, Core Biopsy w/Touch Prep CYTOLOGIC INTERPRETATION:   · Numerous cores and fragments of benign hepatic parenchyma     10/24/18  Liver, Fine Needle Aspiration CYTOLOGIC INTERPRETATION:   Metastatic adenocarcinoma (see Comment). General Categorization   Positive for malignancy. Comment: The morphologic appearance is nonspecific with regard to site of origin but compatible with metastatic pancreatic carcinoma in this patient with known pancreatic adenocarcinoma ([id number]). 11/9/18 abraxane/gemcitabine    History of Present Illness:   She started having abdominal pain, gradual and persistent, x 1 month, pressure sensation, located in epigastrium, no radiation, no aggravating symptoms, no relieving factors. 25 lb weight loss over 6 months.  + nausea. Interval history:  In today for follow up. Complains of gr 1 loss of appetite, gr 1 constipation, gr 1 fatigue, gr 1 insomnia, gr 1 sob    Past Medical History:   Diagnosis Date    Arthritis     Constipation     Diabetes ([address]     Hemorrhoids     Hiatal hernia     High cholesterol     Hypertension     Pancreatic cancer ([address]       Past Surgical History:   Procedure Laterality Date    HX KNEE ARTHROSCOPY Right     HX ORTHOPAEDIC      left wrist surgery    HX TONSILLECTOMY        Social History     Tobacco Use    Smoking status: Never Smoker    Smokeless tobacco: Never Used   Substance Use Topics    Alcohol use:  Yes     Alcohol/week: 1.2 - 2.4 oz     Types: 1 - 2 Cans of beer, 1 - 2 Shots of liquor per week     Frequency: 2-4 times a month     Drinks per session: 1 or 2      Family History   Problem Relation Age of Onset    Cancer Mother         skin    Hypertension Mother     Heart Disease Mother     Cancer Father         skin    Heart Disease Father     Stroke Father     Diabetes Neg Hx      Current Outpatient Medications   Medication Sig    TRESIBA FLEXTOUCH U-200 200 unit/mL (3 mL) inpn 42 UNITS BY SUBCUTANEOUS ROUTE DAILY. OR AS DIRECTED UP TO 60 UNITS DAILY    predniSONE (STERAPRED DS) 10 mg dose pack Take  by mouth See Admin Instructions. See administration instruction per 10mg dose pack    ondansetron hcl (ZOFRAN) 8 mg tablet Take 1 Tab by mouth every eight (8) hours as needed for Nausea.  IBUPROFEN IB PO Take  by mouth as needed.  melatonin 3 mg tablet Take 12 mg by mouth nightly.  GRANIX 480 mcg/0.8 mL syrg injection INJECT CONTENTS OF 1 SYRINGE UNDER THE SKIN FOR 3 DAYS ONCE WEEKLY (Patient taking differently: INJECT CONTENTS OF 1 SYRINGE UNDER THE SKIN FOR 2 DAYS ONCE WEEKLY)    glucose blood VI test strips (ACCU-CHEK RICHIE PLUS TEST STRP) strip Accu-Chek Richie Plus test strips   Check BS BID    OTHER Cranial prosthesis    Dx C25.7    omeprazole (PRILOSEC) 20 mg capsule Take 20 mg by mouth daily.  lidocaine-prilocaine (EMLA) topical cream Apply  to affected area as needed for Pain.  simvastatin (ZOCOR) 10 mg tablet Take  by mouth nightly.  telmisartan (MICARDIS) 80 mg tablet Take 80 mg by mouth daily.  hydroCHLOROthiazide (MICROZIDE) 12.5 mg capsule Take 12.5 mg by mouth daily.  insulin aspart U-100 (NOVOLOG FLEXPEN U-100 INSULIN) 100 unit/mL inpn Inject 3 units as needed for sugars over 200--Dose change 3/1919--updated med list--did not send prescription to the pharmacy    ferrous sulfate (IRON) 325 mg (65 mg iron) tablet Take  by mouth Daily (before breakfast). No current facility-administered medications for this visit.       Facility-Administered Medications Ordered in Other Visits   Medication Dose Route Frequency    gemcitabine (GEMZAR) 1,960 mg in 0.9% sodium chloride 250 mL, overfill volume 25 mL chemo infusion  1,960 mg IntraVENous ONCE    PACLitaxel-Protein Bound (ABRAXANE) 245 mg chemo infusion  245 mg IntraVENous ONCE    dexamethasone (DECADRON) 4 mg/mL injection 8 mg  8 mg IntraVENous ONCE    prochlorperazine (COMPAZINE) with saline injection 10 mg  10 mg IntraVENous Q6H PRN      Allergies   Allergen Reactions    Amoxicillin Hives        Review of Systems: A comprehensive review of systems was performed and all systems were negative except for HPI and for the symptom review form, reviewed and scanned in. Physical Exam:     Visit Vitals  /66 (BP 1 Location: Left arm, BP Patient Position: Sitting)   Pulse 90   Temp 96.6 °F (35.9 °C) (Oral)   Ht 5' 7\" (1.702 m)   Wt 185 lb (83.9 kg)   SpO2 100%   BMI 28.98 kg/m²     ECOG PS: 0  General: No distress  Eyes: PERRLA, anicteric sclerae  HENT: Atraumatic, OP clear, no redness, swelling  Neck: Supple  Lymphatic: No cervical, supraclavicular, or inguinal adenopathy  Respiratory: CTAB, normal respiratory effort  CV: Normal rate, regular rhythm, no murmurs, no peripheral edema  GI: Soft, nontender, nondistended, no masses, no hepatomegaly, no splenomegaly  MS: Normal gait and station. Digits without clubbing or cyanosis. Skin: No rashes, ecchymoses, or petechiae. Normal temperature, turgor, and texture. Psych: Alert, oriented, appropriate affect, normal judgment/insight        Results:     Lab Results   Component Value Date/Time    WBC 12.4 (H) 04/03/2019 11:26 AM    HGB 9.2 (L) 04/03/2019 11:26 AM    HCT 28.7 (L) 04/03/2019 11:26 AM    PLATELET 594 (H) 52/75/2467 11:26 AM    MCV 93.2 04/03/2019 11:26 AM    ABS.  NEUTROPHILS 10.2 (H) 04/03/2019 11:26 AM     Lab Results   Component Value Date/Time    Sodium 130 (L) 03/27/2019 11:13 AM    Potassium 3.2 (L) 03/27/2019 11:13 AM    Chloride 97 03/27/2019 11:13 AM    CO2 31 03/27/2019 11:13 AM    Glucose 190 (H) 03/27/2019 11:13 AM BUN 8 03/27/2019 11:13 AM    Creatinine 0.93 03/27/2019 11:13 AM    GFR est AA >60 03/27/2019 11:13 AM    GFR est non-AA 59 (L) 03/27/2019 11:13 AM    Calcium 8.0 (L) 03/27/2019 11:13 AM    Glucose (POC) 316 (H) 10/04/2018 03:45 PM     Lab Results   Component Value Date/Time    Bilirubin, total 0.4 03/27/2019 11:13 AM    ALT (SGPT) 21 03/27/2019 11:13 AM    AST (SGOT) 18 03/27/2019 11:13 AM    Alk. phosphatase 177 (H) 03/27/2019 11:13 AM    Protein, total 6.1 (L) 03/27/2019 11:13 AM    Albumin 2.8 (L) 03/27/2019 11:13 AM    Globulin 3.3 03/27/2019 11:13 AM     10/1/18 CT abd  There is a 3.8 x 1.9 cm mass involving the uncinate process and possibly  invading the duodenum. Findings are nonspecific but typical of pancreatic  carcinoma. There is no biliary or pancreatic ductal dilatation.     There is a poorly defined irregular hypodensity in segment IVb of the liver  measuring 3.7 x 1.9 cm. There appears to be focal biliary dilatation in the left  lobe behind this abnormality. Metastatic disease is suspected. There is a small,  1 cm hypodensity in the dome of the liver, likely segment 8. There is a 1 cm  hypodensity in segment 4 of the liver as well, also likely metastasis. Small  hypodensity measuring less than 1 cm in segment 6 at the tip laterally is also  nonspecific but probable metastasis.     Spleen kidneys and adrenals are unremarkable.     No free fluid or focal fluid collection.     Lung bases are clear.     Bone windows are unremarkable.     IMPRESSION  IMPRESSION:  1. 3.8 x 1.9 cm mass involving the uncinate process of the pancreas and possibly  invading the duodenum, this likely represents pancreatic carcinoma. 2. Likely metastatic disease in the liver. There is a 3.8 x 1.9 cm mass involving the uncinate process and possibly  invading the duodenum. Findings are nonspecific but typical of pancreatic  carcinoma.  There is no biliary or pancreatic ductal dilatation.     There is a poorly defined irregular hypodensity in segment IVb of the liver  measuring 3.7 x 1.9 cm. There appears to be focal biliary dilatation in the left  lobe behind this abnormality. Metastatic disease is suspected. There is a small,  1 cm hypodensity in the dome of the liver, likely segment 8. There is a 1 cm  hypodensity in segment 4 of the liver as well, also likely metastasis. Small  hypodensity measuring less than 1 cm in segment 6 at the tip laterally is also  nonspecific but probable metastasis.     Spleen kidneys and adrenals are unremarkable.     No free fluid or focal fluid collection.     Lung bases are clear.     Bone windows are unremarkable.     IMPRESSION  IMPRESSION:  1. 3.8 x 1.9 cm mass involving the uncinate process of the pancreas and possibly  invading the duodenum, this likely represents pancreatic carcinoma. 2. Likely metastatic disease in the liver. Records reviewed and summarized above. Pathology report(s) reviewed above. Radiology report(s) reviewed above. MRI abdomen 10/22/18: IMPRESSION:       1. Pancreatic uncinate process mass suspicious for pancreatic neoplasm, possibly  adenocarcinoma. Mass abuts the superior mesenteric artery with about 20%  circumference involvement but no luminal distortion or perivascular stranding. 2. Multiple hepatic lesions suspicious for metastatic neoplasm with segment IVb  lesion showing patchy areas of surrounding steatosis likely secondary to locally  altered intrahepatic hemodynamics and likely responsible for biopsy result. 3. Cholecystolithiasis and small gallbladder polyp. CT c/a/p 12/28/18: IMPRESSION:  Interval decrease in size of pancreatic mass. Slight interval decrease in size of hepatic metastases; these now demonstrate central low attenuation suggestive of necrosis. No evidence of new metastatic disease in the chest, abdomen, or pelvis.     Assessment/plan:   1.  Uncinate pancreatic adenocarcinoma,  mets to liver, stage IV:  cT2 cN0 pM1    Discussed that while this is treatable, it is not curable, the goal of therapy is palliative. Discussed that without therapy, life expectancy would be 3-6 months, with therapy 12-18 months on average. As an example of likely chemotherapy, we discussed the risks and benefits of Gemcitabine and Abraxane chemotherapy. Potential side effects include, but are not limited to, nausea, vomiting, diarrhea, constipation, mucositis, taste changes, myelosuppression, infection, fatigue, alopecia, skin and nail changes, pulmonary toxicity, neuropathy, allergic reactions, infertility, and rarely, death. Discussed the BBI-608 study and the research nurse met with her today and she signed informed consent. · Gemcitabine (1000 mg/m2) and Abraxane (125 mg/m2) given on days 1,8,15 every 28 days. · Labs: CBC, BMP prior to each treatment. Hepatic function panel every 4 weeks. CA 19.9 prior to day 1  · Antiemetic prophylaxis: Dexamethasone prior to each treatment  · PRN antiemetics: Ondansetron and Prochlorperazine at home  · EMLA cream for port    On the control arm, C6d1 today, will see her back in 2 weeks for C6D15. CT c/a/p on 2/25/19 show response to treatment, next scans around 4/22/19. We will plan to see the patient in follow up at least once per cycle, or sooner if symptoms warrant. 2. DM2:  Holding metformin; on insulin; saw Dr. Saira Pittman; her BS are improving; she is now taking less insulin    3. Emotional well being:  She has excellent support and is coping well with her disease    4. Abdominal pain:  Minor, may be due to constipation;  palliative care has seen    5. Nutrition:  Anisa Low RD has met with her; holding on enzymes    6. Insomnia: Has improved. She has lunesta if needed; she may also try melatonin    7. Anemia:  Due to chemo and disease; monitor    8. Neutropenia:  Due to chemo, started granix 480 mcg for 4 days following chemo with C1D15. Does report some pain with granix.   Tried claritin, however this kept her awake.  Recommend trying tylenol and advil PRN. Now at 2 days following chemo    9. Constipation:  Improved    10. Sinus congestion/cold like symptoms:  Started after her son's wedding, s/p zpak. Currently taking robitussin and using flonase. Cough has improved, however severe ear congestion. Has tried sudafed however this causes severe insomnia. Patient states she will retry sudafed while taking her steroids since she has insomnia during the time. No redness, swelling noted during physical exam.  If this does not get better patient to notify us and will refer to ENT, Dr. Deepa Bustillo. Signed By: Kaylee Haynes MD          [facility] [place] [facility] [address], [address] [place] [address]  W: [phone]  F: [phone]      Reason for Visit:   Yolanda Martin is a 69 y.o. female who is seen in self-referral for evaluation of pancreatic cancer. Treatment History:   · 10/4/18 pancreatic head mass FNA, pancreatic adenocarcinoma    10/15/18  Liver, Core Biopsy w/Touch Prep CYTOLOGIC INTERPRETATION:   · Numerous cores and fragments of benign hepatic parenchyma     10/24/18  Liver, Fine Needle Aspiration CYTOLOGIC INTERPRETATION:   Metastatic adenocarcinoma (see Comment). General Categorization   Positive for malignancy. Comment: The morphologic appearance is nonspecific with regard to site of origin but compatible with metastatic pancreatic carcinoma in this patient with known pancreatic adenocarcinoma ([id number]). 11/9/18 abraxane/gemcitabine    History of Present Illness:   She started having abdominal pain, gradual and persistent, x 1 month, pressure sensation, located in epigastrium, no radiation, no aggravating symptoms, no relieving factors. 25 lb weight loss over 6 months.  + nausea. Interval history:  In today for follow up.   Complains of gr 1 constipation, gr 1 fatigue, gr 1 hair loss, gr 1 insomnia    Past Medical History:   Diagnosis Date  Arthritis     Constipation     Diabetes ([facility]     Hemorrhoids     Hiatal hernia     High cholesterol     Hypertension     Pancreatic cancer ([facility]       Past Surgical History:   Procedure Laterality Date    HX KNEE ARTHROSCOPY Right     HX ORTHOPAEDIC      left wrist surgery    HX TONSILLECTOMY        Social History     Tobacco Use    Smoking status: Never Smoker    Smokeless tobacco: Never Used   Substance Use Topics    Alcohol use: Yes     Alcohol/week: 1.2 - 2.4 oz     Types: 1 - 2 Cans of beer, 1 - 2 Shots of liquor per week     Frequency: 2-4 times a month     Drinks per session: 1 or 2      Family History   Problem Relation Age of Onset    Cancer Mother         skin    Hypertension Mother     Heart Disease Mother     Cancer Father         skin    Heart Disease Father     Stroke Father     Diabetes Neg Hx      Current Outpatient Medications   Medication Sig    TRESIBA FLEXTOUCH U-200 200 unit/mL (3 mL) inpn 42 UNITS BY SUBCUTANEOUS ROUTE DAILY. OR AS DIRECTED UP TO 60 UNITS DAILY    predniSONE (STERAPRED DS) 10 mg dose pack Take  by mouth See Admin Instructions. See administration instruction per 10mg dose pack    ondansetron hcl (ZOFRAN) 8 mg tablet Take 1 Tab by mouth every eight (8) hours as needed for Nausea.  IBUPROFEN IB PO Take  by mouth as needed.  melatonin 3 mg tablet Take 12 mg by mouth nightly.  GRANIX 480 mcg/0.8 mL syrg injection INJECT CONTENTS OF 1 SYRINGE UNDER THE SKIN FOR 3 DAYS ONCE WEEKLY (Patient taking differently: INJECT CONTENTS OF 1 SYRINGE UNDER THE SKIN FOR 2 DAYS ONCE WEEKLY)    glucose blood VI test strips (ACCU-CHEK RICHIE PLUS TEST STRP) strip Accu-Chek Richie Plus test strips   Check BS BID    OTHER Cranial prosthesis    Dx C25.7    omeprazole (PRILOSEC) 20 mg capsule Take 20 mg by mouth daily.  lidocaine-prilocaine (EMLA) topical cream Apply  to affected area as needed for Pain.  simvastatin (ZOCOR) 10 mg tablet Take  by mouth nightly.  telmisartan (MICARDIS) 80 mg tablet Take 80 mg by mouth daily.  hydroCHLOROthiazide (MICROZIDE) 12.5 mg capsule Take 12.5 mg by mouth daily.  insulin aspart U-100 (NOVOLOG FLEXPEN U-100 INSULIN) 100 unit/mL inpn Inject 3 units as needed for sugars over 200--Dose change 3/1919--updated med list--did not send prescription to the pharmacy    ferrous sulfate (IRON) 325 mg (65 mg iron) tablet Take  by mouth Daily (before breakfast). No current facility-administered medications for this visit. Facility-Administered Medications Ordered in Other Visits   Medication Dose Route Frequency    gemcitabine (GEMZAR) 1,960 mg in 0.9% sodium chloride 250 mL, overfill volume 25 mL chemo infusion  1,960 mg IntraVENous ONCE    PACLitaxel-Protein Bound (ABRAXANE) 245 mg chemo infusion  245 mg IntraVENous ONCE    dexamethasone (DECADRON) 4 mg/mL injection 8 mg  8 mg IntraVENous ONCE    prochlorperazine (COMPAZINE) with saline injection 10 mg  10 mg IntraVENous Q6H PRN      Allergies   Allergen Reactions    Amoxicillin Hives        Review of Systems: A comprehensive review of systems was performed and all systems were negative except for HPI and for the symptom review form, reviewed and scanned in. Physical Exam:     Visit Vitals  /66 (BP 1 Location: Left arm, BP Patient Position: Sitting)   Pulse 90   Temp 96.6 °F (35.9 °C) (Oral)   Ht 5' 7\" (1.702 m)   Wt 185 lb (83.9 kg)   SpO2 100%   BMI 28.98 kg/m²     ECOG PS: 0  General: No distress  Eyes: PERRLA, anicteric sclerae  HENT: Atraumatic, OP clear  Neck: Supple  Lymphatic: No cervical, supraclavicular, or inguinal adenopathy  Respiratory: CTAB, normal respiratory effort  CV: Normal rate, regular rhythm, no murmurs, no peripheral edema  GI: Soft, nontender, nondistended, no masses, no hepatomegaly, no splenomegaly  MS: Normal gait and station. Digits without clubbing or cyanosis. Skin: No rashes, ecchymoses, or petechiae.  Normal temperature, turgor, and texture. Psych: Alert, oriented, appropriate affect, normal judgment/insight        Results:     Lab Results   Component Value Date/Time    WBC 12.4 (H) 04/03/2019 11:26 AM    HGB 9.2 (L) 04/03/2019 11:26 AM    HCT 28.7 (L) 04/03/2019 11:26 AM    PLATELET 213 (H) 52/05/0747 11:26 AM    MCV 93.2 04/03/2019 11:26 AM    ABS. NEUTROPHILS 10.2 (H) 04/03/2019 11:26 AM     Lab Results   Component Value Date/Time    Sodium 130 (L) 03/27/2019 11:13 AM    Potassium 3.2 (L) 03/27/2019 11:13 AM    Chloride 97 03/27/2019 11:13 AM    CO2 31 03/27/2019 11:13 AM    Glucose 190 (H) 03/27/2019 11:13 AM    BUN 8 03/27/2019 11:13 AM    Creatinine 0.93 03/27/2019 11:13 AM    GFR est AA >60 03/27/2019 11:13 AM    GFR est non-AA 59 (L) 03/27/2019 11:13 AM    Calcium 8.0 (L) 03/27/2019 11:13 AM    Glucose (POC) 316 (H) 10/04/2018 03:45 PM     Lab Results   Component Value Date/Time    Bilirubin, total 0.4 03/27/2019 11:13 AM    ALT (SGPT) 21 03/27/2019 11:13 AM    AST (SGOT) 18 03/27/2019 11:13 AM    Alk. phosphatase 177 (H) 03/27/2019 11:13 AM    Protein, total 6.1 (L) 03/27/2019 11:13 AM    Albumin 2.8 (L) 03/27/2019 11:13 AM    Globulin 3.3 03/27/2019 11:13 AM     10/1/18 CT abd  There is a 3.8 x 1.9 cm mass involving the uncinate process and possibly  invading the duodenum. Findings are nonspecific but typical of pancreatic  carcinoma. There is no biliary or pancreatic ductal dilatation.     There is a poorly defined irregular hypodensity in segment IVb of the liver  measuring 3.7 x 1.9 cm. There appears to be focal biliary dilatation in the left  lobe behind this abnormality. Metastatic disease is suspected. There is a small,  1 cm hypodensity in the dome of the liver, likely segment 8. There is a 1 cm  hypodensity in segment 4 of the liver as well, also likely metastasis.  Small  hypodensity measuring less than 1 cm in segment 6 at the tip laterally is also  nonspecific but probable metastasis.     Spleen kidneys and adrenals are unremarkable.     No free fluid or focal fluid collection.     Lung bases are clear.     Bone windows are unremarkable.     IMPRESSION  IMPRESSION:  1. 3.8 x 1.9 cm mass involving the uncinate process of the pancreas and possibly  invading the duodenum, this likely represents pancreatic carcinoma. 2. Likely metastatic disease in the liver. There is a 3.8 x 1.9 cm mass involving the uncinate process and possibly  invading the duodenum. Findings are nonspecific but typical of pancreatic  carcinoma. There is no biliary or pancreatic ductal dilatation.     There is a poorly defined irregular hypodensity in segment IVb of the liver  measuring 3.7 x 1.9 cm. There appears to be focal biliary dilatation in the left  lobe behind this abnormality. Metastatic disease is suspected. There is a small,  1 cm hypodensity in the dome of the liver, likely segment 8. There is a 1 cm  hypodensity in segment 4 of the liver as well, also likely metastasis. Small  hypodensity measuring less than 1 cm in segment 6 at the tip laterally is also  nonspecific but probable metastasis.     Spleen kidneys and adrenals are unremarkable.     No free fluid or focal fluid collection.     Lung bases are clear.     Bone windows are unremarkable.     IMPRESSION  IMPRESSION:  1. 3.8 x 1.9 cm mass involving the uncinate process of the pancreas and possibly  invading the duodenum, this likely represents pancreatic carcinoma. 2. Likely metastatic disease in the liver. Records reviewed and summarized above. Pathology report(s) reviewed above. Radiology report(s) reviewed above. MRI abdomen 10/22/18: IMPRESSION:       1. Pancreatic uncinate process mass suspicious for pancreatic neoplasm, possibly  adenocarcinoma. Mass abuts the superior mesenteric artery with about 20%  circumference involvement but no luminal distortion or perivascular stranding.   2. Multiple hepatic lesions suspicious for metastatic neoplasm with segment IVb  lesion showing patchy areas of surrounding steatosis likely secondary to locally  altered intrahepatic hemodynamics and likely responsible for biopsy result. 3. Cholecystolithiasis and small gallbladder polyp. CT c/a/p 12/28/18: IMPRESSION:  Interval decrease in size of pancreatic mass. Slight interval decrease in size of hepatic metastases; these now demonstrate central low attenuation suggestive of necrosis. No evidence of new metastatic disease in the chest, abdomen, or pelvis. CT c/a/p 2/25/19:  LIVER: The lesions described on the prior examination have improved in the  interval. 19 mm lesion in the dome of the liver now measures 1 cm. 2.0 x 1.5 cm  lesion in the left hepatic lobe now measures 1.3 x 1.2 cm. Other smaller lesions  are no longer visualized. GALLBLADDER: Unremarkable. SPLEEN: No mass. PANCREAS: Mass lesion in the uncinate process now measures 1.7 x 2.0 cm,  previously measuring 2.0 x 3.0 cm. ADRENALS: Unremarkable. KIDNEYS: No mass, calculus, or hydronephrosis. STOMACH: Unremarkable. SMALL BOWEL: No dilatation or wall thickening. COLON: No dilatation or wall thickening. APPENDIX: Unremarkable. PERITONEUM: No ascites or pneumoperitoneum. RETROPERITONEUM: No lymphadenopathy or aortic aneurysm. REPRODUCTIVE ORGANS: Intrauterine device projects in satisfactory position. URINARY BLADDER: No mass or calculus. BONES: Degenerative changes are seen in the thoracic and lumbar spine. ADDITIONAL COMMENTS: N/A     IMPRESSION  IMPRESSION:  Interval decrease in the size of the hepatic metastases. Decrease in the size of  the mass lesion involving the uncinate process.     RECIST:  Pancreatic uncinate mass: Current: (68) 1.7X2.0cm     12/28/2018: (71) 3.0 x  2.0 cm   Segment 4B liver mass: Current: (56) 1.3 x 1.2 cm 12/28/2018: (57) 2.0 x 1.5 cm   Segment 5 liver mass: Current: (68) 4 x 7 mm 12/28/2018: (70) 1.4 x 0.9 cm     Assessment/plan:   1.  Uncinate pancreatic adenocarcinoma,  mets to liver, stage IV:  cT2 cN0 pM1    Discussed that while this is treatable, it is not curable, the goal of therapy is palliative. Discussed that without therapy, life expectancy would be 3-6 months, with therapy 12-18 months on average. As an example of likely chemotherapy, we discussed the risks and benefits of Gemcitabine and Abraxane chemotherapy. Potential side effects include, but are not limited to, nausea, vomiting, diarrhea, constipation, mucositis, taste changes, myelosuppression, infection, fatigue, alopecia, skin and nail changes, pulmonary toxicity, neuropathy, allergic reactions, infertility, and rarely, death. Discussed the BBI-608 study and the research nurse met with her today and she signed informed consent. · Gemcitabine (1000 mg/m2) and Abraxane (125 mg/m2) given on days 1,8,15 every 28 days. · Labs: CBC, BMP prior to each treatment. Hepatic function panel every 4 weeks. CA 19.9 prior to day 1  · Antiemetic prophylaxis: Dexamethasone prior to each treatment  · PRN antiemetics: Ondansetron and Prochlorperazine at home  · EMLA cream for port    On the control arm, C6d15 today, will see her back in 2 weeks for C7D1. CT c/a/p on 2/25/19 show response to treatment, next scans due in April. We will plan to see the patient in follow up at least once per cycle, or sooner if symptoms warrant. 2. DM2:  Holding metformin; on insulin; saw Dr. Antoni Chang; her BS are improving; she is now taking less insulin    3. Emotional well being:  She has excellent support and is coping well with her disease    4. Abdominal pain:  Minor, may be due to constipation;  palliative care has seen    5. Nutrition:  Moose Norman RD has met with her; holding on enzymes    6. Insomnia: Was improving however worse recently, states her son is getting  this weekend and moving Monday. Currently taking melatonin. She has lunesta if needed. 7. Anemia:  Due to chemo and disease; monitor; iron profile and ferritin normal    8. Neutropenia:  Due to chemo, started granix 480 mcg for 4 days following chemo with C1D15. Does report some pain with granix. Tried claritin, however this kept her awake. Recommend trying tylenol and advil PRN. Now at 2 days following chemo    9. Constipation:  Improved, now stools are little softer, so she is holding off on her stool softener, but she continues with Miralax. 10. Sinus congestion: Clear/bloody sinus drainage. Recommend OTC sudafed and saline spray. Previously referred to Dr. Nieves Burroughs, ENT, started on steroid pack. 11. Possible port flipped:   Will get port study; peripheral IV today    Signed By: Josesito Patel MD

## 2019-04-10 NOTE — PROGRESS NOTES
[facility] Progress Note Date: April 10, 2019 Name: Mark Shaw MRN: [id number] : 1945 Ms. Strange Arrived ambulatory and in no distress for cycle 6 day 15 of Clinical Trial Gemzar Abraxane regimen. Assessment was completed, no acute issues at this time, no new complaints voiced. R chest port attempted access but port was flipped, notified MD office who will have patientt go for port study. PIV accessed in Left arm OK to use AC PIV per MD office. Patient given IV zofran vs. decadron do to prescription of steroids which she is currently taking at home. Chemotherapy Flowsheet 4/10/2019 Cycle [id number] Date 4/10/2019 Drug / Regimen Clinical trial: Gemzar Abraxane Pre Meds -  
Notes -  
 
 
 
1145:  Proceeded to appt with . Ms. Strange's vitals were reviewed. Visit Vitals /75 (BP 1 Location: Right arm, BP Patient Position: Sitting) Pulse 74 Temp 96.6 °F (35.9 °C) Resp 18 Ht 5' 7\" (1.702 m) Wt 82.3 kg (181 lb 8 oz) SpO2 100% BMI 28.43 kg/m² Lab results were obtained and reviewed. Recent Results (from the past 12 hour(s)) CBC WITH AUTOMATED DIFF Collection Time: 04/10/19 11:35 AM  
Result Value Ref Range WBC 3.3 (L) 3.6 - 11.0 K/uL  
 RBC 2.85 (L) 3.80 - 5.20 M/uL HGB 8.4 (L) 11.5 - 16.0 g/dL HCT 26.0 (L) 35.0 - 47.0 % MCV 91.2 80.0 - 99.0 FL  
 MCH 29.5 26.0 - 34.0 PG  
 MCHC 32.3 30.0 - 36.5 g/dL  
 RDW 19.9 (H) 11.5 - 14.5 % PLATELET 086 608 - 515 K/uL MPV 9.8 8.9 - 12.9 FL  
 NRBC 0.0 0  WBC ABSOLUTE NRBC 0.00 0.00 - 0.01 K/uL NEUTROPHILS 85 (H) 32 - 75 % LYMPHOCYTES 11 (L) 12 - 49 % MONOCYTES 1 (L) 5 - 13 % EOSINOPHILS 0 0 - 7 % BASOPHILS 0 0 - 1 % METAMYELOCYTES 2 (H) 0 % MYELOCYTES 1 (H) 0 % IMMATURE GRANULOCYTES 0 %  
 ABS. NEUTROPHILS 2.8 1.8 - 8.0 K/UL  
 ABS. LYMPHOCYTES 0.4 (L) 0.8 - 3.5 K/UL  
 ABS. MONOCYTES 0.0 0.0 - 1.0 K/UL  
 ABS. EOSINOPHILS 0.0 0.0 - 0.4 K/UL ABS. BASOPHILS 0.0 0.0 - 0.1 K/UL  
 ABS. IMM. GRANS. 0.0 K/UL  
 DF MANUAL    
 RBC COMMENTS ANISOCYTOSIS 
1+ WBC COMMENTS TOXIC GRANULATION    
LD Collection Time: 04/10/19 11:35 AM  
Result Value Ref Range  81 - 246 U/L Pre-medications  were administered as ordered and chemotherapy was initiated. Zofran IVP Abraxane IV Gemzar IV 
 
 
Ms. Strange tolerated treatment well and was discharged from [place] [address] in stable condition. PIV removed no s/s of infiltration. She is to return on 4/24/19 for her next appointment. Richy Scripture April 10, 2019

## 2019-04-10 NOTE — PROGRESS NOTES
Ever Osman is a 69 y.o. female    Chief Complaint   Patient presents with    Pancreatic Cancer     1. Have you been to the ER, urgent care clinic since your last visit? Hospitalized since your last visit? No    2. Have you seen or consulted any other health care providers outside of the [address] [place] [address] since your last visit? Include any pap smears or colon screening. Seen by Dr Joseph Hinojosa for B/L ear fullness on 4/5/19.     Visit Vitals  /66 (BP 1 Location: Left arm, BP Patient Position: Sitting)   Pulse 90   Temp 96.6 °F (35.9 °C) (Oral)   Ht 5' 7\" (1.702 m)   Wt 185 lb (83.9 kg)   SpO2 100%   BMI 28.98 kg/m²

## 2019-04-10 NOTE — PROGRESS NOTES
Pt in for labs. Pt in for follow up visit today per protocol with Dr. Roxy Olivarez and RN. This is C6D15 of treatment. Patient reports the following adverse events at this time: gr 1 loss of appetite, gr 1 constipation, gr 1 fatigue, gr 1 insomnia, gr 1 sob. Vital signs are stable. Patient to go to infusion center after appointment to receive paclitaxel and gemcitabine . Next appt is scheduled for 4/24/19.

## 2019-04-15 NOTE — PROGRESS NOTES
Patient arrived. ID and allergies verified verbally with patient. Pt voices understanding of procedure to be performed. Consent obtained. Pt prepped for procedure. Rt chest port accessed by NATHEN Coto,good blood return,easily flushed. 12:00,PA,LAT CXR completed. 12:15,Pt DC via [facility] with son.

## 2019-04-15 NOTE — PROGRESS NOTES
Farooq Sylvester RN, accessed port, with positive blood return and flush. Spoke with Dr. Camille Souza after he reviewed PA and lateral xray. Port in good position. Infusion center will just need to access more medially. Notified Korin Sandoval RN.

## 2019-04-24 NOTE — PROGRESS NOTES
Pt in for labs and follow up visit today per protocol with Dr. Maranda Amador and RN. This is C7D1 of treatment. Patient reports the following adverse events at this time: gr 1 constipation, gr 1 fatigue, gr 1 hair loss, gr 1 insomnia. Vital signs are stable. Patient to go to infusion center after appointment to receive gem/abraxane infusion. Next appt is scheduled for 5/1/19.

## 2019-04-24 NOTE — PROGRESS NOTES
Chief Complaint   Patient presents with    Pancreatic Cancer       1. Have you been to the ER, urgent care clinic since your last visit? Hospitalized since your last visit? No    2. Have you seen or consulted any other health care providers outside of the [address] [place] [address] since your last visit? Include any pap smears or colon screening. No    3) Do you have an Advance Directive on file? no    Patient is accompanied by self I have received verbal consent from [address] [place] [address] to discuss any/all medical information while they are present in the room.

## 2019-04-24 NOTE — PROGRESS NOTES
[facility] [place] [address] [address] [facility]  Kathy Forward: [phone]  F: [phone]      Reason for Visit:   Ever Vazquez is a 69 y.o. female who is seen in self-referral for evaluation of pancreatic cancer. Treatment History:   · 10/4/18 pancreatic head mass FNA, pancreatic adenocarcinoma    10/15/18  Liver, Core Biopsy w/Touch Prep CYTOLOGIC INTERPRETATION:   · Numerous cores and fragments of benign hepatic parenchyma     10/24/18  Liver, Fine Needle Aspiration CYTOLOGIC INTERPRETATION:   Metastatic adenocarcinoma (see Comment). General Categorization   Positive for malignancy. Comment: The morphologic appearance is nonspecific with regard to site of origin but compatible with metastatic pancreatic carcinoma in this patient with known pancreatic adenocarcinoma ([id number]). 11/9/18 abraxane/gemcitabine    History of Present Illness:   She started having abdominal pain, gradual and persistent, x 1 month, pressure sensation, located in epigastrium, no radiation, no aggravating symptoms, no relieving factors. 25 lb weight loss over 6 months.  + nausea. Interval history:  In today for follow up. Complains of gr 1 constipation, gr 1 fatigue, gr 1 hair loss, gr 1 insomnia    Past Medical History:   Diagnosis Date    Arthritis     Constipation     Diabetes ([address]     Hemorrhoids     Hiatal hernia     High cholesterol     Hypertension     Pancreatic cancer ([address]       Past Surgical History:   Procedure Laterality Date    HX KNEE ARTHROSCOPY Right     HX ORTHOPAEDIC      left wrist surgery    HX TONSILLECTOMY        Social History     Tobacco Use    Smoking status: Never Smoker    Smokeless tobacco: Never Used   Substance Use Topics    Alcohol use:  Yes     Alcohol/week: 1.2 - 2.4 oz     Types: 1 - 2 Cans of beer, 1 - 2 Shots of liquor per week     Frequency: 2-4 times a month     Drinks per session: 1 or 2      Family History   Problem Relation Age of Onset    Cancer Mother         skin    Hypertension Mother     Heart Disease Mother     Cancer Father         skin    Heart Disease Father     Stroke Father     Diabetes Neg Hx      Current Outpatient Medications   Medication Sig    TRESIBA FLEXTOUCH U-200 200 unit/mL (3 mL) inpn 42 UNITS BY SUBCUTANEOUS ROUTE DAILY. OR AS DIRECTED UP TO 60 UNITS DAILY    predniSONE (STERAPRED DS) 10 mg dose pack Take  by mouth See Admin Instructions. See administration instruction per 10mg dose pack    ondansetron hcl (ZOFRAN) 8 mg tablet Take 1 Tab by mouth every eight (8) hours as needed for Nausea.  insulin aspart U-100 (NOVOLOG FLEXPEN U-100 INSULIN) 100 unit/mL inpn Inject 3 units as needed for sugars over 200--Dose change 3/1919--updated med list--did not send prescription to the pharmacy    IBUPROFEN IB PO Take  by mouth as needed.  melatonin 3 mg tablet Take 12 mg by mouth nightly.  GRANIX 480 mcg/0.8 mL syrg injection INJECT CONTENTS OF 1 SYRINGE UNDER THE SKIN FOR 3 DAYS ONCE WEEKLY (Patient taking differently: INJECT CONTENTS OF 1 SYRINGE UNDER THE SKIN FOR 2 DAYS ONCE WEEKLY)    glucose blood VI test strips (ACCU-CHEK RICHIE PLUS TEST STRP) strip Accu-Chek Richie Plus test strips   Check BS BID    OTHER Cranial prosthesis    Dx C25.7    omeprazole (PRILOSEC) 20 mg capsule Take 20 mg by mouth daily.  lidocaine-prilocaine (EMLA) topical cream Apply  to affected area as needed for Pain.  simvastatin (ZOCOR) 10 mg tablet Take  by mouth nightly.  telmisartan (MICARDIS) 80 mg tablet Take 80 mg by mouth daily.  hydroCHLOROthiazide (MICROZIDE) 12.5 mg capsule Take 12.5 mg by mouth daily. No current facility-administered medications for this visit.       Facility-Administered Medications Ordered in Other Visits   Medication Dose Route Frequency    heparin (porcine) pf 500 Units  500 Units IntraVENous PRN    sodium chloride (NS) flush 5-10 mL  5-10 mL IntraVENous PRN    gemcitabine (GEMZAR) 1,960 mg in 0.9% sodium chloride 250 mL, overfill volume 25 mL chemo infusion  1,960 mg IntraVENous ONCE    PACLitaxel-Protein Bound (ABRAXANE) 245 mg in 0.9% sodium chloride 49 mL chemo infusion  245 mg IntraVENous ONCE    dexamethasone (DECADRON) 4 mg/mL injection 8 mg  8 mg IntraVENous ONCE    prochlorperazine (COMPAZINE) with saline injection 10 mg  10 mg IntraVENous Q6H PRN    0.9% sodium chloride infusion  25 mL/hr IntraVENous ONCE      Allergies   Allergen Reactions    Amoxicillin Hives        Review of Systems: A comprehensive review of systems was performed and all systems were negative except for HPI and for the symptom review form, reviewed and scanned in. Physical Exam:     Visit Vitals  /62 (BP 1 Location: Right arm, BP Patient Position: Sitting)   Pulse 86   Temp 97.6 °F (36.4 °C) (Temporal)   Resp 14   Ht 5' 7\" (1.702 m)   Wt 185 lb 4.8 oz (84.1 kg)   SpO2 97%   BMI 29.02 kg/m²     ECOG PS: 0  General: No distress  Eyes: PERRLA, anicteric sclerae  HENT: Atraumatic, OP clear  Neck: Supple  Lymphatic: No cervical, supraclavicular, or inguinal adenopathy  Respiratory: CTAB, normal respiratory effort  CV: Normal rate, regular rhythm, no murmurs, no peripheral edema  GI: Soft, nontender, nondistended, no masses, no hepatomegaly, no splenomegaly  MS: Normal gait and station. Digits without clubbing or cyanosis. Skin: No rashes, ecchymoses, or petechiae. Normal temperature, turgor, and texture. Psych: Alert, oriented, appropriate affect, normal judgment/insight        Results:     Lab Results   Component Value Date/Time    WBC 4.3 04/24/2019 11:03 AM    HGB 8.3 (L) 04/24/2019 11:03 AM    HCT 27.0 (L) 04/24/2019 11:03 AM    PLATELET 447 (H) 55/34/8473 11:03 AM    MCV 94.4 04/24/2019 11:03 AM    ABS.  NEUTROPHILS PENDING 04/24/2019 11:03 AM     Lab Results   Component Value Date/Time    Sodium 130 (L) 03/27/2019 11:13 AM    Potassium 3.2 (L) 03/27/2019 11:13 AM    Chloride 97 03/27/2019 11:13 AM    CO2 31 03/27/2019 11:13 AM    Glucose 190 (H) 03/27/2019 11:13 AM    BUN 8 03/27/2019 11:13 AM    Creatinine 0.93 03/27/2019 11:13 AM    GFR est AA >60 03/27/2019 11:13 AM    GFR est non-AA 59 (L) 03/27/2019 11:13 AM    Calcium 8.0 (L) 03/27/2019 11:13 AM    Glucose (POC) 168 (H) 04/15/2019 10:44 AM     Lab Results   Component Value Date/Time    Bilirubin, total 0.4 03/27/2019 11:13 AM    ALT (SGPT) 21 03/27/2019 11:13 AM    AST (SGOT) 18 03/27/2019 11:13 AM    Alk. phosphatase 177 (H) 03/27/2019 11:13 AM    Protein, total 6.1 (L) 03/27/2019 11:13 AM    Albumin 2.8 (L) 03/27/2019 11:13 AM    Globulin 3.3 03/27/2019 11:13 AM     10/1/18 CT abd  There is a 3.8 x 1.9 cm mass involving the uncinate process and possibly  invading the duodenum. Findings are nonspecific but typical of pancreatic  carcinoma. There is no biliary or pancreatic ductal dilatation.     There is a poorly defined irregular hypodensity in segment IVb of the liver  measuring 3.7 x 1.9 cm. There appears to be focal biliary dilatation in the left  lobe behind this abnormality. Metastatic disease is suspected. There is a small,  1 cm hypodensity in the dome of the liver, likely segment 8. There is a 1 cm  hypodensity in segment 4 of the liver as well, also likely metastasis. Small  hypodensity measuring less than 1 cm in segment 6 at the tip laterally is also  nonspecific but probable metastasis.     Spleen kidneys and adrenals are unremarkable.     No free fluid or focal fluid collection.     Lung bases are clear.     Bone windows are unremarkable.     IMPRESSION  IMPRESSION:  1. 3.8 x 1.9 cm mass involving the uncinate process of the pancreas and possibly  invading the duodenum, this likely represents pancreatic carcinoma. 2. Likely metastatic disease in the liver. There is a 3.8 x 1.9 cm mass involving the uncinate process and possibly  invading the duodenum. Findings are nonspecific but typical of pancreatic  carcinoma. There is no biliary or pancreatic ductal dilatation.     There is a poorly defined irregular hypodensity in segment IVb of the liver  measuring 3.7 x 1.9 cm. There appears to be focal biliary dilatation in the left  lobe behind this abnormality. Metastatic disease is suspected. There is a small,  1 cm hypodensity in the dome of the liver, likely segment 8. There is a 1 cm  hypodensity in segment 4 of the liver as well, also likely metastasis. Small  hypodensity measuring less than 1 cm in segment 6 at the tip laterally is also  nonspecific but probable metastasis.     Spleen kidneys and adrenals are unremarkable.     No free fluid or focal fluid collection.     Lung bases are clear.     Bone windows are unremarkable.     IMPRESSION:  1. 3.8 x 1.9 cm mass involving the uncinate process of the pancreas and possibly  invading the duodenum, this likely represents pancreatic carcinoma. 2. Likely metastatic disease in the liver. Records reviewed and summarized above. Pathology report(s) reviewed above. Radiology report(s) reviewed above. MRI abdomen 10/22/18: IMPRESSION:       1. Pancreatic uncinate process mass suspicious for pancreatic neoplasm, possibly  adenocarcinoma. Mass abuts the superior mesenteric artery with about 20%  circumference involvement but no luminal distortion or perivascular stranding. 2. Multiple hepatic lesions suspicious for metastatic neoplasm with segment IVb  lesion showing patchy areas of surrounding steatosis likely secondary to locally  altered intrahepatic hemodynamics and likely responsible for biopsy result. 3. Cholecystolithiasis and small gallbladder polyp. CT c/a/p 12/28/18: IMPRESSION:  Interval decrease in size of pancreatic mass. Slight interval decrease in size of hepatic metastases; these now demonstrate central low attenuation suggestive of necrosis. No evidence of new metastatic disease in the chest, abdomen, or pelvis.     CT c/a/p 2/25/19:  LIVER: The lesions described on the prior examination have improved in the  interval. 19 mm lesion in the dome of the liver now measures 1 cm. 2.0 x 1.5 cm  lesion in the left hepatic lobe now measures 1.3 x 1.2 cm. Other smaller lesions  are no longer visualized. GALLBLADDER: Unremarkable. SPLEEN: No mass. PANCREAS: Mass lesion in the uncinate process now measures 1.7 x 2.0 cm,  previously measuring 2.0 x 3.0 cm. ADRENALS: Unremarkable. KIDNEYS: No mass, calculus, or hydronephrosis. STOMACH: Unremarkable. SMALL BOWEL: No dilatation or wall thickening. COLON: No dilatation or wall thickening. APPENDIX: Unremarkable. PERITONEUM: No ascites or pneumoperitoneum. RETROPERITONEUM: No lymphadenopathy or aortic aneurysm. REPRODUCTIVE ORGANS: Intrauterine device projects in satisfactory position. URINARY BLADDER: No mass or calculus. BONES: Degenerative changes are seen in the thoracic and lumbar spine. ADDITIONAL COMMENTS: N/A     IMPRESSION  IMPRESSION:  Interval decrease in the size of the hepatic metastases. Decrease in the size of  the mass lesion involving the uncinate process.     RECIST:  Pancreatic uncinate mass: Current: (68) 1.7X2.0cm     12/28/2018: (71) 3.0 x  2.0 cm   Segment 4B liver mass: Current: (56) 1.3 x 1.2 cm 12/28/2018: (57) 2.0 x 1.5 cm   Segment 5 liver mass: Current: (68) 4 x 7 mm 12/28/2018: (70) 1.4 x 0.9 cm    CXR for port study 4/15/19:   IMPRESSION:  Right internal jugular chest Port-A-Cath is seen with the catheter terminating  in the mid SVC. The port is slightly angled medially due to breast tissue. The  port is not flipped. Access needle appears to be within the port hub. Nursing  staff was able to get good blood return and flush the port without difficulty. No intervention was performed. CT a/p 4/22/19:  IMPRESSION:  1. Slight decrease in size of liver metastases, detailed above. 2. Slight decrease in size in uncinate process mass.   3. No evidence for new metastatic disease.     RECIST:  Uncinate process mass: 15 x 9 mm, image 56, previously 17 x 11 mm. Segment IVb metastasis 11 x 9 mm, image 50, previously 13 x 12 mm  Segment 5 metastasis: Barely detectable, image 62     Assessment/plan:   1. Uncinate pancreatic adenocarcinoma,  mets to liver, stage IV:  cT2 cN0 pM1    Discussed that while this is treatable, it is not curable, the goal of therapy is palliative. Discussed that without therapy, life expectancy would be 3-6 months, with therapy 12-18 months on average. As an example of likely chemotherapy, we discussed the risks and benefits of Gemcitabine and Abraxane chemotherapy. Potential side effects include, but are not limited to, nausea, vomiting, diarrhea, constipation, mucositis, taste changes, myelosuppression, infection, fatigue, alopecia, skin and nail changes, pulmonary toxicity, neuropathy, allergic reactions, infertility, and rarely, death. Discussed the [id number] study and the research nurse met with her today and she signed informed consent. · Gemcitabine (1000 mg/m2) and Abraxane (125 mg/m2) given on days 1,8,15 every 28 days. · Labs: CBC, BMP prior to each treatment. Hepatic function panel every 4 weeks. CA 19.9 prior to day 1  · Antiemetic prophylaxis: Dexamethasone prior to each treatment  · PRN antiemetics: Ondansetron and Prochlorperazine at home  · EMLA cream for port    On the control arm, C7d1 today, will see her back in 2 weeks for C7D15. CT a/p on 4/22/19 show response to treatment    We will plan to see the patient in follow up at least once per cycle, or sooner if symptoms warrant. 2. DM2:  Holding metformin; on insulin; saw Dr. Bailey May; her BS are improving; she is now taking less insulin    3. Emotional well being:  She has excellent support and is coping well with her disease    4. Abdominal pain:  Mild, may be due to constipation;  palliative care has seen    5. Nutrition:  Hamp Pallas RD has met with her; holding on enzymes    6. Insomnia: Was improving however worse recently, states her son is getting  this weekend and moving Monday. Currently taking melatonin. She has lunesta if needed. 7. Anemia:  Due to chemo and disease; monitor; iron profile and ferritin normal    8. Neutropenia:  Due to chemo, started granix 480 mcg for 4 days following chemo with C1D15. Does report some pain with granix. Tried claritin, however this kept her awake. Recommend trying tylenol and advil PRN. Now at 2 days following chemo    9. Constipation:  Improved, now stools are little softer, so she is holding off on her stool softener, but she continues with Miralax. 10. Sinus congestion: Continues. Clear/bloody sinus drainage. Recommend OTC sudafed and saline spray. She has seen Dr. Nieves Burroughs, ENT, s/p steroids. 11. Possible port flipped:  CXR prior to port study was negative.      Signed By: Josesito Patel MD

## 2019-04-24 NOTE — PROGRESS NOTES
[facility] Progress Note Date: 2019 Name: Christiano Juárez MRN: [id number] : 1945 
 
1100. Ms. Emma Cabrera Arrived ambulatory and in no distress for C7D1 Clinical Trial: Gemzar/Abraxane. Assessment was completed, patient reports continued congestion- MD office notified. R chest wall port accessed without difficulty using 0.75 inch garcia needle, labs drawn and in process. Chemotherapy Flowsheet 2019 Cycle C7D1 Date 2019 Drug / Regimen Clinical Trial:Gemzar/Abraxane Pre Meds -  
Notes -  
 
 
 
Ms. Strange's vitals were reviewed. Patient Vitals for the past 12 hrs: 
 Temp Pulse Resp BP  
19 1518  68  140/64  
19 1100 97.9 °F (36.6 °C) 83 18 122/62 Lab results were obtained and reviewed. Recent Results (from the past 12 hour(s)) METABOLIC PANEL, COMPREHENSIVE Collection Time: 19 11:03 AM  
Result Value Ref Range Sodium 136 136 - 145 mmol/L Potassium 3.9 3.5 - 5.1 mmol/L Chloride 103 97 - 108 mmol/L  
 CO2 29 21 - 32 mmol/L Anion gap 4 (L) 5 - 15 mmol/L Glucose 230 (H) 65 - 100 mg/dL BUN 8 6 - 20 MG/DL Creatinine 0.79 0.55 - 1.02 MG/DL  
 BUN/Creatinine ratio 10 (L) 12 - 20 GFR est AA >60 >60 ml/min/1.73m2 GFR est non-AA >60 >60 ml/min/1.73m2 Calcium 7.7 (L) 8.5 - 10.1 MG/DL Bilirubin, total 0.2 0.2 - 1.0 MG/DL  
 ALT (SGPT) 19 12 - 78 U/L  
 AST (SGOT) 11 (L) 15 - 37 U/L Alk. phosphatase 123 (H) 45 - 117 U/L Protein, total 5.4 (L) 6.4 - 8.2 g/dL Albumin 2.7 (L) 3.5 - 5.0 g/dL Globulin 2.7 2.0 - 4.0 g/dL A-G Ratio 1.0 (L) 1.1 - 2.2    
CBC WITH AUTOMATED DIFF Collection Time: 19 11:03 AM  
Result Value Ref Range WBC 4.3 3.6 - 11.0 K/uL  
 RBC 2.86 (L) 3.80 - 5.20 M/uL HGB 8.3 (L) 11.5 - 16.0 g/dL HCT 27.0 (L) 35.0 - 47.0 % MCV 94.4 80.0 - 99.0 FL  
 MCH 29.0 26.0 - 34.0 PG  
 MCHC 30.7 30.0 - 36.5 g/dL RDW 21.7 (H) 11.5 - 14.5 % PLATELET 575 (H) 246 - 400 K/uL MPV 9.1 8.9 - 12.9 FL  
 NRBC 0.0 0  WBC ABSOLUTE NRBC 0.00 0.00 - 0.01 K/uL NEUTROPHILS 54 32 - 75 % LYMPHOCYTES 19 12 - 49 % MONOCYTES 22 (H) 5 - 13 % EOSINOPHILS 3 0 - 7 % BASOPHILS 1 0 - 1 % IMMATURE GRANULOCYTES 1 (H) 0.0 - 0.5 % ABS. NEUTROPHILS 2.5 1.8 - 8.0 K/UL  
 ABS. LYMPHOCYTES 0.8 0.8 - 3.5 K/UL  
 ABS. MONOCYTES 0.9 0.0 - 1.0 K/UL  
 ABS. EOSINOPHILS 0.1 0.0 - 0.4 K/UL  
 ABS. BASOPHILS 0.0 0.0 - 0.1 K/UL  
 ABS. IMM. GRANS. 0.0 0.00 - 0.04 K/UL  
 DF SMEAR SCANNED    
 RBC COMMENTS ANISOCYTOSIS 
1+ WBC COMMENTS TOXIC GRANULATION    
LD Collection Time: 04/24/19 11:03 AM  
Result Value Ref Range  81 - 246 U/L  
URIC ACID Collection Time: 04/24/19 11:03 AM  
Result Value Ref Range Uric acid 5.1 2.6 - 6.0 MG/DL  
PHOSPHORUS Collection Time: 04/24/19 11:03 AM  
Result Value Ref Range Phosphorus 3.7 2.6 - 4.7 MG/DL MAGNESIUM Collection Time: 04/24/19 11:03 AM  
Result Value Ref Range Magnesium 1.8 1.6 - 2.4 mg/dL URINALYSIS W/ REFLEX CULTURE Collection Time: 04/24/19 11:03 AM  
Result Value Ref Range Color YELLOW/STRAW Appearance CLOUDY (A) CLEAR Specific gravity 1.023 1.003 - 1.030    
 pH (UA) 6.0 5.0 - 8.0 Protein TRACE (A) NEG mg/dL Glucose NEGATIVE  NEG mg/dL Ketone TRACE (A) NEG mg/dL Bilirubin NEGATIVE  NEG Blood NEGATIVE  NEG Urobilinogen 1.0 0.2 - 1.0 EU/dL Nitrites NEGATIVE  NEG Leukocyte Esterase NEGATIVE  NEG    
 WBC 0-4 0 - 4 /hpf  
 RBC 0-5 0 - 5 /hpf Epithelial cells FEW FEW /lpf Bacteria 1+ (A) NEG /hpf  
 UA:UC IF INDICATED URINE CULTURE ORDERED (A) CNI Mucus 2+ (A) NEG /lpf Hyaline cast >20 (H) 0 - 5 /lpf SAMPLES BEING HELD Collection Time: 04/24/19 11:03 AM  
Result Value Ref Range SAMPLES BEING HELD 1SST COMMENT   Add-on orders for these samples will be processed based on acceptable specimen integrity and analyte stability, which may vary by analyte.  
 
1520. + 1 bacteria noted on patient's UA, culture sent. Patient is asymptomatic. MD office notified- per MD office, will wait to see what culture results say. Informed patient if she should develop symptoms of UTI, she should call MD office- verbalized understanding. Pre-medications  were administered as ordered and chemotherapy was initiated. Medications: 
Dexamethasone IVP Abraxane IV Gemzar IV 
NS KVO 
 
1525. Ms. Maggy Valdes tolerated treatment well and was discharged from [place] [address] in stable condition. Port de-accessed, flushed & heparinized per protocol. She is to return on 05/01/19 for her next appointment. Cortney Valera RN April 24, 2019

## 2019-05-01 NOTE — PROGRESS NOTES
Outpatient Infusion Center - Chemotherapy Progress Note 1100 Pt admit to [facility] for Clinical Trial:  Gemzar/Abraxane C7D8 ambulatory in stable condition. Assessment completed. No new concerns voiced. Port accessed with positive blood return. Labs drawn per order and sent. Line flushed, clamped, Curos Cap applied to end clave. Labs reviewed, chemo ordered Visit Vitals /69 (BP 1 Location: Right arm, BP Patient Position: Sitting) Pulse 85 Temp 98.1 °F (36.7 °C) Resp 18 Ht 5' 7\" (1.702 m) Wt 83.7 kg (184 lb 8 oz) BMI 28.90 kg/m² Medications: 
NS @ [facility] Decadron Abraxane Gemzar 1445 Pt tolerated treatment well. Port maintained positive blood return throughout treatment, flushed with positive blood return at conclusion and heparinized and de-accessed. D/c home ambulatory in no distress. Pt aware of next [facility] appointment scheduled for 5/8/19. Recent Results (from the past 12 hour(s)) CBC WITH AUTOMATED DIFF Collection Time: 05/01/19 11:11 AM  
Result Value Ref Range WBC 16.8 (H) 3.6 - 11.0 K/uL  
 RBC 3.05 (L) 3.80 - 5.20 M/uL HGB 8.8 (L) 11.5 - 16.0 g/dL HCT 28.1 (L) 35.0 - 47.0 % MCV 92.1 80.0 - 99.0 FL  
 MCH 28.9 26.0 - 34.0 PG  
 MCHC 31.3 30.0 - 36.5 g/dL RDW 20.4 (H) 11.5 - 14.5 % PLATELET 003 (H) 795 - 400 K/uL MPV 9.3 8.9 - 12.9 FL  
 NRBC 0.2 (H) 0  WBC ABSOLUTE NRBC 0.03 (H) 0.00 - 0.01 K/uL NEUTROPHILS 71 32 - 75 % BAND NEUTROPHILS 4 0 - 6 % LYMPHOCYTES 7 (L) 12 - 49 % MONOCYTES 6 5 - 13 % EOSINOPHILS 0 0 - 7 % BASOPHILS 1 0 - 1 % METAMYELOCYTES 4 (H) 0 % MYELOCYTES 7 (H) 0 % IMMATURE GRANULOCYTES 0 %  
 ABS. NEUTROPHILS 12.6 (H) 1.8 - 8.0 K/UL  
 ABS. LYMPHOCYTES 1.2 0.8 - 3.5 K/UL  
 ABS. MONOCYTES 1.0 0.0 - 1.0 K/UL  
 ABS. EOSINOPHILS 0.0 0.0 - 0.4 K/UL  
 ABS. BASOPHILS 0.2 (H) 0.0 - 0.1 K/UL  
 ABS. IMM.  GRANS. 0.0 K/UL  
 DF MANUAL    
 RBC COMMENTS ANISOCYTOSIS 
1+ 
    
 WBC COMMENTS TOXIC GRANULATION    
LD Collection Time: 05/01/19 11:11 AM  
Result Value Ref Range   (H) 81 - 246 U/L

## 2019-05-01 NOTE — PROGRESS NOTES
Pt in for labs and treatment. This is C7D8 of treatment. Patient reports the following adverse events at this time: gr 1 constipation, gr 2 fatigue, gr 1 alopecia, gr 1 insomnia. Vital signs are stable. Patient to receive gem/abraxane infusion. Next appt is scheduled for 5/8/19.

## 2019-05-08 NOTE — PROGRESS NOTES
Pt in for labs and follow up visit today per protocol with Dr. Moriah Arriaza and RN. This is C7D15 of treatment. Patient reports the following adverse events at this time: gr 1 loss of appetite, gr 1 constipation, gr 1 fatigue, gr 1 insomnia, gr 1 sob. Vital signs are stable. Patient to go to infusion center after appointment to receive gem/abraxane infusion. Next appt is scheduled for 5/22/19.

## 2019-05-08 NOTE — PROGRESS NOTES
Can[facility] [place] at [facility]  [address], [address] [facility] [address]  [place] Portal: [phone]  F: [phone]      Reason for Visit:   Demian Molina is a 69 y.o. female who is seen in self-referral for evaluation of pancreatic cancer. Treatment History:   · 10/4/18 pancreatic head mass FNA, pancreatic adenocarcinoma    10/15/18  Liver, Core Biopsy w/Touch Prep CYTOLOGIC INTERPRETATION:   · Numerous cores and fragments of benign hepatic parenchyma     10/24/18  Liver, Fine Needle Aspiration CYTOLOGIC INTERPRETATION:   Metastatic adenocarcinoma (see Comment). General Categorization   Positive for malignancy. Comment: The morphologic appearance is nonspecific with regard to site of origin but compatible with metastatic pancreatic carcinoma in this patient with known pancreatic adenocarcinoma ([id number]). 11/9/18 abraxane/gemcitabine    History of Present Illness:   She started having abdominal pain, gradual and persistent, x 1 month, pressure sensation, located in epigastrium, no radiation, no aggravating symptoms, no relieving factors. 25 lb weight loss over 6 months.  + nausea. Interval history:  In today for follow up. Complains of gr 1 loss of appetite, gr 1 constipation, gr 1 fatigue, gr 1 insomnia, gr 1 sob    Past Medical History:   Diagnosis Date    Arthritis     Constipation     Diabetes ([address]     Hemorrhoids     Hiatal hernia     High cholesterol     Hypertension     Pancreatic cancer ([address]       Past Surgical History:   Procedure Laterality Date    HX KNEE ARTHROSCOPY Right     HX ORTHOPAEDIC      left wrist surgery    HX TONSILLECTOMY        Social History     Tobacco Use    Smoking status: Never Smoker    Smokeless tobacco: Never Used   Substance Use Topics    Alcohol use:  Yes     Alcohol/week: 1.2 - 2.4 oz     Types: 1 - 2 Cans of beer, 1 - 2 Shots of liquor per week     Frequency: 2-4 times a month     Drinks per session: 1 or 2      Family History   Problem Relation Age of Onset    Cancer Mother         skin    Hypertension Mother     Heart Disease Mother     Cancer Father         skin    Heart Disease Father     Stroke Father     Diabetes Neg Hx      Current Outpatient Medications   Medication Sig    TRESIBA FLEXTOUCH U-200 200 unit/mL (3 mL) inpn 20 UNITS BY SUBCUTANEOUS ROUTE DAILY. OR AS DIRECTED UP TO 60 UNITS DAILY    GRANIX 480 mcg/0.8 mL syrg injection INJECT CONTENTS OF 1 SYRINGE UNDER THE SKIN FOR 3 DAYS ONCE WEEKLY (Patient taking differently: INJECT CONTENTS OF 1 SYRINGE UNDER THE SKIN FOR 2 DAYS ONCE WEEKLY)    glucose blood VI test strips (ACCU-CHEK RICHIE PLUS TEST STRP) strip Accu-Chek Richie Plus test strips   Check BS BID    OTHER Cranial prosthesis    Dx C25.7    omeprazole (PRILOSEC) 20 mg capsule Take 20 mg by mouth daily.  simvastatin (ZOCOR) 10 mg tablet Take  by mouth nightly.  telmisartan (MICARDIS) 80 mg tablet Take 80 mg by mouth daily.  hydroCHLOROthiazide (MICROZIDE) 12.5 mg capsule Take 12.5 mg by mouth daily.  ondansetron hcl (ZOFRAN) 8 mg tablet Take 1 Tab by mouth every eight (8) hours as needed for Nausea.  IBUPROFEN IB PO Take  by mouth as needed.  melatonin 3 mg tablet Take 12 mg by mouth nightly.  lidocaine-prilocaine (EMLA) topical cream Apply  to affected area as needed for Pain. No current facility-administered medications for this visit.       Facility-Administered Medications Ordered in Other Visits   Medication Dose Route Frequency    gemcitabine (GEMZAR) 1,960 mg in 0.9% sodium chloride 250 mL, overfill volume 25 mL chemo infusion  1,960 mg IntraVENous ONCE    PACLitaxel-Protein Bound (ABRAXANE) 245 mg chemo infusion  245 mg IntraVENous ONCE    dexamethasone (DECADRON) 4 mg/mL injection 8 mg  8 mg IntraVENous ONCE    prochlorperazine (COMPAZINE) with saline injection 10 mg  10 mg IntraVENous Q6H PRN      Allergies   Allergen Reactions    Amoxicillin Hives        Review of Systems: A comprehensive review of systems was performed and all systems were negative except for HPI and for the symptom review form, reviewed and scanned in. Physical Exam:     Visit Vitals  /69   Pulse 85   Temp 96.9 °F (36.1 °C) (Temporal)   Resp 18   Ht 5' 7\" (1.702 m)   Wt 186 lb 14.4 oz (84.8 kg)   SpO2 100%   BMI 29.27 kg/m²     ECOG PS: 0  General: No distress  Eyes: PERRLA, anicteric sclerae  HENT: Atraumatic, OP clear  Neck: Supple  Lymphatic: No cervical, supraclavicular, or inguinal adenopathy  Respiratory: CTAB, normal respiratory effort  CV: Normal rate, regular rhythm, no murmurs, no peripheral edema  GI: Soft, nontender, nondistended, no masses, no hepatomegaly, no splenomegaly  MS: Normal gait and station. Digits without clubbing or cyanosis. Skin: No rashes, ecchymoses, or petechiae. Normal temperature, turgor, and texture. Psych: Alert, oriented, appropriate affect, normal judgment/insight        Results:     Lab Results   Component Value Date/Time    WBC 5.7 05/08/2019 11:24 AM    HGB 8.0 (L) 05/08/2019 11:24 AM    HCT 25.3 (L) 05/08/2019 11:24 AM    PLATELET 409 80/75/6672 11:24 AM    MCV 91.0 05/08/2019 11:24 AM    ABS. NEUTROPHILS 4.0 05/08/2019 11:24 AM     Lab Results   Component Value Date/Time    Sodium 136 04/24/2019 11:03 AM    Potassium 3.9 04/24/2019 11:03 AM    Chloride 103 04/24/2019 11:03 AM    CO2 29 04/24/2019 11:03 AM    Glucose 230 (H) 04/24/2019 11:03 AM    BUN 8 04/24/2019 11:03 AM    Creatinine 0.79 04/24/2019 11:03 AM    GFR est AA >60 04/24/2019 11:03 AM    GFR est non-AA >60 04/24/2019 11:03 AM    Calcium 7.7 (L) 04/24/2019 11:03 AM    Glucose (POC) 168 (H) 04/15/2019 10:44 AM     Lab Results   Component Value Date/Time    Bilirubin, total 0.2 04/24/2019 11:03 AM    ALT (SGPT) 19 04/24/2019 11:03 AM    AST (SGOT) 11 (L) 04/24/2019 11:03 AM    Alk.  phosphatase 123 (H) 04/24/2019 11:03 AM    Protein, total 5.4 (L) 04/24/2019 11:03 AM    Albumin 2.7 (L) 04/24/2019 11:03 AM    Globulin 2.7 04/24/2019 11:03 AM     10/1/18 CT abd  There is a 3.8 x 1.9 cm mass involving the uncinate process and possibly  invading the duodenum. Findings are nonspecific but typical of pancreatic  carcinoma. There is no biliary or pancreatic ductal dilatation.     There is a poorly defined irregular hypodensity in segment IVb of the liver  measuring 3.7 x 1.9 cm. There appears to be focal biliary dilatation in the left  lobe behind this abnormality. Metastatic disease is suspected. There is a small,  1 cm hypodensity in the dome of the liver, likely segment 8. There is a 1 cm  hypodensity in segment 4 of the liver as well, also likely metastasis. Small  hypodensity measuring less than 1 cm in segment 6 at the tip laterally is also  nonspecific but probable metastasis.     Spleen kidneys and adrenals are unremarkable.     No free fluid or focal fluid collection.     Lung bases are clear.     Bone windows are unremarkable.     IMPRESSION  IMPRESSION:  1. 3.8 x 1.9 cm mass involving the uncinate process of the pancreas and possibly  invading the duodenum, this likely represents pancreatic carcinoma. 2. Likely metastatic disease in the liver. There is a 3.8 x 1.9 cm mass involving the uncinate process and possibly  invading the duodenum. Findings are nonspecific but typical of pancreatic  carcinoma. There is no biliary or pancreatic ductal dilatation.     There is a poorly defined irregular hypodensity in segment IVb of the liver  measuring 3.7 x 1.9 cm. There appears to be focal biliary dilatation in the left  lobe behind this abnormality. Metastatic disease is suspected. There is a small,  1 cm hypodensity in the dome of the liver, likely segment 8. There is a 1 cm  hypodensity in segment 4 of the liver as well, also likely metastasis.  Small  hypodensity measuring less than 1 cm in segment 6 at the tip laterally is also  nonspecific but probable metastasis.     Spleen kidneys and adrenals are unremarkable.     No free fluid or focal fluid collection.     Lung bases are clear.     Bone windows are unremarkable.     IMPRESSION:  1. 3.8 x 1.9 cm mass involving the uncinate process of the pancreas and possibly  invading the duodenum, this likely represents pancreatic carcinoma. 2. Likely metastatic disease in the liver. Records reviewed and summarized above. Pathology report(s) reviewed above. Radiology report(s) reviewed above. MRI abdomen 10/22/18: IMPRESSION:       1. Pancreatic uncinate process mass suspicious for pancreatic neoplasm, possibly  adenocarcinoma. Mass abuts the superior mesenteric artery with about 20%  circumference involvement but no luminal distortion or perivascular stranding. 2. Multiple hepatic lesions suspicious for metastatic neoplasm with segment IVb  lesion showing patchy areas of surrounding steatosis likely secondary to locally  altered intrahepatic hemodynamics and likely responsible for biopsy result. 3. Cholecystolithiasis and small gallbladder polyp. CT c/a/p 12/28/18: IMPRESSION:  Interval decrease in size of pancreatic mass. Slight interval decrease in size of hepatic metastases; these now demonstrate central low attenuation suggestive of necrosis. No evidence of new metastatic disease in the chest, abdomen, or pelvis. CT c/a/p 2/25/19:  LIVER: The lesions described on the prior examination have improved in the  interval. 19 mm lesion in the dome of the liver now measures 1 cm. 2.0 x 1.5 cm  lesion in the left hepatic lobe now measures 1.3 x 1.2 cm. Other smaller lesions  are no longer visualized. GALLBLADDER: Unremarkable. SPLEEN: No mass. PANCREAS: Mass lesion in the uncinate process now measures 1.7 x 2.0 cm,  previously measuring 2.0 x 3.0 cm. ADRENALS: Unremarkable. KIDNEYS: No mass, calculus, or hydronephrosis. STOMACH: Unremarkable. SMALL BOWEL: No dilatation or wall thickening. COLON: No dilatation or wall thickening.   APPENDIX: Unremarkable. PERITONEUM: No ascites or pneumoperitoneum. RETROPERITONEUM: No lymphadenopathy or aortic aneurysm. REPRODUCTIVE ORGANS: Intrauterine device projects in satisfactory position. URINARY BLADDER: No mass or calculus. BONES: Degenerative changes are seen in the thoracic and lumbar spine. ADDITIONAL COMMENTS: N/A     IMPRESSION  IMPRESSION:  Interval decrease in the size of the hepatic metastases. Decrease in the size of  the mass lesion involving the uncinate process.     RECIST:  Pancreatic uncinate mass: Current: (68) 1.7X2.0cm     12/28/2018: (71) 3.0 x  2.0 cm   Segment 4B liver mass: Current: (56) 1.3 x 1.2 cm 12/28/2018: (57) 2.0 x 1.5 cm   Segment 5 liver mass: Current: (68) 4 x 7 mm 12/28/2018: (70) 1.4 x 0.9 cm    CXR for port study 4/15/19:   IMPRESSION:  Right internal jugular chest Port-A-Cath is seen with the catheter terminating  in the mid SVC. The port is slightly angled medially due to breast tissue. The  port is not flipped. Access needle appears to be within the port hub. Nursing  staff was able to get good blood return and flush the port without difficulty. No intervention was performed. CT a/p 4/22/19:  IMPRESSION:  1. Slight decrease in size of liver metastases, detailed above. 2. Slight decrease in size in uncinate process mass. 3. No evidence for new metastatic disease.     RECIST:  Uncinate process mass: 15 x 9 mm, image 56, previously 17 x 11 mm. Segment IVb metastasis 11 x 9 mm, image 50, previously 13 x 12 mm  Segment 5 metastasis: Barely detectable, image 62     Assessment/plan:   1. Uncinate pancreatic adenocarcinoma,  mets to liver, stage IV:  cT2 cN0 pM1    Discussed that while this is treatable, it is not curable, the goal of therapy is palliative. Discussed that without therapy, life expectancy would be 3-6 months, with therapy 12-18 months on average.     As an example of likely chemotherapy, we discussed the risks and benefits of Gemcitabine and Abraxane chemotherapy. Potential side effects include, but are not limited to, nausea, vomiting, diarrhea, constipation, mucositis, taste changes, myelosuppression, infection, fatigue, alopecia, skin and nail changes, pulmonary toxicity, neuropathy, allergic reactions, infertility, and rarely, death. Discussed the [id number] study and the research nurse met with her today and she signed informed consent. · Gemcitabine (1000 mg/m2) and Abraxane (125 mg/m2) given on days 1,8,15 every 28 days. · Labs: CBC, BMP prior to each treatment. Hepatic function panel every 4 weeks. CA 19.9 prior to day 1  · Antiemetic prophylaxis: Dexamethasone prior to each treatment  · PRN antiemetics: Ondansetron and Prochlorperazine at home  · EMLA cream for port    On the control arm, C7d15 today, will see her back in 2 weeks for C8D1. CT a/p on 4/22/19 show response to treatment, next scans due mid June. We will plan to see the patient in follow up at least once per cycle, or sooner if symptoms warrant. 2. DM2:  Holding metformin; on insulin; saw Dr. Jennifer Means; her BS are improving; she is now taking less insulin    3. Emotional well being:  She has excellent support and is coping well with her disease    4. Abdominal pain:  Mild, may be due to constipation;  palliative care has seen    5. Nutrition:  Antelmo Stewart RD has met with her; holding on enzymes    6. Insomnia: Ongoing but no worse. Currently taking melatonin. She has lunesta if needed. 7. Anemia:  Due to chemo and disease; monitor; iron profile and ferritin normal    8. Neutropenia:  Due to chemo, started granix 480 mcg for 4 days following chemo with C1D15. Does report some pain with granix. Tried claritin, however this kept her awake. Recommend trying tylenol and advil PRN. Now at 2 days following chemo    9. Constipation:  Resolved for now so she is holding off on her stool softener, but she continues with Miralax. 10. Sinus congestion: Continues.  Clear/bloody sinus drainage. Recommend OTC sudafed and saline spray. She has seen Dr. Garo Camacho, ENT, s/p steroids. 11. Possible port flipped:  CXR prior to port study was negative.      Signed By: Nicolas Weiss MD

## 2019-05-08 NOTE — PROGRESS NOTES
Outpatient Infusion Center - Chemotherapy Progress Note 1100 Pt admit to [facility] for Clinical trial Gemcitibine/Abraxane C7D15 ambulatory in stable condition. Assessment completed. No new concerns voiced. PAC with positive blood return. Chemotherapy Flowsheet 5/8/2019 Cycle C7D15 Date 5/8/2019 Drug / Regimen Clinical trial: Gemcitibine/Abraxane Pre Meds -  
Notes - Patient Vitals for the past 12 hrs: 
 Temp Pulse Resp BP SpO2  
05/08/19 1554 97.6 °F (36.4 °C) 75 18 161/80   
05/08/19 1111 96.9 °F (36.1 °C) 85 18 128/69 100 % Pt declines pregnancy Medications: 
Decadron IVP Abraxane IV Gemcitibine IV 
 
1600 Pt tolerated treatment well. PAC maintained positive blood return throughout treatment, flushed with positive blood return at conclusion. D/c home ambulatory in no distress. Pt aware of next appointment scheduled for 5/22 at 6. Pt declined copy of AVS 
 
Recent Results (from the past 12 hour(s)) CBC WITH AUTOMATED DIFF Collection Time: 05/08/19 11:24 AM  
Result Value Ref Range WBC 5.7 3.6 - 11.0 K/uL  
 RBC 2.78 (L) 3.80 - 5.20 M/uL HGB 8.0 (L) 11.5 - 16.0 g/dL HCT 25.3 (L) 35.0 - 47.0 % MCV 91.0 80.0 - 99.0 FL  
 MCH 28.8 26.0 - 34.0 PG  
 MCHC 31.6 30.0 - 36.5 g/dL RDW 20.5 (H) 11.5 - 14.5 % PLATELET 281 493 - 003 K/uL MPV 9.6 8.9 - 12.9 FL  
 NRBC 0.0 0  WBC ABSOLUTE NRBC 0.00 0.00 - 0.01 K/uL NEUTROPHILS 70 32 - 75 % BAND NEUTROPHILS 1 0 - 6 % LYMPHOCYTES 14 12 - 49 % MONOCYTES 7 5 - 13 % EOSINOPHILS 0 0 - 7 % BASOPHILS 0 0 - 1 % METAMYELOCYTES 6 (H) 0 % MYELOCYTES 2 (H) 0 % IMMATURE GRANULOCYTES 0 %  
 ABS. NEUTROPHILS 4.0 1.8 - 8.0 K/UL  
 ABS. LYMPHOCYTES 0.8 0.8 - 3.5 K/UL  
 ABS. MONOCYTES 0.4 0.0 - 1.0 K/UL  
 ABS. EOSINOPHILS 0.0 0.0 - 0.4 K/UL  
 ABS. BASOPHILS 0.0 0.0 - 0.1 K/UL  
 ABS. IMM.  GRANS. 0.0 K/UL  
 DF MANUAL    
 RBC COMMENTS MICROCYTOSIS 
PRESENT 
    
 RBC COMMENTS ANISOCYTOSIS 
PRESENT 
    
 LD  
 Collection Time: 05/08/19 11:24 AM  
Result Value Ref Range   81 - 246 U/L

## 2019-05-22 PROBLEM — D64.9 ANEMIA: Status: ACTIVE | Noted: 2019-01-01

## 2019-05-22 NOTE — PROGRESS NOTES
[facility] [place] at [address], [address] [place] [address] [address]  Frank Nguyen: [phone]  F: [phone]      Reason for Visit:   Fang Gomez is a 69 y.o. female who is seen in self-referral for evaluation of pancreatic cancer. Treatment History:   · 10/4/18 pancreatic head mass FNA, pancreatic adenocarcinoma    10/15/18  Liver, Core Biopsy w/Touch Prep CYTOLOGIC INTERPRETATION:   · Numerous cores and fragments of benign hepatic parenchyma     10/24/18  Liver, Fine Needle Aspiration CYTOLOGIC INTERPRETATION:   Metastatic adenocarcinoma (see Comment). General Categorization   Positive for malignancy. Comment: The morphologic appearance is nonspecific with regard to site of origin but compatible with metastatic pancreatic carcinoma in this patient with known pancreatic adenocarcinoma ([id number]). 11/9/18 abraxane/gemcitabine    History of Present Illness:   She started having abdominal pain, gradual and persistent, x 1 month, pressure sensation, located in epigastrium, no radiation, no aggravating symptoms, no relieving factors. 25 lb weight loss over 6 months.  + nausea. Interval history:  In today for follow up. Complains of gr 2 loss of appetite, gr 1 constipation, gr 1 fatigue, gr 1 hair loss, gr 1 insomnia, gr 1 sob, gr 1 neuropathy, gr 1 urinary leakage. Past Medical History:   Diagnosis Date    Arthritis     Constipation     Diabetes ([address]     Hemorrhoids     Hiatal hernia     High cholesterol     Hypertension     Pancreatic cancer ([address]       Past Surgical History:   Procedure Laterality Date    HX KNEE ARTHROSCOPY Right     HX ORTHOPAEDIC      left wrist surgery    HX TONSILLECTOMY        Social History     Tobacco Use    Smoking status: Never Smoker    Smokeless tobacco: Never Used   Substance Use Topics    Alcohol use:  Yes     Alcohol/week: 1.2 - 2.4 oz     Types: 1 - 2 Cans of beer, 1 - 2 Shots of liquor per week     Frequency: 2-4 times a month     Drinks per session: 1 or 2      Family History   Problem Relation Age of Onset    Cancer Mother         skin    Hypertension Mother     Heart Disease Mother     Cancer Father         skin    Heart Disease Father     Stroke Father     Diabetes Neg Hx      Current Outpatient Medications   Medication Sig    TRESIBA FLEXTOUCH U-200 200 unit/mL (3 mL) inpn 10-12 UNITS BY SUBCUTANEOUS ROUTE DAILY. OR AS DIRECTED UP TO 60 UNITS DAILY    GRANIX 480 mcg/0.8 mL syrg injection INJECT CONTENTS OF 1 SYRINGE UNDER THE SKIN FOR 3 DAYS ONCE WEEKLY (Patient taking differently: INJECT CONTENTS OF 1 SYRINGE UNDER THE SKIN FOR 2 DAYS ONCE WEEKLY)    glucose blood VI test strips (ACCU-CHEK RICIHE PLUS TEST STRP) strip Accu-Chek Richie Plus test strips   Check BS BID    OTHER Cranial prosthesis    Dx C25.7    omeprazole (PRILOSEC) 20 mg capsule Take 20 mg by mouth daily.  simvastatin (ZOCOR) 10 mg tablet Take  by mouth nightly.  telmisartan (MICARDIS) 80 mg tablet Take 80 mg by mouth daily.  hydroCHLOROthiazide (MICROZIDE) 12.5 mg capsule Take 12.5 mg by mouth daily.  ondansetron hcl (ZOFRAN) 8 mg tablet Take 1 Tab by mouth every eight (8) hours as needed for Nausea.  IBUPROFEN IB PO Take  by mouth as needed.  lidocaine-prilocaine (EMLA) topical cream Apply  to affected area as needed for Pain. No current facility-administered medications for this visit.       Facility-Administered Medications Ordered in Other Visits   Medication Dose Route Frequency    sodium chloride 0.9% injection 10 mL  10 mL IntraVENous PRN    heparin (porcine) pf 500 Units  500 Units IntraVENous PRN    sodium chloride (NS) flush 5-10 mL  5-10 mL IntraVENous PRN    gemcitabine (GEMZAR) 1,960 mg in 0.9% sodium chloride 250 mL, overfill volume 25 mL chemo infusion  1,960 mg IntraVENous ONCE    PACLitaxel-Protein Bound (ABRAXANE) 245 mg chemo infusion  245 mg IntraVENous ONCE    dexamethasone (DECADRON) 4 mg/mL injection 8 mg  8 mg IntraVENous ONCE    prochlorperazine (COMPAZINE) with saline injection 10 mg  10 mg IntraVENous Q6H PRN      Allergies   Allergen Reactions    Amoxicillin Hives        Review of Systems: A comprehensive review of systems was performed and all systems were negative except for HPI and for the symptom review form, reviewed and scanned in. Physical Exam:     Visit Vitals  /62   Pulse 79   Temp 97 °F (36.1 °C) (Temporal)   Resp 18   Ht 5' 7.01\" (1.702 m)   Wt 187 lb 1.6 oz (84.9 kg)   SpO2 98%   BMI 29.30 kg/m²     ECOG PS: 0  General: No distress  Eyes: PERRLA, anicteric sclerae  HENT: Atraumatic, OP clear  Neck: Supple  Lymphatic: No cervical, supraclavicular, or inguinal adenopathy  Respiratory: CTAB, normal respiratory effort  CV: Normal rate, regular rhythm, no murmurs, no peripheral edema  GI: Soft, nontender, nondistended, no masses, no hepatomegaly, no splenomegaly  MS: Normal gait and station. Digits without clubbing or cyanosis. Skin: No rashes, ecchymoses, or petechiae. Normal temperature, turgor, and texture. Psych: Alert, oriented, appropriate affect, normal judgment/insight        Results:     Lab Results   Component Value Date/Time    WBC 5.3 05/22/2019 11:18 AM    HGB 8.3 (L) 05/22/2019 11:18 AM    HCT 27.0 (L) 05/22/2019 11:18 AM    PLATELET 695 (H) 82/57/6532 11:18 AM    MCV 94.1 05/22/2019 11:18 AM    ABS.  NEUTROPHILS 3.2 05/22/2019 11:18 AM     Lab Results   Component Value Date/Time    Sodium 136 04/24/2019 11:03 AM    Potassium 3.9 04/24/2019 11:03 AM    Chloride 103 04/24/2019 11:03 AM    CO2 29 04/24/2019 11:03 AM    Glucose 230 (H) 04/24/2019 11:03 AM    BUN 8 04/24/2019 11:03 AM    Creatinine 0.79 04/24/2019 11:03 AM    GFR est AA >60 04/24/2019 11:03 AM    GFR est non-AA >60 04/24/2019 11:03 AM    Calcium 7.7 (L) 04/24/2019 11:03 AM    Glucose (POC) 168 (H) 04/15/2019 10:44 AM     Lab Results   Component Value Date/Time    Bilirubin, total 0.2 04/24/2019 11:03 AM    ALT (SGPT) 19 04/24/2019 11:03 AM    AST (SGOT) 11 (L) 04/24/2019 11:03 AM    Alk. phosphatase 123 (H) 04/24/2019 11:03 AM    Protein, total 5.4 (L) 04/24/2019 11:03 AM    Albumin 2.7 (L) 04/24/2019 11:03 AM    Globulin 2.7 04/24/2019 11:03 AM     10/1/18 CT abd  There is a 3.8 x 1.9 cm mass involving the uncinate process and possibly  invading the duodenum. Findings are nonspecific but typical of pancreatic  carcinoma. There is no biliary or pancreatic ductal dilatation.     There is a poorly defined irregular hypodensity in segment IVb of the liver  measuring 3.7 x 1.9 cm. There appears to be focal biliary dilatation in the left  lobe behind this abnormality. Metastatic disease is suspected. There is a small,  1 cm hypodensity in the dome of the liver, likely segment 8. There is a 1 cm  hypodensity in segment 4 of the liver as well, also likely metastasis. Small  hypodensity measuring less than 1 cm in segment 6 at the tip laterally is also  nonspecific but probable metastasis.     Spleen kidneys and adrenals are unremarkable.     No free fluid or focal fluid collection.     Lung bases are clear.     Bone windows are unremarkable.     IMPRESSION  IMPRESSION:  1. 3.8 x 1.9 cm mass involving the uncinate process of the pancreas and possibly  invading the duodenum, this likely represents pancreatic carcinoma. 2. Likely metastatic disease in the liver. There is a 3.8 x 1.9 cm mass involving the uncinate process and possibly  invading the duodenum. Findings are nonspecific but typical of pancreatic  carcinoma. There is no biliary or pancreatic ductal dilatation.     There is a poorly defined irregular hypodensity in segment IVb of the liver  measuring 3.7 x 1.9 cm. There appears to be focal biliary dilatation in the left  lobe behind this abnormality. Metastatic disease is suspected. There is a small,  1 cm hypodensity in the dome of the liver, likely segment 8.  There is a 1 cm  hypodensity in segment 4 of the liver as well, also likely metastasis. Small  hypodensity measuring less than 1 cm in segment 6 at the tip laterally is also  nonspecific but probable metastasis.     Spleen kidneys and adrenals are unremarkable.     No free fluid or focal fluid collection.     Lung bases are clear.     Bone windows are unremarkable.     IMPRESSION:  1. 3.8 x 1.9 cm mass involving the uncinate process of the pancreas and possibly  invading the duodenum, this likely represents pancreatic carcinoma. 2. Likely metastatic disease in the liver. Records reviewed and summarized above. Pathology report(s) reviewed above. Radiology report(s) reviewed above. MRI abdomen 10/22/18: IMPRESSION:       1. Pancreatic uncinate process mass suspicious for pancreatic neoplasm, possibly  adenocarcinoma. Mass abuts the superior mesenteric artery with about 20%  circumference involvement but no luminal distortion or perivascular stranding. 2. Multiple hepatic lesions suspicious for metastatic neoplasm with segment IVb  lesion showing patchy areas of surrounding steatosis likely secondary to locally  altered intrahepatic hemodynamics and likely responsible for biopsy result. 3. Cholecystolithiasis and small gallbladder polyp. CT c/a/p 12/28/18: IMPRESSION:  Interval decrease in size of pancreatic mass. Slight interval decrease in size of hepatic metastases; these now demonstrate central low attenuation suggestive of necrosis. No evidence of new metastatic disease in the chest, abdomen, or pelvis. CT c/a/p 2/25/19:  LIVER: The lesions described on the prior examination have improved in the  interval. 19 mm lesion in the dome of the liver now measures 1 cm. 2.0 x 1.5 cm  lesion in the left hepatic lobe now measures 1.3 x 1.2 cm. Other smaller lesions  are no longer visualized. GALLBLADDER: Unremarkable. SPLEEN: No mass.   PANCREAS: Mass lesion in the uncinate process now measures 1.7 x 2.0 cm,  previously measuring 2.0 x 3.0 cm.  ADRENALS: Unremarkable. KIDNEYS: No mass, calculus, or hydronephrosis. STOMACH: Unremarkable. SMALL BOWEL: No dilatation or wall thickening. COLON: No dilatation or wall thickening. APPENDIX: Unremarkable. PERITONEUM: No ascites or pneumoperitoneum. RETROPERITONEUM: No lymphadenopathy or aortic aneurysm. REPRODUCTIVE ORGANS: Intrauterine device projects in satisfactory position. URINARY BLADDER: No mass or calculus. BONES: Degenerative changes are seen in the thoracic and lumbar spine. ADDITIONAL COMMENTS: N/A     IMPRESSION  IMPRESSION:  Interval decrease in the size of the hepatic metastases. Decrease in the size of  the mass lesion involving the uncinate process.     RECIST:  Pancreatic uncinate mass: Current: (68) 1.7X2.0cm     12/28/2018: (71) 3.0 x  2.0 cm   Segment 4B liver mass: Current: (56) 1.3 x 1.2 cm 12/28/2018: (57) 2.0 x 1.5 cm   Segment 5 liver mass: Current: (68) 4 x 7 mm 12/28/2018: (70) 1.4 x 0.9 cm    CXR for port study 4/15/19:   IMPRESSION:  Right internal jugular chest Port-A-Cath is seen with the catheter terminating  in the mid SVC. The port is slightly angled medially due to breast tissue. The  port is not flipped. Access needle appears to be within the port hub. Nursing  staff was able to get good blood return and flush the port without difficulty. No intervention was performed. CT a/p 4/22/19:  IMPRESSION:  1. Slight decrease in size of liver metastases, detailed above. 2. Slight decrease in size in uncinate process mass. 3. No evidence for new metastatic disease.     RECIST:  Uncinate process mass: 15 x 9 mm, image 56, previously 17 x 11 mm. Segment IVb metastasis 11 x 9 mm, image 50, previously 13 x 12 mm  Segment 5 metastasis: Barely detectable, image 62     Assessment/plan:   1. Uncinate pancreatic adenocarcinoma,  mets to liver, stage IV:  cT2 cN0 pM1    Discussed that while this is treatable, it is not curable, the goal of therapy is palliative.   Discussed that without therapy, life expectancy would be 3-6 months, with therapy 12-18 months on average. As an example of likely chemotherapy, we discussed the risks and benefits of Gemcitabine and Abraxane chemotherapy. Potential side effects include, but are not limited to, nausea, vomiting, diarrhea, constipation, mucositis, taste changes, myelosuppression, infection, fatigue, alopecia, skin and nail changes, pulmonary toxicity, neuropathy, allergic reactions, infertility, and rarely, death. Discussed the [id number] study and the research nurse met with her today and she signed informed consent. · Gemcitabine (1000 mg/m2) and Abraxane (125 mg/m2) given on days 1,8,15 every 28 days. · Labs: CBC, BMP prior to each treatment. Hepatic function panel every 4 weeks. CA 19.9 prior to day 1  · Antiemetic prophylaxis: Dexamethasone prior to each treatment  · PRN antiemetics: Ondansetron and Prochlorperazine at home  · EMLA cream for port    On the control arm, C8d1 today, will see her back in 2 weeks for C8D15. CT a/p on 4/22/19 show response to treatment, next scans due mid June, prior to cycle 9. We will plan to see the patient in follow up at least once per cycle, or sooner if symptoms warrant. 2. DM2:  Holding metformin; on insulin; saw Dr. Black Mccall; her BS are improving; she is now taking less insulin    3. Emotional well being:  She has excellent support and is coping well with her disease    4. Abdominal pain:  Mild, may be due to constipation;  palliative care has seen    5. Nutrition:  Delgado Bennett RD has met with her; holding on enzymes    6. Insomnia: Ongoing but no worse. Currently taking melatonin. She has lunesta if needed. 7. Anemia:  Due to chemo and disease; monitor; iron profile and ferritin normal; may try injectafer 750 mg IV today or at next chemo dose    8. Neutropenia:  Due to chemo, started granix 480 mcg for 4 days following chemo with C1D15. Does report some pain with granix.   Tried claritin, however this kept her awake. Recommend trying tylenol and advil PRN. Now at 2 days following chemo    9. Constipation:  Resolved for now so she is holding off on her stool softener, but she continues with Miralax. 10. Sinus congestion: Intermittent but has improved. Clear/bloody sinus drainage. Recommend OTC sudafed and saline spray. She has seen Dr. Glenny Hinds, ENT, s/p steroids. 11. Possible port flipped:  CXR prior to port study was negative. 12. Fatigue: Due to treatment. Ongoing but stable. Will monitor.      Signed By: Sheree Landin MD

## 2019-05-22 NOTE — PROGRESS NOTES
[facility] VISIT NOTE    4357. Pt arrived at [facility] ambulatory and in no distress for C8D1 Clinical Trial Abraxane/Gemzar. Assessment completed, pt c/o sinus congestion. RCW chest port accessed with . 75 in garcia with no difficulty. Positive blood return noted and labs drawn. Pt to see MD. Nic Cortez resulted and are within parameters to treat. Medications received:  NS KVO  Dexamethasone IV  Abraxane IV  Gemzar IV  Injectafer IV    Tolerated treatment well, no adverse reaction noted. Port de-accessed and flushed per protocol. Positive blood return noted. Patient Vitals for the past 12 hrs:   Temp Pulse Resp BP   05/22/19 1505 96.3 °F (35.7 °C) 68 18 135/64   05/22/19 1106 97 °F (36.1 °C) 79 18 113/62     Recent Results (from the past 12 hour(s))   METABOLIC PANEL, COMPREHENSIVE    Collection Time: 05/22/19 11:18 AM   Result Value Ref Range    Sodium 136 136 - 145 mmol/L    Potassium 4.1 3.5 - 5.1 mmol/L    Chloride 104 97 - 108 mmol/L    CO2 29 21 - 32 mmol/L    Anion gap 3 (L) 5 - 15 mmol/L    Glucose 224 (H) 65 - 100 mg/dL    BUN 7 6 - 20 MG/DL    Creatinine 0.77 0.55 - 1.02 MG/DL    BUN/Creatinine ratio 9 (L) 12 - 20      GFR est AA >60 >60 ml/min/1.73m2    GFR est non-AA >60 >60 ml/min/1.73m2    Calcium 8.3 (L) 8.5 - 10.1 MG/DL    Bilirubin, total 0.5 0.2 - 1.0 MG/DL    ALT (SGPT) 22 12 - 78 U/L    AST (SGOT) 9 (L) 15 - 37 U/L    Alk.  phosphatase 125 (H) 45 - 117 U/L    Protein, total 5.8 (L) 6.4 - 8.2 g/dL    Albumin 3.0 (L) 3.5 - 5.0 g/dL    Globulin 2.8 2.0 - 4.0 g/dL    A-G Ratio 1.1 1.1 - 2.2     MAGNESIUM    Collection Time: 05/22/19 11:18 AM   Result Value Ref Range    Magnesium 2.1 1.6 - 2.4 mg/dL   PHOSPHORUS    Collection Time: 05/22/19 11:18 AM   Result Value Ref Range    Phosphorus 4.3 2.6 - 4.7 MG/DL   URIC ACID    Collection Time: 05/22/19 11:18 AM   Result Value Ref Range    Uric acid 6.4 (H) 2.6 - 6.0 MG/DL   CBC WITH AUTOMATED DIFF    Collection Time: 05/22/19 11:18 AM   Result Value Ref Range    WBC 5.3 3.6 - 11.0 K/uL    RBC 2.87 (L) 3.80 - 5.20 M/uL    HGB 8.3 (L) 11.5 - 16.0 g/dL    HCT 27.0 (L) 35.0 - 47.0 %    MCV 94.1 80.0 - 99.0 FL    MCH 28.9 26.0 - 34.0 PG    MCHC 30.7 30.0 - 36.5 g/dL    RDW 22.5 (H) 11.5 - 14.5 %    PLATELET 585 (H) 433 - 400 K/uL    MPV 9.4 8.9 - 12.9 FL    NRBC 0.0 0  WBC    ABSOLUTE NRBC 0.00 0.00 - 0.01 K/uL    NEUTROPHILS 62 32 - 75 %    LYMPHOCYTES 17 12 - 49 %    MONOCYTES 16 (H) 5 - 13 %    EOSINOPHILS 3 0 - 7 %    BASOPHILS 1 0 - 1 %    IMMATURE GRANULOCYTES 1 (H) 0.0 - 0.5 %    ABS. NEUTROPHILS 3.2 1.8 - 8.0 K/UL    ABS. LYMPHOCYTES 0.9 0.8 - 3.5 K/UL    ABS. MONOCYTES 0.8 0.0 - 1.0 K/UL    ABS. EOSINOPHILS 0.2 0.0 - 0.4 K/UL    ABS. BASOPHILS 0.1 0.0 - 0.1 K/UL    ABS. IMM. GRANS. 0.1 (H) 0.00 - 0.04 K/UL    DF AUTOMATED      RBC COMMENTS ANISOCYTOSIS  2+        RBC COMMENTS POLYCHROMASIA  1+        RBC COMMENTS MICROCYTOSIS  1+        WBC COMMENTS TOXIC GRANULATION     LD    Collection Time: 05/22/19 11:18 AM   Result Value Ref Range     81 - 246 U/L   URINALYSIS W/ REFLEX CULTURE    Collection Time: 05/22/19 11:18 AM   Result Value Ref Range    Color YELLOW/STRAW      Appearance CLEAR CLEAR      Specific gravity 1.011 1.003 - 1.030      pH (UA) 6.0 5.0 - 8.0      Protein NEGATIVE  NEG mg/dL    Glucose NEGATIVE  NEG mg/dL    Ketone NEGATIVE  NEG mg/dL    Bilirubin NEGATIVE  NEG      Blood NEGATIVE  NEG      Urobilinogen 0.2 0.2 - 1.0 EU/dL    Nitrites NEGATIVE  NEG      Leukocyte Esterase NEGATIVE  NEG      WBC 0-4 0 - 4 /hpf    RBC 0-5 0 - 5 /hpf    Epithelial cells FEW FEW /lpf    Bacteria NEGATIVE  NEG /hpf    UA:UC IF INDICATED CULTURE NOT INDICATED BY UA RESULT CNI      Hyaline cast 2-5 0 - 5 /lpf     1510. D/C'd from [facility] ambulatory and in no distress.  Next appointment is 5/29/19 at 11:00 am.

## 2019-05-22 NOTE — PROGRESS NOTES
Pt in for labs and follow up visit today per protocol with Dr. Bola Lynch and RN. This is C8D1 of treatment. Patient reports the following adverse events at this time: gr 2 loss of appetite, gr 1 constipation, gr 1 fatigue, gr 1 hair loss, gr 1 insomnia, gr 1 sob, gr 1 neuropathy, gr 1 urinary leakage. Vital signs are stable. Patient to go to infusion center after appointment to receive gem/abraxane. Next appt is scheduled for 5/29/19.

## 2019-05-28 NOTE — PROGRESS NOTES
Palliative Medicine Office Visit Palliative Medicine Nurse Check In 
[phone] Patient Name: Ruben Bocanegra YOB: 1945 Date of Office Visit: 5/28/2019 Patient states: \"  \" 
 
From Check In Sheet (scanned in Media): 
Has a medical provider talked with you about cardiopulmonary resuscitation (CPR)? [x] Yes   [] No   [] Unable to obtain Nurse reminder to complete or update ACP FlowSheet: 
 
Is ACP on the Problem List?    [] Yes    [x] No 
IF ACP Document is ON FILE; Nurse to place ACP on Problem List  
 
Is there an ACP Note in Chart Review/Note? [] Yes    [x] No  
If NO: ALERT PROVIDER Advance Care Planning 5/8/2019 Patient's Healthcare Decision Maker is: Legal Next of Kin Confirm Advance Directive Yes, not on file Patient Would Like to Complete Advance Directive - Is there anything that we should know about you as a person in order to provide you the best care possible? Have you been to the ER, urgent care clinic since your last visit? [] Yes   [x] No   [] Unable to obtain Have you been hospitalized since your last visit? [] Yes   [x] No   [] Unable to obtain Have you seen or consulted any other health care providers outside of the [address] [place] [address] since your last visit? [] Yes   [x] No   [] Unable to obtain Functional status (describe):  
 
 
 
Last BM: 5/27/2019  accessed (date): 5/28/2019 Bottle review (for opioid pain medication): 
Medication 1:  
Date filled:  
Directions:  
# filled: # left: # pills taking per day: 
Last dose taken: 
 
Medication 2:  
Date filled:  
Directions:  
# filled: # left: # pills taking per day: 
Last dose taken: 
 
Medication 3:  
Date filled:  
Directions:  
# filled: # left: # pills taking per day: 
Last dose taken: 
 
Medication 4:  
Date filled:  
Directions:  
# filled: # left: # pills taking per day: 
Last dose taken:

## 2019-05-28 NOTE — PROGRESS NOTES
Palliative Medicine Outpatient Services [place]: [phone] ([phone]) Patient Name: Wild Dee YOB: 1945 Date of Current Visit: 19 Location of Current Visit:   
[x] [facility] Office 
[] [facility] Office [] [address] [place] [address] 
[] Home 
[] Other:   
 
Date of Initial Visit: 2018 Requesting Physician: Dr. Trey Mejias Primary Care Physician: Mariana Lynn MD 
  
 SUMMARY:  
Wild Dee is a 69y.o. year old with a  history of diabetes on insulin, newly diagnosed pancreatic cancer with metastases to the liver on chemotherapy with gemcitabine Abraxane as part of a clinical trial at [address] [place] [address], who was referred to Palliative Medicine by Dr. Trey Mejias for management of symptoms and support. 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 Son Waldemar Mcneill lives nearby. Has very good friends and family support CT from 2019 shows good response to chemotherapy PALLIATIVE DIAGNOSES:  
 
  ICD-10-CM ICD-9-CM 1. Abdominal discomfort R10.9 789.00   
2. Nausea without vomiting R11.0 787.02   
3. Neoplastic malignant related fatigue R53.0 780.79   
4. Pancreatic cancer metastasized to liver (HCC) C25.9 157.9 C78.7 197.7 PLAN:  
Patient Instructions Dear Wild Dee , It was a pleasure seeing you today at [facility] office Your described symptoms were: Fatigue: 5 Drowsiness: 1 Depression: 0 Pain: 0 Anxiety: 0 Nausea: 1 Anorexia: 4 Dyspnea: 1 Best Well-Bein Constipation: Yes This is the plan we talked about: 1. Abdominal discomfort - This is no longer a problem. You have not needed any pain medicine in a while. 2. Fatigue - You have considerable fatigue following chemotherapy. - You just want to wait and see how you do. 3. Chemo induced nausea - You are very well controlled with your current regimen. -We reviewed the images from your recent scans on February 25 and I am glad the disease is responding really well to chemotherapy. Your scans in April have been stable 4. Acid reflux 
-You are on Prilosec daily, please continue that. 5.  Goals of care 
-You feel good about starting chemotherapy. You have good support. 
-You are in the process of completing a medical directive along with living will etc. with a . Please bring us a copy when you have it. 
-We discussed your wishes regarding CODE STATUS and you want to remain full code for now. This is what you have shared with us about Advance Care Planning Advance Care Planning 5/8/2019 Patient's Healthcare Decision Maker is: Legal Next of Kin Confirm Advance Directive Yes, not on file Patient Would Like to Complete Advance Directive - The Palliative Medicine Team is here to support you and your family. We will see you again in 3 months Sincerely, 
 
 
Karen Brock MD and the Palliative Medicine Team 
 
 
Counseling and Coordination: See above 
- 12/28 CT scan reviewed in detail GOALS OF CARE / TREATMENT PREFERENCES:  
[====Goals of Care====] GOALS OF CARE: 
Patient / health care proxy stated goals: Full code TREATMENT PREFERENCES:  
Code Status:  [x] Attempt Resuscitation       [] Do Not Attempt Resuscitation Advance Care Planning: 
Advance Care Planning 5/8/2019 Patient's Healthcare Decision Maker is: Legal Next of Kin Confirm Advance Directive Yes, not on file Patient Would Like to Complete Advance Directive - Other: 
(If patient appropriate for POST, consider using PALLPOST smart phrase here) The palliative care team has discussed with patient / health care proxy about goals of care / treatment preferences for patient. 
[====Goals of Care====] PRESCRIPTIONS GIVEN:  
No orders of the defined types were placed in this encounter. FOLLOW UP: Future Appointments Date Time Provider Shiva Zavala 5/29/2019 11:00 AM [facility]  
6/5/2019 11:00 AM SS INF3 CH4 <2H [facility]  
6/5/2019 11:30 AM Davida Medina NP [facility]  
9/23/2019  1:30 PM Sherley Allen MD RDE YOGESH [address] [facility] [address] PHYSICIANS INVOLVED IN CARE:  
Patient Care Team: 
Sol Ivey MD as PCP - General (Internal Medicine) Mardell Cowden, MD (Hematology and Oncology) HISTORY:  
Nursing documentation from date of visit reviewed. Reviewed patient-completed ESAS and advance care planning form. Reviewed patient record in prescription monitoring program. 
 
CHIEF COMPLAINT: None HPI/SUBJECTIVE: The patient is: [x] Verbal / [] Nonverbal  
 
Patient here alone. She looks really well, feels well, tolerating chemo well except for days on Granix where she has muscle aches. She is not on any opioids, does not need them. She is very happy because her recent scans show very good response to chemotherapy. We went over the images of the CT scan to help understand. She is more tired than usual.  She usually about a week to recover from chemotherapy back to baseline but this last round she was more tired than usual and needed more breaks. She is very sad about her cat Brenna, she had an accident and her tail may have to be amputated. She is very upset about that.  
 
----------- Pleasant woman, here alone, appears younger than stated age. Has no specific complaints. Has some mild abdominal discomfort for which he takes hydrocodone [place] occasionally. She tells me that she has handled the diagnosis of pancreatic cancer much better than most people expected. She wants to tolerate chemotherapy as well as she can. She has very good understanding of her disease. Clinical Pain Assessment (nonverbal scale for nonverbal patients):  
[++++ Clinical Pain Assessment++++] [++++Pain Severity++++]: Pain: 0 
 [++++Pain Character++++]: deep gnawing 
[++++Pain Duration++++]: weeks [++++Pain Effect++++]: none in particular 
[++++Pain Factors++++]: in particular 
[++++Pain Frequency++++]: on and off 
[++++Pain Location++++]: upper and left-sided abdomen 
[++++ Clinical Pain Assessment++++] FUNCTIONAL ASSESSMENT:  
 
Palliative Performance Scale (PPS): PPS: 0355 PSYCHOSOCIAL/SPIRITUAL SCREENING:  
 
Any spiritual / Faith concerns: 
[] Yes /  [x] No 
 
Caregiver Burnout: 
[] Yes /  [x] No /  [] No Caregiver Present Anticipatory grief assessment:  
[x] Normal  / [] Maladaptive ESAS Anxiety: Anxiety: 0 
 
ESAS Depression: Depression: 0 REVIEW OF SYSTEMS:  
 
The following systems were [] reviewed / [] unable to be reviewed Systems: constitutional, ears/nose/mouth/throat, respiratory, gastrointestinal, genitourinary, musculoskeletal, integumentary, neurologic, psychiatric, endocrine. Positive findings noted below. Modified ESAS Completed by: provider Fatigue: 5 Drowsiness: 1 Depression: 0 Pain: 0 Anxiety: 0 Nausea: 1 Anorexia: 4 Dyspnea: 1 Best Well-Bein Constipation: Yes PHYSICAL EXAM:  
 
Wt Readings from Last 3 Encounters:  
19 187 lb 14.4 oz (85.2 kg) 19 187 lb 1.6 oz (84.9 kg) 19 187 lb 1.6 oz (84.9 kg) Blood pressure 154/79, pulse 98, temperature 98.2 °F (36.8 °C), temperature source Oral, resp. rate 18, height 5' 7\" (1.702 m), weight 187 lb 14.4 oz (85.2 kg), SpO2 (!) 76 %. Last bowel movement: See Nursing Note Constitutional: Alert, oriented, appears well Eyes: pupils equal, anicteric ENMT: no nasal discharge, moist mucous membranes Cardiovascular: regular rhythm, distal pulses intact Respiratory: breathing not labored, symmetric Gastrointestinal: soft non-tender, +bowel sounds Musculoskeletal: no deformity, no tenderness to palpation Skin: warm, dry Neurologic: following commands, moving all extremities Psychiatric: full affect, no hallucinations Other: 
 
 
 HISTORY:  
 
Past Medical History:  
Diagnosis Date  Arthritis  Constipation  Diabetes ([address]  Hemorrhoids  Hiatal hernia  High cholesterol  Hypertension  Pancreatic cancer ([facility] [address] Past Surgical History:  
Procedure Laterality Date  HX KNEE ARTHROSCOPY Right  HX ORTHOPAEDIC    
 left wrist surgery  HX TONSILLECTOMY Family History Problem Relation Age of Onset  Cancer Mother   
     skin  Hypertension Mother  Heart Disease Mother  Cancer Father   
     skin  Heart Disease Father  Stroke Father  Diabetes Neg Hx History reviewed, no pertinent family history. Social History Tobacco Use  Smoking status: Never Smoker  Smokeless tobacco: Never Used Substance Use Topics  Alcohol use: Yes Alcohol/week: 1.2 - 2.4 oz Types: 1 - 2 Cans of beer, 1 - 2 Shots of liquor per week Frequency: 2-4 times a month Drinks per session: 1 or 2 Allergies Allergen Reactions  Amoxicillin Hives Current Outpatient Medications Medication Sig  TRESIBA FLEXTOUCH U-200 200 unit/mL (3 mL) inpn 10-12 UNITS BY SUBCUTANEOUS ROUTE DAILY. OR AS DIRECTED UP TO 60 UNITS DAILY  ondansetron hcl (ZOFRAN) 8 mg tablet Take 1 Tab by mouth every eight (8) hours as needed for Nausea.  IBUPROFEN IB PO Take  by mouth as needed.  GRANIX 480 mcg/0.8 mL syrg injection INJECT CONTENTS OF 1 SYRINGE UNDER THE SKIN FOR 3 DAYS ONCE WEEKLY (Patient taking differently: INJECT CONTENTS OF 1 SYRINGE UNDER THE SKIN FOR 2 DAYS ONCE WEEKLY)  glucose blood VI test strips (ACCU-CHEK RICHIE PLUS TEST STRP) strip Accu-Chek Richie Plus test strips Check BS BID  
 OTHER Cranial prosthesis Dx C25.7  omeprazole (PRILOSEC) 20 mg capsule Take 20 mg by mouth daily.  lidocaine-prilocaine (EMLA) topical cream Apply  to affected area as needed for Pain.  simvastatin (ZOCOR) 10 mg tablet Take  by mouth nightly.  telmisartan (MICARDIS) 80 mg tablet Take 80 mg by mouth daily.  hydroCHLOROthiazide (MICROZIDE) 12.5 mg capsule Take 12.5 mg by mouth daily. No current facility-administered medications for this visit. Facility-Administered Medications Ordered in Other Visits Medication Dose Route Frequency  [START ON 5/29/2019] gemcitabine (GEMZAR) 1,960 mg in 0.9% sodium chloride 250 mL, overfill volume 25 mL chemo infusion  1,960 mg IntraVENous ONCE  
 [START ON 5/29/2019] PACLitaxel-Protein Bound (ABRAXANE) 245 mg chemo infusion  245 mg IntraVENous ONCE  
 [START ON 5/29/2019] dexamethasone (DECADRON) 4 mg/mL injection 8 mg  8 mg IntraVENous ONCE  
 [START ON 5/29/2019] prochlorperazine (COMPAZINE) with saline injection 10 mg  10 mg IntraVENous Q6H PRN  
 
 
 
 LAB DATA REVIEWED:  
 
Lab Results Component Value Date/Time WBC 5.3 05/22/2019 11:18 AM  
 HGB 8.3 (L) 05/22/2019 11:18 AM  
 PLATELET 735 (H) 43/45/0043 11:18 AM  
 
Lab Results Component Value Date/Time Sodium 136 05/22/2019 11:18 AM  
 Potassium 4.1 05/22/2019 11:18 AM  
 Chloride 104 05/22/2019 11:18 AM  
 CO2 29 05/22/2019 11:18 AM  
 BUN 7 05/22/2019 11:18 AM  
 Creatinine 0.77 05/22/2019 11:18 AM  
 Calcium 8.3 (L) 05/22/2019 11:18 AM  
 Magnesium 2.1 05/22/2019 11:18 AM  
 Phosphorus 4.3 05/22/2019 11:18 AM  
  
Lab Results Component Value Date/Time AST (SGOT) 9 (L) 05/22/2019 11:18 AM  
 Alk. phosphatase 125 (H) 05/22/2019 11:18 AM  
 Protein, total 5.8 (L) 05/22/2019 11:18 AM  
 Albumin 3.0 (L) 05/22/2019 11:18 AM  
 Globulin 2.8 05/22/2019 11:18 AM  
 
Lab Results Component Value Date/Time INR 1.0 11/02/2018 03:14 PM  
 Prothrombin time 10.0 11/02/2018 03:14 PM  
 aPTT 28.3 11/02/2018 03:14 PM  
  
Lab Results Component Value Date/Time  Iron 35 02/27/2019 12:55 PM  
 TIBC 292 02/27/2019 12:55 PM  
 Iron % saturation 12 (L) 02/27/2019 12:55 PM  
 Ferritin 174 02/27/2019 12:55 PM  
  
 
12/28/18- CT C/A/P IMPRESSION: 
Interval decrease in size of pancreatic mass. Slight interval decrease in size 
of hepatic metastases; these now demonstrate central low attenuation suggestive 
of necrosis. No evidence of new metastatic disease in the chest, abdomen, or 
pelvis. CONTROLLED SUBSTANCES SAFETY ASSESSMENT (IF ON CONTROLLED SUBSTANCES):  
 
Reviewed opioid safety handout:  [] Yes   [] No 
24 hour opioid dose >150mg morphine equivalent/day:  [] Yes   [] No 
Benzodiazepines:  [] Yes   [] No 
Sleep apnea:  [] Yes   [] No 
Urine Toxicology Testing within last 6 months:  [] Yes   [] No 
History of or new aberrant medication taking behaviors:  [] Yes   [] No 
 
 
   
 
Total time: 70 minutes Counseling / coordination time: 60 minutes 
> 50% counseling / coordination?:  Yes

## 2019-05-28 NOTE — PATIENT INSTRUCTIONS
Dear Chelsey Armstrong , It was a pleasure seeing you today at [facility] office Your described symptoms were: Fatigue: 5 Drowsiness: 1 Depression: 0 Pain: 0 Anxiety: 0 Nausea: 1 Anorexia: 4 Dyspnea: 1 Best Well-Bein Constipation: Yes This is the plan we talked about: 1. Abdominal discomfort - This is no longer a problem. You have not needed any pain medicine in a while. 2. Fatigue - You have considerable fatigue following chemotherapy. - You just want to wait and see how you do. 3. Chemo induced nausea - You are very well controlled with your current regimen. 
-We reviewed the images from your recent scans on  and I am glad the disease is responding really well to chemotherapy. Your scans in April have been stable 4. Acid reflux 
-You are on Prilosec daily, please continue that. 5.  Goals of care 
-You feel good about starting chemotherapy. You have good support. 
-You are in the process of completing a medical directive along with living will etc. with a . Please bring us a copy when you have it. 
-We discussed your wishes regarding CODE STATUS and you want to remain full code for now. This is what you have shared with us about Advance Care Planning Advance Care Planning 2019 Patient's Healthcare Decision Maker is: Legal Next of Kin Confirm Advance Directive Yes, not on file Patient Would Like to Complete Advance Directive - The Palliative Medicine Team is here to support you and your family. We will see you again in 3 months Sincerely, 
 
 
Nat Rosales MD and the Palliative Medicine Team

## 2019-05-29 NOTE — PROGRESS NOTES
Pt in for C8D8 of treatment. Patient reports the following adverse events at this time: gr 2 loss of appetite, gr 1 constipation, gr 1 fatigue, gr 1 hair loss, gr 1 insomnia, gr 1 sob, gr 1 neuropathy, gr 1 urinary leakage. Vital signs are stable. Patient to go to infusion center after appointment to receive chemotherapy. Next appt is scheduled for 6/5/19.

## 2019-05-29 NOTE — PROGRESS NOTES
[facility] Progress Note Date: May 29, 2019 Name: Mike Sage MRN: [id number] : 1945 
 
1100. Ms. Sheldon Onofre Arrived ambulatory and in no distress for C8D8 Clinical Trial: Gemzar/Abraxane. Assessment was completed, patient with no complaints today. R chest wall port accessed without difficulty using 0.75 inch garcia needle, labs drawn and in process. Chemotherapy Flowsheet 2019 Cycle [id number] Date 2019 Drug / Regimen Clinical Trial: Abraxane/Gemzar Pre Meds -  
Notes -  
 
 
 
Ms. Strange's vitals were reviewed. Patient Vitals for the past 12 hrs: 
 Temp Pulse Resp BP  
19 1438  71  163/77  
19 1103 97.7 °F (36.5 °C) 76 18 144/77 Lab results were obtained and reviewed. Recent Results (from the past 12 hour(s)) CBC WITH AUTOMATED DIFF Collection Time: 19 11:16 AM  
Result Value Ref Range WBC 18.5 (H) 3.6 - 11.0 K/uL  
 RBC 2.97 (L) 3.80 - 5.20 M/uL HGB 8.6 (L) 11.5 - 16.0 g/dL HCT 28.1 (L) 35.0 - 47.0 % MCV 94.6 80.0 - 99.0 FL  
 MCH 29.0 26.0 - 34.0 PG  
 MCHC 30.6 30.0 - 36.5 g/dL RDW 21.3 (H) 11.5 - 14.5 % PLATELET 828 (H) 824 - 400 K/uL MPV 9.4 8.9 - 12.9 FL  
 NRBC 0.3 (H) 0  WBC ABSOLUTE NRBC 0.05 (H) 0.00 - 0.01 K/uL NEUTROPHILS 64 32 - 75 % BAND NEUTROPHILS 5 0 - 6 % LYMPHOCYTES 7 (L) 12 - 49 % MONOCYTES 7 5 - 13 % EOSINOPHILS 0 0 - 7 % BASOPHILS 2 (H) 0 - 1 % METAMYELOCYTES 11 (H) 0 % MYELOCYTES 4 (H) 0 % IMMATURE GRANULOCYTES 0 %  
 ABS. NEUTROPHILS 12.8 (H) 1.8 - 8.0 K/UL  
 ABS. LYMPHOCYTES 1.3 0.8 - 3.5 K/UL  
 ABS. MONOCYTES 1.3 (H) 0.0 - 1.0 K/UL  
 ABS. EOSINOPHILS 0.0 0.0 - 0.4 K/UL  
 ABS. BASOPHILS 0.4 (H) 0.0 - 0.1 K/UL  
 ABS. IMM. GRANS. 0.0 K/UL  
 DF MANUAL PLATELET COMMENTS Large Platelets RBC COMMENTS ANISOCYTOSIS 2+ 
    
 RBC COMMENTS POLYCHROMASIA PRESENT 
    
 RBC COMMENTS MICROCYTOSIS 
1+  WBC COMMENTS TOXIC GRANULATION    
LD  
 Collection Time: 05/29/19 11:16 AM  
Result Value Ref Range  (H) 81 - 246 U/L Pre-medications  were administered as ordered and chemotherapy was initiated. Medications: 
Dexamethasone IVP Abraxane IV Gemzar IV 
NS KVO 
 
1445. Ms. Maral Sethi tolerated treatment well and was discharged from [place] [address] in stable condition. Port de-accessed, flushed & heparinized per protocol. She is to return on 06/05/19 for her next appointment. Yudy Ortega RN May 29, 2019

## 2019-06-04 NOTE — PROGRESS NOTES
[facility] [address], [address] [place] [address] [address]  Marita Lied: [phone]  F: [phone]      Reason for Visit:   Chad Ramirez is a 69 y.o. female who is seen in self-referral for evaluation of pancreatic cancer. Treatment History:   · 10/4/18 pancreatic head mass FNA, pancreatic adenocarcinoma    10/15/18  Liver, Core Biopsy w/Touch Prep CYTOLOGIC INTERPRETATION:   · Numerous cores and fragments of benign hepatic parenchyma     10/24/18  Liver, Fine Needle Aspiration CYTOLOGIC INTERPRETATION:   Metastatic adenocarcinoma (see Comment). General Categorization   Positive for malignancy. Comment: The morphologic appearance is nonspecific with regard to site of origin but compatible with metastatic pancreatic carcinoma in this patient with known pancreatic adenocarcinoma ([id number]). 11/9/18 abraxane/gemcitabine    History of Present Illness:   She started having abdominal pain, gradual and persistent, x 1 month, pressure sensation, located in epigastrium, no radiation, no aggravating symptoms, no relieving factors. 25 lb weight loss over 6 months.  + nausea. Interval history:  In today for follow up. Complains of gr 2 loss of appetite, gr 1 constipation, gr 1 fatigue, gr 2 hair loss, gr 1 nausea, gr 1 insomnia, gr 1 sob, gr 1 neuropathy. Past Medical History:   Diagnosis Date    Arthritis     Constipation     Diabetes ([address]     Hemorrhoids     Hiatal hernia     High cholesterol     Hypertension     [facility] [address]       Past Surgical History:   Procedure Laterality Date    HX KNEE ARTHROSCOPY Right     HX ORTHOPAEDIC      left wrist surgery    HX TONSILLECTOMY        Social History     Tobacco Use    Smoking status: Never Smoker    Smokeless tobacco: Never Used   Substance Use Topics    Alcohol use:  Yes     Alcohol/week: 1.2 - 2.4 oz     Types: 1 - 2 Cans of beer, 1 - 2 Shots of liquor per week     Frequency: 2-4 times a month     Drinks per session: 1 or 2      Family History   Problem Relation Age of Onset    Cancer Mother         skin    Hypertension Mother     Heart Disease Mother     Cancer Father         skin    Heart Disease Father     Stroke Father     Diabetes Neg Hx      Current Outpatient Medications   Medication Sig    TRESIBA FLEXTOUCH U-200 200 unit/mL (3 mL) inpn 10-12 UNITS BY SUBCUTANEOUS ROUTE DAILY. OR AS DIRECTED UP TO 60 UNITS DAILY    ondansetron hcl (ZOFRAN) 8 mg tablet Take 1 Tab by mouth every eight (8) hours as needed for Nausea.  IBUPROFEN IB PO Take  by mouth as needed.  GRANIX 480 mcg/0.8 mL syrg injection INJECT CONTENTS OF 1 SYRINGE UNDER THE SKIN FOR 3 DAYS ONCE WEEKLY (Patient taking differently: INJECT CONTENTS OF 1 SYRINGE UNDER THE SKIN FOR 2 DAYS ONCE WEEKLY)    glucose blood VI test strips (ACCU-CHEK RICHIE PLUS TEST STRP) strip Accu-Chek Richie Plus test strips   Check BS BID    OTHER Cranial prosthesis    Dx C25.7    omeprazole (PRILOSEC) 20 mg capsule Take 20 mg by mouth daily.  lidocaine-prilocaine (EMLA) topical cream Apply  to affected area as needed for Pain.  simvastatin (ZOCOR) 10 mg tablet Take 10 mg by mouth nightly.  telmisartan (MICARDIS) 80 mg tablet Take 80 mg by mouth daily.  hydroCHLOROthiazide (MICROZIDE) 12.5 mg capsule Take 12.5 mg by mouth daily. No current facility-administered medications for this visit.       Facility-Administered Medications Ordered in Other Visits   Medication Dose Route Frequency    gemcitabine (GEMZAR) 1,960 mg in 0.9% sodium chloride 250 mL, overfill volume 25 mL chemo infusion  1,960 mg IntraVENous ONCE    PACLitaxel-Protein Bound (ABRAXANE) 245 mg chemo infusion  245 mg IntraVENous ONCE    dexamethasone (DECADRON) 4 mg/mL injection 8 mg  8 mg IntraVENous ONCE    prochlorperazine (COMPAZINE) with saline injection 10 mg  10 mg IntraVENous Q6H PRN      Allergies   Allergen Reactions    Amoxicillin Hives Review of Systems: A comprehensive review of systems was performed and all systems were negative except for HPI and for the symptom review form, reviewed and scanned in. Physical Exam:     Visit Vitals  /55 (BP 1 Location: Left arm, BP Patient Position: Sitting)   Pulse 88   Temp 97.7 °F (36.5 °C) (Temporal)   Resp 18   Ht 5' 7\" (1.702 m)   Wt 182 lb (82.6 kg)   SpO2 98%   BMI 28.51 kg/m²     ECOG PS: 0  General: No distress  Eyes: PERRLA, anicteric sclerae  HENT: Atraumatic, OP clear  Neck: Supple  Lymphatic: No cervical, supraclavicular, or inguinal adenopathy  Respiratory: CTAB, normal respiratory effort  CV: Normal rate, regular rhythm, no murmurs, no peripheral edema  GI: Soft, nontender, nondistended, no masses, no hepatomegaly, no splenomegaly  MS: Normal gait and station. Digits without clubbing or cyanosis. Skin: No rashes, ecchymoses, or petechiae. Normal temperature, turgor, and texture. Psych: Alert, oriented, appropriate affect, normal judgment/insight        Results:     Lab Results   Component Value Date/Time    WBC 6.6 06/05/2019 11:11 AM    HGB 8.7 (L) 06/05/2019 11:11 AM    HCT 27.2 (L) 06/05/2019 11:11 AM    PLATELET 529 09/49/6200 11:11 AM    MCV 92.5 06/05/2019 11:11 AM    ABS. NEUTROPHILS 6.1 06/05/2019 11:11 AM     Lab Results   Component Value Date/Time    Sodium 136 05/22/2019 11:18 AM    Potassium 4.1 05/22/2019 11:18 AM    Chloride 104 05/22/2019 11:18 AM    CO2 29 05/22/2019 11:18 AM    Glucose 224 (H) 05/22/2019 11:18 AM    BUN 7 05/22/2019 11:18 AM    Creatinine 0.77 05/22/2019 11:18 AM    GFR est AA >60 05/22/2019 11:18 AM    GFR est non-AA >60 05/22/2019 11:18 AM    Calcium 8.3 (L) 05/22/2019 11:18 AM    Glucose (POC) 168 (H) 04/15/2019 10:44 AM     Lab Results   Component Value Date/Time    Bilirubin, total 0.5 05/22/2019 11:18 AM    ALT (SGPT) 22 05/22/2019 11:18 AM    AST (SGOT) 9 (L) 05/22/2019 11:18 AM    Alk.  phosphatase 125 (H) 05/22/2019 11:18 AM    Protein, total 5.8 (L) 05/22/2019 11:18 AM    Albumin 3.0 (L) 05/22/2019 11:18 AM    Globulin 2.8 05/22/2019 11:18 AM     10/1/18 CT abd  There is a 3.8 x 1.9 cm mass involving the uncinate process and possibly  invading the duodenum. Findings are nonspecific but typical of pancreatic  carcinoma. There is no biliary or pancreatic ductal dilatation.     There is a poorly defined irregular hypodensity in segment IVb of the liver  measuring 3.7 x 1.9 cm. There appears to be focal biliary dilatation in the left  lobe behind this abnormality. Metastatic disease is suspected. There is a small,  1 cm hypodensity in the dome of the liver, likely segment 8. There is a 1 cm  hypodensity in segment 4 of the liver as well, also likely metastasis. Small  hypodensity measuring less than 1 cm in segment 6 at the tip laterally is also  nonspecific but probable metastasis.     Spleen kidneys and adrenals are unremarkable.     No free fluid or focal fluid collection.     Lung bases are clear.     Bone windows are unremarkable.     IMPRESSION  IMPRESSION:  1. 3.8 x 1.9 cm mass involving the uncinate process of the pancreas and possibly  invading the duodenum, this likely represents pancreatic carcinoma. 2. Likely metastatic disease in the liver. There is a 3.8 x 1.9 cm mass involving the uncinate process and possibly  invading the duodenum. Findings are nonspecific but typical of pancreatic  carcinoma. There is no biliary or pancreatic ductal dilatation.     There is a poorly defined irregular hypodensity in segment IVb of the liver  measuring 3.7 x 1.9 cm. There appears to be focal biliary dilatation in the left  lobe behind this abnormality. Metastatic disease is suspected. There is a small,  1 cm hypodensity in the dome of the liver, likely segment 8. There is a 1 cm  hypodensity in segment 4 of the liver as well, also likely metastasis.  Small  hypodensity measuring less than 1 cm in segment 6 at the tip laterally is also  nonspecific but probable metastasis.     Spleen kidneys and adrenals are unremarkable.     No free fluid or focal fluid collection.     Lung bases are clear.     Bone windows are unremarkable.     IMPRESSION:  1. 3.8 x 1.9 cm mass involving the uncinate process of the pancreas and possibly  invading the duodenum, this likely represents pancreatic carcinoma. 2. Likely metastatic disease in the liver. Records reviewed and summarized above. Pathology report(s) reviewed above. Radiology report(s) reviewed above. MRI abdomen 10/22/18: IMPRESSION:       1. Pancreatic uncinate process mass suspicious for pancreatic neoplasm, possibly  adenocarcinoma. Mass abuts the superior mesenteric artery with about 20%  circumference involvement but no luminal distortion or perivascular stranding. 2. Multiple hepatic lesions suspicious for metastatic neoplasm with segment IVb  lesion showing patchy areas of surrounding steatosis likely secondary to locally  altered intrahepatic hemodynamics and likely responsible for biopsy result. 3. Cholecystolithiasis and small gallbladder polyp. CT c/a/p 12/28/18: IMPRESSION:  Interval decrease in size of pancreatic mass. Slight interval decrease in size of hepatic metastases; these now demonstrate central low attenuation suggestive of necrosis. No evidence of new metastatic disease in the chest, abdomen, or pelvis. CT c/a/p 2/25/19:  LIVER: The lesions described on the prior examination have improved in the  interval. 19 mm lesion in the dome of the liver now measures 1 cm. 2.0 x 1.5 cm  lesion in the left hepatic lobe now measures 1.3 x 1.2 cm. Other smaller lesions  are no longer visualized. GALLBLADDER: Unremarkable. SPLEEN: No mass. PANCREAS: Mass lesion in the uncinate process now measures 1.7 x 2.0 cm,  previously measuring 2.0 x 3.0 cm. ADRENALS: Unremarkable. KIDNEYS: No mass, calculus, or hydronephrosis. STOMACH: Unremarkable.   SMALL BOWEL: No dilatation or wall thickening. COLON: No dilatation or wall thickening. APPENDIX: Unremarkable. PERITONEUM: No ascites or pneumoperitoneum. RETROPERITONEUM: No lymphadenopathy or aortic aneurysm. REPRODUCTIVE ORGANS: Intrauterine device projects in satisfactory position. URINARY BLADDER: No mass or calculus. BONES: Degenerative changes are seen in the thoracic and lumbar spine. ADDITIONAL COMMENTS: N/A     IMPRESSION  IMPRESSION:  Interval decrease in the size of the hepatic metastases. Decrease in the size of  the mass lesion involving the uncinate process.     RECIST:  Pancreatic uncinate mass: Current: (68) 1.7X2.0cm     12/28/2018: (71) 3.0 x  2.0 cm   Segment 4B liver mass: Current: (56) 1.3 x 1.2 cm 12/28/2018: (57) 2.0 x 1.5 cm   Segment 5 liver mass: Current: (68) 4 x 7 mm 12/28/2018: (70) 1.4 x 0.9 cm    CXR for port study 4/15/19:   IMPRESSION:  Right internal jugular chest Port-A-Cath is seen with the catheter terminating  in the mid SVC. The port is slightly angled medially due to breast tissue. The  port is not flipped. Access needle appears to be within the port hub. Nursing  staff was able to get good blood return and flush the port without difficulty. No intervention was performed. CT a/p 4/22/19:  IMPRESSION:  1. Slight decrease in size of liver metastases, detailed above. 2. Slight decrease in size in uncinate process mass. 3. No evidence for new metastatic disease.     RECIST:  Uncinate process mass: 15 x 9 mm, image 56, previously 17 x 11 mm. Segment IVb metastasis 11 x 9 mm, image 50, previously 13 x 12 mm  Segment 5 metastasis: Barely detectable, image 62     Assessment/plan:   1. Uncinate pancreatic adenocarcinoma,  mets to liver, stage IV:  cT2 cN0 pM1    Discussed that while this is treatable, it is not curable, the goal of therapy is palliative. Discussed that without therapy, life expectancy would be 3-6 months, with therapy 12-18 months on average.     As an example of likely chemotherapy, we discussed the risks and benefits of Gemcitabine and Abraxane chemotherapy. Potential side effects include, but are not limited to, nausea, vomiting, diarrhea, constipation, mucositis, taste changes, myelosuppression, infection, fatigue, alopecia, skin and nail changes, pulmonary toxicity, neuropathy, allergic reactions, infertility, and rarely, death. Discussed the [id number] study and the research nurse met with her today and she signed informed consent. · Gemcitabine (1000 mg/m2) and Abraxane (125 mg/m2) given on days 1,8,15 every 28 days. · Labs: CBC, BMP prior to each treatment. Hepatic function panel every 4 weeks. CA 19.9 prior to day 1  · Antiemetic prophylaxis: Dexamethasone prior to each treatment  · PRN antiemetics: Ondansetron and Prochlorperazine at home  · EMLA cream for port    On the control arm, C8d15  today, will see her back in 2 weeks for C9D1. CT a/p on 4/22/19 show response to treatment, next scans due mid June, scheduled for 6/17/19. We will plan to see the patient in follow up at least once per cycle, or sooner if symptoms warrant. 2. DM2:  Holding metformin; on insulin; saw Dr. Antoni Chang; her BS are improving; she is now taking less insulin    3. Emotional well being:  She has excellent support and is coping well with her disease    4. Abdominal pain:  Mild, may be due to constipation;  palliative care has seen    5. Nutrition:  Moose Norman RD has met with her; holding on enzymes    6. Insomnia: Ongoing but no worse. Currently taking melatonin. She has lunesta if needed. 7. Anemia:  Due to chemo and disease; monitor; iron profile and ferritin normal; gave injectafer 750 mg IV on 5/22/19    8. Neutropenia:  Due to chemo, started granix 480 mcg for 4 days following chemo with C1D15. Does report some pain with granix. Tried claritin, however this kept her awake. Recommend trying tylenol and advil PRN. Now at 2 days following chemo    9.  Constipation:  Resolved for now so she is holding off on her stool softener, but she continues with Miralax. 10. Sinus congestion: Intermittent but has improved. Clear/bloody sinus drainage. Recommend OTC sudafed and saline spray. She has seen Dr. Jean Zhang, ENT, s/p steroids. 11. Dehydration:  Will give 1 LNS today    12. Fatigue: Due to treatment. Ongoing but stable. Will monitor.      Signed By: Leidy Meyer MD

## 2019-06-05 NOTE — PROGRESS NOTES
Identified pt with two pt identifiers(name and ). Reviewed record in preparation for visit and have obtained necessary documentation. Chief Complaint   Patient presents with    Labs      Visit Vitals  /55 (BP 1 Location: Left arm, BP Patient Position: Sitting)   Pulse 88   Temp 97.7 °F (36.5 °C) (Temporal)   Resp 18   Ht 5' 7\" (1.702 m)   Wt 182 lb (82.6 kg)   SpO2 98%   BMI 28.51 kg/m²     Health Maintenance Due   Topic    Hepatitis C Screening     Shingrix Vaccine Age 50> (1 of 2)    BREAST CANCER SCRN MAMMOGRAM     FOBT Q 1 YEAR AGE 50-75     Bone Densitometry (Dexa) Screening     MEDICARE YEARLY EXAM        Coordination of Care Questionnaire:  :   1) Have you been to an emergency room, urgent care, or hospitalized since your last visit? If yes, where when, and reason for visit? No         2. Have seen or consulted any other health care provider since your last visit? If yes, where when, and reason for visit? No           Patient is accompanied by self I have received verbal consent from [address] [place] [address] to discuss any/all medical information while they are present in the room.

## 2019-06-05 NOTE — PROGRESS NOTES
[facility] Progress Note    Date: 2019    Name: Lashae Varghese    MRN: [id number]         : 1945    1100. Ms. Lorie Mcmillan Arrived ambulatory and in no distress for C8D15 Clinical Trial: Gemzar/Abraxane. Assessment was completed, patient with no complaints today. R chest wall port accessed without difficulty using 0.75 inch garcia needle, labs drawn and in process. Chemotherapy Flowsheet 2019   Cycle C8D15   Date 2019   Drug / Regimen Clinical Trial: Abraxane/Gemzr   Pre Meds -   Notes -         Ms. Strange's vitals were reviewed. Patient Vitals for the past 12 hrs:   Temp Pulse Resp BP   19 1537 -- 71 -- 152/76   19 1104 97.7 °F (36.5 °C) 88 18 115/55       Lab results were obtained and reviewed. Recent Results (from the past 12 hour(s))   CBC WITH AUTOMATED DIFF    Collection Time: 19 11:11 AM   Result Value Ref Range    WBC 6.6 3.6 - 11.0 K/uL    RBC 2.94 (L) 3.80 - 5.20 M/uL    HGB 8.7 (L) 11.5 - 16.0 g/dL    HCT 27.2 (L) 35.0 - 47.0 %    MCV 92.5 80.0 - 99.0 FL    MCH 29.6 26.0 - 34.0 PG    MCHC 32.0 30.0 - 36.5 g/dL    RDW 21.2 (H) 11.5 - 14.5 %    PLATELET 095 298 - 342 K/uL    MPV 10.0 8.9 - 12.9 FL    NRBC 0.0 0  WBC    ABSOLUTE NRBC 0.00 0.00 - 0.01 K/uL    NEUTROPHILS 93 (H) 32 - 75 %    LYMPHOCYTES 2 (L) 12 - 49 %    MONOCYTES 2 (L) 5 - 13 %    EOSINOPHILS 0 0 - 7 %    BASOPHILS 1 0 - 1 %    METAMYELOCYTES 2 (H) 0 %    IMMATURE GRANULOCYTES 0 %    ABS. NEUTROPHILS 6.1 1.8 - 8.0 K/UL    ABS. LYMPHOCYTES 0.1 (L) 0.8 - 3.5 K/UL    ABS. MONOCYTES 0.1 0.0 - 1.0 K/UL    ABS. EOSINOPHILS 0.0 0.0 - 0.4 K/UL    ABS. BASOPHILS 0.1 0.0 - 0.1 K/UL    ABS. IMM. GRANS. 0.0 K/UL    DF MANUAL      PLATELET COMMENTS Large Platelets      RBC COMMENTS ANISOCYTOSIS  1+        RBC COMMENTS OVALOCYTES  PRESENT        WBC COMMENTS TOXIC GRANULATION           Pre-medications  were administered as ordered and chemotherapy was initiated.      Medications:  Dexamethasone IVP  Abraxane IV  Gemzar IV  NS KVO    1540. Ms. Sheldon Onofre tolerated treatment well and was discharged from [place] [address] in stable condition. Port de-accessed, flushed & heparinized per protocol. She is to return on 06/13/19 for her next appointment.     Luis A Wilkinson RN  June 5, 2019

## 2019-06-05 NOTE — PROGRESS NOTES
Pt in for labs and follow up visit today per protocol with Dr. Haley Paredes and RN. This is C8D15 of treatment. Patient reports the following adverse events at this time: gr 2 loss of appetite, gr 1 constipation, gr 1 fatigue, gr 2 hair loss, gr 1 nausea, gr 1 insomnia, gr 1 sob, gr 1 neuropathy. Vital signs are stable. Patient to go to infusion center after appointment to receive gem/abraxane. Next appt is scheduled for 6/19/19.

## 2019-06-11 NOTE — PROGRESS NOTES
[facility] Palliative Medicine Office  Nursing Note  [phone]  Fax [phone]      Name:  Viral Desouza  YOB: 1945    Received outpatient Palliative Medicine referral from Dr. Dell Orta to see pt for symptom management and supportive care. Chart  reviewed. Viral Desouza is a 72 y.o. female with pancreatic cancer with liver mets. Pt's most recent office visit with Dr. Rony Ramirez was on 10/30/18. See his note for complete HPI, treatment history, and plan. ACP:   None on file                                                                                                                                                        Nurse called pt and introduced Palliative Medicine services.  Appt scheduled for 11/8/18 at 11:30am.  Bula Seip, BSN, RN-BC  Clinical Referral Navigator  [phone] Statement Selected

## 2019-06-19 NOTE — PROGRESS NOTES
Pt in for labs and follow up visit today per protocol with Dr. Katey Greer and RN. This is C9D1 of treatment. Patient reports the following adverse events at this time: gr 1 loss of appetite, gr 1 constipation, gr 1 fatigue, gr 2 hair loss, gr 1 insomnia, gr 1 sob, gr 1 neuropathy. Vital signs are stable. Patient to go to infusion center after appointment to receive gem/abraxane. Next appt is scheduled for 6/26/19.

## 2019-06-19 NOTE — PROGRESS NOTES
1600    Patient arrived. ID and allergies verified verbally with patient. Pt voices understanding of procedure to be performed. Consent obtained. Pt prepped for procedure. Pt denies contrast allergy. Patient denies taking any blood thinners. 1625    TRANSFER - OUT REPORT:    Verbal report given to [facility] ANASTASIA RN(name) on [facility]  being transferred to angio (unit) for routine progression of care       Report consisted of patients Situation, Background, Assessment and   Recommendations(SBAR). Information from the following report(s) SBAR was reviewed with the receiving nurse. Lines:   Venous Access Device [facility] GTX#[id number] 11/06/18 Upper chest (subclavicular area, right (Active)        Opportunity for questions and clarification was provided. Patient transported with:   Registered Nurse     Amy Gunn 784 REPORT:    Verbal report received from Arsh Buckley RN(name) on [facility]  being received from  Angio (unit) for routine progression of care      Report consisted of patients Situation, Background, Assessment and   Recommendations(SBAR). Information from the following report(s) SBAR was reviewed with the receiving nurse. Opportunity for questions and clarification was provided. Assessment completed upon patients arrival to unit and care assumed. 1638    Pt discharged via ambulation with . Personal belongings with patient upon discharge.

## 2019-06-19 NOTE — PROGRESS NOTES
[facility] [place] at [facility]  [address] [facility] [place]: [phone]  F: [phone]      Reason for Visit:   Padmini Tavares is a 69 y.o. female who is seen in self-referral for evaluation of pancreatic cancer. Treatment History:   · 10/4/18 pancreatic head mass FNA, pancreatic adenocarcinoma    10/15/18  Liver, Core Biopsy w/Touch Prep CYTOLOGIC INTERPRETATION:   · Numerous cores and fragments of benign hepatic parenchyma     10/24/18  Liver, Fine Needle Aspiration CYTOLOGIC INTERPRETATION:   Metastatic adenocarcinoma (see Comment). General Categorization   Positive for malignancy. Comment: The morphologic appearance is nonspecific with regard to site of origin but compatible with metastatic pancreatic carcinoma in this patient with known pancreatic adenocarcinoma ([id number]). 11/9/18 abraxane/gemcitabine    History of Present Illness:   She started having abdominal pain, gradual and persistent, x 1 month, pressure sensation, located in epigastrium, no radiation, no aggravating symptoms, no relieving factors. 25 lb weight loss over 6 months.  + nausea. Interval history:  In today for follow up and treatment. Complains gr 1 loss of appetite, gr 1 constipation, gr 1 fatigue, gr 2 hair loss, gr 1 insomnia, gr 1 sob, gr 1 neuropathy. Past Medical History:   Diagnosis Date    Arthritis     Constipation     Diabetes ([address]     Hemorrhoids     Hiatal hernia     High cholesterol     Hypertension     [address] [address]       Past Surgical History:   Procedure Laterality Date    HX KNEE ARTHROSCOPY Right     HX ORTHOPAEDIC      left wrist surgery    HX TONSILLECTOMY        Social History     Tobacco Use    Smoking status: Never Smoker    Smokeless tobacco: Never Used   Substance Use Topics    Alcohol use:  Yes     Alcohol/week: 1.2 - 2.4 oz     Types: 1 - 2 Cans of beer, 1 - 2 Shots of liquor per week     Frequency: 2-4 times a month Drinks per session: 1 or 2      Family History   Problem Relation Age of Onset    Cancer Mother         skin    Hypertension Mother     Heart Disease Mother     Cancer Father         skin    Heart Disease Father     Stroke Father     Diabetes Neg Hx      Current Outpatient Medications   Medication Sig    TRESIBA FLEXTOUCH U-200 200 unit/mL (3 mL) inpn 10-12 UNITS BY SUBCUTANEOUS ROUTE DAILY. OR AS DIRECTED UP TO 60 UNITS DAILY    GRANIX 480 mcg/0.8 mL syrg injection INJECT CONTENTS OF 1 SYRINGE UNDER THE SKIN FOR 3 DAYS ONCE WEEKLY (Patient taking differently: INJECT CONTENTS OF 1 SYRINGE UNDER THE SKIN FOR 2 DAYS ONCE WEEKLY)    glucose blood VI test strips (ACCU-CHEK RICHIE PLUS TEST STRP) strip Accu-Chek Richie Plus test strips   Check BS BID    OTHER Cranial prosthesis    Dx C25.7    omeprazole (PRILOSEC) 20 mg capsule Take 20 mg by mouth daily.  simvastatin (ZOCOR) 10 mg tablet Take 10 mg by mouth nightly.  telmisartan (MICARDIS) 80 mg tablet Take 80 mg by mouth daily.  hydroCHLOROthiazide (MICROZIDE) 12.5 mg capsule Take 12.5 mg by mouth daily.  lidocaine-prilocaine (EMLA) topical cream Apply  to affected area as needed for Pain.  ondansetron hcl (ZOFRAN) 8 mg tablet Take 1 Tab by mouth every eight (8) hours as needed for Nausea.  IBUPROFEN IB PO Take  by mouth as needed. No current facility-administered medications for this visit.       Facility-Administered Medications Ordered in Other Visits   Medication Dose Route Frequency    gemcitabine (GEMZAR) 1,960 mg in 0.9% sodium chloride 250 mL, overfill volume 25 mL chemo infusion  1,960 mg IntraVENous ONCE    PACLitaxel-Protein Bound (ABRAXANE) 245 mg chemo infusion  245 mg IntraVENous ONCE    dexamethasone (DECADRON) 4 mg/mL injection 8 mg  8 mg IntraVENous ONCE    prochlorperazine (COMPAZINE) with saline injection 10 mg  10 mg IntraVENous Q6H PRN      Allergies   Allergen Reactions    Amoxicillin Hives        Review of Systems: A comprehensive review of systems was performed and all systems were negative except for HPI and for the symptom review form, reviewed and scanned in. Physical Exam:     Visit Vitals  /59   Pulse 84   Temp 98.1 °F (36.7 °C) (Temporal)   Resp 18   Ht 5' 7\" (1.702 m)   Wt 185 lb (83.9 kg)   SpO2 99%   BMI 28.98 kg/m²     ECOG PS: 0  General: No distress  Eyes: PERRLA, anicteric sclerae  HENT: Atraumatic, OP clear  Neck: Supple  Lymphatic: No cervical, supraclavicular, or inguinal adenopathy  Respiratory: CTAB, normal respiratory effort  CV: Normal rate, regular rhythm, no murmurs, no peripheral edema  GI: Soft, nontender, nondistended, no masses, no hepatomegaly, no splenomegaly  MS: Normal gait and station. Digits without clubbing or cyanosis. Skin: No rashes, ecchymoses, or petechiae. Normal temperature, turgor, and texture. Psych: Alert, oriented, appropriate affect, normal judgment/insight        Results:     Lab Results   Component Value Date/Time    WBC 5.9 06/19/2019 11:00 AM    HGB 9.2 (L) 06/19/2019 11:00 AM    HCT 29.5 (L) 06/19/2019 11:00 AM    PLATELET 310 (H) 91/17/3481 11:00 AM    MCV 95.5 06/19/2019 11:00 AM    ABS. NEUTROPHILS 3.7 06/19/2019 11:00 AM     Lab Results   Component Value Date/Time    Sodium 133 (L) 06/19/2019 11:00 AM    Potassium 4.7 06/19/2019 11:00 AM    Chloride 100 06/19/2019 11:00 AM    CO2 28 06/19/2019 11:00 AM    Glucose 252 (H) 06/19/2019 11:00 AM    BUN 9 06/19/2019 11:00 AM    Creatinine 0.79 06/19/2019 11:00 AM    GFR est AA >60 06/19/2019 11:00 AM    GFR est non-AA >60 06/19/2019 11:00 AM    Calcium 8.3 (L) 06/19/2019 11:00 AM    Glucose (POC) 168 (H) 04/15/2019 10:44 AM     Lab Results   Component Value Date/Time    Bilirubin, total 0.4 06/19/2019 11:00 AM    ALT (SGPT) 18 06/19/2019 11:00 AM    AST (SGOT) 17 06/19/2019 11:00 AM    Alk.  phosphatase 149 (H) 06/19/2019 11:00 AM    Protein, total 6.1 (L) 06/19/2019 11:00 AM    Albumin 3.1 (L) 06/19/2019 11:00 AM    Globulin 3.0 06/19/2019 11:00 AM     10/1/18 CT abd  There is a 3.8 x 1.9 cm mass involving the uncinate process and possibly  invading the duodenum. Findings are nonspecific but typical of pancreatic  carcinoma. There is no biliary or pancreatic ductal dilatation.     There is a poorly defined irregular hypodensity in segment IVb of the liver  measuring 3.7 x 1.9 cm. There appears to be focal biliary dilatation in the left  lobe behind this abnormality. Metastatic disease is suspected. There is a small,  1 cm hypodensity in the dome of the liver, likely segment 8. There is a 1 cm  hypodensity in segment 4 of the liver as well, also likely metastasis. Small  hypodensity measuring less than 1 cm in segment 6 at the tip laterally is also  nonspecific but probable metastasis.     Spleen kidneys and adrenals are unremarkable.     No free fluid or focal fluid collection.     Lung bases are clear.     Bone windows are unremarkable.     IMPRESSION  IMPRESSION:  1. 3.8 x 1.9 cm mass involving the uncinate process of the pancreas and possibly  invading the duodenum, this likely represents pancreatic carcinoma. 2. Likely metastatic disease in the liver. There is a 3.8 x 1.9 cm mass involving the uncinate process and possibly  invading the duodenum. Findings are nonspecific but typical of pancreatic  carcinoma. There is no biliary or pancreatic ductal dilatation.     There is a poorly defined irregular hypodensity in segment IVb of the liver  measuring 3.7 x 1.9 cm. There appears to be focal biliary dilatation in the left  lobe behind this abnormality. Metastatic disease is suspected. There is a small,  1 cm hypodensity in the dome of the liver, likely segment 8. There is a 1 cm  hypodensity in segment 4 of the liver as well, also likely metastasis.  Small  hypodensity measuring less than 1 cm in segment 6 at the tip laterally is also  nonspecific but probable metastasis.     Spleen kidneys and adrenals are unremarkable.     No free fluid or focal fluid collection.     Lung bases are clear.     Bone windows are unremarkable.     IMPRESSION:  1. 3.8 x 1.9 cm mass involving the uncinate process of the pancreas and possibly  invading the duodenum, this likely represents pancreatic carcinoma. 2. Likely metastatic disease in the liver. Records reviewed and summarized above. Pathology report(s) reviewed above. Radiology report(s) reviewed above. MRI abdomen 10/22/18: IMPRESSION:       1. Pancreatic uncinate process mass suspicious for pancreatic neoplasm, possibly  adenocarcinoma. Mass abuts the superior mesenteric artery with about 20%  circumference involvement but no luminal distortion or perivascular stranding. 2. Multiple hepatic lesions suspicious for metastatic neoplasm with segment IVb  lesion showing patchy areas of surrounding steatosis likely secondary to locally  altered intrahepatic hemodynamics and likely responsible for biopsy result. 3. Cholecystolithiasis and small gallbladder polyp. CT c/a/p 12/28/18: IMPRESSION:  Interval decrease in size of pancreatic mass. Slight interval decrease in size of hepatic metastases; these now demonstrate central low attenuation suggestive of necrosis. No evidence of new metastatic disease in the chest, abdomen, or pelvis. CT c/a/p 2/25/19:  LIVER: The lesions described on the prior examination have improved in the  interval. 19 mm lesion in the dome of the liver now measures 1 cm. 2.0 x 1.5 cm  lesion in the left hepatic lobe now measures 1.3 x 1.2 cm. Other smaller lesions  are no longer visualized. GALLBLADDER: Unremarkable. SPLEEN: No mass. PANCREAS: Mass lesion in the uncinate process now measures 1.7 x 2.0 cm,  previously measuring 2.0 x 3.0 cm. ADRENALS: Unremarkable. KIDNEYS: No mass, calculus, or hydronephrosis. STOMACH: Unremarkable. SMALL BOWEL: No dilatation or wall thickening. COLON: No dilatation or wall thickening.   APPENDIX: Unremarkable. PERITONEUM: No ascites or pneumoperitoneum. RETROPERITONEUM: No lymphadenopathy or aortic aneurysm. REPRODUCTIVE ORGANS: Intrauterine device projects in satisfactory position. URINARY BLADDER: No mass or calculus. BONES: Degenerative changes are seen in the thoracic and lumbar spine. ADDITIONAL COMMENTS: N/A     IMPRESSION  IMPRESSION:  Interval decrease in the size of the hepatic metastases. Decrease in the size of  the mass lesion involving the uncinate process.     RECIST:  Pancreatic uncinate mass: Current: (68) 1.7X2.0cm     12/28/2018: (71) 3.0 x  2.0 cm   Segment 4B liver mass: Current: (56) 1.3 x 1.2 cm 12/28/2018: (57) 2.0 x 1.5 cm   Segment 5 liver mass: Current: (68) 4 x 7 mm 12/28/2018: (70) 1.4 x 0.9 cm    CXR for port study 4/15/19:   IMPRESSION:  Right internal jugular chest Port-A-Cath is seen with the catheter terminating  in the mid SVC. The port is slightly angled medially due to breast tissue. The  port is not flipped. Access needle appears to be within the port hub. Nursing  staff was able to get good blood return and flush the port without difficulty. No intervention was performed. CT a/p 4/22/19:  IMPRESSION:  1. Slight decrease in size of liver metastases, detailed above. 2. Slight decrease in size in uncinate process mass. 3. No evidence for new metastatic disease.     RECIST:  Uncinate process mass: 15 x 9 mm, image 56, previously 17 x 11 mm. Segment IVb metastasis 11 x 9 mm, image 50, previously 13 x 12 mm  Segment 5 metastasis: Barely detectable, image 62    CT c/a/p 6/17/19: IMPRESSION:  1. Stable lesion in the uncinate process. 2. Slight interval decrease in one of the 2 small liver lesions while the other  remains stable. 3. Stable small nodules right upper lobe.   4. No new evidence of metastatic disease or acute abnormality.     RECIST:  Uncinate process mass: 15 x 9 mm, image 68, previously 15 x 9 mm, image 56  Segment IVb metastasis 11 x 9 mm, image 56, previously 11 x 9 mm, image 50  Segment 5 metastasis: No longer visualized, previously barely detectable, image  62     Assessment/plan:   1. Uncinate pancreatic adenocarcinoma,  mets to liver, stage IV:  cT2 cN0 pM1    Discussed that while this is treatable, it is not curable, the goal of therapy is palliative. Discussed that without therapy, life expectancy would be 3-6 months, with therapy 12-18 months on average. As an example of likely chemotherapy, we discussed the risks and benefits of Gemcitabine and Abraxane chemotherapy. Potential side effects include, but are not limited to, nausea, vomiting, diarrhea, constipation, mucositis, taste changes, myelosuppression, infection, fatigue, alopecia, skin and nail changes, pulmonary toxicity, neuropathy, allergic reactions, infertility, and rarely, death. Discussed the [id number] study and the research nurse met with her today and she signed informed consent. · Gemcitabine (1000 mg/m2) and Abraxane (125 mg/m2) given on days 1,8,15 every 28 days. · Labs: CBC, BMP prior to each treatment. Hepatic function panel every 4 weeks. CA 19.9 prior to day 1  · Antiemetic prophylaxis: Dexamethasone prior to each treatment  · PRN antiemetics: Ondansetron and Prochlorperazine at home  · EMLA cream for port    On the control arm, C9d1  today, will see her back in 2 weeks for C9D1. CT a/p on 6/17/19 show response to treatment. We will plan to see the patient in follow up at least once per cycle, or sooner if symptoms warrant. 2. DM2:  Holding metformin; on insulin; saw Dr. Richmond Ahuja; her BS are improving; she is now taking less insulin    3. Emotional well being:  She has excellent support and is coping well with her disease    4. Abdominal pain:  Mild, may be due to constipation;  palliative care has seen    5. Nutrition:  Juaquin Nava RD has met with her; holding on enzymes    6. Insomnia: Ongoing but no worse. Currently taking melatonin.   She has lunesta if needed. 7. Anemia:  Due to chemo and disease; monitor; iron profile and ferritin normal; gave injectafer 750 mg IV on 5/22/19. May need to give again in the future. 8. Neutropenia:  Due to chemo, started granix 480 mcg for 4 days following chemo with C1D15. Does report some pain with granix. Tried claritin, however this kept her awake. Recommend trying tylenol and advil PRN. Now at 2 days following chemo    9. Constipation:  Resolved for now so she is holding off on her stool softener, but she continues with Miralax. 10. Sinus congestion: Intermittent but has improved. Clear/bloody sinus drainage. Recommend OTC sudafed and saline spray. She has seen Dr. Franchesca Meyer, ENT, s/p steroids. 11. Dehydration:  Will give 1 LNS today    12. Fatigue: Due to treatment. Ongoing. [facility] information given. Will also give 1L IV hydration today. Will monitor. 13. Port issues: Port may be flipped. This is the second time this has happened. Will get port study, hopefully today, ordered.      Signed By: Justin Mello MD

## 2019-06-19 NOTE — PROGRESS NOTES
Outpatient Infusion Center - Chemotherapy Progress Note 1100 Pt admit to [facility] for Clinical Trial:  Gemzar/Abraxane C9D1 ambulatory in stable condition. Assessment completed. No new concerns voiced. Upon accessing pt's port, was unable to advance needle into port hub. The needle was scraping metal only. MD notified that port was probably flipped. PIV started in left arm with positive blood. Labs drawn and sent for processing. Urine specimen collected and sent as well. Pt to MD for appointment Labs reviewed, chemo ordered. 1 L NS added today. Pt to get port study today after chemo, pt verbalized understanding. Visit Vitals /59 Pulse 84 Temp 98.1 °F (36.7 °C) Resp 18 Ht 5' 7\" (1.702 m) Wt 83.9 kg (185 lb) BMI 28.98 kg/m² Medications: 
NS 1 L bolus NS @ [facility] Decadron Abraxane Gemzar 1540 Pt tolerated treatment well. PIV maintained positive blood return throughout treatment, flushed with positive blood return at conclusion and removed prior to discharge. D/c home ambulatory in no distress. Pt aware of next [facility] appointment scheduled for 5/8/19. Recent Results (from the past 12 hour(s)) METABOLIC PANEL, COMPREHENSIVE Collection Time: 06/19/19 11:00 AM  
Result Value Ref Range Sodium 133 (L) 136 - 145 mmol/L Potassium 4.7 3.5 - 5.1 mmol/L Chloride 100 97 - 108 mmol/L  
 CO2 28 21 - 32 mmol/L Anion gap 5 5 - 15 mmol/L Glucose 252 (H) 65 - 100 mg/dL BUN 9 6 - 20 MG/DL Creatinine 0.79 0.55 - 1.02 MG/DL  
 BUN/Creatinine ratio 11 (L) 12 - 20 GFR est AA >60 >60 ml/min/1.73m2 GFR est non-AA >60 >60 ml/min/1.73m2 Calcium 8.3 (L) 8.5 - 10.1 MG/DL Bilirubin, total 0.4 0.2 - 1.0 MG/DL  
 ALT (SGPT) 18 12 - 78 U/L  
 AST (SGOT) 17 15 - 37 U/L Alk. phosphatase 149 (H) 45 - 117 U/L Protein, total 6.1 (L) 6.4 - 8.2 g/dL Albumin 3.1 (L) 3.5 - 5.0 g/dL Globulin 3.0 2.0 - 4.0 g/dL A-G Ratio 1.0 (L) 1.1 - 2.2 LD  
 Collection Time: 06/19/19 11:00 AM  
Result Value Ref Range  81 - 246 U/L  
MAGNESIUM Collection Time: 06/19/19 11:00 AM  
Result Value Ref Range Magnesium 2.0 1.6 - 2.4 mg/dL PHOSPHORUS Collection Time: 06/19/19 11:00 AM  
Result Value Ref Range Phosphorus 3.9 2.6 - 4.7 MG/DL  
URIC ACID Collection Time: 06/19/19 11:00 AM  
Result Value Ref Range Uric acid 5.4 2.6 - 6.0 MG/DL  
CBC WITH AUTOMATED DIFF Collection Time: 06/19/19 11:00 AM  
Result Value Ref Range WBC 5.9 3.6 - 11.0 K/uL  
 RBC 3.09 (L) 3.80 - 5.20 M/uL HGB 9.2 (L) 11.5 - 16.0 g/dL HCT 29.5 (L) 35.0 - 47.0 % MCV 95.5 80.0 - 99.0 FL  
 MCH 29.8 26.0 - 34.0 PG  
 MCHC 31.2 30.0 - 36.5 g/dL RDW 22.7 (H) 11.5 - 14.5 % PLATELET 046 (H) 461 - 400 K/uL MPV 9.4 8.9 - 12.9 FL  
 NRBC 0.0 0  WBC ABSOLUTE NRBC 0.00 0.00 - 0.01 K/uL NEUTROPHILS 64 32 - 75 % LYMPHOCYTES 14 12 - 49 % MONOCYTES 18 (H) 5 - 13 % EOSINOPHILS 2 0 - 7 % BASOPHILS 1 0 - 1 % IMMATURE GRANULOCYTES 1 (H) 0.0 - 0.5 % ABS. NEUTROPHILS 3.7 1.8 - 8.0 K/UL  
 ABS. LYMPHOCYTES 0.8 0.8 - 3.5 K/UL  
 ABS. MONOCYTES 1.1 (H) 0.0 - 1.0 K/UL  
 ABS. EOSINOPHILS 0.1 0.0 - 0.4 K/UL  
 ABS. BASOPHILS 0.1 0.0 - 0.1 K/UL  
 ABS. IMM. GRANS. 0.1 (H) 0.00 - 0.04 K/UL  
 DF SMEAR SCANNED    
 RBC COMMENTS ANISOCYTOSIS 2+ 
    
 RBC COMMENTS POLYCHROMASIA PRESENT 
    
 RBC COMMENTS MICROCYTOSIS 1+ 
    
 RBC COMMENTS MACROCYTOSIS 1+ 
    
 RBC COMMENTS OVALOCYTES PRESENT 
    
 WBC COMMENTS TOXIC GRANULATION    
URINALYSIS W/ REFLEX CULTURE Collection Time: 06/19/19 11:00 AM  
Result Value Ref Range Color YELLOW/STRAW Appearance CLOUDY (A) CLEAR Specific gravity 1.015 1.003 - 1.030    
 pH (UA) 6.5 5.0 - 8.0 Protein NEGATIVE  NEG mg/dL Glucose 100 (A) NEG mg/dL Ketone NEGATIVE  NEG mg/dL Bilirubin NEGATIVE  NEG Blood NEGATIVE  NEG Urobilinogen 1.0 0.2 - 1.0 EU/dL Nitrites NEGATIVE  NEG Leukocyte Esterase TRACE (A) NEG    
 WBC 0-4 0 - 4 /hpf  
 RBC 0-5 0 - 5 /hpf Epithelial cells FEW FEW /lpf Bacteria NEGATIVE  NEG /hpf  
 UA:UC IF INDICATED CULTURE NOT INDICATED BY UA RESULT CNI

## 2019-06-21 NOTE — TELEPHONE ENCOUNTER
[id number]  Social Work Navigator Encounter     Patient Name:  Keny Patrick    Medical History: pancreatic cancer    Advance Directives: None on file; conversation deferred    Narrative: Supportive call placed to patient. Provided information about acupuncture offered at [facility]. Patient is interested in a referral to address fatigue and peripheral neuropathy. No additional needs at this time. Barriers to Care: none identified at this time    Assessment/Action:  1. Discussed acupuncture as an complementary therapy to help manage fatigue and neuropathy. 2. Ongoing psychosocial support to include assessment of adjustment to diagnosis and treatment, counseling and resource linkage.      Plan/Referral:   Complementary therapies referral ([facility])    Thank you,   Siomara , VONNIE

## 2019-06-26 NOTE — PROGRESS NOTES
Outpatient Infusion Center - Chemotherapy Progress Note 1100 Pt admit to [facility] for Clinical [place]: Abraxane + Gemzar C9D8 ambulatory in stable condition. Assessment completed. No new concerns voiced. Port accessed with positive blood return. Labs drawn and sent for processing. Results are within parameters to treat. Chemotherapy Flowsheet 6/26/2019 Cycle C9D8 Date 6/26/2019 Drug / Regimen Clinical [place]: Abraxane + Gemzar Pre Meds -  
Notes - Visit Vitals /82 (BP 1 Location: Right arm, BP Patient Position: Sitting) Pulse 73 Temp 98.6 °F (37 °C) Resp 18 Ht 5' 7\" (1.702 m) Wt 84 kg (185 lb 3.2 oz) SpO2 98% Breastfeeding? No  
BMI 29.01 kg/m² Patient Vitals for the past 12 hrs: 
 Temp Pulse Resp BP SpO2  
06/26/19 1545 98.6 °F (37 °C) 73 18 160/82 98 %  
06/26/19 1104 97.8 °F (36.6 °C) 75 18 130/65 98 % Recent Results (from the past 12 hour(s)) CBC WITH AUTOMATED DIFF Collection Time: 06/26/19 11:13 AM  
Result Value Ref Range WBC 13.7 (H) 3.6 - 11.0 K/uL  
 RBC 3.04 (L) 3.80 - 5.20 M/uL HGB 9.0 (L) 11.5 - 16.0 g/dL HCT 28.4 (L) 35.0 - 47.0 % MCV 93.4 80.0 - 99.0 FL  
 MCH 29.6 26.0 - 34.0 PG  
 MCHC 31.7 30.0 - 36.5 g/dL RDW 21.2 (H) 11.5 - 14.5 % PLATELET 615 (H) 042 - 400 K/uL MPV 9.6 8.9 - 12.9 FL  
 NRBC 0.2 (H) 0  WBC ABSOLUTE NRBC 0.03 (H) 0.00 - 0.01 K/uL NEUTROPHILS 72 32 - 75 % BAND NEUTROPHILS 2 0 - 6 % LYMPHOCYTES 9 (L) 12 - 49 % MONOCYTES 6 5 - 13 % EOSINOPHILS 1 0 - 7 % BASOPHILS 1 0 - 1 % METAMYELOCYTES 3 (H) 0 % MYELOCYTES 6 (H) 0 % IMMATURE GRANULOCYTES 0 %  
 ABS. NEUTROPHILS 10.1 (H) 1.8 - 8.0 K/UL  
 ABS. LYMPHOCYTES 1.2 0.8 - 3.5 K/UL  
 ABS. MONOCYTES 0.8 0.0 - 1.0 K/UL  
 ABS. EOSINOPHILS 0.1 0.0 - 0.4 K/UL  
 ABS. BASOPHILS 0.1 0.0 - 0.1 K/UL  
 ABS. IMM. GRANS. 0.0 K/UL  
 DF MANUAL PLATELET COMMENTS Large Platelets  RBC COMMENTS ANISOCYTOSIS 
1+ 
    
 WBC COMMENTS TOXIC GRANULATION Medications: 
Dexamethasone Abraxane Gemzar 1550 Pt tolerated treatment well. Port maintained positive blood return throughout treatment, flushed with positive blood return at conclusion and throughout. D/c home ambulatory in no distress. Pt aware of next appointment scheduled for 7/3/19.

## 2019-06-26 NOTE — PROGRESS NOTES
Pt in for labs infusion. This is C9D8 of treatment. Patient reports the following adverse events at this time: gr 1 loss of appetite, gr 1 constipation, gr 1 fatigue, gr 2 hair loss, gr 1 insomnia, gr 1 sob, gr 1 neuropathy. Vital signs are stable. Patient to receive gemcitabine/abraxane infusions today. Next appt is scheduled for 7/3/19.

## 2019-07-02 NOTE — TELEPHONE ENCOUNTER
Dr. Victoria De Leon Note  [phone]  Fax [phone]     Name:  Diane Campos  YOB: 1945    Received acupuncture referral for [facility] from Sheila Devine at the [facility] of [address] [place] [address]. Pt has pancreatic cancer with liver mets. Nurse called pt, appt scheduled for 8/6/19 at 12 noon.     CYRUS McadamsN, RN  Clinical Referral Navigator

## 2019-07-03 NOTE — PROGRESS NOTES
[facility] [place] at [address], [address] [facility] 1007 [facility] : [phone]  F: [phone]      Reason for Visit:   Elijah King is a 69 y.o. female who is seen in self-referral for evaluation of pancreatic cancer. Treatment History:   · 10/4/18 pancreatic head mass FNA, pancreatic adenocarcinoma    10/15/18  Liver, Core Biopsy w/Touch Prep CYTOLOGIC INTERPRETATION:   · Numerous cores and fragments of benign hepatic parenchyma     10/24/18  Liver, Fine Needle Aspiration CYTOLOGIC INTERPRETATION:   Metastatic adenocarcinoma (see Comment). General Categorization   Positive for malignancy. Comment: The morphologic appearance is nonspecific with regard to site of origin but compatible with metastatic pancreatic carcinoma in this patient with known pancreatic adenocarcinoma ([id number]). 11/9/18 abraxane/gemcitabine    History of Present Illness:   She started having abdominal pain, gradual and persistent, x 1 month, pressure sensation, located in epigastrium, no radiation, no aggravating symptoms, no relieving factors. 25 lb weight loss over 6 months.  + nausea. Interval history:  In today for follow up and treatment. Complains of gr 1 loss of appetite, gr 1 constipation, gr 1 fatigue, gr 2 hair loss, gr 1 insomnia, gr 1 sob, gr 1 neuropathy, gr 1 urinary leakage. Past Medical History:   Diagnosis Date    Arthritis     Constipation     Diabetes ([address]     Hemorrhoids     Hiatal hernia     High cholesterol     Hypertension     Pancreatic cancer ([address]       Past Surgical History:   Procedure Laterality Date    HX KNEE ARTHROSCOPY Right     HX ORTHOPAEDIC      left wrist surgery    HX TONSILLECTOMY        Social History     Tobacco Use    Smoking status: Never Smoker    Smokeless tobacco: Never Used   Substance Use Topics    Alcohol use:  Yes     Alcohol/week: 1.2 - 2.4 oz     Types: 1 - 2 Cans of beer, 1 - 2 Shots of liquor per week Frequency: 2-4 times a month     Drinks per session: 1 or 2      Family History   Problem Relation Age of Onset    Cancer Mother         skin    Hypertension Mother     Heart Disease Mother     Cancer Father         skin    Heart Disease Father     Stroke Father     Diabetes Neg Hx      Current Outpatient Medications   Medication Sig    lidocaine-prilocaine (EMLA) topical cream Apply  to affected area as needed for Pain.  ondansetron hcl (ZOFRAN) 8 mg tablet Take 1 Tab by mouth every eight (8) hours as needed for Nausea.  TRESIBA FLEXTOUCH U-200 200 unit/mL (3 mL) inpn 10-12 UNITS BY SUBCUTANEOUS ROUTE DAILY. OR AS DIRECTED UP TO 60 UNITS DAILY    IBUPROFEN IB PO Take  by mouth as needed.  GRANIX 480 mcg/0.8 mL syrg injection INJECT CONTENTS OF 1 SYRINGE UNDER THE SKIN FOR 3 DAYS ONCE WEEKLY (Patient taking differently: INJECT CONTENTS OF 1 SYRINGE UNDER THE SKIN FOR 2 DAYS ONCE WEEKLY)    glucose blood VI test strips (ACCU-CHEK RICHIE PLUS TEST STRP) strip Accu-Chek Richie Plus test strips   Check BS BID    OTHER Cranial prosthesis    Dx C25.7    omeprazole (PRILOSEC) 20 mg capsule Take 20 mg by mouth daily.  simvastatin (ZOCOR) 10 mg tablet Take 10 mg by mouth nightly.  telmisartan (MICARDIS) 80 mg tablet Take 80 mg by mouth daily.  hydroCHLOROthiazide (MICROZIDE) 12.5 mg capsule Take 12.5 mg by mouth daily. No current facility-administered medications for this visit.       Facility-Administered Medications Ordered in Other Visits   Medication Dose Route Frequency    gemcitabine (GEMZAR) 1,960 mg in 0.9% sodium chloride 250 mL, overfill volume 25 mL chemo infusion  1,960 mg IntraVENous ONCE    PACLitaxel-Protein Bound (ABRAXANE) 245 mg chemo infusion  245 mg IntraVENous ONCE    dexamethasone (DECADRON) 4 mg/mL injection 8 mg  8 mg IntraVENous ONCE    prochlorperazine (COMPAZINE) with saline injection 10 mg  10 mg IntraVENous Q6H PRN      Allergies   Allergen Reactions    Amoxicillin Hives        Review of Systems: A comprehensive review of systems was performed and all systems were negative except for HPI and for the symptom review form, reviewed and scanned in. Physical Exam:     Visit Vitals  /70   Pulse 74   Temp 98 °F (36.7 °C)   Resp 18   Ht 5' 7\" (1.702 m)   Wt 182 lb (82.6 kg)   SpO2 99%   BMI 28.51 kg/m²     ECOG PS: 0  General: No distress  Eyes: PERRLA, anicteric sclerae  HENT: Atraumatic, OP clear  Neck: Supple  Lymphatic: No cervical, supraclavicular, or inguinal adenopathy  Respiratory: CTAB, normal respiratory effort  CV: Normal rate, regular rhythm, no murmurs, no peripheral edema  GI: Soft, nontender, nondistended, no masses, no hepatomegaly, no splenomegaly  MS: Normal gait and station. Digits without clubbing or cyanosis. Skin: No rashes, ecchymoses, or petechiae. Normal temperature, turgor, and texture. Psych: Alert, oriented, appropriate affect, normal judgment/insight        Results:     Lab Results   Component Value Date/Time    WBC 13.7 (H) 06/26/2019 11:13 AM    HGB 9.0 (L) 06/26/2019 11:13 AM    HCT 28.4 (L) 06/26/2019 11:13 AM    PLATELET 175 (H) 81/48/7622 11:13 AM    MCV 93.4 06/26/2019 11:13 AM    ABS. NEUTROPHILS 10.1 (H) 06/26/2019 11:13 AM     Lab Results   Component Value Date/Time    Sodium 133 (L) 06/19/2019 11:00 AM    Potassium 4.7 06/19/2019 11:00 AM    Chloride 100 06/19/2019 11:00 AM    CO2 28 06/19/2019 11:00 AM    Glucose 252 (H) 06/19/2019 11:00 AM    BUN 9 06/19/2019 11:00 AM    Creatinine 0.79 06/19/2019 11:00 AM    GFR est AA >60 06/19/2019 11:00 AM    GFR est non-AA >60 06/19/2019 11:00 AM    Calcium 8.3 (L) 06/19/2019 11:00 AM    Glucose (POC) 168 (H) 04/15/2019 10:44 AM     Lab Results   Component Value Date/Time    Bilirubin, total 0.4 06/19/2019 11:00 AM    ALT (SGPT) 18 06/19/2019 11:00 AM    AST (SGOT) 17 06/19/2019 11:00 AM    Alk.  phosphatase 149 (H) 06/19/2019 11:00 AM    Protein, total 6.1 (L) 06/19/2019 11:00 AM Albumin 3.1 (L) 06/19/2019 11:00 AM    Globulin 3.0 06/19/2019 11:00 AM     10/1/18 CT abd  There is a 3.8 x 1.9 cm mass involving the uncinate process and possibly  invading the duodenum. Findings are nonspecific but typical of pancreatic  carcinoma. There is no biliary or pancreatic ductal dilatation.     There is a poorly defined irregular hypodensity in segment IVb of the liver  measuring 3.7 x 1.9 cm. There appears to be focal biliary dilatation in the left  lobe behind this abnormality. Metastatic disease is suspected. There is a small,  1 cm hypodensity in the dome of the liver, likely segment 8. There is a 1 cm  hypodensity in segment 4 of the liver as well, also likely metastasis. Small  hypodensity measuring less than 1 cm in segment 6 at the tip laterally is also  nonspecific but probable metastasis.     Spleen kidneys and adrenals are unremarkable.     No free fluid or focal fluid collection.     Lung bases are clear.     Bone windows are unremarkable.     IMPRESSION  IMPRESSION:  1. 3.8 x 1.9 cm mass involving the uncinate process of the pancreas and possibly  invading the duodenum, this likely represents pancreatic carcinoma. 2. Likely metastatic disease in the liver. There is a 3.8 x 1.9 cm mass involving the uncinate process and possibly  invading the duodenum. Findings are nonspecific but typical of pancreatic  carcinoma. There is no biliary or pancreatic ductal dilatation.     There is a poorly defined irregular hypodensity in segment IVb of the liver  measuring 3.7 x 1.9 cm. There appears to be focal biliary dilatation in the left  lobe behind this abnormality. Metastatic disease is suspected. There is a small,  1 cm hypodensity in the dome of the liver, likely segment 8. There is a 1 cm  hypodensity in segment 4 of the liver as well, also likely metastasis.  Small  hypodensity measuring less than 1 cm in segment 6 at the tip laterally is also  nonspecific but probable metastasis.     Spleen kidneys and adrenals are unremarkable.     No free fluid or focal fluid collection.     Lung bases are clear.     Bone windows are unremarkable.     IMPRESSION:  1. 3.8 x 1.9 cm mass involving the uncinate process of the pancreas and possibly  invading the duodenum, this likely represents pancreatic carcinoma. 2. Likely metastatic disease in the liver. Records reviewed and summarized above. Pathology report(s) reviewed above. Radiology report(s) reviewed above. MRI abdomen 10/22/18: IMPRESSION:       1. Pancreatic uncinate process mass suspicious for pancreatic neoplasm, possibly  adenocarcinoma. Mass abuts the superior mesenteric artery with about 20%  circumference involvement but no luminal distortion or perivascular stranding. 2. Multiple hepatic lesions suspicious for metastatic neoplasm with segment IVb  lesion showing patchy areas of surrounding steatosis likely secondary to locally  altered intrahepatic hemodynamics and likely responsible for biopsy result. 3. Cholecystolithiasis and small gallbladder polyp. CT c/a/p 12/28/18: IMPRESSION:  Interval decrease in size of pancreatic mass. Slight interval decrease in size of hepatic metastases; these now demonstrate central low attenuation suggestive of necrosis. No evidence of new metastatic disease in the chest, abdomen, or pelvis. CT c/a/p 2/25/19:  LIVER: The lesions described on the prior examination have improved in the  interval. 19 mm lesion in the dome of the liver now measures 1 cm. 2.0 x 1.5 cm  lesion in the left hepatic lobe now measures 1.3 x 1.2 cm. Other smaller lesions  are no longer visualized. GALLBLADDER: Unremarkable. SPLEEN: No mass. PANCREAS: Mass lesion in the uncinate process now measures 1.7 x 2.0 cm,  previously measuring 2.0 x 3.0 cm. ADRENALS: Unremarkable. KIDNEYS: No mass, calculus, or hydronephrosis. STOMACH: Unremarkable. SMALL BOWEL: No dilatation or wall thickening.   COLON: No dilatation or wall thickening. APPENDIX: Unremarkable. PERITONEUM: No ascites or pneumoperitoneum. RETROPERITONEUM: No lymphadenopathy or aortic aneurysm. REPRODUCTIVE ORGANS: Intrauterine device projects in satisfactory position. URINARY BLADDER: No mass or calculus. BONES: Degenerative changes are seen in the thoracic and lumbar spine. ADDITIONAL COMMENTS: N/A     IMPRESSION  IMPRESSION:  Interval decrease in the size of the hepatic metastases. Decrease in the size of  the mass lesion involving the uncinate process.     RECIST:  Pancreatic uncinate mass: Current: (68) 1.7X2.0cm     12/28/2018: (71) 3.0 x  2.0 cm   Segment 4B liver mass: Current: (56) 1.3 x 1.2 cm 12/28/2018: (57) 2.0 x 1.5 cm   Segment 5 liver mass: Current: (68) 4 x 7 mm 12/28/2018: (70) 1.4 x 0.9 cm    CXR for port study 4/15/19:   IMPRESSION:  Right internal jugular chest Port-A-Cath is seen with the catheter terminating  in the mid SVC. The port is slightly angled medially due to breast tissue. The  port is not flipped. Access needle appears to be within the port hub. Nursing  staff was able to get good blood return and flush the port without difficulty. No intervention was performed. CT a/p 4/22/19:  IMPRESSION:  1. Slight decrease in size of liver metastases, detailed above. 2. Slight decrease in size in uncinate process mass. 3. No evidence for new metastatic disease.     RECIST:  Uncinate process mass: 15 x 9 mm, image 56, previously 17 x 11 mm. Segment IVb metastasis 11 x 9 mm, image 50, previously 13 x 12 mm  Segment 5 metastasis: Barely detectable, image 62    CT c/a/p 6/17/19: IMPRESSION:  1. Stable lesion in the uncinate process. 2. Slight interval decrease in one of the 2 small liver lesions while the other  remains stable. 3. Stable small nodules right upper lobe.   4. No new evidence of metastatic disease or acute abnormality.     RECIST:  Uncinate process mass: 15 x 9 mm, image 68, previously 15 x 9 mm, image 56  Segment IVb metastasis 11 x 9 mm, image 56, previously 11 x 9 mm, image 50  Segment 5 metastasis: No longer visualized, previously barely detectable, image  62     Assessment/plan:   1. Uncinate pancreatic adenocarcinoma,  mets to liver, stage IV:  cT2 cN0 pM1    Discussed that while this is treatable, it is not curable, the goal of therapy is palliative. Discussed that without therapy, life expectancy would be 3-6 months, with therapy 12-18 months on average. As an example of likely chemotherapy, we discussed the risks and benefits of Gemcitabine and Abraxane chemotherapy. Potential side effects include, but are not limited to, nausea, vomiting, diarrhea, constipation, mucositis, taste changes, myelosuppression, infection, fatigue, alopecia, skin and nail changes, pulmonary toxicity, neuropathy, allergic reactions, infertility, and rarely, death. Discussed the [id number] study and the research nurse met with her today and she signed informed consent. · Gemcitabine (1000 mg/m2) and Abraxane (125 mg/m2) given on days 1,8,15 every 28 days. · Labs: CBC, BMP prior to each treatment. Hepatic function panel every 4 weeks. CA 19.9 prior to day 1  · Antiemetic prophylaxis: Dexamethasone prior to each treatment  · PRN antiemetics: Ondansetron and Prochlorperazine at home  · EMLA cream for port    On the control arm, C9d15  today, will see her back in 2 weeks for C10D1. CT a/p on 6/17/19 show response to treatment. We will plan to see the patient in follow up at least once per cycle, or sooner if symptoms warrant. 2. DM2:  Holding metformin; on insulin; saw Dr. Chapin Smiley; her BS are improving; she is now taking less insulin; will check A1c    3. Emotional well being:  She has excellent support and is coping well with her disease    4. Abdominal pain:  Mild, may be due to constipation;  palliative care has seen    5. Nutrition:  Terence Cartagena RD has met with her; holding on enzymes    6. Insomnia: Ongoing but no worse. Currently taking melatonin. She has lunesta if needed. 7. Anemia:  Due to chemo and disease; monitor; iron profile and ferritin normal; gave injectafer 750 mg IV on 5/22/19. May need to give again in the future. 8. Neutropenia:  Due to chemo, started granix 480 mcg for 4 days following chemo with C1D15. Does report some pain with granix. Tried claritin, however this kept her awake. Recommend trying tylenol and advil PRN. Now at 2 days following chemo    9. Constipation:  Resolved for now so she is holding off on her stool softener, but she continues with Miralax. 10. Sinus congestion: Intermittent but has improved. Clear/bloody sinus drainage. Recommend OTC sudafed and saline spray. She has seen Dr. Diane Valdivia, ENT, s/p steroids. Plan to start flonase. 11. Dehydration:  S/p 1 L NS. She is drinking more fluids and has not had issues with dizziness. 12. Fatigue: Due to treatment. Ongoing. [facility] information given. She hs an appointment to get acupuncture on 8/5/19. S/p 1L IV hydration. Will monitor. 15. Port issues: Port study x 2 were normal.  Was able access today, may need to get this replaced in the future.       14. Lipids:  With her diagnosis of metastatic pancreatic cancer, I do not see the utility at this time to check a lipid panel      Signed By: Brigido Blevins MD

## 2019-07-03 NOTE — PROGRESS NOTES
Colleen Ortega is a 69 y.o. female here for follow up of pancreatic cancer. 1. Have you been to the ER, urgent care clinic since your last visit?: No. Hospitalized since your last visit?: No.    2. Have you seen or consulted any other health care providers outside of the [address] [place] [address] since your last visit?   Include any pap smears or colon screening.: No.

## 2019-07-03 NOTE — PROGRESS NOTES
Pt in for labs and follow up visit today per protocol with Dr. Joe Fernández and RN. This is C9D15 of treatment. Patient reports the following adverse events at this time: gr 1 loss of appetite, gr 1 constipation, gr 1 fatigue, gr 2 hair loss, gr 1 insomnia, gr 1 sob, gr 1 neuropathy, gr 1 urinary leakage. Vital signs are stable. Patient to go to infusion center after appointment to receive gemcitabine/abraxane. Next appt is scheduled for 7/17/19. Pt re-consented today with newest version. Informed of all changes made. Copy given to patient, no additional questions at this time.

## 2019-07-03 NOTE — PROGRESS NOTES
[facility] Progress Note Date: July 3, 2019 Name: Yuly Iverson MRN: [id number] : 1945 
 
1235. Ms. Caren Kirby Arrived ambulatory and in no distress for C9D15 Clinical Trial: Gemzar/Abraxane. Assessment was completed, patient with no complaints today. R chest wall port accessed without difficulty using 0.75 inch garcia needle, labs drawn and in process. Chemotherapy Flowsheet 7/3/2019 Cycle C9D15 Date 7/3/2019 Drug / Regimen Clinical trial:  Gemzar + Abraxane Pre Meds -  
Notes -  
 
 
 
Ms. Strange's vitals were reviewed. Patient Vitals for the past 12 hrs: 
 Temp Pulse Resp BP SpO2  
19 1618 97.6 °F (36.4 °C) 71 18 151/75 97 % 19 1255 98 °F (36.7 °C) 74 18 124/70 99 % Lab results were obtained and reviewed. Recent Results (from the past 12 hour(s)) CBC WITH AUTOMATED DIFF Collection Time: 19 12:54 PM  
Result Value Ref Range WBC 6.3 3.6 - 11.0 K/uL  
 RBC 2.95 (L) 3.80 - 5.20 M/uL HGB 9.0 (L) 11.5 - 16.0 g/dL HCT 27.8 (L) 35.0 - 47.0 % MCV 94.2 80.0 - 99.0 FL  
 MCH 30.5 26.0 - 34.0 PG  
 MCHC 32.4 30.0 - 36.5 g/dL RDW 21.2 (H) 11.5 - 14.5 % PLATELET 168 215 - 214 K/uL MPV 9.7 8.9 - 12.9 FL  
 NRBC 0.0 0  WBC ABSOLUTE NRBC 0.00 0.00 - 0.01 K/uL NEUTROPHILS 68 32 - 75 % LYMPHOCYTES 13 12 - 49 % MONOCYTES 10 5 - 13 % EOSINOPHILS 0 0 - 7 % BASOPHILS 0 0 - 1 % METAMYELOCYTES 6 (H) 0 % MYELOCYTES 3 (H) 0 % IMMATURE GRANULOCYTES 0 %  
 ABS. NEUTROPHILS 4.3 1.8 - 8.0 K/UL  
 ABS. LYMPHOCYTES 0.8 0.8 - 3.5 K/UL  
 ABS. MONOCYTES 0.6 0.0 - 1.0 K/UL  
 ABS. EOSINOPHILS 0.0 0.0 - 0.4 K/UL  
 ABS. BASOPHILS 0.0 0.0 - 0.1 K/UL  
 ABS. IMM. GRANS. 0.0 K/UL  
 DF AUTOMATED    
 RBC COMMENTS ANISOCYTOSIS 
PRESENT 
    
HEMOGLOBIN A1C WITH EAG Collection Time: 19 12:54 PM  
Result Value Ref Range Hemoglobin A1c 6.8 (H) 4.2 - 6.3 % Est. average glucose 148 mg/dL METABOLIC PANEL, COMPREHENSIVE Collection Time: 07/03/19 12:54 PM  
Result Value Ref Range Sodium 132 (L) 136 - 145 mmol/L Potassium 3.8 3.5 - 5.1 mmol/L Chloride 98 97 - 108 mmol/L  
 CO2 28 21 - 32 mmol/L Anion gap 6 5 - 15 mmol/L Glucose 127 (H) 65 - 100 mg/dL BUN 8 6 - 20 MG/DL Creatinine 0.73 0.55 - 1.02 MG/DL  
 BUN/Creatinine ratio 11 (L) 12 - 20 GFR est AA >60 >60 ml/min/1.73m2 GFR est non-AA >60 >60 ml/min/1.73m2 Calcium 8.8 8.5 - 10.1 MG/DL Bilirubin, total 0.4 0.2 - 1.0 MG/DL  
 ALT (SGPT) 36 12 - 78 U/L  
 AST (SGOT) 25 15 - 37 U/L Alk. phosphatase 162 (H) 45 - 117 U/L Protein, total 6.1 (L) 6.4 - 8.2 g/dL Albumin 3.2 (L) 3.5 - 5.0 g/dL Globulin 2.9 2.0 - 4.0 g/dL A-G Ratio 1.1 1.1 - 2.2 URINALYSIS W/ REFLEX CULTURE Collection Time: 07/03/19  1:54 PM  
Result Value Ref Range Color YELLOW/STRAW Appearance CLOUDY (A) CLEAR Specific gravity 1.011 1.003 - 1.030    
 pH (UA) 7.0 5.0 - 8.0 Protein TRACE (A) NEG mg/dL Glucose NEGATIVE  NEG mg/dL Ketone NEGATIVE  NEG mg/dL Bilirubin NEGATIVE  NEG Blood NEGATIVE  NEG Urobilinogen 1.0 0.2 - 1.0 EU/dL Nitrites NEGATIVE  NEG Leukocyte Esterase TRACE (A) NEG    
 WBC 0-4 0 - 4 /hpf  
 RBC 0-5 0 - 5 /hpf Epithelial cells FEW FEW /lpf Bacteria NEGATIVE  NEG /hpf  
 UA:UC IF INDICATED CULTURE NOT INDICATED BY UA RESULT CNI Hyaline cast 2-5 0 - 5 /lpf  
 
 
 
Pre-medications  were administered as ordered and chemotherapy was initiated. Medications: 
Dexamethasone IVP Abraxane IV Gemzar IV 
NS KVO 
 
1620. Ms. Tapan Dillard tolerated treatment well and was discharged from [place] [address] in stable condition. Port de-accessed, flushed & heparinized per protocol. She is to return on 07/17/19 for her next appointment. Alyssa Cam RN 
July 3, 2019

## 2019-07-17 NOTE — PROGRESS NOTES
[facility] - Chemotherapy Progress Note    1000 Pt admit to [facility] for Clinical Trial: Abraxane/Gemzar ambulatory in stable condition. Assessment completed. No new concerns voiced. PAC with positive blood return. Chemotherapy Flowsheet 7/17/2019   Cycle C 10   Date 7/17/2019   Drug / Regimen Clinical Trial: Abraxane/Gemzar   Pre Meds -   Notes -       Visit Vitals  /59   Pulse 71   Temp 96.1 °F (35.6 °C)   Resp 18   Ht 5' 7\" (1.702 m)   Wt 83.7 kg (184 lb 9.6 oz)   SpO2 100%   BMI 28.91 kg/m²     Pt declines pregnancy  Labs obtained and patient proceeded to scheduled MD appointment. Medications:  Dexamethasone ivp    Abraxane iv  Gemzar iv    SBAR to [employer] @ 80   D/c home ambulatory in no distress. Pt aware of next appointment scheduled for 7/24/19    Recent Results (from the past 12 hour(s))   CBC WITH AUTOMATED DIFF    Collection Time: 07/17/19 10:23 AM   Result Value Ref Range    WBC 6.7 3.6 - 11.0 K/uL    RBC 2.81 (L) 3.80 - 5.20 M/uL    HGB 8.6 (L) 11.5 - 16.0 g/dL    HCT 26.5 (L) 35.0 - 47.0 %    MCV 94.3 80.0 - 99.0 FL    MCH 30.6 26.0 - 34.0 PG    MCHC 32.5 30.0 - 36.5 g/dL    RDW 21.8 (H) 11.5 - 14.5 %    PLATELET 920 (H) 196 - 400 K/uL    MPV 9.8 8.9 - 12.9 FL    NRBC 0.0 0  WBC    ABSOLUTE NRBC 0.00 0.00 - 0.01 K/uL    NEUTROPHILS 71 32 - 75 %    LYMPHOCYTES 11 (L) 12 - 49 %    MONOCYTES 15 (H) 5 - 13 %    EOSINOPHILS 2 0 - 7 %    BASOPHILS 0 0 - 1 %    IMMATURE GRANULOCYTES 1 (H) 0.0 - 0.5 %    ABS. NEUTROPHILS 4.8 1.8 - 8.0 K/UL    ABS. LYMPHOCYTES 0.7 (L) 0.8 - 3.5 K/UL    ABS. MONOCYTES 1.0 0.0 - 1.0 K/UL    ABS. EOSINOPHILS 0.1 0.0 - 0.4 K/UL    ABS. BASOPHILS 0.0 0.0 - 0.1 K/UL    ABS. IMM.  GRANS. 0.1 (H) 0.00 - 0.04 K/UL    DF SMEAR SCANNED      RBC COMMENTS ANISOCYTOSIS  1+        RBC COMMENTS POLYCHROMASIA  PRESENT        RBC COMMENTS TEARDROP CELLS  PRESENT        WBC COMMENTS TOXIC GRANULATION     METABOLIC PANEL, COMPREHENSIVE    Collection Time: 07/17/19 10:23 AM   Result Value Ref Range    Sodium 132 (L) 136 - 145 mmol/L    Potassium 4.1 3.5 - 5.1 mmol/L    Chloride 97 97 - 108 mmol/L    CO2 27 21 - 32 mmol/L    Anion gap 8 5 - 15 mmol/L    Glucose 187 (H) 65 - 100 mg/dL    BUN 11 6 - 20 MG/DL    Creatinine 0.91 0.55 - 1.02 MG/DL    BUN/Creatinine ratio 12 12 - 20      GFR est AA >60 >60 ml/min/1.73m2    GFR est non-AA >60 >60 ml/min/1.73m2    Calcium 7.9 (L) 8.5 - 10.1 MG/DL    Bilirubin, total 0.4 0.2 - 1.0 MG/DL    ALT (SGPT) 19 12 - 78 U/L    AST (SGOT) 14 (L) 15 - 37 U/L    Alk. phosphatase 148 (H) 45 - 117 U/L    Protein, total 5.8 (L) 6.4 - 8.2 g/dL    Albumin 2.9 (L) 3.5 - 5.0 g/dL    Globulin 2.9 2.0 - 4.0 g/dL    A-G Ratio 1.0 (L) 1.1 - 2.2     LD    Collection Time: 07/17/19 10:23 AM   Result Value Ref Range     81 - 246 U/L   MAGNESIUM    Collection Time: 07/17/19 10:23 AM   Result Value Ref Range    Magnesium 1.9 1.6 - 2.4 mg/dL   PHOSPHORUS    Collection Time: 07/17/19 10:23 AM   Result Value Ref Range    Phosphorus 4.1 2.6 - 4.7 MG/DL   SAMPLES BEING HELD    Collection Time: 07/17/19 10:23 AM   Result Value Ref Range    SAMPLES BEING HELD 1 SST     COMMENT        Add-on orders for these samples will be processed based on acceptable specimen integrity and analyte stability, which may vary by analyte.

## 2019-07-17 NOTE — PROGRESS NOTES
\[facility]\"" Progress Note    Date: 2019    Name: Peewee Kitchen    MRN: [id number]         : 1945    1410. SBAR report received from RAMON Bonilla RN.       9324. Ms. Tapan Dillard tolerated treatment well and was discharged from [place] [address] in stable condition. Port de-accessed, flushed & heparinized per protocol. She is to return on 19 for her next appointment.     Alyssa Cam RN  2019

## 2019-07-17 NOTE — PROGRESS NOTES
[facility] [place] [address] [address] [facility] [address] [facility]  Isa Starch: [phone]  F: [phone]      Reason for Visit:   Yanely Stokes is a 69 y.o. female who is seen in self-referral for evaluation of pancreatic cancer. Treatment History:   · 10/4/18 pancreatic head mass FNA, pancreatic adenocarcinoma    10/15/18  Liver, Core Biopsy w/Touch Prep CYTOLOGIC INTERPRETATION:   · Numerous cores and fragments of benign hepatic parenchyma     10/24/18  Liver, Fine Needle Aspiration CYTOLOGIC INTERPRETATION:   Metastatic adenocarcinoma (see Comment). General Categorization   Positive for malignancy. Comment: The morphologic appearance is nonspecific with regard to site of origin but compatible with metastatic pancreatic carcinoma in this patient with known pancreatic adenocarcinoma ([id number]). 11/9/18 abraxane/gemcitabine    History of Present Illness:   She started having abdominal pain, gradual and persistent, x 1 month, pressure sensation, located in epigastrium, no radiation, no aggravating symptoms, no relieving factors. 25 lb weight loss over 6 months.  + nausea. Interval history:  In today for follow up and treatment. Complains of gr 2 loss of appetite, gr 1 constipation, gr 1 fatigue, gr 2 hair loss, gr 1 insomnia, gr 1 concentration, gr 1 sob, gr 1 neuropathy, gr 1 urinary leakage. Past Medical History:   Diagnosis Date    Arthritis     Constipation     Diabetes ([address]     Hemorrhoids     Hiatal hernia     High cholesterol     Hypertension     Pancreatic cancer ([address]       Past Surgical History:   Procedure Laterality Date    HX KNEE ARTHROSCOPY Right     HX ORTHOPAEDIC      left wrist surgery    HX TONSILLECTOMY        Social History     Tobacco Use    Smoking status: Never Smoker    Smokeless tobacco: Never Used   Substance Use Topics    Alcohol use:  Yes     Alcohol/week: 2.0 - 4.0 standard drinks     Types: 1 - 2 Cans of beer, 1 - 2 Shots of liquor per week     Frequency: 2-4 times a month     Drinks per session: 1 or 2      Family History   Problem Relation Age of Onset    Cancer Mother         skin    Hypertension Mother     Heart Disease Mother     Cancer Father         skin    Heart Disease Father     Stroke Father     Diabetes Neg Hx      Current Outpatient Medications   Medication Sig    TRESIBA FLEXTOUCH U-200 200 unit/mL (3 mL) inpn 10-12 UNITS BY SUBCUTANEOUS ROUTE DAILY. OR AS DIRECTED UP TO 60 UNITS DAILY    GRANIX 480 mcg/0.8 mL syrg injection INJECT CONTENTS OF 1 SYRINGE UNDER THE SKIN FOR 3 DAYS ONCE WEEKLY (Patient taking differently: INJECT CONTENTS OF 1 SYRINGE UNDER THE SKIN FOR 2 DAYS ONCE WEEKLY)    glucose blood VI test strips (ACCU-CHEK RICHIE PLUS TEST STRP) strip Accu-Chek Richie Plus test strips   Check BS BID    OTHER Cranial prosthesis    Dx C25.7    omeprazole (PRILOSEC) 20 mg capsule Take 20 mg by mouth daily.  simvastatin (ZOCOR) 10 mg tablet Take 10 mg by mouth nightly.  telmisartan (MICARDIS) 80 mg tablet Take 80 mg by mouth daily.  hydroCHLOROthiazide (MICROZIDE) 12.5 mg capsule Take 12.5 mg by mouth daily.  lidocaine-prilocaine (EMLA) topical cream Apply  to affected area as needed for Pain.  ondansetron hcl (ZOFRAN) 8 mg tablet Take 1 Tab by mouth every eight (8) hours as needed for Nausea.  IBUPROFEN IB PO Take  by mouth as needed. No current facility-administered medications for this visit.       Facility-Administered Medications Ordered in Other Visits   Medication Dose Route Frequency    gemcitabine (GEMZAR) 1,960 mg in 0.9% sodium chloride 250 mL, overfill volume 25 mL chemo infusion  1,960 mg IntraVENous ONCE    PACLitaxel-Protein Bound (ABRAXANE) 245 mg chemo infusion  245 mg IntraVENous ONCE    dexamethasone (DECADRON) 4 mg/mL injection 8 mg  8 mg IntraVENous ONCE    prochlorperazine (COMPAZINE) with saline injection 10 mg  10 mg IntraVENous Q6H PRN      Allergies Allergen Reactions    Amoxicillin Hives        Review of Systems: A comprehensive review of systems was performed and all systems were negative except for HPI and for the symptom review form, reviewed and scanned in. Physical Exam:     Visit Vitals  /59   Pulse 71   Temp 96.1 °F (35.6 °C) (Temporal)   Resp 18   Ht 5' 7\" (1.702 m)   Wt 184 lb 9.6 oz (83.7 kg)   SpO2 100%   BMI 28.91 kg/m²     ECOG PS: 0  General: No distress  Eyes: PERRLA, anicteric sclerae  HENT: Atraumatic, OP clear  Neck: Supple  Lymphatic: No cervical, supraclavicular, or inguinal adenopathy  Respiratory: CTAB, normal respiratory effort  CV: Normal rate, regular rhythm, no murmurs, no peripheral edema  GI: Soft, nontender, nondistended, no masses, no hepatomegaly, no splenomegaly  MS: Normal gait and station. Digits without clubbing or cyanosis. Skin: No rashes, ecchymoses, or petechiae. Normal temperature, turgor, and texture. Psych: Alert, oriented, appropriate affect, normal judgment/insight        Results:     Lab Results   Component Value Date/Time    WBC 6.7 07/17/2019 10:23 AM    HGB 8.6 (L) 07/17/2019 10:23 AM    HCT 26.5 (L) 07/17/2019 10:23 AM    PLATELET 247 (H) 61/59/4536 10:23 AM    MCV 94.3 07/17/2019 10:23 AM    ABS. NEUTROPHILS 4.8 07/17/2019 10:23 AM     Lab Results   Component Value Date/Time    Sodium 132 (L) 07/17/2019 10:23 AM    Potassium 4.1 07/17/2019 10:23 AM    Chloride 97 07/17/2019 10:23 AM    CO2 27 07/17/2019 10:23 AM    Glucose 187 (H) 07/17/2019 10:23 AM    BUN 11 07/17/2019 10:23 AM    Creatinine 0.91 07/17/2019 10:23 AM    GFR est AA >60 07/17/2019 10:23 AM    GFR est non-AA >60 07/17/2019 10:23 AM    Calcium 7.9 (L) 07/17/2019 10:23 AM    Glucose (POC) 168 (H) 04/15/2019 10:44 AM     Lab Results   Component Value Date/Time    Bilirubin, total 0.4 07/17/2019 10:23 AM    ALT (SGPT) 19 07/17/2019 10:23 AM    AST (SGOT) 14 (L) 07/17/2019 10:23 AM    Alk.  phosphatase 148 (H) 07/17/2019 10:23 AM Protein, total 5.8 (L) 07/17/2019 10:23 AM    Albumin 2.9 (L) 07/17/2019 10:23 AM    Globulin 2.9 07/17/2019 10:23 AM     10/1/18 CT abd  There is a 3.8 x 1.9 cm mass involving the uncinate process and possibly  invading the duodenum. Findings are nonspecific but typical of pancreatic  carcinoma. There is no biliary or pancreatic ductal dilatation.     There is a poorly defined irregular hypodensity in segment IVb of the liver  measuring 3.7 x 1.9 cm. There appears to be focal biliary dilatation in the left  lobe behind this abnormality. Metastatic disease is suspected. There is a small,  1 cm hypodensity in the dome of the liver, likely segment 8. There is a 1 cm  hypodensity in segment 4 of the liver as well, also likely metastasis. Small  hypodensity measuring less than 1 cm in segment 6 at the tip laterally is also  nonspecific but probable metastasis.     Spleen kidneys and adrenals are unremarkable.     No free fluid or focal fluid collection.     Lung bases are clear.     Bone windows are unremarkable.     IMPRESSION  IMPRESSION:  1. 3.8 x 1.9 cm mass involving the uncinate process of the pancreas and possibly  invading the duodenum, this likely represents pancreatic carcinoma. 2. Likely metastatic disease in the liver. There is a 3.8 x 1.9 cm mass involving the uncinate process and possibly  invading the duodenum. Findings are nonspecific but typical of pancreatic  carcinoma. There is no biliary or pancreatic ductal dilatation.     There is a poorly defined irregular hypodensity in segment IVb of the liver  measuring 3.7 x 1.9 cm. There appears to be focal biliary dilatation in the left  lobe behind this abnormality. Metastatic disease is suspected. There is a small,  1 cm hypodensity in the dome of the liver, likely segment 8. There is a 1 cm  hypodensity in segment 4 of the liver as well, also likely metastasis.  Small  hypodensity measuring less than 1 cm in segment 6 at the tip laterally is also  nonspecific but probable metastasis.     Spleen kidneys and adrenals are unremarkable.     No free fluid or focal fluid collection.     Lung bases are clear.     Bone windows are unremarkable.     IMPRESSION:  1. 3.8 x 1.9 cm mass involving the uncinate process of the pancreas and possibly  invading the duodenum, this likely represents pancreatic carcinoma. 2. Likely metastatic disease in the liver. Records reviewed and summarized above. Pathology report(s) reviewed above. Radiology report(s) reviewed above. MRI abdomen 10/22/18: IMPRESSION:       1. Pancreatic uncinate process mass suspicious for pancreatic neoplasm, possibly  adenocarcinoma. Mass abuts the superior mesenteric artery with about 20%  circumference involvement but no luminal distortion or perivascular stranding. 2. Multiple hepatic lesions suspicious for metastatic neoplasm with segment IVb  lesion showing patchy areas of surrounding steatosis likely secondary to locally  altered intrahepatic hemodynamics and likely responsible for biopsy result. 3. Cholecystolithiasis and small gallbladder polyp. CT c/a/p 12/28/18: IMPRESSION:  Interval decrease in size of pancreatic mass. Slight interval decrease in size of hepatic metastases; these now demonstrate central low attenuation suggestive of necrosis. No evidence of new metastatic disease in the chest, abdomen, or pelvis. CT c/a/p 2/25/19:  LIVER: The lesions described on the prior examination have improved in the  interval. 19 mm lesion in the dome of the liver now measures 1 cm. 2.0 x 1.5 cm  lesion in the left hepatic lobe now measures 1.3 x 1.2 cm. Other smaller lesions  are no longer visualized. GALLBLADDER: Unremarkable. SPLEEN: No mass. PANCREAS: Mass lesion in the uncinate process now measures 1.7 x 2.0 cm,  previously measuring 2.0 x 3.0 cm. ADRENALS: Unremarkable. KIDNEYS: No mass, calculus, or hydronephrosis. STOMACH: Unremarkable.   SMALL BOWEL: No dilatation or wall thickening. COLON: No dilatation or wall thickening. APPENDIX: Unremarkable. PERITONEUM: No ascites or pneumoperitoneum. RETROPERITONEUM: No lymphadenopathy or aortic aneurysm. REPRODUCTIVE ORGANS: Intrauterine device projects in satisfactory position. URINARY BLADDER: No mass or calculus. BONES: Degenerative changes are seen in the thoracic and lumbar spine. ADDITIONAL COMMENTS: N/A     IMPRESSION  IMPRESSION:  Interval decrease in the size of the hepatic metastases. Decrease in the size of  the mass lesion involving the uncinate process.     RECIST:  Pancreatic uncinate mass: Current: (68) 1.7X2.0cm     12/28/2018: (71) 3.0 x  2.0 cm   Segment 4B liver mass: Current: (56) 1.3 x 1.2 cm 12/28/2018: (57) 2.0 x 1.5 cm   Segment 5 liver mass: Current: (68) 4 x 7 mm 12/28/2018: (70) 1.4 x 0.9 cm    CXR for port study 4/15/19:   IMPRESSION:  Right internal jugular chest Port-A-Cath is seen with the catheter terminating  in the mid SVC. The port is slightly angled medially due to breast tissue. The  port is not flipped. Access needle appears to be within the port hub. Nursing  staff was able to get good blood return and flush the port without difficulty. No intervention was performed. CT a/p 4/22/19:  IMPRESSION:  1. Slight decrease in size of liver metastases, detailed above. 2. Slight decrease in size in uncinate process mass. 3. No evidence for new metastatic disease.     RECIST:  Uncinate process mass: 15 x 9 mm, image 56, previously 17 x 11 mm. Segment IVb metastasis 11 x 9 mm, image 50, previously 13 x 12 mm  Segment 5 metastasis: Barely detectable, image 62    CT c/a/p 6/17/19: IMPRESSION:  1. Stable lesion in the uncinate process. 2. Slight interval decrease in one of the 2 small liver lesions while the other  remains stable. 3. Stable small nodules right upper lobe.   4. No new evidence of metastatic disease or acute abnormality.     RECIST:  Uncinate process mass: 15 x 9 mm, image 68, previously 15 x 9 mm, image 56  Segment IVb metastasis 11 x 9 mm, image 56, previously 11 x 9 mm, image 50  Segment 5 metastasis: No longer visualized, previously barely detectable, image  62     Assessment/plan:   1. Uncinate pancreatic adenocarcinoma,  mets to liver, stage IV:  cT2 cN0 pM1    Discussed that while this is treatable, it is not curable, the goal of therapy is palliative. Discussed that without therapy, life expectancy would be 3-6 months, with therapy 12-18 months on average. As an example of likely chemotherapy, we discussed the risks and benefits of Gemcitabine and Abraxane chemotherapy. Potential side effects include, but are not limited to, nausea, vomiting, diarrhea, constipation, mucositis, taste changes, myelosuppression, infection, fatigue, alopecia, skin and nail changes, pulmonary toxicity, neuropathy, allergic reactions, infertility, and rarely, death. Discussed the [id number] study and she signed informed consent. · Gemcitabine (1000 mg/m2) and Abraxane (125 mg/m2) given on days 1,8,15 every 28 days. · Labs: CBC, BMP prior to each treatment. Hepatic function panel every 4 weeks. CA 19.9 prior to day 1  · Antiemetic prophylaxis: Dexamethasone prior to each treatment  · PRN antiemetics: Ondansetron and Prochlorperazine at home  · EMLA cream for port    On the control arm, C10d1  today, will see her back in 2 weeks for C10D15. CT a/p on 6/17/19 show response to treatment. We will plan to see the patient in follow up at least once per cycle, or sooner if symptoms warrant. 2. DM2:  Holding metformin; on insulin; saw Dr. Mary Huertas; her BS are improving; she is now taking less insulin; will check A1c    3. Emotional well being:  She has excellent support and is coping well with her disease    4. Abdominal pain:  Mild, may be due to constipation;  palliative care has seen    5. Nutrition:  Roly Ceballos RD has met with her; holding on enzymes    6. Insomnia: Ongoing but no worse. Currently taking melatonin. She has lunesta if needed. 7. Anemia:  Due to chemo and disease; monitor; iron profile and ferritin normal; gave injectafer 750 mg IV on 5/22/19. May need to give again in the future. 8. Neutropenia:  Due to chemo, started granix 480 mcg for 4 days following chemo with C1D15. Does report some pain with granix. Tried claritin, however this kept her awake. Recommend trying tylenol and advil PRN. Previously at 2 days following chemo, will plan for only 1 day following chemo. 9. Constipation:  Resolved for now so she is holding off on her stool softener, but she continues with Miralax. 10. Sinus congestion: Intermittent but has improved. Clear/bloody sinus drainage. Recommend OTC sudafed and saline spray. She has seen Dr. Beatriz Bellamy, ENT, s/p steroids. Plan to start flonase. 11. Dehydration:  S/p 1 L NS. She is drinking more fluids and has not had issues with dizziness. 12. Fatigue: Due to treatment. Ongoing. [facility] information given. She hs an appointment to get acupuncture on 8/5/19. S/p 1L IV hydration. Will monitor. 15. Port issues: Port study x 2 were normal.  Was able access today, may need to get this replaced in the future.       14. Lipids:  With her diagnosis of metastatic pancreatic cancer, I do not see the utility at this time to check a lipid panel      Signed By: See Carbajal MD

## 2019-07-17 NOTE — PROGRESS NOTES
Pt in for labs and follow up visit today per protocol with Dr. Mary Lou Liu and RN. This is C10D1 of treatment. Patient reports the following adverse events at this time: gr 2 loss of appetite, gr 1 constipation, gr 1 fatigue, gr 2 hair loss, gr 1 insomnia, gr 1 concentration, gr 1 sob, gr 1 neuropathy, gr 1 urinary leakage. Vital signs are stable. Patient to go to infusion center after appointment to receive gem/abraxane infusion. Next appt is scheduled for 7/31/19.

## 2019-07-24 NOTE — PROGRESS NOTES
hospitals Progress Note    Date: 2019    Name: Carmen Parra    MRN: [id number]         : 1945    Ms. Strange arrived ambulatory at 1000 and in no distress for Clinical Trial:C10D8 Abraxane/Gemzar. Assessment was completed, no acute issues at this time, no new complaints voiced. RCW port accessed without difficulty with 0.75 garcia needle; + blood return noted, labs drawn and sent. Ms. Strange's vitals were reviewed. Patient Vitals for the past 12 hrs:   Temp Pulse Resp BP SpO2   19 1418 98 °F (36.7 °C) 78 16 120/85 98 %   19 1000     100 %   19 0959 97.6 °F (36.4 °C) 74 18 125/66 100 %         Lab results were obtained and reviewed. Recent Results (from the past 12 hour(s))   CBC WITH AUTOMATED DIFF    Collection Time: 19 10:06 AM   Result Value Ref Range    WBC 5.7 3.6 - 11.0 K/uL    RBC 2.88 (L) 3.80 - 5.20 M/uL    HGB 8.6 (L) 11.5 - 16.0 g/dL    HCT 26.9 (L) 35.0 - 47.0 %    MCV 93.4 80.0 - 99.0 FL    MCH 29.9 26.0 - 34.0 PG    MCHC 32.0 30.0 - 36.5 g/dL    RDW 20.8 (H) 11.5 - 14.5 %    PLATELET 755 (H) 568 - 400 K/uL    MPV 9.9 8.9 - 12.9 FL    NRBC 0.0 0  WBC    ABSOLUTE NRBC 0.00 0.00 - 0.01 K/uL    NEUTROPHILS 82 (H) 32 - 75 %    LYMPHOCYTES 8 (L) 12 - 49 %    MONOCYTES 7 5 - 13 %    EOSINOPHILS 0 0 - 7 %    BASOPHILS 0 0 - 1 %    METAMYELOCYTES 1 (H) 0 %    MYELOCYTES 2 (H) 0 %    IMMATURE GRANULOCYTES 0 %    ABS. NEUTROPHILS 4.7 1.8 - 8.0 K/UL    ABS. LYMPHOCYTES 0.5 (L) 0.8 - 3.5 K/UL    ABS. MONOCYTES 0.4 0.0 - 1.0 K/UL    ABS. EOSINOPHILS 0.0 0.0 - 0.4 K/UL    ABS. BASOPHILS 0.0 0.0 - 0.1 K/UL    ABS. IMM. GRANS. 0.0 K/UL    DF MANUAL      RBC COMMENTS ANISOCYTOSIS  1+       LD    Collection Time: 19 11:49 AM   Result Value Ref Range     81 - 246 U/L       Patient's labs within parameters for treatment. Pre-medications  were administered as ordered and chemotherapy was initiated.      Medication:  Medications Administered 0.9% sodium chloride infusion     Admin Date  07/24/2019 Action  New Bag Dose  25 mL/hr Rate  25 mL/hr Route  IntraVENous Administered By  [facility]          dexamethasone (DECADRON) 4 mg/mL injection 8 mg     Admin Date  07/24/2019 Action  Given Dose  8 mg Route  IntraVENous Administered By  [facility]          gemcitabine (GEMZAR) 1,960 mg in 0.9% sodium chloride 250 mL, overfill volume 25 mL chemo infusion     Admin Date  07/24/2019 Action  New Bag Dose  1960 mg Rate  653.2 mL/hr Route  IntraVENous Administered By  [facility] A          heparin (porcine) pf 500 Units     Admin Date  07/24/2019 Action  Given Dose  500 Units Route  IntraVENous Administered By  [facility] A          PACLitaxel-Protein Bound (ABRAXANE) 245 mg chemo infusion     Admin Date  07/24/2019 Action  New Bag Dose  245 mg Rate  98 mL/hr Route  IntraVENous Administered By  [facility]          sodium chloride (NS) flush 5-10 mL     Admin Date  07/24/2019 Action  Given Dose  10 mL Route  IntraVENous Administered By  [facility]           Admin Date  07/24/2019 Action  Given Dose  10 mL Route  IntraVENous Administered By  [facility]          sodium chloride 0.9% injection 10 mL     Admin Date  07/24/2019 Action  Given Dose  10 mL Route  IntraVENous Administered By  [facility]                Patient port flushed; heparinized; and de-accessed per protocol. Ms. Emma Cabrera tolerated treatment well and was discharged from [place] [address] in stable condition at [address] [address] [facility]. She is aware of when to return for her next appointment.     Future Appointments   Date Time Provider Shiva Zavala   7/24/2019 10:00 AM SS INF2 CH4 <2H [facility]   7/31/2019 10:00 AM SS INF2 CH4 <2H [facility]   7/31/2019 10:30 AM Trev Burr NP [facility] SCHED   8/6/2019 12:00 PM Jamie Bronson MD [address]    8/14/2019 10:00 AM SS INF3 CH4 <2H [facility]   9/23/2019  1:30 PM Ophelia Lewis MD [facility] [address] [facility] [place]  July 24, 2019

## 2019-07-31 NOTE — PROGRESS NOTES
[facility] Progress Note Date: 2019 Name: Mark Shaw MRN: [id number] : 1945 Ms. Strange Arrived ambulatory and in no distress for C10D15 of Clinical Trial of Abraxane/Gemzar Regimen. Assessment was completed, no acute issues at this time, no new complaints voiced. Right chest wall port accessed without difficulty, labs drawn & sent for processing. Chemotherapy Flowsheet 2019 Cycle [id number] Date 2019 Drug / Regimen Clinical Trial: Abraxane/Gemzar Pre Meds -  
Notes - Patient proceed to appointment with Dr. Ayden Guzman Ms. Strange's vitals were reviewed. Patient Vitals for the past 12 hrs: 
 Temp Pulse Resp BP SpO2  
19 1004 98 °F (36.7 °C) 74 18 122/62 99 % Lab There were no vitals taken for this visit. results were obtained and reviewed. Recent Results (from the past 12 hour(s)) CBC WITH AUTOMATED DIFF Collection Time: 19 10:15 AM  
Result Value Ref Range WBC 3.6 3.6 - 11.0 K/uL  
 RBC 2.80 (L) 3.80 - 5.20 M/uL HGB 8.3 (L) 11.5 - 16.0 g/dL HCT 26.0 (L) 35.0 - 47.0 % MCV 92.9 80.0 - 99.0 FL  
 MCH 29.6 26.0 - 34.0 PG  
 MCHC 31.9 30.0 - 36.5 g/dL RDW 21.0 (H) 11.5 - 14.5 % PLATELET 255 059 - 111 K/uL MPV 9.0 8.9 - 12.9 FL  
 NRBC 0.0 0  WBC ABSOLUTE NRBC 0.00 0.00 - 0.01 K/uL NEUTROPHILS 71 32 - 75 % BAND NEUTROPHILS 1 % LYMPHOCYTES 21 12 - 49 % MONOCYTES 3 (L) 5 - 13 % EOSINOPHILS 2 0 - 7 % BASOPHILS 1 0 - 1 % METAMYELOCYTES 1 % IMMATURE GRANULOCYTES 0 0.0 - 0.5 % ABS. NEUTROPHILS 2.6 1.8 - 8.0 K/UL  
 ABS. LYMPHOCYTES 0.8 0.8 - 3.5 K/UL  
 ABS. MONOCYTES 0.1 0.0 - 1.0 K/UL  
 ABS. EOSINOPHILS 0.1 0.0 - 0.4 K/UL  
 ABS. BASOPHILS 0.0 0.0 - 0.1 K/UL  
 ABS. IMM. GRANS. 0.0 0.00 - 0.04 K/UL  
 DF MANUAL    
 RBC COMMENTS ANISOCYTOSIS 1+ 
    
 RBC COMMENTS MICROCYTOSIS 1+ 
    
 RBC COMMENTS OVALOCYTES PRESENT 
    
 WBC COMMENTS TOXIC GRANULATION    
LD  
 Collection Time: 07/31/19 10:15 AM  
Result Value Ref Range  81 - 246 U/L Medications: 
Decadron IVP 
NS Bolus Abraxane IV Gemzar IV 
 
 
Ms. Strange tolerated treatment well and was discharged from [place] [address] in stable condition. Port de-accessed, flushed & heparinized per protocol. She is to return on 8/14/19. Gabby Castillo RN 
July 31, 2019

## 2019-07-31 NOTE — PROGRESS NOTES
Pt in for follow up visit today per protocol with Dr. Paulina Heredia and RN. This is C10D15 of treatment. Patient reports the following adverse events at this time: gr 2 loss of appetite, gr 1 constipation, gr 1 fatigue, gr 2 hair loss, gr 1 insomnia, gr 1 concentration, gr 1 sob, gr 1 neuropathy, gr 1 urinary leakage. Vital signs are stable. Patient to go to infusion center after appointment to receive gem/abraxane infusion. Next appt is scheduled for 8/14/19.

## 2019-07-31 NOTE — PROGRESS NOTES
[facility] [place] [facility] [place] [address]  [address] [facility], [address] [facility] [address] [facility]  Baljit Pickering: [phone]  F: [phone]      Reason for Visit:   Marcos Byrnes is a 69 y.o. female who is seen in self-referral for evaluation of pancreatic cancer. Treatment History:   · 10/4/18 pancreatic head mass FNA, pancreatic adenocarcinoma    10/15/18  Liver, Core Biopsy w/Touch Prep CYTOLOGIC INTERPRETATION:   · Numerous cores and fragments of benign hepatic parenchyma     10/24/18  Liver, Fine Needle Aspiration CYTOLOGIC INTERPRETATION:   Metastatic adenocarcinoma (see Comment). General Categorization   Positive for malignancy. Comment: The morphologic appearance is nonspecific with regard to site of origin but compatible with metastatic pancreatic carcinoma in this patient with known pancreatic adenocarcinoma ([id number]). 11/9/18 abraxane/gemcitabine    History of Present Illness:   She started having abdominal pain, gradual and persistent, x 1 month, pressure sensation, located in epigastrium, no radiation, no aggravating symptoms, no relieving factors. 25 lb weight loss over 6 months.  + nausea. Interval history:  In today for follow up and treatment. Complains of gr 2 loss of appetite, gr 1 constipation, gr 1 fatigue, gr 2 hair loss, gr 1 insomnia, gr 1 concentration, gr 1 sob, gr 1 neuropathy, gr 1 urinary leakage. Past Medical History:   Diagnosis Date    Arthritis     Constipation     Diabetes ([address]     Hemorrhoids     Hiatal hernia     High cholesterol     Hypertension     [address] [address]       Past Surgical History:   Procedure Laterality Date    HX KNEE ARTHROSCOPY Right     HX ORTHOPAEDIC      left wrist surgery    HX TONSILLECTOMY        Social History     Tobacco Use    Smoking status: Never Smoker    Smokeless tobacco: Never Used   Substance Use Topics    Alcohol use:  Yes     Alcohol/week: 2.0 - 4.0 standard drinks     Types: 1 - 2 Cans of beer, 1 - 2 Shots of liquor per week     Frequency: 2-4 times a month     Drinks per session: 1 or 2      Family History   Problem Relation Age of Onset    Cancer Mother         skin    Hypertension Mother     Heart Disease Mother     Cancer Father         skin    Heart Disease Father     Stroke Father     Diabetes Neg Hx      Current Outpatient Medications   Medication Sig    tbo-filgrastim (GRANIX) 480 mcg/0.8 mL syrg injection 1 syringe subq on days 1-2, 24 hours after chemotherapy.  TRESIBA FLEXTOUCH U-200 200 unit/mL (3 mL) inpn 10-12 UNITS BY SUBCUTANEOUS ROUTE DAILY. OR AS DIRECTED UP TO 60 UNITS DAILY    glucose blood VI test strips (ACCU-CHEK RICHIE PLUS TEST STRP) strip Accu-Chek Richie Plus test strips   Check BS BID    OTHER Cranial prosthesis    Dx C25.7    omeprazole (PRILOSEC) 20 mg capsule Take 20 mg by mouth daily.  simvastatin (ZOCOR) 10 mg tablet Take 10 mg by mouth nightly.  telmisartan (MICARDIS) 80 mg tablet Take 80 mg by mouth daily.  lidocaine-prilocaine (EMLA) topical cream Apply  to affected area as needed for Pain.  ondansetron hcl (ZOFRAN) 8 mg tablet Take 1 Tab by mouth every eight (8) hours as needed for Nausea.  IBUPROFEN IB PO Take  by mouth as needed. No current facility-administered medications for this visit.       Facility-Administered Medications Ordered in Other Visits   Medication Dose Route Frequency    gemcitabine (GEMZAR) 1,960 mg in 0.9% sodium chloride 250 mL, overfill volume 25 mL chemo infusion  1,960 mg IntraVENous ONCE    PACLitaxel-Protein Bound (ABRAXANE) 245 mg chemo infusion  245 mg IntraVENous ONCE    dexamethasone (DECADRON) 4 mg/mL injection 8 mg  8 mg IntraVENous ONCE    prochlorperazine (COMPAZINE) with saline injection 10 mg  10 mg IntraVENous Q6H PRN      Allergies   Allergen Reactions    Amoxicillin Hives        Review of Systems: A comprehensive review of systems was performed and all systems were negative except for HPI and for the symptom review form, reviewed and scanned in. Physical Exam:     Visit Vitals  /62   Pulse 74   Temp 98 °F (36.7 °C) (Temporal)   Resp 18   Ht 5' 7\" (1.702 m)   Wt 182 lb 11.2 oz (82.9 kg)   SpO2 99%   BMI 28.61 kg/m²     ECOG PS: 0  General: No distress  Eyes: PERRLA, anicteric sclerae  HENT: Atraumatic, OP clear  Neck: Supple  Lymphatic: No cervical, supraclavicular, or inguinal adenopathy  Respiratory: CTAB, normal respiratory effort  CV: Normal rate, regular rhythm, no murmurs, no peripheral edema  GI: Soft, nontender, nondistended, no masses, no hepatomegaly, no splenomegaly  MS: Normal gait and station. Digits without clubbing or cyanosis. Skin: No rashes, ecchymoses, or petechiae. Normal temperature, turgor, and texture. Psych: Alert, oriented, appropriate affect, normal judgment/insight        Results:     Lab Results   Component Value Date/Time    WBC 3.6 07/31/2019 10:15 AM    HGB 8.3 (L) 07/31/2019 10:15 AM    HCT 26.0 (L) 07/31/2019 10:15 AM    PLATELET 370 99/90/4118 10:15 AM    MCV 92.9 07/31/2019 10:15 AM    ABS. NEUTROPHILS 2.6 07/31/2019 10:15 AM     Lab Results   Component Value Date/Time    Sodium 132 (L) 07/17/2019 10:23 AM    Potassium 4.1 07/17/2019 10:23 AM    Chloride 97 07/17/2019 10:23 AM    CO2 27 07/17/2019 10:23 AM    Glucose 187 (H) 07/17/2019 10:23 AM    BUN 11 07/17/2019 10:23 AM    Creatinine 0.91 07/17/2019 10:23 AM    GFR est AA >60 07/17/2019 10:23 AM    GFR est non-AA >60 07/17/2019 10:23 AM    Calcium 7.9 (L) 07/17/2019 10:23 AM    Glucose (POC) 168 (H) 04/15/2019 10:44 AM     Lab Results   Component Value Date/Time    Bilirubin, total 0.4 07/17/2019 10:23 AM    ALT (SGPT) 19 07/17/2019 10:23 AM    AST (SGOT) 14 (L) 07/17/2019 10:23 AM    Alk.  phosphatase 148 (H) 07/17/2019 10:23 AM    Protein, total 5.8 (L) 07/17/2019 10:23 AM    Albumin 2.9 (L) 07/17/2019 10:23 AM    Globulin 2.9 07/17/2019 10:23 AM     10/1/18 CT abd  There is a 3.8 x 1.9 cm mass involving the uncinate process and possibly  invading the duodenum. Findings are nonspecific but typical of pancreatic  carcinoma. There is no biliary or pancreatic ductal dilatation.     There is a poorly defined irregular hypodensity in segment IVb of the liver  measuring 3.7 x 1.9 cm. There appears to be focal biliary dilatation in the left  lobe behind this abnormality. Metastatic disease is suspected. There is a small,  1 cm hypodensity in the dome of the liver, likely segment 8. There is a 1 cm  hypodensity in segment 4 of the liver as well, also likely metastasis. Small  hypodensity measuring less than 1 cm in segment 6 at the tip laterally is also  nonspecific but probable metastasis.     Spleen kidneys and adrenals are unremarkable.     No free fluid or focal fluid collection.     Lung bases are clear.     Bone windows are unremarkable.     IMPRESSION:  1. 3.8 x 1.9 cm mass involving the uncinate process of the pancreas and possibly  invading the duodenum, this likely represents pancreatic carcinoma. 2. Likely metastatic disease in the liver. There is a 3.8 x 1.9 cm mass involving the uncinate process and possibly  invading the duodenum. Findings are nonspecific but typical of pancreatic  carcinoma. There is no biliary or pancreatic ductal dilatation.     There is a poorly defined irregular hypodensity in segment IVb of the liver  measuring 3.7 x 1.9 cm. There appears to be focal biliary dilatation in the left  lobe behind this abnormality. Metastatic disease is suspected. There is a small,  1 cm hypodensity in the dome of the liver, likely segment 8. There is a 1 cm  hypodensity in segment 4 of the liver as well, also likely metastasis.  Small  hypodensity measuring less than 1 cm in segment 6 at the tip laterally is also  nonspecific but probable metastasis.     Spleen kidneys and adrenals are unremarkable.     No free fluid or focal fluid collection.     Lung bases are clear.     Bone windows are unremarkable.     IMPRESSION:  1. 3.8 x 1.9 cm mass involving the uncinate process of the pancreas and possibly  invading the duodenum, this likely represents pancreatic carcinoma. 2. Likely metastatic disease in the liver. Records reviewed and summarized above. Pathology report(s) reviewed above. Radiology report(s) reviewed above. MRI abdomen 10/22/18: IMPRESSION:       1. Pancreatic uncinate process mass suspicious for pancreatic neoplasm, possibly  adenocarcinoma. Mass abuts the superior mesenteric artery with about 20%  circumference involvement but no luminal distortion or perivascular stranding. 2. Multiple hepatic lesions suspicious for metastatic neoplasm with segment IVb  lesion showing patchy areas of surrounding steatosis likely secondary to locally  altered intrahepatic hemodynamics and likely responsible for biopsy result. 3. Cholecystolithiasis and small gallbladder polyp. CT c/a/p 12/28/18: IMPRESSION:  Interval decrease in size of pancreatic mass. Slight interval decrease in size of hepatic metastases; these now demonstrate central low attenuation suggestive of necrosis. No evidence of new metastatic disease in the chest, abdomen, or pelvis. CT c/a/p 2/25/19:  LIVER: The lesions described on the prior examination have improved in the  interval. 19 mm lesion in the dome of the liver now measures 1 cm. 2.0 x 1.5 cm  lesion in the left hepatic lobe now measures 1.3 x 1.2 cm. Other smaller lesions  are no longer visualized. GALLBLADDER: Unremarkable. SPLEEN: No mass. PANCREAS: Mass lesion in the uncinate process now measures 1.7 x 2.0 cm,  previously measuring 2.0 x 3.0 cm. ADRENALS: Unremarkable. KIDNEYS: No mass, calculus, or hydronephrosis. STOMACH: Unremarkable. SMALL BOWEL: No dilatation or wall thickening. COLON: No dilatation or wall thickening. APPENDIX: Unremarkable. PERITONEUM: No ascites or pneumoperitoneum.   RETROPERITONEUM: No lymphadenopathy or aortic aneurysm. REPRODUCTIVE ORGANS: Intrauterine device projects in satisfactory position. URINARY BLADDER: No mass or calculus. BONES: Degenerative changes are seen in the thoracic and lumbar spine. ADDITIONAL COMMENTS: N/A     IMPRESSION  IMPRESSION:  Interval decrease in the size of the hepatic metastases. Decrease in the size of  the mass lesion involving the uncinate process.     RECIST:  Pancreatic uncinate mass: Current: (68) 1.7X2.0cm     12/28/2018: (71) 3.0 x  2.0 cm   Segment 4B liver mass: Current: (56) 1.3 x 1.2 cm 12/28/2018: (57) 2.0 x 1.5 cm   Segment 5 liver mass: Current: (68) 4 x 7 mm 12/28/2018: (70) 1.4 x 0.9 cm    CXR for port study 4/15/19:   IMPRESSION:  Right internal jugular chest Port-A-Cath is seen with the catheter terminating  in the mid SVC. The port is slightly angled medially due to breast tissue. The  port is not flipped. Access needle appears to be within the port hub. Nursing  staff was able to get good blood return and flush the port without difficulty. No intervention was performed. CT a/p 4/22/19:  IMPRESSION:  1. Slight decrease in size of liver metastases, detailed above. 2. Slight decrease in size in uncinate process mass. 3. No evidence for new metastatic disease.     RECIST:  Uncinate process mass: 15 x 9 mm, image 56, previously 17 x 11 mm. Segment IVb metastasis 11 x 9 mm, image 50, previously 13 x 12 mm  Segment 5 metastasis: Barely detectable, image 62    CT c/a/p 6/17/19: IMPRESSION:  1. Stable lesion in the uncinate process. 2. Slight interval decrease in one of the 2 small liver lesions while the other  remains stable. 3. Stable small nodules right upper lobe.   4. No new evidence of metastatic disease or acute abnormality.     RECIST:  Uncinate process mass: 15 x 9 mm, image 68, previously 15 x 9 mm, image 56  Segment IVb metastasis 11 x 9 mm, image 56, previously 11 x 9 mm, image 50  Segment 5 metastasis: No longer visualized, previously barely detectable, image  62     Assessment/plan:   1. Uncinate pancreatic adenocarcinoma,  mets to liver, stage IV:  cT2 cN0 pM1    Discussed that while this is treatable, it is not curable, the goal of therapy is palliative. Discussed that without therapy, life expectancy would be 3-6 months, with therapy 12-18 months on average. As an example of likely chemotherapy, we discussed the risks and benefits of Gemcitabine and Abraxane chemotherapy. Potential side effects include, but are not limited to, nausea, vomiting, diarrhea, constipation, mucositis, taste changes, myelosuppression, infection, fatigue, alopecia, skin and nail changes, pulmonary toxicity, neuropathy, allergic reactions, infertility, and rarely, death. Discussed the [id number] study and she signed informed consent. · Gemcitabine (1000 mg/m2) and Abraxane (125 mg/m2) given on days 1,8,15 every 28 days. · Labs: CBC, BMP prior to each treatment. Hepatic function panel every 4 weeks. CA 19.9 prior to day 1  · Antiemetic prophylaxis: Dexamethasone prior to each treatment  · PRN antiemetics: Ondansetron and Prochlorperazine at home  · EMLA cream for port    On the control arm, C10d15  today, will see her back in 2 weeks for C11D1. CT a/p on 6/17/19 show response to treatment, next scans scheduled for 8/12/19. We will plan to see the patient in follow up at least once per cycle, or sooner if symptoms warrant. 2. DM2:  Holding metformin; on insulin; saw Dr. Pricilla Lewis; her BS are improving; she is now taking less insulin; Hgb A1C 6.8    3. Emotional well being:  She has excellent support and is coping well with her disease    4. Abdominal pain:  Mild, may be due to constipation;  palliative care has seen    5. Nutrition:  Inna Oliver RD has met with her; holding on enzymes    6. Insomnia: Ongoing but no worse. Currently taking melatonin. She has lunesta if needed.     7. Anemia:  Due to chemo and disease; monitor; iron profile and ferritin normal; gave injectafer 750 mg IV on 5/22/19. May need to give again in the future. 8. Neutropenia:  Due to chemo, started granix 480 mcg for 4 days following chemo with C1D15. Does report some pain with granix. Tried claritin, however this kept her awake. Recommend trying tylenol and advil PRN. Previously at 2 days following chemo but changed to only 1 day following chemo. ANC 2.3 today. 9. Constipation:  Resolved for now so she is holding off on her stool softener, but she continues with Miralax. 10. Sinus congestion: Intermittent but has improved. Clear/bloody sinus drainage. Recommend OTC sudafed and saline spray. She has seen Dr. Colton Bustos, ENT, s/p steroids. On flonase. 11. Dehydration: She is drinking more fluids and has not had issues with dizziness. 12. Fatigue: Due to treatment. Ongoing. [facility] information given. She hs an appointment to get acupuncture on 8/5/19. S/p 1L IV hydration, will plan on 1L IV hydration today. Will monitor.          Signed By: Saleem Ohara MD

## 2019-08-05 NOTE — TELEPHONE ENCOUNTER
Called patient to advise/confirm upcoming appt with Dr. Janel Avitia on 8/6/19 at 12:00pm at [facility]. Pt confirmed. Also advised to please bring in your photo ID and Insurance Card with you to appointment as well as any prescription pain medication in the original container with you to appt.

## 2019-08-06 NOTE — PROGRESS NOTES
Palliative Medicine and Integrative Medicine Acupuncture Note    Date Current Visit: 8/6/2019 visit 1  Date Initial Visit: 8/6/19  Requesting Physician: Warren Cabral and Swati Varner     Summary: Fatigue due to chemotherapy and neoplasm    Subjective: Camila Prieto is a 69 y.o. female with DM, stage IV pancreatic adenocarcinoma dx 10/2018 undergoing chemo w/ Dr Warren Cabral. She is tolerating tx fairly well, with mild nausea controlled w/ prn antiemetics, mild neuropathy - not painful, some numbness in fingers and toes, and fatigue. Fatigue is her largest complaint today- can still do her ADL and IADLs, but is used to being active and has to pace herself. Has some mild congestion, tearing eyes due to environmental factors. Past Medical History:   Past Medical History:   Diagnosis Date    Arthritis     Constipation     Diabetes ([address]     Hemorrhoids     Hiatal hernia     High cholesterol     Hypertension     Pancreatic cancer ([address]         Past Surgical history:   Past Surgical History:   Procedure Laterality Date    HX KNEE ARTHROSCOPY Right     HX ORTHOPAEDIC      left wrist surgery    HX TONSILLECTOMY          Family History:. Family History   Problem Relation Age of Onset    Cancer Mother         skin    Hypertension Mother     Heart Disease Mother     Cancer Father         skin    Heart Disease Father     Stroke Father     Diabetes Neg Hx          ROS:   ROS    The following systems were reviewed: constitutional, ears/nose/mouth/throat, respiratory, gastrointestinal, musculoskeletal, neurologic, psychiatric, endocrine. Positive findings noted below.     +nausea w/ chemo, none now  +fatigue     Social history:   Social History     Socioeconomic History    Marital status:      Spouse name: Not on file    Number of children: Not on file    Years of education: Not on file    Highest education level: Not on file   Occupational History    Not on file   Social Needs    Financial resource strain: Not on file    Food insecurity:     Worry: Not on file     Inability: Not on file    Transportation needs:     Medical: Not on file     Non-medical: Not on file   Tobacco Use    Smoking status: Never Smoker    Smokeless tobacco: Never Used   Substance and Sexual Activity    Alcohol use: Yes     Alcohol/week: 2.0 - 4.0 standard drinks     Types: 1 - 2 Cans of beer, 1 - 2 Shots of liquor per week     Frequency: 2-4 times a month     Drinks per session: 1 or 2    Drug use: No    Sexual activity: Never   Lifestyle    Physical activity:     Days per week: Not on file     Minutes per session: Not on file    Stress: Not on file   Relationships    Social connections:     Talks on phone: Not on file     Gets together: Not on file     Attends Mu-ism service: Not on file     Active member of club or organization: Not on file     Attends meetings of clubs or organizations: Not on file     Relationship status: Not on file    Intimate partner violence:     Fear of current or ex partner: Not on file     Emotionally abused: Not on file     Physically abused: Not on file     Forced sexual activity: Not on file   Other Topics Concern    Not on file   Social History Narrative    Lives in [facility] alone. Has one son.  since 2007. Used to stay home with her son and fixed things around the house. Likes to garden. Physical Exam:         Labs reviewed from 7/31/19, CT A/P 6/17/19    Objective:     General appearance:  No apparent distress, well nourished, well groomed   HEENT: Pupils equal, nares patent    Lungs: Unlabored    Abd:  Soft    Skin: Warm, dry   Psych:  Normal affect, appropriate   Msk Strength intact, gait stable                           Assessment and Plan:       ICD-10-CM ICD-9-CM    1. Neoplastic malignant related fatigue R53.0 780.79    2. Peripheral neuropathy due to chemotherapy (HCC) G62.0 357.7     T45. 1X5A E933.1         Treatment Plan:   -Goals: Improved pain, function -Type of acupuncture and number of treatments planned   -Other modalities or referrals       Acupuncture treatment performed after written and verbal consent obtained. Patient aware that risk include, but are not limited to: pain during needle insertion; bleeding/bruising; infections; internal organ perforation. All questions were answered. Time out performed immediately prior to the start of the procedure. Patient identified by name and date of birth and agreement on procedure being performed was verified. ~~~~~~~~~~~~~~~~~~~~    1. Fatigue due to neoplasm and chemotherapy- tolerable, remains quite functional but has to pace herself. -Terry Mu mac (lies on side) at 4 Hz x 7 minutes only  -B/l LR3, ST 36, LI4, GV24.5 while supine   -Add b/l GB14 and LI20 for facial congestion     ~~~~~~~~~~~~~~~~~~~~~~~~~~~~    Pt tolerated procedure well without apparent complications. Pt advised to avoid strenuous sexual activity, exercise, heavy meals, alcohol for the next 24 hours. Aware that rebound effect may occur following treatment but that should improve back to baseline in several days.        Follow up:    Prn- would like to see back in a few weeks

## 2019-08-14 NOTE — PROGRESS NOTES
Pt in for labs and follow up visit today per protocol with Dr. Darrell Ding and RN. This is C11D1 of treatment. Patient reports the following adverse events at this time: gr 2 loss of appetite, gr 1 constipation, gr 1 fatigue, gr 2 hair loss, gr 1 insomnia, gr 1 sob, gr 1 neuropathy, gr 1 swelling, gr 1 urinary leakage. Vital signs are stable. Patient to go to infusion center after appointment to receive gemcitabine/abraxane. Next appt is scheduled for 8/21/19.

## 2019-08-14 NOTE — PROGRESS NOTES
Chief Complaint   Patient presents with    Pancreatic Cancer     f/u     1. Have you been to the ER, urgent care clinic since your last visit? Hospitalized since your last visit? No    2. Have you seen or consulted any other health care providers outside of the [address] [place] [address] since your last visit? Include any pap smears or colon screening.  No     Health Maintenance Due   Topic Date Due    Hepatitis C Screening  1945    Shingrix Vaccine Age 50> (1 of 2) 03/25/1997    BREAST CANCER SCRN MAMMOGRAM  03/25/1997    FOBT Q 1 YEAR AGE 50-75  03/25/1997    Bone Densitometry (Dexa) Screening  03/25/2012    MEDICARE YEARLY EXAM  03/20/2018    Influenza Age 9 to Adult  08/01/2019

## 2019-08-14 NOTE — PROGRESS NOTES
[facility] at [address], [address] [place] [address] [address]  Shannan Garcia: [phone]  F: [phone]      Reason for Visit:   Dominga Greer is a 69 y.o. female who is seen in self-referral for evaluation of pancreatic cancer. Treatment History:   · 10/4/18 pancreatic head mass FNA, pancreatic adenocarcinoma    10/15/18  Liver, Core Biopsy w/Touch Prep CYTOLOGIC INTERPRETATION:   · Numerous cores and fragments of benign hepatic parenchyma     10/24/18  Liver, Fine Needle Aspiration CYTOLOGIC INTERPRETATION:   Metastatic adenocarcinoma (see Comment). General Categorization   Positive for malignancy. Comment: The morphologic appearance is nonspecific with regard to site of origin but compatible with metastatic pancreatic carcinoma in this patient with known pancreatic adenocarcinoma ([id number]). 11/9/18 abraxane/gemcitabine    History of Present Illness:   She started having abdominal pain, gradual and persistent, x 1 month, pressure sensation, located in epigastrium, no radiation, no aggravating symptoms, no relieving factors. 25 lb weight loss over 6 months.  + nausea. Interval history:  In today for follow up and treatment. Complains of gr 2 loss of appetite, gr 1 constipation, gr 1 fatigue, gr 2 hair loss, gr 1 insomnia, gr 1 sob, gr 1 neuropathy, gr 1 swelling, gr 1 urinary leakage. Past Medical History:   Diagnosis Date    Arthritis     Constipation     Diabetes ([address]     Hemorrhoids     Hiatal hernia     High cholesterol     Hypertension     Pancreatic cancer ([address]       Past Surgical History:   Procedure Laterality Date    HX KNEE ARTHROSCOPY Right     HX ORTHOPAEDIC      left wrist surgery    HX TONSILLECTOMY        Social History     Tobacco Use    Smoking status: Never Smoker    Smokeless tobacco: Never Used   Substance Use Topics    Alcohol use:  Yes     Alcohol/week: 2.0 - 4.0 standard drinks     Types: 1 - 2 Cans of beer, 1 - 2 Shots of liquor per week     Frequency: 2-4 times a month     Drinks per session: 1 or 2     Comment: minimal      Family History   Problem Relation Age of Onset    Cancer Mother         skin    Hypertension Mother     Heart Disease Mother     Cancer Father         skin    Heart Disease Father     Stroke Father     Diabetes Neg Hx      Current Outpatient Medications   Medication Sig    hydroCHLOROthiazide (HYDRODIURIL) 12.5 mg tablet Take 12.5 mg by mouth daily.  tbo-filgrastim (GRANIX) 480 mcg/0.8 mL syrg injection 1 syringe subq on days 1-2, 24 hours after chemotherapy.  lidocaine-prilocaine (EMLA) topical cream Apply  to affected area as needed for Pain.  ondansetron hcl (ZOFRAN) 8 mg tablet Take 1 Tab by mouth every eight (8) hours as needed for Nausea.  TRESIBA FLEXTOUCH U-200 200 unit/mL (3 mL) inpn 10-12 UNITS BY SUBCUTANEOUS ROUTE DAILY. OR AS DIRECTED UP TO 60 UNITS DAILY    IBUPROFEN IB PO Take  by mouth as needed.  glucose blood VI test strips (ACCU-CHEK RICHIE PLUS TEST STRP) strip Accu-Chek Richie Plus test strips   Check BS BID    OTHER Cranial prosthesis    Dx C25.7    omeprazole (PRILOSEC) 20 mg capsule Take 20 mg by mouth daily.  simvastatin (ZOCOR) 10 mg tablet Take 10 mg by mouth nightly.  telmisartan (MICARDIS) 80 mg tablet Take 80 mg by mouth daily. No current facility-administered medications for this visit.       Facility-Administered Medications Ordered in Other Visits   Medication Dose Route Frequency    sodium chloride 0.9% injection 10 mL  10 mL IntraVENous PRN    heparin (porcine) pf 500 Units  500 Units IntraVENous PRN    sodium chloride (NS) flush 5-10 mL  5-10 mL IntraVENous PRN    gemcitabine (GEMZAR) 1,960 mg in 0.9% sodium chloride 250 mL, overfill volume 25 mL chemo infusion  1,960 mg IntraVENous ONCE    PACLitaxel-Protein Bound (ABRAXANE) 245 mg chemo infusion  245 mg IntraVENous ONCE    dexamethasone (DECADRON) 4 mg/mL injection 8 mg  8 mg IntraVENous ONCE    prochlorperazine (COMPAZINE) with saline injection 10 mg  10 mg IntraVENous Q6H PRN      Allergies   Allergen Reactions    Amoxicillin Hives        Review of Systems: A comprehensive review of systems was performed and all systems were negative except for HPI and for the symptom review form, reviewed and scanned in. Physical Exam:     Visit Vitals  /73   Pulse 62   Temp 97.9 °F (36.6 °C) (Oral)   Resp 15   Ht 5' 7\" (1.702 m)   Wt 186 lb (84.4 kg)   SpO2 98%   BMI 29.13 kg/m²     ECOG PS: 0  General: No distress  Eyes: PERRLA, anicteric sclerae  HENT: Atraumatic, OP clear  Neck: Supple  Lymphatic: No cervical, supraclavicular, or inguinal adenopathy  Respiratory: CTAB, normal respiratory effort  CV: Normal rate, regular rhythm, no murmurs, 1+ LE edema bilateral LE  GI: Soft, nontender, nondistended, no masses, no hepatomegaly, no splenomegaly  MS: Normal gait and station. Digits without clubbing or cyanosis. Skin: No rashes, ecchymoses, or petechiae. Normal temperature, turgor, and texture. Psych: Alert, oriented, appropriate affect, normal judgment/insight        Results:     Lab Results   Component Value Date/Time    WBC 5.3 08/14/2019 10:06 AM    HGB 8.5 (L) 08/14/2019 10:06 AM    HCT 26.9 (L) 08/14/2019 10:06 AM    PLATELET 623 (H) 37/33/3512 10:06 AM    MCV 95.7 08/14/2019 10:06 AM    ABS.  NEUTROPHILS PENDING 08/14/2019 10:06 AM     Lab Results   Component Value Date/Time    Sodium 132 (L) 07/17/2019 10:23 AM    Potassium 4.1 07/17/2019 10:23 AM    Chloride 97 07/17/2019 10:23 AM    CO2 27 07/17/2019 10:23 AM    Glucose 187 (H) 07/17/2019 10:23 AM    BUN 11 07/17/2019 10:23 AM    Creatinine 0.91 07/17/2019 10:23 AM    GFR est AA >60 07/17/2019 10:23 AM    GFR est non-AA >60 07/17/2019 10:23 AM    Calcium 7.9 (L) 07/17/2019 10:23 AM    Glucose (POC) 168 (H) 04/15/2019 10:44 AM     Lab Results   Component Value Date/Time    Bilirubin, total 0.4 07/17/2019 10:23 AM    ALT (SGPT) 19 07/17/2019 10:23 AM    AST (SGOT) 14 (L) 07/17/2019 10:23 AM    Alk. phosphatase 148 (H) 07/17/2019 10:23 AM    Protein, total 5.8 (L) 07/17/2019 10:23 AM    Albumin 2.9 (L) 07/17/2019 10:23 AM    Globulin 2.9 07/17/2019 10:23 AM     10/1/18 CT abd  There is a 3.8 x 1.9 cm mass involving the uncinate process and possibly  invading the duodenum. Findings are nonspecific but typical of pancreatic  carcinoma. There is no biliary or pancreatic ductal dilatation.     There is a poorly defined irregular hypodensity in segment IVb of the liver  measuring 3.7 x 1.9 cm. There appears to be focal biliary dilatation in the left  lobe behind this abnormality. Metastatic disease is suspected. There is a small,  1 cm hypodensity in the dome of the liver, likely segment 8. There is a 1 cm  hypodensity in segment 4 of the liver as well, also likely metastasis. Small  hypodensity measuring less than 1 cm in segment 6 at the tip laterally is also  nonspecific but probable metastasis.     Spleen kidneys and adrenals are unremarkable.     No free fluid or focal fluid collection.     Lung bases are clear.     Bone windows are unremarkable.     IMPRESSION:  1. 3.8 x 1.9 cm mass involving the uncinate process of the pancreas and possibly  invading the duodenum, this likely represents pancreatic carcinoma. 2. Likely metastatic disease in the liver. There is a 3.8 x 1.9 cm mass involving the uncinate process and possibly  invading the duodenum. Findings are nonspecific but typical of pancreatic  carcinoma. There is no biliary or pancreatic ductal dilatation.     There is a poorly defined irregular hypodensity in segment IVb of the liver  measuring 3.7 x 1.9 cm. There appears to be focal biliary dilatation in the left  lobe behind this abnormality. Metastatic disease is suspected. There is a small,  1 cm hypodensity in the dome of the liver, likely segment 8.  There is a 1 cm  hypodensity in segment 4 of the liver as well, also likely metastasis. Small  hypodensity measuring less than 1 cm in segment 6 at the tip laterally is also  nonspecific but probable metastasis.     Spleen kidneys and adrenals are unremarkable.     No free fluid or focal fluid collection.     Lung bases are clear.     Bone windows are unremarkable.     IMPRESSION:  1. 3.8 x 1.9 cm mass involving the uncinate process of the pancreas and possibly  invading the duodenum, this likely represents pancreatic carcinoma. 2. Likely metastatic disease in the liver. Records reviewed and summarized above. Pathology report(s) reviewed above. Radiology report(s) reviewed above. MRI abdomen 10/22/18: IMPRESSION:       1. Pancreatic uncinate process mass suspicious for pancreatic neoplasm, possibly  adenocarcinoma. Mass abuts the superior mesenteric artery with about 20%  circumference involvement but no luminal distortion or perivascular stranding. 2. Multiple hepatic lesions suspicious for metastatic neoplasm with segment IVb  lesion showing patchy areas of surrounding steatosis likely secondary to locally  altered intrahepatic hemodynamics and likely responsible for biopsy result. 3. Cholecystolithiasis and small gallbladder polyp. CT c/a/p 12/28/18: IMPRESSION:  Interval decrease in size of pancreatic mass. Slight interval decrease in size of hepatic metastases; these now demonstrate central low attenuation suggestive of necrosis. No evidence of new metastatic disease in the chest, abdomen, or pelvis. CT c/a/p 2/25/19:  LIVER: The lesions described on the prior examination have improved in the  interval. 19 mm lesion in the dome of the liver now measures 1 cm. 2.0 x 1.5 cm  lesion in the left hepatic lobe now measures 1.3 x 1.2 cm. Other smaller lesions  are no longer visualized. GALLBLADDER: Unremarkable. SPLEEN: No mass. PANCREAS: Mass lesion in the uncinate process now measures 1.7 x 2.0 cm,  previously measuring 2.0 x 3.0 cm. ADRENALS: Unremarkable.   KIDNEYS: No mass, calculus, or hydronephrosis. STOMACH: Unremarkable. SMALL BOWEL: No dilatation or wall thickening. COLON: No dilatation or wall thickening. APPENDIX: Unremarkable. PERITONEUM: No ascites or pneumoperitoneum. RETROPERITONEUM: No lymphadenopathy or aortic aneurysm. REPRODUCTIVE ORGANS: Intrauterine device projects in satisfactory position. URINARY BLADDER: No mass or calculus. BONES: Degenerative changes are seen in the thoracic and lumbar spine. ADDITIONAL COMMENTS: N/A     IMPRESSION  IMPRESSION:  Interval decrease in the size of the hepatic metastases. Decrease in the size of  the mass lesion involving the uncinate process.     RECIST:  Pancreatic uncinate mass: Current: (68) 1.7X2.0cm     12/28/2018: (71) 3.0 x  2.0 cm   Segment 4B liver mass: Current: (56) 1.3 x 1.2 cm 12/28/2018: (57) 2.0 x 1.5 cm   Segment 5 liver mass: Current: (68) 4 x 7 mm 12/28/2018: (70) 1.4 x 0.9 cm    CXR for port study 4/15/19:   IMPRESSION:  Right internal jugular chest Port-A-Cath is seen with the catheter terminating  in the mid SVC. The port is slightly angled medially due to breast tissue. The  port is not flipped. Access needle appears to be within the port hub. Nursing  staff was able to get good blood return and flush the port without difficulty. No intervention was performed. CT a/p 4/22/19:  IMPRESSION:  1. Slight decrease in size of liver metastases, detailed above. 2. Slight decrease in size in uncinate process mass. 3. No evidence for new metastatic disease.     RECIST:  Uncinate process mass: 15 x 9 mm, image 56, previously 17 x 11 mm. Segment IVb metastasis 11 x 9 mm, image 50, previously 13 x 12 mm  Segment 5 metastasis: Barely detectable, image 62    CT c/a/p 6/17/19: IMPRESSION:  1. Stable lesion in the uncinate process. 2. Slight interval decrease in one of the 2 small liver lesions while the other  remains stable. 3. Stable small nodules right upper lobe.   4. No new evidence of metastatic disease or acute abnormality.     RECIST:  Uncinate process mass: 15 x 9 mm, image 68, previously 15 x 9 mm, image 56  Segment IVb metastasis 11 x 9 mm, image 56, previously 11 x 9 mm, image 50  Segment 5 metastasis: No longer visualized, previously barely detectable, image  62    CT c/a/p 8/12/19:  IMPRESSION:  Stable mass lesion in the uncinate process. Stable liver lesions. Stable  subpleural nodules right upper lobe. No new evidence of metastatic disease or acute abnormality.     RECIST:  Uncinate process mass: 15 x 9 mm, image 69, previously 15 x 9 mm, image 68   Segment IVb metastasis 11 x 10 mm, image 56, previously 11 x 10 mm, image 56   Segment 5 metastasis: Not visualized     Assessment/plan:   1. Uncinate pancreatic adenocarcinoma,  mets to liver, stage IV:  cT2 cN0 pM1    Discussed that while this is treatable, it is not curable, the goal of therapy is palliative. Discussed that without therapy, life expectancy would be 3-6 months, with therapy 12-18 months on average. As an example of likely chemotherapy, we discussed the risks and benefits of Gemcitabine and Abraxane chemotherapy. Potential side effects include, but are not limited to, nausea, vomiting, diarrhea, constipation, mucositis, taste changes, myelosuppression, infection, fatigue, alopecia, skin and nail changes, pulmonary toxicity, neuropathy, allergic reactions, infertility, and rarely, death. Discussed the [id number] study and she signed informed consent. · Gemcitabine (1000 mg/m2) and Abraxane (125 mg/m2) given on days 1,8,15 every 28 days. · Labs: CBC, BMP prior to each treatment. Hepatic function panel every 4 weeks. CA 19.9 prior to day 1  · Antiemetic prophylaxis: Dexamethasone prior to each treatment  · PRN antiemetics: Ondansetron and Prochlorperazine at home  · EMLA cream for port    On the control arm, C11d1 today, will see her back in 2 weeks for C11D15. CT a/p on 8/12/19 stable.     We will plan to see the patient in follow up at least once per cycle, or sooner if symptoms warrant. 2. DM2:  Holding metformin; on insulin; saw Dr. Marcie Jack; her BS are improving; she is now taking less insulin; Hgb A1C 6.8    3. Emotional well being:  She has excellent support and is coping well with her disease    4. Abdominal pain:  Mild, may be due to constipation;  palliative care has seen    5. Nutrition:  Yoanna Quinonez RD has met with her; holding on enzymes    6. Insomnia: Ongoing but no worse. Currently taking melatonin. She has lunesta if needed. 7. Anemia:  Due to chemo and disease; monitor; iron profile and ferritin normal; gave injectafer 750 mg IV on 5/22/19. May need to give again in the future. 8. Neutropenia:  Due to chemo, started granix 480 mcg for 4 days following chemo with C1D15. Does report some pain with granix. Tried claritin, however this kept her awake. Recommend trying tylenol and advil PRN. Previously at 2 days following chemo but changed to only 1 day following     9. Constipation:  Resolved for now so she is holding off on her stool softener, but she continues with Miralax. 10. Sinus congestion: Intermittent but has improved. Clear/bloody sinus drainage. Recommend OTC sudafed and saline spray. She has seen Dr. Rao Durand, ENT, s/p steroids. On flonase. 11. Dehydration: She is drinking more fluids and has not had issues with dizziness. 12. Fatigue: Due to treatment. Ongoing. [facility] information given. Received acupuncture on 8/5/19, scheduled again in 9/2019. S/p 1L IV hydration. Will monitor. 13. LE edema: Started last week after acupuncture. Does improve with elevation. Plan for BLE dopplers, ordered.           Signed By: Earl Smith MD

## 2019-08-14 NOTE — PROGRESS NOTES
[facility] VISIT NOTE 
 
[id number]. Pt arrived at [facility] ambulatory and in no distress for C11D1 Gemzar/Abraxane Clinical Trial.  Assessment completed, pt c/o of feet swelling. RCW chest port accessed with . 75 in garcia with no difficulty. Positive blood return noted and labs drawn. Pt to see MD. 
 
6960. Notified MD office of UA results. No further orders given. Rest of labs resulted and are within parameters to treat. Medications received: 
[address] Dexamethasone IV Abraxane IV Gemzar IV Tolerated treatment well, no adverse reaction noted. Port de-accessed and flushed per protocol. Positive blood return noted. Patient Vitals for the past 12 hrs: 
 Temp Pulse Resp BP SpO2  
08/14/19 1447 97.7 °F (36.5 °C) 70 16 168/69   
08/14/19 0952  65 18 124/59 100 % Recent Results (from the past 12 hour(s)) METABOLIC PANEL, COMPREHENSIVE Collection Time: 08/14/19 10:06 AM  
Result Value Ref Range Sodium 133 (L) 136 - 145 mmol/L Potassium 4.4 3.5 - 5.1 mmol/L Chloride 101 97 - 108 mmol/L  
 CO2 26 21 - 32 mmol/L Anion gap 6 5 - 15 mmol/L Glucose 162 (H) 65 - 100 mg/dL BUN 7 6 - 20 MG/DL Creatinine 0.72 0.55 - 1.02 MG/DL  
 BUN/Creatinine ratio 10 (L) 12 - 20 GFR est AA >60 >60 ml/min/1.73m2 GFR est non-AA >60 >60 ml/min/1.73m2 Calcium 7.9 (L) 8.5 - 10.1 MG/DL Bilirubin, total 0.3 0.2 - 1.0 MG/DL  
 ALT (SGPT) 15 12 - 78 U/L  
 AST (SGOT) 14 (L) 15 - 37 U/L Alk. phosphatase 132 (H) 45 - 117 U/L Protein, total 5.5 (L) 6.4 - 8.2 g/dL Albumin 2.9 (L) 3.5 - 5.0 g/dL Globulin 2.6 2.0 - 4.0 g/dL A-G Ratio 1.1 1.1 - 2.2 MAGNESIUM Collection Time: 08/14/19 10:06 AM  
Result Value Ref Range Magnesium 1.9 1.6 - 2.4 mg/dL PHOSPHORUS Collection Time: 08/14/19 10:06 AM  
Result Value Ref Range Phosphorus 4.3 2.6 - 4.7 MG/DL  
URIC ACID Collection Time: 08/14/19 10:06 AM  
Result Value Ref Range  Uric acid 5.2 2.6 - 6.0 MG/DL  
 CBC WITH AUTOMATED DIFF Collection Time: 08/14/19 10:06 AM  
Result Value Ref Range WBC 5.3 3.6 - 11.0 K/uL  
 RBC 2.81 (L) 3.80 - 5.20 M/uL HGB 8.5 (L) 11.5 - 16.0 g/dL HCT 26.9 (L) 35.0 - 47.0 % MCV 95.7 80.0 - 99.0 FL  
 MCH 30.2 26.0 - 34.0 PG  
 MCHC 31.6 30.0 - 36.5 g/dL RDW 22.2 (H) 11.5 - 14.5 % PLATELET 945 (H) 765 - 400 K/uL MPV 9.5 8.9 - 12.9 FL  
 NRBC 0.0 0  WBC ABSOLUTE NRBC 0.00 0.00 - 0.01 K/uL NEUTROPHILS 65 32 - 75 % LYMPHOCYTES 13 12 - 49 % MONOCYTES 17 (H) 5 - 13 % EOSINOPHILS 4 0 - 7 % BASOPHILS 1 0 - 1 % IMMATURE GRANULOCYTES 0 0.0 - 0.5 % ABS. NEUTROPHILS 3.4 1.8 - 8.0 K/UL  
 ABS. LYMPHOCYTES 0.7 (L) 0.8 - 3.5 K/UL  
 ABS. MONOCYTES 0.9 0.0 - 1.0 K/UL  
 ABS. EOSINOPHILS 0.2 0.0 - 0.4 K/UL  
 ABS. BASOPHILS 0.1 0.0 - 0.1 K/UL  
 ABS. IMM. GRANS. 0.0 0.00 - 0.04 K/UL  
 DF SMEAR SCANNED    
 RBC COMMENTS ANISOCYTOSIS 3+ 
    
 RBC COMMENTS SCHISTOCYTES PRESENT 
    
 RBC COMMENTS OVALOCYTES PRESENT 
    
LD Collection Time: 08/14/19 10:06 AM  
Result Value Ref Range  81 - 246 U/L  
URINALYSIS W/ REFLEX CULTURE Collection Time: 08/14/19 10:06 AM  
Result Value Ref Range Color DARK YELLOW Appearance CLEAR CLEAR Specific gravity 1.019 1.003 - 1.030    
 pH (UA) 6.0 5.0 - 8.0 Protein NEGATIVE  NEG mg/dL Glucose NEGATIVE  NEG mg/dL Ketone TRACE (A) NEG mg/dL Bilirubin NEGATIVE  NEG Blood SMALL (A) NEG Urobilinogen 1.0 0.2 - 1.0 EU/dL Nitrites NEGATIVE  NEG Leukocyte Esterase TRACE (A) NEG    
 WBC 0-4 0 - 4 /hpf  
 RBC 0-5 0 - 5 /hpf Epithelial cells MODERATE (A) FEW /lpf Bacteria NEGATIVE  NEG /hpf  
 UA:UC IF INDICATED CULTURE NOT INDICATED BY UA RESULT CNI Mucus 2+ (A) NEG /lpf Hyaline cast 10-20 0 - 5 /lpf URINE CULTURE HOLD SAMPLE Collection Time: 08/14/19 12:23 PM  
Result Value Ref Range Urine culture hold URINE ON HOLD IN MICROBIOLOGY DEPT FOR 3 DAYS. IF UNPRESERVED URINE IS SUBMITTED, IT CANNOT BE USED FOR ADDITIONAL TESTING AFTER 24 HRS, RECOLLECTION WILL BE REQUIRED. 1450. D/C'd from [facility] ambulatory and in no distress.  Next appointment is 8/21/19 at 11:30 am.

## 2019-08-20 NOTE — PROGRESS NOTES
Palliative medicine/ medical acupuncture~    Spoke to pt, she had left a my chart message on 8/9/19 to my practice (I do not have access directly to my chart) that I just received today. About 4h after acupuncture session had b/l LE swelling, which she has not really had before. Talked to her oncology team- neg dopplers. Swelling equal and improves w/ rest.     Do know or see a correlation between the acupuncture points placed and LE swelling, which I told patient. Likely more coincidental, although timing is unusual.     Pt appreciated phone call, continues to elevate legs w/ improvement. She plans on keeping her Sept appt w/ me although told her I am also okay if she wants to hold on another tx to eliminate a variable.

## 2019-08-21 NOTE — PROGRESS NOTES
[facility] Progress Note    Date: 2019    Name: Mark Shaw    MRN: [id number]         : 1945    1130:  Ms. Dairl Kayser Arrived ambulatory and in no distress for C11D8 of Clinical Trial:  [facility] [id number]: Arm 2: gemcitabine/albumin-bound Paclitaxel. Assessment was completed, no acute issues at this time, no new complaints voiced. PAC chest wall port accessed without difficulty, labs drawn & sent for processing. Chemotherapy Flowsheet 2019   Cycle C11D8   Date 2019   Drug / Regimen Clinical Trial: [facility] [id number]: Arm 2: gemcitabine/albumin-bound Paclitaxel   Pre Meds given   Notes given         Ms. Strange's vitals were reviewed. Patient Vitals for the past 24 hrs:   Temp Pulse Resp BP SpO2   19 1630 97.8 °F (36.6 °C) 68 18 149/70    19 1139 96 °F (35.6 °C) 73 16 144/67 99 %     Lab results were obtained and reviewed. Recent Results (from the past 24 hour(s))   CBC WITH AUTOMATED DIFF    Collection Time: 19 11:51 AM   Result Value Ref Range    WBC 7.8 3.6 - 11.0 K/uL    RBC 2.93 (L) 3.80 - 5.20 M/uL    HGB 8.8 (L) 11.5 - 16.0 g/dL    HCT 28.0 (L) 35.0 - 47.0 %    MCV 95.6 80.0 - 99.0 FL    MCH 30.0 26.0 - 34.0 PG    MCHC 31.4 30.0 - 36.5 g/dL    RDW 20.6 (H) 11.5 - 14.5 %    PLATELET 610 (H) 639 - 400 K/uL    MPV 9.5 8.9 - 12.9 FL    NRBC 0.0 0  WBC    ABSOLUTE NRBC 0.00 0.00 - 0.01 K/uL    NEUTROPHILS 71 32 - 75 %    BAND NEUTROPHILS 3 0 - 6 %    LYMPHOCYTES 13 12 - 49 %    MONOCYTES 7 5 - 13 %    EOSINOPHILS 0 0 - 7 %    BASOPHILS 1 0 - 1 %    METAMYELOCYTES 1 (H) 0 %    MYELOCYTES 4 (H) 0 %    IMMATURE GRANULOCYTES 0 %    ABS. NEUTROPHILS 5.8 1.8 - 8.0 K/UL    ABS. LYMPHOCYTES 1.0 0.8 - 3.5 K/UL    ABS. MONOCYTES 0.5 0.0 - 1.0 K/UL    ABS. EOSINOPHILS 0.0 0.0 - 0.4 K/UL    ABS. BASOPHILS 0.1 0.0 - 0.1 K/UL    ABS. IMM.  GRANS. 0.0 K/UL    DF MANUAL      RBC COMMENTS ANISOCYTOSIS  1+        WBC COMMENTS TOXIC GRANULATION     LD Collection Time: 08/21/19 11:51 AM   Result Value Ref Range     81 - 246 U/L     Medications:  Medications Administered     dexamethasone (DECADRON) 4 mg/mL injection 8 mg     Admin Date  08/21/2019 Action  Given Dose  8 mg Route  IntraVENous Administered By  Adriana Arvizu RN          gemcitabine (GEMZAR) 1,960 mg in 0.9% sodium chloride 250 mL, overfill volume 25 mL chemo infusion     Admin Date  08/21/2019 Action  New Bag Dose  1960 mg Rate  653.2 mL/hr Route  IntraVENous Administered By  Kurt Bal RN          heparin (porcine) pf 500 Units     Admin Date  08/21/2019 Action  Given Dose  500 Units Route  IntraVENous Administered By  Adriana Arvizu RN          PACLitaxel-Protein Bound (ABRAXANE) 245 mg chemo infusion     Admin Date  08/21/2019 Action  New Bag Dose  245 mg Rate  98 mL/hr Route  IntraVENous Administered By  Adriana Arvizu RN          sodium chloride (NS) flush 5-10 mL     Admin Date  08/21/2019 Action  Given Dose  10 mL Route  IntraVENous Administered By  Adriana Arvizu RN                Ms. Debby Mercedes tolerated treatment well and was discharged from [place] [address] in stable condition at 1640. Port de-accessed, flushed & heparinized per protocol. She is to return on August 28 at 1030 for her next appointment.     Luis E Fajardo RN  August 21, 2019

## 2019-08-21 NOTE — PROGRESS NOTES
Pt in for labs and chemo. This is C11D8 of treatment. Patient reports the following adverse events at this time: gr 2 loss of appetite, gr 1 constipation, gr 1 fatigue, gr 2 hair loss, gr 1 insomnia, gr 1 sob, gr 1 neuropathy, gr 1 swelling, gr 1 urinary leakage. Vital signs are stable. Patient to receive gem/abraxane. Next appt is scheduled for 8/28/19.

## 2019-08-28 NOTE — PROGRESS NOTES
Pt in for labs and follow up visit today per protocol with Dr. Zee Yeboah and RN. This is C11D15 of treatment. Patient reports the following adverse events at this time: gr 2 loss of appetite, gr 1 constipation, gr 1 fatigue, gr 2 hair loss, gr 1 insomnia, gr 1 concentration, gr 1 sob, gr 1 neuropathy, gr 1 urinary leakage. Vital signs are stable. Patient to go to infusion center after appointment to receive gemcitabine/abraxane. Next appt is scheduled for 9/11/19.

## 2019-08-28 NOTE — PROGRESS NOTES
[facility] [place] at [address], [address] [facility]  Ravin Wade: [phone]  F: [phone]      Reason for Visit:   Korina Grimm is a 69 y.o. female who is seen in self-referral for evaluation of pancreatic cancer. Treatment History:   · 10/4/18 pancreatic head mass FNA, pancreatic adenocarcinoma    10/15/18  Liver, Core Biopsy w/Touch Prep CYTOLOGIC INTERPRETATION:   · Numerous cores and fragments of benign hepatic parenchyma     10/24/18  Liver, Fine Needle Aspiration CYTOLOGIC INTERPRETATION:   Metastatic adenocarcinoma (see Comment). General Categorization   Positive for malignancy. Comment: The morphologic appearance is nonspecific with regard to site of origin but compatible with metastatic pancreatic carcinoma in this patient with known pancreatic adenocarcinoma ([id number]). 11/9/18 abraxane/gemcitabine    History of Present Illness:   She started having abdominal pain, gradual and persistent, x 1 month, pressure sensation, located in epigastrium, no radiation, no aggravating symptoms, no relieving factors. 25 lb weight loss over 6 months.  + nausea. Interval history:  In today for follow up and treatment. Complains of gr 2 loss of appetite, gr 1 constipation, gr 1 fatigue, gr 2 hair loss, gr 1 insomnia, gr 1 concentration, gr 1 sob, gr 1 neuropathy, gr 1 urinary leakage. Past Medical History:   Diagnosis Date    Arthritis     Constipation     Diabetes ([address]     Hemorrhoids     Hiatal hernia     High cholesterol     Hypertension     Pancreatic cancer ([address]       Past Surgical History:   Procedure Laterality Date    HX KNEE ARTHROSCOPY Right     HX ORTHOPAEDIC      left wrist surgery    HX TONSILLECTOMY        Social History     Tobacco Use    Smoking status: Never Smoker    Smokeless tobacco: Never Used   Substance Use Topics    Alcohol use:  Yes     Alcohol/week: 2.0 - 4.0 standard drinks     Types: 1 - 2 Cans of beer, 1 - 2 Shots of liquor per week     Frequency: 2-4 times a month     Drinks per session: 1 or 2     Comment: minimal      Family History   Problem Relation Age of Onset    Cancer Mother         skin    Hypertension Mother     Heart Disease Mother     Cancer Father         skin    Heart Disease Father     Stroke Father     Diabetes Neg Hx      Current Outpatient Medications   Medication Sig    hydroCHLOROthiazide (HYDRODIURIL) 12.5 mg tablet Take 12.5 mg by mouth daily.  tbo-filgrastim (GRANIX) 480 mcg/0.8 mL syrg injection 1 syringe subq on days 1-2, 24 hours after chemotherapy.  TRESIBA FLEXTOUCH U-200 200 unit/mL (3 mL) inpn 10-12 UNITS BY SUBCUTANEOUS ROUTE DAILY. OR AS DIRECTED UP TO 60 UNITS DAILY    glucose blood VI test strips (ACCU-CHEK RICHIE PLUS TEST STRP) strip Accu-Chek Richie Plus test strips   Check BS BID    OTHER Cranial prosthesis    Dx C25.7    omeprazole (PRILOSEC) 20 mg capsule Take 20 mg by mouth daily.  simvastatin (ZOCOR) 10 mg tablet Take 10 mg by mouth nightly.  telmisartan (MICARDIS) 80 mg tablet Take 80 mg by mouth daily.  ondansetron hcl (ZOFRAN) 8 mg tablet Take 1 Tab by mouth every eight (8) hours as needed for Nausea.  lidocaine-prilocaine (EMLA) topical cream Apply  to affected area as needed for Pain.  IBUPROFEN IB PO Take  by mouth as needed. No current facility-administered medications for this visit.       Facility-Administered Medications Ordered in Other Visits   Medication Dose Route Frequency    dexamethasone (DECADRON) 4 mg/mL injection 8 mg  8 mg IntraVENous ONCE    PACLitaxel-Protein Bound (ABRAXANE) 245 mg chemo infusion  245 mg IntraVENous ONCE    gemcitabine (GEMZAR) 1,960 mg in 0.9% sodium chloride 250 mL, overfill volume 25 mL chemo infusion  1,960 mg IntraVENous ONCE    prochlorperazine (COMPAZINE) with saline injection 10 mg  10 mg IntraVENous Q6H PRN      Allergies   Allergen Reactions    Amoxicillin Hives        Review of Systems: A comprehensive review of systems was performed and all systems were negative except for HPI and for the symptom review form, reviewed and scanned in. Physical Exam:     Visit Vitals  /68   Pulse 70   Temp 96.8 °F (36 °C) (Temporal)   Resp 18   Ht 5' 7\" (1.702 m)   Wt 184 lb 11.2 oz (83.8 kg)   SpO2 100%   BMI 28.93 kg/m²     ECOG PS: 0  General: No distress  Eyes: PERRLA, anicteric sclerae  HENT: Atraumatic, OP clear  Neck: Supple  Lymphatic: No cervical, supraclavicular, or inguinal adenopathy  Respiratory: CTAB, normal respiratory effort  CV: Normal rate, regular rhythm, no murmurs, 1+ LE edema bilateral LE  GI: Soft, nontender, nondistended, no masses, no hepatomegaly, no splenomegaly  MS: Normal gait and station. Digits without clubbing or cyanosis. Skin: No rashes, ecchymoses, or petechiae. Normal temperature, turgor, and texture. Psych: Alert, oriented, appropriate affect, normal judgment/insight        Results:     Lab Results   Component Value Date/Time    WBC 3.1 (L) 08/28/2019 10:34 AM    HGB 8.3 (L) 08/28/2019 10:34 AM    HCT 26.5 (L) 08/28/2019 10:34 AM    PLATELET 086 60/11/2767 10:34 AM    MCV 96.0 08/28/2019 10:34 AM    ABS. NEUTROPHILS 2.4 08/28/2019 10:34 AM     Lab Results   Component Value Date/Time    Sodium 133 (L) 08/14/2019 10:06 AM    Potassium 4.4 08/14/2019 10:06 AM    Chloride 101 08/14/2019 10:06 AM    CO2 26 08/14/2019 10:06 AM    Glucose 162 (H) 08/14/2019 10:06 AM    BUN 7 08/14/2019 10:06 AM    Creatinine 0.72 08/14/2019 10:06 AM    GFR est AA >60 08/14/2019 10:06 AM    GFR est non-AA >60 08/14/2019 10:06 AM    Calcium 7.9 (L) 08/14/2019 10:06 AM    Glucose (POC) 168 (H) 04/15/2019 10:44 AM     Lab Results   Component Value Date/Time    Bilirubin, total 0.3 08/14/2019 10:06 AM    ALT (SGPT) 15 08/14/2019 10:06 AM    AST (SGOT) 14 (L) 08/14/2019 10:06 AM    Alk.  phosphatase 132 (H) 08/14/2019 10:06 AM    Protein, total 5.5 (L) 08/14/2019 10:06 AM    Albumin 2.9 (L) 08/14/2019 10:06 AM    Globulin 2.6 08/14/2019 10:06 AM     10/1/18 CT abd  There is a 3.8 x 1.9 cm mass involving the uncinate process and possibly  invading the duodenum. Findings are nonspecific but typical of pancreatic  carcinoma. There is no biliary or pancreatic ductal dilatation.     There is a poorly defined irregular hypodensity in segment IVb of the liver  measuring 3.7 x 1.9 cm. There appears to be focal biliary dilatation in the left  lobe behind this abnormality. Metastatic disease is suspected. There is a small,  1 cm hypodensity in the dome of the liver, likely segment 8. There is a 1 cm  hypodensity in segment 4 of the liver as well, also likely metastasis. Small  hypodensity measuring less than 1 cm in segment 6 at the tip laterally is also  nonspecific but probable metastasis.     Spleen kidneys and adrenals are unremarkable.     No free fluid or focal fluid collection.     Lung bases are clear.     Bone windows are unremarkable.     IMPRESSION:  1. 3.8 x 1.9 cm mass involving the uncinate process of the pancreas and possibly  invading the duodenum, this likely represents pancreatic carcinoma. 2. Likely metastatic disease in the liver. There is a 3.8 x 1.9 cm mass involving the uncinate process and possibly  invading the duodenum. Findings are nonspecific but typical of pancreatic  carcinoma. There is no biliary or pancreatic ductal dilatation.     There is a poorly defined irregular hypodensity in segment IVb of the liver  measuring 3.7 x 1.9 cm. There appears to be focal biliary dilatation in the left  lobe behind this abnormality. Metastatic disease is suspected. There is a small,  1 cm hypodensity in the dome of the liver, likely segment 8. There is a 1 cm  hypodensity in segment 4 of the liver as well, also likely metastasis.  Small  hypodensity measuring less than 1 cm in segment 6 at the tip laterally is also  nonspecific but probable metastasis.     Spleen kidneys and adrenals are unremarkable.     No free fluid or focal fluid collection.     Lung bases are clear.     Bone windows are unremarkable.     IMPRESSION:  1. 3.8 x 1.9 cm mass involving the uncinate process of the pancreas and possibly  invading the duodenum, this likely represents pancreatic carcinoma. 2. Likely metastatic disease in the liver. Records reviewed and summarized above. Pathology report(s) reviewed above. Radiology report(s) reviewed above. MRI abdomen 10/22/18: IMPRESSION:       1. Pancreatic uncinate process mass suspicious for pancreatic neoplasm, possibly  adenocarcinoma. Mass abuts the superior mesenteric artery with about 20%  circumference involvement but no luminal distortion or perivascular stranding. 2. Multiple hepatic lesions suspicious for metastatic neoplasm with segment IVb  lesion showing patchy areas of surrounding steatosis likely secondary to locally  altered intrahepatic hemodynamics and likely responsible for biopsy result. 3. Cholecystolithiasis and small gallbladder polyp. CT c/a/p 12/28/18: IMPRESSION:  Interval decrease in size of pancreatic mass. Slight interval decrease in size of hepatic metastases; these now demonstrate central low attenuation suggestive of necrosis. No evidence of new metastatic disease in the chest, abdomen, or pelvis. CT c/a/p 2/25/19:  LIVER: The lesions described on the prior examination have improved in the  interval. 19 mm lesion in the dome of the liver now measures 1 cm. 2.0 x 1.5 cm  lesion in the left hepatic lobe now measures 1.3 x 1.2 cm. Other smaller lesions  are no longer visualized. GALLBLADDER: Unremarkable. SPLEEN: No mass. PANCREAS: Mass lesion in the uncinate process now measures 1.7 x 2.0 cm,  previously measuring 2.0 x 3.0 cm. ADRENALS: Unremarkable. KIDNEYS: No mass, calculus, or hydronephrosis. STOMACH: Unremarkable. SMALL BOWEL: No dilatation or wall thickening. COLON: No dilatation or wall thickening.   APPENDIX: Unremarkable. PERITONEUM: No ascites or pneumoperitoneum. RETROPERITONEUM: No lymphadenopathy or aortic aneurysm. REPRODUCTIVE ORGANS: Intrauterine device projects in satisfactory position. URINARY BLADDER: No mass or calculus. BONES: Degenerative changes are seen in the thoracic and lumbar spine. ADDITIONAL COMMENTS: N/A     IMPRESSION  IMPRESSION:  Interval decrease in the size of the hepatic metastases. Decrease in the size of  the mass lesion involving the uncinate process.     RECIST:  Pancreatic uncinate mass: Current: (68) 1.7X2.0cm     12/28/2018: (71) 3.0 x  2.0 cm   Segment 4B liver mass: Current: (56) 1.3 x 1.2 cm 12/28/2018: (57) 2.0 x 1.5 cm   Segment 5 liver mass: Current: (68) 4 x 7 mm 12/28/2018: (70) 1.4 x 0.9 cm    CXR for port study 4/15/19:   IMPRESSION:  Right internal jugular chest Port-A-Cath is seen with the catheter terminating  in the mid SVC. The port is slightly angled medially due to breast tissue. The  port is not flipped. Access needle appears to be within the port hub. Nursing  staff was able to get good blood return and flush the port without difficulty. No intervention was performed. CT a/p 4/22/19:  IMPRESSION:  1. Slight decrease in size of liver metastases, detailed above. 2. Slight decrease in size in uncinate process mass. 3. No evidence for new metastatic disease.     RECIST:  Uncinate process mass: 15 x 9 mm, image 56, previously 17 x 11 mm. Segment IVb metastasis 11 x 9 mm, image 50, previously 13 x 12 mm  Segment 5 metastasis: Barely detectable, image 62    CT c/a/p 6/17/19: IMPRESSION:  1. Stable lesion in the uncinate process. 2. Slight interval decrease in one of the 2 small liver lesions while the other  remains stable. 3. Stable small nodules right upper lobe.   4. No new evidence of metastatic disease or acute abnormality.     RECIST:  Uncinate process mass: 15 x 9 mm, image 68, previously 15 x 9 mm, image 56  Segment IVb metastasis 11 x 9 mm, image 56, previously 11 x 9 mm, image 50  Segment 5 metastasis: No longer visualized, previously barely detectable, image  62    CT c/a/p 8/12/19:  IMPRESSION:  Stable mass lesion in the uncinate process. Stable liver lesions. Stable  subpleural nodules right upper lobe. No new evidence of metastatic disease or acute abnormality.     RECIST:  Uncinate process mass: 15 x 9 mm, image 69, previously 15 x 9 mm, image 68   Segment IVb metastasis 11 x 10 mm, image 56, previously 11 x 10 mm, image 56   Segment 5 metastasis: Not visualized     Assessment/plan:   1. Uncinate pancreatic adenocarcinoma,  mets to liver, stage IV:  cT2 cN0 pM1    Discussed that while this is treatable, it is not curable, the goal of therapy is palliative. Discussed that without therapy, life expectancy would be 3-6 months, with therapy 12-18 months on average. As an example of likely chemotherapy, we discussed the risks and benefits of Gemcitabine and Abraxane chemotherapy. Potential side effects include, but are not limited to, nausea, vomiting, diarrhea, constipation, mucositis, taste changes, myelosuppression, infection, fatigue, alopecia, skin and nail changes, pulmonary toxicity, neuropathy, allergic reactions, infertility, and rarely, death. Discussed the [id number] study and she signed informed consent. · Gemcitabine (1000 mg/m2) and Abraxane (125 mg/m2) given on days 1,8,15 every 28 days. · Labs: CBC, BMP prior to each treatment. Hepatic function panel every 4 weeks. CA 19.9 prior to day 1  · Antiemetic prophylaxis: Dexamethasone prior to each treatment  · PRN antiemetics: Ondansetron and Prochlorperazine at home  · EMLA cream for port    On the control arm, C11d15 today, will see her back in 2 weeks for C12D1. CT a/p on 8/12/19 stable. We will plan to see the patient in follow up at least once per cycle, or sooner if symptoms warrant.     2. DM2:  Holding metformin; on insulin; saw Dr. Kelsie Dejesus; her BS are improving; she is now taking less insulin; Hgb A1C 6.8    3. Emotional well being:  She has excellent support and is coping well with her disease    4. Abdominal pain:  Intermittent, may be due to constipation;  palliative care has seen    5. Nutrition:  Ojmabel Mao TRU has met with her; holding on enzymes    6. Insomnia: Ongoing but no worse. Currently taking melatonin. She has lunesta if needed. 7. Anemia:  Due to chemo and disease; monitor; iron profile and ferritin normal; gave injectafer 750 mg IV on 5/22/19. Hgb 8.3 today. Will add Iron Profile and Ferritin today. May need to add IV injectafer again in the future. 8. Neutropenia:  Due to chemo, started granix 480 mcg for 4 days following chemo with C1D15. Does report some pain with granix. Tried claritin, however this kept her awake. Recommend trying tylenol and advil PRN. Previously at 2 days following chemo but now only 1 day following     9. Constipation:  Resolved for now so she is holding off on her stool softener, but she continues with Miralax. 10. Sinus congestion: Intermittent but has improved. Clear/bloody sinus drainage. Recommend OTC sudafed and saline spray. She has seen Dr. Zamzam Ding, ENT, s/p steroids. On flonase. 11. Dehydration: She is drinking more fluids and has not had issues with dizziness. 12. Fatigue: Due to treatment. Ongoing. [facility] center information given. Received acupuncture on 8/5/19, scheduled again in 9/2019. S/p 1L IV hydration. Will monitor. 13. LE edema: Started last week after acupuncture. Does improve with elevation. BLE dopplers on 8/15/19 negative.            Signed By: Erick Fenton MD

## 2019-08-28 NOTE — PROGRESS NOTES
[facility] Progress Note    Date: 2019    Name: Mark Shaw    MRN: [id number]         : 1945    1025. Ms. Dairl Kayser Arrived ambulatory and in no distress for C11D15 Clinical Trial: Gemzar/Abraxane. Assessment was completed, patient reported fatigue today, stating she did not sleep well last night. R chest wall port accessed without difficulty using 0.75 inch garcia needle by SHAVON Llanes RN, labs drawn and in process. Chemotherapy Flowsheet 2019   Cycle C11D15   Date 2019   Drug / Regimen Clinical Trial:Gemzar/Abraxane   Pre Meds -   Notes -         Ms. Strange's vitals were reviewed. Patient Vitals for the past 12 hrs:   Temp Pulse Resp BP SpO2   19 1031 96.8 °F (36 °C) 70 18 151/68 100 %       Lab results were obtained and reviewed. Recent Results (from the past 12 hour(s))   CBC WITH AUTOMATED DIFF    Collection Time: 19 10:34 AM   Result Value Ref Range    WBC 3.1 (L) 3.6 - 11.0 K/uL    RBC 2.76 (L) 3.80 - 5.20 M/uL    HGB 8.3 (L) 11.5 - 16.0 g/dL    HCT 26.5 (L) 35.0 - 47.0 %    MCV 96.0 80.0 - 99.0 FL    MCH 30.1 26.0 - 34.0 PG    MCHC 31.3 30.0 - 36.5 g/dL    RDW 20.2 (H) 11.5 - 14.5 %    PLATELET 854 938 - 128 K/uL    MPV 9.2 8.9 - 12.9 FL    NRBC 0.0 0  WBC    ABSOLUTE NRBC 0.00 0.00 - 0.01 K/uL    NEUTROPHILS PENDING %    LYMPHOCYTES PENDING %    MONOCYTES PENDING %    EOSINOPHILS PENDING %    BASOPHILS PENDING %    IMMATURE GRANULOCYTES PENDING %    ABS. NEUTROPHILS PENDING K/UL    ABS. LYMPHOCYTES PENDING K/UL    ABS. MONOCYTES PENDING K/UL    ABS. EOSINOPHILS PENDING K/UL    ABS. BASOPHILS PENDING K/UL    ABS. IMM. GRANS. PENDING K/UL    DF PENDING          Pre-medications  were administered as ordered and chemotherapy was initiated. Medications:  Dexamethasone IVP  Abraxane IV  Gemzar IV  NS KVO    1435. Ms. Dairl Kayser tolerated treatment well and was discharged from [place] [address] in stable condition.   Port de-accessed, flushed & heparinized per protocol. She is to return on 09/11/19 for her next appointment.     Juana Duffy RN  August 28, 2019

## 2019-08-29 NOTE — PROGRESS NOTES
Visit documented 8/29/19  Spiritual Care Partner Volunteer visited patient in [facility] on 8/28/19.   Documented by: Zee Moreira., MS., [address] [facility] ([id number])

## 2019-09-11 NOTE — PROGRESS NOTES
[facility] VISIT NOTE    1000  Pt arrived at [facility] ambulatory and in no distress for Gemzar/Abraxane regimen (C12D1). Assessment completed, pt had no complaints. R chest port accessed with 0.75 in garcia no difficulty. Positive blood return noted and labs drawn. Patient Vitals for the past 12 hrs:   Temp Pulse Resp BP   09/11/19 1450 96.1 °F (35.6 °C) 66 18 172/72   09/11/19 0959 96.1 °F (35.6 °C)  16      Recent Results (from the past 12 hour(s))   METABOLIC PANEL, COMPREHENSIVE    Collection Time: 09/11/19 10:18 AM   Result Value Ref Range    Sodium 138 136 - 145 mmol/L    Potassium 4.0 3.5 - 5.1 mmol/L    Chloride 106 97 - 108 mmol/L    CO2 27 21 - 32 mmol/L    Anion gap 5 5 - 15 mmol/L    Glucose 181 (H) 65 - 100 mg/dL    BUN 7 6 - 20 MG/DL    Creatinine 0.65 0.55 - 1.02 MG/DL    BUN/Creatinine ratio 11 (L) 12 - 20      GFR est AA >60 >60 ml/min/1.73m2    GFR est non-AA >60 >60 ml/min/1.73m2    Calcium 7.9 (L) 8.5 - 10.1 MG/DL    Bilirubin, total 0.5 0.2 - 1.0 MG/DL    ALT (SGPT) 14 12 - 78 U/L    AST (SGOT) 13 (L) 15 - 37 U/L    Alk.  phosphatase 137 (H) 45 - 117 U/L    Protein, total 5.3 (L) 6.4 - 8.2 g/dL    Albumin 2.9 (L) 3.5 - 5.0 g/dL    Globulin 2.4 2.0 - 4.0 g/dL    A-G Ratio 1.2 1.1 - 2.2     PHOSPHORUS    Collection Time: 09/11/19 10:18 AM   Result Value Ref Range    Phosphorus 4.2 2.6 - 4.7 MG/DL   MAGNESIUM    Collection Time: 09/11/19 10:18 AM   Result Value Ref Range    Magnesium 2.1 1.6 - 2.4 mg/dL   URIC ACID    Collection Time: 09/11/19 10:18 AM   Result Value Ref Range    Uric acid 4.6 2.6 - 6.0 MG/DL   CBC WITH AUTOMATED DIFF    Collection Time: 09/11/19 10:18 AM   Result Value Ref Range    WBC 5.2 3.6 - 11.0 K/uL    RBC 2.72 (L) 3.80 - 5.20 M/uL    HGB 8.3 (L) 11.5 - 16.0 g/dL    HCT 25.7 (L) 35.0 - 47.0 %    MCV 94.5 80.0 - 99.0 FL    MCH 30.5 26.0 - 34.0 PG    MCHC 32.3 30.0 - 36.5 g/dL    RDW 20.6 (H) 11.5 - 14.5 %    PLATELET 594 (H) 763 - 400 K/uL    MPV 9.6 8.9 - 12.9 FL NRBC 0.0 0  WBC    ABSOLUTE NRBC 0.00 0.00 - 0.01 K/uL    NEUTROPHILS 67 32 - 75 %    LYMPHOCYTES 13 12 - 49 %    MONOCYTES 17 (H) 5 - 13 %    EOSINOPHILS 2 0 - 7 %    BASOPHILS 1 0 - 1 %    IMMATURE GRANULOCYTES 0 0.0 - 0.5 %    ABS. NEUTROPHILS 3.4 1.8 - 8.0 K/UL    ABS. LYMPHOCYTES 0.7 (L) 0.8 - 3.5 K/UL    ABS. MONOCYTES 0.9 0.0 - 1.0 K/UL    ABS. EOSINOPHILS 0.1 0.0 - 0.4 K/UL    ABS. BASOPHILS 0.1 0.0 - 0.1 K/UL    ABS. IMM.  GRANS. 0.0 0.00 - 0.04 K/UL    DF SMEAR SCANNED      RBC COMMENTS ANISOCYTOSIS  1+        WBC COMMENTS TOXIC GRANULATION     LD    Collection Time: 09/11/19 10:18 AM   Result Value Ref Range     81 - 246 U/L   URINALYSIS W/ REFLEX CULTURE    Collection Time: 09/11/19 12:23 PM   Result Value Ref Range    Color YELLOW/STRAW      Appearance CLEAR CLEAR      Specific gravity <1.005 1.003 - 1.030    pH (UA) 5.5 5.0 - 8.0      Protein NEGATIVE  NEG mg/dL    Glucose NEGATIVE  NEG mg/dL    Ketone NEGATIVE  NEG mg/dL    Bilirubin NEGATIVE  NEG      Blood MODERATE (A) NEG      Urobilinogen 1.0 0.2 - 1.0 EU/dL    Nitrites NEGATIVE  NEG      Leukocyte Esterase NEGATIVE  NEG      WBC 0-4 0 - 4 /hpf    RBC 0-5 0 - 5 /hpf    Epithelial cells MODERATE (A) FEW /lpf    Bacteria NEGATIVE  NEG /hpf    UA:UC IF INDICATED CULTURE NOT INDICATED BY UA RESULT CNI           Medications received:  Medications Administered     0.9% sodium chloride infusion     Admin Date  09/11/2019 Action  New Bag Dose  25 mL/hr Rate  25 mL/hr Route  IntraVENous Administered By  Romayne Gather          dexamethasone (DECADRON) 4 mg/mL injection 8 mg     Admin Date  09/11/2019 Action  Given Dose  8 mg Route  IntraVENous Administered By  Romayne Gather          gemcitabine (GEMZAR) 1,990 mg in 0.9% sodium chloride 250 mL, overfill volume 25 mL chemo infusion     Admin Date  09/11/2019 Action  New Bag Dose  1990 mg Rate  654.8 mL/hr Route  IntraVENous Administered By  Romayne Gather          PACLitaxel-Protein Bound (ABRAXANE) 250 mg chemo infusion     Admin Date  09/11/2019 Action  New Bag Dose  250 mg Rate  100 mL/hr Route  IntraVENous Administered By  [facility] well, no adverse reaction noted. Port de-accessed and flushed per protocol. Positive blood return noted. 1450  D/C'd from [facility] ambulatory and in no distress.  Next appointment is 9/18/19 at [place] [id number].

## 2019-09-11 NOTE — PROGRESS NOTES
[facility] [place] at [address], [address] [facility] [address] [facility]  [place] : [phone]  F: [phone]      Reason for Visit:   Vivian Erazo is a 69 y.o. female who is seen in self-referral for evaluation of pancreatic cancer. Treatment History:   · 10/4/18 pancreatic head mass FNA, pancreatic adenocarcinoma    10/15/18  Liver, Core Biopsy w/Touch Prep CYTOLOGIC INTERPRETATION:   · Numerous cores and fragments of benign hepatic parenchyma     10/24/18  Liver, Fine Needle Aspiration CYTOLOGIC INTERPRETATION:   Metastatic adenocarcinoma (see Comment). General Categorization   Positive for malignancy. Comment: The morphologic appearance is nonspecific with regard to site of origin but compatible with metastatic pancreatic carcinoma in this patient with known pancreatic adenocarcinoma ([id number]). 11/9/18- abraxane/gemcitabine    History of Present Illness:   She started having abdominal pain, gradual and persistent, x 1 month, pressure sensation, located in epigastrium, no radiation, no aggravating symptoms, no relieving factors. 25 lb weight loss over 6 months.  + nausea. Interval history:  In today for follow up and treatment. Complains of gr 1 loss of appetite, gr 2 bleeding, gr 1 constipation, gr 1 fatigue, gr 2 hair loss, gr 1 insomnia, gr 1 sob, gr 1 neuropathy, gr 1 urinary leakage. Past Medical History:   Diagnosis Date    Arthritis     Constipation     Diabetes ([address]     Hemorrhoids     Hiatal hernia     High cholesterol     Hypertension     Pancreatic cancer ([address]       Past Surgical History:   Procedure Laterality Date    HX KNEE ARTHROSCOPY Right     HX ORTHOPAEDIC      left wrist surgery    HX TONSILLECTOMY        Social History     Tobacco Use    Smoking status: Never Smoker    Smokeless tobacco: Never Used   Substance Use Topics    Alcohol use:  Yes     Alcohol/week: 2.0 - 4.0 standard drinks     Types: 1 - 2 Cans of beer, 1 - 2 Shots of liquor per week     Frequency: 2-4 times a month     Drinks per session: 1 or 2     Comment: minimal      Family History   Problem Relation Age of Onset    Cancer Mother         skin    Hypertension Mother     Heart Disease Mother     Cancer Father         skin    Heart Disease Father     Stroke Father     Diabetes Neg Hx      Current Outpatient Medications   Medication Sig    tbo-filgrastim (GRANIX) 480 mcg/0.8 mL syrg injection 1 syringe subq on days 1-2, 24 hours after chemotherapy.  TRESIBA FLEXTOUCH U-200 200 unit/mL (3 mL) inpn 10-12 UNITS BY SUBCUTANEOUS ROUTE DAILY. OR AS DIRECTED UP TO 60 UNITS DAILY    glucose blood VI test strips (ACCU-CHEK RICHIE PLUS TEST STRP) strip Accu-Chek Richie Plus test strips   Check BS BID    OTHER Cranial prosthesis    Dx C25.7    omeprazole (PRILOSEC) 20 mg capsule Take 20 mg by mouth daily.  simvastatin (ZOCOR) 10 mg tablet Take 10 mg by mouth nightly.  telmisartan (MICARDIS) 80 mg tablet Take 80 mg by mouth daily.  ondansetron hcl (ZOFRAN) 8 mg tablet Take 1 Tab by mouth every eight (8) hours as needed for Nausea.  lidocaine-prilocaine (EMLA) topical cream Apply  to affected area as needed for Pain.  IBUPROFEN IB PO Take  by mouth as needed. No current facility-administered medications for this visit.       Facility-Administered Medications Ordered in Other Visits   Medication Dose Route Frequency    PACLitaxel-Protein Bound (ABRAXANE) 245 mg chemo infusion  245 mg IntraVENous ONCE    gemcitabine (GEMZAR) 1,960 mg in 0.9% sodium chloride 250 mL, overfill volume 25 mL chemo infusion  1,960 mg IntraVENous ONCE    prochlorperazine (COMPAZINE) with saline injection 10 mg  10 mg IntraVENous Q6H PRN    dexamethasone (DECADRON) 4 mg/mL injection 8 mg  8 mg IntraVENous ONCE    0.9% sodium chloride infusion  25 mL/hr IntraVENous ONCE      Allergies   Allergen Reactions    Amoxicillin Hives        Review of Systems: A comprehensive review of systems was performed and all systems were negative except for HPI and for the symptom review form, reviewed and scanned in. Physical Exam:     Visit Vitals  /79   Pulse 65   Temp 96.1 °F (35.6 °C) (Temporal)   Resp 18   Ht 5' 7\" (1.702 m)   Wt 182 lb 12.8 oz (82.9 kg)   SpO2 98%   BMI 28.63 kg/m²     ECOG PS: 0  General: No distress  Eyes: PERRLA, anicteric sclerae  HENT: Atraumatic, OP clear  Neck: Supple  Lymphatic: No cervical, supraclavicular, or inguinal adenopathy  Respiratory: CTAB, normal respiratory effort  CV: Normal rate, regular rhythm, no murmurs, 2+ LE edema bilateral LE  GI: Soft, nontender, nondistended, no masses, no hepatomegaly, no splenomegaly  MS: Normal gait and station. Digits without clubbing or cyanosis. Skin: No rashes, ecchymoses, or petechiae. Normal temperature, turgor, and texture. Psych: Alert, oriented, appropriate affect, normal judgment/insight        Results:     Lab Results   Component Value Date/Time    WBC 5.2 09/11/2019 10:18 AM    HGB 8.3 (L) 09/11/2019 10:18 AM    HCT 25.7 (L) 09/11/2019 10:18 AM    PLATELET 238 (H) 23/78/2014 10:18 AM    MCV 94.5 09/11/2019 10:18 AM    ABS. NEUTROPHILS 3.4 09/11/2019 10:18 AM     Lab Results   Component Value Date/Time    Sodium 138 09/11/2019 10:18 AM    Potassium 4.0 09/11/2019 10:18 AM    Chloride 106 09/11/2019 10:18 AM    CO2 27 09/11/2019 10:18 AM    Glucose 181 (H) 09/11/2019 10:18 AM    BUN 7 09/11/2019 10:18 AM    Creatinine 0.65 09/11/2019 10:18 AM    GFR est AA >60 09/11/2019 10:18 AM    GFR est non-AA >60 09/11/2019 10:18 AM    Calcium 7.9 (L) 09/11/2019 10:18 AM    Glucose (POC) 168 (H) 04/15/2019 10:44 AM     Lab Results   Component Value Date/Time    Bilirubin, total 0.5 09/11/2019 10:18 AM    ALT (SGPT) 14 09/11/2019 10:18 AM    AST (SGOT) 13 (L) 09/11/2019 10:18 AM    Alk.  phosphatase 137 (H) 09/11/2019 10:18 AM    Protein, total 5.3 (L) 09/11/2019 10:18 AM    Albumin 2.9 (L) 09/11/2019 10:18 AM    Globulin 2.4 09/11/2019 10:18 AM     10/1/18 CT abd  There is a 3.8 x 1.9 cm mass involving the uncinate process and possibly  invading the duodenum. Findings are nonspecific but typical of pancreatic  carcinoma. There is no biliary or pancreatic ductal dilatation.     There is a poorly defined irregular hypodensity in segment IVb of the liver  measuring 3.7 x 1.9 cm. There appears to be focal biliary dilatation in the left  lobe behind this abnormality. Metastatic disease is suspected. There is a small,  1 cm hypodensity in the dome of the liver, likely segment 8. There is a 1 cm  hypodensity in segment 4 of the liver as well, also likely metastasis. Small  hypodensity measuring less than 1 cm in segment 6 at the tip laterally is also  nonspecific but probable metastasis.     Spleen kidneys and adrenals are unremarkable.     No free fluid or focal fluid collection.     Lung bases are clear.     Bone windows are unremarkable.     IMPRESSION:  1. 3.8 x 1.9 cm mass involving the uncinate process of the pancreas and possibly  invading the duodenum, this likely represents pancreatic carcinoma. 2. Likely metastatic disease in the liver. There is a 3.8 x 1.9 cm mass involving the uncinate process and possibly  invading the duodenum. Findings are nonspecific but typical of pancreatic  carcinoma. There is no biliary or pancreatic ductal dilatation.     There is a poorly defined irregular hypodensity in segment IVb of the liver  measuring 3.7 x 1.9 cm. There appears to be focal biliary dilatation in the left  lobe behind this abnormality. Metastatic disease is suspected. There is a small,  1 cm hypodensity in the dome of the liver, likely segment 8. There is a 1 cm  hypodensity in segment 4 of the liver as well, also likely metastasis.  Small  hypodensity measuring less than 1 cm in segment 6 at the tip laterally is also  nonspecific but probable metastasis.     Spleen kidneys and adrenals are unremarkable.     No free fluid or focal fluid collection.     Lung bases are clear.     Bone windows are unremarkable.     IMPRESSION:  1. 3.8 x 1.9 cm mass involving the uncinate process of the pancreas and possibly  invading the duodenum, this likely represents pancreatic carcinoma. 2. Likely metastatic disease in the liver. Records reviewed and summarized above. Pathology report(s) reviewed above. Radiology report(s) reviewed above. MRI abdomen 10/22/18: IMPRESSION:       1. Pancreatic uncinate process mass suspicious for pancreatic neoplasm, possibly  adenocarcinoma. Mass abuts the superior mesenteric artery with about 20%  circumference involvement but no luminal distortion or perivascular stranding. 2. Multiple hepatic lesions suspicious for metastatic neoplasm with segment IVb  lesion showing patchy areas of surrounding steatosis likely secondary to locally  altered intrahepatic hemodynamics and likely responsible for biopsy result. 3. Cholecystolithiasis and small gallbladder polyp. CT c/a/p 12/28/18: IMPRESSION:  Interval decrease in size of pancreatic mass. Slight interval decrease in size of hepatic metastases; these now demonstrate central low attenuation suggestive of necrosis. No evidence of new metastatic disease in the chest, abdomen, or pelvis. CT c/a/p 2/25/19:  LIVER: The lesions described on the prior examination have improved in the  interval. 19 mm lesion in the dome of the liver now measures 1 cm. 2.0 x 1.5 cm  lesion in the left hepatic lobe now measures 1.3 x 1.2 cm. Other smaller lesions  are no longer visualized. GALLBLADDER: Unremarkable. SPLEEN: No mass. PANCREAS: Mass lesion in the uncinate process now measures 1.7 x 2.0 cm,  previously measuring 2.0 x 3.0 cm. ADRENALS: Unremarkable. KIDNEYS: No mass, calculus, or hydronephrosis. STOMACH: Unremarkable. SMALL BOWEL: No dilatation or wall thickening. COLON: No dilatation or wall thickening. APPENDIX: Unremarkable.   PERITONEUM: No ascites or pneumoperitoneum. RETROPERITONEUM: No lymphadenopathy or aortic aneurysm. REPRODUCTIVE ORGANS: Intrauterine device projects in satisfactory position. URINARY BLADDER: No mass or calculus. BONES: Degenerative changes are seen in the thoracic and lumbar spine. ADDITIONAL COMMENTS: N/A     IMPRESSION  IMPRESSION:  Interval decrease in the size of the hepatic metastases. Decrease in the size of  the mass lesion involving the uncinate process.     RECIST:  Pancreatic uncinate mass: Current: (68) 1.7X2.0cm     12/28/2018: (71) 3.0 x  2.0 cm   Segment 4B liver mass: Current: (56) 1.3 x 1.2 cm 12/28/2018: (57) 2.0 x 1.5 cm   Segment 5 liver mass: Current: (68) 4 x 7 mm 12/28/2018: (70) 1.4 x 0.9 cm    CXR for port study 4/15/19:   IMPRESSION:  Right internal jugular chest Port-A-Cath is seen with the catheter terminating  in the mid SVC. The port is slightly angled medially due to breast tissue. The  port is not flipped. Access needle appears to be within the port hub. Nursing  staff was able to get good blood return and flush the port without difficulty. No intervention was performed. CT a/p 4/22/19:  IMPRESSION:  1. Slight decrease in size of liver metastases, detailed above. 2. Slight decrease in size in uncinate process mass. 3. No evidence for new metastatic disease.     RECIST:  Uncinate process mass: 15 x 9 mm, image 56, previously 17 x 11 mm. Segment IVb metastasis 11 x 9 mm, image 50, previously 13 x 12 mm  Segment 5 metastasis: Barely detectable, image 62    CT c/a/p 6/17/19: IMPRESSION:  1. Stable lesion in the uncinate process. 2. Slight interval decrease in one of the 2 small liver lesions while the other  remains stable. 3. Stable small nodules right upper lobe.   4. No new evidence of metastatic disease or acute abnormality.     RECIST:  Uncinate process mass: 15 x 9 mm, image 68, previously 15 x 9 mm, image 56  Segment IVb metastasis 11 x 9 mm, image 56, previously 11 x 9 mm, image 50  Segment 5 metastasis: No longer visualized, previously barely detectable, image  62    CT c/a/p 8/12/19:  IMPRESSION:  Stable mass lesion in the uncinate process. Stable liver lesions. Stable  subpleural nodules right upper lobe. No new evidence of metastatic disease or acute abnormality.     RECIST:  Uncinate process mass: 15 x 9 mm, image 69, previously 15 x 9 mm, image 68   Segment IVb metastasis 11 x 10 mm, image 56, previously 11 x 10 mm, image 56   Segment 5 metastasis: Not visualized     Assessment/plan:   1. Uncinate pancreatic adenocarcinoma,  mets to liver, stage IV:  cT2 cN0 pM1    Discussed that while this is treatable, it is not curable, the goal of therapy is palliative. Discussed that without therapy, life expectancy would be 3-6 months, with therapy 12-18 months on average. As an example of likely chemotherapy, we discussed the risks and benefits of Gemcitabine and Abraxane chemotherapy. Potential side effects include, but are not limited to, nausea, vomiting, diarrhea, constipation, mucositis, taste changes, myelosuppression, infection, fatigue, alopecia, skin and nail changes, pulmonary toxicity, neuropathy, allergic reactions, infertility, and rarely, death. Discussed the [id number] study and she signed informed consent. · Gemcitabine (1000 mg/m2) and Abraxane (125 mg/m2) given on days 1,8,15 every 28 days. · Labs: CBC, BMP prior to each treatment. Hepatic function panel every 4 weeks. CA 19.9 prior to day 1  · Antiemetic prophylaxis: Dexamethasone prior to each treatment  · PRN antiemetics: Ondansetron and Prochlorperazine at home  · EMLA cream for port    On the control arm, C12d1 today, will see her back in 2 weeks for C12D15. CT a/p on 8/12/19 stable. We will plan to see the patient in follow up at least once per cycle, or sooner if symptoms warrant. 2. DM2:  Holding metformin; on insulin; saw Dr. Roman Pallas; her BS are improving; she is now taking less insulin; Hgb A1C 6.8    3. Emotional well being:  She has excellent support and is coping well with her disease    4. Abdominal pain:  Intermittent, may be due to constipation;  palliative care has seen    5. Nutrition:  Tracey Elizabeth RD has met with her; holding on enzymes    6. Insomnia: Ongoing but no worse. Currently taking melatonin. She has lunesta if needed. 7. Anemia:  Due to chemo and disease; monitor; iron profile and ferritin normal; gave injectafer 750 mg IV on 5/22/19. Hgb 8.3 today. Iron sat 18% and Ferritin 386 on 8/28/19. Will continue to monitor. 8. Neutropenia:  Due to chemo, started granix 480 mcg for 4 days following chemo with C1D15. Does report some pain with granix. Tried claritin, however this kept her awake. Recommend trying tylenol and advil PRN. Previously at 2 days following chemo but now only 1 day following     9. Constipation:  Resolved for now so she is holding off on her stool softener, but she continues with Miralax. 10. Sinus congestion: Intermittent but has improved. Clear/bloody sinus drainage. Recommend OTC sudafed and saline spray. She has seen Dr. Cherry Ruano, ENT, s/p steroids. On flonase. 11. Dehydration: She is drinking more fluids and has not had issues with dizziness. 12. Fatigue: Due to treatment. Ongoing. [facility] information given. Received acupuncture on 8/5/19, scheduled again in 9/2019. S/p 1L IV hydration. Will monitor. 13. LE edema: Does improve with elevation. BLE dopplers on 8/15/19 negative.        14. Neuropathy:  Due to chemo; worse in feet; will start 30 mg daily of cymbalta        Signed By: Zee Hobson MD

## 2019-09-17 NOTE — PROGRESS NOTES
Palliative Medicine and Integrative Medicine Acupuncture Note    Date Current Visit: 9/17/2019 visit 2   Date Initial Visit: 8/6/19  Requesting Physician: Joe Fernández and Issa Dee     Summary: Fatigue due to chemotherapy and neoplasm  Neuropathy in feet    Subjective: Dominga Greer is a 69 y.o. female with DM, stage IV pancreatic adenocarcinoma dx 10/2018 undergoing chemo w/ Dr Joe Fernández. She is tolerating tx fairly well, with mild nausea controlled w/ prn antiemetics, mild neuropathy - not painful, some numbness in fingers and toes, and fatigue. Fatigue is her largest complaint today- can still do her ADL and IADLs, but is used to being active and has to pace herself. Has some mild congestion, tearing eyes due to environmental factors. INTERVAL HX  ~~~~~~~~~~~    9/17/19- Pt had some LE swelling after first session, see my phone conversation. Do not know of any link between tx provided and swelling. Improves w/ elevation, neg dopplers. Still wants to proceed w/ tx today, as w/ each chemo (Gemcitabine and Abraxane) incr fatigue and neuropathy in toes- numbness, decr proprioception. Was sitting at the vet w/ her friend and her dog (undergoing chemo soon) and almost tripped getting up. Being more careful, no falls. Saw Dr Joe Fernández 9/11/19 and was prescribed Cymbalta, to start soon. Past Medical History:   Past Medical History:   Diagnosis Date    Arthritis     Constipation     Diabetes ([address]     Hemorrhoids     Hiatal hernia     High cholesterol     Hypertension     Pancreatic cancer ([address]         Past Surgical history:   Past Surgical History:   Procedure Laterality Date    HX KNEE ARTHROSCOPY Right     HX ORTHOPAEDIC      left wrist surgery    HX TONSILLECTOMY          Family History:.   Family History   Problem Relation Age of Onset   Rory Notice Cancer Mother         skin    Hypertension Mother     Heart Disease Mother     Cancer Father         skin    Heart Disease Father     Stroke Father     Diabetes Neg Hx          ROS:   ROS    The following systems were reviewed: constitutional, ears/nose/mouth/throat, respiratory, gastrointestinal, musculoskeletal, neurologic, psychiatric, endocrine. Positive findings noted below. +nausea w/ chemo, none now  +fatigue     Social history:   Social History     Socioeconomic History    Marital status:      Spouse name: Not on file    Number of children: Not on file    Years of education: Not on file    Highest education level: Not on file   Occupational History    Not on file   Social Needs    Financial resource strain: Not on file    Food insecurity:     Worry: Not on file     Inability: Not on file    Transportation needs:     Medical: Not on file     Non-medical: Not on file   Tobacco Use    Smoking status: Never Smoker    Smokeless tobacco: Never Used   Substance and Sexual Activity    Alcohol use: Yes     Alcohol/week: 2.0 - 4.0 standard drinks     Types: 1 - 2 Cans of beer, 1 - 2 Shots of liquor per week     Frequency: 2-4 times a month     Drinks per session: 1 or 2     Comment: minimal    Drug use: No    Sexual activity: Never   Lifestyle    Physical activity:     Days per week: Not on file     Minutes per session: Not on file    Stress: Not on file   Relationships    Social connections:     Talks on phone: Not on file     Gets together: Not on file     Attends Yarsanism service: Not on file     Active member of club or organization: Not on file     Attends meetings of clubs or organizations: Not on file     Relationship status: Not on file    Intimate partner violence:     Fear of current or ex partner: Not on file     Emotionally abused: Not on file     Physically abused: Not on file     Forced sexual activity: Not on file   Other Topics Concern    Not on file   Social History Narrative    Lives in [facility]. Has one son.  since 2007. Used to stay home with her son and fixed things around the house. Likes to garden. Physical Exam:         Labs reviewed from 9/11/19, Stable scans 8/12/19    Objective:     General appearance:  No apparent distress, well nourished, well groomed   HEENT: Pupils equal, nares patent    Lungs: Unlabored    Abd:  Soft    Skin: Warm, dry   Psych:  Normal affect, appropriate   Msk Strength intact, gait stable   Neuro Decr proprioception b/l toes L>R                       Assessment and Plan:     No diagnosis found. Treatment Plan:   -Goals: Improved pain, function   -Type of acupuncture and number of treatments planned   -Other modalities or referrals       Acupuncture treatment performed after written and verbal consent obtained. Patient aware that risk include, but are not limited to: pain during needle insertion; bleeding/bruising; infections; internal organ perforation. All questions were answered. Time out performed immediately prior to the start of the procedure. Patient identified by name and date of birth and agreement on procedure being performed was verified. ~~~~~~~~~~~~~~~~~~~~    1. Peripheral neuropathy due to chemotherapy, LE>UE:    -B/l Ba Adeel, LR 3, ST 41 at 4 Hz x 20 min- tolerates well  -B/l LI4, ST40, ST 3    ~~~~~~~~~~~~~~~~~~~~~~~~~~~~    Pt tolerated procedure well without apparent complications. Pt advised to avoid strenuous sexual activity, exercise, heavy meals, alcohol for the next 24 hours. Aware that rebound effect may occur following treatment but that should improve back to baseline in several days.        Follow up:    Prn-

## 2019-09-18 NOTE — PROGRESS NOTES
Pt in for labs and chemo. This is C12D8 of treatment. Patient reports the following adverse events at this time: gr 1 loss of appetite, gr 2 bleeding, gr 1 constipation, gr 1 fatigue, gr 2 hair loss, gr 1 insomnia, gr 1 sob, gr 1 neuropathy, gr 1 urinary leakage. Vital signs are stable. Patient to receive gem/abraxane. Next appt is scheduled for 9/25/19.

## 2019-09-23 NOTE — PATIENT INSTRUCTIONS
1) Restart hctz 12.5 mg and take 1/2 tab daily to help with blood pressure and swelling. 2) On chemo days, just take 50 units of tresiba. On all other day as long as your sugar is under 150, you won't need any tresiba but if over this value, then take 10 units.

## 2019-09-23 NOTE — PROGRESS NOTES
Chief Complaint   Patient presents with    Diabetes     pcp and pharmacy confirmed     History of Present Illness: Camila Prieto is a 69 y.o. female here for follow up of diabetes. Weight down 17 lbs since last visit in 3/19. Her appetite has not been as good the past few months to explain the weight loss. She contacted us in 8/19 about having low sugars under 80 after taking the 70 units of tresiba on chemo days and we cut back to 60 units and this has helped but still can have readings under 80 the 1-2 days after chemo with taking this dose. She has been taking 10 units of tresiba if her sugar is over 130 on non-chemo days and ends up doing this about 1-2 times a week some weeks and other weeks 4-5 times a week. If she takes 60 units of tresiba on chemo days and then 10 units the next day for a sugar over 130, has seen readings in the 70s the following 2 days. She was told by Dr. Jefry Irwin to stop the hctz this summer to avoid dehydration but has had more leg swelling and higher BP readings off this so we agreed to restart just 1/2 tab daily. She was prescribed cymbalta for neuropathy but hasn't yet picked this up and plans to start this week. Has had Hgb down to 8.3 recently and is having more fatigue. Due for next CT scan 1st of October but last one in 8/19 was stable from June. Current Outpatient Medications   Medication Sig    ondansetron hcl (ZOFRAN) 8 mg tablet Take 1 Tab by mouth every eight (8) hours as needed for Nausea.  tbo-filgrastim (GRANIX) 480 mcg/0.8 mL syrg injection 1 syringe subq on days 1-2, 24 hours after chemotherapy.  lidocaine-prilocaine (EMLA) topical cream Apply  to affected area as needed for Pain.  TRESIBA FLEXTOUCH U-200 200 unit/mL (3 mL) inpn 10-12 UNITS BY SUBCUTANEOUS ROUTE DAILY. OR AS DIRECTED UP TO 60 UNITS DAILY    IBUPROFEN IB PO Take  by mouth as needed.     glucose blood VI test strips (ACCU-CHEK CARY PLUS TEST STRP) strip Accu-Chek Cary Plus test strips   Check BS BID    OTHER Cranial prosthesis    Dx C25.7    omeprazole (PRILOSEC) 20 mg capsule Take 20 mg by mouth daily.  simvastatin (ZOCOR) 10 mg tablet Take 10 mg by mouth nightly.  telmisartan (MICARDIS) 80 mg tablet Take 80 mg by mouth daily.  DULoxetine (CYMBALTA) 30 mg capsule Take 1 Cap by mouth daily. No current facility-administered medications for this visit. Facility-Administered Medications Ordered in Other Visits   Medication Dose Route Frequency    [START ON 9/25/2019] PACLitaxel-Protein Bound (ABRAXANE) 250 mg chemo infusion  250 mg IntraVENous ONCE    [START ON 9/25/2019] gemcitabine (GEMZAR) 1,990 mg in 0.9% sodium chloride 250 mL, overfill volume 25 mL chemo infusion  1,990 mg IntraVENous ONCE    [START ON 9/25/2019] prochlorperazine (COMPAZINE) with saline injection 10 mg  10 mg IntraVENous Q6H PRN    [START ON 9/25/2019] 0.9% sodium chloride infusion  25 mL/hr IntraVENous ONCE    [START ON 9/25/2019] dexamethasone (DECADRON) 4 mg/mL injection 8 mg  8 mg IntraVENous ONCE     Allergies   Allergen Reactions    Amoxicillin Hives     Review of Systems:  - Eyes: no blurry vision or double vision  - Cardiovascular: no chest pain  - Respiratory: no shortness of breath  - Musculoskeletal: no myalgias  - Neurological: (+) numbness/tingling in extremities    Physical Examination:  Blood pressure 146/75, pulse 79, height 5' 7\" (1.702 m), weight 177 lb 3.2 oz (80.4 kg).   - General: pleasant, no distress, good eye contact   - Neck: no carotid bruits  - Cardiovascular: regular, normal rate, nl s1 and s2, no m/r/g,   - Respiratory: clear bilaterally  - Integumentary: no edema,   - Psychiatric: normal mood and affect    Data Reviewed:   Component      Latest Ref Rng & Units 9/11/2019 7/3/2019          10:18 AM 12:54 PM   Sodium      136 - 145 mmol/L 138    Potassium      3.5 - 5.1 mmol/L 4.0    Chloride      97 - 108 mmol/L 106    CO2      21 - 32 mmol/L 27    Anion gap 5 - 15 mmol/L 5    Glucose      65 - 100 mg/dL 181 (H)    BUN      6 - 20 MG/DL 7    Creatinine      0.55 - 1.02 MG/DL 0.65    BUN/Creatinine ratio      12 - 20   11 (L)    GFR est AA      >60 ml/min/1.73m2 >60    GFR est non-AA      >60 ml/min/1.73m2 >60    Calcium      8.5 - 10.1 MG/DL 7.9 (L)    Bilirubin, total      0.2 - 1.0 MG/DL 0.5    ALT (SGPT)      12 - 78 U/L 14    AST      15 - 37 U/L 13 (L)    Alk. phosphatase      45 - 117 U/L 137 (H)    Protein, total      6.4 - 8.2 g/dL 5.3 (L)    Albumin      3.5 - 5.0 g/dL 2.9 (L)    Globulin      2.0 - 4.0 g/dL 2.4    A-G Ratio      1.1 - 2.2   1.2    Hemoglobin A1c, (calculated)      4.2 - 6.3 %  6.8 (H)   Est. average glucose      mg/dL  148       Assessment/Plan:     1. Uncontrolled type 2 diabetes mellitus without complication, with long-term current use of insulin ([facility]): her most recent Hgb A1c was 6.8% in 7/19 up from 6% in 3/19 down from 9.7% in 12/18 down from 12% in 9/18 when recently diagnosed with full blown diabetes due to newly diagnosed metastatic pancreatic cancer. Her blood sugars are still doing well but will decrease the tresiba on chemo days to avoid hypoglycemia the day or two after chemo and also will raise her threshold for taking insulin on non-chemo days  - take 10 units of tresiba on non-chemo days only if BS > 150 and take 50 units on chemo days  - take novolog 3 units only if bs > 200  - check bs once daily  - foot exam done 10/18  - will need eye exam in the future  - microalbumin nl 3/19  - check A1c by POC at next visit          2. Hypertension: her BP was above goal < 140/90 off hctz so will restart at a lower dose  - cont micardis 80 mg daily  - restart hctz 12.5 mg 1/2 tab daily    3. Hyperlipidemia: Goal LDL < 100.   LDL 66 in 12/18 on simva 10 daily  - cont simvastatin 10 mg daily  - check lipids at next visit        Patient Instructions   1) Restart hctz 12.5 mg and take 1/2 tab daily to help with blood pressure and swelling. 2) On chemo days, just take 50 units of tresiba. On all other day as long as your sugar is under 150, you won't need any tresiba but if over this value, then take 10 units. Follow-up and Dispositions    · Return in about 6 months (around 3/23/2020).                Copy sent to:  Reymundo Carmona MD as PCP - General (Internal Medicine)  Annamaria Martinez MD (Hematology and Oncology)

## 2019-09-25 NOTE — PROGRESS NOTES
[facility] - Chemotherapy Progress Note 
 
1000 Pt admit to [place] for Clinical Trial; Gemzar/Abraxane ambulatory in stable condition. Assessment completed. No new concerns voiced. PAC with positive blood return. Chemotherapy Flowsheet 9/25/2019 Cycle C 12 D 8 Date 9/25/2019 Drug / Regimen Gemzar/Abraxane Pre Meds -  
Notes - Visit Vitals /73 Pulse 76 Temp 97.9 °F (36.6 °C) Resp 18 Ht 5' 7\" (1.702 m) Wt 80.4 kg (177 lb 3.2 oz) BMI 27.75 kg/m² Pt declines pregnancy Labs obtained and patient proceeded to scheduled MD appointment. Medications: 
Dexamethasone ivp Abraxane iv Gemzar iv 
 
1515 Pt tolerated treatment well. PAC maintained positive blood return throughout treatment, flushed with positive blood return at conclusion and throughout treatment. D/c home ambulatory in no distress. Pt aware of next appointment scheduled for 10/9/19 Recent Results (from the past 12 hour(s)) CBC WITH AUTOMATED DIFF Collection Time: 09/25/19 10:17 AM  
Result Value Ref Range WBC 3.1 (L) 3.6 - 11.0 K/uL  
 RBC 2.64 (L) 3.80 - 5.20 M/uL HGB 7.9 (L) 11.5 - 16.0 g/dL HCT 24.9 (L) 35.0 - 47.0 % MCV 94.3 80.0 - 99.0 FL  
 MCH 29.9 26.0 - 34.0 PG  
 MCHC 31.7 30.0 - 36.5 g/dL  
 RDW 19.7 (H) 11.5 - 14.5 % PLATELET 704 369 - 609 K/uL MPV 9.9 8.9 - 12.9 FL  
 NRBC 0.0 0  WBC ABSOLUTE NRBC 0.00 0.00 - 0.01 K/uL NEUTROPHILS 81 (H) 32 - 75 % LYMPHOCYTES 11 (L) 12 - 49 % MONOCYTES 4 (L) 5 - 13 % EOSINOPHILS 0 0 - 7 % BASOPHILS 1 0 - 1 % IMMATURE GRANULOCYTES 3 (H) 0.0 - 0.5 % ABS. NEUTROPHILS 2.6 1.8 - 8.0 K/UL  
 ABS. LYMPHOCYTES 0.3 (L) 0.8 - 3.5 K/UL  
 ABS. MONOCYTES 0.1 0.0 - 1.0 K/UL  
 ABS. EOSINOPHILS 0.0 0.0 - 0.4 K/UL  
 ABS. BASOPHILS 0.0 0.0 - 0.1 K/UL  
 ABS. IMM. GRANS. 0.1 (H) 0.00 - 0.04 K/UL  
 DF SMEAR SCANNED    
 RBC COMMENTS ANISOCYTOSIS 1+ 
    
LD  Collection Time: 09/25/19 10:17 AM  
 Result Value Ref Range  81 - 246 U/L  
SAMPLES BEING HELD Collection Time: 09/25/19 10:17 AM  
Result Value Ref Range SAMPLES BEING HELD 1SST COMMENT Add-on orders for these samples will be processed based on acceptable specimen integrity and analyte stability, which may vary by analyte.

## 2019-09-25 NOTE — PROGRESS NOTES
Pt in for labs and follow up visit today per protocol with Dr. Daneil Cabot and RN. This is C12D15 of treatment. Patient reports the following adverse events at this time: gr 2 loss of appetite, gr 1 bleeding, gr 1 constipation, gr 1 fatigue, gr 2 hair loss, gr 1 insomnia, gr 1 concentration, gr 1 sob, gr 1 neuropathy, gr 1 urinary leakage. Vital signs are stable. Patient to go to infusion center after appointment to receive gem/abraxane. Next appt is scheduled for 10/9/19. Scans 10/7/19.

## 2019-09-30 NOTE — TELEPHONE ENCOUNTER
[facility] at [address], [address] [facility] [address] [facility]  Shirley Hails: [phone]  F: [phone]    Medical Nutrition Therapy      Telephone Encounter:  Called and spoke with Ms. Strange regarding taste changes. She reports recently the taste of food was altered that it caused her to lose weight. She is now making more of an effort to eat to prevent weight loss. She reports no hunger cues but still making an effort for eat despite appetite. She is drinking an Ensure several times a week, not daily. Results:   Pancreatic cancer   Chemotherapy Flowsheet 9/25/2019   Cycle C 12 D 8   Date 9/25/2019   Drug / Regimen Gemzar/Abraxane   Pre Meds -   Notes -     Wt Readings from Last 9 Encounters:   09/25/19 177 lb 3.2 oz (80.4 kg)   09/25/19 177 lb 3.2 oz (80.4 kg)   09/23/19 177 lb 3.2 oz (80.4 kg)   09/18/19 185 lb 8 oz (84.1 kg)   09/11/19 182 lb 12.8 oz (82.9 kg)   09/11/19 182 lb 12.8 oz (82.9 kg)   08/28/19 184 lb 11.2 oz (83.8 kg)   08/28/19 184 lb 11.2 oz (83.8 kg)   08/21/19 183 lb 6.4 oz (83.2 kg)       Estimated Nutrition Needs:   Calorie Range: 1800-2000kcal/day    Protein Range: 70-80g/day     Fluid Needs: 2000ml     Assessment:   Involuntary weight loss related to decreased intake as evidence by weight loss of 7# x 1 month. Plan:   · Maintain good oral hygiene. · Avoid mouthwashes or rinses containing alcohol. · Eater cooler foods rather than warm or hot foods or at room temperature. · Try to improve flavor by using marinades, salad dressing, herbs, spices, vinegar and sweeteners. · Try tart foods and drinks. · Use plastic utensils if metallic taste are a problem. · Avoid foods that come from a can or metal container. · Consume supplements - try for 1 per day     I appreciate the opportunity to participate in Ms. Ginny Strange's care.     Signed By: Griselda Pagan, 68 [address], [address] [place] [address] , [address]     Contact: [phone]

## 2019-10-08 NOTE — PROGRESS NOTES
Palliative Medicine and Integrative Medicine Acupuncture Note    Date Current Visit: 10/8/2019 visit 3   Date Initial Visit: 8/6/19  Requesting Physician: Carlos Varma and Leighann Henson     Summary: Fatigue due to chemotherapy and neoplasm  Neuropathy in feet    Subjective: Christiano Juárez is a 69 y.o. female with DM, stage IV pancreatic adenocarcinoma dx 10/2018 undergoing chemo w/ Dr Carlos Varma. She is tolerating tx fairly well, with mild nausea controlled w/ prn antiemetics, mild neuropathy - not painful, some numbness in fingers and toes, and fatigue. Fatigue is her largest complaint today- can still do her ADL and IADLs, but is used to being active and has to pace herself. Has some mild congestion, tearing eyes due to environmental factors. INTERVAL HX  ~~~~~~~~~~~    10/8/19Liam Haven had CT A/P,saw Dr Carlos Varma recently. Could not tolerate Cymbalta- caused nausea and insomnia at low dose. Feels that the sensation in her feet improved w/ BaFeng last session. Also restarted HCTZ at low dose and the improvement in LE swelling improves neuropathy as well. Her friend's dog is doing well on chemo. 9/17/19- Pt had some LE swelling after first session, see my phone conversation. Do not know of any link between tx provided and swelling. Improves w/ elevation, neg dopplers. Still wants to proceed w/ tx today, as w/ each chemo (Gemcitabine and Abraxane) incr fatigue and neuropathy in toes- numbness, decr proprioception. Was sitting at the vet w/ her friend and her dog (undergoing chemo soon) and almost tripped getting up. Being more careful, no falls. Saw Dr Carlos Varma 9/11/19 and was prescribed Cymbalta, to start soon.      Past Medical History:   Past Medical History:   Diagnosis Date    Arthritis     Constipation     Diabetes ([address]     Hemorrhoids     Hiatal hernia     High cholesterol     Hypertension     Pancreatic cancer [facility])         Past Surgical history:   Past Surgical History:   Procedure Laterality Date    HX KNEE ARTHROSCOPY Right     HX ORTHOPAEDIC      left wrist surgery    HX TONSILLECTOMY          Family History:. Family History   Problem Relation Age of Onset    Cancer Mother         skin    Hypertension Mother     Heart Disease Mother     Cancer Father         skin    Heart Disease Father     Stroke Father     Diabetes Neg Hx          ROS:   ROS    The following systems were reviewed: constitutional, ears/nose/mouth/throat, respiratory, gastrointestinal, musculoskeletal, neurologic, psychiatric, endocrine. Positive findings noted below. +nausea w/ chemo, none now  +fatigue     Social history:   Social History     Socioeconomic History    Marital status:      Spouse name: Not on file    Number of children: Not on file    Years of education: Not on file    Highest education level: Not on file   Occupational History    Not on file   Social Needs    Financial resource strain: Not on file    Food insecurity:     Worry: Not on file     Inability: Not on file    Transportation needs:     Medical: Not on file     Non-medical: Not on file   Tobacco Use    Smoking status: Never Smoker    Smokeless tobacco: Never Used   Substance and Sexual Activity    Alcohol use:  Yes     Alcohol/week: 2.0 - 4.0 standard drinks     Types: 1 - 2 Cans of beer, 1 - 2 Shots of liquor per week     Frequency: 2-4 times a month     Drinks per session: 1 or 2     Comment: minimal    Drug use: No    Sexual activity: Never   Lifestyle    Physical activity:     Days per week: Not on file     Minutes per session: Not on file    Stress: Not on file   Relationships    Social connections:     Talks on phone: Not on file     Gets together: Not on file     Attends [employer] witness service: Not on file     Active member of club or organization: Not on file     Attends meetings of clubs or organizations: Not on file     Relationship status: Not on file    Intimate partner violence:     Fear of current or ex partner: Not on file Emotionally abused: Not on file     Physically abused: Not on file     Forced sexual activity: Not on file   Other Topics Concern    Not on file   Social History Narrative    Lives in [facility] [place] alone. Has one son.  since 2007. Used to stay home with her son and fixed things around the house. Likes to garden. Physical Exam:         Labs reviewed from 9/25/19, CT AP 10/7/19    Objective:     General appearance:  No apparent distress, well nourished, well groomed   HEENT: Pupils equal, nares patent    Lungs: Unlabored    Abd:  Soft    Skin: Warm, dry   Psych:  Normal affect, appropriate   Msk Strength intact, gait stable   Neuro Decr proprioception b/l toes L>R                       Assessment and Plan:     No diagnosis found. Treatment Plan:   -Goals: Improved pain, function   -Type of acupuncture and number of treatments planned   -Other modalities or referrals       Acupuncture treatment performed after written and verbal consent obtained. Patient aware that risk include, but are not limited to: pain during needle insertion; bleeding/bruising; infections; internal organ perforation. All questions were answered. Time out performed immediately prior to the start of the procedure. Patient identified by name and date of birth and agreement on procedure being performed was verified. ~~~~~~~~~~~~~~~~~~~~    1. Peripheral neuropathy due to chemotherapy, LE>UE. Impaired proprioception. Being careful when walks and gets up from sitting. Did not tolerate Cymbalta (nausea and insomnia). -B/l Ba Adeel, LR 3, ST 41 at 4 Hz x 20 min- tolerates well and did well lat session       2. Edema and congestion in sinuses: Bloody phlegm, unable to take nasal corticosteroid. -B/l LI4, ST 40, ST 36, ST 3 aiming towards BL1 and GV24.5     ~~~~~~~~~~~~~~~~~~~~~~~~~~~~    Pt tolerated procedure well without apparent complications.  Pt advised to avoid strenuous sexual activity, exercise, heavy meals, alcohol for the next 24 hours. Aware that rebound effect may occur following treatment but that should improve back to baseline in several days.        Follow up:    Prn-

## 2019-10-09 NOTE — PROGRESS NOTES
10/9/19 12:27 PM: Provided patient with a handout on mFOLFIRINOX chemotherapy regimen. Reviewed handout with patient and provided opportunity for questions and concerns.

## 2019-10-09 NOTE — PROGRESS NOTES
[facility] [place] at [address], [address] [facility] [address] [facility]  Merline Ferris: [phone]  F: [phone]      Reason for Visit:   Konrad Hurtado is a 69 y.o. female who is seen in self-referral for evaluation of pancreatic cancer. Treatment History:   · 10/4/18 pancreatic head mass FNA, pancreatic adenocarcinoma    10/15/18  Liver, Core Biopsy w/Touch Prep CYTOLOGIC INTERPRETATION:   · Numerous cores and fragments of benign hepatic parenchyma     10/24/18  Liver, Fine Needle Aspiration CYTOLOGIC INTERPRETATION:   Metastatic adenocarcinoma (see Comment). General Categorization   Positive for malignancy. Comment: The morphologic appearance is nonspecific with regard to site of origin but compatible with metastatic pancreatic carcinoma in this patient with known pancreatic adenocarcinoma ([id number]). 11/9/18-9/25/19- abraxane/gemcitabine -- PD    History of Present Illness:   She started having abdominal pain, gradual and persistent, x 1 month, pressure sensation, located in epigastrium, no radiation, no aggravating symptoms, no relieving factors. 25 lb weight loss over 6 months.  + nausea. Interval history:  In today for follow up and treatment. Complains of gr 2 loss of appetite, gr 2 constipation, gr 2 fatigue, gr 1 insomnia, gr 1 loss of concentration, gr 1 sob, gr 1 neuropathy, gr 1 urinary leakage    Past Medical History:   Diagnosis Date    Arthritis     Constipation     Diabetes ([address]     Hemorrhoids     Hiatal hernia     High cholesterol     Hypertension     Pancreatic cancer (HCC)       Past Surgical History:   Procedure Laterality Date    HX KNEE ARTHROSCOPY Right     HX ORTHOPAEDIC      left wrist surgery    HX TONSILLECTOMY        Social History     Tobacco Use    Smoking status: Never Smoker    Smokeless tobacco: Never Used   Substance Use Topics    Alcohol use:  Yes     Alcohol/week: 2.0 - 4.0 standard drinks     Types: 1 - 2 Cans of beer, 1 - 2 Shots of liquor per week     Frequency: 2-4 times a month     Drinks per session: 1 or 2     Comment: minimal      Family History   Problem Relation Age of Onset    Cancer Mother         skin    Hypertension Mother     Heart Disease Mother     Cancer Father         skin    Heart Disease Father     Stroke Father     Diabetes Neg Hx      Current Outpatient Medications   Medication Sig    hydroCHLOROthiazide (HYDRODIURIL) 12.5 mg tablet Take 0.5 Tabs by mouth daily. Replaces capsules    tbo-filgrastim (GRANIX) 480 mcg/0.8 mL syrg injection 1 syringe subq on days 1-2, 24 hours after chemotherapy.  TRESIBA FLEXTOUCH U-200 200 unit/mL (3 mL) inpn Inject 50 units on chemo days and 10 units as needed for bs > 150 on non-chemo days    glucose blood VI test strips (ACCU-CHEK RICHIE PLUS TEST STRP) strip Accu-Chek Richie Plus test strips   Check BS BID    OTHER Cranial prosthesis    Dx C25.7    omeprazole (PRILOSEC) 20 mg capsule Take 20 mg by mouth daily.  simvastatin (ZOCOR) 10 mg tablet Take 10 mg by mouth nightly.  telmisartan (MICARDIS) 80 mg tablet Take 80 mg by mouth daily.  DULoxetine (CYMBALTA) 30 mg capsule Take 1 Cap by mouth daily.  ondansetron hcl (ZOFRAN) 8 mg tablet Take 1 Tab by mouth every eight (8) hours as needed for Nausea.  lidocaine-prilocaine (EMLA) topical cream Apply  to affected area as needed for Pain.  IBUPROFEN IB PO Take  by mouth as needed. No current facility-administered medications for this visit.       Facility-Administered Medications Ordered in Other Visits   Medication Dose Route Frequency    PACLitaxel-Protein Bound (ABRAXANE) 250 mg chemo infusion  250 mg IntraVENous ONCE    gemcitabine (GEMZAR) 1,990 mg in 0.9% sodium chloride 250 mL, overfill volume 25 mL chemo infusion  1,990 mg IntraVENous ONCE    prochlorperazine (COMPAZINE) with saline injection 10 mg  10 mg IntraVENous Q6H PRN    0.9% sodium chloride infusion  25 mL/hr IntraVENous ONCE  dexamethasone (DECADRON) 4 mg/mL injection 8 mg  8 mg IntraVENous ONCE      Allergies   Allergen Reactions    Amoxicillin Hives        Review of Systems: A comprehensive review of systems was performed and all systems were negative except for HPI and for the symptom review form, reviewed and scanned in. Physical Exam:     Visit Vitals  /60   Pulse 67   Temp 97.7 °F (36.5 °C) (Temporal)   Resp 18   Ht 5' 7\" (1.702 m)   Wt 175 lb 12.8 oz (79.7 kg)   BMI 27.53 kg/m²     ECOG PS: 0  General: No distress  Eyes: PERRLA, anicteric sclerae  HENT: Atraumatic, OP clear  Neck: Supple  Lymphatic: No cervical, supraclavicular, or inguinal adenopathy  Respiratory: CTAB, normal respiratory effort  CV: Normal rate, regular rhythm, no murmurs, 2+ LE edema bilateral LE  GI: Soft, nontender, nondistended, no masses, no hepatomegaly, no splenomegaly  MS: Normal gait and station. Digits without clubbing or cyanosis. Skin: No rashes, ecchymoses, or petechiae. Normal temperature, turgor, and texture. Psych: Alert, oriented, appropriate affect, normal judgment/insight        Results:     Lab Results   Component Value Date/Time    WBC 5.5 10/09/2019 10:47 AM    HGB 8.8 (L) 10/09/2019 10:47 AM    HCT 27.4 (L) 10/09/2019 10:47 AM    PLATELET 070 (H) 34/70/5068 10:47 AM    MCV 94.5 10/09/2019 10:47 AM    ABS.  NEUTROPHILS PENDING 10/09/2019 10:47 AM     Lab Results   Component Value Date/Time    Sodium 138 09/11/2019 10:18 AM    Potassium 4.0 09/11/2019 10:18 AM    Chloride 106 09/11/2019 10:18 AM    CO2 27 09/11/2019 10:18 AM    Glucose 181 (H) 09/11/2019 10:18 AM    BUN 7 09/11/2019 10:18 AM    Creatinine 0.65 09/11/2019 10:18 AM    GFR est AA >60 09/11/2019 10:18 AM    GFR est non-AA >60 09/11/2019 10:18 AM    Calcium 7.9 (L) 09/11/2019 10:18 AM    Glucose (POC) 168 (H) 04/15/2019 10:44 AM     Lab Results   Component Value Date/Time    Bilirubin, total 0.5 09/11/2019 10:18 AM    ALT (SGPT) 14 09/11/2019 10:18 AM    AST (SGOT) 13 (L) 09/11/2019 10:18 AM    Alk. phosphatase 137 (H) 09/11/2019 10:18 AM    Protein, total 5.3 (L) 09/11/2019 10:18 AM    Albumin 2.9 (L) 09/11/2019 10:18 AM    Globulin 2.4 09/11/2019 10:18 AM     10/1/18 CT abd  There is a 3.8 x 1.9 cm mass involving the uncinate process and possibly  invading the duodenum. Findings are nonspecific but typical of pancreatic  carcinoma. There is no biliary or pancreatic ductal dilatation.     There is a poorly defined irregular hypodensity in segment IVb of the liver  measuring 3.7 x 1.9 cm. There appears to be focal biliary dilatation in the left  lobe behind this abnormality. Metastatic disease is suspected. There is a small,  1 cm hypodensity in the dome of the liver, likely segment 8. There is a 1 cm  hypodensity in segment 4 of the liver as well, also likely metastasis. Small  hypodensity measuring less than 1 cm in segment 6 at the tip laterally is also  nonspecific but probable metastasis.     Spleen kidneys and adrenals are unremarkable.     No free fluid or focal fluid collection.     Lung bases are clear.     Bone windows are unremarkable.     IMPRESSION:  1. 3.8 x 1.9 cm mass involving the uncinate process of the pancreas and possibly  invading the duodenum, this likely represents pancreatic carcinoma. 2. Likely metastatic disease in the liver. There is a 3.8 x 1.9 cm mass involving the uncinate process and possibly  invading the duodenum. Findings are nonspecific but typical of pancreatic  carcinoma. There is no biliary or pancreatic ductal dilatation.     There is a poorly defined irregular hypodensity in segment IVb of the liver  measuring 3.7 x 1.9 cm. There appears to be focal biliary dilatation in the left  lobe behind this abnormality. Metastatic disease is suspected. There is a small,  1 cm hypodensity in the dome of the liver, likely segment 8. There is a 1 cm  hypodensity in segment 4 of the liver as well, also likely metastasis.  Small  hypodensity measuring less than 1 cm in segment 6 at the tip laterally is also  nonspecific but probable metastasis.     Spleen kidneys and adrenals are unremarkable.     No free fluid or focal fluid collection.     Lung bases are clear.     Bone windows are unremarkable.     IMPRESSION:  1. 3.8 x 1.9 cm mass involving the uncinate process of the pancreas and possibly  invading the duodenum, this likely represents pancreatic carcinoma. 2. Likely metastatic disease in the liver. Records reviewed and summarized above. Pathology report(s) reviewed above. Radiology report(s) reviewed above. MRI abdomen 10/22/18: IMPRESSION:       1. Pancreatic uncinate process mass suspicious for pancreatic neoplasm, possibly  adenocarcinoma. Mass abuts the superior mesenteric artery with about 20%  circumference involvement but no luminal distortion or perivascular stranding. 2. Multiple hepatic lesions suspicious for metastatic neoplasm with segment IVb  lesion showing patchy areas of surrounding steatosis likely secondary to locally  altered intrahepatic hemodynamics and likely responsible for biopsy result. 3. Cholecystolithiasis and small gallbladder polyp. CT c/a/p 12/28/18: IMPRESSION:  Interval decrease in size of pancreatic mass. Slight interval decrease in size of hepatic metastases; these now demonstrate central low attenuation suggestive of necrosis. No evidence of new metastatic disease in the chest, abdomen, or pelvis. CT c/a/p 2/25/19:  LIVER: The lesions described on the prior examination have improved in the  interval. 19 mm lesion in the dome of the liver now measures 1 cm. 2.0 x 1.5 cm  lesion in the left hepatic lobe now measures 1.3 x 1.2 cm. Other smaller lesions  are no longer visualized. GALLBLADDER: Unremarkable. SPLEEN: No mass. PANCREAS: Mass lesion in the uncinate process now measures 1.7 x 2.0 cm,  previously measuring 2.0 x 3.0 cm. ADRENALS: Unremarkable.   KIDNEYS: No mass, calculus, or hydronephrosis. STOMACH: Unremarkable. SMALL BOWEL: No dilatation or wall thickening. COLON: No dilatation or wall thickening. APPENDIX: Unremarkable. PERITONEUM: No ascites or pneumoperitoneum. RETROPERITONEUM: No lymphadenopathy or aortic aneurysm. REPRODUCTIVE ORGANS: Intrauterine device projects in satisfactory position. URINARY BLADDER: No mass or calculus. BONES: Degenerative changes are seen in the thoracic and lumbar spine. ADDITIONAL COMMENTS: N/A     IMPRESSION  IMPRESSION:  Interval decrease in the size of the hepatic metastases. Decrease in the size of  the mass lesion involving the uncinate process.     RECIST:  Pancreatic uncinate mass: Current: (68) 1.7X2.0cm     12/28/2018: (71) 3.0 x  2.0 cm   Segment 4B liver mass: Current: (56) 1.3 x 1.2 cm 12/28/2018: (57) 2.0 x 1.5 cm   Segment 5 liver mass: Current: (68) 4 x 7 mm 12/28/2018: (70) 1.4 x 0.9 cm    CXR for port study 4/15/19:   IMPRESSION:  Right internal jugular chest Port-A-Cath is seen with the catheter terminating  in the mid SVC. The port is slightly angled medially due to breast tissue. The  port is not flipped. Access needle appears to be within the port hub. Nursing  staff was able to get good blood return and flush the port without difficulty. No intervention was performed. CT a/p 4/22/19:  IMPRESSION:  1. Slight decrease in size of liver metastases, detailed above. 2. Slight decrease in size in uncinate process mass. 3. No evidence for new metastatic disease.     RECIST:  Uncinate process mass: 15 x 9 mm, image 56, previously 17 x 11 mm. Segment IVb metastasis 11 x 9 mm, image 50, previously 13 x 12 mm  Segment 5 metastasis: Barely detectable, image 62    CT c/a/p 6/17/19: IMPRESSION:  1. Stable lesion in the uncinate process. 2. Slight interval decrease in one of the 2 small liver lesions while the other  remains stable. 3. Stable small nodules right upper lobe.   4. No new evidence of metastatic disease or acute abnormality.     RECIST:  Uncinate process mass: 15 x 9 mm, image 68, previously 15 x 9 mm, image 56  Segment IVb metastasis 11 x 9 mm, image 56, previously 11 x 9 mm, image 50  Segment 5 metastasis: No longer visualized, previously barely detectable, image  62    CT c/a/p 8/12/19:  IMPRESSION:  Stable mass lesion in the uncinate process. Stable liver lesions. Stable  subpleural nodules right upper lobe. No new evidence of metastatic disease or acute abnormality.     RECIST:  Uncinate process mass: 15 x 9 mm, image 69, previously 15 x 9 mm, image 68   Segment IVb metastasis 11 x 10 mm, image 56, previously 11 x 10 mm, image 56   Segment 5 metastasis: Not visualized    CT c/a/p 10/7/19:   IMPRESSION:   1. Uncinate process mass. This is difficult to measure but appears to have  increased in size. A multiphase pancreatic protocol on the next follow-up  examination may be helpful to more precisely define the lesion. 2. Small liver lesions unchanged. 3. No evidence of new metastatic disease. 4. Intrauterine device. RECIST   Malignant neoplasm of other parts of the pancreas.     TARGET LESIONS:      Lesion (description)         Location (series/slice)                Size                                              1. Uncinate process mass    series 3 image 68    2.3 x 2 cm  2. Segment 4B liver lesion    series 3 image 57    10 x 9 mm        NONTARGET LESIONS:   None. Assessment/plan:   1. Uncinate pancreatic adenocarcinoma,  mets to liver, stage IV:  cT2 cN0 pM1    Discussed that while this is treatable, it is not curable, the goal of therapy is palliative. Discussed that without therapy, life expectancy would be 3-6 months, with therapy 12-18 months on average. CT on 10/7/19 with PD > 20% of pancreatic mass. We discussed the risks and benefits of FOLFIRINOX chemotherapy, including potential side effects.  These include but are not limited to fatigue, nausea vomiting, diarrhea, neuropathy, taste changes, cold intolerance, esophageal spasm, allergic reactions, alopecia, mucositis, myelosuppression, risk for infection, infertility, and rarely, death. Rarely, a patient may have a condition where they do not metabolize fluorouracil appropriately (called DPD deficiency), and they may have excessive toxicity. A Port-A-Cath will be required in order to deliver the continuous infusion. The patient has consented to beginning therapy. ·  mFOLFIRINOX (oxaliplatin 85 mg/m2, irinotecan 150mg/m2, leucovorin 400 mg/m2, and a 46 hour infusion of fluorouracil 2400 mg/m2 given every 2 weeks)  · Labs: CBC, BMP, Magnesium prior to each treatment, hepatic function panel every 4 weeks  · Prophylactic antiemetics: Palonosetron and dexamethasone on the day of each chemotherapy infusion. · PRN antiemetics: Ondansetron, Prochlorperazine  · PRN antidiarrheals: Atropine 0.4mg IV every 2 hours PRN during or immediately after irinotecan infusion,  imodium every 2 hours as needed at home  · EMLA cream for port    After this discussion, she is agreeable to mFOLFIRINOX, will start next week. Will have her sign informed consent at the next visit. We will plan to see the patient in follow up at least once per cycle, or sooner if symptoms warrant. 2. DM2:  Holding metformin; on insulin; saw Dr. Annalisa Valdez; her BS are improving; she is now taking less insulin; Hgb A1C 6.8    3. Emotional well being:  She has excellent support and is coping well with her disease    4. Abdominal pain:  Intermittent, may be due to constipation;  palliative care has seen    5. Nutrition:  Effie Fabry RD has met with her; holding on enzymes. She has lost 2 pounds since last visit. Reports taste changes, decreased appetite. Will have Effie Fabry, [address] speak with her. 6. Insomnia: Ongoing but no worse. Currently taking melatonin. She has lunesta if needed.     7. Anemia:  Due to chemo and disease; monitor; iron profile and ferritin normal; gave injectafer 750 mg IV on 5/22/19. Hgb 8.8 today. Iron sat 18% and Ferritin 386 on 8/28/19. Will continue to monitor. 8. Neutropenia:  Due to chemo, will monitor now that changing chemo    9. Constipation:  Resolved for now so she is holding off on her stool softener, but she continues with Miralax. 10. Sinus congestion: Intermittent but has improved. Clear/bloody sinus drainage. Recommend OTC sudafed and saline spray. She has seen Dr. Reece Naqvi, ENT, s/p steroids. On flonase. 11. Dehydration: She is drinking more fluids and has not had issues with dizziness. 12. Fatigue: Due to treatment. Ongoing. [facility] information given. Received acupuncture on 8/5/19 and 9/2019. Next scheduled for 10/8/19. S/p 1L IV hydration. Will monitor. 13. LE edema: Does improve with elevation. BLE dopplers on 8/15/19 negative. Restarted HCTZ at 6/25 mg daily. 14. Neuropathy:  Due to chemo; worse in feet; tried cymbalta 30 mg daily, but had insomnia and nausea; acupuncture has helped    > 40 minutes were spent with this patient with > 50% of that time spent in face to face counseling.         Signed By: Maksim Saleh MD

## 2019-10-09 NOTE — PROGRESS NOTES
[facility] Progress Note Date: 2019 Name: López Concepcion MRN: [id number] : 1945 Treatment Held:  
Received phone call from Dr. Woodrow Natarajan stating that treatment will be held today. Per Dr. Lauryn Rome note, pt will start Folirinox next week and consent will be signed at next visit. (please see Dr. Lauryn Rome note) Ms. Strange Arrived ambulatory and in no distress for cycle 12 day 15 of Gemzar/Abraxane (research pt) regimen. Assessment was completed, no acute issues at this time, no new complaints voiced. Port accessed without difficulty, labs drawn and processed. Chemotherapy Flowsheet 10/9/2019 Cycle [id number] Date 10/9/2019 Drug / Regimen Gemzar/Abraxane Pre Meds -  
Notes -  
 
 
 
Ms. Strange's vitals were reviewed. Patient Vitals for the past 12 hrs: 
 Temp Pulse Resp BP  
10/09/19 1031 97.7 °F (36.5 °C) 67 18 128/60 Urine labs will be collected at next visit. Lab results were obtained and reviewed. Recent Results (from the past 12 hour(s)) CBC WITH AUTOMATED DIFF Collection Time: 10/09/19 10:47 AM  
Result Value Ref Range WBC 5.5 3.6 - 11.0 K/uL  
 RBC 2.90 (L) 3.80 - 5.20 M/uL HGB 8.8 (L) 11.5 - 16.0 g/dL HCT 27.4 (L) 35.0 - 47.0 % MCV 94.5 80.0 - 99.0 FL  
 MCH 30.3 26.0 - 34.0 PG  
 MCHC 32.1 30.0 - 36.5 g/dL RDW 21.3 (H) 11.5 - 14.5 % PLATELET 254 (H) 632 - 400 K/uL MPV 9.6 8.9 - 12.9 FL  
 NRBC 0.0 0  WBC ABSOLUTE NRBC 0.00 0.00 - 0.01 K/uL NEUTROPHILS 73 32 - 75 % LYMPHOCYTES 8 (L) 12 - 49 % MONOCYTES 17 (H) 5 - 13 % EOSINOPHILS 1 0 - 7 % BASOPHILS 0 0 - 1 % IMMATURE GRANULOCYTES 1 (H) 0.0 - 0.5 % ABS. NEUTROPHILS 4.0 1.8 - 8.0 K/UL  
 ABS. LYMPHOCYTES 0.4 (L) 0.8 - 3.5 K/UL  
 ABS. MONOCYTES 0.9 0.0 - 1.0 K/UL  
 ABS. EOSINOPHILS 0.1 0.0 - 0.4 K/UL  
 ABS. BASOPHILS 0.0 0.0 - 0.1 K/UL  
 ABS. IMM.  GRANS. 0.1 (H) 0.00 - 0.04 K/UL  
 DF SMEAR SCANNED    
 RBC COMMENTS ANISOCYTOSIS 
1+ 
 RBC COMMENTS OVALOCYTES PRESENT 
    
 
 
 
 
 
 
1230 Pt deaccessed and flushed per facility protocol. Pt was discharged and follow up appointments were provided. Future Appointments Date Time Provider Shiva Zavala 10/16/2019 11:00 AM [facility]  
10/23/2019 10:00 AM [facility]  
11/6/2019 10:00 AM SS INF3 CH4 <2H [facility]  
11/13/2019 11:30 AM SS INF3 CH4 <2H [facility]  
11/19/2019 12:00 PM Sheryl Ku MD [address] [facility]  
11/20/2019 10:00 AM SS INF2 CH4 <2H [facility]  
3/24/2020  1:50 PM aYw Muse MD RDE YOGESH [address] Carmen Bosworth October 9, 2019

## 2019-10-09 NOTE — PROGRESS NOTES
Pt in today to discuss treatment options with physician. Patient progressed per last scans done on 10/7/19. Patient will proceed with new chemo regimen next week. New amendment of informed consent for [id number] was reviewed and signed by patient and RN. All questions were answered adequately by RN or Dr. Jesus Pina. A copy of ICF given to patient. No additional questions at this time.

## 2019-10-16 NOTE — PROGRESS NOTES
[employer]  Social Work Navigator Encounter     Patient Name:  Gagan Ramirez    Medical History: pancreatic cancer    Advance Directives: none on file; conversation deferred    Narrative: Social work referral received from Carrie Brock RN regarding transportation barriers. Met with patient in hospitals to provide a list of resources. Patient tells me she wants transportation options incase she is not able to drive herself home. She feels asking friends/family to drive her in the morning would be a imposition. Referred her to [facility] and encouraged her to check her advantage plan transportation benefits. Explained that most plans do not have transportation coverage but some offer a limited number of trips. Also suggested patient utilize uber/lyft if needed. Patient voiced understanding and appreciation. Barriers to Care: transportation    Assessment/Action:  1. Patient is actively coping with diagnosis and treatment. 2. Provided transportation resources a noted above. 3. Ongoing psychosocial support including supportive counseling, assessment and resource linkage.      Plan/Referral:   Transportation referral    Thank you,  Miya Hopkins LCSW

## 2019-10-16 NOTE — PROGRESS NOTES
Pt has been screened for all eligibility/ineligibility criteria for Strata and has been determined eligible for this protocol. Informed consent was reviewed by RN with patient prior to signing. All questions were answered adequately by RN. Patient signed consent today for study strata. A copy of ICF given to patient. No additional questions at this time.

## 2019-10-16 NOTE — TELEPHONE ENCOUNTER
[address] [facility]   Social Work Navigator Encounter     Patient Name:  Philomena Stephens    Medical History: pancreatic cancer    Advance Directives: none on file; conversation deferred    Narrative: Received phone call from Mauricio Richardson (son) requesting information care resources. Offered supportive listening as he shares with me his is patient's primary support and only child. He lives in [place] with his wife who is also currently receiving cancer treatment. He tells me his mother is very independent and is reluctant to accept help but his is concerned that her care needs will increase with this new chemotherapy regimen. Provided him with information on support resources  - transportation, personal care, cleaning services, etc. Suggested strategy to start conversation with patient. Offered to follow up with patient when she RTC and explore with her long to plan if care needs increase. He voiced understanding and appreciation. Barriers to Care: patient has limited practical local support    Assessment/Action:  1. Provided supported listening to patient's son, Olayinka Reardon and strategies to engaged patient with discussing long term care plans. 2. Provided information on transportation and personal care resources. 3. Ongoing psychosocial support including assessment, supportive counseling and resource linkage.     Plan/Referral:   Transportation referral  Dependent Care referral    Thank you,  Vicente Estevez LCSW

## 2019-10-16 NOTE — PROGRESS NOTES
[facility] VISIT NOTE    Pt arrived at [facility] ambulatory and in no distress for C1D1 Folfirinox. Assessment completed, pt c/o ongoing congestion. Patient Vitals for the past 12 hrs:   Temp Pulse Resp BP SpO2   10/16/19 1435 98.9 °F (37.2 °C) 71 18 176/77 --   10/16/19 0738 97.9 °F (36.6 °C) 82 18 156/68 98 %     Right chest port accessed with . 75  in garcia no difficulty. Positive blood return noted and labs drawn. Recent Results (from the past 12 hour(s))   CBC WITH AUTOMATED DIFF    Collection Time: 10/16/19  7:57 AM   Result Value Ref Range    WBC 9.5 3.6 - 11.0 K/uL    RBC 3.06 (L) 3.80 - 5.20 M/uL    HGB 9.1 (L) 11.5 - 16.0 g/dL    HCT 28.9 (L) 35.0 - 47.0 %    MCV 94.4 80.0 - 99.0 FL    MCH 29.7 26.0 - 34.0 PG    MCHC 31.5 30.0 - 36.5 g/dL    RDW 19.9 (H) 11.5 - 14.5 %    PLATELET 755 (H) 439 - 400 K/uL    MPV 10.1 8.9 - 12.9 FL    NRBC 0.0 0  WBC    ABSOLUTE NRBC 0.00 0.00 - 0.01 K/uL    NEUTROPHILS 80 (H) 32 - 75 %    BAND NEUTROPHILS 3 0 - 6 %    LYMPHOCYTES 6 (L) 12 - 49 %    MONOCYTES 8 5 - 13 %    EOSINOPHILS 1 0 - 7 %    BASOPHILS 0 0 - 1 %    METAMYELOCYTES 1 (H) 0 %    MYELOCYTES 1 (H) 0 %    IMMATURE GRANULOCYTES 0 %    ABS. NEUTROPHILS 7.9 1.8 - 8.0 K/UL    ABS. LYMPHOCYTES 0.6 (L) 0.8 - 3.5 K/UL    ABS. MONOCYTES 0.8 0.0 - 1.0 K/UL    ABS. EOSINOPHILS 0.1 0.0 - 0.4 K/UL    ABS. BASOPHILS 0.0 0.0 - 0.1 K/UL    ABS. IMM.  GRANS. 0.0 K/UL    DF MANUAL      RBC COMMENTS NORMOCYTIC, NORMOCHROMIC     METABOLIC PANEL, COMPREHENSIVE    Collection Time: 10/16/19  7:57 AM   Result Value Ref Range    Sodium 135 (L) 136 - 145 mmol/L    Potassium 4.0 3.5 - 5.1 mmol/L    Chloride 102 97 - 108 mmol/L    CO2 24 21 - 32 mmol/L    Anion gap 9 5 - 15 mmol/L    Glucose 213 (H) 65 - 100 mg/dL    BUN 10 6 - 20 MG/DL    Creatinine 0.64 0.55 - 1.02 MG/DL    BUN/Creatinine ratio 16 12 - 20      GFR est AA >60 >60 ml/min/1.73m2    GFR est non-AA >60 >60 ml/min/1.73m2    Calcium 8.4 (L) 8.5 - 10.1 MG/DL Bilirubin, total 0.4 0.2 - 1.0 MG/DL    ALT (SGPT) 12 12 - 78 U/L    AST (SGOT) 16 15 - 37 U/L    Alk. phosphatase 140 (H) 45 - 117 U/L    Protein, total 5.6 (L) 6.4 - 8.2 g/dL    Albumin 2.9 (L) 3.5 - 5.0 g/dL    Globulin 2.7 2.0 - 4.0 g/dL    A-G Ratio 1.1 1.1 - 2.2     MAGNESIUM    Collection Time: 10/16/19  7:57 AM   Result Value Ref Range    Magnesium 1.9 1.6 - 2.4 mg/dL   PHOSPHORUS    Collection Time: 10/16/19  7:57 AM   Result Value Ref Range    Phosphorus 4.4 2.6 - 4.7 MG/DL   URIC ACID    Collection Time: 10/16/19  7:57 AM   Result Value Ref Range    Uric acid 5.0 2.6 - 6.0 MG/DL   LD    Collection Time: 10/16/19  7:57 AM   Result Value Ref Range     81 - 246 U/L   URINALYSIS W/ REFLEX CULTURE    Collection Time: 10/16/19  7:57 AM   Result Value Ref Range    Color YELLOW/STRAW      Appearance CLEAR CLEAR      Specific gravity 1.012 1.003 - 1.030      pH (UA) 6.5 5.0 - 8.0      Protein NEGATIVE  NEG mg/dL    Glucose NEGATIVE  NEG mg/dL    Ketone NEGATIVE  NEG mg/dL    Bilirubin NEGATIVE  NEG      Blood MODERATE (A) NEG      Urobilinogen 1.0 0.2 - 1.0 EU/dL    Nitrites NEGATIVE  NEG      Leukocyte Esterase TRACE (A) NEG      WBC 5-10 0 - 4 /hpf    RBC 10-20 0 - 5 /hpf    Epithelial cells MODERATE (A) FEW /lpf    Bacteria NEGATIVE  NEG /hpf    UA:UC IF INDICATED URINE CULTURE ORDERED (A) CNI      Hyaline cast 2-5 0 - 5 /lpf     Medications received:  Aloxi IV  Decadron IV  Oxaliplatin IV  Irinotecan IV  Leukovorin IV  5FU CIV  Atropine IV    Tolerated treatment well, no adverse reaction noted. Port hooked up to 5FU CIV and good blood return noted. D/C'd from [facility] ambulatory and in no distress. Terry Villanueva Next appointment is 10/18/19.

## 2019-10-18 NOTE — PROGRESS NOTES
10/18/19 11:36 AM: [employer] saliva specimen prepared for shipment and will be picked up today by [employer] by 5 PM. Tracking number [id number].

## 2019-10-18 NOTE — PROGRESS NOTES
[facility]fusion [facility] Short Visit Progress Note    1320 Patient admitted to [facility] for CADD pump removal ambulatory in stable condition. Assessment completed. No new concerns voiced. Vital Signs:  Visit Vitals  /75   Pulse 82   Temp 98.4 °F (36.9 °C)   Resp 18       Right chest wall  with positive blood return. Medications:  Medications Administered     heparin (porcine) pf 300-500 Units     Admin Date  10/18/2019 Action  Given Dose  500 Units Route  InterCATHeter Administered By  Demario Sinha, RN          sodium chloride 0.9% injection 10 mL     Admin Date  10/18/2019 Action  Given Dose  10 mL Route  IntraVENous Administered By  Demario Sinha, Erich6 Glenis Benitez Patient tolerated treatment well. Patient discharged from [place] [address] ambulatory in no distress . Port flushed, heparinized, and de-accessed. Patient aware of next appointment on 10/30/19.     Cem Underwood RN    Future Appointments   Date Time Provider Shiva Blounti   10/30/2019  7:30 AM SS INF1 CH2 >7H [facility] [place]   10/30/2019  8:45 AM Larry Granados NP [facility]   11/13/2019  7:30 AM SS INF1 CH2 >7H [facility]   11/19/2019 12:00 PM Jc Vazquez MD [address] [facility]   11/27/2019  7:30 AM SS INF4 CH2 >7H [facility]   12/11/2019  7:30 AM SS INF2 CH2 >7H [facility]   12/24/2019  7:30 AM SS INF1 CH2 >7H [facility]   1/8/2020  7:30 AM SS INF1 CH2 >7H [facility]   3/24/2020  1:50 PM Loren Lind MD RDE [address]

## 2019-10-30 NOTE — PROGRESS NOTES
[facility] Progress Note    Date: 2019    Name: Todd James    MRN: [id number]         : 1945    Ms. Strange Arrived ambulatory and in no distress for C2D1 of Folfirinox Regimen. Assessment was completed, no acute issues at this time, no new complaints voiced. Right chest wall port accessed without difficulty, labs drawn & sent for processing. Chemotherapy Flowsheet 10/30/2019   Cycle C2D1   Date 10/30/2019   Drug / Regimen Folfirinox   Pre Meds -   Notes -       0845 Patient proceed to appointment with Dr. Idalia Ward. Ms. Strange's vitals were reviewed. Visit Vitals  /65   Pulse 76   Temp 97.4 °F (36.3 °C)   Resp 18   Ht 5' 7\" (1.702 m)   Wt 74.8 kg (165 lb)   SpO2 99%   Breastfeeding? No   BMI 25.84 kg/m²         Lab results were obtained and reviewed. Recent Results (from the past 12 hour(s))   CBC WITH AUTOMATED DIFF    Collection Time: 10/30/19  7:51 AM   Result Value Ref Range    WBC 4.8 3.6 - 11.0 K/uL    RBC 3.21 (L) 3.80 - 5.20 M/uL    HGB 9.4 (L) 11.5 - 16.0 g/dL    HCT 29.3 (L) 35.0 - 47.0 %    MCV 91.3 80.0 - 99.0 FL    MCH 29.3 26.0 - 34.0 PG    MCHC 32.1 30.0 - 36.5 g/dL    RDW 18.5 (H) 11.5 - 14.5 %    PLATELET 183 000 - 144 K/uL    MPV 10.2 8.9 - 12.9 FL    NRBC 0.0 0  WBC    ABSOLUTE NRBC 0.00 0.00 - 0.01 K/uL    NEUTROPHILS 68 32 - 75 %    LYMPHOCYTES 12 12 - 49 %    MONOCYTES 10 5 - 13 %    EOSINOPHILS 9 (H) 0 - 7 %    BASOPHILS 1 0 - 1 %    IMMATURE GRANULOCYTES 0 0.0 - 0.5 %    ABS. NEUTROPHILS 3.3 1.8 - 8.0 K/UL    ABS. LYMPHOCYTES 0.6 (L) 0.8 - 3.5 K/UL    ABS. MONOCYTES 0.5 0.0 - 1.0 K/UL    ABS. EOSINOPHILS 0.4 0.0 - 0.4 K/UL    ABS. BASOPHILS 0.0 0.0 - 0.1 K/UL    ABS. IMM. GRANS. 0.0 0.00 - 0.04 K/UL    DF SMEAR SCANNED      RBC COMMENTS ANISOCYTOSIS  1+        RBC COMMENTS OVALOCYTES  PRESENT        WBC COMMENTS TOXIC GRANULATION       All labs within treatment parameters. Chemotherapy initiated.      After administration of oxaliplatin, patient c/o feeling flushed in face. PRN Atropine SQ in right arm given. Monitored patient closely. Patient stable.     Medications:  Medications Administered     atropine 0.4 mg/mL injection 0.4 mg     Admin Date  10/30/2019 Action  Given Dose  0.4 mg Route  SubCUTAneous Administered By  Samreen Shrestha RN          dexamethasone (DECADRON) 12 mg in 0.9% sodium chloride 50 mL IVPB     Admin Date  10/30/2019 Action  Given Dose  12 mg Route  IntraVENous Administered By  Samreen Shrestha RN          dextrose 5% infusion     Admin Date  10/30/2019 Action  New Bag Dose  25 mL/hr Rate  25 mL/hr Route  IntraVENous Administered By  Samreen Shrestha RN          fluorouracil (ADRUCIL) 4,656 mg in 0.9% sodium chloride 100 mL CADD Cassette     Admin Date  10/30/2019 Action  New Bag Dose  4656 mg Rate  2.2 mL/hr Route  IntraVENous Administered By  Samreen Shrestha RN          irinotecan (CAMPTOSAR) 291 mg in dextrose 5% 250 mL, overfill volume 25 mL chemo infusion     Admin Date  10/30/2019 Action  New Bag Dose  291 mg Rate  193 mL/hr Route  IntraVENous Administered By  Samreen Shrestha RN          leucovorin (WELLCOVORIN) 776 mg in dextrose 5% 250 mL, overfill volume 25 mL IVPB     Admin Date  10/30/2019 Action  New Bag Dose  776 mg Rate  209.2 mL/hr Route  IntraVENous Administered By  Samreen Shrestha RN          oxaliplatin (ELOXATIN) 165 mg in dextrose 5% 250 mL, overfill volume 25 mL chemo infusion     Admin Date  10/30/2019 Action  New Bag Dose  165 mg Rate  154 mL/hr Route  IntraVENous Administered By  Samreen Shrestha RN          palonosetron HCl (ALOXI) injection 0.25 mg     Admin Date  10/30/2019 Action  Given Dose  0.25 mg Route  IntraVENous Administered By  Samreen Shrestha RN          saline peripheral flush soln 10 mL     Admin Date  10/30/2019 Action  Given Dose  10 mL Route  InterCATHeter Administered By  Samreen Shrestha RN          sodium chloride 0.9% injection 10 mL Admin Date  10/30/2019 Action  Given Dose  10 mL Route  IntraVENous Administered By  Stephen Littlejohn RN           Admin Date  10/30/2019 Action  Given Dose  10 mL Route  IntraVENous Administered By  Stephen Littlejohn RN              Patient Vitals for the past 12 hrs:   Temp Pulse Resp BP SpO2   10/30/19 1436 -- 80 18 167/83 --   10/30/19 0737 97.4 °F (36.3 °C) 76 18 137/65 99 %         Ms. Strange tolerated treatment well and was discharged from [place] [address] in stable condition at 1435. Port infusing with 5FU pump. She is to return on November 1 at 1400  for her next appointment.     Adria Larson RN  October 30, 2019

## 2019-10-30 NOTE — PROGRESS NOTES
[facility] [place] [facility] [place] [address]  301 [facility], [address] [place], [place] [address] [facility] [address]  Lindsey Cons: [phone]  F: [phone]      Reason for Visit:   Todd James is a 69 y.o. female who is seen in self-referral for evaluation of pancreatic cancer. Treatment History:   · 10/4/18 pancreatic head mass FNA, pancreatic adenocarcinoma    10/15/18  Liver, Core Biopsy w/Touch Prep CYTOLOGIC INTERPRETATION:   · Numerous cores and fragments of benign hepatic parenchyma     10/24/18  Liver, Fine Needle Aspiration CYTOLOGIC INTERPRETATION:   Metastatic adenocarcinoma (see Comment). General Categorization   Positive for malignancy. Comment: The morphologic appearance is nonspecific with regard to site of origin but compatible with metastatic pancreatic carcinoma in this patient with known pancreatic adenocarcinoma ([id number]). 11/9/18-9/25/19- abraxane/gemcitabine -- PD  mFOLFIRINOX 10/16/19-    invitae genetic testing negative 10/2019    History of Present Illness:   She started having abdominal pain, gradual and persistent, x 1 month, pressure sensation, located in epigastrium, no radiation, no aggravating symptoms, no relieving factors. 25 lb weight loss over 6 months.  + nausea. Interval history:  In today for follow up and treatment. Complains of gr 3 loss of appetite, gr 2 fatigue, gr 2 hair loss, gr 1 nausea, gr 2 insomnia, gr 1 cognition/concentration, gr 1 neuropathy, gr 1 urinary leakage. Past Medical History:   Diagnosis Date    Arthritis     Constipation     Diabetes ([address]     Hemorrhoids     Hiatal hernia     High cholesterol     Hypertension     Pancreatic cancer ([facility] [address]       Past Surgical History:   Procedure Laterality Date    HX KNEE ARTHROSCOPY Right     HX ORTHOPAEDIC      left wrist surgery    HX TONSILLECTOMY        Social History     Tobacco Use    Smoking status: Never Smoker    Smokeless tobacco: Never Used   Substance Use Topics    Alcohol use:  Yes Alcohol/week: 2.0 - 4.0 standard drinks     Types: 1 - 2 Cans of beer, 1 - 2 Shots of liquor per week     Frequency: 2-4 times a month     Drinks per session: 1 or 2     Comment: minimal      Family History   Problem Relation Age of Onset    Cancer Mother         skin    Hypertension Mother     Heart Disease Mother     Cancer Father         skin    Heart Disease Father     Stroke Father     Diabetes Neg Hx      Current Outpatient Medications   Medication Sig    hydroCHLOROthiazide (HYDRODIURIL) 12.5 mg tablet Take 0.5 Tabs by mouth daily. Replaces capsules    TRESIBA FLEXTOUCH U-200 200 unit/mL (3 mL) inpn Inject 50 units on chemo days and 10 units as needed for bs > 150 on non-chemo days    glucose blood VI test strips (ACCU-CHEK RICHIE PLUS TEST STRP) strip Accu-Chek Richie Plus test strips   Check BS BID    OTHER Cranial prosthesis    Dx C25.7    omeprazole (PRILOSEC) 20 mg capsule Take 20 mg by mouth daily.  simvastatin (ZOCOR) 10 mg tablet Take 10 mg by mouth nightly.  telmisartan (MICARDIS) 80 mg tablet Take 80 mg by mouth daily.  ondansetron hcl (ZOFRAN) 8 mg tablet Take 1 Tab by mouth every eight (8) hours as needed for Nausea.  lidocaine-prilocaine (EMLA) topical cream Apply  to affected area as needed for Pain.  IBUPROFEN IB PO Take  by mouth as needed. No current facility-administered medications for this visit. Facility-Administered Medications Ordered in Other Visits   Medication Dose Route Frequency    saline peripheral flush soln 10 mL  10 mL InterCATHeter PRN    sodium chloride 0.9% injection 10 mL  10 mL IntraVENous PRN    heparin (porcine) pf 300-500 Units  300-500 Units InterCATHeter PRN      Allergies   Allergen Reactions    Amoxicillin Hives        Review of Systems: A comprehensive review of systems was performed and all systems were negative except for HPI and for the symptom review form, reviewed and scanned in.       Physical Exam:     Visit Vitals  BP 137/65   Pulse 76   Temp 97.4 °F (36.3 °C) (Temporal)   Resp 18   Ht 5' 7\" (1.702 m)   Wt 165 lb (74.8 kg)   SpO2 99%   BMI 25.84 kg/m²     ECOG PS: 0  General: No distress  Eyes: PERRLA, anicteric sclerae  HENT: Atraumatic, OP clear  Neck: Supple  Lymphatic: No cervical, supraclavicular, or inguinal adenopathy  Respiratory: CTAB, normal respiratory effort  CV: Normal rate, regular rhythm, no murmurs, 2+ LE edema bilateral LE  GI: Soft, nontender, nondistended, no masses, no hepatomegaly, no splenomegaly  MS: Normal gait and station. Digits without clubbing or cyanosis. Skin: No rashes, ecchymoses, or petechiae. Normal temperature, turgor, and texture. Psych: Alert, oriented, appropriate affect, normal judgment/insight        Results:     Lab Results   Component Value Date/Time    WBC 4.8 10/30/2019 07:51 AM    HGB 9.4 (L) 10/30/2019 07:51 AM    HCT 29.3 (L) 10/30/2019 07:51 AM    PLATELET 810 20/02/9332 07:51 AM    MCV 91.3 10/30/2019 07:51 AM    ABS. NEUTROPHILS 3.3 10/30/2019 07:51 AM     Lab Results   Component Value Date/Time    Sodium 135 (L) 10/16/2019 07:57 AM    Potassium 4.0 10/16/2019 07:57 AM    Chloride 102 10/16/2019 07:57 AM    CO2 24 10/16/2019 07:57 AM    Glucose 213 (H) 10/16/2019 07:57 AM    BUN 10 10/16/2019 07:57 AM    Creatinine 0.64 10/16/2019 07:57 AM    GFR est AA >60 10/16/2019 07:57 AM    GFR est non-AA >60 10/16/2019 07:57 AM    Calcium 8.4 (L) 10/16/2019 07:57 AM    Glucose (POC) 168 (H) 04/15/2019 10:44 AM     Lab Results   Component Value Date/Time    Bilirubin, total 0.4 10/16/2019 07:57 AM    ALT (SGPT) 12 10/16/2019 07:57 AM    AST (SGOT) 16 10/16/2019 07:57 AM    Alk. phosphatase 140 (H) 10/16/2019 07:57 AM    Protein, total 5.6 (L) 10/16/2019 07:57 AM    Albumin 2.9 (L) 10/16/2019 07:57 AM    Globulin 2.7 10/16/2019 07:57 AM     10/1/18 CT abd  There is a 3.8 x 1.9 cm mass involving the uncinate process and possibly  invading the duodenum.  Findings are nonspecific but typical of pancreatic  carcinoma. There is no biliary or pancreatic ductal dilatation.     There is a poorly defined irregular hypodensity in segment IVb of the liver  measuring 3.7 x 1.9 cm. There appears to be focal biliary dilatation in the left  lobe behind this abnormality. Metastatic disease is suspected. There is a small,  1 cm hypodensity in the dome of the liver, likely segment 8. There is a 1 cm  hypodensity in segment 4 of the liver as well, also likely metastasis. Small  hypodensity measuring less than 1 cm in segment 6 at the tip laterally is also  nonspecific but probable metastasis.     Spleen kidneys and adrenals are unremarkable.     No free fluid or focal fluid collection.     Lung bases are clear.     Bone windows are unremarkable.     IMPRESSION:  1. 3.8 x 1.9 cm mass involving the uncinate process of the pancreas and possibly  invading the duodenum, this likely represents pancreatic carcinoma. 2. Likely metastatic disease in the liver. There is a 3.8 x 1.9 cm mass involving the uncinate process and possibly  invading the duodenum. Findings are nonspecific but typical of pancreatic  carcinoma. There is no biliary or pancreatic ductal dilatation.     There is a poorly defined irregular hypodensity in segment IVb of the liver  measuring 3.7 x 1.9 cm. There appears to be focal biliary dilatation in the left  lobe behind this abnormality. Metastatic disease is suspected. There is a small,  1 cm hypodensity in the dome of the liver, likely segment 8. There is a 1 cm  hypodensity in segment 4 of the liver as well, also likely metastasis.  Small  hypodensity measuring less than 1 cm in segment 6 at the tip laterally is also  nonspecific but probable metastasis.     Spleen kidneys and adrenals are unremarkable.     No free fluid or focal fluid collection.     Lung bases are clear.     Bone windows are unremarkable.     IMPRESSION:  1. 3.8 x 1.9 cm mass involving the uncinate process of the pancreas and possibly  invading the duodenum, this likely represents pancreatic carcinoma. 2. Likely metastatic disease in the liver. Records reviewed and summarized above. Pathology report(s) reviewed above. Radiology report(s) reviewed above. MRI abdomen 10/22/18: IMPRESSION:       1. Pancreatic uncinate process mass suspicious for pancreatic neoplasm, possibly  adenocarcinoma. Mass abuts the superior mesenteric artery with about 20%  circumference involvement but no luminal distortion or perivascular stranding. 2. Multiple hepatic lesions suspicious for metastatic neoplasm with segment IVb  lesion showing patchy areas of surrounding steatosis likely secondary to locally  altered intrahepatic hemodynamics and likely responsible for biopsy result. 3. Cholecystolithiasis and small gallbladder polyp. CT c/a/p 12/28/18: IMPRESSION:  Interval decrease in size of pancreatic mass. Slight interval decrease in size of hepatic metastases; these now demonstrate central low attenuation suggestive of necrosis. No evidence of new metastatic disease in the chest, abdomen, or pelvis. CT c/a/p 2/25/19:  LIVER: The lesions described on the prior examination have improved in the  interval. 19 mm lesion in the dome of the liver now measures 1 cm. 2.0 x 1.5 cm  lesion in the left hepatic lobe now measures 1.3 x 1.2 cm. Other smaller lesions  are no longer visualized. GALLBLADDER: Unremarkable. SPLEEN: No mass. PANCREAS: Mass lesion in the uncinate process now measures 1.7 x 2.0 cm,  previously measuring 2.0 x 3.0 cm. ADRENALS: Unremarkable. KIDNEYS: No mass, calculus, or hydronephrosis. STOMACH: Unremarkable. SMALL BOWEL: No dilatation or wall thickening. COLON: No dilatation or wall thickening. APPENDIX: Unremarkable. PERITONEUM: No ascites or pneumoperitoneum. RETROPERITONEUM: No lymphadenopathy or aortic aneurysm. REPRODUCTIVE ORGANS: Intrauterine device projects in satisfactory position.   URINARY BLADDER: No mass or calculus. BONES: Degenerative changes are seen in the thoracic and lumbar spine. ADDITIONAL COMMENTS: N/A     IMPRESSION  IMPRESSION:  Interval decrease in the size of the hepatic metastases. Decrease in the size of  the mass lesion involving the uncinate process.     RECIST:  Pancreatic uncinate mass: Current: (68) 1.7X2.0cm     12/28/2018: (71) 3.0 x  2.0 cm   Segment 4B liver mass: Current: (56) 1.3 x 1.2 cm 12/28/2018: (57) 2.0 x 1.5 cm   Segment 5 liver mass: Current: (68) 4 x 7 mm 12/28/2018: (70) 1.4 x 0.9 cm    CXR for port study 4/15/19:   IMPRESSION:  Right internal jugular chest Port-A-Cath is seen with the catheter terminating  in the mid SVC. The port is slightly angled medially due to breast tissue. The  port is not flipped. Access needle appears to be within the port hub. Nursing  staff was able to get good blood return and flush the port without difficulty. No intervention was performed. CT a/p 4/22/19:  IMPRESSION:  1. Slight decrease in size of liver metastases, detailed above. 2. Slight decrease in size in uncinate process mass. 3. No evidence for new metastatic disease.     RECIST:  Uncinate process mass: 15 x 9 mm, image 56, previously 17 x 11 mm. Segment IVb metastasis 11 x 9 mm, image 50, previously 13 x 12 mm  Segment 5 metastasis: Barely detectable, image 62    CT c/a/p 6/17/19: IMPRESSION:  1. Stable lesion in the uncinate process. 2. Slight interval decrease in one of the 2 small liver lesions while the other  remains stable. 3. Stable small nodules right upper lobe. 4. No new evidence of metastatic disease or acute abnormality.     RECIST:  Uncinate process mass: 15 x 9 mm, image 68, previously 15 x 9 mm, image 56  Segment IVb metastasis 11 x 9 mm, image 56, previously 11 x 9 mm, image 50  Segment 5 metastasis: No longer visualized, previously barely detectable, image  62    CT c/a/p 8/12/19:  IMPRESSION:  Stable mass lesion in the uncinate process. Stable liver lesions. Stable  subpleural nodules right upper lobe. No new evidence of metastatic disease or acute abnormality.     RECIST:  Uncinate process mass: 15 x 9 mm, image 69, previously 15 x 9 mm, image 68   Segment IVb metastasis 11 x 10 mm, image 56, previously 11 x 10 mm, image 56   Segment 5 metastasis: Not visualized    CT c/a/p 10/7/19:   IMPRESSION:   1. Uncinate process mass. This is difficult to measure but appears to have  increased in size. A multiphase pancreatic protocol on the next follow-up  examination may be helpful to more precisely define the lesion. 2. Small liver lesions unchanged. 3. No evidence of new metastatic disease. 4. Intrauterine device. RECIST   Malignant neoplasm of other parts of the pancreas.     TARGET LESIONS:      Lesion (description)         Location (series/slice)                Size                                              1. Uncinate process mass    series 3 image 68    2.3 x 2 cm  2. Segment 4B liver lesion    series 3 image 57    10 x 9 mm        NONTARGET LESIONS:   None. Assessment/plan:   1. Uncinate pancreatic adenocarcinoma,  mets to liver, stage IV:  cT2 cN0 pM1    Discussed that while this is treatable, it is not curable, the goal of therapy is palliative. Discussed that without therapy, life expectancy would be 3-6 months, with therapy 12-18 months on average. CT on 10/7/19 with PD > 20% of pancreatic mass. We discussed the risks and benefits of FOLFIRINOX chemotherapy, including potential side effects. These include but are not limited to fatigue, nausea vomiting, diarrhea, neuropathy, taste changes, cold intolerance, esophageal spasm, allergic reactions, alopecia, mucositis, myelosuppression, risk for infection, infertility, and rarely, death. Rarely, a patient may have a condition where they do not metabolize fluorouracil appropriately (called DPD deficiency), and they may have excessive toxicity.   A Port-A-Cath will be required in order to deliver the continuous infusion. The patient has consented to beginning therapy. ·  mFOLFIRINOX (oxaliplatin 85 mg/m2, irinotecan 150mg/m2, leucovorin 400 mg/m2, and a 46 hour infusion of fluorouracil 2400 mg/m2 given every 2 weeks)  · Labs: CBC, BMP, Magnesium prior to each treatment, hepatic function panel every 4 weeks  · Prophylactic antiemetics: Palonosetron and dexamethasone on the day of each chemotherapy infusion. · PRN antiemetics: Ondansetron, Prochlorperazine  · PRN antidiarrheals: Atropine 0.4mg IV every 2 hours PRN during or immediately after irinotecan infusion,  imodium every 2 hours as needed at home  · EMLA cream for port    After this discussion, she is agreeable to mFOLFIRINOX, will start next week. She has signed informed consent. Cycle 2 today. Reports gr 3 loss of appetite and gr 2 fatigue. Denies cold sensitivity. Based on chemo schedule, she is scheduled for chemo the week of Samuel and Kevin. She would like to push back her chemo the week of [place] to the following week, will plan for this. We will plan to see the patient in follow up at least once per cycle, or sooner if symptoms warrant. MSI testing pending. Will send tumor for strata testing. Genetic testing was negative. 2. DM2:  Holding metformin; on insulin; saw Dr. Sara Mccurdy; her BS are improving; she is now taking less insulin; Hgb A1C 6.8. She will touch base with Dr. Sara Mccurdy. 3. Emotional well being:  She has excellent support and is coping well with her disease    4. Abdominal pain:  More cramping with BMs; palliative care has seen    5. Nutrition:  Abdiaziz Vegas RD has met with her in the past, was previously holding on enzymes. She has lost 12 pounds since last visit. Reports taste changes, decreased appetite. Will have Abdiaziz Vegas, [address] speak with her. Recommend at least 3-4 ensures/day if she is not eating. 6. Insomnia: Ongoing but no worse. Currently taking melatonin.   She has lunesta if needed. 7. Anemia:  Due to chemo and disease; monitor; iron profile and ferritin normal; gave injectafer 750 mg IV on 5/22/19. Iron sat 18% and Ferritin 386 on 8/28/19. Will continue to monitor. 8. Neutropenia:  Due to chemo, will monitor now that changing chemo    9. Constipation:  Resolved for now so she is holding off on her stool softener, but she continues with Miralax. 10. Sinus congestion: Intermittent but has improved. Clear/bloody sinus drainage. Recommend OTC sudafed and saline spray. She has seen Dr. Fernando Suárez, ENT, s/p steroids. On flonase. 11. Dehydration: She is drinking more fluids and has not had issues with dizziness. 12. Fatigue: Due to treatment. Ongoing but slightly worse. She has [facility] information. Continues acupuncture on 8/5/19 and 9/2019. Plan for 1L IV hydration with pump removal.     13. LE edema: Does improve with elevation. BLE dopplers on 8/15/19 negative. Restarted HCTZ at 6.25 mg daily.      14. Neuropathy:  Due to chemo; in fingers and feet; worse in feet; tried cymbalta 30 mg daily, but had insomnia and nausea; acupuncture has helped    This patient was seen in conjunction with Shlomo Gaxiola NP          Signed By: Ranjan Woods MD

## 2019-10-30 NOTE — PROGRESS NOTES
[facility] [place] at [place] [address]  301 [facility], [address] [place] [id number]  W: [phone]  F: [phone]    Medical Nutrition Therapy      Nutrition Encounter:    Met with patient. She reports altered taste and lack of appetite. Stating she opens the pantry and fridge and does not feel like eating anything. Discussed importance of nutrition and ongoing weight loss can effect the treatment response. She knew she lost weight but didn't realize it was 12 pounds. She plans to make more effort this week and drink more ensure/boost products especially when she does not eat a meal.  Discussed thinking of food as medicine, eating based on the time/schedule verses hunger cues. Results:   Pancreatic cancer   Chemotherapy Flowsheet 10/30/2019   Cycle C2D1   Date 10/30/2019   Drug / Regimen Folfirinox   Pre Meds given   Notes -     Wt Readings from Last 9 Encounters:   10/30/19 165 lb (74.8 kg)   10/30/19 165 lb (74.8 kg)   10/16/19 177 lb 6.4 oz (80.5 kg)   10/16/19 177 lb 6.4 oz (80.5 kg)   10/09/19 175 lb 12.8 oz (79.7 kg)   09/25/19 177 lb 3.2 oz (80.4 kg)   09/25/19 177 lb 3.2 oz (80.4 kg)   09/23/19 177 lb 3.2 oz (80.4 kg)   09/18/19 185 lb 8 oz (84.1 kg)       Estimated Nutrition Needs:   Calorie Range: 1800-2000kcal/day    Protein Range: 70-80g/day     Fluid Needs: 2000ml     Assessment:   Involuntary weight loss related to decreased intake as evidence by weight loss of 12# x 2 weeks. Plan: Think of food as medicine. Eat based on time/schedule verses hunger cues. Increase intake of nutrient-dense foods   Drink nutrition supplements   Take advantage of times when feeling good. Eat meals and snacks in a pleasant atmosphere. Keep snacks readily available and in eye sight to promote/stimulate appetite. Eat an extra bedtime snack. This will give you extra calories but won't affect your appetite for the next meals.       I appreciate the opportunity to participate in Ms. Ruben Strange's care.     Signed By: Fariba Landers, [address], [address] [place] [address] , [address]     Contact: [phone]

## 2019-11-01 NOTE — PROGRESS NOTES
[facility] Progress Note    Date: 2019    Name: Fernandez Flores    MRN: [id number]         : 1945    Ms. Strange Arrived ambulatory and in no distress for Pump Removal + hydration. Assessment was completed, patient has experienced several bouts of diarrhea since Wednesday night following treatment, pt mildly tachycardic upon arrival but in no distress. CADD completed- 100 ml infused per order. Ms. Strange's vitals were reviewed. Visit Vitals  /59   Pulse (!) 102   Temp 97.1 °F (36.2 °C)   Resp 18   Ht 5' 7\" (1.702 m)   Wt 71.8 kg (158 lb 3.2 oz)   SpO2 97%   BMI 24.78 kg/m²           Ms. Strange tolerated treatment well and was discharged from [place] [address] in stable condition at 1430. Port de-accessed, flushed & heparinized per protocol. She is to return on  at 0730 for her next appointment.     Gary Huber RN  2019

## 2019-11-06 NOTE — TELEPHONE ENCOUNTER
Detail Level: Zone Patient called and stated she is having more nausea and diarrhea. Would like to speak to a nurse about what else can be done to treat.  #[phone] Exfoliation Type: scrub Facial Steaming: steamed Extraction Method: extractor Price (Use Numbers Only, No Special Characters Or $): 50

## 2019-11-06 NOTE — TELEPHONE ENCOUNTER
11/6/19 3:36 p.m.-Spoke to patient, who states for the last couple days she has been experiencing nausea, vomiting, and diarrhea. Patient states that she has been taking Imodium two tablets in the morning and then one tablet every two hours after that, and states she has been taking Zofran every eight hours and Compazine every six hours. Patient states she hasn't been able to keep much down for the last two days, and states that last night she became \"very weak all of a sudden and I had to just sit down in the middle of the kitchen floor and then I was too weak to get myself up. \"  Patient states, \"I didn't have my glasses or phone with me,\" and states that she laid in the floor and slept for a little bit and then would try to scoot to the living room and then would sleep some more and states that finally after a couple hours of doing that she was able to reach the couch. Patient denies any falls and states, \"I feel much stronger today, though. \"  Patient states she has had 3-4 episodes of diarrhea so far today, and that the only intake so far today has been [place] little bit of jello and a little bit of Ginger-nathalie. \"  Patient denies any fever or dizziness. Inquired if patient could have someone drive her here today for IV hydration, and patient replied, \"I would rather come in tomorrow. \"  Advised patient that per Marlon Guzmán NP, a prescription for Lomotil would be sent to her pharmacy for her to take as needed for the diarrhea, in addition to the Imodium. Also, advised patient that per Marlon Guzmán NP, to continue taking the Zofran every eight hours and Compazine every six hours, and to try to drink plenty of fluids, and that she needed to come in tomorrow for 2 liters of IV fluids. Advised patient that appointment had been scheduled in [facility] for 11/7/19 at 11:00 a.m. Patient voices understanding and denies any further questions at this time.

## 2019-11-07 NOTE — PROGRESS NOTES
[facility] Progress Note    Date: 2019    Name: Zeus Yuan    MRN: [id number]         : 1945    Ms. Strange Arrived ambulatory and in no distress for Hydration. Assessment was completed. Pt complaining of general fatigue/weakness/diarrhea and nausea. Right chest wall port accessed without difficulty, labs drawn & sent for processing. Spoke to MD's office about patient condition and extreme nausea, orders received and carried out for IV antiemetics. Critical result reported from lab of a potassium of 2.7. MD's office made aware and orders received and carried out for PO Potassium. Pt requesting to take second dose of potassium home to self-administer, MD's office made aware and said it was okay for patient to take medication home. Patient instructed to call if she does not tolerate pills. Ms. Strange's vitals were reviewed. Visit Vitals  /70 (BP 1 Location: Right arm, BP Patient Position: At rest)   Pulse 90   Temp 96.5 °F (35.8 °C)   Resp 20   Ht 5' 7\" (1.702 m)   Wt 67.6 kg (149 lb)   SpO2 99%   BMI 23.34 kg/m²       Recent Results (from the past 8 hour(s))   METABOLIC PANEL, BASIC    Collection Time: 19 11:18 AM   Result Value Ref Range    Sodium 130 (L) 136 - 145 mmol/L    Potassium 2.7 (LL) 3.5 - 5.1 mmol/L    Chloride 96 (L) 97 - 108 mmol/L    CO2 26 21 - 32 mmol/L    Anion gap 8 5 - 15 mmol/L    Glucose 129 (H) 65 - 100 mg/dL    BUN 18 6 - 20 MG/DL    Creatinine 0.94 0.55 - 1.02 MG/DL    BUN/Creatinine ratio 19 12 - 20      GFR est AA >60 >60 ml/min/1.73m2    GFR est non-AA 59 (L) >60 ml/min/1.73m2    Calcium 8.3 (L) 8.5 - 10.1 MG/DL   MAGNESIUM    Collection Time: 19 11:18 AM   Result Value Ref Range    Magnesium 1.7 1.6 - 2.4 mg/dL       Medications:  2 L NS Bolus   10 mg IV Compazine  PO Potassium 80 Meq's    Ms. Strange tolerated treatment well and was discharged from [address] in stable condition.    Port de-accessed, flushed & heparinized per protocol. She is to return on November 13 2019 for her next appointment.     Robert Abernathy RN  November 7, 2019

## 2019-11-13 NOTE — PROGRESS NOTES
[facility] Progress Note    Date: 2019    Name: Diamante Richardson    MRN: [id number]         : 1945    Ms. Strange Arrived in wheelchair for cycle 3 day 1 of folfirinox regimen. Assessment was completed, patient presents in wheelchair accompanied by son and cousin. Patient states she has been having diarrhea, nausea and vomiting since last infusion. She has been extremely weak, per son was not able to make it downstairs at all this weekend. Patient states she cannot keep food or drink down without vomiting. Significant weight loss from last visit. R chest port accessed without difficulty, labs drawn and in process. Treatment HELD. Hydration and potassium ordered. Pt could not take oral pills due to nausea, per Cj parker for pt to take pills home. IV potassium and sandostatin ordered. Chemotherapy Flowsheet 2019   Cycle C3D1   Date 2019   Drug / Regimen Folfirinox   Pre Meds -   Notes -         0815  Proceeded to appt with Dr. Osiris Hernandez. Ms. Strange's vitals were reviewed. Visit Vitals  /58 (BP 1 Location: Left arm, BP Patient Position: At rest)   Pulse 82   Temp 97.8 °F (36.6 °C)   Resp 18   Wt 64.7 kg (142 lb 11.2 oz)   Breastfeeding? No   BMI 22.35 kg/m²       Lab results were obtained and reviewed.   Recent Results (from the past 12 hour(s))   CBC WITH AUTOMATED DIFF    Collection Time: 19  8:04 AM   Result Value Ref Range    WBC 3.4 (L) 3.6 - 11.0 K/uL    RBC 3.41 (L) 3.80 - 5.20 M/uL    HGB 9.9 (L) 11.5 - 16.0 g/dL    HCT 29.6 (L) 35.0 - 47.0 %    MCV 86.8 80.0 - 99.0 FL    MCH 29.0 26.0 - 34.0 PG    MCHC 33.4 30.0 - 36.5 g/dL    RDW 17.8 (H) 11.5 - 14.5 %    PLATELET 819 439 - 399 K/uL    MPV 9.3 8.9 - 12.9 FL    NRBC 0.0 0  WBC    ABSOLUTE NRBC 0.00 0.00 - 0.01 K/uL    NEUTROPHILS 60 32 - 75 %    BAND NEUTROPHILS 3 0 - 6 %    LYMPHOCYTES 10 (L) 12 - 49 %    MONOCYTES 26 (H) 5 - 13 %    EOSINOPHILS 0 0 - 7 %    BASOPHILS 0 0 - 1 %    METAMYELOCYTES 1 (H) 0 % IMMATURE GRANULOCYTES 0 %    ABS. NEUTROPHILS 2.1 1.8 - 8.0 K/UL    ABS. LYMPHOCYTES 0.3 (L) 0.8 - 3.5 K/UL    ABS. MONOCYTES 0.9 0.0 - 1.0 K/UL    ABS. EOSINOPHILS 0.0 0.0 - 0.4 K/UL    ABS. BASOPHILS 0.0 0.0 - 0.1 K/UL    ABS. IMM. GRANS. 0.0 K/UL    DF MANUAL      RBC COMMENTS ANISOCYTOSIS  1+        WBC COMMENTS TOXIC GRANULATION     METABOLIC PANEL, COMPREHENSIVE    Collection Time: 11/13/19  8:04 AM   Result Value Ref Range    Sodium 136 136 - 145 mmol/L    Potassium 2.2 (LL) 3.5 - 5.1 mmol/L    Chloride 99 97 - 108 mmol/L    CO2 26 21 - 32 mmol/L    Anion gap 11 5 - 15 mmol/L    Glucose 129 (H) 65 - 100 mg/dL    BUN 21 (H) 6 - 20 MG/DL    Creatinine 1.31 (H) 0.55 - 1.02 MG/DL    BUN/Creatinine ratio 16 12 - 20      GFR est AA 48 (L) >60 ml/min/1.73m2    GFR est non-AA 40 (L) >60 ml/min/1.73m2    Calcium 8.7 8.5 - 10.1 MG/DL    Bilirubin, total 0.4 0.2 - 1.0 MG/DL    ALT (SGPT) 9 (L) 12 - 78 U/L    AST (SGOT) 8 (L) 15 - 37 U/L    Alk.  phosphatase 90 45 - 117 U/L    Protein, total 6.1 (L) 6.4 - 8.2 g/dL    Albumin 2.6 (L) 3.5 - 5.0 g/dL    Globulin 3.5 2.0 - 4.0 g/dL    A-G Ratio 0.7 (L) 1.1 - 2.2       Medications Administered     octreotide (SANDOSTATIN) injection 100 mcg     Admin Date  11/13/2019 Action  Given Dose  100 mcg Route  SubCUTAneous Administered By  Tivis Candido          ondansetron [facility]) injection 8 mg     Admin Date  11/13/2019 Action  Given Dose  8 mg Route  IntraVENous Administered By  Dinora Rey          potassium chloride 20 mEq in 100 ml IVPB     Admin Date  11/13/2019 Action  New Bag Dose  20 mEq Rate  100 mL/hr Route  IntraVENous Administered By  Jorge Bird Date  11/13/2019 Action  New Bag Dose  20 mEq Rate  100 mL/hr Route  IntraVENous Administered By  Tivis Alexis          potassium chloride SR (KLOR-CON 10) tablet 40 mEq     Admin Date  11/13/2019 Action  Given Dose  40 mEq Route  Oral Administered By  Tivis Alexis          prochlorperazine (COMPAZINE) injection 10 mg     Admin Date  11/13/2019 Action  Given Dose  10 mg Route  IntraVENous Administered By  Tivis Pawnee          sodium chloride 0.9 % bolus infusion 1,000 mL     Admin Date  11/13/2019 Action  New Bag Dose  1000 mL Rate  1,000 mL/hr Route  IntraVENous Administered By  Jorge Pelon Date  11/13/2019 Action  New Bag Dose  1000 mL Rate  1,000 mL/hr Route  IntraVENous Administered By  Tivis Pawnee                  Ms. Strange tolerated treatment well and was discharged from [place] [id number] in stable condition. She is to return on 11/15/19 for her next appointment.     Claudio Cordon  November 13, 2019

## 2019-11-13 NOTE — PROGRESS NOTES
[employer]  Social Work Navigator Encounter     Patient Name:  Shadia Sánchez    Medical History: pancreatic cancer    Advance Directives: none on file; conversation deferred    Narrative: Met with patient, her son and cousin. Patient's son tells me they have hired 24/7 care through [employer] to assist with ADLs. Patient has long-term care insurance. Assisted complete physician form to submit a claim. No additional needs at this time. Barriers to Care: none identified at this time    Assessment/Action:  1. Patient is well supported by family and caregivers with practical and emotional needs. 2. Offered supportive listening as patient's son expressed concern for his mother's ability to tolerate treatment. He is relived that she now has caregivers at home.     Plan/Referral:   Insurance/Entitlements referral

## 2019-11-13 NOTE — PROGRESS NOTES
[facility]  [address] [facility], [address] [facility] 100[address] [facility]  Daniella Cost: [phone]  F: [phone]      Reason for Visit:   Lashae Varghese is a 69 y.o. female who is seen in self-referral for evaluation of pancreatic cancer. Treatment History:   · 10/4/18 pancreatic head mass FNA, pancreatic adenocarcinoma    10/15/18  Liver, Core Biopsy w/Touch Prep CYTOLOGIC INTERPRETATION:   · Numerous cores and fragments of benign hepatic parenchyma     10/24/18  Liver, Fine Needle Aspiration CYTOLOGIC INTERPRETATION:   Metastatic adenocarcinoma (see Comment). General Categorization   Positive for malignancy. Comment: The morphologic appearance is nonspecific with regard to site of origin but compatible with metastatic pancreatic carcinoma in this patient with known pancreatic adenocarcinoma ([id number]). 11/9/18-9/25/19- abraxane/gemcitabine -- PD  mFOLFIRINOX 10/16/19-    invitae genetic testing negative 10/2019    Strata testing:  KRAS p.G12D, MYC amp, TP53 mutation (x2); TASH    History of Present Illness:   She started having abdominal pain, gradual and persistent, x 1 month, pressure sensation, located in epigastrium, no radiation, no aggravating symptoms, no relieving factors. 25 lb weight loss over 6 months.  + nausea. Interval history:  In today for follow up and treatment.  Complains of gr 3 loss of appetite, gr 3 diarrhea, gr 3 fatigue, gr 2 nausea, gr 2 vomiting, gr 1 insomnia, gr 1 loss of concentration, gr 2 neuropathy    Lost 16 lbs in 2 weeks    Severe nausea started last week, taking compazine    Thought had UTI on Sat -- PCP called in bactrim x 3 days    Last week lay on floor and had a hard time to get back upstairs    Past Medical History:   Diagnosis Date    Arthritis     Constipation     Diabetes ([address]     Hemorrhoids     Hiatal hernia     High cholesterol     Hypertension     Pancreatic cancer [facility])       Past Surgical History:   Procedure Laterality Date    HX KNEE ARTHROSCOPY Right     HX ORTHOPAEDIC      left wrist surgery    HX TONSILLECTOMY        Social History     Tobacco Use    Smoking status: Never Smoker    Smokeless tobacco: Never Used   Substance Use Topics    Alcohol use: Yes     Alcohol/week: 2.0 - 4.0 standard drinks     Types: 1 - 2 Cans of beer, 1 - 2 Shots of liquor per week     Frequency: 2-4 times a month     Drinks per session: 1 or 2     Comment: minimal      Family History   Problem Relation Age of Onset    Cancer Mother         skin    Hypertension Mother     Heart Disease Mother     Cancer Father         skin    Heart Disease Father     Stroke Father     Diabetes Neg Hx      Current Outpatient Medications   Medication Sig    glucose blood VI test strips (ACCU-CHEK RICHIE PLUS TEST STRP) strip Accu-Chek Richie Plus test strips   Check BS BID    OTHER Cranial prosthesis    Dx C25.7    simvastatin (ZOCOR) 10 mg tablet Take 10 mg by mouth nightly.  telmisartan (MICARDIS) 80 mg tablet Take 80 mg by mouth daily.  prochlorperazine (COMPAZINE) 10 mg tablet TAKE 1 TAB BY MOUTH EVERY SIX (6) HOURS AS NEEDED FOR NAUSEA.  diphenoxylate-atropine (LOMOTIL) 2.5-0.025 mg per tablet Take 1 Tab by mouth four (4) times daily as needed for Diarrhea. Max Daily Amount: 4 Tabs.  hydroCHLOROthiazide (HYDRODIURIL) 12.5 mg tablet Take 0.5 Tabs by mouth daily. Replaces capsules    ondansetron hcl (ZOFRAN) 8 mg tablet Take 1 Tab by mouth every eight (8) hours as needed for Nausea.  lidocaine-prilocaine (EMLA) topical cream Apply  to affected area as needed for Pain.  TRESIBA FLEXTOUCH U-200 200 unit/mL (3 mL) inpn Inject 50 units on chemo days and 10 units as needed for bs > 150 on non-chemo days    IBUPROFEN IB PO Take  by mouth as needed.  omeprazole (PRILOSEC) 20 mg capsule Take 20 mg by mouth daily. No current facility-administered medications for this visit.        Allergies   Allergen Reactions    Amoxicillin Hives        Review of Systems: A comprehensive review of systems was performed and all systems were negative except for HPI and for the symptom review form, reviewed and scanned in. Physical Exam:     Visit Vitals  /58   Pulse 82   Temp 97.8 °F (36.6 °C) (Temporal)   Resp 18   Ht 5' 7\" (1.702 m)   Wt 142 lb 11.2 oz (64.7 kg)   SpO2 96%   BMI 22.35 kg/m²     ECOG PS: 4  General: in wheelchair today  Eyes: PERRLA, anicteric sclerae  HENT: Atraumatic, OP clear  Neck: Supple  Lymphatic: No cervical, supraclavicular, or inguinal adenopathy  Respiratory: CTAB, normal respiratory effort  CV: Normal rate, regular rhythm, no murmurs, 2+ LE edema bilateral LE  GI: Soft, nontender, nondistended, no masses, no hepatomegaly, no splenomegaly  MS: Normal gait and station. Digits without clubbing or cyanosis. Skin: No rashes, ecchymoses, or petechiae. Normal temperature, turgor, and texture. Psych: Alert, oriented, appropriate affect, normal judgment/insight        Results:     Lab Results   Component Value Date/Time    WBC 4.8 10/30/2019 07:51 AM    HGB 9.4 (L) 10/30/2019 07:51 AM    HCT 29.3 (L) 10/30/2019 07:51 AM    PLATELET 940 96/20/9594 07:51 AM    MCV 91.3 10/30/2019 07:51 AM    ABS. NEUTROPHILS 3.3 10/30/2019 07:51 AM     Lab Results   Component Value Date/Time    Sodium 130 (L) 11/07/2019 11:18 AM    Potassium 2.7 (LL) 11/07/2019 11:18 AM    Chloride 96 (L) 11/07/2019 11:18 AM    CO2 26 11/07/2019 11:18 AM    Glucose 129 (H) 11/07/2019 11:18 AM    BUN 18 11/07/2019 11:18 AM    Creatinine 0.94 11/07/2019 11:18 AM    GFR est AA >60 11/07/2019 11:18 AM    GFR est non-AA 59 (L) 11/07/2019 11:18 AM    Calcium 8.3 (L) 11/07/2019 11:18 AM    Glucose (POC) 168 (H) 04/15/2019 10:44 AM     Lab Results   Component Value Date/Time    Bilirubin, total 0.4 10/16/2019 07:57 AM    ALT (SGPT) 12 10/16/2019 07:57 AM    AST (SGOT) 16 10/16/2019 07:57 AM    Alk.  phosphatase 140 (H) 10/16/2019 07:57 AM    Protein, total 5.6 (L) 10/16/2019 07:57 AM    Albumin 2.9 (L) 10/16/2019 07:57 AM    Globulin 2.7 10/16/2019 07:57 AM     10/1/18 CT abd  There is a 3.8 x 1.9 cm mass involving the uncinate process and possibly  invading the duodenum. Findings are nonspecific but typical of pancreatic  carcinoma. There is no biliary or pancreatic ductal dilatation.     There is a poorly defined irregular hypodensity in segment IVb of the liver  measuring 3.7 x 1.9 cm. There appears to be focal biliary dilatation in the left  lobe behind this abnormality. Metastatic disease is suspected. There is a small,  1 cm hypodensity in the dome of the liver, likely segment 8. There is a 1 cm  hypodensity in segment 4 of the liver as well, also likely metastasis. Small  hypodensity measuring less than 1 cm in segment 6 at the tip laterally is also  nonspecific but probable metastasis.     Spleen kidneys and adrenals are unremarkable.     No free fluid or focal fluid collection.     Lung bases are clear.     Bone windows are unremarkable.     IMPRESSION:  1. 3.8 x 1.9 cm mass involving the uncinate process of the pancreas and possibly  invading the duodenum, this likely represents pancreatic carcinoma. 2. Likely metastatic disease in the liver. There is a 3.8 x 1.9 cm mass involving the uncinate process and possibly  invading the duodenum. Findings are nonspecific but typical of pancreatic  carcinoma. There is no biliary or pancreatic ductal dilatation.     There is a poorly defined irregular hypodensity in segment IVb of the liver  measuring 3.7 x 1.9 cm. There appears to be focal biliary dilatation in the left  lobe behind this abnormality. Metastatic disease is suspected. There is a small,  1 cm hypodensity in the dome of the liver, likely segment 8. There is a 1 cm  hypodensity in segment 4 of the liver as well, also likely metastasis.  Small  hypodensity measuring less than 1 cm in segment 6 at the tip laterally is also  nonspecific but probable metastasis.     Spleen kidneys and adrenals are unremarkable.     No free fluid or focal fluid collection.     Lung bases are clear.     Bone windows are unremarkable.     IMPRESSION:  1. 3.8 x 1.9 cm mass involving the uncinate process of the pancreas and possibly  invading the duodenum, this likely represents pancreatic carcinoma. 2. Likely metastatic disease in the liver. Records reviewed and summarized above. Pathology report(s) reviewed above. Radiology report(s) reviewed above. MRI abdomen 10/22/18: IMPRESSION:       1. Pancreatic uncinate process mass suspicious for pancreatic neoplasm, possibly  adenocarcinoma. Mass abuts the superior mesenteric artery with about 20%  circumference involvement but no luminal distortion or perivascular stranding. 2. Multiple hepatic lesions suspicious for metastatic neoplasm with segment IVb  lesion showing patchy areas of surrounding steatosis likely secondary to locally  altered intrahepatic hemodynamics and likely responsible for biopsy result. 3. Cholecystolithiasis and small gallbladder polyp. CT c/a/p 12/28/18: IMPRESSION:  Interval decrease in size of pancreatic mass. Slight interval decrease in size of hepatic metastases; these now demonstrate central low attenuation suggestive of necrosis. No evidence of new metastatic disease in the chest, abdomen, or pelvis. CT c/a/p 2/25/19:  LIVER: The lesions described on the prior examination have improved in the  interval. 19 mm lesion in the dome of the liver now measures 1 cm. 2.0 x 1.5 cm  lesion in the left hepatic lobe now measures 1.3 x 1.2 cm. Other smaller lesions  are no longer visualized. GALLBLADDER: Unremarkable. SPLEEN: No mass. PANCREAS: Mass lesion in the uncinate process now measures 1.7 x 2.0 cm,  previously measuring 2.0 x 3.0 cm. ADRENALS: Unremarkable. KIDNEYS: No mass, calculus, or hydronephrosis. STOMACH: Unremarkable. SMALL BOWEL: No dilatation or wall thickening.   COLON: No dilatation or wall thickening. APPENDIX: Unremarkable. PERITONEUM: No ascites or pneumoperitoneum. RETROPERITONEUM: No lymphadenopathy or aortic aneurysm. REPRODUCTIVE ORGANS: Intrauterine device projects in satisfactory position. URINARY BLADDER: No mass or calculus. BONES: Degenerative changes are seen in the thoracic and lumbar spine. ADDITIONAL COMMENTS: N/A     IMPRESSION  IMPRESSION:  Interval decrease in the size of the hepatic metastases. Decrease in the size of  the mass lesion involving the uncinate process.     RECIST:  Pancreatic uncinate mass: Current: (68) 1.7X2.0cm     12/28/2018: (71) 3.0 x  2.0 cm   Segment 4B liver mass: Current: (56) 1.3 x 1.2 cm 12/28/2018: (57) 2.0 x 1.5 cm   Segment 5 liver mass: Current: (68) 4 x 7 mm 12/28/2018: (70) 1.4 x 0.9 cm    CXR for port study 4/15/19:   IMPRESSION:  Right internal jugular chest Port-A-Cath is seen with the catheter terminating  in the mid SVC. The port is slightly angled medially due to breast tissue. The  port is not flipped. Access needle appears to be within the port hub. Nursing  staff was able to get good blood return and flush the port without difficulty. No intervention was performed. CT a/p 4/22/19:  IMPRESSION:  1. Slight decrease in size of liver metastases, detailed above. 2. Slight decrease in size in uncinate process mass. 3. No evidence for new metastatic disease.     RECIST:  Uncinate process mass: 15 x 9 mm, image 56, previously 17 x 11 mm. Segment IVb metastasis 11 x 9 mm, image 50, previously 13 x 12 mm  Segment 5 metastasis: Barely detectable, image 62    CT c/a/p 6/17/19: IMPRESSION:  1. Stable lesion in the uncinate process. 2. Slight interval decrease in one of the 2 small liver lesions while the other  remains stable. 3. Stable small nodules right upper lobe.   4. No new evidence of metastatic disease or acute abnormality.     RECIST:  Uncinate process mass: 15 x 9 mm, image 68, previously 15 x 9 mm, image 56  Segment IVb metastasis 11 x 9 mm, image 56, previously 11 x 9 mm, image 50  Segment 5 metastasis: No longer visualized, previously barely detectable, image  62    CT c/a/p 8/12/19:  IMPRESSION:  Stable mass lesion in the uncinate process. Stable liver lesions. Stable  subpleural nodules right upper lobe. No new evidence of metastatic disease or acute abnormality.     RECIST:  Uncinate process mass: 15 x 9 mm, image 69, previously 15 x 9 mm, image 68   Segment IVb metastasis 11 x 10 mm, image 56, previously 11 x 10 mm, image 56   Segment 5 metastasis: Not visualized    CT c/a/p 10/7/19:   IMPRESSION:   1. Uncinate process mass. This is difficult to measure but appears to have  increased in size. A multiphase pancreatic protocol on the next follow-up  examination may be helpful to more precisely define the lesion. 2. Small liver lesions unchanged. 3. No evidence of new metastatic disease. 4. Intrauterine device. RECIST   Malignant neoplasm of other parts of the pancreas.     TARGET LESIONS:      Lesion (description)         Location (series/slice)                Size                                              1. Uncinate process mass    series 3 image 68    2.3 x 2 cm  2. Segment 4B liver lesion    series 3 image 57    10 x 9 mm        NONTARGET LESIONS:   None. Assessment/plan:   1. Uncinate pancreatic adenocarcinoma,  mets to liver, stage IV:  cT2 cN0 pM1  TASH    Discussed that while this is treatable, it is not curable, the goal of therapy is palliative. Discussed that without therapy, life expectancy would be 3-6 months, with therapy 12-18 months on average. CT on 10/7/19 with PD > 20% of pancreatic mass. We discussed the risks and benefits of FOLFIRINOX chemotherapy, including potential side effects.  These include but are not limited to fatigue, nausea vomiting, diarrhea, neuropathy, taste changes, cold intolerance, esophageal spasm, allergic reactions, alopecia, mucositis, myelosuppression, risk for infection, infertility, and rarely, death. Rarely, a patient may have a condition where they do not metabolize fluorouracil appropriately (called DPD deficiency), and they may have excessive toxicity. A Port-A-Cath will be required in order to deliver the continuous infusion. The patient has consented to beginning therapy. ·  mFOLFIRINOX (oxaliplatin 85 mg/m2, irinotecan 150mg/m2, leucovorin 400 mg/m2, and a 46 hour infusion of fluorouracil 2400 mg/m2 given every 2 weeks)  · Labs: CBC, BMP, Magnesium prior to each treatment, hepatic function panel every 4 weeks  · Prophylactic antiemetics: Palonosetron and dexamethasone on the day of each chemotherapy infusion. · PRN antiemetics: Ondansetron, Prochlorperazine  · PRN antidiarrheals: Atropine 0.4mg IV every 2 hours PRN during or immediately after irinotecan infusion,  imodium every 2 hours as needed at home  · EMLA cream for port    After this discussion, she is agreeable to mFOLFIRINOX, will start next week. She has signed informed consent. Cycle 3 today will be held. Discussed if no more therapy, life expectancy may be 3 months or less    We will plan to see the patient in follow up at least once per cycle, or sooner if symptoms warrant. Genetic testing was negative. 2. DM2:  Holding metformin; not needing insulin at this time; saw Dr. Brigido De León; her BS are improving; she is now taking less insulin; Hgb A1C 6.8. She will touch base with Dr. Brigido De León. 3. Emotional well being:  She has excellent support and is coping well with her disease    4. Abdominal pain:  More cramping with BMs; palliative care has seen    5. Nutrition:  Berkley Cherry RD has met with her in the past, was previously holding on enzymes. She has lost 12 pounds since last visit. Reports taste changes, decreased appetite. Will have Berkley Cherry, [address] speak with her. Recommend at least 3-4 ensures/day if she is not eating. 6. Insomnia: Ongoing but no worse.   Currently taking melatonin. She has lunesta if needed. 7. Anemia:  Due to chemo and disease; monitor; iron profile and ferritin normal; gave injectafer 750 mg IV on 5/22/19. Iron sat 18% and Ferritin 386 on 8/28/19. Will continue to monitor. 8. Neutropenia:  Due to chemo, will monitor now that changing chemo    9. Sinus congestion: Intermittent but has improved. Clear/bloody sinus drainage. Recommend OTC sudafed and saline spray. She has seen Dr. Michael Villa, ENT, s/p steroids. On flonase. 10. Dehydration: will give 2 L IVF today and Friday and then Monday and Wednesday    11. Fatigue: Due to treatment. Much worse    12. LE edema: holding HCTZ at 6.25 mg daily. Also holding micardis    13. Neuropathy:  Stable; Due to chemo; in fingers and feet; worse in feet; tried cymbalta 30 mg daily, but had insomnia and nausea; acupuncture has helped    14. Nausea/vomiting:  Taking compazine; advised to also take zofran 8 mg q8h    15. Diarrhea:  Worse; taking lomotil q4h; advised to take imodium with diarrhea; will give octreotide 100 mcg today SC    16. Hypokalemia:  Will replete    17. NANETTE:  IVF as above    24 hour care has been arranged.          Signed By: Fili Lopez MD

## 2019-11-15 NOTE — TELEPHONE ENCOUNTER
11/15/19 3:02 PM: Called patient and advised that her potassium is low at 2.8. Advised that NICK Clark NP, sent in a prescription for the patient to  from her pharmacy. Advised that the dosage is 20 mEq and NP recommends taking two tablets twice today, and then one tablet twice daily until she has labs drawn again on Monday. Patient verbalized understanding and denied questions or concerns.

## 2019-11-15 NOTE — PROGRESS NOTES
[facility] Short Visit Progress Note    5536 Patient admitted to [address] [place] [address] for hydration and antiemetics via wheelchair accompanied by nursing aid  in stable condition. Assessment completed. No new concerns voiced. Vital Signs:  Visit Vitals  /63   Pulse 62   Temp 99 °F (37.2 °C)   Wt 66.5 kg (146 lb 11.2 oz)   SpO2 97%   BMI 22.98 kg/m²       Right chest port accessed with positive blood return, labs drawn and sent for processing. Lab Results:  Recent Results (from the past 12 hour(s))   METABOLIC PANEL, BASIC    Collection Time: 11/15/19 11:41 AM   Result Value Ref Range    Sodium 139 136 - 145 mmol/L    Potassium 2.8 (L) 3.5 - 5.1 mmol/L    Chloride 102 97 - 108 mmol/L    CO2 26 21 - 32 mmol/L    Anion gap 11 5 - 15 mmol/L    Glucose 181 (H) 65 - 100 mg/dL    BUN 12 6 - 20 MG/DL    Creatinine 0.88 0.55 - 1.02 MG/DL    BUN/Creatinine ratio 14 12 - 20      GFR est AA >60 >60 ml/min/1.73m2    GFR est non-AA >60 >60 ml/min/1.73m2    Calcium 7.9 (L) 8.5 - 10.1 MG/DL           Medications:  Medications Administered     heparin (porcine) pf 500 Units     Admin Date  11/15/2019 Action  Given Dose  500 Units Route  IntraVENous Administered By  Samantha Bowling RN          ondansetron [facility]) injection 8 mg     Admin Date  11/15/2019 Action  Given Dose  8 mg Route  IntraVENous Administered By  Samantha Bowling RN          sodium chloride (NS) flush 5-10 mL     Admin Date  11/15/2019 Action  Given Dose  10 mL Route  IntraVENous Administered By  Samantha Bowling RN          sodium chloride 0.9 % bolus infusion 1,000 mL     Admin Date  11/15/2019 Action  New Bag Dose  1000 mL Rate  1,000 mL/hr Route  IntraVENous Administered By  Samantha Bowling RN          sodium chloride 0.9% injection 10 mL     Admin Date  11/15/2019 Action  Given Dose  10 mL Route  IntraVENous Administered By  Samantha Bowling RN                 Patient tolerated treatment well.  Patient discharged from [place] [address] via wheelchair  in no distress at 1305. Patient aware of next appointment.     Future Appointments   Date Time Provider Shiva Zavala   11/18/2019 10:30 AM SS INF5 CH3 <4H [facility]   11/20/2019  1:00 PM SS INF3 CH3 <4H [facility]   11/20/2019  1:15 PM Robert Adair NP [facility]   3/24/2020  1:50 PM Glenn Leonard MD RDE [address]

## 2019-11-18 NOTE — PROGRESS NOTES
[facility]    Date: 2019    Name: Christiano Juárez    MRN: [id number]         : 1945    Ms. Strange Arrived ambulatory and in no distress for Hydration Regimen. Assessment was completed, no acute issues at this time, no new complaints voiced. Right chest wall port accessed without difficulty, + blood return noted. Ms. Strange's vitals were reviewed. Patient Vitals for the past 12 hrs:   Temp Pulse Resp BP   19 1052 97.4 °F (36.3 °C) 72 18 124/65     Medications:  1 L NS over 1 hour    Zofran and compazine ordered. Pt stated she took PO zofran and compazine prior to coming into [facility] at 0800. RN called MD office. Stated it is too soon to receive these medications again. Ms. LOZANO [facility] tolerated treatment well and was discharged from [place] [address] in stable condition. Port de-accessed, flushed & heparinized per protocol. She is to return on 19 at 1:00 for her next appointment.     Stephanie Bundy RN  2019

## 2019-11-20 NOTE — PROGRESS NOTES
[facility] Progress Note    Date: 2019    Name: Moshe Pollock    MRN: [id number]         : 1945    Ms. Strange Arrived ambulatory and in no distress for IV antiemetics and Hydration. Assessment was completed, no acute issues at this time, no new complaints voiced. Pt stating that overall she has been feeling much better. Right chest wall port accessed without difficulty, labs drawn & sent for processing. Pt declining any IV antiemetics at this time, stating her nausea has been controlled on prescriptions. Pt requesting to no longer take PO Potassium pills as potassium is WDL. Md's office notified and pt notified per MD that as long as she is tolerating potassium rich foods, she no longer needs to take her potassium pills. Pt proceeded to appointment with Dr. Darnelle Rinne after lab draw. Ms. Strange's vitals were reviewed. Visit Vitals  /78   Pulse 87   Resp 18   Ht 5' 7\" (1.702 m)   Wt 69.7 kg (153 lb 9.6 oz)   SpO2 98%   BMI 24.06 kg/m²       Recent Results (from the past 8 hour(s))   CBC WITH AUTOMATED DIFF    Collection Time: 19  1:14 PM   Result Value Ref Range    WBC 5.5 3.6 - 11.0 K/uL    RBC 3.36 (L) 3.80 - 5.20 M/uL    HGB 9.6 (L) 11.5 - 16.0 g/dL    HCT 30.1 (L) 35.0 - 47.0 %    MCV 89.6 80.0 - 99.0 FL    MCH 28.6 26.0 - 34.0 PG    MCHC 31.9 30.0 - 36.5 g/dL    RDW 18.5 (H) 11.5 - 14.5 %    PLATELET 438 (H) 203 - 400 K/uL    MPV 9.0 8.9 - 12.9 FL    NRBC 0.0 0  WBC    ABSOLUTE NRBC 0.00 0.00 - 0.01 K/uL    NEUTROPHILS 63 32 - 75 %    LYMPHOCYTES 20 12 - 49 %    MONOCYTES 15 (H) 5 - 13 %    EOSINOPHILS 0 0 - 7 %    BASOPHILS 1 0 - 1 %    IMMATURE GRANULOCYTES 1 (H) 0.0 - 0.5 %    ABS. NEUTROPHILS 3.6 1.8 - 8.0 K/UL    ABS. LYMPHOCYTES 1.1 0.8 - 3.5 K/UL    ABS. MONOCYTES 0.8 0.0 - 1.0 K/UL    ABS. EOSINOPHILS 0.0 0.0 - 0.4 K/UL    ABS. BASOPHILS 0.0 0.0 - 0.1 K/UL    ABS. IMM.  GRANS. 0.0 0.00 - 0.04 K/UL    DF AUTOMATED     METABOLIC PANEL, COMPREHENSIVE Collection Time: 11/20/19  1:14 PM   Result Value Ref Range    Sodium 136 136 - 145 mmol/L    Potassium 3.9 3.5 - 5.1 mmol/L    Chloride 103 97 - 108 mmol/L    CO2 28 21 - 32 mmol/L    Anion gap 5 5 - 15 mmol/L    Glucose 134 (H) 65 - 100 mg/dL    BUN 10 6 - 20 MG/DL    Creatinine 0.77 0.55 - 1.02 MG/DL    BUN/Creatinine ratio 13 12 - 20      GFR est AA >60 >60 ml/min/1.73m2    GFR est non-AA >60 >60 ml/min/1.73m2    Calcium 7.9 (L) 8.5 - 10.1 MG/DL    Bilirubin, total 0.2 0.2 - 1.0 MG/DL    ALT (SGPT) 15 12 - 78 U/L    AST (SGOT) 13 (L) 15 - 37 U/L    Alk. phosphatase 124 (H) 45 - 117 U/L    Protein, total 5.5 (L) 6.4 - 8.2 g/dL    Albumin 2.3 (L) 3.5 - 5.0 g/dL    Globulin 3.2 2.0 - 4.0 g/dL    A-G Ratio 0.7 (L) 1.1 - 2.2         Medications:  1 L Bolus     Ms. Strange tolerated treatment well and was discharged from [place] [address] in stable condition. Port de-accessed, flushed & heparinized per protocol. She is to return on November 22 at 1130 for her next appointment.     Guilherme Willams RN  November 20, 2019

## 2019-11-20 NOTE — PROGRESS NOTES
[facility] [place] [address]  [address] [facility], [address] [facility] 100[address] [facility]  Gilbert Malagon: [phone]  F: [phone]      Reason for Visit:   Christiano Juárez is a 69 y.o. female who is seen in self-referral for evaluation of pancreatic cancer. Treatment History:   · 10/4/18 pancreatic head mass FNA, pancreatic adenocarcinoma    10/15/18  Liver, Core Biopsy w/Touch Prep CYTOLOGIC INTERPRETATION:   · Numerous cores and fragments of benign hepatic parenchyma     10/24/18  Liver, Fine Needle Aspiration CYTOLOGIC INTERPRETATION:   Metastatic adenocarcinoma (see Comment). General Categorization   Positive for malignancy. Comment: The morphologic appearance is nonspecific with regard to site of origin but compatible with metastatic pancreatic carcinoma in this patient with known pancreatic adenocarcinoma ([id number]). 11/9/18-9/25/19- abraxane/gemcitabine -- PD  mFOLFIRINOX 10/16/19-    invitae genetic testing negative 10/2019    Strata testing:  KRAS p.G12D, MYC amp, TP53 mutation (x2); TASH    History of Present Illness:   She started having abdominal pain, gradual and persistent, x 1 month, pressure sensation, located in epigastrium, no radiation, no aggravating symptoms, no relieving factors. 25 lb weight loss over 6 months.  + nausea. Interval history:  In today for follow up. Complains of gr 1 loss of appetite, gr 1 constipation, gr 1 diarrhea, gr 2 fatigue, gr 2 hair loss, gr 1 nausea, gr 1 insomnia, gr 1 cognition/concentration, gr 2 neuropathy, gr 1 urinary leakage. Reports this past week was better, she is eating better and nausea is better controlled.      Past Medical History:   Diagnosis Date    Arthritis     Constipation     Diabetes ([address]     Hemorrhoids     Hiatal hernia     High cholesterol     Hypertension     Pancreatic cancer [facility])       Past Surgical History:   Procedure Laterality Date    HX KNEE ARTHROSCOPY Right     HX ORTHOPAEDIC      left wrist surgery    HX TONSILLECTOMY        Social History     Tobacco Use    Smoking status: Never Smoker    Smokeless tobacco: Never Used   Substance Use Topics    Alcohol use: Yes     Alcohol/week: 2.0 - 4.0 standard drinks     Types: 1 - 2 Cans of beer, 1 - 2 Shots of liquor per week     Frequency: 2-4 times a month     Drinks per session: 1 or 2     Comment: minimal      Family History   Problem Relation Age of Onset    Cancer Mother         skin    Hypertension Mother     Heart Disease Mother     Cancer Father         skin    Heart Disease Father     Stroke Father     Diabetes Neg Hx      Current Outpatient Medications   Medication Sig    TRESIBA FLEXTOUCH U-200 200 unit/mL (3 mL) inpn Inject 50 units on chemo days and 10 units as needed for bs > 150 on non-chemo days    glucose blood VI test strips (ACCU-CHEK RICHIE PLUS TEST STRP) strip Accu-Chek Richie Plus test strips   Check BS BID    OTHER Cranial prosthesis    Dx C25.7    omeprazole (PRILOSEC) 20 mg capsule Take 20 mg by mouth daily.  prochlorperazine (COMPAZINE) 10 mg tablet TAKE 1 TAB BY MOUTH EVERY SIX (6) HOURS AS NEEDED FOR NAUSEA.  diphenoxylate-atropine (LOMOTIL) 2.5-0.025 mg per tablet Take 1 Tab by mouth four (4) times daily as needed for Diarrhea. Max Daily Amount: 4 Tabs.  ondansetron hcl (ZOFRAN) 8 mg tablet Take 1 Tab by mouth every eight (8) hours as needed for Nausea.  lidocaine-prilocaine (EMLA) topical cream Apply  to affected area as needed for Pain.  IBUPROFEN IB PO Take  by mouth as needed. No current facility-administered medications for this visit.       Facility-Administered Medications Ordered in Other Visits   Medication Dose Route Frequency    prochlorperazine (COMPAZINE) injection 10 mg  10 mg IntraVENous See Admin Instructions    sodium chloride 0.9 % bolus infusion 1,000 mL  1,000 mL IntraVENous ONCE    sodium chloride (NS) flush 5-10 mL  5-10 mL IntraVENous PRN    sodium chloride 0.9% injection 10 mL  10 mL IntraVENous PRN    heparin (porcine) pf 500 Units  500 Units IntraVENous PRN    ondansetron (ZOFRAN) injection 8 mg  8 mg IntraVENous See Admin Instructions      Allergies   Allergen Reactions    Amoxicillin Hives        Review of Systems: A comprehensive review of systems was performed and all systems were negative except for HPI and for the symptom review form, reviewed and scanned in. Physical Exam:     Visit Vitals  /78   Pulse 87   Temp 97.7 °F (36.5 °C) (Temporal)   Resp 18   Ht 5' 7\" (1.702 m)   Wt 153 lb 9.6 oz (69.7 kg)   SpO2 98%   BMI 24.06 kg/m²     ECOG PS: 4  General: in wheelchair today  Eyes: PERRLA, anicteric sclerae  HENT: Atraumatic, OP clear  Neck: Supple  Lymphatic: No cervical, supraclavicular, or inguinal adenopathy  Respiratory: CTAB, normal respiratory effort  CV: Normal rate, regular rhythm, no murmurs, 2+ LE edema bilateral LE  GI: Soft, nontender, nondistended, no masses, no hepatomegaly, no splenomegaly  MS: Normal gait and station. Digits without clubbing or cyanosis. Skin: No rashes, ecchymoses, or petechiae. Normal temperature, turgor, and texture. Psych: Alert, oriented, appropriate affect, normal judgment/insight        Results:     Lab Results   Component Value Date/Time    WBC 5.5 11/20/2019 01:14 PM    HGB 9.6 (L) 11/20/2019 01:14 PM    HCT 30.1 (L) 11/20/2019 01:14 PM    PLATELET 179 (H) 48/35/1008 01:14 PM    MCV 89.6 11/20/2019 01:14 PM    ABS.  NEUTROPHILS 3.6 11/20/2019 01:14 PM     Lab Results   Component Value Date/Time    Sodium 139 11/15/2019 11:41 AM    Potassium 2.8 (L) 11/15/2019 11:41 AM    Chloride 102 11/15/2019 11:41 AM    CO2 26 11/15/2019 11:41 AM    Glucose 181 (H) 11/15/2019 11:41 AM    BUN 12 11/15/2019 11:41 AM    Creatinine 0.88 11/15/2019 11:41 AM    GFR est AA >60 11/15/2019 11:41 AM    GFR est non-AA >60 11/15/2019 11:41 AM    Calcium 7.9 (L) 11/15/2019 11:41 AM    Glucose (POC) 168 (H) 04/15/2019 10:44 AM     Lab Results Component Value Date/Time    Bilirubin, total 0.4 11/13/2019 08:04 AM    ALT (SGPT) 9 (L) 11/13/2019 08:04 AM    AST (SGOT) 8 (L) 11/13/2019 08:04 AM    Alk. phosphatase 90 11/13/2019 08:04 AM    Protein, total 6.1 (L) 11/13/2019 08:04 AM    Albumin 2.6 (L) 11/13/2019 08:04 AM    Globulin 3.5 11/13/2019 08:04 AM     10/1/18 CT abd  There is a 3.8 x 1.9 cm mass involving the uncinate process and possibly  invading the duodenum. Findings are nonspecific but typical of pancreatic  carcinoma. There is no biliary or pancreatic ductal dilatation.     There is a poorly defined irregular hypodensity in segment IVb of the liver  measuring 3.7 x 1.9 cm. There appears to be focal biliary dilatation in the left  lobe behind this abnormality. Metastatic disease is suspected. There is a small,  1 cm hypodensity in the dome of the liver, likely segment 8. There is a 1 cm  hypodensity in segment 4 of the liver as well, also likely metastasis. Small  hypodensity measuring less than 1 cm in segment 6 at the tip laterally is also  nonspecific but probable metastasis.     Spleen kidneys and adrenals are unremarkable.     No free fluid or focal fluid collection.     Lung bases are clear.     Bone windows are unremarkable.     IMPRESSION:  1. 3.8 x 1.9 cm mass involving the uncinate process of the pancreas and possibly  invading the duodenum, this likely represents pancreatic carcinoma. 2. Likely metastatic disease in the liver. There is a 3.8 x 1.9 cm mass involving the uncinate process and possibly  invading the duodenum. Findings are nonspecific but typical of pancreatic  carcinoma. There is no biliary or pancreatic ductal dilatation.     There is a poorly defined irregular hypodensity in segment IVb of the liver  measuring 3.7 x 1.9 cm. There appears to be focal biliary dilatation in the left  lobe behind this abnormality. Metastatic disease is suspected.  There is a small,  1 cm hypodensity in the dome of the liver, likely segment 8. There is a 1 cm  hypodensity in segment 4 of the liver as well, also likely metastasis. Small  hypodensity measuring less than 1 cm in segment 6 at the tip laterally is also  nonspecific but probable metastasis.     Spleen kidneys and adrenals are unremarkable.     No free fluid or focal fluid collection.     Lung bases are clear.     Bone windows are unremarkable.     IMPRESSION:  1. 3.8 x 1.9 cm mass involving the uncinate process of the pancreas and possibly  invading the duodenum, this likely represents pancreatic carcinoma. 2. Likely metastatic disease in the liver. Records reviewed and summarized above. Pathology report(s) reviewed above. Radiology report(s) reviewed above. MRI abdomen 10/22/18: IMPRESSION:       1. Pancreatic uncinate process mass suspicious for pancreatic neoplasm, possibly  adenocarcinoma. Mass abuts the superior mesenteric artery with about 20%  circumference involvement but no luminal distortion or perivascular stranding. 2. Multiple hepatic lesions suspicious for metastatic neoplasm with segment IVb  lesion showing patchy areas of surrounding steatosis likely secondary to locally  altered intrahepatic hemodynamics and likely responsible for biopsy result. 3. Cholecystolithiasis and small gallbladder polyp. CT c/a/p 12/28/18: IMPRESSION:  Interval decrease in size of pancreatic mass. Slight interval decrease in size of hepatic metastases; these now demonstrate central low attenuation suggestive of necrosis. No evidence of new metastatic disease in the chest, abdomen, or pelvis. CT c/a/p 2/25/19:  LIVER: The lesions described on the prior examination have improved in the  interval. 19 mm lesion in the dome of the liver now measures 1 cm. 2.0 x 1.5 cm  lesion in the left hepatic lobe now measures 1.3 x 1.2 cm. Other smaller lesions  are no longer visualized. GALLBLADDER: Unremarkable. SPLEEN: No mass.   PANCREAS: Mass lesion in the uncinate process now measures 1.7 x 2.0 cm,  previously measuring 2.0 x 3.0 cm. ADRENALS: Unremarkable. KIDNEYS: No mass, calculus, or hydronephrosis. STOMACH: Unremarkable. SMALL BOWEL: No dilatation or wall thickening. COLON: No dilatation or wall thickening. APPENDIX: Unremarkable. PERITONEUM: No ascites or pneumoperitoneum. RETROPERITONEUM: No lymphadenopathy or aortic aneurysm. REPRODUCTIVE ORGANS: Intrauterine device projects in satisfactory position. URINARY BLADDER: No mass or calculus. BONES: Degenerative changes are seen in the thoracic and lumbar spine. ADDITIONAL COMMENTS: N/A     IMPRESSION  IMPRESSION:  Interval decrease in the size of the hepatic metastases. Decrease in the size of  the mass lesion involving the uncinate process.     RECIST:  Pancreatic uncinate mass: Current: (68) 1.7X2.0cm     12/28/2018: (71) 3.0 x  2.0 cm   Segment 4B liver mass: Current: (56) 1.3 x 1.2 cm 12/28/2018: (57) 2.0 x 1.5 cm   Segment 5 liver mass: Current: (68) 4 x 7 mm 12/28/2018: (70) 1.4 x 0.9 cm    CXR for port study 4/15/19:   IMPRESSION:  Right internal jugular chest Port-A-Cath is seen with the catheter terminating  in the mid SVC. The port is slightly angled medially due to breast tissue. The  port is not flipped. Access needle appears to be within the port hub. Nursing  staff was able to get good blood return and flush the port without difficulty. No intervention was performed. CT a/p 4/22/19:  IMPRESSION:  1. Slight decrease in size of liver metastases, detailed above. 2. Slight decrease in size in uncinate process mass. 3. No evidence for new metastatic disease.     RECIST:  Uncinate process mass: 15 x 9 mm, image 56, previously 17 x 11 mm. Segment IVb metastasis 11 x 9 mm, image 50, previously 13 x 12 mm  Segment 5 metastasis: Barely detectable, image 62    CT c/a/p 6/17/19: IMPRESSION:  1. Stable lesion in the uncinate process.   2. Slight interval decrease in one of the 2 small liver lesions while the other  remains stable. 3. Stable small nodules right upper lobe. 4. No new evidence of metastatic disease or acute abnormality.     RECIST:  Uncinate process mass: 15 x 9 mm, image 68, previously 15 x 9 mm, image 56  Segment IVb metastasis 11 x 9 mm, image 56, previously 11 x 9 mm, image 50  Segment 5 metastasis: No longer visualized, previously barely detectable, image  62    CT c/a/p 8/12/19:  IMPRESSION:  Stable mass lesion in the uncinate process. Stable liver lesions. Stable  subpleural nodules right upper lobe. No new evidence of metastatic disease or acute abnormality.     RECIST:  Uncinate process mass: 15 x 9 mm, image 69, previously 15 x 9 mm, image 68   Segment IVb metastasis 11 x 10 mm, image 56, previously 11 x 10 mm, image 56   Segment 5 metastasis: Not visualized    CT c/a/p 10/7/19:   IMPRESSION:   1. Uncinate process mass. This is difficult to measure but appears to have  increased in size. A multiphase pancreatic protocol on the next follow-up  examination may be helpful to more precisely define the lesion. 2. Small liver lesions unchanged. 3. No evidence of new metastatic disease. 4. Intrauterine device. RECIST   Malignant neoplasm of other parts of the pancreas.     TARGET LESIONS:      Lesion (description)         Location (series/slice)                Size                                              1. Uncinate process mass    series 3 image 68    2.3 x 2 cm  2. Segment 4B liver lesion    series 3 image 57    10 x 9 mm        NONTARGET LESIONS:   None. Assessment/plan:   1. Uncinate pancreatic adenocarcinoma,  mets to liver, stage IV:  cT2 cN0 pM1  TASH    Discussed that while this is treatable, it is not curable, the goal of therapy is palliative. Discussed that without therapy, life expectancy would be 3-6 months, with therapy 12-18 months on average. CT on 10/7/19 with PD > 20% of pancreatic mass.     We discussed the risks and benefits of FOLFIRINOX chemotherapy, including potential side effects. These include but are not limited to fatigue, nausea vomiting, diarrhea, neuropathy, taste changes, cold intolerance, esophageal spasm, allergic reactions, alopecia, mucositis, myelosuppression, risk for infection, infertility, and rarely, death. Rarely, a patient may have a condition where they do not metabolize fluorouracil appropriately (called DPD deficiency), and they may have excessive toxicity. A Port-A-Cath will be required in order to deliver the continuous infusion. The patient has consented to beginning therapy. ·  mFOLFIRINOX (oxaliplatin 85 mg/m2, irinotecan 150mg/m2, leucovorin 400 mg/m2, and a 46 hour infusion of fluorouracil 2400 mg/m2 given every 2 weeks)  · Labs: CBC, BMP, Magnesium prior to each treatment, hepatic function panel every 4 weeks  · Prophylactic antiemetics: Palonosetron and dexamethasone on the day of each chemotherapy infusion. · PRN antiemetics: Ondansetron, Prochlorperazine  · PRN antidiarrheals: Atropine 0.4mg IV every 2 hours PRN during or immediately after irinotecan infusion,  imodium every 2 hours as needed at home  · EMLA cream for port    After this discussion, she is agreeable to mFOLFIRINOX, will start next week. She has signed informed consent. Cycle 3 will be held. Discussed if no more therapy, life expectancy may be 3 months or less    We will plan to see the patient in follow up at least once per cycle, or sooner if symptoms warrant. Genetic testing was negative. 2. DM2:  Holding metformin; not needing insulin at this time; saw Dr. Eliza Forde; her BS are improving; she is now taking less insulin; Hgb A1C 6.8. She will touch base with Dr. Eliza Forde. 3. Emotional well being:  She has excellent support and is coping well with her disease    4. Abdominal pain:  More cramping with BMs; palliative care has seen    5. Nutrition:  Jyoti Patrick RD has met with her in the past, was previously holding on enzymes. She has lost 12 pounds since last visit. Reports taste changes, decreased appetite. Will have Jose F Pierson, [address] speak with her. Recommend at least 3-4 ensures/day if she is not eating. Discussed with Jose F Pierson, pancreatic enzyme use during Thanksgiving so she possibly may eat more    6. Insomnia: Ongoing but no worse. Currently taking melatonin. She has lunesta if needed. 7. Anemia:  Due to chemo and disease; monitor; iron profile and ferritin normal; gave injectafer 750 mg IV on 5/22/19. Iron sat 18% and Ferritin 386 on 8/28/19. Will continue to monitor. 8. Neutropenia:  Due to chemo, will monitor now that changing chemo    9. Sinus congestion: Intermittent but has improved. Clear/bloody sinus drainage. Recommend OTC sudafed and saline spray. She has seen Dr. Payam Lopez, ENT, s/p steroids. On flonase. 10. Dehydration: s/p IV hydration on 11/13, 11/15, 11/17, and today, 11/19/19. Plan to continue, will add IV hydration 11/22, 11/26, 12/2, 12/4. 11. Fatigue: Due to treatment. Much worse    12. LE edema: holding HCTZ at 6.25 mg daily. Also holding micardis    13. Neuropathy:  Stable; Due to chemo; in fingers and feet; worse in feet; tried cymbalta 30 mg daily, but had insomnia and nausea; acupuncture has helped    14. Nausea/vomiting:  Taking compazine and zofran as needed. Reports this has improved. 15.  Diarrhea: This has improved, reports 1 mild episode yesterday due to food. S/p Octreotide 100 mcg on 11/13/19. Previously advised imodium with diarrhea as well as lomotil q4h. 16. Hypokalemia:  Will replete    17. NANETTE:  IVF as above    24 hour care has been arranged.          Signed By: Angella Almaguer MD

## 2019-11-21 NOTE — TELEPHONE ENCOUNTER
11/21/19 11:02 a.m.- Spoke to patient and advised her that prescription for Creon had been sent to her [facility] pharmacy yesterday. Patient states that she had contacted [facility], and [facility] informed her they didn't receive a prescription from our office. Advised patient that prescription would be sent to [facility] again. Patient voices understanding and denies any further questions at this time. Spoke to [facility] pharmacist, who states that they didn't receive prescription for Creon yesterday. Per verbal order from Checo Khan NP, prescription for Creon given to pharmacist over the phone.

## 2019-11-21 NOTE — TELEPHONE ENCOUNTER
Patient states she was suppose to get a rx sent over for pancreatic enzymes?     Her best number [phone]  Thanks  Henna Mccarty

## 2019-11-22 NOTE — PROGRESS NOTES
Outpatient Infusion Center Short Visit Progress Note    1140 Patient admitted to [facility] for hydration ambulatory in stable condition. Assessment completed. No new concerns voiced. Vital Signs:  Visit Vitals  /67   Pulse 68   Temp 98.5 °F (36.9 °C)   Resp 18   Ht 5' 7\" (1.702 m)   Wt 71.7 kg (158 lb 1.6 oz)   SpO2 97%   BMI 24.76 kg/m²         R PAC with positive blood return. Medications:  Medications Administered     ondansetron (ZOFRAN) injection 8 mg     Admin Date  11/22/2019 Action  Given Dose  8 mg Route  IntraVENous Administered By  Regina Castillo RN          prochlorperazine (COMPAZINE) injection 10 mg     Admin Date  11/22/2019 Action  Given Dose  10 mg Route  IntraVENous Administered By  Regina Castillo RN          sodium chloride 0.9 % bolus infusion 1,000 mL     Admin Date  11/22/2019 Action  New Bag Dose  1000 mL Rate  1,000 mL/hr Route  IntraVENous Administered By  Mine Acuna RN          sodium chloride 0.9% injection 10 mL     Admin Date  11/22/2019 Action  Given Dose  10 mL Route  IntraVENous Administered By  Mine Acuna RN                [id number] Patient tolerated treatment well. Patient discharged from [place] [address] via wheelchair in no distress at 1315. Patient aware of next appointment.     Future Appointments   Date Time Provider Shiva Zavala   11/26/2019 11:00 AM [facility]   12/2/2019  9:00 AM SS INF5 CH3 <4H [facility]   12/4/2019  1:00 PM SS INF3 CH3 <4H [facility]   12/4/2019  1:15 PM Vanesa Swenson NP [facility] SCHED   3/24/2020  1:50 PM Marcela Aviles MD RDE [facility] [address] [place] [address]

## 2019-12-02 NOTE — PROGRESS NOTES
\[facility] [place] [facility]\"" Progress Note    Date: 2019    Name: Mike Sage    MRN: [id number]         : 1945    Ms. Strange Arrived ambulatory and in no distress for Hydration. Assessment was completed, no acute issues at this time, no new complaints voiced. R chest wall port accessed without difficulty. Pt states she has not needed any of her PO antiemetics in a couple days and does not need any IV antiemetics this visit. Ms. Strange's vitals were reviewed. Visit Vitals  /65 (BP 1 Location: Left arm, BP Patient Position: At rest)   Pulse 76   Temp 98.1 °F (36.7 °C)   Resp 18   Wt 71.9 kg (158 lb 8 oz)   SpO2 98%   BMI 24.82 kg/m²       Medications:  1 L Bolus     Ms. Strange tolerated treatment well and was discharged from [place] [address] in stable condition. Port de-accessed, flushed & heparinized per protocol. She is to return on  at 1300 for her next appointment.     Therese Paz RN  2019

## 2019-12-04 NOTE — PROGRESS NOTES
Outpatient Infusion Center Short Visit Progress Note    7318  Patient admitted to [facility] for Hydration ambulatory in stable condition. Assessment completed. No new concerns voiced. Vital Signs:  Visit Vitals  /73   Pulse 91   Temp 98.1 °F (36.7 °C)   Resp 18   Ht 5' 7\" (1.702 m)   Wt 72.3 kg (159 lb 6.4 oz)   SpO2 98%   BMI 24.97 kg/m²         Right chest port accessed  with positive blood return. Medications:  Medications Administered     heparin (porcine) pf 500 Units     Admin Date  12/04/2019 Action  Given Dose  500 Units Route  IntraVENous Administered By  Ady Guzman RN          sodium chloride (NS) flush 5-10 mL     Admin Date  12/04/2019 Action  Given Dose  10 mL Route  IntraVENous Administered By  Ady Guzman RN          sodium chloride 0.9 % bolus infusion 1,000 mL     Admin Date  12/04/2019 Action  New Bag Dose  1000 mL Rate  1,000 mL/hr Route  IntraVENous Administered By  Ady Guzman, [address] Patient tolerated treatment well. Patient discharged from [place] [address] ambulatory in no distress. Patient aware of next appointment on 1/7/19.     Elli Mclain RN  Future Appointments   Date Time Provider Shiva Zavala   1/7/2020 11:15 AM Suzie Fu NP [facility] SCHED   3/24/2020  1:50 PM Myles Llanes MD RDE [facility] [address] [facility]

## 2019-12-04 NOTE — PROGRESS NOTES
[facility] [place] [facility] [place] [address] [address] [facility] [address] [facility]  Nery Bardaless: [phone]  F: [phone]      Reason for Visit:   Mara Bee is a 69 y.o. female who is seen in self-referral for evaluation of pancreatic cancer. Treatment History:   · 10/4/18 pancreatic head mass FNA, pancreatic adenocarcinoma    10/15/18  Liver, Core Biopsy w/Touch Prep CYTOLOGIC INTERPRETATION:   · Numerous cores and fragments of benign hepatic parenchyma     10/24/18  Liver, Fine Needle Aspiration CYTOLOGIC INTERPRETATION:   Metastatic adenocarcinoma (see Comment). General Categorization   Positive for malignancy. Comment: The morphologic appearance is nonspecific with regard to site of origin but compatible with metastatic pancreatic carcinoma in this patient with known pancreatic adenocarcinoma ([id number]). 11/9/18-9/25/19- abraxane/gemcitabine -- PD  mFOLFIRINOX 10/16/19-    invitae genetic testing negative 10/2019    Strata testing:  KRAS p.G12D, MYC amp, TP53 mutation (x2); TASH    History of Present Illness:   She started having abdominal pain, gradual and persistent, x 1 month, pressure sensation, located in epigastrium, no radiation, no aggravating symptoms, no relieving factors. 25 lb weight loss over 6 months.  + nausea. Interval history:  In today for follow up. Complains of gr 2 fatigue, gr 2 hair loss, gr 1 insomnia, gr 1 concentration, gr 1 sob, gr 1 neuropathy, gr 1 urinary leakage. She continues to gain weight.      Past Medical History:   Diagnosis Date    Arthritis     Constipation     Diabetes ([address]     Hemorrhoids     Hiatal hernia     High cholesterol     Hypertension     Pancreatic cancer ([address]       Past Surgical History:   Procedure Laterality Date    HX KNEE ARTHROSCOPY Right     HX ORTHOPAEDIC      left wrist surgery    HX TONSILLECTOMY        Social History     Tobacco Use    Smoking status: Never Smoker    Smokeless tobacco: Never Used Substance Use Topics    Alcohol use: Yes     Alcohol/week: 2.0 - 4.0 standard drinks     Types: 1 - 2 Cans of beer, 1 - 2 Shots of liquor per week     Frequency: 2-4 times a month     Drinks per session: 1 or 2     Comment: minimal      Family History   Problem Relation Age of Onset    Cancer Mother         skin    Hypertension Mother     Heart Disease Mother     Cancer Father         skin    Heart Disease Father     Stroke Father     Diabetes Neg Hx      Current Outpatient Medications   Medication Sig    lipase-protease-amylase (CREON) 36,000-114,000- 180,000 unit cpDR capsule Take 2 capsules with first bite of meals TID. Take 1 with snacks BID. Indications: exocrine pancreatic insufficiency    glucose blood VI test strips (ACCU-CHEK RICHIE PLUS TEST STRP) strip Accu-Chek Richie Plus test strips   Check BS BID    OTHER Cranial prosthesis    Dx C25.7    lidocaine-prilocaine (EMLA) topical cream Apply  to affected area as needed for Pain.  prochlorperazine (COMPAZINE) 10 mg tablet TAKE 1 TAB BY MOUTH EVERY SIX (6) HOURS AS NEEDED FOR NAUSEA.  diphenoxylate-atropine (LOMOTIL) 2.5-0.025 mg per tablet Take 1 Tab by mouth four (4) times daily as needed for Diarrhea. Max Daily Amount: 4 Tabs.  ondansetron hcl (ZOFRAN) 8 mg tablet Take 1 Tab by mouth every eight (8) hours as needed for Nausea.  TRESIBA FLEXTOUCH U-200 200 unit/mL (3 mL) inpn Inject 50 units on chemo days and 10 units as needed for bs > 150 on non-chemo days    IBUPROFEN IB PO Take  by mouth as needed.  omeprazole (PRILOSEC) 20 mg capsule Take 20 mg by mouth daily. No current facility-administered medications for this visit.       Facility-Administered Medications Ordered in Other Visits   Medication Dose Route Frequency    ondansetron (ZOFRAN) injection 8 mg  8 mg IntraVENous See Admin Instructions    prochlorperazine (COMPAZINE) injection 10 mg  10 mg IntraVENous See Admin Instructions    sodium chloride 0.9 % bolus infusion 1,000 mL  1,000 mL IntraVENous ONCE    sodium chloride (NS) flush 5-10 mL  5-10 mL IntraVENous PRN    0.9% sodium chloride injection 10 mL  10 mL IntraVENous PRN    heparin (porcine) pf 500 Units  500 Units IntraVENous PRN      Allergies   Allergen Reactions    Amoxicillin Hives        Review of Systems: A comprehensive review of systems was performed and all systems were negative except for HPI and for the symptom review form, reviewed and scanned in. Physical Exam:     Visit Vitals  /67   Pulse 67   Temp 98.1 °F (36.7 °C) (Temporal)   Resp 18   Ht 5' 7\" (1.702 m)   Wt 159 lb 6.4 oz (72.3 kg)   SpO2 98%   BMI 24.97 kg/m²     ECOG PS: 4  General: in wheelchair today  Eyes: PERRLA, anicteric sclerae  HENT: Atraumatic, OP clear  Neck: Supple  Lymphatic: No cervical, supraclavicular, or inguinal adenopathy  Respiratory: CTAB, normal respiratory effort  CV: Normal rate, regular rhythm, no murmurs, 1+ LE edema bilateral LE  GI: Soft, nontender, nondistended, no masses, no hepatomegaly, no splenomegaly  MS: Normal gait and station. Digits without clubbing or cyanosis. Skin: No rashes, ecchymoses, or petechiae. Normal temperature, turgor, and texture. Psych: Alert, oriented, appropriate affect, normal judgment/insight        Results:     Lab Results   Component Value Date/Time    WBC 5.5 11/20/2019 01:14 PM    HGB 9.6 (L) 11/20/2019 01:14 PM    HCT 30.1 (L) 11/20/2019 01:14 PM    PLATELET 813 (H) 22/69/5283 01:14 PM    MCV 89.6 11/20/2019 01:14 PM    ABS.  NEUTROPHILS 3.6 11/20/2019 01:14 PM     Lab Results   Component Value Date/Time    Sodium 136 11/20/2019 01:14 PM    Potassium 3.9 11/20/2019 01:14 PM    Chloride 103 11/20/2019 01:14 PM    CO2 28 11/20/2019 01:14 PM    Glucose 134 (H) 11/20/2019 01:14 PM    BUN 10 11/20/2019 01:14 PM    Creatinine 0.77 11/20/2019 01:14 PM    GFR est AA >60 11/20/2019 01:14 PM    GFR est non-AA >60 11/20/2019 01:14 PM    Calcium 7.9 (L) 11/20/2019 01:14 PM Glucose (POC) 168 (H) 04/15/2019 10:44 AM     Lab Results   Component Value Date/Time    Bilirubin, total 0.2 11/20/2019 01:14 PM    ALT (SGPT) 15 11/20/2019 01:14 PM    AST (SGOT) 13 (L) 11/20/2019 01:14 PM    Alk. phosphatase 124 (H) 11/20/2019 01:14 PM    Protein, total 5.5 (L) 11/20/2019 01:14 PM    Albumin 2.3 (L) 11/20/2019 01:14 PM    Globulin 3.2 11/20/2019 01:14 PM     10/1/18 CT abd  There is a 3.8 x 1.9 cm mass involving the uncinate process and possibly  invading the duodenum. Findings are nonspecific but typical of pancreatic  carcinoma. There is no biliary or pancreatic ductal dilatation.     There is a poorly defined irregular hypodensity in segment IVb of the liver  measuring 3.7 x 1.9 cm. There appears to be focal biliary dilatation in the left  lobe behind this abnormality. Metastatic disease is suspected. There is a small,  1 cm hypodensity in the dome of the liver, likely segment 8. There is a 1 cm  hypodensity in segment 4 of the liver as well, also likely metastasis. Small  hypodensity measuring less than 1 cm in segment 6 at the tip laterally is also  nonspecific but probable metastasis.     Spleen kidneys and adrenals are unremarkable.     No free fluid or focal fluid collection.     Lung bases are clear.     Bone windows are unremarkable.     IMPRESSION:  1. 3.8 x 1.9 cm mass involving the uncinate process of the pancreas and possibly  invading the duodenum, this likely represents pancreatic carcinoma. 2. Likely metastatic disease in the liver. There is a 3.8 x 1.9 cm mass involving the uncinate process and possibly  invading the duodenum. Findings are nonspecific but typical of pancreatic  carcinoma. There is no biliary or pancreatic ductal dilatation.     There is a poorly defined irregular hypodensity in segment IVb of the liver  measuring 3.7 x 1.9 cm. There appears to be focal biliary dilatation in the left  lobe behind this abnormality. Metastatic disease is suspected.  There is a small,  1 cm hypodensity in the dome of the liver, likely segment 8. There is a 1 cm  hypodensity in segment 4 of the liver as well, also likely metastasis. Small  hypodensity measuring less than 1 cm in segment 6 at the tip laterally is also  nonspecific but probable metastasis.     Spleen kidneys and adrenals are unremarkable.     No free fluid or focal fluid collection.     Lung bases are clear.     Bone windows are unremarkable.     IMPRESSION:  1. 3.8 x 1.9 cm mass involving the uncinate process of the pancreas and possibly  invading the duodenum, this likely represents pancreatic carcinoma. 2. Likely metastatic disease in the liver. Records reviewed and summarized above. Pathology report(s) reviewed above. Radiology report(s) reviewed above. MRI abdomen 10/22/18: IMPRESSION:       1. Pancreatic uncinate process mass suspicious for pancreatic neoplasm, possibly  adenocarcinoma. Mass abuts the superior mesenteric artery with about 20%  circumference involvement but no luminal distortion or perivascular stranding. 2. Multiple hepatic lesions suspicious for metastatic neoplasm with segment IVb  lesion showing patchy areas of surrounding steatosis likely secondary to locally  altered intrahepatic hemodynamics and likely responsible for biopsy result. 3. Cholecystolithiasis and small gallbladder polyp. CT c/a/p 12/28/18: IMPRESSION:  Interval decrease in size of pancreatic mass. Slight interval decrease in size of hepatic metastases; these now demonstrate central low attenuation suggestive of necrosis. No evidence of new metastatic disease in the chest, abdomen, or pelvis. CT c/a/p 2/25/19:  LIVER: The lesions described on the prior examination have improved in the  interval. 19 mm lesion in the dome of the liver now measures 1 cm. 2.0 x 1.5 cm  lesion in the left hepatic lobe now measures 1.3 x 1.2 cm. Other smaller lesions  are no longer visualized. GALLBLADDER: Unremarkable.   SPLEEN: No mass.  PANCREAS: Mass lesion in the uncinate process now measures 1.7 x 2.0 cm,  previously measuring 2.0 x 3.0 cm. ADRENALS: Unremarkable. KIDNEYS: No mass, calculus, or hydronephrosis. STOMACH: Unremarkable. SMALL BOWEL: No dilatation or wall thickening. COLON: No dilatation or wall thickening. APPENDIX: Unremarkable. PERITONEUM: No ascites or pneumoperitoneum. RETROPERITONEUM: No lymphadenopathy or aortic aneurysm. REPRODUCTIVE ORGANS: Intrauterine device projects in satisfactory position. URINARY BLADDER: No mass or calculus. BONES: Degenerative changes are seen in the thoracic and lumbar spine. ADDITIONAL COMMENTS: N/A     IMPRESSION  IMPRESSION:  Interval decrease in the size of the hepatic metastases. Decrease in the size of  the mass lesion involving the uncinate process.     RECIST:  Pancreatic uncinate mass: Current: (68) 1.7X2.0cm     12/28/2018: (71) 3.0 x  2.0 cm   Segment 4B liver mass: Current: (56) 1.3 x 1.2 cm 12/28/2018: (57) 2.0 x 1.5 cm   Segment 5 liver mass: Current: (68) 4 x 7 mm 12/28/2018: (70) 1.4 x 0.9 cm    CXR for port study 4/15/19:   IMPRESSION:  Right internal jugular chest Port-A-Cath is seen with the catheter terminating  in the mid SVC. The port is slightly angled medially due to breast tissue. The  port is not flipped. Access needle appears to be within the port hub. Nursing  staff was able to get good blood return and flush the port without difficulty. No intervention was performed. CT a/p 4/22/19:  IMPRESSION:  1. Slight decrease in size of liver metastases, detailed above. 2. Slight decrease in size in uncinate process mass. 3. No evidence for new metastatic disease.     RECIST:  Uncinate process mass: 15 x 9 mm, image 56, previously 17 x 11 mm. Segment IVb metastasis 11 x 9 mm, image 50, previously 13 x 12 mm  Segment 5 metastasis: Barely detectable, image 62    CT c/a/p 6/17/19: IMPRESSION:  1. Stable lesion in the uncinate process.   2. Slight interval decrease in one of the 2 small liver lesions while the other  remains stable. 3. Stable small nodules right upper lobe. 4. No new evidence of metastatic disease or acute abnormality.     RECIST:  Uncinate process mass: 15 x 9 mm, image 68, previously 15 x 9 mm, image 56  Segment IVb metastasis 11 x 9 mm, image 56, previously 11 x 9 mm, image 50  Segment 5 metastasis: No longer visualized, previously barely detectable, image  62    CT c/a/p 8/12/19:  IMPRESSION:  Stable mass lesion in the uncinate process. Stable liver lesions. Stable  subpleural nodules right upper lobe. No new evidence of metastatic disease or acute abnormality.     RECIST:  Uncinate process mass: 15 x 9 mm, image 69, previously 15 x 9 mm, image 68   Segment IVb metastasis 11 x 10 mm, image 56, previously 11 x 10 mm, image 56   Segment 5 metastasis: Not visualized    CT c/a/p 10/7/19:   IMPRESSION:   1. Uncinate process mass. This is difficult to measure but appears to have  increased in size. A multiphase pancreatic protocol on the next follow-up  examination may be helpful to more precisely define the lesion. 2. Small liver lesions unchanged. 3. No evidence of new metastatic disease. 4. Intrauterine device. RECIST   Malignant neoplasm of other parts of the pancreas.     TARGET LESIONS:      Lesion (description)         Location (series/slice)                Size                                              1. Uncinate process mass    series 3 image 68    2.3 x 2 cm  2. Segment 4B liver lesion    series 3 image 57    10 x 9 mm        NONTARGET LESIONS:   None. Assessment/plan:   1. Uncinate pancreatic adenocarcinoma,  mets to liver, stage IV:  cT2 cN0 pM1  TASH    Discussed that while this is treatable, it is not curable, the goal of therapy is palliative. Discussed that without therapy, life expectancy would be 3-6 months, with therapy 12-18 months on average. CT on 10/7/19 with PD > 20% of pancreatic mass.     We discussed the risks and benefits of FOLFIRINOX chemotherapy, including potential side effects. These include but are not limited to fatigue, nausea vomiting, diarrhea, neuropathy, taste changes, cold intolerance, esophageal spasm, allergic reactions, alopecia, mucositis, myelosuppression, risk for infection, infertility, and rarely, death. Rarely, a patient may have a condition where they do not metabolize fluorouracil appropriately (called DPD deficiency), and they may have excessive toxicity. A Port-A-Cath will be required in order to deliver the continuous infusion. The patient has consented to beginning therapy. ·  mFOLFIRINOX (oxaliplatin 85 mg/m2, irinotecan 150mg/m2, leucovorin 400 mg/m2, and a 46 hour infusion of fluorouracil 2400 mg/m2 given every 2 weeks)  · Labs: CBC, BMP, Magnesium prior to each treatment, hepatic function panel every 4 weeks  · Prophylactic antiemetics: Palonosetron and dexamethasone on the day of each chemotherapy infusion. · PRN antiemetics: Ondansetron, Prochlorperazine  · PRN antidiarrheals: Atropine 0.4mg IV every 2 hours PRN during or immediately after irinotecan infusion,  imodium every 2 hours as needed at home  · EMLA cream for port    After this discussion, she is agreeable to mFOLFIRINOX, will start next week. She has signed informed consent. Cycle 3 will be held since 11/13/19 due to significant fatigue, diarrhea, nausea, and weight loss. Since then, she has been receiving IV hydration multiples times a week. She is feeling much better since holding treatment and symptoms have resolved. She would like to wait to restart until after 1/1/20. Plan to repeat imaging prior to restarting. Discussed if no more therapy, life expectancy may be 3 months or less    We will plan to see the patient in follow up at least once per cycle, or sooner if symptoms warrant. Genetic testing was negative.      2. DM2:  Holding metformin; not needing insulin at this time; saw Dr. Ken Sims; her BS are improving; she is now taking less insulin; Hgb A1C 6.8. She will touch base with Dr. Brigido De León. 3. Emotional well being:  She has excellent support and is coping well with her disease    4. Abdominal pain:  More cramping with BMs; palliative care has seen    5. Nutrition:  Berkley Cherry RD has met with her in the past, was previously holding on enzymes. She has lost 12 pounds since last visit. Reports taste changes, decreased appetite. Will have James Villatoro speak with her. Recommend at least 3-4 ensures/day if she is not eating. Discussed with Berkley Cherry, pancreatic enzyme use during Thanksgiving so she possibly may eat more    6. Insomnia: Ongoing but no worse. Currently taking melatonin. She has lunesta if needed. 7. Anemia:  Due to chemo and disease; monitor; iron profile and ferritin normal; gave injectafer 750 mg IV on 5/22/19. Iron sat 18% and Ferritin 386 on 8/28/19. Will continue to monitor. 8. Neutropenia:  Due to chemo, will monitor now that changing chemo    9. Sinus congestion: Intermittent but has improved. Clear/bloody sinus drainage. Recommend OTC sudafed and saline spray. She has seen Dr. Bonner Cancer, ENT, s/p steroids. On flonase. 10. Dehydration: s/p IV hydration on 11/13, 11/15, 11/18, 11/20, as well as 11/22, 11/26, 12/2, and today. 11. Fatigue: Due to treatment. This has improved and continues to improve since being off treatment. 12. LE edema: holding HCTZ at 6.25 mg daily. Also holding micardis    13. Neuropathy:  Stable; Due to chemo; in fingers and feet; worse in feet; tried cymbalta 30 mg daily, but had insomnia and nausea; acupuncture has helped    14. Nausea/vomiting:  Taking compazine and zofran as needed. Reports this has resolved. 15.  Diarrhea: S/p Octreotide 100 mcg on 11/13/19. Previously advised imodium with diarrhea as well as lomotil q4h. This has resolved since being off treatment. 16. Hypokalemia:  Will replete prn    17. NANETTE:  IVF as above. This has improved. 24 hour care has been arranged.          Signed By: Venus Pimentel MD

## 2019-12-05 NOTE — PROGRESS NOTES
Documented 12/5/2019. Patient visited by [facility] Volunteer on [facility] on 12/4/2019. Visited by .  Stewart Jon MDiv, [facility], [address] [facility] paging service: [phone])

## 2019-12-27 PROBLEM — K83.1 OBSTRUCTIVE JAUNDICE: Status: ACTIVE | Noted: 2019-01-01

## 2019-12-27 NOTE — PROGRESS NOTES
Patient here today for lab check. Received [place] SL message on 12/26 stating that patient had noticed to be jaundiced on 12/24. She has a history of metastatic pancreatic cancer and has been on chemo holiday since 10/30/19. Plan was to get repeat CT c/a/p on 1/2 and see her in office the following week. Recommended that she come in to infusion center to get labs checked, she was unable to do this yesterday and came in today. CBC and CMP were obtained. Total bili 15.6 which was a significant increase. Previously her bili was 0.2 on 11/20/19. ALT/AST/Alk phos also significantly increased. She is also very jaundiced. With her bili being so high, recommended that patient go to the ED for further evaluation. Patient states understanding. Report called to ED. Recommend Stat CT c/a/p and gi consult.

## 2019-12-27 NOTE — ED NOTES
Sent from heme/onc for jaundic, bili > 15 on outpatient labs. Wanted CT chest/abd/palv with RECIST protocol.     ALPHONSO Alonso  4:28 PM

## 2019-12-27 NOTE — PROGRESS NOTES
[facility] Progress Note    Date: 2019    Name: Cristofer Steele    MRN: [id number]         : 1945    Ms. Strange Arrived ambulatory and severely jaundiced to her sclera and face, neck and arms for Hydration. Assessment was completed and patient had no new complaints except for the jaundice which she noticed on pardeep ana m. Patient requested to get her labs peripherally and to access her with a 24g IV due to issues she has been having with her chest port, labs drawn & sent for processing. Ms. Strange's vitals were reviewed. Visit Vitals  /75   Pulse 70   Temp 97.9 °F (36.6 °C)   Resp 18   Wt 70.3 kg (155 lb)   SpO2 98%   Breastfeeding No   BMI 24.28 kg/m²       Lab results were obtained and reviewed. Recent Results (from the past 12 hour(s))   METABOLIC PANEL, COMPREHENSIVE    Collection Time: 19  2:13 PM   Result Value Ref Range    Sodium 130 (L) 136 - 145 mmol/L    Potassium 3.2 (L) 3.5 - 5.1 mmol/L    Chloride 96 (L) 97 - 108 mmol/L    CO2 27 21 - 32 mmol/L    Anion gap 7 5 - 15 mmol/L    Glucose 472 (H) 65 - 100 mg/dL    BUN 10 6 - 20 MG/DL    Creatinine 0.91 0.55 - 1.02 MG/DL    BUN/Creatinine ratio 11 (L) 12 - 20      GFR est AA >60 >60 ml/min/1.73m2    GFR est non-AA >60 >60 ml/min/1.73m2    Calcium 8.0 (L) 8.5 - 10.1 MG/DL    Bilirubin, total 15.6 (H) 0.2 - 1.0 MG/DL    ALT (SGPT) 217 (H) 12 - 78 U/L    AST (SGOT) 219 (H) 15 - 37 U/L    Alk.  phosphatase 1,001 (H) 45 - 117 U/L    Protein, total 5.3 (L) 6.4 - 8.2 g/dL    Albumin 2.0 (L) 3.5 - 5.0 g/dL    Globulin 3.3 2.0 - 4.0 g/dL    A-G Ratio 0.6 (L) 1.1 - 2.2     CBC WITH 3 PART DIFF    Collection Time: 19  2:13 PM   Result Value Ref Range    WBC 7.5 3.6 - 11.0 K/uL    RBC 3.06 (L) 3.80 - 5.20 M/uL    HGB 9.6 (L) 11.5 - 16.0 g/dL    HCT 28.6 (L) 35.0 - 47.0 %    MCV 93.5 80.0 - 99.0 FL    MCH 31.4 26.0 - 34.0 PG    MCHC 33.6 30.0 - 36.5 g/dL    RDW 20.0 (H) 11.8 - 15.8 %    PLATELET 277 388 - 795 K/uL NEUTROPHILS 84 (H) 32 - 75 %    MIXED CELLS 11 3.2 - 16.9 %    LYMPHOCYTES 5 (L) 12 - 49 %    ABS. NEUTROPHILS 6.3 1.8 - 8.0 K/UL    ABS. MIXED CELLS 0.8 0.2 - 1.2 K/uL    ABS. LYMPHOCYTES 0.4 (L) 0.8 - 3.5 K/UL    DF AUTOMATED         Medications:  1 L Bolus     Ms. Strange tolerated treatment well and was discharged from [place] [address] in stable condition at [phone] at the request of SENAIT Vernon due to patient needing to go to the ER due to her bilirubin level. PIV left in place for the patient since she is going straight to the ER at [facility].   Lu Treadwell RN  December 27, 2019

## 2019-12-27 NOTE — TELEPHONE ENCOUNTER
12/27/19 3:49 PM: Per request of Dr. Chaparro Branch and Ryan Michaels NP, called the [address] [place] [address] ED to inform them that the patient is being sent over for further evaluation. Spoke to ED physician and advised that the patient has metastatic pancreatic cancer and contacted this office yesterday stating that she had developed jaundice. Advised that labs were checked today and her bilirubin is 15.6, ALT is 217, AST is 219, and alk phos is 1,001. Advised that Dr. Chaparro Branch and NP recommend performing a CT of the chest/abdomen/pelvis to be read with RECIST criteria, as well as a GI consult. Provided the phone number for this office in case ED staff has any questions or concerns.

## 2019-12-27 NOTE — ED PROVIDER NOTES
I have evaluated the patient as the Provider in Triage. I have reviewed Her vital signs and the triage nurse assessment. I have talked with the patient and any available family and advised that I am the provider in triage and have ordered the appropriate study to initiate their work up based on the clinical presentation during my assessment. I have advised that the patient will be accommodated in the Main ED as soon as possible. I have also requested to contact the triage nurse or myself immediately if the patient experiences any changes in their condition during this brief waiting period. Note written by Yoshi Malone, as dictated by ALPHONSO Mckinnon 4:26 PM    Pt reports she was sent to ED by Dr. Charisse Almendarez for evaluation of stage 4 pancreatic cancer. Chart Review:  Note from 12/27/2019 - Patient here today for lab check. Received [employer] message on 12/26 stating that patient had noticed to be jaundiced on 12/24. She has a history of metastatic pancreatic cancer and has been on chemo holiday since 10/30/19. Plan was to get repeat CT c/a/p on 1/2 and see her in office the following week.    Recommended that she come in to infusion center to get labs checked, she was unable to do this yesterday and came in today. CBC and CMP were obtained. Total bili 15.6 which was a significant increase. Previously her bili was 0.2 on 11/20/19. ALT/AST/Alk phos also significantly increased. She is also very jaundiced. With her bili being so high, recommended that patient go to the ED for further evaluation. Patient states understanding.   Report called to ED. Recommend Stat CT c/a/p and gi consult. Cintia Heard NP   Oncology     69 y.o. female with past medical history significant for pancreatic cancer, HTN, and diabetes who presents from home with chief complaint of skin color change. Patient states ~4 days ago she noticed her skins color was changing to [facility]. \" Patient was seen by Oncology earlier today who referred patient to ED due to Bili being high alongside ALT/AST/Alk phos also significantly increasing. Patient [facility] to [facility] ED c/o skin color change described as \"yellow\" and accompanying nausea. Patient notes she is going to f/u with Oncology on 01/07/19. Patient denies fever, vomiting, and diarrhea. There are no other acute medical concerns at this time. Social hx: No Tobacco use, (+) EtOH use, No illicit drug use. PCP: Teresa Hodges MD  Oncology: Arnav Suero MD. Adair Dale NP. Note written by Yoshi Umanzor, as dictated by Seb Gregorio MD 4:26 PM     The history is provided by the patient and medical records. No  was used. Past Medical History:   Diagnosis Date    Arthritis     Constipation     Diabetes ([facility]     Hemorrhoids     Hiatal hernia     High cholesterol     Hypertension     Pancreatic cancer ([facility]        Past Surgical History:   Procedure Laterality Date    HX KNEE ARTHROSCOPY Right     HX ORTHOPAEDIC      left wrist surgery    HX TONSILLECTOMY           Family History:   Problem Relation Age of Onset    Cancer Mother         skin    Hypertension Mother     Heart Disease Mother     Cancer Father         skin    Heart Disease Father     Stroke Father     Diabetes Neg Hx        Social History     Socioeconomic History    Marital status:      Spouse name: Not on file    Number of children: Not on file    Years of education: Not on file    Highest education level: Not on file   Occupational History    Not on file   Social Needs    Financial resource strain: Not on file    Food insecurity:     Worry: Not on file     Inability: Not on file    Transportation needs:     Medical: Not on file     Non-medical: Not on file   Tobacco Use    Smoking status: Never Smoker    Smokeless tobacco: Never Used   Substance and Sexual Activity    Alcohol use:  Yes     Alcohol/week: 2.0 - 4.0 standard drinks Types: 1 - 2 Cans of beer, 1 - 2 Shots of liquor per week     Frequency: 2-4 times a month     Drinks per session: 1 or 2     Comment: minimal    Drug use: No    Sexual activity: Never   Lifestyle    Physical activity:     Days per week: Not on file     Minutes per session: Not on file    Stress: Not on file   Relationships    Social connections:     Talks on phone: Not on file     Gets together: Not on file     Attends Shinto service: Not on file     Active member of club or organization: Not on file     Attends meetings of clubs or organizations: Not on file     Relationship status: Not on file    Intimate partner violence:     Fear of current or ex partner: Not on file     Emotionally abused: Not on file     Physically abused: Not on file     Forced sexual activity: Not on file   Other Topics Concern    Not on file   Social History Narrative    Lives in [place] [facility] alone. Has one son.  since 2007. Used to stay home with her son and fixed things around the house. Likes to garden. ALLERGIES: Amoxicillin    Review of Systems   Constitutional: Negative for chills and fever. HENT: Negative. Respiratory: Negative. Negative for shortness of breath. Cardiovascular: Negative. Negative for chest pain. Gastrointestinal: Positive for nausea. Negative for abdominal pain, constipation, diarrhea and vomiting. Genitourinary: Negative. Musculoskeletal: Negative. Skin: Positive for color change. Neurological: Negative. Negative for dizziness and light-headedness. All other systems reviewed and are negative. Vitals:    12/27/19 1626   BP: 185/87   Pulse: 71   Resp: 18   Temp: 97.6 °F (36.4 °C)   SpO2: 98%   Weight: 70.3 kg (155 lb)   Height: 5' 7\" (1.702 m)            Physical Exam  Vitals signs and nursing note reviewed. Constitutional:       Appearance: She is well-developed. HENT:      Head: Normocephalic and atraumatic.    Eyes:      Pupils: Pupils are equal, round, and reactive to light. Neck:      Musculoskeletal: Normal range of motion and neck supple. Cardiovascular:      Rate and Rhythm: Normal rate and regular rhythm. Pulmonary:      Effort: Pulmonary effort is normal. No respiratory distress. Breath sounds: Normal breath sounds. No stridor. No wheezing, rhonchi or rales. Chest:      Chest wall: No tenderness. Abdominal:      General: There is no distension. Palpations: Abdomen is soft. Tenderness: There is no tenderness. Musculoskeletal: Normal range of motion. Comments: 1+ pitting edema to lower extremities. Skin:     General: Skin is warm and dry. Capillary Refill: Capillary refill takes less than 2 seconds. Coloration: Skin is jaundiced. Comments: Telangiectasia. Neurological:      Mental Status: She is alert and oriented to person, place, and time. Psychiatric:         Behavior: Behavior normal.      Note written by Yoshi Ulloa, as dictated by Geovanna Willett MD 4:26 PM     MDM       Procedures    Patient is being admitted to the hospital.  The results of their tests and reasons for their admission have been discussed with them and/or available family. They convey agreement and understanding for the need to be admitted and for their admission diagnosis. Consultation will be made now with the inpatient physician for hospitalization. Camposist Manuel Serve for Admission  8:20 PM    ED Room Number: ER02/02  Patient Name and age:   Krish Strange 69 y.o.  female  Working Diagnosis:   1. Jaundice      Readmission: no  Isolation Requirements:  no  Recommended Level of Care:  med/surg  Code Status:  Full Code  Department:[facility] ED - [phone]  Other:  GI aware

## 2019-12-28 PROBLEM — R63.4 WEIGHT LOSS: Status: ACTIVE | Noted: 2019-01-01

## 2019-12-28 PROBLEM — E87.1 HYPONATREMIA: Status: ACTIVE | Noted: 2019-01-01

## 2019-12-28 PROBLEM — E87.6 HYPOKALEMIA: Status: ACTIVE | Noted: 2019-01-01

## 2019-12-28 PROBLEM — R79.89 LFT ELEVATION: Status: ACTIVE | Noted: 2019-01-01

## 2019-12-28 PROBLEM — G47.00 INSOMNIA: Status: ACTIVE | Noted: 2019-01-01

## 2019-12-28 PROBLEM — R53.83 FATIGUE: Status: ACTIVE | Noted: 2019-01-01

## 2019-12-28 PROBLEM — E80.6 HYPERBILIRUBINEMIA: Status: ACTIVE | Noted: 2019-01-01

## 2019-12-28 NOTE — DISCHARGE INSTRUCTIONS
Patient Discharge Instructions    Chelsey Armstrong / [id number] : 1945    Admitted 2019 Discharged: 2019     Primary Diagnoses  Problem List as of 2019 Date Reviewed: 2019           Hyponatremia   Hypokalemia   Hyperbilirubinemia   LFT elevation   Pancreatic cancer ([facility]   Hypertension   High cholesterol   Hiatal hernia   Arthritis   Diabetes (HCC)   Weight loss   Insomnia   Fatigue   Obstructive jaundice   Anemia   Malignant neoplasm of other parts of pancreas ([facility]          Take Home Medications     · It is important that you take the medication exactly as they are prescribed. · Keep your medication in the bottles provided by the pharmacist and keep a list of the medication names, dosages, and times to be taken in your wallet. · Do not take other medications without consulting your doctor. What to do at Home    Recommended diet: Regular Diet, Increase noncaffeinated fluids and Encourage fluids    Recommended activity: Activity as tolerated    If you experience worse symptoms, please follow up with Dr Delora Lanes or Dr Naomi Santos. Follow-up with your PCP in a few days        Information obtained by :  I understand that if any problems occur once I am at home I am to contact my physician. I understand and acknowledge receipt of the instructions indicated above.                                                                                                                                            Physician's or R.N.'s Signature                                                                  Date/Time                                                                                                                                              Patient or Representative Signature                                                          Date/Time

## 2019-12-28 NOTE — CONSULTS
Gastroenterology Consult     Referring Physician: Taylor Mendoza    Consult Date: 12/28/2019     Subjective:     Chief Complaint: Jaundice    History of Present Illness: Ayo Vieyra is a 69 y.o. female who is seen in consultation for Biliary obstruction. Personal history 2018 diagnosis pancreatic cancer; radiologic studies have indicated terminal condition. She understands this in fine detail. The    She has received chemotherapy; that had to be suspended because of significant side effect. Went to see oncology, was noted to be overtly jaundiced; referred for additional evaluation. She has not had pain, fever, vomiting. Pruritus is mild, intermittent. I have personally reviewed her MRI scan just accomplished today. Past Medical History:   Diagnosis Date    Arthritis     Constipation     Diabetes ([address]     Hemorrhoids     Hiatal hernia     High cholesterol     Hypertension     Pancreatic cancer ([address]      Past Surgical History:   Procedure Laterality Date    HX KNEE ARTHROSCOPY Right     HX ORTHOPAEDIC      left wrist surgery    HX TONSILLECTOMY        Family History   Problem Relation Age of Onset    Cancer Mother         skin    Hypertension Mother     Heart Disease Mother     Cancer Father         skin    Heart Disease Father     Stroke Father     Diabetes Neg Hx      Social History     Tobacco Use    Smoking status: Never Smoker    Smokeless tobacco: Never Used   Substance Use Topics    Alcohol use:  Yes     Alcohol/week: 2.0 - 4.0 standard drinks     Types: 1 - 2 Cans of beer, 1 - 2 Shots of liquor per week     Frequency: 2-4 times a month     Drinks per session: 1 or 2     Comment: minimal      Allergies   Allergen Reactions    Amoxicillin Hives     Current Facility-Administered Medications   Medication Dose Route Frequency    0.9% sodium chloride with KCl 40 mEq/L infusion   IntraVENous CONTINUOUS    lidocaine-prilocaine (EMLA) 2.5-2.5 % cream   Topical PRN    pantoprazole (PROTONIX) tablet 40 mg  40 mg Oral ACB    potassium chloride SR (KLOR-CON 10) tablet 40 mEq  40 mEq Oral Q4H    sodium chloride (NS) flush 5-40 mL  5-40 mL IntraVENous Q8H    sodium chloride (NS) flush 5-40 mL  5-40 mL IntraVENous PRN    glucose chewable tablet 16 g  4 Tab Oral PRN    glucagon (GLUCAGEN) injection 1 mg  1 mg IntraMUSCular PRN    dextrose 10% infusion 0-250 mL  0-250 mL IntraVENous PRN        Review of Systems:  A detailed 10 organ review of systems is obtained with pertinent positives as listed in the History of Present Illness and Past Medical History. All others are negative. Objective:     Physical Exam:  Visit Vitals  /81 (BP 1 Location: Left arm, BP Patient Position: At rest)   Pulse 73   Temp 98.5 °F (36.9 °C)   Resp 18   Ht 5' 7\" (1.702 m)   Wt 70.3 kg (155 lb)   SpO2 99%   Breastfeeding No   BMI 24.28 kg/m²        Skin:  Deeply jaundiced. Extremities and face reveal no rashes. No nicholas erythema. No telangiectasias on the chest wall. HEENT: Sclerae icteric. Extra-occular muscles are intact. No oral ulcers. No abnormal pigmentation of the lips. The neck is supple. Cardiovascular: Regular rate and rhythm. 2/6 systolic murmur. Respiratory:  Comfortable breathing with no accessory muscle use. Clear breath sounds with no wheezes, rales, or rhonchi. GI:  Abdomen nondistended, soft, and nontender. Normal active bowel sounds. No enlargement of the liver or spleen. No masses palpable. Musculoskeletal:  No pitting edema of the lower legs. Extremities have good range of motion. No costovertebral tenderness. Neurological:  Gross memory appears intact. Patient is alert and oriented. Psychiatric:  Mood appears appropriate with judgement intact. Lymphatic:  No cervical or supraclavicular adenopathy.     Lab/Data Review:  CMP:   Lab Results   Component Value Date/Time     (L) 12/28/2019 04:27 AM    K 2.7 (LL) 12/28/2019 04:27 AM     12/28/2019 04:27 AM CO2 26 12/28/2019 04:27 AM    AGAP 9 12/28/2019 04:27 AM     (H) 12/28/2019 04:27 AM    BUN 8 12/28/2019 04:27 AM    CREA 0.60 12/28/2019 04:27 AM    GFRAA >60 12/28/2019 04:27 AM    GFRNA >60 12/28/2019 04:27 AM    CA 8.0 (L) 12/28/2019 04:27 AM    ALB 1.9 (L) 12/28/2019 04:27 AM    TP 4.9 (L) 12/28/2019 04:27 AM    GLOB 3.0 12/28/2019 04:27 AM    AGRAT 0.6 (L) 12/28/2019 04:27 AM    SGOT 199 (H) 12/28/2019 04:27 AM     (H) 12/28/2019 04:27 AM     CBC:   Lab Results   Component Value Date/Time    WBC 7.4 12/28/2019 04:27 AM    HGB 9.2 (L) 12/28/2019 04:27 AM    HCT 28.0 (L) 12/28/2019 04:27 AM     12/28/2019 04:27 AM     MRI shows dilation of the biliary duct system to mass lesion in the head of the pancreas. Assessment/Plan:     Active Problems:    Malignant neoplasm of other parts of pancreas ([address] (10/8/2018)      Anemia (5/22/2019)      Obstructive jaundice (12/27/2019)      Hyponatremia (12/28/2019)      Hypokalemia (12/28/2019)      Hyperbilirubinemia (12/28/2019)      LFT elevation (12/28/2019)      Pancreatic cancer (HCC) ()      Hypertension ()      High cholesterol ()      Hiatal hernia ()      Arthritis ()      Diabetes (HCC) ()      Weight loss (12/28/2019)      Insomnia (12/28/2019)      Fatigue (12/28/2019)         I have discussed with patient and her son findings to date; she does not demonstrate any evidence for cholangitis or other emergency condition. She specifically understands that alleviation of her jaundice may not improve tolerance of chemotherapy in the future, may not extend her life expectancy. I discussed with her the possibility of ERCP, sphincterotomy, stent placement versus PTC drain placement; she understands potential comp patients. All questions answered. As there are no emergency findings I do not believe she must stay additional time in the hospital if she wishes to go home and consider her options her leisure. Available as needed.

## 2019-12-28 NOTE — PROGRESS NOTES
[facility]    Medical Progress Note      NAME: Shakila Perrin   :  1945  MRM:  [id number]    Date/Time: 2019  10:44 AM          Assessment and Plan:     Obstructive jaundice / Hyperbilirubinemia / LFT elevation - POA, new, presumed to extension of known cancer. GI consult. MRCP, then ERCP with stent. NPO. Malignant neoplasm of other parts of pancreas - POA, followed by Dr Roslyn Menon. Not responding to chemo. Chemo on hold till . Consult onc to re evaluate. Anemia - POA due to cancer and chemo. Check serologies and monitor for transfusion if Hb<7    Dehydration / Hyponatremia / Hypokalemia - POA, chronic due to chemo and now acute due to poor PO intake. Hydrate and replete lytes. Weight loss - Nutrition consult. Supplements after ERCP/stent. Creon when eating. Insomnia / Fatigue - Fall precautions and PT eval.    Hiatal hernia - PPI for comfort    Arthritis - Tylenol prn    Hypertension - BP fine. Stop all testing and treatment in setting of terminal cancer    High cholesterol - Stop all testing and treatment in setting of terminal cancer    Diabetes - BG fine. Stop all testing and treatment in setting of terminal cancer         Subjective:     Chief Complaint:  Still yellow, a bit weak, no pain    ROS:  (bold if positive, if negative)    Tolerating PT  NPO        Objective:     Last 24hrs VS reviewed since prior progress note.  Most recent are:    Visit Vitals  /78 (BP 1 Location: Right arm, BP Patient Position: At rest)   Pulse 68   Temp 98.4 °F (36.9 °C)   Resp 16   Ht 5' 7\" (1.702 m)   Wt 70.3 kg (155 lb)   SpO2 98%   Breastfeeding No   BMI 24.28 kg/m²     SpO2 Readings from Last 6 Encounters:   19 98%   19 98%   19 98%   19 98%   19 98%   19 98%        No intake or output data in the 24 hours ending 19 1044     Physical Exam:    Gen:  Faril, in no acute distress  HEENT:  Pink conjunctivae, PERRL, hearing intact to voice, dry mucous membranes, alopecia  Neck:  Supple, without masses, thyroid non-tender  Resp:  No accessory muscle use, clear breath sounds without wheezes rales or rhonchi  Card:  No murmurs, normal S1, S2 without thrills, bruits or peripheral edema  Abd:  Soft, non-tender, non-distended, normoactive bowel sounds are present, no mass  Lymph:  No cervical or inguinal adenopathy  Musc:  No cyanosis or clubbing  Skin:  Jaundice, No rashes or ulcers, skin turgor is good  Neuro:  Cranial nerves are grossly intact, general motor weakness, follows commands   Psych:  Good insight, oriented to person, place and time, alert    Telemetry reviewed:   normal sinus rhythm  __________________________________________________________________  Medications Reviewed: (see below)  Medications:     Current Facility-Administered Medications   Medication Dose Route Frequency    0.9% sodium chloride with KCl 40 mEq/L infusion   IntraVENous CONTINUOUS    lidocaine-prilocaine (EMLA) 2.5-2.5 % cream   Topical PRN    potassium chloride 10 mEq in 100 ml IVPB  10 mEq IntraVENous Q1H    sodium chloride (NS) flush 5-40 mL  5-40 mL IntraVENous Q8H    sodium chloride (NS) flush 5-40 mL  5-40 mL IntraVENous PRN    glucose chewable tablet 16 g  4 Tab Oral PRN    glucagon (GLUCAGEN) injection 1 mg  1 mg IntraMUSCular PRN    dextrose 10% infusion 0-250 mL  0-250 mL IntraVENous PRN        Lab Data Reviewed: (see below)  Lab Review:     Recent Labs     12/28/19 0427 12/27/19  1413   WBC 7.4 7.5   HGB 9.2* 9.6*   HCT 28.0* 28.6*    307     Recent Labs     12/28/19  0819 12/28/19  0427 12/27/19  1413   NA  --  135* 130*   K  --  2.7* 3.2*   CL  --  100 96*   CO2  --  26 27   GLU  --  307* 472*   BUN  --  8 10   CREA  --  0.60 0.91   CA  --  8.0* 8.0*   ALB  --  1.9* 2.0*   TBILI  --  15.5* 15.6*   SGOT  --  199* 219*   ALT  --  207* 217*   INR 1.1  --   --      Lab Results   Component Value Date/Time    Glucose (POC) 168 (H) 04/15/2019 10:44 AM    Glucose (POC) 316 (H) 10/04/2018 03:45 PM    Glucose (POC) 445 (H) 09/29/2018 10:26 AM     No results for input(s): PH, PCO2, PO2, HCO3, FIO2 in the last 72 hours. Recent Labs     12/28/19  0819   INR 1.1     All Micro Results     None          Other pertinent lab: none    Total time spent with patient: 45 Minutes I reviewed chart, notes, data and current medications in the medical record. I have examined and treated the patient at bedside during this period.                  Care Plan discussed with: Patient, Nursing Staff, Consultant/Specialist and >50% of time spent in counseling and coordination of care    Discussed:  Care Plan and D/C Planning    Prophylaxis:  H2B/PPI    Disposition:  Home w/Family           ___________________________________________________    Attending Physician: Weston Mujica MD

## 2019-12-28 NOTE — DISCHARGE SUMMARY
Physician Discharge Summary     Patient ID:  Ever Vazquez  [id number]  79 y.o.  1945    Admit date: 12/27/2019    Discharge date and time: 12/28/2019    Admission Diagnoses: Obstructive jaundice [K83.1]    Discharge Diagnoses:    Principal Diagnosis    Obstructive jaundice                                         Hospital Course and other diagnoses  Obstructive jaundice / Hyperbilirubinemia / LFT elevation - POA, new, presumed to extension of known cancer. GI consulted. MRCP shows this is due to the mass at the pancreatic head, as expected. ERCP cannot be done at [facility] due to lack of credentialed staff. Resume diet. Can DC home and outpatient stent at [facility] next week.     Malignant neoplasm of other parts of pancreas - POA, followed by Dr Maudie Gosselin. Not responding to chemo. Chemo on hold till 2020. Consult onc to re evaluate.     Anemia - POA due to cancer and chemo. Check serologies and monitor for transfusion if Hb<7     Dehydration / Hyponatremia / Hypokalemia - POA, chronic due to chemo and now acute due to poor PO intake. Hydrate and replete lytes.     Weight loss - Nutrition consult. Supplements after ERCP/stent. Creon when eating.     Insomnia / Fatigue - Fall precautions and PT eval.     Hiatal hernia - PPI for comfort     Arthritis - Tylenol prn     Hypertension - BP fine. Stop all testing and treatment in setting of terminal cancer     High cholesterol - Stop all testing and treatment in setting of terminal cancer     Diabetes - BG fine. Stop all testing and treatment in setting of terminal cancer     PCP: Lori Wheatley MD    Consults: GI and Hematology/Oncology    Significant Diagnostic Studies: See Hospital Course    Discharged home in improved condition.     Discharge Exam:  /78 (BP 1 Location: Right arm, BP Patient Position: At rest)   Pulse 68   Temp 98.4 °F (36.9 °C)   Resp 16   Ht 5' 7\" (1.702 m)   Wt 70.3 kg (155 lb)   SpO2 98%   Breastfeeding No   BMI 24.28 kg/m²      Gen:  Faril, in no acute distress  HEENT:  Pink conjunctivae, PERRL, hearing intact to voice, dry mucous membranes, alopecia  Neck:  Supple, without masses, thyroid non-tender  Resp:  No accessory muscle use, clear breath sounds without wheezes rales or rhonchi  Card:  No murmurs, normal S1, S2 without thrills, bruits or peripheral edema  Abd:  Soft, non-tender, non-distended, normoactive bowel sounds are present, no mass  Lymph:  No cervical or inguinal adenopathy  Musc:  No cyanosis or clubbing  Skin:  Jaundice, No rashes or ulcers, skin turgor is good  Neuro:  Cranial nerves are grossly intact, general motor weakness, follows commands   Psych:  Good insight, oriented to person, place and time, alert    Patient Instructions:   Current Discharge Medication List      START taking these medications    Details   potassium chloride SR (K-TAB) 20 mEq tablet Take 1 Tab by mouth two (2) times a day for 10 days. Qty: 20 Tab, Refills: 0         CONTINUE these medications which have NOT CHANGED    Details   lidocaine-prilocaine (EMLA) topical cream Apply  to affected area as needed for Pain. Qty: 30 g, Refills: 0    Associated Diagnoses: Malignant neoplasm of other parts of pancreas ([address]      lipase-protease-amylase (CREON) 36,000-114,000- 180,000 unit cpDR capsule Take 2 capsules with first bite of meals TID. Take 1 with snacks BID. Indications: exocrine pancreatic insufficiency  Qty: 240 Cap, Refills: 3      prochlorperazine (COMPAZINE) 10 mg tablet TAKE 1 TAB BY MOUTH EVERY SIX (6) HOURS AS NEEDED FOR NAUSEA. Qty: 50 Tab, Refills: 5      diphenoxylate-atropine (LOMOTIL) 2.5-0.025 mg per tablet Take 1 Tab by mouth four (4) times daily as needed for Diarrhea. Max Daily Amount: 4 Tabs. Qty: 45 Tab, Refills: 1    Associated Diagnoses: Malignant neoplasm of other parts of pancreas ([address]  Diarrhea, unspecified type      ondansetron hcl (ZOFRAN) 8 mg tablet Take 1 Tab by mouth every eight (8) hours as needed for Nausea. Qty: 24 Tab, Refills: 3    Associated Diagnoses: Malignant neoplasm of other parts of pancreas (HCC)      TRESIBA FLEXTOUCH U-200 200 unit/mL (3 mL) inpn Inject 50 units on chemo days and 10 units as needed for bs > 150 on non-chemo days  Refills: 11      IBUPROFEN IB PO Take  by mouth as needed. 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 Activity: Activity as tolerated  Diet: Regular Diet, Clear liquids, advance as tolerated, Increase noncaffeinated fluids, Low fat, Low cholesterol and Encourage fluids  Wound Care: None needed    Follow-up with your PCP, Dr Yojana Fraser and Dr Leigh Ann Lea in a few days.   Follow-up tests/labs - none    Signed:  Katie Roberts MD  12/28/2019  2:09 PM

## 2019-12-28 NOTE — H&P
[facility] Adult  Hospitalist Group  History and Physical    Primary Care Provider: Lori Wheatley MD    Subjective: Ever Vazquez is a 69 y.o. female with past medical history of hypertension, hyperlipidemia and pancreatic cancer sent to the ED for abnormal lab finding. Patient went to see her oncology follow-up today and had her blood work which showed significantly elevated bilirubin level accompanied by jaundice and referred to the ED for further evaluation. Patient has occasional generalized itching but denied any fever, abdominal pain, nausea or vomiting  Review of Systems:    12 point review of systems was done and found to be negative except for mentioned in the HPI    Past Medical History:   Diagnosis Date    Arthritis     Constipation     Diabetes [facility]     Hemorrhoids     Hiatal hernia     High cholesterol     Hypertension     Pancreatic cancer ([facility]       Past Surgical History:   Procedure Laterality Date    HX KNEE ARTHROSCOPY Right     HX ORTHOPAEDIC      left wrist surgery    HX TONSILLECTOMY       Prior to Admission medications    Medication Sig Start Date End Date Taking? Authorizing Provider   lidocaine-prilocaine (EMLA) topical cream Apply  to affected area as needed for Pain. 11/22/19   Gloria Simmons NP   lipase-protease-amylase (CREON) 36,000-114,000- 180,000 unit cpDR capsule Take 2 capsules with first bite of meals TID. Take 1 with snacks BID. Indications: exocrine pancreatic insufficiency 11/20/19   Gloria Simmons NP   prochlorperazine (COMPAZINE) 10 mg tablet TAKE 1 TAB BY MOUTH EVERY SIX (6) HOURS AS NEEDED FOR NAUSEA. 11/6/19   Alexandrea Fajardo MD   diphenoxylate-atropine (LOMOTIL) 2.5-0.025 mg per tablet Take 1 Tab by mouth four (4) times daily as needed for Diarrhea. Max Daily Amount: 4 Tabs. 11/6/19   Gloria Simmons NP   ondansetron hcl (ZOFRAN) 8 mg tablet Take 1 Tab by mouth every eight (8) hours as needed for Nausea.  8/21/19   Gloria Simmons NP TRESIBA FLEXTOUCH U-200 200 unit/mL (3 mL) inpn Inject 50 units on chemo days and 10 units as needed for bs > 150 on non-chemo days 2/28/19   Provider, Historical   IBUPROFEN IB PO Take  by mouth as needed. 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 Provider, Historical     Allergies   Allergen Reactions    Amoxicillin Hives      Family History   Problem Relation Age of Onset    Cancer Mother         skin    Hypertension Mother     Heart Disease Mother     Cancer Father         skin    Heart Disease Father     Stroke Father     Diabetes Neg Hx         SOCIAL HISTORY:  Patient resides at home  Patient ambulates independent  Smoking history:   Alcohol history:  none        Objective:       Physical Exam:   Physical Exam  Constitutional:       General: She is not in acute distress. Appearance: She is not ill-appearing or diaphoretic. HENT:      Head: Normocephalic and atraumatic. Eyes:      General: No scleral icterus. Left eye: No discharge. Extraocular Movements: Extraocular movements intact. Pupils: Pupils are equal, round, and reactive to light. Neck:      Musculoskeletal: Normal range of motion and neck supple. Cardiovascular:      Rate and Rhythm: Normal rate and regular rhythm. Pulmonary:      Effort: Pulmonary effort is normal. No respiratory distress. Breath sounds: Normal breath sounds. No stridor. Abdominal:      General: Abdomen is flat. There is no distension. Palpations: There is no mass. Tenderness: There is no tenderness. Hernia: No hernia is present. Musculoskeletal: Normal range of motion. General: No swelling, tenderness, deformity or signs of injury.       Right lower leg: No edema. Left lower leg: No edema. Skin:     General: Skin is warm. Coloration: Skin is jaundiced. Skin is not pale. Findings: No bruising, erythema, lesion or rash. Neurological:      General: No focal deficit present. Mental Status: She is alert and oriented to person, place, and time. Psychiatric:         Mood and Affect: Mood normal.         Behavior: Behavior normal.       Cap refill: Brisk, less than 3 seconds  Pulses: 2+, symmetric in all extremities      Data Review: All diagnostic labs and studies have been reviewed. CT abdomen  1. New marked intrahepatic biliary dilatation, common bile duct dilatation,  pancreatic duct dilatation and gallbladder dilatation. Interval increase in size  of ill-defined pancreatic head\uncinate mass, difficult to measure, but  measuring approximately 8.5 cm x 3.7 cm x 2.6 cm.  2. Hepatic metastatic disease, as described above.     Assessment:     #Obstructive jaundice  -With no evidence of sepsis or cholangitis  -CT abdomen/pelvis showed market dilatation of intrahepatic biliary ducts, PD and CBD along with increase in size of the pancreatic head mass  -We will keep her n.p.o. for MRCP in a.m.  -GI consult placed for possible ERCP and stenting    #Metastatic pancreatic cancer  -Continue follow-up with oncology    #Diabetes  -LDSS, monitor fingersticks closely    #Hypertension  -Stable    Anemia stable    #Hypokalemia  Monitor and correct electrolytes-            FUNCTIONAL STATUS PRIOR TO HOSPITALIZATION (including history of recent falls): independent    Signed By: Shonda Martinez MD     December 27, 2019

## 2019-12-28 NOTE — ROUTINE PROCESS
TRANSFER - IN REPORT:    Verbal report received from Talita Alexander RN(name) on [address]  being received from ED (unit) for routine progression of care. Report consisted of patients Situation, Background, Assessment and   Recommendations(SBAR). Information from the following report(s) SBAR, Kardex and Med Rec Status was reviewed with the receiving nurse. Opportunity for questions and clarification was provided. Assessment completed upon patients arrival to unit and care assumed. Primary Nurse Eliseo Del Cid RN and Ashly Keys RN performed a dual skin assessment on this patient No impairment noted  Ck score is 21.

## 2019-12-28 NOTE — ROUTINE PROCESS
Bedside and Verbal shift change report given to Noy Thomas RN (oncoming nurse) by Steve Wayne (offgoing nurse). Report included the following information SBAR, Kardex and Med Rec Status.

## 2019-12-28 NOTE — PHYSICIAN ADVISORY
Medical Short Stay Review       Pt Name:  Shakila Perrin   MR#  [id number]   [facility]#   [id number]   [address] [place] [address]  322/21  @ [facility] ([place], [place])   Hospitalization date  12/27/2019  4:33 PM  No discharge date for patient encounter. Current Attending Physician  Darby Siemens, MD     A discharge order has been placed for this episode of hospital care for Ms. Shakila Perrin; since this hospital stay is less than two midnights, I reviewed Ms. Colletta Mario Alberto Strange's chart. Ms. Rustam Strange's healthcare insurance/benefit include:  Payor: [place] MEDICARE / Plan: VA MEDICARE PART A & B / Product Type: Medicare /     Utilization Review related case summary:   Age  69 y.o.   BMI  Body mass index is 24.28 kg/m². PMHx includes  PMHx :    Hospital course  Pt admitted for for Obstructive jaundice / Hyperbilirubinemia / LFT elevation new, presumed to extension of known cancer.  GI consulted. MRCP shows this is due to the mass at the pancreatic head, hemodynamically stable, plan for outpatient ERCP. Risk of deterioration at the time this patient  was hospitalized   Moderate            On the basis of chart review, this patient's hospitalization status         Should be changed to OBSERVATION       The information in this document is a recommendation to be used for utilization review and utilization management purposes only. This recommendation is not an order. The recommendation is made based on the information reviewed at the time of the referral, is pursuant to the [facility] Conditions of Participation (42 CFR Part 482), and is neither a judgment nor an assessment with regard to the appropriateness or quality of clinical care. For all Managed Care patients:  The Criteria are intended solely for use as screening guidelines with respect to the medical appropriateness of healthcare services and not for final clinical or payment determinations concerning the type or level of medical care provided, or proposed to be provided, to a patient. They help the reviewers determine whether a patient is appropriate for observation or inpatient admission at the time a decision to admit the patient is being made. All efforts are made to apply the pertinent payor criteria ([employer] or [employer]) as well as the clinical judgements based on the information reviewed at the time of the referral.\" Nothing in this document may be used to limit, alter, or affect clinical services provided to the patient named below.       Hussein Brown MD   2:44 PM 12/28/2019

## 2019-12-28 NOTE — ED NOTES
5th floor charge nurse notified that receiving did not call to receive report within 20 minutes. Patient to be transported without report. Floor to call back to nurse with questions.

## 2019-12-28 NOTE — ED NOTES
Verbal shift change report given to NATHEN Diaz (oncoming nurse) by  Beverly Arizmendi nurse). Report included the following information SBAR, Kardex, ED Summary, Intake/Output, MAR, Recent Results and Med Rec Status. 1929  Introduced self as primary RN. Initial assessment completed. VSS. Pt denies additional complaints at this time. Bed locked and in low position with call bell within reach. Instructed pt to press call ayala when needed. Dr. Lawrence Lights at the bedside. [id number]  TRANSFER - OUT REPORT:    Verbal report given to NATHEN Matos (name) on Orlin Octave  being transferred to Med-Surg (unit) for routine progression of care       Report consisted of patients Situation, Background, Assessment and   Recommendations(SBAR). Information from the following report(s) SBAR, Kardex, Procedure Summary, Intake/Output, MAR, Recent Results, Med Rec Status and Cardiac Rhythm NSR was reviewed with the receiving nurse. Lines:   Venous Access Device [facility]#[id number] 11/06/18 Upper chest (subclavicular area, right (Active)       Peripheral IV 12/27/19 Anterior;Proximal;Right Forearm (Active)   Site Assessment Clean, dry, & intact 12/27/2019  2:42 PM   Phlebitis Assessment 0 12/27/2019  2:42 PM   Infiltration Assessment 0 12/27/2019  2:42 PM   Dressing Status Clean, dry, & intact; New 12/27/2019  2:42 PM   Dressing Type Transparent;Elastic bandage 12/27/2019  2:42 PM   Hub Color/Line Status Yellow; Flushed 12/27/2019  2:42 PM   Action Taken Open ports on tubing capped; Wrapped 12/27/2019  2:42 PM   Alcohol Cap Used Yes 12/27/2019  2:42 PM        Opportunity for questions and clarification was provided.       Patient transported with:   Registered Nurse

## 2019-12-28 NOTE — PROGRESS NOTES
I have reviewed discharge instructions with the patient. The patient verbalized understanding. Discharge medications reviewed with patient and appropriate educational materials and side effects teaching were provided. Current Discharge Medication List      START taking these medications    Details   potassium chloride SR (K-TAB) 20 mEq tablet Take 1 Tab by mouth two (2) times a day for 10 days. Qty: 20 Tab, Refills: 0         CONTINUE these medications which have NOT CHANGED    Details   lidocaine-prilocaine (EMLA) topical cream Apply  to affected area as needed for Pain. Qty: 30 g, Refills: 0    Associated Diagnoses: Malignant neoplasm of other parts of pancreas ([address]      lipase-protease-amylase (CREON) 36,000-114,000- 180,000 unit cpDR capsule Take 2 capsules with first bite of meals TID. Take 1 with snacks BID. Indications: exocrine pancreatic insufficiency  Qty: 240 Cap, Refills: 3      prochlorperazine (COMPAZINE) 10 mg tablet TAKE 1 TAB BY MOUTH EVERY SIX (6) HOURS AS NEEDED FOR NAUSEA. Qty: 50 Tab, Refills: 5      diphenoxylate-atropine (LOMOTIL) 2.5-0.025 mg per tablet Take 1 Tab by mouth four (4) times daily as needed for Diarrhea. Max Daily Amount: 4 Tabs. Qty: 45 Tab, Refills: 1    Associated Diagnoses: Malignant neoplasm of other parts of pancreas ([address] Diarrhea, unspecified type      ondansetron hcl (ZOFRAN) 8 mg tablet Take 1 Tab by mouth every eight (8) hours as needed for Nausea. Qty: 24 Tab, Refills: 3    Associated Diagnoses: Malignant neoplasm of other parts of pancreas (HCC)      TRESIBA FLEXTOUCH U-200 200 unit/mL (3 mL) inpn Inject 50 units on chemo days and 10 units as needed for bs > 150 on non-chemo days  Refills: 11      IBUPROFEN IB PO Take  by mouth as needed.       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

## 2019-12-28 NOTE — PROGRESS NOTES
Spiritual Care Assessment/Progress Note  [address]      NAME: Amy Vu      MRN: [id number]  AGE: 69 y.o.  SEX: female  Cheondoism Affiliation: Judaism   Language: English     12/28/2019     Total Time (in minutes): 15     Spiritual Assessment begun in [facility] 5M1 MED SURG 1 through conversation with:         [x]Patient        [] Family    [] Friend(s)        Reason for Consult: Palliative Care, Initial/Spiritual Assessment     Spiritual beliefs: (Please include comment if needed)     [x] Identifies with a kade tradition:    Yazidism     [] Supported by a kade community:            [] Claims no spiritual orientation:           [] Seeking spiritual identity:                [] Adheres to an individual form of spirituality:           [] Not able to assess:                           Identified resources for coping:      [] Prayer                               [] Music                  [] Guided Imagery     [x] Family/friends                 [] Pet visits     [] Devotional reading                         [] Unknown     [] Other:                                        Interventions offered during this visit: (See comments for more details)    Patient Interventions: Affirmation of kade, Iconic (affirming the presence of God/Higher Power), Catharsis/review of pertinent events in supportive environment, Initial/Spiritual assessment, patient floor, Prayer (assurance of)           Plan of Care:     [] Support spiritual and/or cultural needs    [] Support AMD and/or advance care planning process      [] Support grieving process   [] Coordinate Rites and/or Rituals    [] Coordination with community clergy   [x] No spiritual needs identified at this time   [] Detailed Plan of Care below (See Comments)  [] Make referral to Music Therapy  [] Make referral to Pet Therapy     [] Make referral to Addiction services  [] Make referral to [facility]  [] Make referral to Spiritual Care Partner  [] No future visits requested        [] Follow up visits as needed     Comments: Initial Palliative Care spiritual assessment in 5 Med Surg. Miss Veronica Lopez was in pretty good spirits. She has a son who came back to town from [place], he has gone to get her some things from home as she did not expect to have to stay. She has wonderful family support and has a [place]anda[place] belief in [address]. She has not specific needs at this time. Provided spiritual presence and assurance of prayers. Advised of  Availability.   Visited by: Enio Ruelas., MS., [address] [facility] Annika ([id number])

## 2019-12-28 NOTE — CONSULTS
[facility] [place] [facility] [place] [address]  [facility], [address] [place] [address]  Michelle Fraires: [phone]  F: [phone]      Reason for Visit:   Megan Landers is a 69 y.o. female who is seen in consultation at the request of Dr. Wilma Reyes for evaluation of pancreatic cancer. History of Present Illness: Megan Landers is a pleasant 69 y.o. female who was admitted yesterday for jaundice. She has a history of metastatic pancreatic cancer, followed by Dr. Franca Pineda. She recently has been on a treatment holiday, after having considerable toxicity with FOLFIRINOX. Imaging in October showed an overall low disease burden. Earlier this week the patient noted new jaundice, prompting us to bring her in for a lab check yesterday. She received IVFs and her labs resulted with a bilirubin of 15.6, a rise from 0.2 just one month prior. She was directed to the ED for urgent evaluation. She had a CT and has been evaluated by GI. Her potassium was also noted to be low and she has been repleted aggressively. Currently, she is feeling better. She denies pain. Jaundice persists. She is accompanied by her son today. Past Medical History:   Diagnosis Date    Arthritis     Constipation     Diabetes ([address]     Hemorrhoids     Hiatal hernia     High cholesterol     Hypertension     Pancreatic cancer ([address]       Past Surgical History:   Procedure Laterality Date    HX KNEE ARTHROSCOPY Right     HX ORTHOPAEDIC      left wrist surgery    HX TONSILLECTOMY        Social History     Tobacco Use    Smoking status: Never Smoker    Smokeless tobacco: Never Used   Substance Use Topics    Alcohol use:  Yes     Alcohol/week: 2.0 - 4.0 standard drinks     Types: 1 - 2 Cans of beer, 1 - 2 Shots of liquor per week     Frequency: 2-4 times a month     Drinks per session: 1 or 2     Comment: minimal      Family History   Problem Relation Age of Onset    Cancer Mother         skin    Hypertension Mother     Heart Disease Mother     Cancer Father         skin    Heart Disease Father     Stroke Father     Diabetes Neg Hx      Current Facility-Administered Medications   Medication Dose Route Frequency    0.9% sodium chloride with KCl 40 mEq/L infusion   IntraVENous CONTINUOUS    lidocaine-prilocaine (EMLA) 2.5-2.5 % cream   Topical PRN    pantoprazole (PROTONIX) tablet 40 mg  40 mg Oral ACB    potassium chloride SR (KLOR-CON 10) tablet 40 mEq  40 mEq Oral Q4H    sodium chloride (NS) flush 5-40 mL  5-40 mL IntraVENous Q8H    sodium chloride (NS) flush 5-40 mL  5-40 mL IntraVENous PRN    glucose chewable tablet 16 g  4 Tab Oral PRN    glucagon (GLUCAGEN) injection 1 mg  1 mg IntraMUSCular PRN    dextrose 10% infusion 0-250 mL  0-250 mL IntraVENous PRN      Allergies   Allergen Reactions    Amoxicillin Hives        Review of Systems: A complete review of systems was obtained, negative except as described above. Physical Exam:     Visit Vitals  /81 (BP 1 Location: Left arm, BP Patient Position: At rest)   Pulse 73   Temp 98.5 °F (36.9 °C)   Resp 18   Ht 5' 7\" (1.702 m)   Wt 155 lb (70.3 kg)   SpO2 99%   Breastfeeding No   BMI 24.28 kg/m²     General: No distress  Eyes: PERRLA, icteric sclerae  HENT: Atraumatic, OP clear  Neck: Supple  Lymphatic: No cervical, supraclavicular, or inguinal adenopathy  Respiratory: CTAB, normal respiratory effort  CV: Normal rate, regular rhythm, no murmurs, no peripheral edema  GI: Soft, nontender, nondistended, no masses, no hepatomegaly, no splenomegaly  Skin: Significant jaundice. No rashes, ecchymoses, or petechiae. Normal temperature, turgor, and texture.   Psych: Alert, oriented, appropriate affect, normal judgment/insight    Results:     Lab Results   Component Value Date/Time    WBC 7.4 12/28/2019 04:27 AM    HGB 9.2 (L) 12/28/2019 04:27 AM    HCT 28.0 (L) 12/28/2019 04:27 AM    PLATELET 688 40/17/0640 04:27 AM    MCV 92.1 12/28/2019 04:27 AM    ABS. NEUTROPHILS 5.9 12/28/2019 04:27 AM     Lab Results   Component Value Date/Time    Sodium 135 (L) 12/28/2019 04:27 AM    Potassium 2.7 (LL) 12/28/2019 04:27 AM    Chloride 100 12/28/2019 04:27 AM    CO2 26 12/28/2019 04:27 AM    Glucose 307 (H) 12/28/2019 04:27 AM    BUN 8 12/28/2019 04:27 AM    Creatinine 0.60 12/28/2019 04:27 AM    GFR est AA >60 12/28/2019 04:27 AM    GFR est non-AA >60 12/28/2019 04:27 AM    Calcium 8.0 (L) 12/28/2019 04:27 AM    Glucose (POC) 168 (H) 04/15/2019 10:44 AM     Lab Results   Component Value Date/Time    Bilirubin, total 15.5 (H) 12/28/2019 04:27 AM    ALT (SGPT) 207 (H) 12/28/2019 04:27 AM    AST (SGOT) 199 (H) 12/28/2019 04:27 AM    Alk. phosphatase 938 (H) 12/28/2019 04:27 AM    Protein, total 4.9 (L) 12/28/2019 04:27 AM    Albumin 1.9 (L) 12/28/2019 04:27 AM    Globulin 3.0 12/28/2019 04:27 AM       CT A/P 12/27/2019:  1. New marked intrahepatic biliary dilatation, common bile duct dilatation,  pancreatic duct dilatation and gallbladder dilatation. Interval increase in size  of ill-defined pancreatic head\uncinate mass, difficult to measure, but  measuring approximately 8.5 cm x 3.7 cm x 2.6 cm.  2. Hepatic metastatic disease, as described above. MRCP 12/28/2019: report pending    Records reviewed and summarized above. Assessment/Recommendations:   1) Biliary obstruction  Acute, with bilirubin rising from 0.2 to 15.6 in just one month. CT confirms obstruction. She was evaluated by GI and her obstruction may be amenable to ERCP and stenting, though this would not be done over the weekend. They have recommended discharge and outpatient follow up early next week for ERCP. 2) Metastatic pancreatic cancer  S/p is s/p treatment with Wolsey/Abraxane, and more recently was on FOLFIRINOX. This has been held due to intolerance. Discussed goals of care with her today, in light of her biliary obstruction and previous chemotherapy intolerance.   Reviewed option of focusing on symptom management and supportive care alone, in which case ERCP would nto be needed. Discussed option of attempting to resume chemotherapy in the near future, perhaps with reduced dose, which would require ERCP to first get her bilirubin down. At this time, she is not ready to stop therapy and would like to proceed with aggressive care. Close follow up with Dr. Adilene Peter after the weekend. 3) Hypokalemia  Hospitalist is repleting    4) Dehydration  Improved after IVFs    I appreciate the opportunity to participate in Ms. Trupti Hernandez JOVANI [place]'s care.     Signed By: Gisela Phipps MD

## 2019-12-28 NOTE — PROGRESS NOTES
[email or web address]: RN offered EMLA cream to access port for patient because patient to have MRCP today and needs multiple lab draws. Patient refuses port accessed at this time, prefers that RN puts in larger IV for contrast dye. 20 gauge IV started in right wrist.    7222: RN attempts to put SCD's on patient. Patient states she refuses the SCD's at this time but will think about it and let RN know if she changes her mind.

## 2019-12-30 NOTE — TELEPHONE ENCOUNTER
12/30/19 10:15 AM: Dr. Blossom Vaughan returned call to patient and advised that he spoke to Dr. Renetta Phipps and after looking at her MRCP and lab results together, they agreed that this procedure does not need to be done emergently. Advised that Dr. Renetta Phipps will be contacting the patient today to set up an appointment with him and to make a plan from there. Patient verbalized understanding and denied further questions or concerns.

## 2019-12-30 NOTE — TELEPHONE ENCOUNTER
Patient was discharge from the hospital and spoke with Dr. Claudia Fortune on the weekend. Patient is calling to see if anyone from this office has talk with Dr. Caitlyn Olson office about getting her surgery schedule. He does surgery out of [place] for which the patient would like to be treated. Schedule the surgery for today or tomorrow.   According to patient the bilirubin was 15.     #  [phone]

## 2020-01-01 ENCOUNTER — HOME CARE VISIT (OUTPATIENT)
Dept: HOSPICE | Facility: HOSPICE | Age: 75
End: 2020-01-01
Payer: MEDICARE

## 2020-01-01 ENCOUNTER — HOME CARE VISIT (OUTPATIENT)
Dept: SCHEDULING | Facility: HOME HEALTH | Age: 75
End: 2020-01-01
Payer: MEDICARE

## 2020-01-01 ENCOUNTER — HOSPITAL ENCOUNTER (INPATIENT)
Age: 75
LOS: 1 days | End: 2020-03-24
Attending: INTERNAL MEDICINE | Admitting: INTERNAL MEDICINE

## 2020-01-01 ENCOUNTER — HOSPITAL ENCOUNTER (OUTPATIENT)
Dept: INFUSION THERAPY | Age: 75
Discharge: HOME | End: 2020-01-14
Payer: MEDICARE

## 2020-01-01 ENCOUNTER — RESEARCH ENCOUNTER (OUTPATIENT)
Dept: ONCOLOGY | Age: 75
End: 2020-01-01

## 2020-01-01 ENCOUNTER — HOSPITAL ENCOUNTER (OUTPATIENT)
Dept: INFUSION THERAPY | Age: 75
Discharge: HOME | End: 2020-01-21
Payer: MEDICARE

## 2020-01-01 ENCOUNTER — TELEPHONE (OUTPATIENT)
Dept: ONCOLOGY | Age: 75
End: 2020-01-01

## 2020-01-01 ENCOUNTER — HOSPICE ADMISSION (OUTPATIENT)
Dept: HOSPICE | Facility: HOSPICE | Age: 75
End: 2020-01-01
Payer: MEDICARE

## 2020-01-01 ENCOUNTER — OFFICE VISIT (OUTPATIENT)
Dept: ONCOLOGY | Age: 75
End: 2020-01-01

## 2020-01-01 ENCOUNTER — APPOINTMENT (OUTPATIENT)
Dept: GENERAL RADIOLOGY | Age: 75
DRG: 435 | End: 2020-01-01
Attending: INTERNAL MEDICINE
Payer: MEDICARE

## 2020-01-01 ENCOUNTER — ANESTHESIA (OUTPATIENT)
Dept: ENDOSCOPY | Age: 75
DRG: 435 | End: 2020-01-01
Payer: MEDICARE

## 2020-01-01 ENCOUNTER — ANESTHESIA EVENT (OUTPATIENT)
Dept: ENDOSCOPY | Age: 75
DRG: 435 | End: 2020-01-01
Payer: MEDICARE

## 2020-01-01 ENCOUNTER — APPOINTMENT (OUTPATIENT)
Dept: INFUSION THERAPY | Age: 75
End: 2020-01-01

## 2020-01-01 ENCOUNTER — DOCUMENTATION ONLY (OUTPATIENT)
Dept: PALLATIVE CARE | Age: 75
End: 2020-01-01

## 2020-01-01 ENCOUNTER — HOSPITAL ENCOUNTER (INPATIENT)
Age: 75
LOS: 5 days | Discharge: HOME | DRG: 435 | End: 2020-01-11
Attending: EMERGENCY MEDICINE | Admitting: FAMILY MEDICINE
Payer: MEDICARE

## 2020-01-01 ENCOUNTER — APPOINTMENT (OUTPATIENT)
Dept: INFUSION THERAPY | Age: 75
End: 2020-01-01
Payer: MEDICARE

## 2020-01-01 VITALS
TEMPERATURE: 97.4 F | HEART RATE: 106 BPM | OXYGEN SATURATION: 87 % | RESPIRATION RATE: 24 BRPM | SYSTOLIC BLOOD PRESSURE: 64 MMHG | DIASTOLIC BLOOD PRESSURE: 48 MMHG

## 2020-01-01 VITALS
DIASTOLIC BLOOD PRESSURE: 74 MMHG | OXYGEN SATURATION: 96 % | SYSTOLIC BLOOD PRESSURE: 142 MMHG | RESPIRATION RATE: 16 BRPM | HEART RATE: 72 BPM

## 2020-01-01 VITALS
RESPIRATION RATE: 16 BRPM | HEIGHT: 67 IN | OXYGEN SATURATION: 100 % | TEMPERATURE: 96.6 F | WEIGHT: 148.8 LBS | DIASTOLIC BLOOD PRESSURE: 89 MMHG | SYSTOLIC BLOOD PRESSURE: 155 MMHG | HEART RATE: 70 BPM | BODY MASS INDEX: 23.35 KG/M2

## 2020-01-01 VITALS — HEART RATE: 76 BPM | RESPIRATION RATE: 28 BRPM

## 2020-01-01 VITALS
RESPIRATION RATE: 16 BRPM | WEIGHT: 140 LBS | HEIGHT: 67 IN | SYSTOLIC BLOOD PRESSURE: 140 MMHG | DIASTOLIC BLOOD PRESSURE: 83 MMHG | TEMPERATURE: 97.1 F | HEART RATE: 86 BPM | OXYGEN SATURATION: 99 % | BODY MASS INDEX: 21.97 KG/M2

## 2020-01-01 VITALS
OXYGEN SATURATION: 96 % | HEART RATE: 76 BPM | SYSTOLIC BLOOD PRESSURE: 158 MMHG | RESPIRATION RATE: 20 BRPM | DIASTOLIC BLOOD PRESSURE: 80 MMHG

## 2020-01-01 VITALS
SYSTOLIC BLOOD PRESSURE: 147 MMHG | TEMPERATURE: 97.6 F | RESPIRATION RATE: 18 BRPM | DIASTOLIC BLOOD PRESSURE: 90 MMHG | HEART RATE: 67 BPM | OXYGEN SATURATION: 99 %

## 2020-01-01 VITALS
TEMPERATURE: 98.8 F | SYSTOLIC BLOOD PRESSURE: 153 MMHG | RESPIRATION RATE: 18 BRPM | HEART RATE: 77 BPM | DIASTOLIC BLOOD PRESSURE: 95 MMHG | OXYGEN SATURATION: 97 %

## 2020-01-01 VITALS
WEIGHT: 140 LBS | BODY MASS INDEX: 21.97 KG/M2 | HEIGHT: 67 IN | SYSTOLIC BLOOD PRESSURE: 140 MMHG | DIASTOLIC BLOOD PRESSURE: 70 MMHG | OXYGEN SATURATION: 98 % | RESPIRATION RATE: 18 BRPM | HEART RATE: 72 BPM

## 2020-01-01 VITALS
HEART RATE: 68 BPM | SYSTOLIC BLOOD PRESSURE: 158 MMHG | OXYGEN SATURATION: 99 % | DIASTOLIC BLOOD PRESSURE: 80 MMHG | RESPIRATION RATE: 20 BRPM

## 2020-01-01 VITALS
HEART RATE: 67 BPM | SYSTOLIC BLOOD PRESSURE: 124 MMHG | DIASTOLIC BLOOD PRESSURE: 62 MMHG | OXYGEN SATURATION: 97 % | RESPIRATION RATE: 20 BRPM

## 2020-01-01 VITALS
RESPIRATION RATE: 17 BRPM | SYSTOLIC BLOOD PRESSURE: 164 MMHG | HEART RATE: 78 BPM | TEMPERATURE: 97.3 F | OXYGEN SATURATION: 99 % | DIASTOLIC BLOOD PRESSURE: 78 MMHG

## 2020-01-01 DIAGNOSIS — E11.69 TYPE 2 DIABETES MELLITUS WITH OTHER SPECIFIED COMPLICATION, UNSPECIFIED WHETHER LONG TERM INSULIN USE (HCC): ICD-10-CM

## 2020-01-01 DIAGNOSIS — F41.9 ANXIETY: ICD-10-CM

## 2020-01-01 DIAGNOSIS — G89.3 NEOPLASM RELATED PAIN: ICD-10-CM

## 2020-01-01 DIAGNOSIS — R53.0 NEOPLASTIC (MALIGNANT) RELATED FATIGUE: ICD-10-CM

## 2020-01-01 DIAGNOSIS — R10.84 GENERALIZED ABDOMINAL PAIN: ICD-10-CM

## 2020-01-01 DIAGNOSIS — C25.9 MALIGNANT NEOPLASM OF PANCREAS, UNSPECIFIED LOCATION OF MALIGNANCY (HCC): ICD-10-CM

## 2020-01-01 DIAGNOSIS — C25.9 MALIGNANT NEOPLASM OF PANCREAS, UNSPECIFIED LOCATION OF MALIGNANCY (HCC): Primary | ICD-10-CM

## 2020-01-01 DIAGNOSIS — R11.0 NAUSEA: ICD-10-CM

## 2020-01-01 DIAGNOSIS — C25.7 MALIGNANT NEOPLASM OF OTHER PARTS OF PANCREAS (HCC): ICD-10-CM

## 2020-01-01 DIAGNOSIS — D64.9 ANEMIA, UNSPECIFIED TYPE: ICD-10-CM

## 2020-01-01 DIAGNOSIS — R17 JAUNDICE: ICD-10-CM

## 2020-01-01 DIAGNOSIS — E86.0 DEHYDRATION: Primary | ICD-10-CM

## 2020-01-01 DIAGNOSIS — E87.1 HYPONATREMIA: ICD-10-CM

## 2020-01-01 DIAGNOSIS — E87.6 HYPOKALEMIA: ICD-10-CM

## 2020-01-01 DIAGNOSIS — R45.1 RESTLESSNESS: ICD-10-CM

## 2020-01-01 DIAGNOSIS — C78.7 LIVER METASTASES (HCC): ICD-10-CM

## 2020-01-01 DIAGNOSIS — R79.89 LFT ELEVATION: ICD-10-CM

## 2020-01-01 DIAGNOSIS — K83.1 OBSTRUCTIVE JAUNDICE: ICD-10-CM

## 2020-01-01 DIAGNOSIS — K92.0 HEMATEMESIS WITH NAUSEA: ICD-10-CM

## 2020-01-01 DIAGNOSIS — R73.9 HYPERGLYCEMIA: ICD-10-CM

## 2020-01-01 LAB
ALBUMIN SERPL-MCNC: 1.7 G/DL (ref 3.5–5)
ALBUMIN SERPL-MCNC: 1.7 G/DL (ref 3.5–5)
ALBUMIN SERPL-MCNC: 1.8 G/DL (ref 3.5–5)
ALBUMIN SERPL-MCNC: 1.9 G/DL (ref 3.5–5)
ALBUMIN SERPL-MCNC: 2.1 G/DL (ref 3.5–5)
ALBUMIN SERPL-MCNC: 2.1 G/DL (ref 3.5–5)
ALBUMIN SERPL-MCNC: 2.4 G/DL (ref 3.5–5)
ALBUMIN/GLOB SERPL: 0.6 {RATIO} (ref 1.1–2.2)
ALP SERPL-CCNC: 1081 U/L (ref 45–117)
ALP SERPL-CCNC: 1131 U/L (ref 45–117)
ALP SERPL-CCNC: 1145 U/L (ref 45–117)
ALP SERPL-CCNC: 1176 U/L (ref 45–117)
ALP SERPL-CCNC: 1191 U/L (ref 45–117)
ALP SERPL-CCNC: 1359 U/L (ref 45–117)
ALP SERPL-CCNC: 785 U/L (ref 45–117)
ALT SERPL-CCNC: 125 U/L (ref 12–78)
ALT SERPL-CCNC: 162 U/L (ref 12–78)
ALT SERPL-CCNC: 184 U/L (ref 12–78)
ALT SERPL-CCNC: 187 U/L (ref 12–78)
ALT SERPL-CCNC: 191 U/L (ref 12–78)
ALT SERPL-CCNC: 213 U/L (ref 12–78)
ALT SERPL-CCNC: 58 U/L (ref 12–78)
ANION GAP SERPL CALC-SCNC: 6 MMOL/L (ref 5–15)
ANION GAP SERPL CALC-SCNC: 6 MMOL/L (ref 5–15)
ANION GAP SERPL CALC-SCNC: 7 MMOL/L (ref 5–15)
ANION GAP SERPL CALC-SCNC: 7 MMOL/L (ref 5–15)
ANION GAP SERPL CALC-SCNC: 8 MMOL/L (ref 5–15)
AST SERPL-CCNC: 172 U/L (ref 15–37)
AST SERPL-CCNC: 208 U/L (ref 15–37)
AST SERPL-CCNC: 235 U/L (ref 15–37)
AST SERPL-CCNC: 238 U/L (ref 15–37)
AST SERPL-CCNC: 241 U/L (ref 15–37)
AST SERPL-CCNC: 39 U/L (ref 15–37)
AST SERPL-CCNC: 83 U/L (ref 15–37)
BASOPHILS # BLD: 0 K/UL (ref 0–0.1)
BASOPHILS # BLD: 0 K/UL (ref 0–0.1)
BASOPHILS # BLD: 0.1 K/UL (ref 0–0.1)
BASOPHILS # BLD: 0.1 K/UL (ref 0–0.1)
BASOPHILS NFR BLD: 0 % (ref 0–1)
BASOPHILS NFR BLD: 1 % (ref 0–1)
BILIRUB SERPL-MCNC: 16.7 MG/DL (ref 0.2–1)
BILIRUB SERPL-MCNC: 17.8 MG/DL (ref 0.2–1)
BILIRUB SERPL-MCNC: 18.3 MG/DL (ref 0.2–1)
BILIRUB SERPL-MCNC: 21.4 MG/DL (ref 0.2–1)
BILIRUB SERPL-MCNC: 4.2 MG/DL (ref 0.2–1)
BILIRUB SERPL-MCNC: 5.8 MG/DL (ref 0.2–1)
BILIRUB SERPL-MCNC: 9.5 MG/DL (ref 0.2–1)
BUN SERPL-MCNC: 10 MG/DL (ref 6–20)
BUN SERPL-MCNC: 11 MG/DL (ref 6–20)
BUN SERPL-MCNC: 11 MG/DL (ref 6–20)
BUN SERPL-MCNC: 12 MG/DL (ref 6–20)
BUN SERPL-MCNC: 9 MG/DL (ref 6–20)
BUN/CREAT SERPL: 10 (ref 12–20)
BUN/CREAT SERPL: 16 (ref 12–20)
BUN/CREAT SERPL: 18 (ref 12–20)
BUN/CREAT SERPL: 18 (ref 12–20)
BUN/CREAT SERPL: 21 (ref 12–20)
CALCIUM SERPL-MCNC: 8.3 MG/DL (ref 8.5–10.1)
CALCIUM SERPL-MCNC: 8.4 MG/DL (ref 8.5–10.1)
CALCIUM SERPL-MCNC: 8.4 MG/DL (ref 8.5–10.1)
CALCIUM SERPL-MCNC: 8.5 MG/DL (ref 8.5–10.1)
CALCIUM SERPL-MCNC: 8.5 MG/DL (ref 8.5–10.1)
CALCIUM SERPL-MCNC: 8.6 MG/DL (ref 8.5–10.1)
CALCIUM SERPL-MCNC: 9 MG/DL (ref 8.5–10.1)
CHLORIDE SERPL-SCNC: 100 MMOL/L (ref 97–108)
CHLORIDE SERPL-SCNC: 93 MMOL/L (ref 97–108)
CHLORIDE SERPL-SCNC: 95 MMOL/L (ref 97–108)
CHLORIDE SERPL-SCNC: 95 MMOL/L (ref 97–108)
CHLORIDE SERPL-SCNC: 99 MMOL/L (ref 97–108)
CO2 SERPL-SCNC: 23 MMOL/L (ref 21–32)
CO2 SERPL-SCNC: 24 MMOL/L (ref 21–32)
CO2 SERPL-SCNC: 24 MMOL/L (ref 21–32)
CO2 SERPL-SCNC: 26 MMOL/L (ref 21–32)
CO2 SERPL-SCNC: 27 MMOL/L (ref 21–32)
CO2 SERPL-SCNC: 29 MMOL/L (ref 21–32)
CO2 SERPL-SCNC: 29 MMOL/L (ref 21–32)
COMMENT, HOLDF: NORMAL
CREAT SERPL-MCNC: 0.55 MG/DL (ref 0.55–1.02)
CREAT SERPL-MCNC: 0.56 MG/DL (ref 0.55–1.02)
CREAT SERPL-MCNC: 0.58 MG/DL (ref 0.55–1.02)
CREAT SERPL-MCNC: 0.65 MG/DL (ref 0.55–1.02)
CREAT SERPL-MCNC: 0.67 MG/DL (ref 0.55–1.02)
CREAT SERPL-MCNC: 0.76 MG/DL (ref 0.55–1.02)
CREAT SERPL-MCNC: 1.08 MG/DL (ref 0.55–1.02)
DIFFERENTIAL METHOD BLD: ABNORMAL
EOSINOPHIL # BLD: 0 K/UL (ref 0–0.4)
EOSINOPHIL # BLD: 0.1 K/UL (ref 0–0.4)
EOSINOPHIL NFR BLD: 0 % (ref 0–7)
EOSINOPHIL NFR BLD: 1 % (ref 0–7)
ERYTHROCYTE [DISTWIDTH] IN BLOOD BY AUTOMATED COUNT: 13.2 % (ref 11.5–14.5)
ERYTHROCYTE [DISTWIDTH] IN BLOOD BY AUTOMATED COUNT: 14.3 % (ref 11.5–14.5)
ERYTHROCYTE [DISTWIDTH] IN BLOOD BY AUTOMATED COUNT: 16.8 % (ref 11.5–14.5)
ERYTHROCYTE [DISTWIDTH] IN BLOOD BY AUTOMATED COUNT: 17 % (ref 11.5–14.5)
ERYTHROCYTE [DISTWIDTH] IN BLOOD BY AUTOMATED COUNT: 17.1 % (ref 11.5–14.5)
ERYTHROCYTE [DISTWIDTH] IN BLOOD BY AUTOMATED COUNT: 17.2 % (ref 11.5–14.5)
EST. AVERAGE GLUCOSE BLD GHB EST-MCNC: 143 MG/DL
GLOBULIN SER CALC-MCNC: 2.9 G/DL (ref 2–4)
GLOBULIN SER CALC-MCNC: 3 G/DL (ref 2–4)
GLOBULIN SER CALC-MCNC: 3 G/DL (ref 2–4)
GLOBULIN SER CALC-MCNC: 3.2 G/DL (ref 2–4)
GLOBULIN SER CALC-MCNC: 3.4 G/DL (ref 2–4)
GLOBULIN SER CALC-MCNC: 3.6 G/DL (ref 2–4)
GLOBULIN SER CALC-MCNC: 3.8 G/DL (ref 2–4)
GLUCOSE BLD STRIP.AUTO-MCNC: 137 MG/DL (ref 65–100)
GLUCOSE BLD STRIP.AUTO-MCNC: 138 MG/DL (ref 65–100)
GLUCOSE BLD STRIP.AUTO-MCNC: 143 MG/DL (ref 65–100)
GLUCOSE BLD STRIP.AUTO-MCNC: 145 MG/DL (ref 65–100)
GLUCOSE BLD STRIP.AUTO-MCNC: 147 MG/DL (ref 65–100)
GLUCOSE BLD STRIP.AUTO-MCNC: 174 MG/DL (ref 65–100)
GLUCOSE BLD STRIP.AUTO-MCNC: 188 MG/DL (ref 65–100)
GLUCOSE BLD STRIP.AUTO-MCNC: 198 MG/DL (ref 65–100)
GLUCOSE BLD STRIP.AUTO-MCNC: 200 MG/DL (ref 65–100)
GLUCOSE BLD STRIP.AUTO-MCNC: 201 MG/DL (ref 65–100)
GLUCOSE BLD STRIP.AUTO-MCNC: 209 MG/DL (ref 65–100)
GLUCOSE BLD STRIP.AUTO-MCNC: 220 MG/DL (ref 65–100)
GLUCOSE BLD STRIP.AUTO-MCNC: 230 MG/DL (ref 65–100)
GLUCOSE BLD STRIP.AUTO-MCNC: 237 MG/DL (ref 65–100)
GLUCOSE BLD STRIP.AUTO-MCNC: 248 MG/DL (ref 65–100)
GLUCOSE BLD STRIP.AUTO-MCNC: 252 MG/DL (ref 65–100)
GLUCOSE BLD STRIP.AUTO-MCNC: 258 MG/DL (ref 65–100)
GLUCOSE BLD STRIP.AUTO-MCNC: 278 MG/DL (ref 65–100)
GLUCOSE BLD STRIP.AUTO-MCNC: 279 MG/DL (ref 65–100)
GLUCOSE BLD STRIP.AUTO-MCNC: 289 MG/DL (ref 65–100)
GLUCOSE SERPL-MCNC: 146 MG/DL (ref 65–100)
GLUCOSE SERPL-MCNC: 148 MG/DL (ref 65–100)
GLUCOSE SERPL-MCNC: 193 MG/DL (ref 65–100)
GLUCOSE SERPL-MCNC: 194 MG/DL (ref 65–100)
GLUCOSE SERPL-MCNC: 357 MG/DL (ref 65–100)
GLUCOSE SERPL-MCNC: 357 MG/DL (ref 65–100)
GLUCOSE SERPL-MCNC: 465 MG/DL (ref 65–100)
HBA1C MFR BLD: 6.6 % (ref 4–5.6)
HCT VFR BLD AUTO: 28 % (ref 35–47)
HCT VFR BLD AUTO: 28.9 % (ref 35–47)
HCT VFR BLD AUTO: 29.2 % (ref 35–47)
HCT VFR BLD AUTO: 30.3 % (ref 35–47)
HCT VFR BLD AUTO: 32.1 % (ref 35–47)
HCT VFR BLD AUTO: 32.5 % (ref 35–47)
HGB BLD-MCNC: 10.4 G/DL (ref 11.5–16)
HGB BLD-MCNC: 11.2 G/DL (ref 11.5–16)
HGB BLD-MCNC: 9.2 G/DL (ref 11.5–16)
HGB BLD-MCNC: 9.7 G/DL (ref 11.5–16)
HGB BLD-MCNC: 9.8 G/DL (ref 11.5–16)
HGB BLD-MCNC: 9.8 G/DL (ref 11.5–16)
IMM GRANULOCYTES # BLD AUTO: 0 K/UL (ref 0–0.04)
IMM GRANULOCYTES # BLD AUTO: 0.1 K/UL (ref 0–0.04)
IMM GRANULOCYTES NFR BLD AUTO: 0 % (ref 0–0.5)
IMM GRANULOCYTES NFR BLD AUTO: 1 % (ref 0–0.5)
INR PPP: 1.1 (ref 0.9–1.1)
LIPASE SERPL-CCNC: 37 U/L (ref 73–393)
LIPASE SERPL-CCNC: 66 U/L (ref 73–393)
LYMPHOCYTES # BLD: 0.4 K/UL (ref 0.8–3.5)
LYMPHOCYTES # BLD: 0.6 K/UL (ref 0.8–3.5)
LYMPHOCYTES # BLD: 0.8 K/UL (ref 0.8–3.5)
LYMPHOCYTES # BLD: 0.8 K/UL (ref 0.8–3.5)
LYMPHOCYTES NFR BLD: 13 % (ref 12–49)
LYMPHOCYTES NFR BLD: 5 % (ref 12–49)
LYMPHOCYTES NFR BLD: 8 % (ref 12–49)
LYMPHOCYTES NFR BLD: 9 % (ref 12–49)
MCH RBC QN AUTO: 31.4 PG (ref 26–34)
MCH RBC QN AUTO: 31.5 PG (ref 26–34)
MCH RBC QN AUTO: 31.7 PG (ref 26–34)
MCH RBC QN AUTO: 31.8 PG (ref 26–34)
MCH RBC QN AUTO: 32.3 PG (ref 26–34)
MCH RBC QN AUTO: 32.8 PG (ref 26–34)
MCHC RBC AUTO-ENTMCNC: 32 G/DL (ref 30–36.5)
MCHC RBC AUTO-ENTMCNC: 32.3 G/DL (ref 30–36.5)
MCHC RBC AUTO-ENTMCNC: 32.9 G/DL (ref 30–36.5)
MCHC RBC AUTO-ENTMCNC: 33.6 G/DL (ref 30–36.5)
MCHC RBC AUTO-ENTMCNC: 33.6 G/DL (ref 30–36.5)
MCHC RBC AUTO-ENTMCNC: 34.9 G/DL (ref 30–36.5)
MCV RBC AUTO: 100 FL (ref 80–99)
MCV RBC AUTO: 94.1 FL (ref 80–99)
MCV RBC AUTO: 94.5 FL (ref 80–99)
MCV RBC AUTO: 94.8 FL (ref 80–99)
MCV RBC AUTO: 95.6 FL (ref 80–99)
MCV RBC AUTO: 98.5 FL (ref 80–99)
MONOCYTES # BLD: 0.6 K/UL (ref 0–1)
MONOCYTES # BLD: 0.6 K/UL (ref 0–1)
MONOCYTES # BLD: 0.7 K/UL (ref 0–1)
MONOCYTES # BLD: 1 K/UL (ref 0–1)
MONOCYTES NFR BLD: 11 % (ref 5–13)
MONOCYTES NFR BLD: 12 % (ref 5–13)
MONOCYTES NFR BLD: 9 % (ref 5–13)
MONOCYTES NFR BLD: 9 % (ref 5–13)
NEUTS SEG # BLD: 4.7 K/UL (ref 1.8–8)
NEUTS SEG # BLD: 5.7 K/UL (ref 1.8–8)
NEUTS SEG # BLD: 6 K/UL (ref 1.8–8)
NEUTS SEG # BLD: 6.4 K/UL (ref 1.8–8)
NEUTS SEG NFR BLD: 74 % (ref 32–75)
NEUTS SEG NFR BLD: 76 % (ref 32–75)
NEUTS SEG NFR BLD: 81 % (ref 32–75)
NEUTS SEG NFR BLD: 86 % (ref 32–75)
NRBC # BLD: 0 K/UL (ref 0–0.01)
NRBC BLD-RTO: 0 PER 100 WBC
PLATELET # BLD AUTO: 283 K/UL (ref 150–400)
PLATELET # BLD AUTO: 287 K/UL (ref 150–400)
PLATELET # BLD AUTO: 293 K/UL (ref 150–400)
PLATELET # BLD AUTO: 296 K/UL (ref 150–400)
PLATELET # BLD AUTO: 366 K/UL (ref 150–400)
PLATELET # BLD AUTO: 377 K/UL (ref 150–400)
PLATELET COMMENTS,PCOM: ABNORMAL
PLATELET COMMENTS,PCOM: ABNORMAL
PMV BLD AUTO: 10.3 FL (ref 8.9–12.9)
PMV BLD AUTO: 10.7 FL (ref 8.9–12.9)
PMV BLD AUTO: 11.5 FL (ref 8.9–12.9)
PMV BLD AUTO: 11.7 FL (ref 8.9–12.9)
PMV BLD AUTO: 11.9 FL (ref 8.9–12.9)
PMV BLD AUTO: 12 FL (ref 8.9–12.9)
POTASSIUM SERPL-SCNC: 3 MMOL/L (ref 3.5–5.1)
POTASSIUM SERPL-SCNC: 3.4 MMOL/L (ref 3.5–5.1)
POTASSIUM SERPL-SCNC: 3.5 MMOL/L (ref 3.5–5.1)
POTASSIUM SERPL-SCNC: 3.5 MMOL/L (ref 3.5–5.1)
POTASSIUM SERPL-SCNC: 3.6 MMOL/L (ref 3.5–5.1)
POTASSIUM SERPL-SCNC: 3.8 MMOL/L (ref 3.5–5.1)
POTASSIUM SERPL-SCNC: 3.8 MMOL/L (ref 3.5–5.1)
PROT SERPL-MCNC: 4.7 G/DL (ref 6.4–8.2)
PROT SERPL-MCNC: 5.1 G/DL (ref 6.4–8.2)
PROT SERPL-MCNC: 5.5 G/DL (ref 6.4–8.2)
PROT SERPL-MCNC: 5.7 G/DL (ref 6.4–8.2)
PROT SERPL-MCNC: 6.2 G/DL (ref 6.4–8.2)
PROTHROMBIN TIME: 11.1 SEC (ref 9–11.1)
RBC # BLD AUTO: 2.93 M/UL (ref 3.8–5.2)
RBC # BLD AUTO: 3.03 M/UL (ref 3.8–5.2)
RBC # BLD AUTO: 3.05 M/UL (ref 3.8–5.2)
RBC # BLD AUTO: 3.09 M/UL (ref 3.8–5.2)
RBC # BLD AUTO: 3.3 M/UL (ref 3.8–5.2)
RBC # BLD AUTO: 3.41 M/UL (ref 3.8–5.2)
RBC MORPH BLD: ABNORMAL
SAMPLES BEING HELD,HOLD: NORMAL
SERVICE CMNT-IMP: ABNORMAL
SODIUM SERPL-SCNC: 127 MMOL/L (ref 136–145)
SODIUM SERPL-SCNC: 130 MMOL/L (ref 136–145)
SODIUM SERPL-SCNC: 130 MMOL/L (ref 136–145)
SODIUM SERPL-SCNC: 131 MMOL/L (ref 136–145)
SODIUM SERPL-SCNC: 131 MMOL/L (ref 136–145)
SODIUM SERPL-SCNC: 132 MMOL/L (ref 136–145)
SODIUM SERPL-SCNC: 133 MMOL/L (ref 136–145)
WBC # BLD AUTO: 6.4 K/UL (ref 3.6–11)
WBC # BLD AUTO: 6.5 K/UL (ref 3.6–11)
WBC # BLD AUTO: 6.6 K/UL (ref 3.6–11)
WBC # BLD AUTO: 7 K/UL (ref 3.6–11)
WBC # BLD AUTO: 7.1 K/UL (ref 3.6–11)
WBC # BLD AUTO: 8.5 K/UL (ref 3.6–11)
WBC MORPH BLD: ABNORMAL

## 2020-01-01 PROCEDURE — HOSPICE MEDICATION HC HH HOSPICE MEDICATION

## 2020-01-01 PROCEDURE — 74011250636 HC RX REV CODE- 250/636

## 2020-01-01 PROCEDURE — 83036 HEMOGLOBIN GLYCOSYLATED A1C: CPT

## 2020-01-01 PROCEDURE — 0651 HSPC ROUTINE HOME CARE

## 2020-01-01 PROCEDURE — 74011250636 HC RX REV CODE- 250/636: Performed by: FAMILY MEDICINE

## 2020-01-01 PROCEDURE — 77030010603 HC BLN DEV INFL BSC -B: Performed by: INTERNAL MEDICINE

## 2020-01-01 PROCEDURE — 74011636637 HC RX REV CODE- 636/637: Performed by: FAMILY MEDICINE

## 2020-01-01 PROCEDURE — 74011000250 HC RX REV CODE- 250: Performed by: NURSE PRACTITIONER

## 2020-01-01 PROCEDURE — 99283 EMERGENCY DEPT VISIT LOW MDM: CPT

## 2020-01-01 PROCEDURE — 36415 COLL VENOUS BLD VENIPUNCTURE: CPT

## 2020-01-01 PROCEDURE — 74011636637 HC RX REV CODE- 636/637: Performed by: EMERGENCY MEDICINE

## 2020-01-01 PROCEDURE — 0656 HSPC GENERAL INPATIENT

## 2020-01-01 PROCEDURE — 85025 COMPLETE CBC W/AUTO DIFF WBC: CPT

## 2020-01-01 PROCEDURE — 74011000250 HC RX REV CODE- 250: Performed by: NURSE ANESTHETIST, CERTIFIED REGISTERED

## 2020-01-01 PROCEDURE — 82962 GLUCOSE BLOOD TEST: CPT

## 2020-01-01 PROCEDURE — G0299 HHS/HOSPICE OF RN EA 15 MIN: HCPCS

## 2020-01-01 PROCEDURE — 80053 COMPREHEN METABOLIC PANEL: CPT

## 2020-01-01 PROCEDURE — 3336500001 HSPC ELECTION

## 2020-01-01 PROCEDURE — 65270000029 HC RM PRIVATE

## 2020-01-01 PROCEDURE — 74011250636 HC RX REV CODE- 250/636: Performed by: INTERNAL MEDICINE

## 2020-01-01 PROCEDURE — 85027 COMPLETE CBC AUTOMATED: CPT

## 2020-01-01 PROCEDURE — 65270000032 HC RM SEMIPRIVATE

## 2020-01-01 PROCEDURE — 74011250637 HC RX REV CODE- 250/637: Performed by: FAMILY MEDICINE

## 2020-01-01 PROCEDURE — 77030008684 HC TU ET CUF COVD -B: Performed by: ANESTHESIOLOGY

## 2020-01-01 PROCEDURE — 74011250637 HC RX REV CODE- 250/637: Performed by: INTERNAL MEDICINE

## 2020-01-01 PROCEDURE — G0155 HHCP-SVS OF CSW,EA 15 MIN: HCPCS

## 2020-01-01 PROCEDURE — G0300 HHS/HOSPICE OF LPN EA 15 MIN: HCPCS

## 2020-01-01 PROCEDURE — 83690 ASSAY OF LIPASE: CPT

## 2020-01-01 PROCEDURE — 77030007288 HC DEV LOK BILI BSC -A: Performed by: INTERNAL MEDICINE

## 2020-01-01 PROCEDURE — 76060000032 HC ANESTHESIA 0.5 TO 1 HR: Performed by: INTERNAL MEDICINE

## 2020-01-01 PROCEDURE — C1874 STENT, COATED/COV W/DEL SYS: HCPCS | Performed by: INTERNAL MEDICINE

## 2020-01-01 PROCEDURE — C1726 CATH, BAL DIL, NON-VASCULAR: HCPCS | Performed by: INTERNAL MEDICINE

## 2020-01-01 PROCEDURE — 77030040361 HC SLV COMPR DVT MDII -B: Performed by: INTERNAL MEDICINE

## 2020-01-01 PROCEDURE — 74011250637 HC RX REV CODE- 250/637: Performed by: EMERGENCY MEDICINE

## 2020-01-01 PROCEDURE — 74011250636 HC RX REV CODE- 250/636: Performed by: NURSE ANESTHETIST, CERTIFIED REGISTERED

## 2020-01-01 PROCEDURE — 99223 1ST HOSP IP/OBS HIGH 75: CPT | Performed by: INTERNAL MEDICINE

## 2020-01-01 PROCEDURE — BF101ZZ FLUOROSCOPY OF BILE DUCTS USING LOW OSMOLAR CONTRAST: ICD-10-PCS | Performed by: INTERNAL MEDICINE

## 2020-01-01 PROCEDURE — 0F798DZ DILATION OF COMMON BILE DUCT WITH INTRALUMINAL DEVICE, VIA NATURAL OR ARTIFICIAL OPENING ENDOSCOPIC: ICD-10-PCS | Performed by: INTERNAL MEDICINE

## 2020-01-01 PROCEDURE — 74011250636 HC RX REV CODE- 250/636: Performed by: NURSE PRACTITIONER

## 2020-01-01 PROCEDURE — 74011250637 HC RX REV CODE- 250/637

## 2020-01-01 PROCEDURE — 76040000007: Performed by: INTERNAL MEDICINE

## 2020-01-01 PROCEDURE — 85610 PROTHROMBIN TIME: CPT

## 2020-01-01 PROCEDURE — 77030041276 HC SPHNTOM BILI BSC -F: Performed by: INTERNAL MEDICINE

## 2020-01-01 PROCEDURE — 74011250636 HC RX REV CODE- 250/636: Performed by: EMERGENCY MEDICINE

## 2020-01-01 DEVICE — STENT SYSTEM RMV
Type: IMPLANTABLE DEVICE | Site: BILE DUCT | Status: FUNCTIONAL
Brand: WALLFLEX BILIARY

## 2020-01-01 RX ORDER — EPINEPHRINE 0.1 MG/ML
1 INJECTION INTRACARDIAC; INTRAVENOUS
Status: DISCONTINUED | OUTPATIENT
Start: 2020-01-01 | End: 2020-01-01 | Stop reason: HOSPADM

## 2020-01-01 RX ORDER — FENTANYL CITRATE 50 UG/ML
25-200 INJECTION, SOLUTION INTRAMUSCULAR; INTRAVENOUS
Status: DISCONTINUED | OUTPATIENT
Start: 2020-01-01 | End: 2020-01-01 | Stop reason: HOSPADM

## 2020-01-01 RX ORDER — POTASSIUM CHLORIDE 750 MG/1
40 TABLET, FILM COATED, EXTENDED RELEASE ORAL DAILY
Status: DISCONTINUED | OUTPATIENT
Start: 2020-01-01 | End: 2020-01-01 | Stop reason: HOSPADM

## 2020-01-01 RX ORDER — LORAZEPAM 1 MG/1
1 TABLET ORAL
Status: DISCONTINUED | OUTPATIENT
Start: 2020-01-01 | End: 2020-01-01

## 2020-01-01 RX ORDER — ATROPINE SULFATE 0.1 MG/ML
0.5 INJECTION INTRAVENOUS
Status: DISCONTINUED | OUTPATIENT
Start: 2020-01-01 | End: 2020-01-01 | Stop reason: HOSPADM

## 2020-01-01 RX ORDER — OXYCODONE HYDROCHLORIDE 5 MG/1
5 TABLET ORAL
Status: DISCONTINUED | OUTPATIENT
Start: 2020-01-01 | End: 2020-01-01 | Stop reason: HOSPADM

## 2020-01-01 RX ORDER — SODIUM CHLORIDE 9 MG/ML
INJECTION, SOLUTION INTRAVENOUS
Status: DISCONTINUED | OUTPATIENT
Start: 2020-01-01 | End: 2020-01-01 | Stop reason: HOSPADM

## 2020-01-01 RX ORDER — ONDANSETRON HYDROCHLORIDE 8 MG/1
8 TABLET, FILM COATED ORAL
Qty: 30 TAB | Refills: 0 | Status: SHIPPED | OUTPATIENT
Start: 2020-01-01

## 2020-01-01 RX ORDER — SODIUM CHLORIDE 9 MG/ML
100 INJECTION, SOLUTION INTRAVENOUS CONTINUOUS
Status: DISCONTINUED | OUTPATIENT
Start: 2020-01-01 | End: 2020-03-25 | Stop reason: HOSPADM

## 2020-01-01 RX ORDER — DICYCLOMINE HYDROCHLORIDE 10 MG/1
10 CAPSULE ORAL 3 TIMES DAILY
Qty: 45 CAP | Refills: 0 | Status: SHIPPED | OUTPATIENT
Start: 2020-01-01

## 2020-01-01 RX ORDER — FACIAL-BODY WIPES
10 EACH TOPICAL DAILY PRN
Status: DISCONTINUED | OUTPATIENT
Start: 2020-01-01 | End: 2020-03-25 | Stop reason: HOSPADM

## 2020-01-01 RX ORDER — PROCHLORPERAZINE EDISYLATE 5 MG/ML
10 INJECTION INTRAMUSCULAR; INTRAVENOUS
Status: DISCONTINUED | OUTPATIENT
Start: 2020-01-01 | End: 2020-03-25 | Stop reason: HOSPADM

## 2020-01-01 RX ORDER — ONDANSETRON 2 MG/ML
4 INJECTION INTRAMUSCULAR; INTRAVENOUS EVERY 4 HOURS
Status: DISCONTINUED | OUTPATIENT
Start: 2020-01-01 | End: 2020-03-25 | Stop reason: HOSPADM

## 2020-01-01 RX ORDER — INSULIN LISPRO 100 [IU]/ML
INJECTION, SOLUTION INTRAVENOUS; SUBCUTANEOUS EVERY 6 HOURS
Status: DISCONTINUED | OUTPATIENT
Start: 2020-01-01 | End: 2020-01-01 | Stop reason: HOSPADM

## 2020-01-01 RX ORDER — PROCHLORPERAZINE MALEATE 5 MG
5 TABLET ORAL
Qty: 28 TAB | Refills: 0 | Status: SHIPPED | OUTPATIENT
Start: 2020-01-01 | End: 2020-01-01

## 2020-01-01 RX ORDER — DEXTROMETHORPHAN/PSEUDOEPHED 2.5-7.5/.8
1.2 DROPS ORAL
Status: DISCONTINUED | OUTPATIENT
Start: 2020-01-01 | End: 2020-01-01 | Stop reason: HOSPADM

## 2020-01-01 RX ORDER — ONDANSETRON 2 MG/ML
INJECTION INTRAMUSCULAR; INTRAVENOUS AS NEEDED
Status: DISCONTINUED | OUTPATIENT
Start: 2020-01-01 | End: 2020-01-01 | Stop reason: HOSPADM

## 2020-01-01 RX ORDER — MAGNESIUM SULFATE 100 %
4 CRYSTALS MISCELLANEOUS AS NEEDED
Status: DISCONTINUED | OUTPATIENT
Start: 2020-01-01 | End: 2020-01-01 | Stop reason: HOSPADM

## 2020-01-01 RX ORDER — LOPERAMIDE HYDROCHLORIDE 2 MG/1
2 CAPSULE ORAL
COMMUNITY

## 2020-01-01 RX ORDER — INSULIN LISPRO 100 [IU]/ML
INJECTION, SOLUTION INTRAVENOUS; SUBCUTANEOUS
Status: DISCONTINUED | OUTPATIENT
Start: 2020-01-01 | End: 2020-01-01

## 2020-01-01 RX ORDER — PANTOPRAZOLE SODIUM 40 MG/1
40 TABLET, DELAYED RELEASE ORAL
Status: DISCONTINUED | OUTPATIENT
Start: 2020-01-01 | End: 2020-01-01 | Stop reason: HOSPADM

## 2020-01-01 RX ORDER — NEOSTIGMINE METHYLSULFATE 1 MG/ML
INJECTION INTRAVENOUS AS NEEDED
Status: DISCONTINUED | OUTPATIENT
Start: 2020-01-01 | End: 2020-01-01 | Stop reason: HOSPADM

## 2020-01-01 RX ORDER — PROCHLORPERAZINE MALEATE 5 MG
5 TABLET ORAL
Status: DISCONTINUED | OUTPATIENT
Start: 2020-01-01 | End: 2020-01-01 | Stop reason: HOSPADM

## 2020-01-01 RX ORDER — DICYCLOMINE HYDROCHLORIDE 10 MG/1
10 CAPSULE ORAL 3 TIMES DAILY
Status: DISCONTINUED | OUTPATIENT
Start: 2020-01-01 | End: 2020-01-01 | Stop reason: HOSPADM

## 2020-01-01 RX ORDER — ONDANSETRON 2 MG/ML
4 INJECTION INTRAMUSCULAR; INTRAVENOUS
Status: DISCONTINUED | OUTPATIENT
Start: 2020-01-01 | End: 2020-01-01 | Stop reason: SDUPTHER

## 2020-01-01 RX ORDER — ONDANSETRON 4 MG/1
TABLET, ORALLY DISINTEGRATING ORAL
Status: COMPLETED
Start: 2020-01-01 | End: 2020-01-01

## 2020-01-01 RX ORDER — SODIUM CHLORIDE, SODIUM LACTATE, POTASSIUM CHLORIDE, CALCIUM CHLORIDE 600; 310; 30; 20 MG/100ML; MG/100ML; MG/100ML; MG/100ML
100 INJECTION, SOLUTION INTRAVENOUS CONTINUOUS
Status: DISCONTINUED | OUTPATIENT
Start: 2020-01-01 | End: 2020-01-01 | Stop reason: HOSPADM

## 2020-01-01 RX ORDER — FLUMAZENIL 0.1 MG/ML
0.2 INJECTION INTRAVENOUS
Status: DISCONTINUED | OUTPATIENT
Start: 2020-01-01 | End: 2020-01-01 | Stop reason: HOSPADM

## 2020-01-01 RX ORDER — DEXTROSE MONOHYDRATE 100 MG/ML
0-250 INJECTION, SOLUTION INTRAVENOUS AS NEEDED
Status: DISCONTINUED | OUTPATIENT
Start: 2020-01-01 | End: 2020-01-01 | Stop reason: HOSPADM

## 2020-01-01 RX ORDER — SODIUM CHLORIDE 9 MG/ML
75 INJECTION, SOLUTION INTRAVENOUS CONTINUOUS
Status: DISCONTINUED | OUTPATIENT
Start: 2020-01-01 | End: 2020-01-01

## 2020-01-01 RX ORDER — NALOXONE HYDROCHLORIDE 0.4 MG/ML
0.4 INJECTION, SOLUTION INTRAMUSCULAR; INTRAVENOUS; SUBCUTANEOUS
Status: DISCONTINUED | OUTPATIENT
Start: 2020-01-01 | End: 2020-01-01 | Stop reason: HOSPADM

## 2020-01-01 RX ORDER — SODIUM CHLORIDE 0.9 % (FLUSH) 0.9 %
5-40 SYRINGE (ML) INJECTION EVERY 8 HOURS
Status: DISCONTINUED | OUTPATIENT
Start: 2020-01-01 | End: 2020-01-01 | Stop reason: HOSPADM

## 2020-01-01 RX ORDER — DEXTROSE MONOHYDRATE AND SODIUM CHLORIDE 5; .45 G/100ML; G/100ML
100 INJECTION, SOLUTION INTRAVENOUS CONTINUOUS
Status: DISCONTINUED | OUTPATIENT
Start: 2020-01-01 | End: 2020-01-01

## 2020-01-01 RX ORDER — LEVOFLOXACIN 5 MG/ML
500 INJECTION, SOLUTION INTRAVENOUS ONCE
Status: COMPLETED | OUTPATIENT
Start: 2020-01-01 | End: 2020-01-01

## 2020-01-01 RX ORDER — DEXTROSE MONOHYDRATE 100 MG/ML
0-250 INJECTION, SOLUTION INTRAVENOUS AS NEEDED
Status: DISCONTINUED | OUTPATIENT
Start: 2020-01-01 | End: 2020-01-01

## 2020-01-01 RX ORDER — MORPHINE SULFATE 2 MG/ML
2 INJECTION, SOLUTION INTRAMUSCULAR; INTRAVENOUS
Status: DISCONTINUED | OUTPATIENT
Start: 2020-01-01 | End: 2020-03-25 | Stop reason: HOSPADM

## 2020-01-01 RX ORDER — SODIUM CHLORIDE 0.9 % (FLUSH) 0.9 %
5-40 SYRINGE (ML) INJECTION AS NEEDED
Status: DISCONTINUED | OUTPATIENT
Start: 2020-01-01 | End: 2020-01-01 | Stop reason: HOSPADM

## 2020-01-01 RX ORDER — LORAZEPAM 2 MG/ML
1 INJECTION INTRAMUSCULAR
Status: DISCONTINUED | OUTPATIENT
Start: 2020-01-01 | End: 2020-03-25 | Stop reason: HOSPADM

## 2020-01-01 RX ORDER — GLYCOPYRROLATE 0.2 MG/ML
INJECTION INTRAMUSCULAR; INTRAVENOUS AS NEEDED
Status: DISCONTINUED | OUTPATIENT
Start: 2020-01-01 | End: 2020-01-01 | Stop reason: HOSPADM

## 2020-01-01 RX ORDER — PANTOPRAZOLE SODIUM 40 MG/1
40 TABLET, DELAYED RELEASE ORAL
Qty: 30 TAB | Refills: 0 | Status: SHIPPED | OUTPATIENT
Start: 2020-01-01

## 2020-01-01 RX ORDER — MIDAZOLAM HYDROCHLORIDE 1 MG/ML
.25-1 INJECTION, SOLUTION INTRAMUSCULAR; INTRAVENOUS
Status: DISCONTINUED | OUTPATIENT
Start: 2020-01-01 | End: 2020-01-01 | Stop reason: HOSPADM

## 2020-01-01 RX ORDER — MAGNESIUM SULFATE 100 %
4 CRYSTALS MISCELLANEOUS AS NEEDED
Status: DISCONTINUED | OUTPATIENT
Start: 2020-01-01 | End: 2020-01-01

## 2020-01-01 RX ORDER — HYDRALAZINE HYDROCHLORIDE 20 MG/ML
10 INJECTION INTRAMUSCULAR; INTRAVENOUS
Status: DISCONTINUED | OUTPATIENT
Start: 2020-01-01 | End: 2020-01-01 | Stop reason: HOSPADM

## 2020-01-01 RX ORDER — SODIUM CHLORIDE 9 MG/ML
100 INJECTION, SOLUTION INTRAVENOUS CONTINUOUS
Status: DISCONTINUED | OUTPATIENT
Start: 2020-01-01 | End: 2020-01-01

## 2020-01-01 RX ORDER — ONDANSETRON 2 MG/ML
8 INJECTION INTRAMUSCULAR; INTRAVENOUS
Status: DISCONTINUED | OUTPATIENT
Start: 2020-01-01 | End: 2020-01-01 | Stop reason: HOSPADM

## 2020-01-01 RX ORDER — POTASSIUM CHLORIDE 14.9 MG/ML
10 INJECTION INTRAVENOUS
Status: DISCONTINUED | OUTPATIENT
Start: 2020-01-01 | End: 2020-01-01

## 2020-01-01 RX ORDER — PROPOFOL 10 MG/ML
INJECTION, EMULSION INTRAVENOUS AS NEEDED
Status: DISCONTINUED | OUTPATIENT
Start: 2020-01-01 | End: 2020-01-01 | Stop reason: HOSPADM

## 2020-01-01 RX ORDER — ROCURONIUM BROMIDE 10 MG/ML
INJECTION, SOLUTION INTRAVENOUS AS NEEDED
Status: DISCONTINUED | OUTPATIENT
Start: 2020-01-01 | End: 2020-01-01 | Stop reason: HOSPADM

## 2020-01-01 RX ORDER — SUCCINYLCHOLINE CHLORIDE 20 MG/ML
INJECTION INTRAMUSCULAR; INTRAVENOUS AS NEEDED
Status: DISCONTINUED | OUTPATIENT
Start: 2020-01-01 | End: 2020-01-01 | Stop reason: HOSPADM

## 2020-01-01 RX ORDER — LORAZEPAM 2 MG/ML
INJECTION INTRAMUSCULAR
Status: COMPLETED
Start: 2020-01-01 | End: 2020-01-01

## 2020-01-01 RX ADMIN — HUMAN INSULIN 7 UNITS: 100 INJECTION, SOLUTION SUBCUTANEOUS at 18:58

## 2020-01-01 RX ADMIN — NEOSTIGMINE METHYLSULFATE 3 MG: 1 INJECTION, SOLUTION INTRAVENOUS at 17:14

## 2020-01-01 RX ADMIN — ONDANSETRON 4 MG: 2 INJECTION INTRAMUSCULAR; INTRAVENOUS at 18:37

## 2020-01-01 RX ADMIN — HYDRALAZINE HYDROCHLORIDE 10 MG: 20 INJECTION INTRAMUSCULAR; INTRAVENOUS at 12:14

## 2020-01-01 RX ADMIN — Medication 10 ML: at 21:07

## 2020-01-01 RX ADMIN — SODIUM CHLORIDE, SODIUM LACTATE, POTASSIUM CHLORIDE, AND CALCIUM CHLORIDE 100 ML/HR: 600; 310; 30; 20 INJECTION, SOLUTION INTRAVENOUS at 18:05

## 2020-01-01 RX ADMIN — SODIUM CHLORIDE, SODIUM LACTATE, POTASSIUM CHLORIDE, AND CALCIUM CHLORIDE 100 ML/HR: 600; 310; 30; 20 INJECTION, SOLUTION INTRAVENOUS at 04:37

## 2020-01-01 RX ADMIN — POTASSIUM BICARBONATE 40 MEQ: 782 TABLET, EFFERVESCENT ORAL at 18:58

## 2020-01-01 RX ADMIN — MORPHINE SULFATE 2 MG: 2 INJECTION, SOLUTION INTRAMUSCULAR; INTRAVENOUS at 14:18

## 2020-01-01 RX ADMIN — LORAZEPAM: 2 INJECTION INTRAMUSCULAR; INTRAVENOUS at 13:20

## 2020-01-01 RX ADMIN — INSULIN LISPRO 3 UNITS: 100 INJECTION, SOLUTION INTRAVENOUS; SUBCUTANEOUS at 07:21

## 2020-01-01 RX ADMIN — GLYCOPYRROLATE 0.4 MG: 0.2 INJECTION, SOLUTION INTRAMUSCULAR; INTRAVENOUS at 17:14

## 2020-01-01 RX ADMIN — LORAZEPAM 1 MG: 2 INJECTION INTRAMUSCULAR; INTRAVENOUS at 14:01

## 2020-01-01 RX ADMIN — ONDANSETRON 4 MG: 2 INJECTION INTRAMUSCULAR; INTRAVENOUS at 07:21

## 2020-01-01 RX ADMIN — POTASSIUM CHLORIDE 40 MEQ: 750 TABLET, FILM COATED, EXTENDED RELEASE ORAL at 08:13

## 2020-01-01 RX ADMIN — SODIUM CHLORIDE: 900 INJECTION, SOLUTION INTRAVENOUS at 16:12

## 2020-01-01 RX ADMIN — Medication 10 ML: at 11:21

## 2020-01-01 RX ADMIN — SODIUM CHLORIDE, SODIUM LACTATE, POTASSIUM CHLORIDE, AND CALCIUM CHLORIDE 100 ML/HR: 600; 310; 30; 20 INJECTION, SOLUTION INTRAVENOUS at 00:15

## 2020-01-01 RX ADMIN — Medication 10 ML: at 01:18

## 2020-01-01 RX ADMIN — SODIUM CHLORIDE 5 MG: 9 INJECTION INTRAMUSCULAR; INTRAVENOUS; SUBCUTANEOUS at 20:45

## 2020-01-01 RX ADMIN — INSULIN LISPRO 2 UNITS: 100 INJECTION, SOLUTION INTRAVENOUS; SUBCUTANEOUS at 07:12

## 2020-01-01 RX ADMIN — ROCURONIUM BROMIDE 10 MG: 10 SOLUTION INTRAVENOUS at 16:57

## 2020-01-01 RX ADMIN — INSULIN LISPRO 2 UNITS: 100 INJECTION, SOLUTION INTRAVENOUS; SUBCUTANEOUS at 11:31

## 2020-01-01 RX ADMIN — DICYCLOMINE HYDROCHLORIDE 10 MG: 10 CAPSULE ORAL at 17:59

## 2020-01-01 RX ADMIN — SODIUM CHLORIDE, SODIUM LACTATE, POTASSIUM CHLORIDE, AND CALCIUM CHLORIDE 100 ML/HR: 600; 310; 30; 20 INJECTION, SOLUTION INTRAVENOUS at 14:51

## 2020-01-01 RX ADMIN — ONDANSETRON 8 MG: 2 INJECTION INTRAMUSCULAR; INTRAVENOUS at 22:07

## 2020-01-01 RX ADMIN — DICYCLOMINE HYDROCHLORIDE 10 MG: 10 CAPSULE ORAL at 08:13

## 2020-01-01 RX ADMIN — INSULIN LISPRO 2 UNITS: 100 INJECTION, SOLUTION INTRAVENOUS; SUBCUTANEOUS at 00:01

## 2020-01-01 RX ADMIN — Medication 10 ML: at 22:07

## 2020-01-01 RX ADMIN — INDOMETHACIN 100 MG: 50 SUPPOSITORY RECTAL at 17:26

## 2020-01-01 RX ADMIN — PANTOPRAZOLE SODIUM 40 MG: 40 TABLET, DELAYED RELEASE ORAL at 07:12

## 2020-01-01 RX ADMIN — POTASSIUM CHLORIDE 40 MEQ: 750 TABLET, FILM COATED, EXTENDED RELEASE ORAL at 10:42

## 2020-01-01 RX ADMIN — SODIUM CHLORIDE 1000 ML: 900 INJECTION, SOLUTION INTRAVENOUS at 18:58

## 2020-01-01 RX ADMIN — INSULIN LISPRO 3 UNITS: 100 INJECTION, SOLUTION INTRAVENOUS; SUBCUTANEOUS at 07:04

## 2020-01-01 RX ADMIN — LORAZEPAM 1 MG: 2 INJECTION INTRAMUSCULAR; INTRAVENOUS at 13:50

## 2020-01-01 RX ADMIN — PANTOPRAZOLE SODIUM 40 MG: 40 TABLET, DELAYED RELEASE ORAL at 07:21

## 2020-01-01 RX ADMIN — POTASSIUM CHLORIDE 40 MEQ: 750 TABLET, FILM COATED, EXTENDED RELEASE ORAL at 09:13

## 2020-01-01 RX ADMIN — INSULIN LISPRO 2 UNITS: 100 INJECTION, SOLUTION INTRAVENOUS; SUBCUTANEOUS at 11:26

## 2020-01-01 RX ADMIN — MORPHINE SULFATE 2 MG: 2 INJECTION, SOLUTION INTRAMUSCULAR; INTRAVENOUS at 14:08

## 2020-01-01 RX ADMIN — HYDRALAZINE HYDROCHLORIDE 10 MG: 20 INJECTION INTRAMUSCULAR; INTRAVENOUS at 03:12

## 2020-01-01 RX ADMIN — INSULIN LISPRO 3 UNITS: 100 INJECTION, SOLUTION INTRAVENOUS; SUBCUTANEOUS at 18:14

## 2020-01-01 RX ADMIN — MORPHINE SULFATE 2 MG: 2 INJECTION, SOLUTION INTRAMUSCULAR; INTRAVENOUS at 13:20

## 2020-01-01 RX ADMIN — ONDANSETRON 8 MG: 2 INJECTION INTRAMUSCULAR; INTRAVENOUS at 01:17

## 2020-01-01 RX ADMIN — ONDANSETRON 4 MG: 4 TABLET, ORALLY DISINTEGRATING ORAL at 13:16

## 2020-01-01 RX ADMIN — PROCHLORPERAZINE EDISYLATE 10 MG: 5 INJECTION INTRAMUSCULAR; INTRAVENOUS at 13:18

## 2020-01-01 RX ADMIN — Medication 10 ML: at 11:22

## 2020-01-01 RX ADMIN — Medication 10 ML: at 23:31

## 2020-01-01 RX ADMIN — PROMETHAZINE HYDROCHLORIDE 12.5 MG: 25 INJECTION INTRAMUSCULAR; INTRAVENOUS at 21:05

## 2020-01-01 RX ADMIN — ONDANSETRON 8 MG: 2 INJECTION INTRAMUSCULAR; INTRAVENOUS at 11:21

## 2020-01-01 RX ADMIN — LORAZEPAM 1 MG: 2 INJECTION INTRAMUSCULAR; INTRAVENOUS at 14:08

## 2020-01-01 RX ADMIN — DICYCLOMINE HYDROCHLORIDE 10 MG: 10 CAPSULE ORAL at 22:07

## 2020-01-01 RX ADMIN — HYDRALAZINE HYDROCHLORIDE 10 MG: 20 INJECTION INTRAMUSCULAR; INTRAVENOUS at 23:31

## 2020-01-01 RX ADMIN — Medication 10 ML: at 01:17

## 2020-01-01 RX ADMIN — HYDRALAZINE HYDROCHLORIDE 10 MG: 20 INJECTION INTRAMUSCULAR; INTRAVENOUS at 18:37

## 2020-01-01 RX ADMIN — INSULIN LISPRO 3 UNITS: 100 INJECTION, SOLUTION INTRAVENOUS; SUBCUTANEOUS at 00:38

## 2020-01-01 RX ADMIN — LORAZEPAM 1 MG: 2 INJECTION INTRAMUSCULAR; INTRAVENOUS at 14:18

## 2020-01-01 RX ADMIN — ONDANSETRON 4 MG: 2 INJECTION INTRAMUSCULAR; INTRAVENOUS at 11:22

## 2020-01-01 RX ADMIN — PANTOPRAZOLE SODIUM 40 MG: 40 TABLET, DELAYED RELEASE ORAL at 06:40

## 2020-01-01 RX ADMIN — Medication 10 ML: at 17:37

## 2020-01-01 RX ADMIN — ONDANSETRON HYDROCHLORIDE 4 MG: 2 INJECTION, SOLUTION INTRAMUSCULAR; INTRAVENOUS at 16:44

## 2020-01-01 RX ADMIN — PROCHLORPERAZINE EDISYLATE 10 MG: 5 INJECTION INTRAMUSCULAR; INTRAVENOUS at 14:18

## 2020-01-01 RX ADMIN — ONDANSETRON 4 MG: 2 INJECTION INTRAMUSCULAR; INTRAVENOUS at 23:35

## 2020-01-01 RX ADMIN — OXYCODONE 5 MG: 5 TABLET ORAL at 15:42

## 2020-01-01 RX ADMIN — PROCHLORPERAZINE MALEATE 5 MG: 5 TABLET, FILM COATED ORAL at 14:25

## 2020-01-01 RX ADMIN — PROPOFOL 200 MG: 10 INJECTION, EMULSION INTRAVENOUS at 16:29

## 2020-01-01 RX ADMIN — SODIUM CHLORIDE 100 ML/HR: 9 INJECTION, SOLUTION INTRAVENOUS at 12:24

## 2020-01-01 RX ADMIN — Medication 10 ML: at 13:15

## 2020-01-01 RX ADMIN — INSULIN LISPRO 5 UNITS: 100 INJECTION, SOLUTION INTRAVENOUS; SUBCUTANEOUS at 18:00

## 2020-01-01 RX ADMIN — SUCCINYLCHOLINE CHLORIDE 140 MG: 20 INJECTION, SOLUTION INTRAMUSCULAR; INTRAVENOUS at 16:30

## 2020-01-01 RX ADMIN — ROCURONIUM BROMIDE 10 MG: 10 SOLUTION INTRAVENOUS at 16:29

## 2020-01-01 RX ADMIN — Medication 10 ML: at 07:39

## 2020-01-01 RX ADMIN — MORPHINE SULFATE 2 MG: 2 INJECTION, SOLUTION INTRAMUSCULAR; INTRAVENOUS at 14:01

## 2020-01-01 RX ADMIN — ONDANSETRON 4 MG: 2 INJECTION INTRAMUSCULAR; INTRAVENOUS at 18:04

## 2020-01-01 RX ADMIN — INSULIN LISPRO 5 UNITS: 100 INJECTION, SOLUTION INTRAVENOUS; SUBCUTANEOUS at 13:13

## 2020-01-01 RX ADMIN — INSULIN LISPRO 5 UNITS: 100 INJECTION, SOLUTION INTRAVENOUS; SUBCUTANEOUS at 23:43

## 2020-01-01 RX ADMIN — FENTANYL CITRATE 25 MCG: 50 INJECTION, SOLUTION INTRAMUSCULAR; INTRAVENOUS at 18:08

## 2020-01-01 RX ADMIN — Medication 10 ML: at 07:04

## 2020-01-01 RX ADMIN — SODIUM CHLORIDE, SODIUM LACTATE, POTASSIUM CHLORIDE, AND CALCIUM CHLORIDE 100 ML/HR: 600; 310; 30; 20 INJECTION, SOLUTION INTRAVENOUS at 06:31

## 2020-01-01 RX ADMIN — Medication 20 ML: at 07:39

## 2020-01-01 RX ADMIN — SODIUM CHLORIDE, SODIUM LACTATE, POTASSIUM CHLORIDE, AND CALCIUM CHLORIDE 100 ML/HR: 600; 310; 30; 20 INJECTION, SOLUTION INTRAVENOUS at 20:15

## 2020-01-01 RX ADMIN — Medication 10 ML: at 07:18

## 2020-01-01 RX ADMIN — ONDANSETRON 4 MG: 2 INJECTION INTRAMUSCULAR; INTRAVENOUS at 12:28

## 2020-01-01 RX ADMIN — Medication 10 ML: at 07:17

## 2020-01-01 RX ADMIN — LEVOFLOXACIN 500 MG: 5 INJECTION, SOLUTION INTRAVENOUS at 17:02

## 2020-01-01 RX ADMIN — INSULIN LISPRO 3 UNITS: 100 INJECTION, SOLUTION INTRAVENOUS; SUBCUTANEOUS at 12:00

## 2020-01-01 RX ADMIN — INSULIN LISPRO 5 UNITS: 100 INJECTION, SOLUTION INTRAVENOUS; SUBCUTANEOUS at 17:13

## 2020-01-01 RX ADMIN — ONDANSETRON 4 MG: 2 INJECTION INTRAMUSCULAR; INTRAVENOUS at 17:37

## 2020-01-01 RX ADMIN — INSULIN LISPRO 3 UNITS: 100 INJECTION, SOLUTION INTRAVENOUS; SUBCUTANEOUS at 01:20

## 2020-01-01 RX ADMIN — ROCURONIUM BROMIDE 10 MG: 10 SOLUTION INTRAVENOUS at 16:38

## 2020-01-01 RX ADMIN — ONDANSETRON 4 MG: 2 INJECTION INTRAMUSCULAR; INTRAVENOUS at 15:54

## 2020-01-01 RX ADMIN — POTASSIUM CHLORIDE 40 MEQ: 750 TABLET, FILM COATED, EXTENDED RELEASE ORAL at 12:04

## 2020-01-01 RX ADMIN — MORPHINE SULFATE 2 MG: 2 INJECTION, SOLUTION INTRAMUSCULAR; INTRAVENOUS at 13:50

## 2020-01-03 NOTE — PROGRESS NOTES
Phone call to patient for follow up per protocol. Pt states she believes she is having some side effects of the potassium supplements she's taking. States the last couple of nights she's been up in the middle of the night vomiting clear liquid. Also has been experiencing diarrhea 2 times daily. Reports moderate fatigue and decrease in appetite. Is meeting with GI specialist today regarding having stent placed. Is hopeful that can take place next week so she can start chemo ASAP. Education provided on symptom control. No further questions.

## 2020-01-06 PROBLEM — R17 JAUNDICE: Status: ACTIVE | Noted: 2020-01-01

## 2020-01-06 NOTE — ED NOTES
Dr. Hali Conti at bedside. Pt reports that when her BG was >500 on Friday, she had eaten a milkshake prior to lab draw. 7:40 PM  Dr. Florencio Arroyo at bedside.

## 2020-01-06 NOTE — ED TRIAGE NOTES
TRIAGE NOTE:  Patient arrives referred by GI doctor. For abnormal lab results. Patient has ERCP scheduled for Wednesday and was sent for admission for sodium 125 and glucose greater than 500 on lab work drawn Friday.

## 2020-01-06 NOTE — ED PROVIDER NOTES
69 y.o. female with past medical history significant for pancreatic cancer with secondary DM, HTN who presents via private vehicle to the ED with chief complaint of referral. Patient reports that she was referred to the ED by GI, Dr. Shannan Rhoades for abnormal lab results. Patient states that she was seen by GI 3 days ago and had labs drawn for an ERCP that is scheduled for this week. She states today she received a call from GI today , referring her to the ED for low sodium and high glucose. Patient arrives to the ED asymptomatic, stating that her diabetes is secondary to pancreatic cancer and that she has not been on insulin for 2 months. Patient states that she has only had 2 courses of chemotherapy, and that her last treatment was 11/1/19. She states that she was recently admitted to the hospital and told that she had a block in her pancreas and needed a stent placement. Patient notes that she lives at home with 24 hour care. She also states that she is taking creon for nutrient retention. Patient denies chest pain, shortness of breath, or other symptoms. Per chart, patient was recently admitted from 12/27/19 to 12/28/19 for obstructive juandice due to a mass at the pancreatic head. Patient was discharged home and scehduled for stent placement. There are no other acute medical concerns at this time. Social Hx: no Tobacco use, minimal EtOH use, no Illicit Drug use  PCP: Nadya Cole MD  GI: Carmenza Field MD    Note written by Yoshi Altamirano, as dictated by Marisela Benson MD 5:47 PM       The history is provided by the patient. No  was used.         Past Medical History:   Diagnosis Date    Arthritis     Constipation     Diabetes ([address]     Hemorrhoids     Hiatal hernia     High cholesterol     Hypertension     Pancreatic cancer [facility])        Past Surgical History:   Procedure Laterality Date    HX KNEE ARTHROSCOPY Right     HX ORTHOPAEDIC      left wrist surgery    HX TONSILLECTOMY           Family History:   Problem Relation Age of Onset   Elkin Walters Cancer Mother         skin    Hypertension Mother     Heart Disease Mother     Cancer Father         skin    Heart Disease Father     Stroke Father     Diabetes Neg Hx        Social History     Socioeconomic History    Marital status:      Spouse name: Not on file    Number of children: Not on file    Years of education: Not on file    Highest education level: Not on file   Occupational History    Not on file   Social Needs    Financial resource strain: Not on file    Food insecurity:     Worry: Not on file     Inability: Not on file    Transportation needs:     Medical: Not on file     Non-medical: Not on file   Tobacco Use    Smoking status: Never Smoker    Smokeless tobacco: Never Used   Substance and Sexual Activity    Alcohol use: Yes     Alcohol/week: 2.0 - 4.0 standard drinks     Types: 1 - 2 Cans of beer, 1 - 2 Shots of liquor per week     Frequency: 2-4 times a month     Drinks per session: 1 or 2     Comment: minimal    Drug use: No    Sexual activity: Never   Lifestyle    Physical activity:     Days per week: Not on file     Minutes per session: Not on file    Stress: Not on file   Relationships    Social connections:     Talks on phone: Not on file     Gets together: Not on file     Attends Judaism service: Not on file     Active member of club or organization: Not on file     Attends meetings of clubs or organizations: Not on file     Relationship status: Not on file    Intimate partner violence:     Fear of current or ex partner: Not on file     Emotionally abused: Not on file     Physically abused: Not on file     Forced sexual activity: Not on file   Other Topics Concern    Not on file   Social History Narrative    Lives in [facility] alone. Has one son.  since 2007. Used to stay home with her son and fixed things around the house. Likes to garden.          ALLERGIES: Amoxicillin    Review of Systems   Constitutional: Negative for appetite change, chills and fever. HENT: Negative for rhinorrhea, sore throat and trouble swallowing. Eyes: Negative for photophobia. Respiratory: Negative for cough and shortness of breath. Cardiovascular: Negative for chest pain and palpitations. Gastrointestinal: Negative for abdominal pain, nausea and vomiting. Genitourinary: Negative for dysuria, frequency and hematuria. Musculoskeletal: Negative for arthralgias. Neurological: Negative for dizziness, syncope and weakness. Psychiatric/Behavioral: Negative for behavioral problems. The patient is not nervous/anxious. Vitals:    01/06/20 1622 01/06/20 1749   BP: 140/80 180/80   Pulse: 78 70   Resp: 18 16   Temp: 97.7 °F (36.5 °C) 97.6 °F (36.4 °C)   SpO2: 98% 99%            Physical Exam  Vitals signs and nursing note reviewed. Constitutional:       Appearance: She is well-developed. HENT:      Head: Normocephalic and atraumatic. Eyes:      Pupils: Pupils are equal, round, and reactive to light. Neck:      Musculoskeletal: Normal range of motion and neck supple. Cardiovascular:      Rate and Rhythm: Normal rate and regular rhythm. Heart sounds: Normal heart sounds. No murmur. No friction rub. No gallop. Pulmonary:      Effort: Pulmonary effort is normal. No respiratory distress. Breath sounds: No wheezing or rales. Abdominal:      Palpations: Abdomen is soft. Tenderness: There is no tenderness. There is no rebound. Musculoskeletal: Normal range of motion. General: No tenderness. Skin:     Coloration: Skin is jaundiced. Findings: No erythema. Neurological:      Mental Status: She is alert. Cranial Nerves: No cranial nerve deficit.       Comments: Motor; symmetric   Psychiatric:         Behavior: Behavior normal.      Note written by Yoshi [place], as dictated by Brianne Clifford MD 5:47 PM     [facility]       Procedures CONSULT NOTE:  6:12 PM Izabella Mcelroy MD spoke with Dr. Maggie Cabral, Consult for Gastroenterology. Discussed available diagnostic tests and clinical findings. Hospitalist Khoi Text for Admission  6:36 PM    ED Room Number: G/HG  Patient Name and age: Bria [place] 69 y.o.  female  Working Diagnosis:   1. Dehydration    2. Hyperglycemia    3. Hypokalemia    4. Jaundice      Readmission: yes  Isolation Requirements:  no  Recommended Level of Care:  med/surg  Code Status:  FULL  Other:    Department:[facility] Adult ED - 21     8:27 PM  Patient is being admitted to the hospital.  The results of their tests and reasons for their admission have been discussed with them and/or available family. They convey agreement and understanding for the need to be admitted and for their admission diagnosis.

## 2020-01-06 NOTE — TELEPHONE ENCOUNTER
Patient states she has a surgery Wednesday and doesn't  Feel like she needs to keep tomorrows appt  Thanks

## 2020-01-06 NOTE — TELEPHONE ENCOUNTER
1/6/2020 12:28 PM: Returned call to patient, who stated that she had labs checked and her sodium is too low to do the procedure on Wednesday, so she is being admitted to [address] [place] [address] patient that Dr. Bola Lynch will be notified and to call this office if she needs anything. Patient denied further questions or concerns at this time.

## 2020-01-07 NOTE — H&P
Addendum to H&P  Patient's blood glucose improved, will hold the insulin drip for now put patient on sliding scale NovoLog insulin, Accu-Cheks, appropriate diet, and continue to closely monitor provide gentle IV hydration repeat labs in the morning

## 2020-01-07 NOTE — PROGRESS NOTES
Transitions of care:   participated in [facility] rounds this am with team.  Patient is tentatively schedulded for an ERCP on Wed.

## 2020-01-07 NOTE — H&P
[address]  HISTORY AND PHYSICAL    Name:  Haley [place]  MR#:  [id number]  :  1945  ACCOUNT #:  [de-identified]  ADMIT DATE:  2020      CHIEF COMPLAINT:  Abnormal labs. HISTORY OF PRESENT ILLNESS:  The patient is a 73-year-old female with past medical history of pancreatic cancer, currently off her chemotherapy cycle; history of diabetes; hemorrhoids; hiatal hernia; hypertension; hypercholesterolemia, who presents to the hospital with the above-mentioned symptom. History was primarily obtained from the patient. The patient reports that she was recently admitted at [place] [address], an MRCP done, which showed a mass at the pancreatic head, and needs an ERCP. The patient was discharged home, but reports that she went to her gastroenterologist to get some baseline labs done as her ERCP is scheduled for day after tomorrow. The patient reports she got a call today saying her sugars were high and her sodium was low, and she was told to come to the ER for further management and evaluation. The patient reports that she has diabetes secondary to pancreatic cancer, and she had not been on insulin for the last 2 months. The patient reports that she has had chemotherapy and her last treatment was 2019, after which she was waiting the initiation of chemotherapy. The patient reports that she is having lighter stools and has been having minimal abdominal pain. The patient denies any abdominal distention or lower extremity swelling. The patient was seen in the hospital and was requested to be admitted under the hospitalist service. The patient denies any headache, blurry vision, sore throat, trouble swallowing, trouble with speech, chest pain, shortness of breath, cough, fever, chills.   Does admit to mild abdominal pain, but denies any constipation, diarrhea, urinary symptoms, focal or generalized neurological weakness, recent travel, sick contacts, falls, injuries, hematemesis, melena, hemoptysis, hematuria, or any other concerns or problems. The patient denies any other problems. PAST MEDICAL HISTORY:  See above. HOME MEDICATIONS:  Currently the patient is on:  1. Creon 2 capsules t.i.d.  2.  Lomotil. 3.  Ibuprofen. SOCIAL HISTORY:  No history of tobacco abuse. History of alcohol abuse. She used to drink 2-4 drinks 2-4 times a month. Denies IV drug abuse. Lives at home. ALLERGIES:  TO AMOXICILLIN. FAMILY HISTORY:  Mother has history of skin cancer, hypertension, heart disease. Father has history of skin cancer and heart disease. REVIEW OF SYMPTOMS:  All systems were reviewed and found to be essentially negative except for the symptoms mentioned above. PHYSICAL EXAMINATION:  VITAL SIGNS:  Temperature 97.8, pulse 68, respiratory rate 16, blood pressure 178/90, pulse oximetry 99% on room air. GENERAL:  Alert x3, awake, mildly distressed, icteric female, appears to be stated age. HEENT:  Pupils are equal and reactive to light. Dry mucous membranes. Tympanic membranes are clear. Icteric sclerae. NECK:  Supple. No JVD. CHEST:  Clear to auscultation bilaterally. CORONARY:  S1 and S2 heard. ABDOMEN:  Soft, nontender, and nondistended. Bowel sounds are physiologic. EXTREMITIES:  No clubbing, no cyanosis, no edema. NEURO/PSYCH:  Pleasant mood and affect. Cranial nerves II through XII are grossly intact. Sensory grossly within normal limits. DTRs 2+ x4. Strength 5/5. SKIN:  Warm. LABORATORY DATA:  White count 7.20, hemoglobin 10.4, hematocrit 32.5, and platelets 414. Sodium 127, potassium 3.8, chloride 93, bicarbonate 27, anion gap 7, glucose 465, BUN 11, creatinine 1.08, calcium 9.0. Bilirubin total 21.4, , , and alkaline phosphatase 1359. ASSESSMENT AND PLAN:  1. Hyperbilirubinemia. Elevated liver function test likely secondary to pancreatic head mass. The patient probably needs a biliary stent.   Admit the patient to the hospital.  Keep the patient n.p.o. for now, IV hydration, supportive care, Gastroenterology consult, and repeat labs in the morning. Monitor liver function test.  We will get INR level and further intervention per hospital course. Reassess as needed. Continue to monitor. 2.  Pancreatic cancer. We will continue Creon. Supportive care. Close monitoring. Further intervention per hospital course. 3.  Hyponatremia, mild, likely secondary to hyperglycemia. The patient received IV hydration in the emergency room. Continue to closely monitor. Repeat labs in the morning. Reassess as needed. 4.  Diabetes mellitus type 2, poorly controlled. Concern for hyperosmolar nonketotic state. Provide gentle IV hydration, insulin drips, supportive care. Close monitoring. Switch to scheduled insulin in the morning and further intervention per hospital course. Reassess as needed. Keep the patient n.p.o. for now. Monitor for now. 5.  Accelerated hypertension. Hydralazine p.r.n. Continue to monitor. Further intervention per hospital course. 6.  Gastrointestinal and deep vein thrombosis prophylaxis. The patient will be on heparin.     Leilani Christianson MD      [id number]  D:  01/06/2020 20:40  T:  01/06/2020 22:52  JOB #:  [id number]

## 2020-01-07 NOTE — ED NOTES
2006- Dr. Luis E Meza paged for BP medication order. MD will place an order for medication prn. 2045- POC . Dr. Luis E Meza paged. 2110- TRANSFER - OUT REPORT:    Verbal report given to Roselyn(name) on Tacos Loveless  being transferred to [address] [place] [address] (unit) for routine progression of care       Report consisted of patients Situation, Background, Assessment and   Recommendations(SBAR). Information from the following report(s) ED Summary was reviewed with the receiving nurse. Lines:   Venous Access Device [facility] QRT#[id number] 11/06/18 Upper chest (subclavicular area, right (Active)       Peripheral IV 01/06/20 Left Antecubital (Active)        Opportunity for questions and clarification was provided.       Patient transported with:   [employer]

## 2020-01-07 NOTE — PROGRESS NOTES
TRANSFER - IN REPORT:    Verbal report received from Digna RN(name) on Samantha Monzon  being received from ED(unit) for routine progression of care      Report consisted of patients Situation, Background, Assessment and   Recommendations(SBAR). Information from the following report(s) SBAR, Kardex, Intake/Output, MAR and Recent Results was reviewed with the receiving nurse. Opportunity for questions and clarification was provided. Assessment completed upon patients arrival to unit and care assumed. 0800 Bedside and Verbal shift change report given to Nahed Early (oncoming nurse) by Marcia Smith RN (offgoing nurse). Report included the following information SBAR, Kardex, Intake/Output, MAR and Recent Results.

## 2020-01-07 NOTE — PROGRESS NOTES
Hospitalist Progress Note  Helene Finn MD  Answering service: [phone] OR [phone] from in house phone        Date of Service:  2020  NAME:  Jude Raza  :  1945  MRN:  [id number]  PCP: Teresa Hodges MD    Chief Complaint:   Chief Complaint   Patient presents with    Referral / Consult         Admission Summary:     Jude Raza is a 69 y.o. female who presented with    Interval history / Subjective:   Patient seen for Follow up of elevated LFTs  As per initial history   The patient is a 68-year-old female with past medical history of pancreatic cancer, currently off her chemotherapy cycle; history of diabetes; hemorrhoids; hiatal hernia; hypertension; hypercholesterolemia, who presents to the hospital with the above-mentioned symptom. History was primarily obtained from the patient. The patient reports that she was recently admitted at [facility], an MRCP done, which showed a mass at the pancreatic head, and needs an ERCP. The patient was discharged home, but reports that she went to her gastroenterologist to get some baseline labs done as her ERCP is scheduled for day after tomorrow. The patient reports she got a call today saying her sugars were high and her sodium was low, and she was told to come to the ER for further management and evaluation. The patient reports that she has diabetes secondary to pancreatic cancer, and she had not been on insulin for the last 2 months. The patient reports that she has had chemotherapy and her last treatment was 2019, after which she was waiting the initiation of chemotherapy. The patient reports that she is having lighter stools and has been having minimal abdominal pain. The patient denies any abdominal distention or lower extremity swelling. The patient was seen in the hospital and was requested to be admitted under the hospitalist service.   The patient denies any headache, blurry vision, sore throat, trouble swallowing, trouble with speech, chest pain, shortness of breath, cough, fever, chills. Does admit to mild abdominal pain, but denies any constipation, diarrhea, urinary symptoms, focal or generalized neurological weakness, recent travel, sick contacts, falls, injuries, hematemesis, melena, hemoptysis, hematuria, or any other concerns or problems. The patient denies any other problems. Patient seen and examined by the bedside, Labs, images and notes reviewed  Patient is comfortable, denies any chest pain, SOB, abdominal pain, fevers, chills. No N/V/D. Plan for stenting tomorrow   Discussed with nursing staff, no acute issues overnight, orders reviewed. Assessment & Plan:     1. Hyperbilirubinemia. Elevated liver function test likely secondary to pancreatic head mass. The patient probably needs a biliary stent. Admit the patient to the hospital.  Keep the patient n.p.o. for now, IV hydration, supportive care, Gastroenterology consult, and repeat labs in the morning. Monitor liver function test.  Reassess as needed. Continue to monitor. NPO midnight  Stenting tomorrow     2. Pancreatic cancer. We will continue Creon. Supportive care. Close monitoring. Further intervention per hospital course. Oncology consulted     3. Hyponatremia, mild, likely secondary to hyperglycemia. improving   The patient received IV hydration in the emergency room. Continue to closely monitor. Repeat labs in the morning. Reassess as needed. 4.  Diabetes mellitus type 2, poorly controlled. Blood glucose levels much better   On correctional scale insulin     5. Accelerated hypertension. Hydralazine p.r.n. Continue to monitor. Further intervention per hospital course.       Code status: Partial Code    DVT prophylaxis: SCDs    Care Plan discussed with: Patient/Family    Disposition: Home w/Family and TBD    Hospital Problems  Date Reviewed: 11/20/2019          Codes Class Noted POA    Jaundice ICD-10-CM: R17  ICD-9-CM: 782.4  2020 Unknown                Review of Systems:   A comprehensive review of systems was negative except for that written in the HPI. Physical Examination:     Visit Vitals  /79 (BP 1 Location: Left arm, BP Patient Position: At rest)   Pulse 68   Temp 98.2 °F (36.8 °C)   Resp 16   SpO2 98%     GENERAL:  Alert x3, awake, mildly distressed, icteric female, appears to be stated age. HEENT:  Pupils are equal and reactive to light. Dry mucous membranes. Tympanic membranes are clear. Icteric sclerae. NECK:  Supple. No JVD. CHEST:  Clear to auscultation bilaterally. CORONARY:  S1 and S2 heard. ABDOMEN:  Soft, nontender, and nondistended. Bowel sounds are physiologic. EXTREMITIES:  No clubbing, no cyanosis, no edema. NEURO/PSYCH:  Pleasant mood and affect. Cranial nerves II through XII are grossly intact. Sensory grossly within normal limits. DTRs 2+ x4. Strength 5/5. SKIN:  Warm. Vital Signs:    Last 24hrs VS reviewed since prior progress note. Most recent are:    Visit Vitals  /79 (BP 1 Location: Left arm, BP Patient Position: At rest)   Pulse 68   Temp 98.2 °F (36.8 °C)   Resp 16   SpO2 98%         Intake/Output Summary (Last 24 hours) at 2020 1831  Last data filed at 2020 0830  Gross per 24 hour   Intake 1240 ml   Output    Net 1240 ml        Tmax:  Temp (24hrs), Av.2 °F (36.8 °C), Min:97.8 °F (36.6 °C), Max:98.6 °F (37 °C)      Data Review:   Data reviewed by myself:  Ct Chest Abd Pelv W Cont    Result Date: 2019  INDICATION: Elevated bilirubin, pancreatic cancer. CT of the chest, abdomen and pelvis is performed with 5 mm collimation. Study is performed with 100 cc of nonionic IV Isovue-370. Sagittal and coronal reformatted images were also performed. CT dose reduction was achieved with the use of the standardized protocol tailored for this examination and automatic exposure control for dose modulation.  Direct comparison is made to prior CT dated October 2019. Chest: Tubes and lines: Central venous port catheter extends to the distal SVC. Lungs: Lungs are clear bilaterally. Lymph nodes: There is no mediastinal, hilar or axillary lymphadenopathy. Pleura: There is no pleural fluid. Heart: The heart is normal in size and there is no pericardial fluid. Bones: No lytic or sclerotic osseous lesion is visualized. Abdomen/pelvis: Liver/pancreasgallbladder: There is new, extensive intrahepatic biliary dilatation in the common bile duct dilatation to the level of the pancreatic head, not present on prior CT dated October 2019. There is also new main pancreatic ductal dilatation at the level of the pancreatic neck, body and tail. The gallbladder is distended as well. The patient's known pancreatic uncinate neoplasm is difficult to evaluate as it is ill-defined and mildly hypodense, however the mass appears to be increased in size; this mass measures approximately 3.5 cm x 3.7 cm x 2.6 cm and measured 3.2 cm x 3.2 cm x 2.2 cm on prior CT dated October 2019. The mass appears to encase the distal SMA seen on prior CT dated October 2019. There is a 5 mm x 6 mm hypodense lesion within the upper right hepatic lobe, not significant change compared to prior CT dated October 2019. There is also a 1 cm x 9 mm hypodensity within the left hepatic lobe, not significantly changed compared to prior CT dated October 2019. Within the inferior right hepatic lobe, there is a 1.3 cm x 2 cm hypodense lesion which measured 8 mm x 6 mm on prior CT dated October 2019. There is also a subtle 7 mm  x 1 cm hypodensity within the left hepatic lobe immediately adjacent to the gallbladder, not seen on prior CT dated October 2019. Spleen: The spleen is normal. Adrenals: The adrenals are normal. Kidneys: The kidneys are normal. Bowel: No dilated or thickened loop of large or small bowel is seen.  Appendix: The appendix is normal. Urinary bladder: Urinary bladder is partially filled and grossly normal. Bones: No lytic or sclerotic osseous lesion is visualized. Miscellaneous: There is no free intraperitoneal gas or fluid. There is no focal fluid collection to suggest abscess. IUD is present in the uterus. IMPRESSION: 1. New marked intrahepatic biliary dilatation, common bile duct dilatation, pancreatic duct dilatation and gallbladder dilatation. Interval increase in size of ill-defined pancreatic head\uncinate mass, difficult to measure, but measuring approximately 8.5 cm x 3.7 cm x 2.6 cm. 2. Hepatic metastatic disease, as described above. Mri Abd W Mrcp W Cont    Result Date: 12/30/2019  *PRELIMINARY REPORT* Biliary and pancreatic ductal dilatation due to the pancreatic head mass, as recently demonstrated on CT. Small liver lesions suspicious for metastases. Preliminary report was provided by Dr. Jesse Martínez, the on-call radiologist, at 13:20 hours on 12/20/2019 Final report to follow. *END PRELIMINARY REPOR* EXAM:  MRI ABD W MRCP W CONT INDICATION:   Jaundice, painless, weight loss or fatigue or anorexia or >3 months duration, otherwise healthy COMPARISON: CT 2019 CONTRAST:  10 mL Gadavist. TECHNIQUE: MR of the abdomen was performed and the following sequences were obtained: Coronal HASTE, multiplanar MRCP, axial in and out-of-phase T1, axial T1 fat-saturated, axial T2, and pre- and post contrast T1-weighted images. FINDINGS: There is a 5 mm focus of increased signal intensity segment 8 of the liver. There is a 1.7 x 0.9 cm lesion segment 5 of the liver. There is intrahepatic ductal dilatation. .  Gallbladder is distended and there is suspicion of a tiny gallstone. The spleen is normal.  There is a mass in the pancreatic head measuring approximately 3 x 2.7 cm with dilatation of the pancreatic duct measuring 9 mm and of the common duct measuring 14 mm. The mass extends medially to involve the SMA. .  The adrenal glands are normal.  The kidneys are normal. There is no lymphadenopathy or ascites. IMPRESSION:  1. There is a pancreatic head mass measuring approximately 3 x 2.7 cm with obstruction of the pancreatic duct and common duct. There is dilatation of the gallbladder with suggestion of a tiny gallstone. The mass extends medially to involve the SMA. There is a lesion in segment 5 of the liver measuring 1.7 x 0.9 cm suspicious for metastatic disease. There is a nonspecific 5 mm focus of increased signal intensity in segment 8 of the liver. No results found for: SDES  Lab Results   Component Value Date/Time    Culture result: MIXED UROGENITAL SAMARA ISOLATED 10/16/2019 07:57 AM    Culture result: NO GROWTH 2 DAYS 04/24/2019 11:03 AM    Culture result: NO GROWTH 1 DAY 12/05/2018 11:12 AM     All Micro Results     None          Labs: reviewed by myself. Recent Labs     01/07/20  0436 01/06/20  1709   WBC 8.5 7.0   HGB 9.8* 10.4*   HCT 29.2* 32.5*    287     Recent Labs     01/07/20  0436 01/06/20  1709   * 127*   K 3.4* 3.8    93*   CO2 23 27   BUN 10 11   CREA 0.55 1.08*   * 465*   CA 8.5 9.0     Recent Labs     01/07/20  0436 01/06/20  1709   SGOT 208* 235*   * 213*   AP 1,176* 1,359*   TBILI 18.3* 21.4*   TP 5.1* 5.5*   ALB 1.9* 2.1*   GLOB 3.2 3.4     Recent Labs     01/06/20  1709   INR 1.1   PTP 11.1      No results for input(s): FE, TIBC, PSAT, FERR in the last 72 hours. No results found for: FOL, RBCF   No results for input(s): PH, PCO2, PO2 in the last 72 hours. No results for input(s): CPK, CKNDX, TROIQ in the last 72 hours.     No lab exists for component: CPKMB  Lab Results   Component Value Date/Time    Cholesterol, total 131 12/05/2018 11:12 AM    HDL Cholesterol 48 12/05/2018 11:12 AM    LDL, calculated 66.6 12/05/2018 11:12 AM    Triglyceride 82 12/05/2018 11:12 AM    CHOL/HDL Ratio 2.7 12/05/2018 11:12 AM     Lab Results   Component Value Date/Time    Glucose (POC) 248 (H) 01/07/2020 05:52 PM    Glucose (POC) 220 (H) 01/07/2020 11:22 AM    Glucose (POC) 201 (H) 01/07/2020 06:54 AM    Glucose (POC) 258 (H) 01/06/2020 11:38 PM    Glucose (POC) 279 (H) 01/06/2020 08:44 PM     Lab Results   Component Value Date/Time    Color YELLOW/STRAW 10/16/2019 07:57 AM    Appearance CLEAR 10/16/2019 07:57 AM    Specific gravity 1.012 10/16/2019 07:57 AM    pH (UA) 6.5 10/16/2019 07:57 AM    Protein NEGATIVE  10/16/2019 07:57 AM    Glucose NEGATIVE  10/16/2019 07:57 AM    Ketone NEGATIVE  10/16/2019 07:57 AM    Bilirubin NEGATIVE  10/16/2019 07:57 AM    Urobilinogen 1.0 10/16/2019 07:57 AM    Nitrites NEGATIVE  10/16/2019 07:57 AM    Leukocyte Esterase TRACE (A) 10/16/2019 07:57 AM    Epithelial cells MODERATE (A) 10/16/2019 07:57 AM    Bacteria NEGATIVE  10/16/2019 07:57 AM    WBC 5-10 10/16/2019 07:57 AM    RBC 10-20 10/16/2019 07:57 AM         Medications Reviewed:     Current Facility-Administered Medications   Medication Dose Route Frequency    . PHARMACY TO SUBSTITUTE PER PROTOCOL (Reordered from: lipase-protease-amylase (CREON) 36,000-114,000- 180,000 unit cpDR capsule)    Per Protocol    pantoprazole (PROTONIX) tablet 40 mg  40 mg Oral ACB    sodium chloride (NS) flush 5-40 mL  5-40 mL IntraVENous Q8H    sodium chloride (NS) flush 5-40 mL  5-40 mL IntraVENous PRN    ondansetron (ZOFRAN) injection 4 mg  4 mg IntraVENous Q4H PRN    hydrALAZINE (APRESOLINE) 20 mg/mL injection 10 mg  10 mg IntraVENous Q6H PRN    glucose chewable tablet 16 g  4 Tab Oral PRN    glucagon (GLUCAGEN) injection 1 mg  1 mg IntraMUSCular PRN    dextrose 10% infusion 0-250 mL  0-250 mL IntraVENous PRN    insulin lispro (HUMALOG) injection   SubCUTAneous Q6H     ______________________________________________________________________  EXPECTED LENGTH OF STAY: 3d 12h  ACTUAL LENGTH OF STAY:          1                 Surjit Dos Santos MD     Patient's emergency contacts:  Extended Emergency Contact Information  Primary Emergency Contact: Dejon Strange  Address[address]  Home Phone: [phone]  Mobile Phone: [phone]  Relation: Son  Preferred language: ENGLISH   needed?  No  Secondary Emergency Contact: Karma, [address] Phone: [phone]  Mobile Phone: [phone]  Relation: Friend

## 2020-01-07 NOTE — PROGRESS NOTES
TRANSFER - OUT REPORT:    Verbal report given to Lynae Favre, RN(name) on [address]  being transferred to (unit) for routine progression of care /Lateral transfer     Report consisted of patients Situation, Background, Assessment and   Recommendations(SBAR). Information from the following report(s) SBAR was reviewed with the receiving nurse. Lines:   Venous Access Device [facility]#[id number] 11/06/18 Upper chest (subclavicular area, right (Active)       Peripheral IV 01/06/20 Left Antecubital (Active)   Site Assessment Clean, dry, & intact 1/7/2020  4:39 AM   Phlebitis Assessment 0 1/7/2020  4:39 AM   Infiltration Assessment 0 1/7/2020  4:39 AM   Dressing Status Clean, dry, & intact 1/7/2020  4:39 AM   Dressing Type Transparent;Tape 1/7/2020  4:39 AM   Hub Color/Line Status Pink;Flushed;Capped 1/7/2020  4:39 AM   Action Taken Open ports on tubing capped 1/7/2020  4:39 AM   Alcohol Cap Used Yes 1/7/2020  4:39 AM        Opportunity for questions and clarification was provided.       Patient transported with:  Patient belongings

## 2020-01-07 NOTE — PROGRESS NOTES
New order for Oncology consult. Per patient, she is a current patient of Dr. Robe Gonsalez; however, he does not come to [facility]. Will therefore consult [employer] [facility]Onc at [facility] for on-call to see patient.

## 2020-01-07 NOTE — PROGRESS NOTES
Admission Medication Reconciliation:    Information obtained from:  patient  RxQuery data available¹:  YES    Comments/Recommendations: Updated PTA meds/reviewed patient's allergies. 1)  patient was a good historian    2)  Medication changes (since last review): Added  - Loperamide    Adjusted  - ibuprofen to 200mg    Removed  - n/a         ¹RxQuery pharmacy benefit data reflects medications filled and processed through the patient's insurance, however   this data does NOT capture whether the medication was picked up or is currently being taken by the patient. Allergies:  Amoxicillin    Significant PMH/Disease States:   Past Medical History:   Diagnosis Date    Arthritis     Constipation     Diabetes ([facility] [address]     Hemorrhoids     Hiatal hernia     High cholesterol     Hypertension     Pancreatic cancer [facility])      Chief Complaint for this Admission:    Chief Complaint   Patient presents with    Referral / Consult     Prior to Admission Medications:   Prior to Admission Medications   Prescriptions Last Dose Informant Taking? diphenoxylate-atropine (LOMOTIL) 2.5-0.025 mg per tablet 1/6/2020 at Unknown time  Yes   Sig: Take 1 Tab by mouth four (4) times daily as needed for Diarrhea. Max Daily Amount: 4 Tabs. ibuprofen (IBUPROFEN IB) 200 mg tablet 1/6/2020 at Unknown time  Yes   Sig: Take 200 mg by mouth four (4) times daily as needed. lidocaine-prilocaine (EMLA) topical cream Not Taking at Unknown time  No   Sig: Apply  to affected area as needed for Pain. lipase-protease-amylase (CREON) 36,000-114,000- 180,000 unit cpDR capsule 1/6/2020 at Unknown time  Yes   Sig: Take 2 capsules with first bite of meals TID. Take 1 with snacks BID. Indications: exocrine pancreatic insufficiency   loperamide (IMODIUM) 2 mg capsule   Yes   Sig: Take 2 mg by mouth four (4) times daily as needed for Diarrhea. ondansetron hcl (ZOFRAN) 8 mg tablet   Yes   Sig: Take 1 Tab by mouth every eight (8) hours as needed for Nausea. prochlorperazine (COMPAZINE) 10 mg tablet   Yes   Sig: TAKE 1 TAB BY MOUTH EVERY SIX (6) HOURS AS NEEDED FOR NAUSEA. Facility-Administered Medications: None       Please contact the main inpatient pharmacy with any questions or concerns at [phone] and we will direct you to the clinical pharmacist covering this patient's care while in-house.    Robert Alva [facility]

## 2020-01-07 NOTE — PROGRESS NOTES
Reason for Admission:  Patient had been at her PCP last Friday and was instructed to come to the ED yesterday as her labs were of concern. Patient has a hx of adenocarcinoma of the pancreas and this was diagnosed in 2018 and patient has been under treatment since this date. She also is a diabetic since 2018. Patient initially had an outpatient ERCP scheduled for 1/8/2019. RRAT Score:     20             Do you (patient/family) have any concerns for transition/discharge? Patient's son Dollie Babinski is visiting from [place], [address] [place]. And will stay with mom this week. His phone [phone]                 Plan for utilizing home health:   Patient has a toshia catheter in place and goes to her MD's for blood work . Pt currently has [facility] with 24hr help. The aides shop for her and go to her pharmacy which  is [facility] in the [place] end Their phone is [phone]. Patient has an AMD in place and has no preferences for Home Health if needed. Patient did not want to consider speaking with Palliative Care until after this procedure. Current Advanced Directive/Advance Care Plan:  Has an AMD on file. Transition of Care Plan:      Patient will undergo an ERCP and wishes to return home with her private help at this time.

## 2020-01-07 NOTE — PROGRESS NOTES
Arnold Aldridge TRANSFER - IN REPORT:    Verbal report received from Griselda Oneill [facility] (name) on [facility]  being received from [address] [place] [address] (unit) for routine progression of care      Report consisted of patients Situation, Background, Assessment and   Recommendations(SBAR). Information from the following report(s) SBAR, Kardex and MAR was reviewed with the receiving nurse. Opportunity for questions and clarification was provided. Assessment completed upon patients arrival to unit and care assumed.

## 2020-01-07 NOTE — ACP (ADVANCE CARE PLANNING)
[address] [facility] Adult  Hospitalist Group                                      Advance Care Planning Note    Name: Vernon Constantino  YOB: 1945  MRN: [id number]  Admission Date: 1/6/2020  5:43 PM    Date of discussion: 1/6/2020    Active Diagnoses:    Hospital Problems  Date Reviewed: 11/20/2019          Codes Class Noted POA    Jaundice ICD-10-CM: R17  ICD-9-CM: 782.4  1/6/2020 Unknown              These active diagnoses are of sufficient risk that focused discussion on advance care planning is indicated in order to allow the patient to thoughtfully consider personal goals of care, and if situations arise that prevent the ability to personally give input, to ensure appropriate representation of their personal desires for different levels and aggressiveness of care. Discussion:     Persons present and participating in discussion: Vernon ConstantinoDora MD,     Discussion: I had a detailed discussion with the patient regarding her CODE STATUS. I explained to her what CODE STATUS means, why we need to have this discussion, and what it entails. I explained to her the various kind of code statuses, explained to her the meaning of full code, DNR/DNI, partial code, what each entails, answered all her questions, explained the modalities for each of the CODE STATUSES. The patient reports that she would like to be partial code and would not like to be intubated, she she would want to be resuscitated if needed with CPR, ACLS drugs, and shocks. Patient will be a partial code    Time Spent: 19 MINUTES    Total time spent face-to-face in education and discussion: 19 minutes.      Dora Muir MD  1/6/2020  8:50 PM

## 2020-01-07 NOTE — CONSULTS
[facility] [place] [facility] [place] [address] [address], [place]: [phone]  F: [phone]      Reason for Consult: Samantha Monzon is a 69 y.o. female who is seen in consultation at the request of Dr. Mickey Castano for evaluation of pancreatic cancer. Treatment History:   · 10/4/18 pancreatic head mass FNA, pancreatic adenocarcinoma     10/15/18  Liver, Core Biopsy w/Touch Prep CYTOLOGIC INTERPRETATION:   · Numerous cores and fragments of benign hepatic parenchyma      10/24/18  Liver, Fine Needle Aspiration CYTOLOGIC INTERPRETATION:   Metastatic adenocarcinoma (see Comment). General Categorization   Positive for malignancy.      Comment: The morphologic appearance is nonspecific with regard to site of origin but compatible with metastatic pancreatic carcinoma in this patient with known pancreatic adenocarcinoma ([id number]).      11/9/18-9/25/19- abraxane/gemcitabine -- PD  mFOLFIRINOX 10/16/19-     invitae genetic testing negative 10/2019     Strata testing:  KRAS p.G12D, MYC amp, TP53 mutation (x2); TASH    History of Present Illness: Samantha Monzon is a pleasant 69 y.o. female who was admitted yesterday for hyperglycemia and hyponatremia. Patient was admitted to the hospital for jaundice 12/28/19. She has a history of metastatic pancreatic cancer, followed by Dr. Phil Sim. She recently has been on a treatment holiday, after having considerable toxicity with FOLFIRINOX. Imaging in October showed an overall low disease burden. The patient noted new jaundice, prompting us to bring her in for a lab check 12/27/19. She received IVFs and her labs resulted with a bilirubin of 15.6, a rise from 0.2 just one month prior. She was directed to the ED for urgent evaluation. She had a CT and was evaluated by GI and her obstruction may be amenable to ERCP and stenting.  It was not able to be done over the weekend, so she was discharged home with plans for ERCP to be done 1/8/20. She went for pre-procedure lab work 1/6/20 and was advised to come to the hospital due to a blood glucose of 465 mg/dL and sodium 127 mmol/L. She received an IVF bolus and subQ insulin, and her blood glucose has improved and her sodium is slowly rising. Plan is for an ERCP tomorrow. Oncology was consulted for evaluation. Patient was seen resting in bed, feeling well. Her jaundice persists. She also notes dark urine. She reports fatigue from not sleeping well last night. She has mild abdominal discomfort. Tolerated breakfast this morning. Denies N/V/D. She was seen with son at bedside today. Past Medical History:   Diagnosis Date    Arthritis     Constipation     Diabetes ([facility]     Hemorrhoids     Hiatal hernia     High cholesterol     Hypertension     Pancreatic cancer ([facility]       Past Surgical History:   Procedure Laterality Date    HX KNEE ARTHROSCOPY Right     HX ORTHOPAEDIC      left wrist surgery    HX TONSILLECTOMY        Social History     Tobacco Use    Smoking status: Never Smoker    Smokeless tobacco: Never Used   Substance Use Topics    Alcohol use: Yes     Alcohol/week: 2.0 - 4.0 standard drinks     Types: 1 - 2 Cans of beer, 1 - 2 Shots of liquor per week     Frequency: 2-4 times a month     Drinks per session: 1 or 2     Comment: minimal      Family History   Problem Relation Age of Onset    Cancer Mother         skin    Hypertension Mother     Heart Disease Mother     Cancer Father         skin    Heart Disease Father     Stroke Father     Diabetes Neg Hx      Current Facility-Administered Medications   Medication Dose Route Frequency    . PHARMACY TO SUBSTITUTE PER PROTOCOL (Reordered from: lipase-protease-amylase (CREON) 36,000-114,000- 180,000 unit cpDR capsule)    Per Protocol    pantoprazole (PROTONIX) tablet 40 mg  40 mg Oral ACB    sodium chloride (NS) flush 5-40 mL  5-40 mL IntraVENous Q8H    sodium chloride (NS) flush 5-40 mL  5-40 mL IntraVENous PRN  ondansetron (ZOFRAN) injection 4 mg  4 mg IntraVENous Q4H PRN    hydrALAZINE (APRESOLINE) 20 mg/mL injection 10 mg  10 mg IntraVENous Q6H PRN    glucose chewable tablet 16 g  4 Tab Oral PRN    glucagon (GLUCAGEN) injection 1 mg  1 mg IntraMUSCular PRN    dextrose 10% infusion 0-250 mL  0-250 mL IntraVENous PRN    insulin lispro (HUMALOG) injection   SubCUTAneous Q6H      Allergies   Allergen Reactions    Amoxicillin Hives      Review of Systems: A complete review of systems was obtained, negative except as described above. Physical Exam:     Visit Vitals  /69 (BP 1 Location: Left arm, BP Patient Position: At rest)   Pulse 71   Temp 98.5 °F (36.9 °C)   Resp 16   SpO2 98%     General: No distress  Eyes: PERRLA, icteric sclerae  HENT: Atraumatic  Neck: Supple  Respiratory: CTAB, normal respiratory effort  CV: Normal rate, regular rhythm, no murmurs, no peripheral edema  GI: Soft, nontender, nondistended, no masses  Skin: Significant jaundice  Psych: Alert, oriented, appropriate affect, normal judgment/insight    Results:     Lab Results   Component Value Date/Time    WBC 8.5 01/07/2020 04:36 AM    HGB 9.8 (L) 01/07/2020 04:36 AM    HCT 29.2 (L) 01/07/2020 04:36 AM    PLATELET 726 52/66/2485 04:36 AM    MCV 94.5 01/07/2020 04:36 AM    ABS.  NEUTROPHILS 6.4 01/07/2020 04:36 AM     Lab Results   Component Value Date/Time    Sodium 131 (L) 01/07/2020 04:36 AM    Potassium 3.4 (L) 01/07/2020 04:36 AM    Chloride 100 01/07/2020 04:36 AM    CO2 23 01/07/2020 04:36 AM    Glucose 193 (H) 01/07/2020 04:36 AM    BUN 10 01/07/2020 04:36 AM    Creatinine 0.55 01/07/2020 04:36 AM    GFR est AA >60 01/07/2020 04:36 AM    GFR est non-AA >60 01/07/2020 04:36 AM    Calcium 8.5 01/07/2020 04:36 AM    Glucose (POC) 220 (H) 01/07/2020 11:22 AM     Lab Results   Component Value Date/Time    Bilirubin, total 18.3 (H) 01/07/2020 04:36 AM    ALT (SGPT) 191 (H) 01/07/2020 04:36 AM    AST (SGOT) 208 (H) 01/07/2020 04:36 AM Alk. phosphatase 1,176 (H) 01/07/2020 04:36 AM    Protein, total 5.1 (L) 01/07/2020 04:36 AM    Albumin 1.9 (L) 01/07/2020 04:36 AM    Globulin 3.2 01/07/2020 04:36 AM       CT A/P 12/27/2019:  1. New marked intrahepatic biliary dilatation, common bile duct dilatation,  pancreatic duct dilatation and gallbladder dilatation. Interval increase in size  of ill-defined pancreatic head\uncinate mass, difficult to measure, but  measuring approximately 8.5 cm x 3.7 cm x 2.6 cm.  2. Hepatic metastatic disease, as described above. MRCP 12/28/2019: report pending    Records reviewed and summarized above. Assessment/Recommendations:   1) Metastatic pancreatic cancer, mets to liver, stage IV:  cT2 cN0 pM1  TASH    S/p treatment with [facility]/Abraxane, and more recently was on FOLFIRINOX. This has been held due to intolerance. Plan is to attempt to resume chemotherapy in the near future, perhaps with reduced dose, which would require ERCP to first get her bilirubin down. Patient will follow up with Dr. Trinidad Segovia on discharge. Patient now admitted with hyponatremia and hyperglycemia which are improving. Bilirubin remains high at 18.3 mg/dL today. Patient doing well with only mild abdominal discomfort. Clinically stable. Plan for ERCP 1/8/2020.    2) Biliary obstruction  Acute, with bilirubin rising from 0.2 mg/dL to 15.6 mg/dL in just one month. Bilirubin 18.3 mg/dL today   U/L,  U/L, Alk phos 1,176 U/L  CT confirms obstruction.   Her obstruction may be amenable to ERCP and stenting which is planned for tomorrow, 1/8/20  Appreciate GI management    3) Hyperglycemia  Improved with IVF and subQ insulin  Patient on glucose checks, SSI and diabetic diet  Daily CMP  Management per hospitalist    4) Hyponatremia  Likely secondary to hyperglycemia  Improved with IVF and glucose correction  Sodium 131 mmol/L today  Daily CMP  Management per hospitalist      Pt seen today in conjunction with NP K León  Pt will f/u with  Hussain Lazcano at [place]/  Will follow as needed  Call if questions    I appreciate the opportunity to participate in Ms. Arnav Strange's care.

## 2020-01-07 NOTE — CONSULTS
[address] [place] [address] NOTE  [facility]  [phone] NP in-hospital cell phone M-F until 4:30  After 5pm or on weekends, please call  for physician on call        NAME:  Demian Molina   :   1945   MRN:   [id number]       Referring Physician: Suni Means Date: 2020 8:49 AM    Chief Complaint: pancreatic cancer     History of Present Illness:  Patient is a 69 y.o. who is seen in consultation at the request of Dr. Rachael Lopes for pancreatic cancer. She was diagnosed with pancreatic adenocarcinoma 10/2018. She was getting chemotherapy but lost response and therapy was changed and she was intolerant due to side effects. She was planning to resume chemo after the holidays. She presented to [facility]  after evaluation by oncologist Dr. Craig Cadet for jaundice x 2 weeks with bili >15. CT and MRI with compression of CBD from pancreatic mass. She has some fatigue, decreased appetite, and nausea/vomiting (from potassium supplements). She was evaluated in the office Friday with plans for outpatient ERCP tomorrow. We checked CMP which showed glucose >500 and Sodium 124, recommended admission to correct abnormalities prior to ERCP. EUS 10/2018: 3 cm hiatal hernia, 3.5 x 2 cm uncinate process path +adenocarcinoma  Colonoscopy  Dr. Floridalma Hylton, polyps  CT 19: new marked intrahepatic biliary dilation, CBD dilation, pancreatic duct dilation, and gallbladder dilation, hepatic metastasis, increased size of pancreatic mass (8.5 x 3.7 x 2.6 cm)  MRI 19: pancreatic head mass 3 x 2.7 cm, obstruction of pancreatic duct and common bile duct, involving SMA, liver mets    I have reviewed the emergency room note, hospital admission note, notes by all other clinicians who have seen the patient during this hospitalization to date.  I have reviewed the problem list and the reason for this hospitalization. I have reviewed the allergies and the medications the patient was taking at home prior to this hospitalization. PMH:  Past Medical History:   Diagnosis Date    Arthritis     Constipation     Diabetes ([address]     Hemorrhoids     Hiatal hernia     High cholesterol     Hypertension     Pancreatic cancer (HCC)        PSH:  Past Surgical History:   Procedure Laterality Date    HX KNEE ARTHROSCOPY Right     HX ORTHOPAEDIC      left wrist surgery    HX TONSILLECTOMY         Allergies: Allergies   Allergen Reactions    Amoxicillin Hives       Home Medications:  Prior to Admission Medications   Prescriptions Last Dose Informant Patient Reported? Taking? diphenoxylate-atropine (LOMOTIL) 2.5-0.025 mg per tablet 1/6/2020 at Unknown time  No Yes   Sig: Take 1 Tab by mouth four (4) times daily as needed for Diarrhea. Max Daily Amount: 4 Tabs. ibuprofen (IBUPROFEN IB) 200 mg tablet 1/6/2020 at Unknown time  Yes Yes   Sig: Take 200 mg by mouth four (4) times daily as needed. lidocaine-prilocaine (EMLA) topical cream Not Taking at Unknown time  No No   Sig: Apply  to affected area as needed for Pain. lipase-protease-amylase (CREON) 36,000-114,000- 180,000 unit cpDR capsule 1/6/2020 at Unknown time  No Yes   Sig: Take 2 capsules with first bite of meals TID. Take 1 with snacks BID. Indications: exocrine pancreatic insufficiency   loperamide (IMODIUM) 2 mg capsule   Yes Yes   Sig: Take 2 mg by mouth four (4) times daily as needed for Diarrhea. ondansetron hcl (ZOFRAN) 8 mg tablet   No Yes   Sig: Take 1 Tab by mouth every eight (8) hours as needed for Nausea. prochlorperazine (COMPAZINE) 10 mg tablet   No Yes   Sig: TAKE 1 TAB BY MOUTH EVERY SIX (6) HOURS AS NEEDED FOR NAUSEA. Facility-Administered Medications: None       Hospital Medications:  Current Facility-Administered Medications   Medication Dose Route Frequency    . PHARMACY TO SUBSTITUTE PER PROTOCOL (Reordered from: lipase-protease-amylase (CREON) 36,000-114,000- 180,000 unit cpDR capsule)    Per Protocol    pantoprazole (PROTONIX) tablet 40 mg  40 mg Oral ACB    sodium chloride (NS) flush 5-40 mL  5-40 mL IntraVENous Q8H    sodium chloride (NS) flush 5-40 mL  5-40 mL IntraVENous PRN    ondansetron (ZOFRAN) injection 4 mg  4 mg IntraVENous Q4H PRN    hydrALAZINE (APRESOLINE) 20 mg/mL injection 10 mg  10 mg IntraVENous Q6H PRN    glucose chewable tablet 16 g  4 Tab Oral PRN    glucagon (GLUCAGEN) injection 1 mg  1 mg IntraMUSCular PRN    dextrose 10% infusion 0-250 mL  0-250 mL IntraVENous PRN    insulin lispro (HUMALOG) injection   SubCUTAneous Q6H       Social History:  Social History     Tobacco Use    Smoking status: Never Smoker    Smokeless tobacco: Never Used   Substance Use Topics    Alcohol use:  Yes     Alcohol/week: 2.0 - 4.0 standard drinks     Types: 1 - 2 Cans of beer, 1 - 2 Shots of liquor per week     Frequency: 2-4 times a month     Drinks per session: 1 or 2     Comment: minimal       Family History:  Family History   Problem Relation Age of Onset    Cancer Mother         skin    Hypertension Mother     Heart Disease Mother     Cancer Father         skin    Heart Disease Father     Stroke Father     Diabetes Neg Hx        Review of Systems:  Constitutional: negative fever, negative chills, negative weight loss  Eyes:   negative visual changes  ENT:   negative sore throat, tongue or lip swelling  Respiratory:  negative cough, negative dyspnea  Cards:  negative for chest pain, palpitations, lower extremity edema  GI:   See HPI  :  negative for frequency, dysuria  Integument:  negative for rash and pruritus  Heme:  negative for easy bruising and gum/nose bleeding  Musculoskeletal:negative for myalgias, back pain and muscle weakness  Neuro:    negative for headaches, dizziness, vertigo  Psych:  negative for feelings of anxiety, depression     Objective:     Patient Vitals for the past 8 hrs: BP Temp Pulse Resp SpO2   01/07/20 0730 159/69 98.5 °F (36.9 °C) 71 16 98 %   01/07/20 0439 148/64 98.6 °F (37 °C) 67 16 98 %     No intake/output data recorded.   01/05 1901 - 01/07 0700  In: 1000 [I.V.:1000]  Out: -     EXAM:     CONST:  Pleasant lady lying in bed, no acute distress   NEURO:  alert and oriented x 3   HEENT: EOMI, +scleral icterus   LUNGS: clear to ausculation, (-) wheeze   CARD:   S1 S2   ABD:  soft, no tenderness, no rebound, bowel sounds (+) all 4 quadrants, no masses, non distended   EXT:  warm   PSYCH: full, not anxious     Data Review     Recent Labs     01/07/20  0436 01/06/20  1709   WBC 8.5 7.0   HGB 9.8* 10.4*   HCT 29.2* 32.5*    287     Recent Labs     01/07/20  0436 01/06/20  1709   * 127*   K 3.4* 3.8    93*   CO2 23 27   BUN 10 11   CREA 0.55 1.08*   * 465*   CA 8.5 9.0     Recent Labs     01/07/20  0436 01/06/20  1709   SGOT 208* 235*   AP 1,176* 1,359*   TP 5.1* 5.5*   ALB 1.9* 2.1*   GLOB 3.2 3.4     Recent Labs     01/06/20  1709   INR 1.1   PTP 11.1     CT 12/27/19: new marked intrahepatic biliary dilation, CBD dilation, pancreatic duct dilation, and gallbladder dilation, hepatic metastasis, increased size of pancreatic mass (8.5 x 3.7 x 2.6 cm)  MRI 12/28/19: pancreatic head mass 3 x 2.7 cm, obstruction of pancreatic duct and common bile duct, involving SMA, liver mets     Assessment:     · Biliary obstruction: alk phos 1,176, , , t bili 18.3  · Pancreatic cancer: Dr. Leigh Ann Lea, currently off treatment  · Hyperglycemia/electrolyte abnormalities      Patient Active Problem List   Diagnosis Code    Malignant neoplasm of other parts of pancreas ([facility] C25.7    Anemia D64.9    Obstructive jaundice K83.1    Hyponatremia E87.1    Hypokalemia E87.6    Hyperbilirubinemia E80.6    LFT elevation R94.5    Pancreatic cancer [facility] C25.9    Hypertension I10    High cholesterol E78.00    Hiatal hernia K44.9    Arthritis M19.90    Diabetes ([facility] E11.9    Weight loss R63.4    Insomnia G47.00    Fatigue R53.83    Jaundice R17     Plan:       · Appreciate hospitalist management of electrolytes and hyperglycemia   · Diet as tolerated, NPO Midnight  · Plan for ERCP tomrrow  · Patient discussed with and will be seen by Dr. Rissa Krishna  · Thank you for allowing me to participate in care of [address] By: Leonidas Pitt NP     1/7/2020  8:49 AM       Gastroenterology Attending Physician attestation statement and comments. This patient was seen and examined by me in a face-to-face visit today. I reviewed the medical record including lab work, imaging and other provider notes. I confirmed the history as described above. I spoke to the patient, reviewed the medical record including lab work, imaging and other provider notes. I discussed this case in detail with onur soto. I formulated an  assessment of this patient and developed a treatment plan. I agree with the above consultation note. I agree with the history, exam and assessment and plan as outlined in the note.   I would like to add the following:   Abdomen soft  obstructing pancreas cancer of the CBD, ERCP with stenting tomorrow pm, pt is agreeable  Correct hyperglycemia and lytes

## 2020-01-08 NOTE — ANESTHESIA PREPROCEDURE EVALUATION
Relevant Problems   No relevant active problems       Anesthetic History   No history of anesthetic complications            Review of Systems / Medical History  Patient summary reviewed, nursing notes reviewed and pertinent labs reviewed    Pulmonary  Within defined limits                 Neuro/Psych   Within defined limits           Cardiovascular    Hypertension              Exercise tolerance: >4 METS     GI/Hepatic/Renal               Comments: Pancreatic cancer Endo/Other    Diabetes    Arthritis and cancer    Comments: jaundice Other Findings            Physical Exam    Airway  Mallampati: I    Neck ROM: normal range of motion   Mouth opening: Normal     Cardiovascular    Rhythm: regular  Rate: normal         Dental  No notable dental hx       Pulmonary  Breath sounds clear to auscultation               Abdominal         Other Findings            Anesthetic Plan    ASA: 3  Anesthesia type: general          Induction: Intravenous  Anesthetic plan and risks discussed with: Patient

## 2020-01-08 NOTE — PROGRESS NOTES
[address] [facility], [address]       GI PROGRESS NOTE  [facility]  [phone] NP in-hospital cell phone M-F until 4:30  After 5pm or on weekends, please call  for physician on call      NAME: uJde Raza   :  1945   MRN:  [id number]       Subjective:   She is feeling well. Had a bowel movement last night, helped with nausea. No pain. Objective:     VITALS:   Last 24hrs VS reviewed since prior progress note. Most recent are:  Visit Vitals  /80 (BP 1 Location: Left arm, BP Patient Position: At rest)   Pulse 69   Temp 97.8 °F (36.6 °C)   Resp 16   SpO2 97%       PHYSICAL EXAM:  General: Cooperative, no acute distress    Neurologic:  Alert and oriented X 3. HEENT: EOMI, +scleral icterus   Lungs:  CTA bilaterally. No wheezing  Heart:  S1 S2    Abdomen: Soft, non-distended, no tenderness. +Bowel sounds  Extremities: warm  Psych:   Good insight. Not anxious or agitated.     Lab Data Reviewed:     Recent Results (from the past 24 hour(s))   GLUCOSE, POC    Collection Time: 20 11:22 AM   Result Value Ref Range    Glucose (POC) 220 (H) 65 - 100 mg/dL    Performed by [address], POC    Collection Time: 20  5:52 PM   Result Value Ref Range    Glucose (POC) 248 (H) 65 - 100 mg/dL    Performed by Brian Brooke, POC    Collection Time: 20 12:18 AM   Result Value Ref Range    Glucose (POC) 230 (H) 65 - 100 mg/dL    Performed by [address] [place] [address]    Collection Time: 20  6:25 AM   Result Value Ref Range    Lipase 66 (L) 73 - 967 U/L   METABOLIC PANEL, COMPREHENSIVE    Collection Time: 20  6:25 AM   Result Value Ref Range    Sodium 132 (L) 136 - 145 mmol/L    Potassium 3.0 (L) 3.5 - 5.1 mmol/L    Chloride 100 97 - 108 mmol/L    CO2 24 21 - 32 mmol/L    Anion gap 8 5 - 15 mmol/L    Glucose 146 (H) 65 - 100 mg/dL    BUN 9 6 - 20 MG/DL    Creatinine 0.56 0.55 - 1.02 MG/DL BUN/Creatinine ratio 16 12 - 20      GFR est AA >60 >60 ml/min/1.73m2    GFR est non-AA >60 >60 ml/min/1.73m2    Calcium 8.6 8.5 - 10.1 MG/DL    Bilirubin, total 17.8 (H) 0.2 - 1.0 MG/DL    ALT (SGPT) 184 (H) 12 - 78 U/L    AST (SGOT) 238 (H) 15 - 37 U/L    Alk.  phosphatase 1,145 (H) 45 - 117 U/L    Protein, total 4.7 (L) 6.4 - 8.2 g/dL    Albumin 1.8 (L) 3.5 - 5.0 g/dL    Globulin 2.9 2.0 - 4.0 g/dL    A-G Ratio 0.6 (L) 1.1 - 2.2     CBC W/O DIFF    Collection Time: 01/08/20  6:25 AM   Result Value Ref Range    WBC 6.5 3.6 - 11.0 K/uL    RBC 2.93 (L) 3.80 - 5.20 M/uL    HGB 9.2 (L) 11.5 - 16.0 g/dL    HCT 28.0 (L) 35.0 - 47.0 %    MCV 95.6 80.0 - 99.0 FL    MCH 31.4 26.0 - 34.0 PG    MCHC 32.9 30.0 - 36.5 g/dL    RDW 17.1 (H) 11.5 - 14.5 %    PLATELET 753 393 - 467 K/uL    MPV 11.9 8.9 - 12.9 FL    NRBC 0.0 0  WBC    ABSOLUTE NRBC 0.00 0.00 - 0.01 K/uL   GLUCOSE, POC    Collection Time: 01/08/20  6:28 AM   Result Value Ref Range    Glucose (POC) 138 (H) 65 - 100 mg/dL    Performed by Live Cabrera             Assessment:   · Biliary obstruction: alk phos 1,145, , , t bili 17.8  · Pancreatic cancer: Dr. Yo Marinelli, currently off treatment  · Hyperglycemia/electrolyte abnormalities      Patient Active Problem List   Diagnosis Code    Malignant neoplasm of other parts of pancreas ([facility] C25.7    Anemia D64.9    Obstructive jaundice K83.1    Hyponatremia E87.1    Hypokalemia E87.6    Hyperbilirubinemia E80.6    LFT elevation R94.5    Pancreatic cancer ([facility] C25.9    Hypertension I10    High cholesterol E78.00    Hiatal hernia K44.9    Arthritis M19.90    Diabetes ([facility] E11.9    Weight loss R63.4    Insomnia G47.00    Fatigue R53.83    Jaundice R17     Plan:   · NPO  · Appreciate potassium repletions per hospitalist - consider accessing port for IV repletions (has been getting oral repletions for weeks)  · Plan for ERCP today at  with Dr. Lindsey Maki By: Omari Nicole SENAIT Carey     1/8/2020  9:35 AM

## 2020-01-08 NOTE — PROCEDURES
[address]  [address]       NAME:  Samantha Monzon   :   1945   MRN:   [id number]       Procedure Type:   ERCPwith biliary sphincterotomy, biliary stent placement     Indications: jaundice, pancreas head cancer invading distal CBD  Pre-operative Diagnosis: see indication above  Post-operative Diagnosis:  See findings below  : Ayo Ricks MD    Surgical Assistant: None    Implants:  stent    Referring Provider:     Suzan Mark MD      Sedation:  General anesthesia, levaquin 500 mg ivx1, indocin 100 mg suppx1    Procedure Details:  After informed consent was obtained with all risks and benefits of procedure explained, the patient was taken to the fluoroscopy suite and placed in the prone position. Upon sequential sedation as per above, the Olympus duodenoscope [id number]   was inserted via the mouthpeice and carefully advanced to the second portion of the duodenum. The quality of visualization was good. The duodenoscope was withdrawn into a short position.       Findings:   Esophagus:normal  Stomach: normal   Duodenum/jejunum: normal    Ampulla: looked protruded and deformed from extrinsic compression by the pancreas mass  I was able to cannulate the CBD after few attempts over guided wire by using sphincterotome ( jagtome, RX, [employer])    Cholangiogram: -there was around 3 cm distal CBD malignant looking stricture  The CBD was diffusely dilated up to 2 cm in diameter  Did not fill cystic duct and neither the gallbladder  I performed 1 cm biliary sphincterotomy in standard fashion    I attempted to advance fully covered metal stent catheter over guided wire but could not advance into distal CBD because of that stricture    I then performed successfully dilatation of the distal CBD stricture by using Hurricane RX biliary dilatation catheter ( 10 mm x 4 cm long), kept balloon dilated at 10 mm for 1 minute    I was then able to advance easily and deploy fully covered metal stent ( wallflex, 10x60 mm, [employer]) into the distal CBD  Large amount of dark bile came out after deployment of stent    Pancreatogram:not performed  I performed all immediate radiologic interpretation during this procedure    Specimen Removed:  None    Complications: None. EBL:  None.       Impression:  See findings  Recommendations:    Clear liquids tonight, may advance in am   Labs in am  Discussed results with pt's family  Will follow  Signed By: Dwaine Pina MD     1/8/2020  5:22 PM

## 2020-01-08 NOTE — PROGRESS NOTES
Problem: Falls - Risk of  Goal: *Absence of Falls  Description  Document Libia Pryor Fall Risk and appropriate interventions in the flowsheet. Outcome: Progressing Towards Goal  Note:   Fall Risk Interventions:  Gripper socks on feet, patient to call out for assistance, bed locked and in lowest position     Problem: Patient Education: Go to Patient Education Activity  Goal: Patient/Family Education  Outcome: Progressing Towards Goal     Problem: Pressure Injury - Risk of  Goal: *Prevention of pressure injury  Description  Document Ck Scale and appropriate interventions in the flowsheet. Outcome: Progressing Towards Goal  Note:   Pressure Injury Interventions:    Nutrition Interventions: Document food/fluid/supplement intake    Patient turns self in bed.  Verbal reminders given

## 2020-01-08 NOTE — PROGRESS NOTES
Hospitalist Progress Note  Verdis Lefort, MD  Answering service: [phone] or [phone] from in house phone        Date of Service:  2020  NAME:  Smita Singh  :  1945  MRN:  [id number]  PCP: Debbie Yin MD    Chief Complaint:   Chief Complaint   Patient presents with    Referral / Consult         Admission Summary:     Smita Singh is a 69 y.o. female who presented with    Interval history / Subjective:   Patient seen for Follow up of elevated LFTs  As per initial history   The patient is a 72-year-old female with past medical history of pancreatic cancer, currently off her chemotherapy cycle; history of diabetes; hemorrhoids; hiatal hernia; hypertension; hypercholesterolemia, who presents to the hospital with the above-mentioned symptom. History was primarily obtained from the patient. The patient reports that she was recently admitted at [facility], an MRCP done, which showed a mass at the pancreatic head, and needs an ERCP. The patient was discharged home, but reports that she went to her gastroenterologist to get some baseline labs done as her ERCP is scheduled for day after tomorrow. The patient reports she got a call today saying her sugars were high and her sodium was low, and she was told to come to the ER for further management and evaluation. The patient reports that she has diabetes secondary to pancreatic cancer, and she had not been on insulin for the last 2 months. The patient reports that she has had chemotherapy and her last treatment was 2019, after which she was waiting the initiation of chemotherapy. The patient reports that she is having lighter stools and has been having minimal abdominal pain. The patient denies any abdominal distention or lower extremity swelling. The patient was seen in the hospital and was requested to be admitted under the hospitalist service.   The patient denies any headache, blurry vision, sore throat, trouble swallowing, trouble with speech, chest pain, shortness of breath, cough, fever, chills. Does admit to mild abdominal pain, but denies any constipation, diarrhea, urinary symptoms, focal or generalized neurological weakness, recent travel, sick contacts, falls, injuries, hematemesis, melena, hemoptysis, hematuria, or any other concerns or problems. The patient denies any other problems. Patient seen and examined by the bedside, Labs, images and notes reviewed  Patient is comfortable, denies any chest pain, SOB, abdominal pain, fevers, chills. No N/V/D. Plan for stenting today. Afebrile . No nausea or vomiting or abdominal pain   Discussed with nursing staff, no acute issues overnight, orders reviewed. Assessment & Plan:     1. Hyperbilirubinemia. Elevated liver function test likely secondary to pancreatic head mass. The patient probably needs a biliary stent. Admit the patient to the hospital.  Keep the patient n.p.o. for now, IV hydration, supportive care, Gastroenterology consult, and repeat labs in the morning. Monitor liver function test.  Reassess as needed. Continue to monitor. NPO midnight  Stenting today    2. Pancreatic cancer. We will continue Creon. Supportive care. Close monitoring. Further intervention per hospital course. Oncology consulted     3. Hyponatremia, mild, likely secondary to hyperglycemia. improving   The patient received IV hydration in the emergency room. Continue to closely monitor. Repeat labs in the morning. Reassess as needed. 4.  Diabetes mellitus type 2, poorly controlled. Blood glucose levels much better   On correctional scale insulin     5. Accelerated hypertension. Hydralazine p.r.n. Continue to monitor. Further intervention per hospital course.       Code status: Partial Code    DVT prophylaxis: SCDs    Care Plan discussed with: Patient/Family    Disposition: Home w/Family and TBD    Hospital Problems  Date Reviewed: 11/20/2019 Codes Class Noted POA    Jaundice ICD-10-CM: R17  ICD-9-CM: 782.4  2020 Unknown                Review of Systems:   A comprehensive review of systems was negative except for that written in the HPI. Physical Examination:     Visit Vitals  /75   Pulse (!) 56   Temp 97.6 °F (36.4 °C)   Resp 14   SpO2 99%     GENERAL:  Alert x3, awake, mildly distressed, icteric female, appears to be stated age. HEENT:  Pupils are equal and reactive to light. Dry mucous membranes. Tympanic membranes are clear. Icteric sclerae. NECK:  Supple. No JVD. CHEST:  Clear to auscultation bilaterally. CORONARY:  S1 and S2 heard. ABDOMEN:  Soft, nontender, and nondistended. Bowel sounds are physiologic. EXTREMITIES:  No clubbing, no cyanosis, no edema. NEURO/PSYCH:  Pleasant mood and affect. Cranial nerves II through XII are grossly intact. Sensory grossly within normal limits. DTRs 2+ x4. Strength 5/5. SKIN:  Warm. Vital Signs:    Last 24hrs VS reviewed since prior progress note. Most recent are:    Visit Vitals  /75   Pulse (!) 56   Temp 97.6 °F (36.4 °C)   Resp 14   SpO2 99%         Intake/Output Summary (Last 24 hours) at 2020 1816  Last data filed at 2020 1723  Gross per 24 hour   Intake 200 ml   Output    Net 200 ml        Tmax:  Temp (24hrs), Av.5 °F (36.9 °C), Min:97.6 °F (36.4 °C), Max:100.2 °F (37.9 °C)      Data Review:   Data reviewed by myself:  Ct Chest Abd Pelv W Cont    Result Date: 2019  INDICATION: Elevated bilirubin, pancreatic cancer. CT of the chest, abdomen and pelvis is performed with 5 mm collimation. Study is performed with 100 cc of nonionic IV Isovue-370. Sagittal and coronal reformatted images were also performed. CT dose reduction was achieved with the use of the standardized protocol tailored for this examination and automatic exposure control for dose modulation. Direct comparison is made to prior CT dated 2019.  Chest: Tubes and lines: Central venous port catheter extends to the distal SVC. Lungs: Lungs are clear bilaterally. Lymph nodes: There is no mediastinal, hilar or axillary lymphadenopathy. Pleura: There is no pleural fluid. Heart: The heart is normal in size and there is no pericardial fluid. Bones: No lytic or sclerotic osseous lesion is visualized. Abdomen/pelvis: Liver/pancreasgallbladder: There is new, extensive intrahepatic biliary dilatation in the common bile duct dilatation to the level of the pancreatic head, not present on prior CT dated October 2019. There is also new main pancreatic ductal dilatation at the level of the pancreatic neck, body and tail. The gallbladder is distended as well. The patient's known pancreatic uncinate neoplasm is difficult to evaluate as it is ill-defined and mildly hypodense, however the mass appears to be increased in size; this mass measures approximately 3.5 cm x 3.7 cm x 2.6 cm and measured 3.2 cm x 3.2 cm x 2.2 cm on prior CT dated October 2019. The mass appears to encase the distal SMA seen on prior CT dated October 2019. There is a 5 mm x 6 mm hypodense lesion within the upper right hepatic lobe, not significant change compared to prior CT dated October 2019. There is also a 1 cm x 9 mm hypodensity within the left hepatic lobe, not significantly changed compared to prior CT dated October 2019. Within the inferior right hepatic lobe, there is a 1.3 cm x 2 cm hypodense lesion which measured 8 mm x 6 mm on prior CT dated October 2019. There is also a subtle 7 mm  x 1 cm hypodensity within the left hepatic lobe immediately adjacent to the gallbladder, not seen on prior CT dated October 2019. Spleen: The spleen is normal. Adrenals: The adrenals are normal. Kidneys: The kidneys are normal. Bowel: No dilated or thickened loop of large or small bowel is seen.  Appendix: The appendix is normal. Urinary bladder: Urinary bladder is partially filled and grossly normal. Bones: No lytic or sclerotic osseous lesion is visualized. Miscellaneous: There is no free intraperitoneal gas or fluid. There is no focal fluid collection to suggest abscess. IUD is present in the uterus. IMPRESSION: 1. New marked intrahepatic biliary dilatation, common bile duct dilatation, pancreatic duct dilatation and gallbladder dilatation. Interval increase in size of ill-defined pancreatic head\uncinate mass, difficult to measure, but measuring approximately 8.5 cm x 3.7 cm x 2.6 cm. 2. Hepatic metastatic disease, as described above. Mri Abd W Mrcp W Cont    Result Date: 12/30/2019  *PRELIMINARY REPORT* Biliary and pancreatic ductal dilatation due to the pancreatic head mass, as recently demonstrated on CT. Small liver lesions suspicious for metastases. Preliminary report was provided by Dr. Darrell Tesfaye, the on-call radiologist, at 13:20 hours on 12/20/2019 Final report to follow. *END PRELIMINARY REPOR* EXAM:  MRI ABD W MRCP W CONT INDICATION:   Jaundice, painless, weight loss or fatigue or anorexia or >3 months duration, otherwise healthy COMPARISON: CT 2019 CONTRAST:  10 mL Gadavist. TECHNIQUE: MR of the abdomen was performed and the following sequences were obtained: Coronal HASTE, multiplanar MRCP, axial in and out-of-phase T1, axial T1 fat-saturated, axial T2, and pre- and post contrast T1-weighted images. FINDINGS: There is a 5 mm focus of increased signal intensity segment 8 of the liver. There is a 1.7 x 0.9 cm lesion segment 5 of the liver. There is intrahepatic ductal dilatation. .  Gallbladder is distended and there is suspicion of a tiny gallstone. The spleen is normal.  There is a mass in the pancreatic head measuring approximately 3 x 2.7 cm with dilatation of the pancreatic duct measuring 9 mm and of the common duct measuring 14 mm. The mass extends medially to involve the SMA. .  The adrenal glands are normal.  The kidneys are normal. There is no lymphadenopathy or ascites. IMPRESSION:  1.  There is a pancreatic head mass measuring approximately 3 x 2.7 cm with obstruction of the pancreatic duct and common duct. There is dilatation of the gallbladder with suggestion of a tiny gallstone. The mass extends medially to involve the SMA. There is a lesion in segment 5 of the liver measuring 1.7 x 0.9 cm suspicious for metastatic disease. There is a nonspecific 5 mm focus of increased signal intensity in segment 8 of the liver. No results found for: SDES  Lab Results   Component Value Date/Time    Culture result: MIXED UROGENITAL SAMARA ISOLATED 10/16/2019 07:57 AM    Culture result: NO GROWTH 2 DAYS 04/24/2019 11:03 AM    Culture result: NO GROWTH 1 DAY 12/05/2018 11:12 AM     All Micro Results     None          Labs: reviewed by myself. Recent Labs     01/08/20 0625 01/07/20 0436   WBC 6.5 8.5   HGB 9.2* 9.8*   HCT 28.0* 29.2*    296     Recent Labs     01/08/20 0625 01/07/20 0436 01/06/20  1709   * 131* 127*   K 3.0* 3.4* 3.8    100 93*   CO2 24 23 27   BUN 9 10 11   CREA 0.56 0.55 1.08*   * 193* 465*   CA 8.6 8.5 9.0     Recent Labs     01/08/20 0625 01/07/20 0436 01/06/20  1709   SGOT 238* 208* 235*   * 191* 213*   AP 1,145* 1,176* 1,359*   TBILI 17.8* 18.3* 21.4*   TP 4.7* 5.1* 5.5*   ALB 1.8* 1.9* 2.1*   GLOB 2.9 3.2 3.4   LPSE 66*  --   --      Recent Labs     01/06/20  1709   INR 1.1   PTP 11.1      No results for input(s): FE, TIBC, PSAT, FERR in the last 72 hours. No results found for: FOL, RBCF   No results for input(s): PH, PCO2, PO2 in the last 72 hours. No results for input(s): CPK, CKNDX, TROIQ in the last 72 hours.     No lab exists for component: CPKMB  Lab Results   Component Value Date/Time    Cholesterol, total 131 12/05/2018 11:12 AM    HDL Cholesterol 48 12/05/2018 11:12 AM    LDL, calculated 66.6 12/05/2018 11:12 AM    Triglyceride 82 12/05/2018 11:12 AM    CHOL/HDL Ratio 2.7 12/05/2018 11:12 AM     Lab Results   Component Value Date/Time    Glucose (POC) 174 (H) 01/08/2020 11:42 AM    Glucose (POC) 138 (H) 01/08/2020 06:28 AM    Glucose (POC) 230 (H) 01/08/2020 12:18 AM    Glucose (POC) 248 (H) 01/07/2020 05:52 PM    Glucose (POC) 220 (H) 01/07/2020 11:22 AM     Lab Results   Component Value Date/Time    Color YELLOW/STRAW 10/16/2019 07:57 AM    Appearance CLEAR 10/16/2019 07:57 AM    Specific gravity 1.012 10/16/2019 07:57 AM    pH (UA) 6.5 10/16/2019 07:57 AM    Protein NEGATIVE  10/16/2019 07:57 AM    Glucose NEGATIVE  10/16/2019 07:57 AM    Ketone NEGATIVE  10/16/2019 07:57 AM    Bilirubin NEGATIVE  10/16/2019 07:57 AM    Urobilinogen 1.0 10/16/2019 07:57 AM    Nitrites NEGATIVE  10/16/2019 07:57 AM    Leukocyte Esterase TRACE (A) 10/16/2019 07:57 AM    Epithelial cells MODERATE (A) 10/16/2019 07:57 AM    Bacteria NEGATIVE  10/16/2019 07:57 AM    WBC 5-10 10/16/2019 07:57 AM    RBC 10-20 10/16/2019 07:57 AM         Medications Reviewed:     Current Facility-Administered Medications   Medication Dose Route Frequency    0.9% sodium chloride infusion  100 mL/hr IntraVENous CONTINUOUS    sodium chloride (NS) flush 5-40 mL  5-40 mL IntraVENous Q8H    sodium chloride (NS) flush 5-40 mL  5-40 mL IntraVENous PRN    midazolam (VERSED) injection 0.25-10 mg  0.25-10 mg IntraVENous Multiple    fentaNYL citrate (PF) injection  mcg   mcg IntraVENous Multiple    naloxone (NARCAN) injection 0.4 mg  0.4 mg IntraVENous Multiple    flumazenil (ROMAZICON) 0.1 mg/mL injection 0.2 mg  0.2 mg IntraVENous Multiple    simethicone (MYLICON) 88YG/3.7SH oral drops 80 mg  1.2 mL Oral Multiple    atropine injection 0.5 mg  0.5 mg IntraVENous ONCE PRN    EPINEPHrine (ADRENALIN) 0.1 mg/mL syringe 1 mg  1 mg Endoscopically ONCE PRN    potassium chloride SR (KLOR-CON 10) tablet 40 mEq  40 mEq Oral DAILY    iopamidol (ISOVUE 300) 61 % contrast injection 30 mL  30 mL IntraVENous RAD ONCE    iopamidol (ISOVUE 300) 61 % contrast injection        lipase-protease-amylase (CREON 36,000) capsule 1 Cap (Patient Supplied)  1 Cap Oral BID PRN    lipase-protease-amylase (CREON 36,000) capsule 2 Cap (Patient Supplied)  2 Cap Oral TID WITH MEALS    pantoprazole (PROTONIX) tablet 40 mg  40 mg Oral ACB    sodium chloride (NS) flush 5-40 mL  5-40 mL IntraVENous Q8H    sodium chloride (NS) flush 5-40 mL  5-40 mL IntraVENous PRN    ondansetron (ZOFRAN) injection 4 mg  4 mg IntraVENous Q4H PRN    hydrALAZINE (APRESOLINE) 20 mg/mL injection 10 mg  10 mg IntraVENous Q6H PRN    glucose chewable tablet 16 g  4 Tab Oral PRN    glucagon (GLUCAGEN) injection 1 mg  1 mg IntraMUSCular PRN    dextrose 10% infusion 0-250 mL  0-250 mL IntraVENous PRN    insulin lispro (HUMALOG) injection   SubCUTAneous Q6H     ______________________________________________________________________  EXPECTED LENGTH OF STAY: 3d 12h  ACTUAL LENGTH OF STAY:          2                 Verdis Lefort, MD     Patient's emergency contacts:  Extended Emergency Contact Information  Primary Emergency Contact: Dejon Strange  Address: [address] [place] [address], [address] [place] [address] Phone: [phone]  Mobile Phone: [phone]  Relation: Son  Preferred language: ENGLISH   needed?  No  Secondary Emergency Contact: Karma [facility] Phone: [phone]  Mobile Phone: [phone]  Relation: Friend

## 2020-01-08 NOTE — PROGRESS NOTES
A Spiritual Care Partner Volunteer visited patient in  623 on 01/08/2020.   Documented by:  Chaplain Mike MDiv, MS, [place] [id number] PRAY ([id number])

## 2020-01-08 NOTE — ANESTHESIA POSTPROCEDURE EVALUATION
Procedure(s):  ENDOSCOPIC RETROGRADE CHOLANGIOPANCREATOGRAPHY (ERCP)  SPHINCTEROTOMY  BILIARY STENT PLACEMENT  BILIARY DILATATION. general    Anesthesia Post Evaluation      Multimodal analgesia: multimodal analgesia not used between 6 hours prior to anesthesia start to PACU discharge  Patient location during evaluation: PACU  Patient participation: complete - patient participated  Level of consciousness: awake  Pain score: 0  Pain management: adequate  Airway patency: patent  Anesthetic complications: no  Cardiovascular status: acceptable  Respiratory status: acceptable  Hydration status: acceptable  Comments: I have evaluated the patient and meets criteria for discharge from PACU. Payton Guillory MD        Vitals Value Taken Time   /82 1/8/2020  5:31 PM   Temp     Pulse 0 1/8/2020  5:33 PM   Resp 15 1/8/2020  5:33 PM   SpO2 99 % 1/8/2020  5:33 PM   Vitals shown include unvalidated device data.

## 2020-01-08 NOTE — PROGRESS NOTES
TRANSFER - IN REPORT:    Verbal report received from Darlin RN(name) on Carvel Mass  being received from Endo(unit) for ordered procedure      Report consisted of patients Situation, Background, Assessment and   Recommendations(SBAR). Information from the following report(s) SBAR, MAR, Recent Results and Med Rec Status was reviewed with the receiving nurse. Opportunity for questions and clarification was provided. Assessment completed upon patients arrival to unit and care assumed.

## 2020-01-09 NOTE — PROGRESS NOTES
[facility] [address] [place] [address] Radha Li, [address], [place]: [phone]  F: [phone]      Reason for Consult: Ruben Bocanegra is a 69 y.o. female who is seen in consultation at the request of Dr. Yazmin Oropeza for evaluation of pancreatic cancer. Treatment History:   · 10/4/18 pancreatic head mass FNA, pancreatic adenocarcinoma     10/15/18  Liver, Core Biopsy w/Touch Prep CYTOLOGIC INTERPRETATION:   · Numerous cores and fragments of benign hepatic parenchyma      10/24/18  Liver, Fine Needle Aspiration CYTOLOGIC INTERPRETATION:   Metastatic adenocarcinoma (see Comment). General Categorization   Positive for malignancy.      Comment: The morphologic appearance is nonspecific with regard to site of origin but compatible with metastatic pancreatic carcinoma in this patient with known pancreatic adenocarcinoma ([id number]).      11/9/18-9/25/19- abraxane/gemcitabine -- PD  mFOLFIRINOX 10/16/19-     invitae genetic testing negative 10/2019     Strata testing:  KRAS p.G12D, MYC amp, TP53 mutation (x2); TASH    History of Present Illness: Ruben Bocanegra is a pleasant 69 y.o. female who was admitted yesterday for hyperglycemia and hyponatremia. Patient was admitted to the hospital for jaundice 12/28/19. She has a history of metastatic pancreatic cancer, followed by Dr. Chao Menjivar. She recently has been on a treatment holiday, after having considerable toxicity with FOLFIRINOX. Imaging in October showed an overall low disease burden. The patient noted new jaundice, prompting us to bring her in for a lab check 12/27/19. She received IVFs and her labs resulted with a bilirubin of 15.6, a rise from 0.2 just one month prior. She was directed to the ED for urgent evaluation. She had a CT and was evaluated by GI and her obstruction may be amenable to ERCP and stenting.  It was not able to be done over the weekend, so she was discharged home with plans for ERCP to be done 1/8/20. She went for pre-procedure lab work 1/6/20 and was advised to come to the hospital due to a blood glucose of 465 mg/dL and sodium 127 mmol/L. She received an IVF bolus and subQ insulin, and her blood glucose has improved and her sodium is slowly rising. Plan is for an ERCP tomorrow. Oncology was consulted for evaluation. Patient was seen resting in bed, feeling well. Her jaundice persists. She also notes dark urine. She reports fatigue from not sleeping well last night. She has mild abdominal discomfort. Tolerated breakfast this morning. Denies N/V/D. She was seen with son at bedside today. INTERIM HX: pt seen today for f/u of pancreatic cancer off chemo/ pt of Dr Yo Marinelli at [facility]. Pt is post ERCP/ stent. Up eating lunch with family at bedside. States has some abdominal pain but not bad. Past Medical History:   Diagnosis Date    Arthritis     Constipation     Diabetes ([address]     Hemorrhoids     Hiatal hernia     High cholesterol     Hypertension     [facility] [address] [address]       Past Surgical History:   Procedure Laterality Date    HX KNEE ARTHROSCOPY Right     HX ORTHOPAEDIC      left wrist surgery    HX TONSILLECTOMY        Social History     Tobacco Use    Smoking status: Never Smoker    Smokeless tobacco: Never Used   Substance Use Topics    Alcohol use:  Yes     Alcohol/week: 2.0 - 4.0 standard drinks     Types: 1 - 2 Cans of beer, 1 - 2 Shots of liquor per week     Frequency: 2-4 times a month     Drinks per session: 1 or 2     Comment: minimal      Family History   Problem Relation Age of Onset    Cancer Mother         skin    Hypertension Mother     Heart Disease Mother     Cancer Father         skin    Heart Disease Father     Stroke Father     Diabetes Neg Hx      Current Facility-Administered Medications   Medication Dose Route Frequency    prochlorperazine (COMPAZINE) tablet 5 mg  5 mg Oral Q6H PRN    sodium chloride (NS) flush 5-40 mL  5-40 mL IntraVENous Q8H  sodium chloride (NS) flush 5-40 mL  5-40 mL IntraVENous PRN    potassium chloride SR (KLOR-CON 10) tablet 40 mEq  40 mEq Oral DAILY    lactated Ringers infusion  100 mL/hr IntraVENous CONTINUOUS    lipase-protease-amylase (CREON 36,000) capsule 1 Cap (Patient Supplied)  1 Cap Oral BID PRN    lipase-protease-amylase (CREON 36,000) capsule 2 Cap (Patient Supplied)  2 Cap Oral TID WITH MEALS    pantoprazole (PROTONIX) tablet 40 mg  40 mg Oral ACB    sodium chloride (NS) flush 5-40 mL  5-40 mL IntraVENous Q8H    sodium chloride (NS) flush 5-40 mL  5-40 mL IntraVENous PRN    ondansetron (ZOFRAN) injection 4 mg  4 mg IntraVENous Q4H PRN    hydrALAZINE (APRESOLINE) 20 mg/mL injection 10 mg  10 mg IntraVENous Q6H PRN    glucose chewable tablet 16 g  4 Tab Oral PRN    glucagon (GLUCAGEN) injection 1 mg  1 mg IntraMUSCular PRN    dextrose 10% infusion 0-250 mL  0-250 mL IntraVENous PRN    insulin lispro (HUMALOG) injection   SubCUTAneous Q6H      Allergies   Allergen Reactions    Amoxicillin Hives      Review of Systems: A complete review of systems was obtained, negative except as described above. Physical Exam:     Visit Vitals  /81 (BP 1 Location: Left arm, BP Patient Position: At rest)   Pulse (!) 58 Comment: notified nurses   Temp 98.3 °F (36.8 °C)   Resp 16   SpO2 94%     General: No distress  Eyes: PERRLA, icteric sclerae  HENT: Atraumatic  Neck: Supple  Skin: Significant jaundice  Psych: Alert, oriented, appropriate affect, normal judgment/insight    Results:     Lab Results   Component Value Date/Time    WBC 6.6 01/09/2020 03:12 AM    HGB 9.7 (L) 01/09/2020 03:12 AM    HCT 28.9 (L) 01/09/2020 03:12 AM    PLATELET 054 45/63/8394 03:12 AM    MCV 94.8 01/09/2020 03:12 AM    ABS.  NEUTROPHILS 6.4 01/07/2020 04:36 AM     Lab Results   Component Value Date/Time    Sodium 131 (L) 01/09/2020 03:12 AM    Potassium 3.6 01/09/2020 03:12 AM    Chloride 100 01/09/2020 03:12 AM    CO2 24 01/09/2020 03:12 AM    Glucose 148 (H) 01/09/2020 03:12 AM    BUN 12 01/09/2020 03:12 AM    Creatinine 0.58 01/09/2020 03:12 AM    GFR est AA >60 01/09/2020 03:12 AM    GFR est non-AA >60 01/09/2020 03:12 AM    Calcium 8.4 (L) 01/09/2020 03:12 AM    Glucose (POC) 278 (H) 01/09/2020 12:54 PM     Lab Results   Component Value Date/Time    Bilirubin, total 16.7 (H) 01/09/2020 03:12 AM    ALT (SGPT) 187 (H) 01/09/2020 03:12 AM    AST (SGOT) 241 (H) 01/09/2020 03:12 AM    Alk. phosphatase 1,191 (H) 01/09/2020 03:12 AM    Protein, total 4.7 (L) 01/09/2020 03:12 AM    Albumin 1.7 (L) 01/09/2020 03:12 AM    Globulin 3.0 01/09/2020 03:12 AM       CT A/P 12/27/2019:  1. New marked intrahepatic biliary dilatation, common bile duct dilatation,  pancreatic duct dilatation and gallbladder dilatation. Interval increase in size  of ill-defined pancreatic head\uncinate mass, difficult to measure, but  measuring approximately 8.5 cm x 3.7 cm x 2.6 cm.  2. Hepatic metastatic disease, as described above. MRCP 12/28/2019: report pending    Records reviewed and summarized above. Assessment/Recommendations:   1) Metastatic pancreatic cancer, mets to liver, stage IV:  cT2 cN0 pM1  TASH    S/p treatment with [place]/Abraxane, and more recently was on FOLFIRINOX. This has been held due to intolerance. Plan is to attempt to resume chemotherapy in the near future, perhaps with reduced dose, which would require ERCP to first get her bilirubin down. Patient will follow up with Dr. Maudie Gosselin on discharge. Patient now admitted with hyponatremia and hyperglycemia which are improving. Bilirubin remains high. Post ERCP/ stent. Patient doing well with only mild abdominal discomfort. Clinically stable. Monitor labs. F/u with Dr Maudie Gosselin. 2) Biliary obstruction post ERCP.    Appreciate GI management    3) Hyperglycemia  IManagement per hospitalist    4) Hyponatremia  Management per hospitalist      Pt will f/u with Dr Maudie Gosselin at dc/  Will follow as needed  Call if questions    I appreciate the opportunity to participate in Ms. Jose Guadalupe Strange's care.

## 2020-01-09 NOTE — PROGRESS NOTES
Hospitalist Progress Note  Yazmin Oropeza MD  Answering service: [phone] OR [phone] from in house phone        Date of Service:  2020  NAME:  Ruben Bocanegra  :  1945  MRN:  [id number]  PCP: Surendra Evans MD    Chief Complaint:   Chief Complaint   Patient presents with    Referral / Consult         Admission Summary:     Ruben Bocanegra is a 69 y.o. female who presented with    Interval history / Subjective:   Patient seen for Follow up of elevated LFTs  As per initial history   The patient is a 73-year-old female with past medical history of pancreatic cancer, currently off her chemotherapy cycle; history of diabetes; hemorrhoids; hiatal hernia; hypertension; hypercholesterolemia, who presents to the hospital with the above-mentioned symptom. History was primarily obtained from the patient. The patient reports that she was recently admitted at [facility], an MRCP done, which showed a mass at the pancreatic head, and needs an ERCP. The patient was discharged home, but reports that she went to her gastroenterologist to get some baseline labs done as her ERCP is scheduled for day after tomorrow. The patient reports she got a call today saying her sugars were high and her sodium was low, and she was told to come to the ER for further management and evaluation. The patient reports that she has diabetes secondary to pancreatic cancer, and she had not been on insulin for the last 2 months. The patient reports that she has had chemotherapy and her last treatment was 2019, after which she was waiting the initiation of chemotherapy. The patient reports that she is having lighter stools and has been having minimal abdominal pain. The patient denies any abdominal distention or lower extremity swelling. The patient was seen in the hospital and was requested to be admitted under the hospitalist service.   The patient denies any headache, blurry vision, sore throat, trouble swallowing, trouble with speech, chest pain, shortness of breath, cough, fever, chills. Does admit to mild abdominal pain, but denies any constipation, diarrhea, urinary symptoms, focal or generalized neurological weakness, recent travel, sick contacts, falls, injuries, hematemesis, melena, hemoptysis, hematuria, or any other concerns or problems. The patient denies any other problems. Patient seen and examined by the bedside, Labs, images and notes reviewed  Complaining of nausea and some abdominal pain. Started on roxicodone along with bentyl. Discussed with nursing staff, no acute issues overnight, orders reviewed. Assessment & Plan:     1. Hyperbilirubinemia. Elevated liver function test likely secondary to pancreatic head mass. The patient probably needs a biliary stent. Admit the patient to the hospital.  Keep the patient n.p.o. for now, IV hydration, supportive care, Gastroenterology consult, and repeat labs in the morning. Monitor liver function test.  Reassess as needed. Continue to monitor. S/p Stenting with release of bile     2. Pancreatic cancer. We will continue Creon. Supportive care. Close monitoring. Further intervention per hospital course. Oncology consulted     3. Hyponatremia, mild, likely secondary to hyperglycemia. improving   The patient received IV hydration in the emergency room. Continue to closely monitor. Repeat labs in the morning. Reassess as needed. 4.  Diabetes mellitus type 2, poorly controlled. Blood glucose levels much better   On correctional scale insulin     5. Accelerated hypertension. Hydralazine p.r.n. Continue to monitor. Further intervention per hospital course.       Code status: Partial Code    DVT prophylaxis: SCDs    Care Plan discussed with: Patient/Family    Disposition: Home w/Family and TBD    Hospital Problems  Date Reviewed: 11/20/2019          Codes Class Noted POA    Jaundice ICD-10-CM: R17  ICD-9-CM: 782.4 2020 Unknown                Review of Systems:   A comprehensive review of systems was negative except for that written in the HPI. Physical Examination:     Visit Vitals  /81 (BP 1 Location: Left arm, BP Patient Position: At rest)   Pulse (!) 58 Comment: notified nurses   Temp 98.3 °F (36.8 °C)   Resp 16   SpO2 94%     GENERAL:  Alert x3, awake, mildly distressed, icteric female, appears to be stated age. HEENT:  Pupils are equal and reactive to light. Dry mucous membranes. Tympanic membranes are clear. Icteric sclerae. NECK:  Supple. No JVD. CHEST:  Clear to auscultation bilaterally. CORONARY:  S1 and S2 heard. ABDOMEN:  Soft, nontender, and nondistended. Bowel sounds are physiologic. EXTREMITIES:  No clubbing, no cyanosis, no edema. NEURO/PSYCH:  Pleasant mood and affect. Cranial nerves II through XII are grossly intact. Sensory grossly within normal limits. DTRs 2+ x4. Strength 5/5. SKIN:  Warm. Vital Signs:    Last 24hrs VS reviewed since prior progress note. Most recent are:    Visit Vitals  /81 (BP 1 Location: Left arm, BP Patient Position: At rest)   Pulse (!) 58   Temp 98.3 °F (36.8 °C)   Resp 16   SpO2 94%         Intake/Output Summary (Last 24 hours) at 2020 1811  Last data filed at 2020 0045  Gross per 24 hour   Intake 450 ml   Output    Net 450 ml        Tmax:  Temp (24hrs), Av.9 °F (36.6 °C), Min:97.5 °F (36.4 °C), Max:98.3 °F (36.8 °C)      Data Review:   Data reviewed by myself:  Ct Chest Abd Pelv W Cont    Result Date: 2019  INDICATION: Elevated bilirubin, pancreatic cancer. CT of the chest, abdomen and pelvis is performed with 5 mm collimation. Study is performed with 100 cc of nonionic IV Isovue-370. Sagittal and coronal reformatted images were also performed. CT dose reduction was achieved with the use of the standardized protocol tailored for this examination and automatic exposure control for dose modulation.  Direct comparison is made to prior CT dated October 2019. Chest: Tubes and lines: Central venous port catheter extends to the distal SVC. Lungs: Lungs are clear bilaterally. Lymph nodes: There is no mediastinal, hilar or axillary lymphadenopathy. Pleura: There is no pleural fluid. Heart: The heart is normal in size and there is no pericardial fluid. Bones: No lytic or sclerotic osseous lesion is visualized. Abdomen/pelvis: Liver/pancreasgallbladder: There is new, extensive intrahepatic biliary dilatation in the common bile duct dilatation to the level of the pancreatic head, not present on prior CT dated October 2019. There is also new main pancreatic ductal dilatation at the level of the pancreatic neck, body and tail. The gallbladder is distended as well. The patient's known pancreatic uncinate neoplasm is difficult to evaluate as it is ill-defined and mildly hypodense, however the mass appears to be increased in size; this mass measures approximately 3.5 cm x 3.7 cm x 2.6 cm and measured 3.2 cm x 3.2 cm x 2.2 cm on prior CT dated October 2019. The mass appears to encase the distal SMA seen on prior CT dated October 2019. There is a 5 mm x 6 mm hypodense lesion within the upper right hepatic lobe, not significant change compared to prior CT dated October 2019. There is also a 1 cm x 9 mm hypodensity within the left hepatic lobe, not significantly changed compared to prior CT dated October 2019. Within the inferior right hepatic lobe, there is a 1.3 cm x 2 cm hypodense lesion which measured 8 mm x 6 mm on prior CT dated October 2019. There is also a subtle 7 mm  x 1 cm hypodensity within the left hepatic lobe immediately adjacent to the gallbladder, not seen on prior CT dated October 2019. Spleen: The spleen is normal. Adrenals: The adrenals are normal. Kidneys: The kidneys are normal. Bowel: No dilated or thickened loop of large or small bowel is seen.  Appendix: The appendix is normal. Urinary bladder: Urinary bladder is partially filled and grossly normal. Bones: No lytic or sclerotic osseous lesion is visualized. Miscellaneous: There is no free intraperitoneal gas or fluid. There is no focal fluid collection to suggest abscess. IUD is present in the uterus. IMPRESSION: 1. New marked intrahepatic biliary dilatation, common bile duct dilatation, pancreatic duct dilatation and gallbladder dilatation. Interval increase in size of ill-defined pancreatic head\uncinate mass, difficult to measure, but measuring approximately 8.5 cm x 3.7 cm x 2.6 cm. 2. Hepatic metastatic disease, as described above. Mri Abd W Mrcp W Cont    Result Date: 12/30/2019  *PRELIMINARY REPORT* Biliary and pancreatic ductal dilatation due to the pancreatic head mass, as recently demonstrated on CT. Small liver lesions suspicious for metastases. Preliminary report was provided by Dr. Bruce Bustos, the on-call radiologist, at 13:20 hours on 12/20/2019 Final report to follow. *END PRELIMINARY REPOR* EXAM:  MRI ABD W MRCP W CONT INDICATION:   Jaundice, painless, weight loss or fatigue or anorexia or >3 months duration, otherwise healthy COMPARISON: CT 2019 CONTRAST:  10 mL Gadavist. TECHNIQUE: MR of the abdomen was performed and the following sequences were obtained: Coronal HASTE, multiplanar MRCP, axial in and out-of-phase T1, axial T1 fat-saturated, axial T2, and pre- and post contrast T1-weighted images. FINDINGS: There is a 5 mm focus of increased signal intensity segment 8 of the liver. There is a 1.7 x 0.9 cm lesion segment 5 of the liver. There is intrahepatic ductal dilatation. .  Gallbladder is distended and there is suspicion of a tiny gallstone. The spleen is normal.  There is a mass in the pancreatic head measuring approximately 3 x 2.7 cm with dilatation of the pancreatic duct measuring 9 mm and of the common duct measuring 14 mm. The mass extends medially to involve the SMA. .  The adrenal glands are normal.  The kidneys are normal. There is no lymphadenopathy or ascites. IMPRESSION:  1. There is a pancreatic head mass measuring approximately 3 x 2.7 cm with obstruction of the pancreatic duct and common duct. There is dilatation of the gallbladder with suggestion of a tiny gallstone. The mass extends medially to involve the SMA. There is a lesion in segment 5 of the liver measuring 1.7 x 0.9 cm suspicious for metastatic disease. There is a nonspecific 5 mm focus of increased signal intensity in segment 8 of the liver. No results found for: SDES  Lab Results   Component Value Date/Time    Culture result: MIXED UROGENITAL SAMARA ISOLATED 10/16/2019 07:57 AM    Culture result: NO GROWTH 2 DAYS 04/24/2019 11:03 AM    Culture result: NO GROWTH 1 DAY 12/05/2018 11:12 AM     All Micro Results     None          Labs: reviewed by myself. Recent Labs     01/09/20 0312 01/08/20 0625   WBC 6.6 6.5   HGB 9.7* 9.2*   HCT 28.9* 28.0*    283     Recent Labs     01/09/20 0312 01/08/20 0625 01/07/20  0436   * 132* 131*   K 3.6 3.0* 3.4*    100 100   CO2 24 24 23   BUN 12 9 10   CREA 0.58 0.56 0.55   * 146* 193*   CA 8.4* 8.6 8.5     Recent Labs     01/09/20 0312 01/08/20 0625 01/07/20  0436   SGOT 241* 238* 208*   * 184* 191*   AP 1,191* 1,145* 1,176*   TBILI 16.7* 17.8* 18.3*   TP 4.7* 4.7* 5.1*   ALB 1.7* 1.8* 1.9*   GLOB 3.0 2.9 3.2   LPSE 37* 66*  --      No results for input(s): INR, PTP, APTT, INREXT, INREXT in the last 72 hours. No results for input(s): FE, TIBC, PSAT, FERR in the last 72 hours. No results found for: FOL, RBCF   No results for input(s): PH, PCO2, PO2 in the last 72 hours. No results for input(s): CPK, CKNDX, TROIQ in the last 72 hours.     No lab exists for component: CPKMB  Lab Results   Component Value Date/Time    Cholesterol, total 131 12/05/2018 11:12 AM    HDL Cholesterol 48 12/05/2018 11:12 AM    LDL, calculated 66.6 12/05/2018 11:12 AM    Triglyceride 82 12/05/2018 11:12 AM    CHOL/HDL Ratio 2.7 12/05/2018 11:12 AM     Lab Results   Component Value Date/Time    Glucose (POC) 289 (H) 01/09/2020 05:52 PM    Glucose (POC) 278 (H) 01/09/2020 12:54 PM    Glucose (POC) 137 (H) 01/09/2020 06:31 AM    Glucose (POC) 145 (H) 01/08/2020 11:54 PM    Glucose (POC) 147 (H) 01/08/2020 06:32 PM     Lab Results   Component Value Date/Time    Color YELLOW/STRAW 10/16/2019 07:57 AM    Appearance CLEAR 10/16/2019 07:57 AM    Specific gravity 1.012 10/16/2019 07:57 AM    pH (UA) 6.5 10/16/2019 07:57 AM    Protein NEGATIVE  10/16/2019 07:57 AM    Glucose NEGATIVE  10/16/2019 07:57 AM    Ketone NEGATIVE  10/16/2019 07:57 AM    Bilirubin NEGATIVE  10/16/2019 07:57 AM    Urobilinogen 1.0 10/16/2019 07:57 AM    Nitrites NEGATIVE  10/16/2019 07:57 AM    Leukocyte Esterase TRACE (A) 10/16/2019 07:57 AM    Epithelial cells MODERATE (A) 10/16/2019 07:57 AM    Bacteria NEGATIVE  10/16/2019 07:57 AM    WBC 5-10 10/16/2019 07:57 AM    RBC 10-20 10/16/2019 07:57 AM         Medications Reviewed:     Current Facility-Administered Medications   Medication Dose Route Frequency    prochlorperazine (COMPAZINE) tablet 5 mg  5 mg Oral Q6H PRN    dicyclomine (BENTYL) capsule 10 mg  10 mg Oral TID    oxyCODONE IR (ROXICODONE) tablet 5 mg  5 mg Oral Q6H PRN    sodium chloride (NS) flush 5-40 mL  5-40 mL IntraVENous Q8H    sodium chloride (NS) flush 5-40 mL  5-40 mL IntraVENous PRN    potassium chloride SR (KLOR-CON 10) tablet 40 mEq  40 mEq Oral DAILY    lactated Ringers infusion  100 mL/hr IntraVENous CONTINUOUS    lipase-protease-amylase (CREON 36,000) capsule 1 Cap (Patient Supplied)  1 Cap Oral BID PRN    lipase-protease-amylase (CREON 36,000) capsule 2 Cap (Patient Supplied)  2 Cap Oral TID WITH MEALS    pantoprazole (PROTONIX) tablet 40 mg  40 mg Oral ACB    sodium chloride (NS) flush 5-40 mL  5-40 mL IntraVENous Q8H    sodium chloride (NS) flush 5-40 mL  5-40 mL IntraVENous PRN    ondansetron (ZOFRAN) injection 4 mg  4 mg IntraVENous Q4H PRN    hydrALAZINE (APRESOLINE) 20 mg/mL injection 10 mg  10 mg IntraVENous Q6H PRN    glucose chewable tablet 16 g  4 Tab Oral PRN    glucagon (GLUCAGEN) injection 1 mg  1 mg IntraMUSCular PRN    dextrose 10% infusion 0-250 mL  0-250 mL IntraVENous PRN    insulin lispro (HUMALOG) injection   SubCUTAneous Q6H     ______________________________________________________________________  EXPECTED LENGTH OF STAY: 3d 12h  ACTUAL LENGTH OF STAY:          3                 Lesa Aguilar MD     Patient's emergency contacts:  Extended Emergency Contact Information  Primary Emergency Contact: Dejon Strange  Address: [address] [place] [address] Phone: [phone]  Mobile Phone: [phone]  Relation: Son  Preferred language: ENGLISH   needed?  No  Secondary Emergency Contact: Karma [facility] Phone: [phone]  Mobile Phone: [phone]  Relation: Friend

## 2020-01-09 NOTE — PROGRESS NOTES
Problem: Falls - Risk of  Goal: *Absence of Falls  Description  Document Josue Catalan Fall Risk and appropriate interventions in the flowsheet. Outcome: Progressing Towards Goal  Note: Fall Risk Interventions:    Medication Interventions: Patient to call before getting OOB, Teach patient to arise slowly    History of Falls Interventions: Evaluate medications/consider consulting pharmacy         Problem: Pressure Injury - Risk of  Goal: *Prevention of pressure injury  Description  Document Ck Scale and appropriate interventions in the flowsheet. Outcome: Progressing Towards Goal  Note: Pressure Injury Interventions:      Activity Interventions: Increase time out of bed    Nutrition Interventions: Document food/fluid/supplement intake

## 2020-01-09 NOTE — PROGRESS NOTES
[address]  174 [facility], [address]       GI PROGRESS NOTE  Sumi Milner, [facility] office  [phone] NP in-hospital cell phone M-F until 4:30  After 5pm or on weekends, please call  for physician on call      NAME: Amy Vu   :  1945   MRN:  [id number]       Subjective:    She felt fine overnight, had some broth this morning. She reports some epigastric discomfort and nausea, unrelieved by zofran (started around 11:30). Objective:     VITALS:   Last 24hrs VS reviewed since prior progress note. Most recent are:  Visit Vitals  /81 (BP 1 Location: Left arm, BP Patient Position: At rest)   Pulse (!) 58   Temp 98.3 °F (36.8 °C)   Resp 16   SpO2 94%       PHYSICAL EXAM:  General: Cooperative, no acute distress    Neurologic:  Alert and oriented X 3. HEENT: EOMI, +scleral icterus   Lungs:  CTA bilaterally. No wheezing  Heart:  S1 S2    Abdomen: Soft, non-distended, epigastric tenderness. +Bowel sounds  Extremities: warm  Psych:   Good insight. Not anxious or agitated.     Lab Data Reviewed:     Recent Results (from the past 24 hour(s))   GLUCOSE, POC    Collection Time: 20  6:32 PM   Result Value Ref Range    Glucose (POC) 147 (H) 65 - 100 mg/dL    Performed by Serena Martinez    GLUCOSE, POC    Collection Time: 20 11:54 PM   Result Value Ref Range    Glucose (POC) 145 (H) 65 - 100 mg/dL    Performed by [address] [place] [address]    Collection Time: 20  3:12 AM   Result Value Ref Range    Lipase 37 (L) 73 - 120 U/L   METABOLIC PANEL, COMPREHENSIVE    Collection Time: 20  3:12 AM   Result Value Ref Range    Sodium 131 (L) 136 - 145 mmol/L    Potassium 3.6 3.5 - 5.1 mmol/L    Chloride 100 97 - 108 mmol/L    CO2 24 21 - 32 mmol/L    Anion gap 7 5 - 15 mmol/L    Glucose 148 (H) 65 - 100 mg/dL    BUN 12 6 - 20 MG/DL    Creatinine 0.58 0.55 - 1.02 MG/DL    BUN/Creatinine ratio 21 (H) 12 - 20      GFR est AA >60 >60 ml/min/1.73m2 GFR est non-AA >60 >60 ml/min/1.73m2    Calcium 8.4 (L) 8.5 - 10.1 MG/DL    Bilirubin, total 16.7 (H) 0.2 - 1.0 MG/DL    ALT (SGPT) 187 (H) 12 - 78 U/L    AST (SGOT) 241 (H) 15 - 37 U/L    Alk.  phosphatase 1,191 (H) 45 - 117 U/L    Protein, total 4.7 (L) 6.4 - 8.2 g/dL    Albumin 1.7 (L) 3.5 - 5.0 g/dL    Globulin 3.0 2.0 - 4.0 g/dL    A-G Ratio 0.6 (L) 1.1 - 2.2     CBC W/O DIFF    Collection Time: 01/09/20  3:12 AM   Result Value Ref Range    WBC 6.6 3.6 - 11.0 K/uL    RBC 3.05 (L) 3.80 - 5.20 M/uL    HGB 9.7 (L) 11.5 - 16.0 g/dL    HCT 28.9 (L) 35.0 - 47.0 %    MCV 94.8 80.0 - 99.0 FL    MCH 31.8 26.0 - 34.0 PG    MCHC 33.6 30.0 - 36.5 g/dL    RDW 17.0 (H) 11.5 - 14.5 %    PLATELET 821 158 - 506 K/uL    MPV 11.5 8.9 - 12.9 FL    NRBC 0.0 0  WBC    ABSOLUTE NRBC 0.00 0.00 - 0.01 K/uL   GLUCOSE, POC    Collection Time: 01/09/20  6:31 AM   Result Value Ref Range    Glucose (POC) 137 (H) 65 - 100 mg/dL    Performed by Lena Roldan    GLUCOSE, POC    Collection Time: 01/09/20 12:54 PM   Result Value Ref Range    Glucose (POC) 278 (H) 65 - 100 mg/dL    Performed by Domingo Banegas (PCT)             Assessment:   · Biliary obstruction: alk phos 1,191, , , t bili 16.7; ERCP 1/8/2020: 3 cm distal CBD malignant looking stricture, dilated up to 2 cm, sphincterotomy, dilation of distal CBD stricture, fully covered metal stent into distal CBD, large amount of dark bile came out after stent deployment   · Pancreatic cancer: Dr. Marnie Quach, currently off treatment  · Hyperglycemia/electrolyte abnormalities      Patient Active Problem List   Diagnosis Code    Malignant neoplasm of other parts of pancreas ([facility] C25.7    Anemia D64.9    Obstructive jaundice K83.1    Hyponatremia E87.1    Hypokalemia E87.6    Hyperbilirubinemia E80.6    LFT elevation R94.5    Pancreatic cancer ([facility] C25.9    Hypertension I10    High cholesterol E78.00    Hiatal hernia K44.9    Arthritis M19.90    Diabetes ([facility] E11.9    Weight loss R63.4    Insomnia G47.00    Fatigue R53.83    Jaundice R17     Plan:   · Diet as tolerated  · Discomfort maybe from stent opening up mass, pain management per hospitalist  · Symptomatic management - compazine   · GI follow up March 23 at [address] [place] [address] By: Madhuri Frey NP     1/9/2020  9:35 AM        This patient was seen and examined by me in a face-to-face visit today. I reviewed the medical record including lab work, imaging and other provider notes. I confirmed the interval history as described above. I spoke to the patient, discussing our findings and plans. I discussed this case in detail with onur soto. I formulated an updated  assessment of this patient and guided our treatment plan. I agree with the above progress note. I agree with the history, exam and assessment and plan as outlined in the note.   I would like to add the following:   Abdomen is soft  C/o some nausea and pain, no evidence of pancreatitis, suspect it from the stent deployment  LFT in am   May d/c in am if doing better

## 2020-01-09 NOTE — ROUTINE PROCESS
Bedside and Verbal shift change report given to Severo Can (oncoming nurse) by Alec Patrick (offgoing nurse). Report included the following information SBAR.

## 2020-01-10 NOTE — PROGRESS NOTES
Paged GI due to patient not tolerating the Zofran nor the Compazine. She has been fighting nausea all day. I gave her the last dose of Zofran at 1815 and the patient vomited at [address].  Waiting call back from the doctor

## 2020-01-10 NOTE — PROGRESS NOTES
[address]  174 [facility], [address]       GI PROGRESS NOTE  [facility]  [phone] NP in-hospital cell phone M-F until 4:30  After 5pm or on weekends, please call  for physician on call      NAME: Moises Johnson   :  1945   MRN:  [id number]       Subjective:     She had a rough day yesterday with nausea, feeling much better today. Objective:     VITALS:   Last 24hrs VS reviewed since prior progress note. Most recent are:  Visit Vitals  /71 (BP 1 Location: Left arm, BP Patient Position: At rest)   Pulse 63   Temp 97.6 °F (36.4 °C)   Resp 17   SpO2 99%       PHYSICAL EXAM:  General: Cooperative, no acute distress    Neurologic:  Alert and oriented X 3. HEENT: EOMI, +scleral icterus   Lungs:  CTA bilaterally. No wheezing  Heart:  S1 S2    Abdomen: Soft, non-distended, no tenderness. +Bowel sounds  Extremities: warm  Psych:   Good insight. Not anxious or agitated.     Lab Data Reviewed:     Recent Results (from the past 24 hour(s))   GLUCOSE, POC    Collection Time: 20 12:54 PM   Result Value Ref Range    Glucose (POC) 278 (H) 65 - 100 mg/dL    Performed by Clayton Hutson (RODOLFO)    GLUCOSE, POC    Collection Time: 20  5:52 PM   Result Value Ref Range    Glucose (POC) 289 (H) 65 - 100 mg/dL    Performed by Kristan Brandt    GLUCOSE, POC    Collection Time: 01/10/20 12:56 AM   Result Value Ref Range    Glucose (POC) 237 (H) 65 - 100 mg/dL    Performed by [facility]les [facility]    METABOLIC PANEL, COMPREHENSIVE    Collection Time: 01/10/20  4:45 AM   Result Value Ref Range    Sodium 133 (L) 136 - 145 mmol/L    Potassium 3.5 3.5 - 5.1 mmol/L    Chloride 99 97 - 108 mmol/L    CO2 26 21 - 32 mmol/L    Anion gap 8 5 - 15 mmol/L    Glucose 194 (H) 65 - 100 mg/dL    BUN 12 6 - 20 MG/DL    Creatinine 0.65 0.55 - 1.02 MG/DL    BUN/Creatinine ratio 18 12 - 20      GFR est AA >60 >60 ml/min/1.73m2    GFR est non-AA >60 >60 ml/min/1.73m2    Calcium 8.5 8.5 - 10.1 MG/DL    Bilirubin, total 9.5 (H) 0.2 - 1.0 MG/DL    ALT (SGPT) 162 (H) 12 - 78 U/L    AST (SGOT) 172 (H) 15 - 37 U/L    Alk.  phosphatase 1,081 (H) 45 - 117 U/L    Protein, total 4.7 (L) 6.4 - 8.2 g/dL    Albumin 1.7 (L) 3.5 - 5.0 g/dL    Globulin 3.0 2.0 - 4.0 g/dL    A-G Ratio 0.6 (L) 1.1 - 2.2     GLUCOSE, POC    Collection Time: 01/10/20  6:00 AM   Result Value Ref Range    Glucose (POC) 200 (H) 65 - 100 mg/dL    Performed by Alfonso Jimenez             Assessment:   · Biliary obstruction: alk phos 1,081, , , t bili 9.5; ERCP 1/8/2020: 3 cm distal CBD malignant looking stricture, dilated up to 2 cm, sphincterotomy, dilation of distal CBD stricture, fully covered metal stent into distal CBD, large amount of dark bile came out after stent deployment   · Pancreatic cancer: Dr. Delora Lanes, currently off treatment  · Hyperglycemia/electrolyte abnormalities      Patient Active Problem List   Diagnosis Code    Malignant neoplasm of other parts of pancreas ([facility] C25.7    Anemia D64.9    Obstructive jaundice K83.1    Hyponatremia E87.1    Hypokalemia E87.6    Hyperbilirubinemia E80.6    LFT elevation R94.5    Pancreatic cancer ([address] C25.9    Hypertension I10    High cholesterol E78.00    Hiatal hernia K44.9    Arthritis M19.90    Diabetes ([address] E11.9    Weight loss R63.4    Insomnia G47.00    Fatigue R53.83    Jaundice R17     Plan:   · Diet as tolerated  · Symptomatic management    · GI follow up March 23 at 1530  · Will be available to see on request over the weekend     Signed By: Leonidas Pitt NP     1/10/2020  9:35 AM   D/c per hospitalist  Will see as needed

## 2020-01-10 NOTE — PROGRESS NOTES
Adeel NP Progress note    Name: Chad Ramirez  YOB: 1945  MRN: [id number]  Admission Date: 1/6/2020  5:43 PM    Date of service: 1/9/2020 8:37 PM                                Overnight Update:        Complaint: Not tolerating diet  Paged by: Saroj Herndon RN  Subjective: Patient advanced to gi lite diet today. Not tolerating at present with increased nausea.   Plan:   - Back off to full liquid tonight and advance back to gi lite as tolerated  - Continue PRN antiemetics      Lonetree Malena MIRELES, PAAngeliqueC  [phone] or TigerText

## 2020-01-10 NOTE — PROGRESS NOTES
Problem: Diabetes Self-Management  Goal: *Disease process and treatment process  Description  Define diabetes and identify own type of diabetes; list 3 options for treating diabetes. 1/10/2020 0557 by Zina Blanco RN  Outcome: Progressing Towards Goal  1/10/2020 0555 by Zina Blanco RN  Outcome: Progressing Towards Goal     Problem: Patient Education: Go to Patient Education Activity  Goal: Patient/Family Education  1/10/2020 0557 by Zina Blanco RN  Outcome: Progressing Towards Goal  1/10/2020 0555 by Zina Blanco RN  Outcome: Progressing Towards Goal     Problem: Falls - Risk of  Goal: *Absence of Falls  Description  Document Stephen Tucker Fall Risk and appropriate interventions in the flowsheet. 1/10/2020 0557 by Zina Blanco RN  Outcome: Progressing Towards Goal  Note: Fall Risk Interventions:            Medication Interventions: Patient to call before getting OOB, Teach patient to arise slowly         History of Falls Interventions: Evaluate medications/consider consulting pharmacy      1/10/2020 0555 by Zina Blanco RN  Outcome: Progressing Towards Goal  Note: Fall Risk Interventions:            Medication Interventions: Patient to call before getting OOB, Teach patient to arise slowly         History of Falls Interventions: Evaluate medications/consider consulting pharmacy         Problem: Patient Education: Go to Patient Education Activity  Goal: Patient/Family Education  Outcome: Progressing Towards Goal     Problem: Pressure Injury - Risk of  Goal: *Prevention of pressure injury  Description  Document Ck Scale and appropriate interventions in the flowsheet.   Outcome: Progressing Towards Goal  Note: Pressure Injury Interventions:  Sensory Interventions: Assess changes in LOC    Moisture Interventions: Absorbent underpads    Activity Interventions: Pressure redistribution bed/mattress(bed type)    Mobility Interventions: Pressure redistribution bed/mattress (bed type)    Nutrition Interventions: Document food/fluid/supplement intake    Friction and Shear Interventions: Minimize layers                Problem: Patient Education: Go to Patient Education Activity  Goal: Patient/Family Education  Outcome: Progressing Towards Goal     Problem: Nausea/Vomiting (Adult)  Goal: *Absence of nausea/vomiting  Outcome: Progressing Towards Goal

## 2020-01-10 NOTE — PROGRESS NOTES
[facility] [place] [facility], [address], [place]: [phone]  F: [phone]      Reason for Consult: Sandi Brian is a 69 y.o. female who is seen in consultation at the request of Dr. Yasmin Wilcox for evaluation of pancreatic cancer. Treatment History:   · 10/4/18 pancreatic head mass FNA, pancreatic adenocarcinoma     10/15/18  Liver, Core Biopsy w/Touch Prep CYTOLOGIC INTERPRETATION:   · Numerous cores and fragments of benign hepatic parenchyma      10/24/18  Liver, Fine Needle Aspiration CYTOLOGIC INTERPRETATION:   Metastatic adenocarcinoma (see Comment). General Categorization   Positive for malignancy.      Comment: The morphologic appearance is nonspecific with regard to site of origin but compatible with metastatic pancreatic carcinoma in this patient with known pancreatic adenocarcinoma ([id number]).      11/9/18-9/25/19- abraxane/gemcitabine -- PD  mFOLFIRINOX 10/16/19-     invitae genetic testing negative 10/2019     Strata testing:  KRAS p.G12D, MYC amp, TP53 mutation (x2); TASH    History of Present Illness: Sandi Brian is a pleasant 69 y.o. female who was admitted yesterday for hyperglycemia and hyponatremia. Patient was admitted to the hospital for jaundice 12/28/19. She has a history of metastatic pancreatic cancer, followed by Dr. Maranda Amador. She recently has been on a treatment holiday, after having considerable toxicity with FOLFIRINOX. Imaging in October showed an overall low disease burden. The patient noted new jaundice, prompting us to bring her in for a lab check 12/27/19. She received IVFs and her labs resulted with a bilirubin of 15.6, a rise from 0.2 just one month prior. She was directed to the ED for urgent evaluation. She had a CT and was evaluated by GI and her obstruction may be amenable to ERCP and stenting.  It was not able to be done over the weekend, so she was discharged home with plans for ERCP to be done 1/8/20. She went for pre-procedure lab work 1/6/20 and was advised to come to the hospital due to a blood glucose of 465 mg/dL and sodium 127 mmol/L. She received an IVF bolus and subQ insulin, and her blood glucose has improved and her sodium is slowly rising. Plan is for an ERCP tomorrow. Oncology was consulted for evaluation. Patient was seen resting in bed, feeling well. Her jaundice persists. She also notes dark urine. She reports fatigue from not sleeping well last night. She has mild abdominal discomfort. Tolerated breakfast this morning. Denies N/V/D. She was seen with son at bedside today. INTERIM HX: pt seen today for f/u of pancreatic cancer off chemo/ pt of Dr Chao Menjivar at [facility]. Pt is post ERCP/ stent. Sleeping in this am.  Had nausea yesterday when trying to eat. Some abdominal pain. Feeling ok this am. Does not feel ready to go home. Past Medical History:   Diagnosis Date    Arthritis     Constipation     Diabetes ([address]     Hemorrhoids     Hiatal hernia     High cholesterol     Hypertension     [facility]       Past Surgical History:   Procedure Laterality Date    HX KNEE ARTHROSCOPY Right     HX ORTHOPAEDIC      left wrist surgery    HX TONSILLECTOMY        Social History     Tobacco Use    Smoking status: Never Smoker    Smokeless tobacco: Never Used   Substance Use Topics    Alcohol use:  Yes     Alcohol/week: 2.0 - 4.0 standard drinks     Types: 1 - 2 Cans of beer, 1 - 2 Shots of liquor per week     Frequency: 2-4 times a month     Drinks per session: 1 or 2     Comment: minimal      Family History   Problem Relation Age of Onset    Cancer Mother         skin    Hypertension Mother     Heart Disease Mother     Cancer Father         skin    Heart Disease Father     Stroke Father     Diabetes Neg Hx      Current Facility-Administered Medications   Medication Dose Route Frequency    prochlorperazine (COMPAZINE) tablet 5 mg  5 mg Oral Q6H PRN    dicyclomine (BENTYL) capsule 10 mg  10 mg Oral TID    oxyCODONE IR (ROXICODONE) tablet 5 mg  5 mg Oral Q6H PRN    ondansetron (ZOFRAN) injection 8 mg  8 mg IntraVENous Q4H PRN    sodium chloride (NS) flush 5-40 mL  5-40 mL IntraVENous Q8H    sodium chloride (NS) flush 5-40 mL  5-40 mL IntraVENous PRN    potassium chloride SR (KLOR-CON 10) tablet 40 mEq  40 mEq Oral DAILY    lactated Ringers infusion  100 mL/hr IntraVENous CONTINUOUS    lipase-protease-amylase (CREON 36,000) capsule 1 Cap (Patient Supplied)  1 Cap Oral BID PRN    lipase-protease-amylase (CREON 36,000) capsule 2 Cap (Patient Supplied)  2 Cap Oral TID WITH MEALS    pantoprazole (PROTONIX) tablet 40 mg  40 mg Oral ACB    sodium chloride (NS) flush 5-40 mL  5-40 mL IntraVENous Q8H    sodium chloride (NS) flush 5-40 mL  5-40 mL IntraVENous PRN    hydrALAZINE (APRESOLINE) 20 mg/mL injection 10 mg  10 mg IntraVENous Q6H PRN    glucose chewable tablet 16 g  4 Tab Oral PRN    glucagon (GLUCAGEN) injection 1 mg  1 mg IntraMUSCular PRN    dextrose 10% infusion 0-250 mL  0-250 mL IntraVENous PRN    insulin lispro (HUMALOG) injection   SubCUTAneous Q6H      Allergies   Allergen Reactions    Amoxicillin Hives      Review of Systems: A complete review of systems was obtained, negative except as described above. Physical Exam:     Visit Vitals  /71 (BP 1 Location: Left arm, BP Patient Position: At rest)   Pulse 63   Temp 97.6 °F (36.4 °C)   Resp 17   SpO2 99%     General: No distress  Eyes: PERRLA, icteric sclerae  HENT: Atraumatic  Neck: Supple  Skin: Significant jaundice  Psych: Alert, oriented, appropriate affect, normal judgment/insight    Results:     Lab Results   Component Value Date/Time    WBC 6.6 01/09/2020 03:12 AM    HGB 9.7 (L) 01/09/2020 03:12 AM    HCT 28.9 (L) 01/09/2020 03:12 AM    PLATELET 934 83/17/0399 03:12 AM    MCV 94.8 01/09/2020 03:12 AM    ABS.  NEUTROPHILS 6.4 01/07/2020 04:36 AM     Lab Results   Component Value Date/Time    Sodium 133 (L) 01/10/2020 04:45 AM    Potassium 3.5 01/10/2020 04:45 AM    Chloride 99 01/10/2020 04:45 AM    CO2 26 01/10/2020 04:45 AM    Glucose 194 (H) 01/10/2020 04:45 AM    BUN 12 01/10/2020 04:45 AM    Creatinine 0.65 01/10/2020 04:45 AM    GFR est AA >60 01/10/2020 04:45 AM    GFR est non-AA >60 01/10/2020 04:45 AM    Calcium 8.5 01/10/2020 04:45 AM    Glucose (POC) 198 (H) 01/10/2020 11:22 AM     Lab Results   Component Value Date/Time    Bilirubin, total 9.5 (H) 01/10/2020 04:45 AM    ALT (SGPT) 162 (H) 01/10/2020 04:45 AM    AST (SGOT) 172 (H) 01/10/2020 04:45 AM    Alk. phosphatase 1,081 (H) 01/10/2020 04:45 AM    Protein, total 4.7 (L) 01/10/2020 04:45 AM    Albumin 1.7 (L) 01/10/2020 04:45 AM    Globulin 3.0 01/10/2020 04:45 AM       CT A/P 12/27/2019:  1. New marked intrahepatic biliary dilatation, common bile duct dilatation,  pancreatic duct dilatation and gallbladder dilatation. Interval increase in size  of ill-defined pancreatic head\uncinate mass, difficult to measure, but  measuring approximately 8.5 cm x 3.7 cm x 2.6 cm.  2. Hepatic metastatic disease, as described above. MRCP 12/28/2019: report pending    Records reviewed and summarized above. Assessment/Recommendations:   1) Metastatic pancreatic cancer, mets to liver, stage IV:  cT2 cN0 pM1  TASH    S/p treatment with [place]/Abraxane, and more recently was on FOLFIRINOX. This has been held due to intolerance. Plan is to attempt to resume chemotherapy in the near future, perhaps with reduced dose, which would require ERCP to first get her bilirubin down. Patient will follow up with Dr. Yadi Arteaga on discharge. Patient now admitted with hyponatremia and hyperglycemia which are improving. Bilirubin coming down. Good. Post ERCP/ stent. Patient still having some nausea and abdominal discomfort. Clinically stable. Monitor labs. F/u with Dr Yadi Arteaga already set up for next tues. 2) Biliary obstruction post ERCP. Appreciate GI management    3) Hyperglycemia  IManagement per hospitalist    4) Hyponatremia  Management per hospitalist      Pt will f/u with Dr Harley Dahl at dc/  Will follow as needed  Call if questions over weekend    I appreciate the opportunity to participate in Ms. Tong Strange's care.

## 2020-01-10 NOTE — PROGRESS NOTES
Spoke with Dr. Rachid Mart regarding patient's nausea. Received new orders of phenergan 12.5 IV one time dose. If that does not help the patient, order was given to increase zofran to 8 mg Q4.

## 2020-01-11 NOTE — PROGRESS NOTES
[facility] [place] [facility] [place] [address] [address], [place]: [phone]  F: [phone]      Reason for Consult: Sho Franklin is a 69 y.o. female who is seen in consultation at the request of Dr. Sharad Hernandez for evaluation of pancreatic cancer. Treatment History:   · 10/4/18 pancreatic head mass FNA, pancreatic adenocarcinoma     10/15/18  Liver, Core Biopsy w/Touch Prep CYTOLOGIC INTERPRETATION:   · Numerous cores and fragments of benign hepatic parenchyma      10/24/18  Liver, Fine Needle Aspiration CYTOLOGIC INTERPRETATION:   Metastatic adenocarcinoma (see Comment). General Categorization   Positive for malignancy.      Comment: The morphologic appearance is nonspecific with regard to site of origin but compatible with metastatic pancreatic carcinoma in this patient with known pancreatic adenocarcinoma ([id number]).      11/9/18-9/25/19- abraxane/gemcitabine -- PD  mFOLFIRINOX 10/16/19-     invitae genetic testing negative 10/2019     Strata testing:  KRAS p.G12D, MYC amp, TP53 mutation (x2); TASH    History of Present Illness: Sho Franklin is a pleasant 69 y.o. female who was admitted yesterday for hyperglycemia and hyponatremia. Patient was admitted to the hospital for jaundice 12/28/19. She has a history of metastatic pancreatic cancer, followed by Dr. Bola Lynch. She recently has been on a treatment holiday, after having considerable toxicity with FOLFIRINOX. Imaging in October showed an overall low disease burden. The patient noted new jaundice, prompting us to bring her in for a lab check 12/27/19. She received IVFs and her labs resulted with a bilirubin of 15.6, a rise from 0.2 just one month prior. She was directed to the ED for urgent evaluation. She had a CT and was evaluated by GI and her obstruction may be amenable to ERCP and stenting.  It was not able to be done over the weekend, so she was discharged home with plans for ERCP to be done 1/8/20. She went for pre-procedure lab work 1/6/20 and was advised to come to the hospital due to a blood glucose of 465 mg/dL and sodium 127 mmol/L. She received an IVF bolus and subQ insulin, and her blood glucose has improved and her sodium is slowly rising. Plan is for an ERCP tomorrow. Oncology was consulted for evaluation. Patient was seen resting in bed, feeling well. Her jaundice persists. She also notes dark urine. She reports fatigue from not sleeping well last night. She has mild abdominal discomfort. Tolerated breakfast this morning. Denies N/V/D. She was seen with son at bedside today. INTERIM HX: pt seen today for f/u of pancreatic cancer off chemo/ pt of Dr Craig Cadet at [facility]. Pt is post ERCP/ stent. Rare nausea and minimal pain in the mid abdomen without need for any medications    Past Medical History:   Diagnosis Date    Arthritis     Constipation     Diabetes ([address]     Hemorrhoids     Hiatal hernia     High cholesterol     Hypertension     [facility] [address] [address]       Past Surgical History:   Procedure Laterality Date    HX KNEE ARTHROSCOPY Right     HX ORTHOPAEDIC      left wrist surgery    HX TONSILLECTOMY        Social History     Tobacco Use    Smoking status: Never Smoker    Smokeless tobacco: Never Used   Substance Use Topics    Alcohol use:  Yes     Alcohol/week: 2.0 - 4.0 standard drinks     Types: 1 - 2 Cans of beer, 1 - 2 Shots of liquor per week     Frequency: 2-4 times a month     Drinks per session: 1 or 2     Comment: minimal      Family History   Problem Relation Age of Onset    Cancer Mother         skin    Hypertension Mother     Heart Disease Mother     Cancer Father         skin    Heart Disease Father     Stroke Father     Diabetes Neg Hx      Current Facility-Administered Medications   Medication Dose Route Frequency    prochlorperazine (COMPAZINE) tablet 5 mg  5 mg Oral Q6H PRN    dicyclomine (BENTYL) capsule 10 mg  10 mg Oral TID    oxyCODONE IR (ROXICODONE) tablet 5 mg  5 mg Oral Q6H PRN    ondansetron (ZOFRAN) injection 8 mg  8 mg IntraVENous Q4H PRN    sodium chloride (NS) flush 5-40 mL  5-40 mL IntraVENous Q8H    sodium chloride (NS) flush 5-40 mL  5-40 mL IntraVENous PRN    potassium chloride SR (KLOR-CON 10) tablet 40 mEq  40 mEq Oral DAILY    lactated Ringers infusion  100 mL/hr IntraVENous CONTINUOUS    lipase-protease-amylase (CREON 36,000) capsule 1 Cap (Patient Supplied)  1 Cap Oral BID PRN    lipase-protease-amylase (CREON 36,000) capsule 2 Cap (Patient Supplied)  2 Cap Oral TID WITH MEALS    pantoprazole (PROTONIX) tablet 40 mg  40 mg Oral ACB    sodium chloride (NS) flush 5-40 mL  5-40 mL IntraVENous Q8H    sodium chloride (NS) flush 5-40 mL  5-40 mL IntraVENous PRN    hydrALAZINE (APRESOLINE) 20 mg/mL injection 10 mg  10 mg IntraVENous Q6H PRN    glucose chewable tablet 16 g  4 Tab Oral PRN    glucagon (GLUCAGEN) injection 1 mg  1 mg IntraMUSCular PRN    dextrose 10% infusion 0-250 mL  0-250 mL IntraVENous PRN    insulin lispro (HUMALOG) injection   SubCUTAneous Q6H      Allergies   Allergen Reactions    Amoxicillin Hives      Review of Systems: A complete review of systems was obtained, negative except as described above. Physical Exam:     Visit Vitals  /78 (BP 1 Location: Left arm, BP Patient Position: At rest)   Pulse 78   Temp 97.3 °F (36.3 °C)   Resp 17   SpO2 99%     General: No distress  Eyes: PERRLA, icteric sclerae  HENT: Atraumatic  Neck: Supple  Skin: Significant jaundice  Psych: Alert, oriented, appropriate affect, normal judgment/insight    Results:     Lab Results   Component Value Date/Time    WBC 6.6 01/09/2020 03:12 AM    HGB 9.7 (L) 01/09/2020 03:12 AM    HCT 28.9 (L) 01/09/2020 03:12 AM    PLATELET 324 74/05/1080 03:12 AM    MCV 94.8 01/09/2020 03:12 AM    ABS.  NEUTROPHILS 6.4 01/07/2020 04:36 AM     Lab Results   Component Value Date/Time    Sodium 133 (L) 01/10/2020 04:45 AM    Potassium 3.5 01/10/2020 04:45 AM    Chloride 99 01/10/2020 04:45 AM    CO2 26 01/10/2020 04:45 AM    Glucose 194 (H) 01/10/2020 04:45 AM    BUN 12 01/10/2020 04:45 AM    Creatinine 0.65 01/10/2020 04:45 AM    GFR est AA >60 01/10/2020 04:45 AM    GFR est non-AA >60 01/10/2020 04:45 AM    Calcium 8.5 01/10/2020 04:45 AM    Glucose (POC) 188 (H) 01/11/2020 11:21 AM     Lab Results   Component Value Date/Time    Bilirubin, total 9.5 (H) 01/10/2020 04:45 AM    ALT (SGPT) 162 (H) 01/10/2020 04:45 AM    AST (SGOT) 172 (H) 01/10/2020 04:45 AM    Alk. phosphatase 1,081 (H) 01/10/2020 04:45 AM    Protein, total 4.7 (L) 01/10/2020 04:45 AM    Albumin 1.7 (L) 01/10/2020 04:45 AM    Globulin 3.0 01/10/2020 04:45 AM       CT A/P 12/27/2019:  1. New marked intrahepatic biliary dilatation, common bile duct dilatation,  pancreatic duct dilatation and gallbladder dilatation. Interval increase in size  of ill-defined pancreatic head\uncinate mass, difficult to measure, but  measuring approximately 8.5 cm x 3.7 cm x 2.6 cm.  2. Hepatic metastatic disease, as described above. MRCP 12/28/2019: report pending    Records reviewed and summarized above. Assessment/Recommendations:   1) Metastatic pancreatic cancer, mets to liver, stage IV:  cT2 cN0 pM1  TASH    S/p treatment with [place]/Abraxane, and more recently was on FOLFIRINOX. This has been held due to intolerance. Plan is to attempt to resume chemotherapy in the near future, perhaps with reduced dose once Bb comes down to 1.5. She could possibly start with FOLFOX and Irinotecan could be added later    Will review with Dr. Phil Sim    2) Biliary obstruction post ERCP. Bb trending down  Appreciate GI management    3) Hyperglycemia  Resolved    4)Pain  Not requiring and medications      Pt will f/u with Dr Phil Sim at [facility]  Will follow as needed  Call if questions    I appreciate the opportunity to participate in Ms. Zelda HAHN [place]'s care.     Jcarlos Foley MD, [address] [facility]

## 2020-01-11 NOTE — DISCHARGE SUMMARY
Inpatient hospitalist discharge summary                Brief Overview    PATIENT ID: Megan Landers    MRN: [id number]     YOB: 1945    Admitting Provider: Harsh Conrad MD    Discharging Provider: Anna Lopez MD   To contact this individual call [phone] and ask the  to page. If unavailable ask to be transferred the Adult Hospitalist Department.       PCP at discharge: Lela Covarrubias MD [address] [place] [id number]    Admission date: 1/6/2020  Date of Discharge: 01/11/20    Chief complaint:   Chief Complaint   Patient presents with    Referral / Consult     Patient Active Problem List   Diagnosis Code    Malignant neoplasm of other parts of pancreas ([facility] C25.7    Anemia D64.9    Obstructive jaundice K83.1    Hyponatremia E87.1    Hypokalemia E87.6    Hyperbilirubinemia E80.6    LFT elevation R94.5    Pancreatic cancer ([facility] C25.9    Hypertension I10    High cholesterol E78.00    Hiatal hernia K44.9    Arthritis M19.90    Diabetes ([facility] E11.9    Weight loss R63.4    Insomnia G47.00    Fatigue R53.83    Jaundice R17         Discharge diagnosis, hospital course/plan:  As per initial history   The patient is a 56-year-old female with past medical history of pancreatic cancer, currently off her chemotherapy cycle; history of diabetes; hemorrhoids; hiatal hernia; hypertension; hypercholesterolemia, who presents to the hospital with the above-mentioned symptom.  History was primarily obtained from the patient. Fanny Feng patient reports that she was recently admitted at [place] [address], an MRCP done, which showed a mass at the pancreatic head, and needs an ERCP.  The patient was discharged home, but reports that she went to her gastroenterologist to get some baseline labs done as her ERCP is scheduled for day after tomorrow.  The patient reports she got a call today saying her sugars were high and her sodium was low, and she was told to come to the ER for further management and evaluation.  The patient reports that she has diabetes secondary to pancreatic cancer, and she had not been on insulin for the last 2 months.  The patient reports that she has had chemotherapy and her last treatment was 11/01/2019, after which she was waiting the initiation of chemotherapy.  The patient reports that she is having lighter stools and has been having minimal abdominal pain.  The patient denies any abdominal distention or lower extremity swelling.  The patient was seen in the hospital and was requested to be admitted under the hospitalist service.  The patient denies any headache, blurry vision, sore throat, trouble swallowing, trouble with speech, chest pain, shortness of breath, cough, fever, chills.  Does admit to mild abdominal pain, but denies any constipation, diarrhea, urinary symptoms, focal or generalized neurological weakness, recent travel, sick contacts, falls, injuries, hematemesis, melena, hemoptysis, hematuria, or any other concerns or problems.  The patient denies any other problems. 1.  Hyperbilirubinemia. Improved   Elevated liver function test likely secondary to pancreatic head mass.  s/p stent.  Admit the patient to the hospital.    Need to follow up with GI as an outpatient  Nausea and vomiting resolved   Feeling much better tolerating diet want to go home        2.  Pancreatic cancer.  We will continue Creon.  Supportive care.  Close monitoring.  Further intervention per hospital course. Oncology consulted, need to follow up with Dr. Debra Tanner       3.  Hyponatremia, mild, improved   Stable      4.  Diabetes mellitus type 2, poorly controlled.       5.   hypertension.  stable     On the date of discharge, diagnostic face to face encounter was performed. Patient was hemodynamically stable, offering no new complaints. Denies any shortness of breath at rest, no fevers or chills, no diarrhea or constipation.  Patient is agreeable for discharge. Patient understood and verbalized the understanding of the discharge plan. Patient was advised to seek medical help/ care or return to ED, if symptoms recur, worsen or new symptoms develop. Discharge Disposition:  Home or Self Care    Discharge activity:  Activity as tolerated    Code status at discharge:  Partial Code     Outpatient follow up:  Please follow up with your PCP in one week  In addition follow up with GI and oncology       Future appointments-  Future Appointments   Date Time Provider Shiva Zavala   1/14/2020  2:45 PM Christine Deluca  [facility] [address]   3/24/2020  1:50 PM Cherie Agustin MD [facility]     Follow-up Information     Follow up With Specialties Details Why Contact Info    Chelsea Cooper NP Gastroenterology On 3/23/2020 at [facility] 64      Matti Esquivel MD Internal Medicine   [address] [place]      Tanja Trimble MD Hematology and Oncology, Internal Medicine, Hematology, Oncology In 1 week  [address] [place] [address]  [phone]            Operative procedures performed:  Procedure(s):  ENDOSCOPIC RETROGRADE CHOLANGIOPANCREATOGRAPHY (ERCP)  SPHINCTEROTOMY  BILIARY STENT PLACEMENT  BILIARY DILATATION    Consults:  IP CONSULT TO GASTROENTEROLOGY  IP CONSULT TO ONCOLOGY    Procedures:   Procedure(s):  ENDOSCOPIC RETROGRADE CHOLANGIOPANCREATOGRAPHY (ERCP)  SPHINCTEROTOMY  BILIARY STENT PLACEMENT  BILIARY DILATATION    Diet:  DIET ONE TIME MESSAGE  DIET ONE TIME MESSAGE  DIET GI LITE (POST SURGICAL)    Pertinent test results:  Ct Chest Abd Pelv W Cont    Addendum Date: 1/9/2020    Addendum: Addendum: There is a typo in the impression. IMPRESSION #1 should read \"new marked intrahepatic biliary dilatation, common bile duct dilatation, pancreatic ductal dilatation and gallbladder distention.  Interval increase in size of ill-defined pancreatic head\uncinate mass, difficult to measure, measuring approximately 3.5 cm x 3.7 cm x 2.6 cm. \"    Result Date: 1/9/2020  INDICATION: Elevated bilirubin, pancreatic cancer. CT of the chest, abdomen and pelvis is performed with 5 mm collimation. Study is performed with 100 cc of nonionic IV Isovue-370. Sagittal and coronal reformatted images were also performed. CT dose reduction was achieved with the use of the standardized protocol tailored for this examination and automatic exposure control for dose modulation. Direct comparison is made to prior CT dated October 2019. Chest: Tubes and lines: Central venous port catheter extends to the distal SVC. Lungs: Lungs are clear bilaterally. Lymph nodes: There is no mediastinal, hilar or axillary lymphadenopathy. Pleura: There is no pleural fluid. Heart: The heart is normal in size and there is no pericardial fluid. Bones: No lytic or sclerotic osseous lesion is visualized. Abdomen/pelvis: Liver/pancreasgallbladder: There is new, extensive intrahepatic biliary dilatation in the common bile duct dilatation to the level of the pancreatic head, not present on prior CT dated October 2019. There is also new main pancreatic ductal dilatation at the level of the pancreatic neck, body and tail. The gallbladder is distended as well. The patient's known pancreatic uncinate neoplasm is difficult to evaluate as it is ill-defined and mildly hypodense, however the mass appears to be increased in size; this mass measures approximately 3.5 cm x 3.7 cm x 2.6 cm and measured 3.2 cm x 3.2 cm x 2.2 cm on prior CT dated October 2019. The mass appears to encase the distal SMA seen on prior CT dated October 2019. There is a 5 mm x 6 mm hypodense lesion within the upper right hepatic lobe, not significant change compared to prior CT dated October 2019.  There is also a 1 cm x 9 mm hypodensity within the left hepatic lobe, not significantly changed compared to prior CT dated October 2019. Within the inferior right hepatic lobe, there is a 1.3 cm x 2 cm hypodense lesion which measured 8 mm x 6 mm on prior CT dated October 2019. There is also a subtle 7 mm  x 1 cm hypodensity within the left hepatic lobe immediately adjacent to the gallbladder, not seen on prior CT dated October 2019. Spleen: The spleen is normal. Adrenals: The adrenals are normal. Kidneys: The kidneys are normal. Bowel: No dilated or thickened loop of large or small bowel is seen. Appendix: The appendix is normal. Urinary bladder: Urinary bladder is partially filled and grossly normal. Bones: No lytic or sclerotic osseous lesion is visualized. Miscellaneous: There is no free intraperitoneal gas or fluid. There is no focal fluid collection to suggest abscess. IUD is present in the uterus. IMPRESSION: 1. New marked intrahepatic biliary dilatation, common bile duct dilatation, pancreatic duct dilatation and gallbladder dilatation. Interval increase in size of ill-defined pancreatic head\uncinate mass, difficult to measure, but measuring approximately 8.5 cm x 3.7 cm x 2.6 cm. 2. Hepatic metastatic disease, as described above. Mri Abd W Mrcp W Cont    Result Date: 12/30/2019  *PRELIMINARY REPORT* Biliary and pancreatic ductal dilatation due to the pancreatic head mass, as recently demonstrated on CT. Small liver lesions suspicious for metastases. Preliminary report was provided by Dr. Darrell Tesfaye, the on-call radiologist, at 13:20 hours on 12/20/2019 Final report to follow.  *END PRELIMINARY REPORT* EXAM:  MRI ABD W MRCP W CONT INDICATION:   Jaundice, painless, weight loss or fatigue or anorexia or >3 months duration, otherwise healthy COMPARISON: CT 2019 CONTRAST:  10 mL Gadavist. TECHNIQUE: MR of the abdomen was performed and the following sequences were obtained: Coronal HASTE, multiplanar MRCP, axial in and out-of-phase T1, axial T1 fat-saturated, axial T2, and pre- and post contrast T1-weighted images. FINDINGS: There is a 5 mm focus of increased signal intensity segment 8 of the liver. There is a 1.7 x 0.9 cm lesion segment 5 of the liver. There is intrahepatic ductal dilatation. .  Gallbladder is distended and there is suspicion of a tiny gallstone. The spleen is normal.  There is a mass in the pancreatic head measuring approximately 3 x 2.7 cm with dilatation of the pancreatic duct measuring 9 mm and of the common duct measuring 14 mm. The mass extends medially to involve the SMA. .  The adrenal glands are normal.  The kidneys are normal. There is no lymphadenopathy or ascites. IMPRESSION:  1. There is a pancreatic head mass measuring approximately 3 x 2.7 cm with obstruction of the pancreatic duct and common duct. There is dilatation of the gallbladder with suggestion of a tiny gallstone. The mass extends medially to involve the SMA. There is a lesion in segment 5 of the liver measuring 1.7 x 0.9 cm suspicious for metastatic disease. There is a nonspecific 5 mm focus of increased signal intensity in segment 8 of the liver. Recent Results (from the past 168 hour(s))   CBC WITH AUTOMATED DIFF    Collection Time: 01/06/20  5:09 PM   Result Value Ref Range    WBC 7.0 3.6 - 11.0 K/uL    RBC 3.30 (L) 3.80 - 5.20 M/uL    HGB 10.4 (L) 11.5 - 16.0 g/dL    HCT 32.5 (L) 35.0 - 47.0 %    MCV 98.5 80.0 - 99.0 FL    MCH 31.5 26.0 - 34.0 PG    MCHC 32.0 30.0 - 36.5 g/dL    RDW 16.8 (H) 11.5 - 14.5 %    PLATELET 526 101 - 978 K/uL    MPV 11.7 8.9 - 12.9 FL    NRBC 0.0 0  WBC    ABSOLUTE NRBC 0.00 0.00 - 0.01 K/uL    NEUTROPHILS 86 (H) 32 - 75 %    LYMPHOCYTES 5 (L) 12 - 49 %    MONOCYTES 9 5 - 13 %    EOSINOPHILS 0 0 - 7 %    BASOPHILS 0 0 - 1 %    IMMATURE GRANULOCYTES 0 0.0 - 0.5 %    ABS. NEUTROPHILS 6.0 1.8 - 8.0 K/UL    ABS. LYMPHOCYTES 0.4 (L) 0.8 - 3.5 K/UL    ABS. MONOCYTES 0.6 0.0 - 1.0 K/UL    ABS. EOSINOPHILS 0.0 0.0 - 0.4 K/UL    ABS. BASOPHILS 0.0 0.0 - 0.1 K/UL    ABS. IMM.  GRANS. 0.0 0.00 - 0.04 K/UL    DF SMEAR SCANNED      PLATELET COMMENTS Large Platelets      RBC COMMENTS ANISOCYTOSIS  1+        RBC COMMENTS HYPOCHROMIA  1+        WBC COMMENTS TOXIC GRANULATION     METABOLIC PANEL, COMPREHENSIVE    Collection Time: 01/06/20  5:09 PM   Result Value Ref Range    Sodium 127 (L) 136 - 145 mmol/L    Potassium 3.8 3.5 - 5.1 mmol/L    Chloride 93 (L) 97 - 108 mmol/L    CO2 27 21 - 32 mmol/L    Anion gap 7 5 - 15 mmol/L    Glucose 465 (H) 65 - 100 mg/dL    BUN 11 6 - 20 MG/DL    Creatinine 1.08 (H) 0.55 - 1.02 MG/DL    BUN/Creatinine ratio 10 (L) 12 - 20      GFR est AA >60 >60 ml/min/1.73m2    GFR est non-AA 50 (L) >60 ml/min/1.73m2    Calcium 9.0 8.5 - 10.1 MG/DL    Bilirubin, total 21.4 (H) 0.2 - 1.0 MG/DL    ALT (SGPT) 213 (H) 12 - 78 U/L    AST (SGOT) 235 (H) 15 - 37 U/L    Alk. phosphatase 1,359 (H) 45 - 117 U/L    Protein, total 5.5 (L) 6.4 - 8.2 g/dL    Albumin 2.1 (L) 3.5 - 5.0 g/dL    Globulin 3.4 2.0 - 4.0 g/dL    A-G Ratio 0.6 (L) 1.1 - 2.2     SAMPLES BEING HELD    Collection Time: 01/06/20  5:09 PM   Result Value Ref Range    SAMPLES BEING HELD  1 red, 1 itzel     COMMENT        Add-on orders for these samples will be processed based on acceptable specimen integrity and analyte stability, which may vary by analyte.    HEMOGLOBIN A1C WITH EAG    Collection Time: 01/06/20  5:09 PM   Result Value Ref Range    Hemoglobin A1c 6.6 (H) 4.0 - 5.6 %    Est. average glucose 143 mg/dL   PROTHROMBIN TIME + INR    Collection Time: 01/06/20  5:09 PM   Result Value Ref Range    INR 1.1 0.9 - 1.1      Prothrombin time 11.1 9.0 - 11.1 sec   GLUCOSE, POC    Collection Time: 01/06/20  8:44 PM   Result Value Ref Range    Glucose (POC) 279 (H) 65 - 100 mg/dL    Performed by [address] [place] [address], POC    Collection Time: 01/06/20 11:38 PM   Result Value Ref Range    Glucose (POC) 258 (H) 65 - 100 mg/dL    Performed by [address] [place] [address] [facility]    Collection Time: 01/07/20  4:36 AM   Result Value Ref Range    Sodium 131 (L) 136 - 145 mmol/L    Potassium 3.4 (L) 3.5 - 5.1 mmol/L    Chloride 100 97 - 108 mmol/L    CO2 23 21 - 32 mmol/L    Anion gap 8 5 - 15 mmol/L    Glucose 193 (H) 65 - 100 mg/dL    BUN 10 6 - 20 MG/DL    Creatinine 0.55 0.55 - 1.02 MG/DL    BUN/Creatinine ratio 18 12 - 20      GFR est AA >60 >60 ml/min/1.73m2    GFR est non-AA >60 >60 ml/min/1.73m2    Calcium 8.5 8.5 - 10.1 MG/DL    Bilirubin, total 18.3 (H) 0.2 - 1.0 MG/DL    ALT (SGPT) 191 (H) 12 - 78 U/L    AST (SGOT) 208 (H) 15 - 37 U/L    Alk. phosphatase 1,176 (H) 45 - 117 U/L    Protein, total 5.1 (L) 6.4 - 8.2 g/dL    Albumin 1.9 (L) 3.5 - 5.0 g/dL    Globulin 3.2 2.0 - 4.0 g/dL    A-G Ratio 0.6 (L) 1.1 - 2.2     CBC WITH AUTOMATED DIFF    Collection Time: 01/07/20  4:36 AM   Result Value Ref Range    WBC 8.5 3.6 - 11.0 K/uL    RBC 3.09 (L) 3.80 - 5.20 M/uL    HGB 9.8 (L) 11.5 - 16.0 g/dL    HCT 29.2 (L) 35.0 - 47.0 %    MCV 94.5 80.0 - 99.0 FL    MCH 31.7 26.0 - 34.0 PG    MCHC 33.6 30.0 - 36.5 g/dL    RDW 17.2 (H) 11.5 - 14.5 %    PLATELET 030 097 - 958 K/uL    MPV 12.0 8.9 - 12.9 FL    NRBC 0.0 0  WBC    ABSOLUTE NRBC 0.00 0.00 - 0.01 K/uL    NEUTROPHILS 76 (H) 32 - 75 %    LYMPHOCYTES 9 (L) 12 - 49 %    MONOCYTES 12 5 - 13 %    EOSINOPHILS 1 0 - 7 %    BASOPHILS 1 0 - 1 %    IMMATURE GRANULOCYTES 1 (H) 0.0 - 0.5 %    ABS. NEUTROPHILS 6.4 1.8 - 8.0 K/UL    ABS. LYMPHOCYTES 0.8 0.8 - 3.5 K/UL    ABS. MONOCYTES 1.0 0.0 - 1.0 K/UL    ABS. EOSINOPHILS 0.1 0.0 - 0.4 K/UL    ABS. BASOPHILS 0.1 0.0 - 0.1 K/UL    ABS. IMM.  GRANS. 0.1 (H) 0.00 - 0.04 K/UL    DF SMEAR SCANNED      PLATELET COMMENTS Large Platelets      RBC COMMENTS ANISOCYTOSIS  1+        RBC COMMENTS STOMATOCYTES  1+        RBC COMMENTS TARGET CELLS  1+       GLUCOSE, POC    Collection Time: 01/07/20  6:54 AM   Result Value Ref Range    Glucose (POC) 201 (H) 65 - 100 mg/dL    Performed by [address] [place] [address], [address] [place] [address]    Collection Time: 01/07/20 11:22 AM   Result Value Ref Range    Glucose (POC) 220 (H) 65 - 100 mg/dL    Performed by [facility], POC    Collection Time: 01/07/20  5:52 PM   Result Value Ref Range    Glucose (POC) 248 (H) 65 - 100 mg/dL    Performed by [facility], POC    Collection Time: 01/08/20 12:18 AM   Result Value Ref Range    Glucose (POC) 230 (H) 65 - 100 mg/dL    Performed by [facility]    Collection Time: 01/08/20  6:25 AM   Result Value Ref Range    Lipase 66 (L) 73 - 567 U/L   METABOLIC PANEL, COMPREHENSIVE    Collection Time: 01/08/20  6:25 AM   Result Value Ref Range    Sodium 132 (L) 136 - 145 mmol/L    Potassium 3.0 (L) 3.5 - 5.1 mmol/L    Chloride 100 97 - 108 mmol/L    CO2 24 21 - 32 mmol/L    Anion gap 8 5 - 15 mmol/L    Glucose 146 (H) 65 - 100 mg/dL    BUN 9 6 - 20 MG/DL    Creatinine 0.56 0.55 - 1.02 MG/DL    BUN/Creatinine ratio 16 12 - 20      GFR est AA >60 >60 ml/min/1.73m2    GFR est non-AA >60 >60 ml/min/1.73m2    Calcium 8.6 8.5 - 10.1 MG/DL    Bilirubin, total 17.8 (H) 0.2 - 1.0 MG/DL    ALT (SGPT) 184 (H) 12 - 78 U/L    AST (SGOT) 238 (H) 15 - 37 U/L    Alk.  phosphatase 1,145 (H) 45 - 117 U/L    Protein, total 4.7 (L) 6.4 - 8.2 g/dL    Albumin 1.8 (L) 3.5 - 5.0 g/dL    Globulin 2.9 2.0 - 4.0 g/dL    A-G Ratio 0.6 (L) 1.1 - 2.2     CBC W/O DIFF    Collection Time: 01/08/20  6:25 AM   Result Value Ref Range    WBC 6.5 3.6 - 11.0 K/uL    RBC 2.93 (L) 3.80 - 5.20 M/uL    HGB 9.2 (L) 11.5 - 16.0 g/dL    HCT 28.0 (L) 35.0 - 47.0 %    MCV 95.6 80.0 - 99.0 FL    MCH 31.4 26.0 - 34.0 PG    MCHC 32.9 30.0 - 36.5 g/dL    RDW 17.1 (H) 11.5 - 14.5 %    PLATELET 771 156 - 979 K/uL    MPV 11.9 8.9 - 12.9 FL    NRBC 0.0 0  WBC    ABSOLUTE NRBC 0.00 0.00 - 0.01 K/uL   GLUCOSE, POC    Collection Time: 01/08/20  6:28 AM   Result Value Ref Range    Glucose (POC) 138 (H) 65 - 100 mg/dL    Performed by [address] [place] [address], POC    Collection Time: 01/08/20 11:42 AM Result Value Ref Range    Glucose (POC) 174 (H) 65 - 100 mg/dL    Performed by Renato Gan    GLUCOSE, POC    Collection Time: 01/08/20  6:32 PM   Result Value Ref Range    Glucose (POC) 147 (H) 65 - 100 mg/dL    Performed by Black Cabrera    GLUCOSE, POC    Collection Time: 01/08/20 11:54 PM   Result Value Ref Range    Glucose (POC) 145 (H) 65 - 100 mg/dL    Performed by [address]    Collection Time: 01/09/20  3:12 AM   Result Value Ref Range    Lipase 37 (L) 73 - 650 U/L   METABOLIC PANEL, COMPREHENSIVE    Collection Time: 01/09/20  3:12 AM   Result Value Ref Range    Sodium 131 (L) 136 - 145 mmol/L    Potassium 3.6 3.5 - 5.1 mmol/L    Chloride 100 97 - 108 mmol/L    CO2 24 21 - 32 mmol/L    Anion gap 7 5 - 15 mmol/L    Glucose 148 (H) 65 - 100 mg/dL    BUN 12 6 - 20 MG/DL    Creatinine 0.58 0.55 - 1.02 MG/DL    BUN/Creatinine ratio 21 (H) 12 - 20      GFR est AA >60 >60 ml/min/1.73m2    GFR est non-AA >60 >60 ml/min/1.73m2    Calcium 8.4 (L) 8.5 - 10.1 MG/DL    Bilirubin, total 16.7 (H) 0.2 - 1.0 MG/DL    ALT (SGPT) 187 (H) 12 - 78 U/L    AST (SGOT) 241 (H) 15 - 37 U/L    Alk.  phosphatase 1,191 (H) 45 - 117 U/L    Protein, total 4.7 (L) 6.4 - 8.2 g/dL    Albumin 1.7 (L) 3.5 - 5.0 g/dL    Globulin 3.0 2.0 - 4.0 g/dL    A-G Ratio 0.6 (L) 1.1 - 2.2     CBC W/O DIFF    Collection Time: 01/09/20  3:12 AM   Result Value Ref Range    WBC 6.6 3.6 - 11.0 K/uL    RBC 3.05 (L) 3.80 - 5.20 M/uL    HGB 9.7 (L) 11.5 - 16.0 g/dL    HCT 28.9 (L) 35.0 - 47.0 %    MCV 94.8 80.0 - 99.0 FL    MCH 31.8 26.0 - 34.0 PG    MCHC 33.6 30.0 - 36.5 g/dL    RDW 17.0 (H) 11.5 - 14.5 %    PLATELET 304 492 - 794 K/uL    MPV 11.5 8.9 - 12.9 FL    NRBC 0.0 0  WBC    ABSOLUTE NRBC 0.00 0.00 - 0.01 K/uL   GLUCOSE, POC    Collection Time: 01/09/20  6:31 AM   Result Value Ref Range    Glucose (POC) 137 (H) 65 - 100 mg/dL    Performed by Andrew [facility], POC    Collection Time: 01/09/20 12:54 PM   Result Value Ref Range    Glucose (POC) 278 (H) 65 - 100 mg/dL    Performed by Gemma Cea (PCT)    GLUCOSE, POC    Collection Time: 01/09/20  5:52 PM   Result Value Ref Range    Glucose (POC) 289 (H) 65 - 100 mg/dL    Performed by [address], POC    Collection Time: 01/10/20 12:56 AM   Result Value Ref Range    Glucose (POC) 237 (H) 65 - 100 mg/dL    Performed by Zachery Lee    METABOLIC PANEL, COMPREHENSIVE    Collection Time: 01/10/20  4:45 AM   Result Value Ref Range    Sodium 133 (L) 136 - 145 mmol/L    Potassium 3.5 3.5 - 5.1 mmol/L    Chloride 99 97 - 108 mmol/L    CO2 26 21 - 32 mmol/L    Anion gap 8 5 - 15 mmol/L    Glucose 194 (H) 65 - 100 mg/dL    BUN 12 6 - 20 MG/DL    Creatinine 0.65 0.55 - 1.02 MG/DL    BUN/Creatinine ratio 18 12 - 20      GFR est AA >60 >60 ml/min/1.73m2    GFR est non-AA >60 >60 ml/min/1.73m2    Calcium 8.5 8.5 - 10.1 MG/DL    Bilirubin, total 9.5 (H) 0.2 - 1.0 MG/DL    ALT (SGPT) 162 (H) 12 - 78 U/L    AST (SGOT) 172 (H) 15 - 37 U/L    Alk.  phosphatase 1,081 (H) 45 - 117 U/L    Protein, total 4.7 (L) 6.4 - 8.2 g/dL    Albumin 1.7 (L) 3.5 - 5.0 g/dL    Globulin 3.0 2.0 - 4.0 g/dL    A-G Ratio 0.6 (L) 1.1 - 2.2     GLUCOSE, POC    Collection Time: 01/10/20  6:00 AM   Result Value Ref Range    Glucose (POC) 200 (H) 65 - 100 mg/dL    Performed by [employer] [facility], POC    Collection Time: 01/10/20 11:22 AM   Result Value Ref Range    Glucose (POC) 198 (H) 65 - 100 mg/dL    Performed by [address] [place] [address], POC    Collection Time: 01/10/20  4:59 PM   Result Value Ref Range    Glucose (POC) 252 (H) 65 - 100 mg/dL    Performed by [address], POC    Collection Time: 01/10/20 11:28 PM   Result Value Ref Range    Glucose (POC) 209 (H) 65 - 100 mg/dL    Performed by Naveed Silva    GLUCOSE, POC    Collection Time: 01/11/20  6:32 AM   Result Value Ref Range    Glucose (POC) 143 (H) 65 - 100 mg/dL    Performed by Zachery Lee Physical Exam on Discharge:    Discharge condition: good    Vital signs:   Patient Vitals for the past 12 hrs:   Temp Pulse Resp BP SpO2   01/11/20 0832 97.3 °F (36.3 °C) 78 17 164/78 99 %   01/11/20 0307 98.6 °F (37 °C) 84 17 148/73 97 %       Visit Vitals  /78 (BP 1 Location: Left arm, BP Patient Position: At rest)   Pulse 78   Temp 97.3 °F (36.3 °C)   Resp 17   SpO2 99%     General:  Alert, cooperative, no distress, appears stated age. Head:  Normocephalic, without obvious abnormality, atraumatic. Lungs:   Clear to auscultation bilaterally. Chest wall:  No tenderness or deformity. Heart:  Regular rate and rhythm, S1, S2 normal, no murmur, click, rub or gallop. Abdomen:   Soft, non-tender. Bowel sounds normal. No masses,  No organomegaly. Extremities: Extremities normal, atraumatic, no cyanosis or edema. Pulses: 2+ and symmetric all extremities. Neurologic: CNII-XII intact. Normal strength, sensation and reflexes throughout. Current Discharge Medication List      START taking these medications    Details   pantoprazole (PROTONIX) 40 mg tablet Take 1 Tab by mouth Daily (before breakfast). Qty: 30 Tab, Refills: 0      dicyclomine (BENTYL) 10 mg capsule Take 1 Cap by mouth three (3) times daily. Qty: 45 Cap, Refills: 0         CONTINUE these medications which have CHANGED    Details   ondansetron hcl (ZOFRAN) 8 mg tablet Take 1 Tab by mouth every eight (8) hours as needed for Nausea. Qty: 30 Tab, Refills: 0    Associated Diagnoses: Malignant neoplasm of other parts of pancreas (HCC)      prochlorperazine (COMPAZINE) 5 mg tablet Take 1 Tab by mouth every six (6) hours as needed for Nausea for up to 7 days. Qty: 28 Tab, Refills: 0      lipase-protease-amylase (CREON 36,000) 36,000-114,000- 180,000 unit cpDR capsule Take 1 Cap by mouth two (2) times daily as needed (snacks).  Indications: exocrine pancreatic insufficiency  Qty: 30 Cap, Refills: 0         CONTINUE these medications which have NOT CHANGED    Details   loperamide (IMODIUM) 2 mg capsule Take 2 mg by mouth four (4) times daily as needed for Diarrhea. diphenoxylate-atropine (LOMOTIL) 2.5-0.025 mg per tablet Take 1 Tab by mouth four (4) times daily as needed for Diarrhea. Max Daily Amount: 4 Tabs. Qty: 45 Tab, Refills: 1    Associated Diagnoses: Malignant neoplasm of other parts of pancreas ([address] Diarrhea, unspecified type      ibuprofen (IBUPROFEN IB) 200 mg tablet Take 200 mg by mouth four (4) times daily as needed. lidocaine-prilocaine (EMLA) topical cream Apply  to affected area as needed for Pain. Qty: 30 g, Refills: 0    Associated Diagnoses: Malignant neoplasm of other parts of pancreas ([address]         STOP taking these medications       omeprazole (PRILOSEC) 20 mg capsule Comments:   Reason for Stopping:                  Total time spent on discharge planning, counseling and co-ordination of care:   35 minutes    Raymundo Goncalves MD  01/11/20  11:06 AM

## 2020-01-11 NOTE — DISCHARGE INSTRUCTIONS
Discharge Instructions       PATIENT ID: Diogenes Nixon  MRN: [id number]   YOB: 1945    DATE OF ADMISSION: 1/6/2020  5:43 PM    DATE OF DISCHARGE: 1/11/2020    PRIMARY CARE PROVIDER: Emaon Spann MD     ATTENDING PHYSICIAN: Eliseo Gunter MD  DISCHARGING PROVIDER: Surjit Dos Santos MD    To contact this individual call [phone] and ask the  to page. If unavailable ask to be transferred the Adult Hospitalist Department. DISCHARGE DIAGNOSES ***    CONSULTATIONS: IP CONSULT TO GASTROENTEROLOGY  IP CONSULT TO ONCOLOGY    PROCEDURES/SURGERIES: Procedure(s):  ENDOSCOPIC RETROGRADE CHOLANGIOPANCREATOGRAPHY (ERCP)  SPHINCTEROTOMY  BILIARY STENT PLACEMENT  BILIARY DILATATION    FOLLOW UP APPOINTMENTS:   Follow-up Information     Follow up With Specialties Details Why Contact Adan Cardona NP Gastroenterology On 3/23/2020 at [facility]      Eamon Spann MD Internal Medicine   [address]      Leti Aguilar MD Hematology and Oncology, Internal Medicine, Hematology, Oncology In 1 week  [address] [place] [address]  [phone]             ADDITIONAL CARE RECOMMENDATIONS:  Follow up with PCP, GI and oncology     DIET: diet as tolerated     ACTIVITY: Activity as tolerated        DISCHARGE MEDICATIONS:   See Medication Reconciliation Form    · It is important that you take the medication exactly as they are prescribed. · Keep your medication in the bottles provided by the pharmacist and keep a list of the medication names, dosages, and times to be taken in your wallet. · Do not take other medications without consulting your doctor. NOTIFY YOUR PHYSICIAN FOR ANY OF THE FOLLOWING:   Fever over 101 degrees for 24 hours.    Chest pain, shortness of breath, fever, chills, nausea, vomiting, diarrhea, change in mentation, falling, weakness, bleeding. Severe pain or pain not relieved by medications. Or, any other signs or symptoms that you may have questions about. DISPOSITION:    Home With:   OT  PT  HH  RN       SNF/Inpatient Rehab/LTAC    Independent/assisted living    Hospice    Other:     CDMP Checked:   Yes ***     PROBLEM LIST Updated:  Yes ***       Information obtained by :   I understand that if any problems occur once I am at home I am to contact my physician. I understand and acknowledge receipt of the instructions indicated above.                                                                                                                                              Physician's or R.N.'s Signature                                                                  Date/Time                                                                                                                                              Patient or Representative Signature                                                          Date/Time          Signed:   Mona Arshad MD  1/11/2020  11:11 AM

## 2020-01-11 NOTE — PROGRESS NOTES
Hospitalist Progress Note  Lara Vu MD  Answering service: [phone] OR [phone] from in house phone        Date of Service:  1/10/2020  NAME:  Bishop Torres  :  1945  MRN:  [id number]  PCP: Celestino Arriaga MD    Chief Complaint:   Chief Complaint   Patient presents with    Referral / Consult         Admission Summary:     Bishop Torres is a 69 y.o. female who presented with    Interval history / Subjective:   Patient seen for Follow up of elevated LFTs  As per initial history   The patient is a 79-year-old female with past medical history of pancreatic cancer, currently off her chemotherapy cycle; history of diabetes; hemorrhoids; hiatal hernia; hypertension; hypercholesterolemia, who presents to the hospital with the above-mentioned symptom. History was primarily obtained from the patient. The patient reports that she was recently admitted at [facility], an MRCP done, which showed a mass at the pancreatic head, and needs an ERCP. The patient was discharged home, but reports that she went to her gastroenterologist to get some baseline labs done as her ERCP is scheduled for day after tomorrow. The patient reports she got a call today saying her sugars were high and her sodium was low, and she was told to come to the ER for further management and evaluation. The patient reports that she has diabetes secondary to pancreatic cancer, and she had not been on insulin for the last 2 months. The patient reports that she has had chemotherapy and her last treatment was 2019, after which she was waiting the initiation of chemotherapy. The patient reports that she is having lighter stools and has been having minimal abdominal pain. The patient denies any abdominal distention or lower extremity swelling. The patient was seen in the hospital and was requested to be admitted under the hospitalist service.   The patient denies any headache, blurry vision, sore throat, trouble swallowing, trouble with speech, chest pain, shortness of breath, cough, fever, chills. Does admit to mild abdominal pain, but denies any constipation, diarrhea, urinary symptoms, focal or generalized neurological weakness, recent travel, sick contacts, falls, injuries, hematemesis, melena, hemoptysis, hematuria, or any other concerns or problems. The patient denies any other problems. Patient seen and examined by the bedside, Labs, images and notes reviewed  Looked much better today. Nausea and vomiting improved likely discharged tomorrow. Discussed with nursing staff, no acute issues overnight, orders reviewed. Assessment & Plan:     1. Hyperbilirubinemia. Elevated liver function test likely secondary to pancreatic head mass. The patient probably needs a biliary stent. Admit the patient to the hospital.  Keep the patient n.p.o. for now, IV hydration, supportive care, Gastroenterology consult, and repeat labs in the morning. Monitor liver function test.  Reassess as needed. Continue to monitor. S/p Stenting with release of bile     2. Pancreatic cancer. We will continue Creon. Supportive care. Close monitoring. Further intervention per hospital course. Oncology consulted     3. Hyponatremia, mild, likely secondary to hyperglycemia. improving   The patient received IV hydration in the emergency room. Continue to closely monitor. Repeat labs in the morning. Reassess as needed. 4.  Diabetes mellitus type 2, poorly controlled. Blood glucose levels much better   On correctional scale insulin     5. Accelerated hypertension. Hydralazine p.r.n. Continue to monitor. Further intervention per hospital course.       Code status: Partial Code    DVT prophylaxis: SCDs    Care Plan discussed with: Patient/Family    Disposition: Home w/Family and TBD    Hospital Problems  Date Reviewed: 11/20/2019          Codes Class Noted POA    Jaundice ICD-10-CM: R17  ICD-9-CM: 782.4 2020 Unknown                Review of Systems:   A comprehensive review of systems was negative except for that written in the HPI. Physical Examination:     Visit Vitals  /79 (BP Patient Position: At rest)   Pulse 60   Temp 97.8 °F (36.6 °C)   Resp 18   SpO2 98%     GENERAL:  Alert x3, awake, mildly distressed, icteric female, appears to be stated age. HEENT:  Pupils are equal and reactive to light. Dry mucous membranes. Tympanic membranes are clear. Icteric sclerae. NECK:  Supple. No JVD. CHEST:  Clear to auscultation bilaterally. CORONARY:  S1 and S2 heard. ABDOMEN:  Soft, nontender, and nondistended. Bowel sounds are physiologic. EXTREMITIES:  No clubbing, no cyanosis, no edema. NEURO/PSYCH:  Pleasant mood and affect. Cranial nerves II through XII are grossly intact. Sensory grossly within normal limits. DTRs 2+ x4. Strength 5/5. SKIN:  Warm. Vital Signs:    Last 24hrs VS reviewed since prior progress note. Most recent are:    Visit Vitals  /79 (BP Patient Position: At rest)   Pulse 60   Temp 97.8 °F (36.6 °C)   Resp 18   SpO2 98%       No intake or output data in the 24 hours ending 01/10/20 1930     Tmax:  Temp (24hrs), Av.1 °F (36.7 °C), Min:97.6 °F (36.4 °C), Max:98.9 °F (37.2 °C)      Data Review:   Data reviewed by myself:  Ct Chest Abd Pelv W Cont    Result Date: 2019  INDICATION: Elevated bilirubin, pancreatic cancer. CT of the chest, abdomen and pelvis is performed with 5 mm collimation. Study is performed with 100 cc of nonionic IV Isovue-370. Sagittal and coronal reformatted images were also performed. CT dose reduction was achieved with the use of the standardized protocol tailored for this examination and automatic exposure control for dose modulation. Direct comparison is made to prior CT dated 2019. Chest: Tubes and lines: Central venous port catheter extends to the distal SVC. Lungs: Lungs are clear bilaterally. Lymph nodes:  There is no mediastinal, hilar or axillary lymphadenopathy. Pleura: There is no pleural fluid. Heart: The heart is normal in size and there is no pericardial fluid. Bones: No lytic or sclerotic osseous lesion is visualized. Abdomen/pelvis: Liver/pancreasgallbladder: There is new, extensive intrahepatic biliary dilatation in the common bile duct dilatation to the level of the pancreatic head, not present on prior CT dated October 2019. There is also new main pancreatic ductal dilatation at the level of the pancreatic neck, body and tail. The gallbladder is distended as well. The patient's known pancreatic uncinate neoplasm is difficult to evaluate as it is ill-defined and mildly hypodense, however the mass appears to be increased in size; this mass measures approximately 3.5 cm x 3.7 cm x 2.6 cm and measured 3.2 cm x 3.2 cm x 2.2 cm on prior CT dated October 2019. The mass appears to encase the distal SMA seen on prior CT dated October 2019. There is a 5 mm x 6 mm hypodense lesion within the upper right hepatic lobe, not significant change compared to prior CT dated October 2019. There is also a 1 cm x 9 mm hypodensity within the left hepatic lobe, not significantly changed compared to prior CT dated October 2019. Within the inferior right hepatic lobe, there is a 1.3 cm x 2 cm hypodense lesion which measured 8 mm x 6 mm on prior CT dated October 2019. There is also a subtle 7 mm  x 1 cm hypodensity within the left hepatic lobe immediately adjacent to the gallbladder, not seen on prior CT dated October 2019. Spleen: The spleen is normal. Adrenals: The adrenals are normal. Kidneys: The kidneys are normal. Bowel: No dilated or thickened loop of large or small bowel is seen. Appendix: The appendix is normal. Urinary bladder: Urinary bladder is partially filled and grossly normal. Bones: No lytic or sclerotic osseous lesion is visualized. Miscellaneous: There is no free intraperitoneal gas or fluid.  There is no focal fluid collection to suggest abscess. IUD is present in the uterus. IMPRESSION: 1. New marked intrahepatic biliary dilatation, common bile duct dilatation, pancreatic duct dilatation and gallbladder dilatation. Interval increase in size of ill-defined pancreatic head\uncinate mass, difficult to measure, but measuring approximately 8.5 cm x 3.7 cm x 2.6 cm. 2. Hepatic metastatic disease, as described above. Mri Abd W Mrcp W Cont    Result Date: 12/30/2019  *PRELIMINARY REPORT* Biliary and pancreatic ductal dilatation due to the pancreatic head mass, as recently demonstrated on CT. Small liver lesions suspicious for metastases. Preliminary report was provided by Dr. Jeaneth Bartlett, the on-call radiologist, at 13:20 hours on 12/20/2019 Final report to follow. *END PRELIMINARY REPOR* EXAM:  MRI ABD W MRCP W CONT INDICATION:   Jaundice, painless, weight loss or fatigue or anorexia or >3 months duration, otherwise healthy COMPARISON: CT 2019 CONTRAST:  10 mL Gadavist. TECHNIQUE: MR of the abdomen was performed and the following sequences were obtained: Coronal HASTE, multiplanar MRCP, axial in and out-of-phase T1, axial T1 fat-saturated, axial T2, and pre- and post contrast T1-weighted images. FINDINGS: There is a 5 mm focus of increased signal intensity segment 8 of the liver. There is a 1.7 x 0.9 cm lesion segment 5 of the liver. There is intrahepatic ductal dilatation. .  Gallbladder is distended and there is suspicion of a tiny gallstone. The spleen is normal.  There is a mass in the pancreatic head measuring approximately 3 x 2.7 cm with dilatation of the pancreatic duct measuring 9 mm and of the common duct measuring 14 mm. The mass extends medially to involve the SMA. .  The adrenal glands are normal.  The kidneys are normal. There is no lymphadenopathy or ascites. IMPRESSION:  1. There is a pancreatic head mass measuring approximately 3 x 2.7 cm with obstruction of the pancreatic duct and common duct.  There is dilatation of the gallbladder with suggestion of a tiny gallstone. The mass extends medially to involve the SMA. There is a lesion in segment 5 of the liver measuring 1.7 x 0.9 cm suspicious for metastatic disease. There is a nonspecific 5 mm focus of increased signal intensity in segment 8 of the liver. No results found for: SDES  Lab Results   Component Value Date/Time    Culture result: MIXED UROGENITAL SAMARA ISOLATED 10/16/2019 07:57 AM    Culture result: NO GROWTH 2 DAYS 04/24/2019 11:03 AM    Culture result: NO GROWTH 1 DAY 12/05/2018 11:12 AM     All Micro Results     None          Labs: reviewed by myself. Recent Labs     01/09/20 0312 01/08/20 0625   WBC 6.6 6.5   HGB 9.7* 9.2*   HCT 28.9* 28.0*    283     Recent Labs     01/10/20  0445 01/09/20  0312 01/08/20  0625   * 131* 132*   K 3.5 3.6 3.0*   CL 99 100 100   CO2 26 24 24   BUN 12 12 9   CREA 0.65 0.58 0.56   * 148* 146*   CA 8.5 8.4* 8.6     Recent Labs     01/10/20  0445 01/09/20  0312 01/08/20  0625   SGOT 172* 241* 238*   * 187* 184*   AP 1,081* 1,191* 1,145*   TBILI 9.5* 16.7* 17.8*   TP 4.7* 4.7* 4.7*   ALB 1.7* 1.7* 1.8*   GLOB 3.0 3.0 2.9   LPSE  --  37* 66*     No results for input(s): INR, PTP, APTT, INREXT, INREXT in the last 72 hours. No results for input(s): FE, TIBC, PSAT, FERR in the last 72 hours. No results found for: FOL, RBCF   No results for input(s): PH, PCO2, PO2 in the last 72 hours. No results for input(s): CPK, CKNDX, TROIQ in the last 72 hours.     No lab exists for component: CPKMB  Lab Results   Component Value Date/Time    Cholesterol, total 131 12/05/2018 11:12 AM    HDL Cholesterol 48 12/05/2018 11:12 AM    LDL, calculated 66.6 12/05/2018 11:12 AM    Triglyceride 82 12/05/2018 11:12 AM    CHOL/HDL Ratio 2.7 12/05/2018 11:12 AM     Lab Results   Component Value Date/Time    Glucose (POC) 252 (H) 01/10/2020 04:59 PM    Glucose (POC) 198 (H) 01/10/2020 11:22 AM    Glucose (POC) 200 (H) 01/10/2020 06:00 AM    Glucose (POC) 237 (H) 01/10/2020 12:56 AM    Glucose (POC) 289 (H) 01/09/2020 05:52 PM     Lab Results   Component Value Date/Time    Color YELLOW/STRAW 10/16/2019 07:57 AM    Appearance CLEAR 10/16/2019 07:57 AM    Specific gravity 1.012 10/16/2019 07:57 AM    pH (UA) 6.5 10/16/2019 07:57 AM    Protein NEGATIVE  10/16/2019 07:57 AM    Glucose NEGATIVE  10/16/2019 07:57 AM    Ketone NEGATIVE  10/16/2019 07:57 AM    Bilirubin NEGATIVE  10/16/2019 07:57 AM    Urobilinogen 1.0 10/16/2019 07:57 AM    Nitrites NEGATIVE  10/16/2019 07:57 AM    Leukocyte Esterase TRACE (A) 10/16/2019 07:57 AM    Epithelial cells MODERATE (A) 10/16/2019 07:57 AM    Bacteria NEGATIVE  10/16/2019 07:57 AM    WBC 5-10 10/16/2019 07:57 AM    RBC 10-20 10/16/2019 07:57 AM         Medications Reviewed:     Current Facility-Administered Medications   Medication Dose Route Frequency    prochlorperazine (COMPAZINE) tablet 5 mg  5 mg Oral Q6H PRN    dicyclomine (BENTYL) capsule 10 mg  10 mg Oral TID    oxyCODONE IR (ROXICODONE) tablet 5 mg  5 mg Oral Q6H PRN    ondansetron (ZOFRAN) injection 8 mg  8 mg IntraVENous Q4H PRN    sodium chloride (NS) flush 5-40 mL  5-40 mL IntraVENous Q8H    sodium chloride (NS) flush 5-40 mL  5-40 mL IntraVENous PRN    potassium chloride SR (KLOR-CON 10) tablet 40 mEq  40 mEq Oral DAILY    lactated Ringers infusion  100 mL/hr IntraVENous CONTINUOUS    lipase-protease-amylase (CREON 36,000) capsule 1 Cap (Patient Supplied)  1 Cap Oral BID PRN    lipase-protease-amylase (CREON 36,000) capsule 2 Cap (Patient Supplied)  2 Cap Oral TID WITH MEALS    pantoprazole (PROTONIX) tablet 40 mg  40 mg Oral ACB    sodium chloride (NS) flush 5-40 mL  5-40 mL IntraVENous Q8H    sodium chloride (NS) flush 5-40 mL  5-40 mL IntraVENous PRN    hydrALAZINE (APRESOLINE) 20 mg/mL injection 10 mg  10 mg IntraVENous Q6H PRN    glucose chewable tablet 16 g  4 Tab Oral PRN    glucagon (GLUCAGEN) injection 1 mg  1 mg IntraMUSCular PRN    dextrose 10% infusion 0-250 mL  0-250 mL IntraVENous PRN    insulin lispro (HUMALOG) injection   SubCUTAneous Q6H     ______________________________________________________________________  EXPECTED LENGTH OF STAY: 4d 19h  ACTUAL LENGTH OF STAY:          Jm John MD     Patient's emergency contacts:  Extended Emergency Contact Information  Primary Emergency Contact: Dejon Strange  Address: [address] [place] [address], [address] [place] [address] Phone: [phone]  Mobile Phone: [phone]  Relation: Son  Preferred language: ENGLISH   needed?  No  Secondary Emergency Contact: Karma, [address] Phone: [phone]  Mobile Phone: [phone]  Relation: Friend

## 2020-01-14 NOTE — PROGRESS NOTES
[facility] [place] [address] [facility] [address] [facility] W: [phone]  F: [phone] Reason for Visit:  
Mohan Griffin is a 69 y.o. female who is seen in self-referral for evaluation of pancreatic cancer. Treatment History:  
· 10/4/18 pancreatic head mass FNA, pancreatic adenocarcinoma 10/15/18  Liver, Core Biopsy w/Touch Prep CYTOLOGIC INTERPRETATION:  
· Numerous cores and fragments of benign hepatic parenchyma 10/24/18  Liver, Fine Needle Aspiration CYTOLOGIC INTERPRETATION:  
Metastatic adenocarcinoma (see Comment). General Categorization Positive for malignancy. Comment: The morphologic appearance is nonspecific with regard to site of origin but compatible with metastatic pancreatic carcinoma in this patient with known pancreatic adenocarcinoma ([id number]). 11/9/18-9/25/19- abraxane/gemcitabine -- PD 
mFOLFIRINOX 10/16/19- 
 
invitae genetic testing negative 10/2019, held chemo since 11/13/19 Strata testing:  KRAS p.G12D, MYC amp, TP53 mutation (x2); TASH Was admitted from 1/6/2020-1/11/2020 for hyponatremia and hyperbilirubinemia, stent placed by Dr. Jostin Stein with ERCP on 1/8/2020 with metal stent into distal CBD History of Present Illness:  
She started having abdominal pain, gradual and persistent, x 1 month, pressure sensation, located in epigastrium, no radiation, no aggravating symptoms, no relieving factors. 25 lb weight loss over 6 months.  + nausea. Interval history:  In today for follow up. Complains of gr 2 loss of appetite, gr 1 constipation, gr 1 fatigue, gr 2 hair loss, gr 1 nausea, gr 1 vomiting, gr 1 insomnia, gr 1 concentration, gr 1 pain to abdomen, gr 1 neuropathy, gr 1 urinary leakage. Past Medical History:  
Diagnosis Date  Arthritis  Constipation  Diabetes ([address]  Hemorrhoids  Hiatal hernia  High cholesterol  Hypertension  Pancreatic cancer ([facility] [address] Past Surgical History:  
Procedure Laterality Date  HX KNEE ARTHROSCOPY Right  HX ORTHOPAEDIC    
 left wrist surgery  HX TONSILLECTOMY Social History Tobacco Use  Smoking status: Never Smoker  Smokeless tobacco: Never Used Substance Use Topics  Alcohol use: Yes Alcohol/week: 2.0 - 4.0 standard drinks Types: 1 - 2 Cans of beer, 1 - 2 Shots of liquor per week Frequency: 2-4 times a month Drinks per session: 1 or 2 Comment: minimal  
  
Family History Problem Relation Age of Onset  Cancer Mother   
     skin  Hypertension Mother  Heart Disease Mother  Cancer Father   
     skin  Heart Disease Father  Stroke Father  Diabetes Neg Hx Current Outpatient Medications Medication Sig  
 ondansetron hcl (ZOFRAN) 8 mg tablet Take 1 Tab by mouth every eight (8) hours as needed for Nausea.  pantoprazole (PROTONIX) 40 mg tablet Take 1 Tab by mouth Daily (before breakfast).  prochlorperazine (COMPAZINE) 5 mg tablet Take 1 Tab by mouth every six (6) hours as needed for Nausea for up to 7 days.  dicyclomine (BENTYL) 10 mg capsule Take 1 Cap by mouth three (3) times daily.  lipase-protease-amylase (CREON 36,000) 36,000-114,000- 180,000 unit cpDR capsule Take 1 Cap by mouth two (2) times daily as needed (snacks). Indications: exocrine pancreatic insufficiency  loperamide (IMODIUM) 2 mg capsule Take 2 mg by mouth four (4) times daily as needed for Diarrhea.  lidocaine-prilocaine (EMLA) topical cream Apply  to affected area as needed for Pain.  diphenoxylate-atropine (LOMOTIL) 2.5-0.025 mg per tablet Take 1 Tab by mouth four (4) times daily as needed for Diarrhea. Max Daily Amount: 4 Tabs.  ibuprofen (IBUPROFEN IB) 200 mg tablet Take 200 mg by mouth four (4) times daily as needed. No current facility-administered medications for this visit. Allergies Allergen Reactions  Amoxicillin Hives Review of Systems: A comprehensive review of systems was performed and all systems were negative except for HPI and for the symptom review form, reviewed and scanned in. Physical Exam:  
 
Visit Vitals /89 (BP 1 Location: Left arm, BP Patient Position: Sitting) Pulse 70 Temp 96.6 °F (35.9 °C) (Temporal) Resp 16 Ht 5' 7\" (1.702 m) Wt 148 lb 12.8 oz (67.5 kg) SpO2 100% BMI 23.31 kg/m² ECOG PS: 1 General: nad Eyes: PERRLA, + scleral icterus HENT: Atraumatic, OP clear Neck: Supple Lymphatic: No cervical, supraclavicular, or inguinal adenopathy Respiratory: CTAB, normal respiratory effort CV: Normal rate, regular rhythm, no murmurs, 1+ LE edema bilateral LE 
GI: Soft, nontender, nondistended, no masses, no hepatomegaly, no splenomegaly MS: Normal gait and station. Digits without clubbing or cyanosis. Skin: + jaundice Psych: Alert, oriented, appropriate affect, normal judgment/insight Results:  
 
Lab Results Component Value Date/Time WBC 6.6 01/09/2020 03:12 AM  
 HGB 9.7 (L) 01/09/2020 03:12 AM  
 HCT 28.9 (L) 01/09/2020 03:12 AM  
 PLATELET 779 45/16/9776 03:12 AM  
 MCV 94.8 01/09/2020 03:12 AM  
 ABS. NEUTROPHILS 6.4 01/07/2020 04:36 AM  
 
Lab Results Component Value Date/Time Sodium 133 (L) 01/10/2020 04:45 AM  
 Potassium 3.5 01/10/2020 04:45 AM  
 Chloride 99 01/10/2020 04:45 AM  
 CO2 26 01/10/2020 04:45 AM  
 Glucose 194 (H) 01/10/2020 04:45 AM  
 BUN 12 01/10/2020 04:45 AM  
 Creatinine 0.65 01/10/2020 04:45 AM  
 GFR est AA >60 01/10/2020 04:45 AM  
 GFR est non-AA >60 01/10/2020 04:45 AM  
 Calcium 8.5 01/10/2020 04:45 AM  
 Glucose (POC) 188 (H) 01/11/2020 11:21 AM  
 
Lab Results Component Value Date/Time Bilirubin, total 9.5 (H) 01/10/2020 04:45 AM  
 ALT (SGPT) 162 (H) 01/10/2020 04:45 AM  
 AST (SGOT) 172 (H) 01/10/2020 04:45 AM  
 Alk.  phosphatase 1,081 (H) 01/10/2020 04:45 AM  
 Protein, total 4.7 (L) 01/10/2020 04:45 AM  
 Albumin 1.7 (L) 01/10/2020 04:45 AM  
 Globulin 3.0 01/10/2020 04:45 AM  
 
10/1/18 CT abd There is a 3.8 x 1.9 cm mass involving the uncinate process and possibly 
invading the duodenum. Findings are nonspecific but typical of pancreatic 
carcinoma. There is no biliary or pancreatic ductal dilatation. 
  
There is a poorly defined irregular hypodensity in segment IVb of the liver 
measuring 3.7 x 1.9 cm. There appears to be focal biliary dilatation in the left 
lobe behind this abnormality. Metastatic disease is suspected. There is a small, 
1 cm hypodensity in the dome of the liver, likely segment 8. There is a 1 cm 
hypodensity in segment 4 of the liver as well, also likely metastasis. Small 
hypodensity measuring less than 1 cm in segment 6 at the tip laterally is also 
nonspecific but probable metastasis. 
  
Spleen kidneys and adrenals are unremarkable. 
  
No free fluid or focal fluid collection. 
  
Lung bases are clear. 
  
Bone windows are unremarkable. 
  
IMPRESSION: 
1. 3.8 x 1.9 cm mass involving the uncinate process of the pancreas and possibly 
invading the duodenum, this likely represents pancreatic carcinoma. 2. Likely metastatic disease in the liver. There is a 3.8 x 1.9 cm mass involving the uncinate process and possibly 
invading the duodenum. Findings are nonspecific but typical of pancreatic 
carcinoma. There is no biliary or pancreatic ductal dilatation. 
  
There is a poorly defined irregular hypodensity in segment IVb of the liver 
measuring 3.7 x 1.9 cm. There appears to be focal biliary dilatation in the left 
lobe behind this abnormality. Metastatic disease is suspected. There is a small, 
1 cm hypodensity in the dome of the liver, likely segment 8. There is a 1 cm 
hypodensity in segment 4 of the liver as well, also likely metastasis. Small 
hypodensity measuring less than 1 cm in segment 6 at the tip laterally is also 
nonspecific but probable metastasis.  
  
 Spleen kidneys and adrenals are unremarkable. 
  
No free fluid or focal fluid collection. 
  
Lung bases are clear. 
  
Bone windows are unremarkable. 
  
IMPRESSION: 
1. 3.8 x 1.9 cm mass involving the uncinate process of the pancreas and possibly 
invading the duodenum, this likely represents pancreatic carcinoma. 2. Likely metastatic disease in the liver. Records reviewed and summarized above. Pathology report(s) reviewed above. Radiology report(s) reviewed above. MRI abdomen 10/22/18: IMPRESSION:   
  
1. Pancreatic uncinate process mass suspicious for pancreatic neoplasm, possibly 
adenocarcinoma. Mass abuts the superior mesenteric artery with about 20% 
circumference involvement but no luminal distortion or perivascular stranding. 2. Multiple hepatic lesions suspicious for metastatic neoplasm with segment IVb 
lesion showing patchy areas of surrounding steatosis likely secondary to locally 
altered intrahepatic hemodynamics and likely responsible for biopsy result. 3. Cholecystolithiasis and small gallbladder polyp. CT c/a/p 12/28/18: IMPRESSION: 
Interval decrease in size of pancreatic mass. Slight interval decrease in size of hepatic metastases; these now demonstrate central low attenuation suggestive of necrosis. No evidence of new metastatic disease in the chest, abdomen, or pelvis. CT c/a/p 2/25/19: 
LIVER: The lesions described on the prior examination have improved in the 
interval. 19 mm lesion in the dome of the liver now measures 1 cm. 2.0 x 1.5 cm 
lesion in the left hepatic lobe now measures 1.3 x 1.2 cm. Other smaller lesions 
are no longer visualized. GALLBLADDER: Unremarkable. SPLEEN: No mass. PANCREAS: Mass lesion in the uncinate process now measures 1.7 x 2.0 cm, 
previously measuring 2.0 x 3.0 cm. ADRENALS: Unremarkable. KIDNEYS: No mass, calculus, or hydronephrosis. STOMACH: Unremarkable. SMALL BOWEL: No dilatation or wall thickening. COLON: No dilatation or wall thickening. APPENDIX: Unremarkable. PERITONEUM: No ascites or pneumoperitoneum. RETROPERITONEUM: No lymphadenopathy or aortic aneurysm. REPRODUCTIVE ORGANS: Intrauterine device projects in satisfactory position. URINARY BLADDER: No mass or calculus. BONES: Degenerative changes are seen in the thoracic and lumbar spine. ADDITIONAL COMMENTS: N/A 
  
IMPRESSION IMPRESSION: 
Interval decrease in the size of the hepatic metastases. Decrease in the size of 
the mass lesion involving the uncinate process. 
  
RECIST: 
Pancreatic uncinate mass: Current: (68) 1.7X2.0cm     12/28/2018: (71) 3.0 x 
2.0 cm Segment 4B liver mass: Current: (56) 1.3 x 1.2 cm 12/28/2018: (57) 2.0 x 1.5 cm Segment 5 liver mass: Current: (68) 4 x 7 mm 12/28/2018: (70) 1.4 x 0.9 cm CXR for port study 4/15/19:  
IMPRESSION: 
Right internal jugular chest Port-A-Cath is seen with the catheter terminating 
in the mid SVC. The port is slightly angled medially due to breast tissue. The 
port is not flipped. Access needle appears to be within the port hub. Nursing 
staff was able to get good blood return and flush the port without difficulty. No intervention was performed. CT a/p 4/22/19: 
IMPRESSION: 
1. Slight decrease in size of liver metastases, detailed above. 2. Slight decrease in size in uncinate process mass. 3. No evidence for new metastatic disease. 
  
RECIST: 
Uncinate process mass: 15 x 9 mm, image 56, previously 17 x 11 mm. Segment IVb metastasis 11 x 9 mm, image 50, previously 13 x 12 mm Segment 5 metastasis: Barely detectable, image 62 CT c/a/p 6/17/19: IMPRESSION: 
1. Stable lesion in the uncinate process. 2. Slight interval decrease in one of the 2 small liver lesions while the other 
remains stable. 3. Stable small nodules right upper lobe.  
4. No new evidence of metastatic disease or acute abnormality. 
  
RECIST: 
 Uncinate process mass: 15 x 9 mm, image 68, previously 15 x 9 mm, image 56 Segment IVb metastasis 11 x 9 mm, image 56, previously 11 x 9 mm, image 50 Segment 5 metastasis: No longer visualized, previously barely detectable, image 58 CT c/a/p 8/12/19: 
IMPRESSION: 
Stable mass lesion in the uncinate process. Stable liver lesions. Stable 
subpleural nodules right upper lobe. No new evidence of metastatic disease or acute abnormality. 
  
RECIST: 
Uncinate process mass: 15 x 9 mm, image 69, previously 15 x 9 mm, image 68 
 Segment IVb metastasis 11 x 10 mm, image 56, previously 11 x 10 mm, image 56 Segment 5 metastasis: Not visualized CT c/a/p 10/7/19: 
 IMPRESSION:  
1. Uncinate process mass. This is difficult to measure but appears to have 
increased in size. A multiphase pancreatic protocol on the next follow-up 
examination may be helpful to more precisely define the lesion. 2. Small liver lesions unchanged. 3. No evidence of new metastatic disease. 4. Intrauterine device. RECIST Malignant neoplasm of other parts of the pancreas. 
  
TARGET LESIONS: 
    Lesion (description)         Location (series/slice)                Size 1. Uncinate process mass    series 3 image 68    2.3 x 2 cm 2. Segment 4B liver lesion    series 3 image 57    10 x 9 mm 
  
  
NONTARGET LESIONS:  
None. 12/27/2019 CT c/a/p: 
Findings: Abdomen/pelvis: 
  
Liver/pancreasgallbladder: There is new, extensive intrahepatic biliary dilatation in the common bile duct dilatation to the level of the pancreatic head, not present on prior CT dated October 2019. There is also new main pancreatic ductal dilatation at the level of the pancreatic neck, body and tail. The gallbladder is distended as well.  The patient's known pancreatic uncinate neoplasm is difficult to evaluate as it is ill-defined and mildly hypodense, however the mass appears to be increased in size; this mass measures approximately 3.5 cm x 3.7 cm x 2.6 cm and measured 3.2 cm x 3.2 cm x 2.2 cm on prior CT dated October 2019. The mass appears to encase the distal SMA seen on prior CT dated October 2019. There is a 5 mm x 6 mm hypodense lesion within the upper right hepatic lobe, not significant change compared to prior CT dated October 2019. There is also a 1 cm x 9 mm hypodensity within the left hepatic lobe, not significantly changed compared to prior CT dated October 2019. Within the inferior right hepatic lobe, there is a 1.3 cm x 2 cm hypodense lesion which 
measured 8 mm x 6 mm on prior CT dated October 2019. There is also a subtle 7 mm x 1 cm hypodensity within the left hepatic lobe immediately adjacent to the gallbladder, not seen on prior CT dated October 2019. IMPRESSION #1 should read \"new marked intrahepatic biliary dilatation, common bile duct dilatation, pancreatic ductal dilatation and gallbladder distention. Interval increase in 
size of ill-defined pancreatic head\uncinate mass, difficult to measure, measuring approximately 3.5 cm x 3.7 cm x 2.6 cm.\" 2. Hepatic metastatic disease, as described above. 12/28/19 MRI abd wit MRCP: 
IMPRESSION:   
1. There is a pancreatic head mass measuring approximately 3 x 2.7 cm with obstruction of the pancreatic duct and common duct. There is dilatation of the gallbladder with suggestion of a tiny gallstone. The mass extends medially to involve the SMA. There is a lesion in segment 5 of the liver measuring 1.7 x 0.9 
cm suspicious for metastatic disease. 
  
There is a nonspecific 5 mm focus of increased signal intensity in segment 8 of the liver. Assessment/plan: 1. Uncinate pancreatic adenocarcinoma,  mets to liver, stage IV:  cT2 cN0 pM1 
TASH Discussed that while this is treatable, it is not curable, the goal of therapy is palliative. Discussed that without therapy, life expectancy would be 3-6 months, with therapy 12-18 months on average. CT on 10/7/19 with PD > 20% of pancreatic mass. We discussed the risks and benefits of FOLFIRINOX chemotherapy, including potential side effects. These include but are not limited to fatigue, nausea vomiting, diarrhea, neuropathy, taste changes, cold intolerance, esophageal spasm, allergic reactions, alopecia, mucositis, myelosuppression, risk for infection, infertility, and rarely, death. Rarely, a patient may have a condition where they do not metabolize fluorouracil appropriately (called DPD deficiency), and they may have excessive toxicity. A Port-A-Cath will be required in order to deliver the continuous infusion. The patient has consented to beginning therapy. ·  mFOLFIRINOX (oxaliplatin 85 mg/m2, irinotecan 150mg/m2, leucovorin 400 mg/m2, and a 46 hour infusion of fluorouracil 2400 mg/m2 given every 2 weeks) · Labs: CBC, BMP, Magnesium prior to each treatment, hepatic function panel every 4 weeks · Prophylactic antiemetics: Palonosetron and dexamethasone on the day of each chemotherapy infusion. · PRN antiemetics: Ondansetron, Prochlorperazine · PRN antidiarrheals: Atropine 0.4mg IV every 2 hours PRN during or immediately after irinotecan infusion,  imodium every 2 hours as needed at home · EMLA cream for port After this discussion, she was agreeable to mFOLFIRINOX. She has signed informed consent. Cycle 3 was held since 11/13/19 due to significant fatigue, diarrhea, nausea, and weight loss. S/p weekly hydration. She was admitted on 12/27/19 to [facility] for hyperbilirubinemia and then to [facility] on 1/6/2020 Discussed if no more therapy, life expectancy may be 3 months or less Will get CMP and CBC today. Discussed that if bili is > 4, cannot give irinotecan. If > 5, no 5-FU. oxaliplatin would be ok to give. I am concerned that further chemotherapy would take good days away from her without further efficacy. Discussed hospice care.   Will get labs today and she will consider our conversation and will plan on returning to clinic next week. We will plan to see the patient in follow up at least once per cycle, or sooner if symptoms warrant. Genetic testing was negative. 2. DM2:  On insulin; She will touch base with Dr. Milady Aguilar. 3. Emotional well being:  She has excellent support and is coping well with her disease 4. Abdominal pain:  minimal 
 
5. Nutrition:  She has lost 11 pounds since last visit. Taking creon 3x/day 6. Insomnia: Ongoing but no worse. Currently taking melatonin. She has lunesta if needed. 7. Anemia:  Due to chemo and disease; monitor; iron profile and ferritin normal; gave injectafer 750 mg IV on 5/22/19. Iron sat 18% and Ferritin 386 on 8/28/19. Will continue to monitor. 8. Fatigue: Due to cancer. This has improved and continues to improve since being off treatment. 9. LE edema: holding HCTZ at 6.25 mg daily. Also holding micardis 10. Neuropathy:  Stable; Due to chemo; in fingers and feet; worse in feet; tried cymbalta 30 mg daily, but had insomnia and nausea; acupuncture has helped 11. Hyponatremia:  Due to cancer, improved in hospital 
 
12. Bilary obstruction: s/p ERCP with stent placement on 1/8/2020. GI following. 24 hour care has been arranged. > 40 minutes were spent with this patient with > 50% of that time spent in face to face counseling.  
 
 
Signed By: Adan Herrera MD

## 2020-01-14 NOTE — PROGRESS NOTES
Wild Dee is a 69 y.o. female Follow up for the Evaluation for Pancreatic Cancer. 1. Have you been to the ER, urgent care clinic since your last visit? Hospitalized since your last visit? No 
 
2. Have you seen or consulted any other health care providers outside of the [address] [place] [address] since your last visit? Include any pap smears or colon screening.  No

## 2020-01-21 NOTE — PROGRESS NOTES
Lupe Adler is a 69 y.o. female Follow up for the Evaluation for Pancreatic Cancer. 1. Have you been to the ER, urgent care clinic since your last visit? Hospitalized since your last visit? No 
 
2. Have you seen or consulted any other health care providers outside of the [address] [place] [address] since your last visit? Include any pap smears or colon screening.  No

## 2020-01-21 NOTE — PROGRESS NOTES
[facility] [place] [facility] [place] [address] [address] [facility], formerly [facility], [id number] 100[address] [facility] W: [phone]  F: [phone] Reason for Visit:  
Moises Johnson is a 69 y.o. female who is seen in self-referral for evaluation of pancreatic cancer. Treatment History:  
· 10/4/18 pancreatic head mass FNA, pancreatic adenocarcinoma 10/15/18  Liver, Core Biopsy w/Touch Prep CYTOLOGIC INTERPRETATION:  
· Numerous cores and fragments of benign hepatic parenchyma 10/24/18  Liver, Fine Needle Aspiration CYTOLOGIC INTERPRETATION:  
Metastatic adenocarcinoma (see Comment). General Categorization Positive for malignancy. Comment: The morphologic appearance is nonspecific with regard to site of origin but compatible with metastatic pancreatic carcinoma in this patient with known pancreatic adenocarcinoma ([id number]). 11/9/18-9/25/19- abraxane/gemcitabine -- PD 
mFOLFIRINOX 10/16/19- 
 
invitae genetic testing negative 10/2019, held chemo since 11/13/19 Strata testing:  KRAS p.G12D, MYC amp, TP53 mutation (x2); TASH Was admitted from 1/6/2020-1/11/2020 for hyponatremia and hyperbilirubinemia, stent placed by Dr. Bonita Elizabeth with ERCP on 1/8/2020 with metal stent into distal CBD History of Present Illness:  
She started having abdominal pain, gradual and persistent, x 1 month, pressure sensation, located in epigastrium, no radiation, no aggravating symptoms, no relieving factors. 25 lb weight loss over 6 months.  + nausea. Interval history:  In today for follow up. Complains of gr 1 loss of appetite, gr 2 fatigue, gr 1 nausea, gr 1 insomnia, gr 1 loss of cognition and concentration, 1/10 pain to abdomen, gr 1 neuropathy, gr 1 urinary leakage Past Medical History:  
Diagnosis Date  Arthritis  Constipation  Diabetes ([address]  Hemorrhoids  Hiatal hernia  High cholesterol  Hypertension  Pancreatic cancer ([facility] [address] Past Surgical History: Procedure Laterality Date  HX KNEE ARTHROSCOPY Right  HX ORTHOPAEDIC    
 left wrist surgery  HX TONSILLECTOMY Social History Tobacco Use  Smoking status: Never Smoker  Smokeless tobacco: Never Used Substance Use Topics  Alcohol use: Yes Alcohol/week: 2.0 - 4.0 standard drinks Types: 1 - 2 Cans of beer, 1 - 2 Shots of liquor per week Frequency: 2-4 times a month Drinks per session: 1 or 2 Comment: minimal  
  
Family History Problem Relation Age of Onset  Cancer Mother   
     skin  Hypertension Mother  Heart Disease Mother  Cancer Father   
     skin  Heart Disease Father  Stroke Father  Diabetes Neg Hx Current Outpatient Medications Medication Sig  
 ondansetron hcl (ZOFRAN) 8 mg tablet Take 1 Tab by mouth every eight (8) hours as needed for Nausea.  pantoprazole (PROTONIX) 40 mg tablet Take 1 Tab by mouth Daily (before breakfast).  dicyclomine (BENTYL) 10 mg capsule Take 1 Cap by mouth three (3) times daily.  lipase-protease-amylase (CREON 36,000) 36,000-114,000- 180,000 unit cpDR capsule Take 1 Cap by mouth two (2) times daily as needed (snacks). Indications: exocrine pancreatic insufficiency  loperamide (IMODIUM) 2 mg capsule Take 2 mg by mouth four (4) times daily as needed for Diarrhea.  lidocaine-prilocaine (EMLA) topical cream Apply  to affected area as needed for Pain.  diphenoxylate-atropine (LOMOTIL) 2.5-0.025 mg per tablet Take 1 Tab by mouth four (4) times daily as needed for Diarrhea. Max Daily Amount: 4 Tabs.  ibuprofen (IBUPROFEN IB) 200 mg tablet Take 200 mg by mouth four (4) times daily as needed. No current facility-administered medications for this visit. Allergies Allergen Reactions  Amoxicillin Hives Review of Systems: A comprehensive review of systems was performed and all systems were negative except for HPI and for the symptom review form, reviewed and scanned in. Physical Exam:  
 
Visit Vitals /83 (BP 1 Location: Right arm, BP Patient Position: Sitting) Pulse 86 Temp 97.1 °F (36.2 °C) (Temporal) Resp 16 Ht 5' 7\" (1.702 m) Wt 140 lb (63.5 kg) SpO2 99% BMI 21.93 kg/m² ECOG PS: 1 General: nad Eyes: PERRLA, + scleral icterus HENT: Atraumatic, OP clear Neck: Supple Lymphatic: No cervical, supraclavicular, or inguinal adenopathy Respiratory: CTAB, normal respiratory effort CV: Normal rate, regular rhythm, no murmurs, 1+ LE edema bilateral LE 
GI: Soft, nontender, nondistended, no masses, no hepatomegaly, no splenomegaly MS: Normal gait and station. Digits without clubbing or cyanosis. Skin: + jaundice, improved Psych: Alert, oriented, appropriate affect, normal judgment/insight Results:  
 
Lab Results Component Value Date/Time WBC 6.4 01/14/2020 04:19 PM  
 HGB 9.8 (L) 01/14/2020 04:19 PM  
 HCT 30.3 (L) 01/14/2020 04:19 PM  
 PLATELET 618 21/67/9774 04:19 PM  
 .0 (H) 01/14/2020 04:19 PM  
 ABS. NEUTROPHILS 4.7 01/14/2020 04:19 PM  
 
Lab Results Component Value Date/Time Sodium 130 (L) 01/14/2020 04:21 PM  
 Potassium 3.8 01/14/2020 04:21 PM  
 Chloride 95 (L) 01/14/2020 04:21 PM  
 CO2 29 01/14/2020 04:21 PM  
 Glucose 357 (H) 01/14/2020 04:21 PM  
 BUN 11 01/14/2020 04:21 PM  
 Creatinine 0.67 01/14/2020 04:21 PM  
 GFR est AA >60 01/14/2020 04:21 PM  
 GFR est non-AA >60 01/14/2020 04:21 PM  
 Calcium 8.3 (L) 01/14/2020 04:21 PM  
 Glucose (POC) 188 (H) 01/11/2020 11:21 AM  
 
Lab Results Component Value Date/Time Bilirubin, total 5.8 (H) 01/14/2020 04:21 PM  
 ALT (SGPT) 125 (H) 01/14/2020 04:21 PM  
 AST (SGOT) 83 (H) 01/14/2020 04:21 PM  
 Alk.  phosphatase 1,131 (H) 01/14/2020 04:21 PM  
 Protein, total 5.7 (L) 01/14/2020 04:21 PM  
 Albumin 2.1 (L) 01/14/2020 04:21 PM  
 Globulin 3.6 01/14/2020 04:21 PM  
 
10/1/18 CT abd 
 There is a 3.8 x 1.9 cm mass involving the uncinate process and possibly 
invading the duodenum. Findings are nonspecific but typical of pancreatic 
carcinoma. There is no biliary or pancreatic ductal dilatation. 
  
There is a poorly defined irregular hypodensity in segment IVb of the liver 
measuring 3.7 x 1.9 cm. There appears to be focal biliary dilatation in the left 
lobe behind this abnormality. Metastatic disease is suspected. There is a small, 
1 cm hypodensity in the dome of the liver, likely segment 8. There is a 1 cm 
hypodensity in segment 4 of the liver as well, also likely metastasis. Small 
hypodensity measuring less than 1 cm in segment 6 at the tip laterally is also 
nonspecific but probable metastasis. 
  
Spleen kidneys and adrenals are unremarkable. 
  
No free fluid or focal fluid collection. 
  
Lung bases are clear. 
  
Bone windows are unremarkable. 
  
IMPRESSION: 
1. 3.8 x 1.9 cm mass involving the uncinate process of the pancreas and possibly 
invading the duodenum, this likely represents pancreatic carcinoma. 2. Likely metastatic disease in the liver. There is a 3.8 x 1.9 cm mass involving the uncinate process and possibly 
invading the duodenum. Findings are nonspecific but typical of pancreatic 
carcinoma. There is no biliary or pancreatic ductal dilatation. 
  
There is a poorly defined irregular hypodensity in segment IVb of the liver 
measuring 3.7 x 1.9 cm. There appears to be focal biliary dilatation in the left 
lobe behind this abnormality. Metastatic disease is suspected. There is a small, 
1 cm hypodensity in the dome of the liver, likely segment 8. There is a 1 cm 
hypodensity in segment 4 of the liver as well, also likely metastasis. Small 
hypodensity measuring less than 1 cm in segment 6 at the tip laterally is also 
nonspecific but probable metastasis. 
  
Spleen kidneys and adrenals are unremarkable. 
  
No free fluid or focal fluid collection. 
  
Lung bases are clear.   
Bone windows are unremarkable. 
  
IMPRESSION: 
1. 3.8 x 1.9 cm mass involving the uncinate process of the pancreas and possibly 
invading the duodenum, this likely represents pancreatic carcinoma. 2. Likely metastatic disease in the liver. Records reviewed and summarized above. Pathology report(s) reviewed above. Radiology report(s) reviewed above. MRI abdomen 10/22/18: IMPRESSION:   
  
1. Pancreatic uncinate process mass suspicious for pancreatic neoplasm, possibly 
adenocarcinoma. Mass abuts the superior mesenteric artery with about 20% 
circumference involvement but no luminal distortion or perivascular stranding. 2. Multiple hepatic lesions suspicious for metastatic neoplasm with segment IVb 
lesion showing patchy areas of surrounding steatosis likely secondary to locally 
altered intrahepatic hemodynamics and likely responsible for biopsy result. 3. Cholecystolithiasis and small gallbladder polyp. CT c/a/p 12/28/18: IMPRESSION: 
Interval decrease in size of pancreatic mass. Slight interval decrease in size of hepatic metastases; these now demonstrate central low attenuation suggestive of necrosis. No evidence of new metastatic disease in the chest, abdomen, or pelvis. CT c/a/p 2/25/19: 
LIVER: The lesions described on the prior examination have improved in the 
interval. 19 mm lesion in the dome of the liver now measures 1 cm. 2.0 x 1.5 cm 
lesion in the left hepatic lobe now measures 1.3 x 1.2 cm. Other smaller lesions 
are no longer visualized. GALLBLADDER: Unremarkable. SPLEEN: No mass. PANCREAS: Mass lesion in the uncinate process now measures 1.7 x 2.0 cm, 
previously measuring 2.0 x 3.0 cm. ADRENALS: Unremarkable. KIDNEYS: No mass, calculus, or hydronephrosis. STOMACH: Unremarkable. SMALL BOWEL: No dilatation or wall thickening. COLON: No dilatation or wall thickening. APPENDIX: Unremarkable. PERITONEUM: No ascites or pneumoperitoneum. RETROPERITONEUM: No lymphadenopathy or aortic aneurysm. REPRODUCTIVE ORGANS: Intrauterine device projects in satisfactory position. URINARY BLADDER: No mass or calculus. BONES: Degenerative changes are seen in the thoracic and lumbar spine. ADDITIONAL COMMENTS: N/A 
  
IMPRESSION IMPRESSION: 
Interval decrease in the size of the hepatic metastases. Decrease in the size of 
the mass lesion involving the uncinate process. 
  
RECIST: 
Pancreatic uncinate mass: Current: (68) 1.7X2.0cm     12/28/2018: (71) 3.0 x 
2.0 cm Segment 4B liver mass: Current: (56) 1.3 x 1.2 cm 12/28/2018: (57) 2.0 x 1.5 cm Segment 5 liver mass: Current: (68) 4 x 7 mm 12/28/2018: (70) 1.4 x 0.9 cm CXR for port study 4/15/19:  
IMPRESSION: 
Right internal jugular chest Port-A-Cath is seen with the catheter terminating 
in the mid SVC. The port is slightly angled medially due to breast tissue. The 
port is not flipped. Access needle appears to be within the port hub. Nursing 
staff was able to get good blood return and flush the port without difficulty. No intervention was performed. CT a/p 4/22/19: 
IMPRESSION: 
1. Slight decrease in size of liver metastases, detailed above. 2. Slight decrease in size in uncinate process mass. 3. No evidence for new metastatic disease. 
  
RECIST: 
Uncinate process mass: 15 x 9 mm, image 56, previously 17 x 11 mm. Segment IVb metastasis 11 x 9 mm, image 50, previously 13 x 12 mm Segment 5 metastasis: Barely detectable, image 62 CT c/a/p 6/17/19: IMPRESSION: 
1. Stable lesion in the uncinate process. 2. Slight interval decrease in one of the 2 small liver lesions while the other 
remains stable. 3. Stable small nodules right upper lobe. 4. No new evidence of metastatic disease or acute abnormality. 
  
RECIST: 
Uncinate process mass: 15 x 9 mm, image 68, previously 15 x 9 mm, image 56 Segment IVb metastasis 11 x 9 mm, image 56, previously 11 x 9 mm, image 50 Segment 5 metastasis: No longer visualized, previously barely detectable, image [id number] [facility] CT c/a/p 8/12/19: 
IMPRESSION: 
Stable mass lesion in the uncinate process. Stable liver lesions. Stable 
subpleural nodules right upper lobe. No new evidence of metastatic disease or acute abnormality. 
  
RECIST: 
Uncinate process mass: 15 x 9 mm, image 69, previously 15 x 9 mm, image 68 
 Segment IVb metastasis 11 x 10 mm, image 56, previously 11 x 10 mm, image 56 Segment 5 metastasis: Not visualized CT c/a/p 10/7/19: 
 IMPRESSION:  
1. Uncinate process mass. This is difficult to measure but appears to have 
increased in size. A multiphase pancreatic protocol on the next follow-up 
examination may be helpful to more precisely define the lesion. 2. Small liver lesions unchanged. 3. No evidence of new metastatic disease. 4. Intrauterine device. RECIST Malignant neoplasm of other parts of the pancreas. 
  
TARGET LESIONS: 
    Lesion (description)         Location (series/slice)                Size 1. Uncinate process mass    series 3 image 68    2.3 x 2 cm 2. Segment 4B liver lesion    series 3 image 57    10 x 9 mm 
  
  
NONTARGET LESIONS:  
None. 12/27/2019 CT c/a/p: 
Findings: Abdomen/pelvis: 
  
Liver/pancreasgallbladder: There is new, extensive intrahepatic biliary dilatation in the common bile duct dilatation to the level of the pancreatic head, not present on prior CT dated October 2019. There is also new main pancreatic ductal dilatation at the level of the pancreatic neck, body and tail. The gallbladder is distended as well. The patient's known pancreatic uncinate neoplasm is difficult to evaluate as it is ill-defined and mildly hypodense, however the mass appears to be increased in size; this mass measures approximately 3.5 cm x 3.7 cm x 2.6 cm and measured 3.2 cm x 3.2 cm x 2.2 cm on prior CT dated October 2019.  The mass appears to encase the distal SMA seen on prior CT dated October 2019. There is a 5 mm x 6 mm hypodense lesion within the upper right hepatic lobe, not significant change compared to prior CT dated October 2019. There is also a 1 cm x 9 mm hypodensity within the left hepatic lobe, not significantly changed compared to prior CT dated October 2019. Within the inferior right hepatic lobe, there is a 1.3 cm x 2 cm hypodense lesion which 
measured 8 mm x 6 mm on prior CT dated October 2019. There is also a subtle 7 mm x 1 cm hypodensity within the left hepatic lobe immediately adjacent to the gallbladder, not seen on prior CT dated October 2019. IMPRESSION #1 should read \"new marked intrahepatic biliary dilatation, common bile duct dilatation, pancreatic ductal dilatation and gallbladder distention. Interval increase in 
size of ill-defined pancreatic head\uncinate mass, difficult to measure, measuring approximately 3.5 cm x 3.7 cm x 2.6 cm.\" 2. Hepatic metastatic disease, as described above. 12/28/19 MRI abd wit MRCP: 
IMPRESSION:   
1. There is a pancreatic head mass measuring approximately 3 x 2.7 cm with obstruction of the pancreatic duct and common duct. There is dilatation of the gallbladder with suggestion of a tiny gallstone. The mass extends medially to involve the SMA. There is a lesion in segment 5 of the liver measuring 1.7 x 0.9 
cm suspicious for metastatic disease. 
  
There is a nonspecific 5 mm focus of increased signal intensity in segment 8 of the liver. Assessment/plan: 1. Uncinate pancreatic adenocarcinoma,  mets to liver, stage IV:  cT2 cN0 pM1 
TASH Discussed that while this is treatable, it is not curable, the goal of therapy is palliative. Discussed that without therapy, life expectancy would be 3-6 months, with therapy 12-18 months on average. CT on 10/7/19 with PD > 20% of pancreatic mass.  
 
We discussed the risks and benefits of FOLFIRINOX chemotherapy, including potential side effects. These include but are not limited to fatigue, nausea vomiting, diarrhea, neuropathy, taste changes, cold intolerance, esophageal spasm, allergic reactions, alopecia, mucositis, myelosuppression, risk for infection, infertility, and rarely, death. Rarely, a patient may have a condition where they do not metabolize fluorouracil appropriately (called DPD deficiency), and they may have excessive toxicity. A Port-A-Cath will be required in order to deliver the continuous infusion. The patient has consented to beginning therapy. ·  mFOLFIRINOX (oxaliplatin 85 mg/m2, irinotecan 150mg/m2, leucovorin 400 mg/m2, and a 46 hour infusion of fluorouracil 2400 mg/m2 given every 2 weeks) · Labs: CBC, BMP, Magnesium prior to each treatment, hepatic function panel every 4 weeks · Prophylactic antiemetics: Palonosetron and dexamethasone on the day of each chemotherapy infusion. · PRN antiemetics: Ondansetron, Prochlorperazine · PRN antidiarrheals: Atropine 0.4mg IV every 2 hours PRN during or immediately after irinotecan infusion,  imodium every 2 hours as needed at home · EMLA cream for port After this discussion, she was agreeable to mFOLFIRINOX. She has signed informed consent. Cycle 3 was held since 11/13/19 due to significant fatigue, diarrhea, nausea, and weight loss. S/p weekly hydration. She was admitted on 12/27/19 to [facility] for hyperbilirubinemia and then to [facility] on 1/6/2020 Discussed if no more therapy, life expectancy may be 3 months or less Will get CMP and CBC today. Discussed that if bili is > 4, cannot give irinotecan. If > 5, no 5-FU. oxaliplatin would be ok to give. I am concerned that further chemotherapy would take good days away from her without further efficacy. Discussed hospice care. Will get labs today and she will consider our conversation and will plan on returning to clinic next week. Friend and son present today. We will plan to see the patient in follow up at least once per cycle, or sooner if symptoms warrant. Genetic testing was negative. 2. DM2:  On insulin; She will touch base with Dr. Green . 173 this am 
 
3. Emotional well being:  She has excellent support and is coping well with her disease 4. Abdominal pain:  minimal 
 
5. Nutrition:  She has lost 8 pounds since last visit. Taking creon 3x/day 6. Insomnia: Ongoing but no worse. Currently taking melatonin. She has lunesta if needed. 7. Anemia:  Due to chemo and disease; monitor; iron profile and ferritin normal; gave injectafer 750 mg IV on 5/22/19. Iron sat 18% and Ferritin 386 on 8/28/19. Will continue to monitor. 8. Fatigue: Due to cancer. This has improved and continues to improve since being off treatment. 9. LE edema: holding HCTZ at 6.25 mg daily. Also holding micardis 10. Neuropathy:  Stable; Due to chemo; in fingers and feet; worse in feet; tried cymbalta 30 mg daily, but had insomnia and nausea; acupuncture has helped 11. Hyponatremia:  Due to cancer, improved in hospital 
 
12. Bilary obstruction: s/p ERCP with stent placement on 1/8/2020. GI following. 24 hour care has been arranged. > 25 minutes were spent with this patient with > 50% of that time spent in face to face counseling.  
 
 
Signed By: Tatianna Chavez MD

## 2020-01-21 NOTE — PROGRESS NOTES
[facility] Lab Visit: 
[id number]  Pt arrived ambulatory and in no distress, labs drawn per 1546 One stick in Right Ac per [employer] Departed [facility] ambulatory and in no distress. There were no vitals taken for this visit. Labs available in CC once resulted.

## 2020-01-27 NOTE — TELEPHONE ENCOUNTER
1/27/2020 1:18 PM: Returned call to patient and inquired if she would like to keep her office appointment tomorrow or if she prefer for this office to go ahead and put the hospice referral in and cancel the appointment. Patient stated she would like to cancel the appointment for tomorrow and would like a referral to [address] [place] [address]. Appointment canceled and referral entered; encouraged patient to call this office back if she needs anything.

## 2020-03-12 PROBLEM — M85.80 OSTEOPENIA: Status: ACTIVE | Noted: 2017-05-01

## 2020-03-12 PROBLEM — L57.0 SENILE HYPERKERATOSIS: Status: ACTIVE | Noted: 2017-03-21

## 2020-03-12 PROBLEM — R73.01 IMPAIRED FASTING GLUCOSE: Status: ACTIVE | Noted: 2020-01-01

## 2020-03-12 PROBLEM — Z51.5 HOSPICE CARE PATIENT: Status: ACTIVE | Noted: 2020-01-01

## 2020-03-12 PROBLEM — R94.31 ELECTROCARDIOGRAM ABNORMAL: Status: ACTIVE | Noted: 2020-01-01

## 2020-03-12 PROBLEM — Z91.09 ENVIRONMENTAL ALLERGIES: Status: ACTIVE | Noted: 2018-07-10

## 2020-03-24 NOTE — PROGRESS NOTES
Hospice attending    Received phone call from hospice team to include Michael Moore, Vincent Durand, Mark Jean. Michael Moore is in the home with the patient patient with known pancreatic cancer with associated jaundice. Patient having significant nausea and vomiting. Blood pressure noted to be 52H to 53S systolic. Patient  remains awake. Her son is on the way from [place]. Patient does appear to be declining rapidly related to her cancer. I had had prior discussions with her about her CODE STATUS patient remains full code. Had a discussion with the patient via phone about resuscitation and intubation given her rapid decline. We would not recommend resuscitation or intubation in this situation. Patient agreed to no attempts at resuscitation or intubation. I was able to complete a durable DO NOT RESUSCITATE form which was sent to me via email. Discussed with Bozena miranda in the home. We will continue to focus on her comfort but awaiting transfer to the community hospice house for further care. She will remain with her until [facility] arrives. We discussed morphine use as well as antiemetics.   Patient ultimately was able to be transferred to the [facility] hospitals around 11:30 AM

## 2020-03-24 NOTE — PROGRESS NOTES
1228 Patient arrived via [facility], patient transferred to bed from stretcher. Patient very restless and confused. Patient states she has nausea at this time. Gave scheduled Zofran and normal saline. 1320 Patient pulling off gown and moaning and grimacing in bed. Gave PRN morphine and lorazepam, see MAR.  1350 Patient getting bath with SAMEER Rogers, patient continues to moan and have restlessness, gave PRN lorazepam and dilaudid, see MAR.   1401 When turning patient during bath copious amounts of dark liquid came out of patient's mouth and nose. Suction set up, patient grimacing and restless, gave PRN lorazepam and morphine, see MAR.  1408 Patient continues to be suctioned orally and nasally. 800 ml of dark fluid suctioned. Patient grimacing and restless. Gave PRN lorazepam and morphine. 0 Dr. Brit Agustin at the bedside, patient given PRN lorazepam, and morphine for restlessness and pain. Compainzine given per Dr. Gerardo Israel order. Suctioned 2500 ml of fluid out. 1430 Patient resting in bed quietly, respirations are shallow and unlabored, neutral facial expression noted. Abdomen is no longer distended. [phone] Patient's son is at the bedside. Patient resting in bed quietly, respirations are even and unlabored, neutral facial expression noted. 1610 Patient repositioned in bed with SAMEER Rogers. Son is at the bedside. 1705 Patient resting in bed quietly, respirations are shallow and unlabored, neutral facial expression noted. 1750 Patient resting in bed quietly, respirations are even and shallow, neutral facial expression noted. Son at the bedside. 1825 Patient resting in bed quietly, respirations are even and unlabored, neutral facial expression noted.

## 2020-03-24 NOTE — H&P
[facility]  [phone]    Patient Name: Samantha Monzon  YOB: 1945    Date of Provider Hospice Visit: 03/24/20    Level of Care:   [x] General Inpatient (GIP)    [] Routine   [] Respite    Current Location of Care:  [] [facility] [] [facility] [] [address] [] [address] [x] [facility], patient referred from:  [] [facility] [] [facility] [] [address] [] [address] [x] Home [] Other:     Hospice terminal diagnosis:     Malignant neoplasm of pancreas, unspecified location of malignancy ([address] (C25.9)  Other Hospice diagnoses:     Liver metastases ([address] (C78.7)     Hypertension, unspecified type (I10)     Anemia, unspecified type (D64.9)     Hypokalemia (E87.6)     Hyponatremia (E87.1)     Hyperbilirubinemia (E80.6)     Type 2 diabetes mellitus without complication, unspecified whether long term insulin use ([address] (E11.9)     Encounter for hospice care (Z51.5)        HOSPICE SUMMARY   Do not cut and paste chart information other than imaging findings    Samantha Monzon is a 69y.o. year old who was admitted to [facility]. Patient with stage IV pancreatic cancer metastatic disease to her liver. Patient also found to have hyponatremia and hyperbilirubinemia with stent placement/ERCP on 1/8. Patient elected not to proceed with chemotherapy and chose hospice care to focus on her comfort.     The patient is not likely to endanger self or others.        HOSPICE DIAGNOSES   Active Symptoms:  1. Nausea/vomiting; intractable, coffee ground emesis  2. Labored breathing  3. Restlessness/anxiety  4. Pain; abdominal, generalised  5. Fatigue/weakness  6. Constipation:appears bowel obstructed     PLAN   1. Admit GIP LOC as pt with intractable symptoms requiring close monitoring and active management of symptoms  2. IVF NS at 100cc/hour for hydration; will likely help with dehydration related agitation symptoms and also comforting and may help with bowel obstruction  3.  NPO completely  4. Lorazepam 1mg IV every 15mts as needed  5. Morphine 2mg IV every 15mts as needed  6. Zofran 4mg IV every 4 hours scheduled  7. Compazine 10mg IV every 4 hours as needed for vomiting     8.  and SW to support family needs: I spoke to son Giselle Beckford who has just driven in from [place]. He is at pt's bedside holding hands. Pt is lethargic and almost unresponsive, unable to open eyes to see or talk to son. Provided support to son and encouraged for him to talk and hold hands as she can still hear and feel touch but may not be able to answer or open eyes due to extreme weakness  9. Disposition: likely to pass here; already transitioned within an hour of arriving here to [facility]  10. Hospice Plan of care was reviewed in detail and agree with current plan of care    Prognosis estimated based on 03/24/20 clinical assessment is:   [x] Hours to Days    [] Days to Weeks    [] Other:    Communicated plan of care with: Hospice Case Manager; Hospice IDT; Care Team     GOALS OF CARE     Patient/Medical POA stated Goal of Care: comfort    [x] I have reviewed and/or updated ACP information in the Advance Care Planning Navigator. This information is available in the [address] link in the patient's chart header.     [facility] ([phone]):   Health Care Agent: Harinder Lazar - [phone]    [facility] Health Care Agent: Celeste Root - [phone]    Resuscitation Status: DNR: verbal order  If DNR is there a Durable DNR on file? : [] Yes [x] No (If no, complete Durable DNR)    HISTORY     History obtained from: staff, chart, patient able to answer simple questions    CHIEF COMPLAINT: slightly better  The patient is:   [x] Verbal  [] Nonverbal  [] Unresponsive    HPI/SUBJECTIVE:  Pt is a home hospice pt who has been declining over past week with increased symptoms of N/V and overall weakness, medications including haldol, zofran, compazine have been tried regularly at home but symptoms are not controlled. Pt seen at home today by CM for urgent visit due to worsening refractory nausea and recurrent vomiting (coffee ground emesis). Pt severely dehydrated as well. BP very low at 64/48 at one time. Caregiver at home called 911 as well. Pt on 6l O2 by NC with sats at 94%. Pt desired comfort measures. Dr. Carolyn Abarca spoke to pt on phone and obtained a verbal DNR however pt has not signed DDNR yet as she is waiting for her son to arrive from [facility] (on his way)    Went back to see patient again in half hour and she appeared about the same; was having little pain. HHA getting ready to give her bath    Called again in 15mts that pt is vomiting; went back and assessed pt. RN Alida Chavira assisting and suctioning out coffee ground emesis that pt is pouring out rapidly; filled up 4 canisters  Pt in pain and moaning; pt given extra dose of compazine and zofran and morphine. Called son; he is about 20mts away; driving towards [facility]    Went back again in 10mts; son arrived; met him in room at bedside. He is tearful, holding mom's hands; pt unresponsive but appears comfortable         REVIEW OF SYSTEMS     The following systems were: [x] reviewed  [] unable to be reviewed    Positive ROS include:  Constitutional: fatigue, weakness, in pain, short of breath  Ears/nose/mouth/throat: increased airway secretions  Respiratory:shortness of breath, wheezing  Gastrointestinal:poor appetite, nausea, vomiting, abdominal pain, constipation, diarrhea  Musculoskeletal:pain, deformities, swelling legs  Neurologic:confusion, hallucinations, weakness  Psychiatric:anxiety, feeling depressed, poor sleep  Endocrine:     Adult Non-Verbal Pain Assessment Score:      Face  [] 0   No particular expression or smile  [] 1   Occasional grimace, tearing, frowning, wrinkled forehead  [] 2   Frequent grimace, tearing, frowning, wrinkled forehead    Activity (movement)  [] 0   Lying quietly, normal position  [] 1   Seeking attention through movement or slow, cautious movement  [] 2   Restless, excessive activity and/or withdrawal reflexes    Guarding  [] 0   Lying quietly, no positioning of hands over areas of body  [] 1   Splinting areas of the body, tense  [] 2   Rigid, stiff    Physiology (vital signs)  [] 0   Stable vital signs  [] 1   Change in any of the following: SBP > 20mm Hg; HR > 20/minute  [] 2   Change in any of the following: SBP > 30mm Hg; HR > 25/minute    Respiratory  [] 0   Baseline RR/SpO2, compliant with ventilator  [] 1   RR > 10 above baseline, or 5% drop SpO2, mild asynchrony with ventilator  [] 2   RR > 20 above baseline, or 10% drop SpO2, asynchrony with ventilator     FUNCTIONAL ASSESSMENT     Palliative Performance Scale (PPS):10%       PSYCHOSOCIAL/SPIRITUAL ASSESSMENT     Active Problems:    * No active hospital problems. *    Past Medical History:   Diagnosis Date    Arthritis     Constipation     Diabetes ([facility]     Hemorrhoids     Hiatal hernia     High cholesterol     Hypertension     [facility]       Past Surgical History:   Procedure Laterality Date    HX KNEE ARTHROSCOPY Right     HX ORTHOPAEDIC      left wrist surgery    HX TONSILLECTOMY        Social History     Tobacco Use    Smoking status: Never Smoker    Smokeless tobacco: Never Used   Substance Use Topics    Alcohol use:  Yes     Alcohol/week: 2.0 - 4.0 standard drinks     Types: 1 - 2 Cans of beer, 1 - 2 Shots of liquor per week     Frequency: 2-4 times a month     Drinks per session: 1 or 2     Comment: minimal     Family History   Problem Relation Age of Onset    Cancer Mother         skin    Hypertension Mother     Heart Disease Mother     Cancer Father         skin    Heart Disease Father     Stroke Father     Diabetes Neg Hx       Allergies   Allergen Reactions    Amoxicillin Hives      Current Facility-Administered Medications   Medication Dose Route Frequency    bisacodyL (DULCOLAX) suppository 10 mg  10 mg Rectal DAILY PRN    ondansetron (ZOFRAN) injection 4 mg  4 mg IntraVENous Q4H    prochlorperazine (COMPAZINE) injection 10 mg  10 mg IntraVENous Q4H PRN    0.9% sodium chloride infusion  100 mL/hr IntraVENous CONTINUOUS    morphine injection 2 mg  2 mg IntraVENous Q15MIN PRN    LORazepam (ATIVAN) injection 1 mg  1 mg IntraVENous Q15MIN PRN        PHYSICAL EXAM     Wt Readings from Last 3 Encounters:   02/03/20 63.5 kg (140 lb)   01/21/20 63.5 kg (140 lb)   01/14/20 67.5 kg (148 lb 12.8 oz)       Visit Vitals  Pulse 76   Resp 28       Supplemental O2  [x] Yes  [] NO  Last bowel movement: ??     Currently this patient has:  [x] Peripheral IV [] PICC  [] PORT [] ICD    [x] Moore Catheter [] NG Tube   [] PEG Tube    [] Rectal Tube [] Drain  [] Other:     Constitutional:lethargic, awake and oriented, appears restless and in distress  Eyes: pallor  ENMT: dry mucous membranes  Cardiovascular: distant heart sounds  Respiratory: labored breathing, tachypnea  Gastrointestinal: firm, slightly distended, no bowel sounds, tender to palpation, no rebound  Musculoskeletal:dry, no deformities  Skin:cool extremities, dusky discoloration  Neurologic:restless, agitated, no other deficits  Psychiatric: restless, anxious  Other:       Pertinent Lab and or Imaging Tests:  Lab Results   Component Value Date/Time    Sodium 130 (L) 01/21/2020 03:46 PM    Potassium 3.5 01/21/2020 03:46 PM    Chloride 95 (L) 01/21/2020 03:46 PM    CO2 29 01/21/2020 03:46 PM    Anion gap 6 01/21/2020 03:46 PM    Glucose 357 (H) 01/21/2020 03:46 PM    BUN 12 01/21/2020 03:46 PM    Creatinine 0.76 01/21/2020 03:46 PM    BUN/Creatinine ratio 16 01/21/2020 03:46 PM    GFR est AA >60 01/21/2020 03:46 PM    GFR est non-AA >60 01/21/2020 03:46 PM    Calcium 8.4 (L) 01/21/2020 03:46 PM     Lab Results   Component Value Date/Time    Protein, total 6.2 (L) 01/21/2020 03:46 PM    Albumin 2.4 (L) 01/21/2020 03:46 PM           Total time: 70mts  Counseling / coordination time: 40mts  > 50% counseling / coordination?: yes

## 2020-03-25 ENCOUNTER — HOME CARE VISIT (OUTPATIENT)
Dept: HOSPICE | Facility: HOSPICE | Age: 75
End: 2020-03-25
Payer: MEDICARE

## 2020-03-25 NOTE — PROGRESS NOTES
20:15 Patient unresponsive no heart rate , no pulse for one minute, Pronounced at 20:15 by Andree Champion RN, Dejon(son) notified via phone and is grieving appropriately, MD notified via e-mail.

## 2023-02-06 NOTE — PROGRESS NOTES
[facility] at [address], [address]  Braydon Jarred: [phone]  F: [phone]      Reason for Visit:   Deonte Daniels is a 69 y.o. female who is seen in self-referral for evaluation of pancreatic cancer. Treatment History:   · 10/4/18 pancreatic head mass FNA, pancreatic adenocarcinoma    10/15/18  Liver, Core Biopsy w/Touch Prep CYTOLOGIC INTERPRETATION:   · Numerous cores and fragments of benign hepatic parenchyma     10/24/18  Liver, Fine Needle Aspiration CYTOLOGIC INTERPRETATION:   Metastatic adenocarcinoma (see Comment). General Categorization   Positive for malignancy. Comment: The morphologic appearance is nonspecific with regard to site of origin but compatible with metastatic pancreatic carcinoma in this patient with known pancreatic adenocarcinoma ([id number]). 11/9/18- abraxane/gemcitabine    History of Present Illness:   She started having abdominal pain, gradual and persistent, x 1 month, pressure sensation, located in epigastrium, no radiation, no aggravating symptoms, no relieving factors. 25 lb weight loss over 6 months.  + nausea. Interval history:  In today for follow up and treatment. Complains of gr 2 loss of appetite, gr 1 bleeding, gr 1 constipation, gr 1 fatigue, gr 2 hair loss, gr 1 insomnia, gr 1 concentration, gr 1 sob, gr 1 neuropathy, gr 1 urinary leakage. Past Medical History:   Diagnosis Date    Arthritis     Constipation     Diabetes ([address]     Hemorrhoids     Hiatal hernia     High cholesterol     Hypertension     Pancreatic cancer ([address]       Past Surgical History:   Procedure Laterality Date    HX KNEE ARTHROSCOPY Right     HX ORTHOPAEDIC      left wrist surgery    HX TONSILLECTOMY        Social History     Tobacco Use    Smoking status: Never Smoker    Smokeless tobacco: Never Used   Substance Use Topics    Alcohol use:  Yes     Alcohol/week: 2.0 - 4.0 standard drinks     Types: 1 - 2 Cans of beer, 1 - 2 Shots of liquor per week     Frequency: 2-4 times a month     Drinks per session: 1 or 2     Comment: minimal      Family History   Problem Relation Age of Onset    Cancer Mother         skin    Hypertension Mother     Heart Disease Mother     Cancer Father         skin    Heart Disease Father     Stroke Father     Diabetes Neg Hx      Current Outpatient Medications   Medication Sig    hydroCHLOROthiazide (HYDRODIURIL) 12.5 mg tablet Take 0.5 Tabs by mouth daily. Replaces capsules    tbo-filgrastim (GRANIX) 480 mcg/0.8 mL syrg injection 1 syringe subq on days 1-2, 24 hours after chemotherapy.  TRESIBA FLEXTOUCH U-200 200 unit/mL (3 mL) inpn Inject 50 units on chemo days and 10 units as needed for bs > 150 on non-chemo days    glucose blood VI test strips (ACCU-CHEK RICHIE PLUS TEST STRP) strip Accu-Chek Richie Plus test strips   Check BS BID    OTHER Cranial prosthesis    Dx C25.7    omeprazole (PRILOSEC) 20 mg capsule Take 20 mg by mouth daily.  simvastatin (ZOCOR) 10 mg tablet Take 10 mg by mouth nightly.  telmisartan (MICARDIS) 80 mg tablet Take 80 mg by mouth daily.  DULoxetine (CYMBALTA) 30 mg capsule Take 1 Cap by mouth daily.  ondansetron hcl (ZOFRAN) 8 mg tablet Take 1 Tab by mouth every eight (8) hours as needed for Nausea.  lidocaine-prilocaine (EMLA) topical cream Apply  to affected area as needed for Pain.  IBUPROFEN IB PO Take  by mouth as needed. No current facility-administered medications for this visit.       Facility-Administered Medications Ordered in Other Visits   Medication Dose Route Frequency    heparin (porcine) pf 500 Units  500 Units IntraVENous PRN    sodium chloride 0.9% injection 10 mL  10 mL IntraVENous PRN    sodium chloride (NS) flush 5-10 mL  5-10 mL IntraVENous PRN    PACLitaxel-Protein Bound (ABRAXANE) 250 mg chemo infusion  250 mg IntraVENous ONCE    gemcitabine (GEMZAR) 1,990 mg in 0.9% sodium chloride 250 mL, overfill Eyad volume 25 mL chemo infusion  1,990 mg IntraVENous ONCE    prochlorperazine (COMPAZINE) with saline injection 10 mg  10 mg IntraVENous Q6H PRN    0.9% sodium chloride infusion  25 mL/hr IntraVENous ONCE    dexamethasone (DECADRON) 4 mg/mL injection 8 mg  8 mg IntraVENous ONCE      Allergies   Allergen Reactions    Amoxicillin Hives        Review of Systems: A comprehensive review of systems was performed and all systems were negative except for HPI and for the symptom review form, reviewed and scanned in. Physical Exam:     Visit Vitals  /73   Pulse 76   Temp 97.9 °F (36.6 °C) (Temporal)   Resp 18   Ht 5' 7\" (1.702 m)   Wt 177 lb 3.2 oz (80.4 kg)   SpO2 97%   BMI 27.75 kg/m²     ECOG PS: 0  General: No distress  Eyes: PERRLA, anicteric sclerae  HENT: Atraumatic, OP clear  Neck: Supple  Lymphatic: No cervical, supraclavicular, or inguinal adenopathy  Respiratory: CTAB, normal respiratory effort  CV: Normal rate, regular rhythm, no murmurs, 2+ LE edema bilateral LE  GI: Soft, nontender, nondistended, no masses, no hepatomegaly, no splenomegaly  MS: Normal gait and station. Digits without clubbing or cyanosis. Skin: No rashes, ecchymoses, or petechiae. Normal temperature, turgor, and texture. Psych: Alert, oriented, appropriate affect, normal judgment/insight        Results:     Lab Results   Component Value Date/Time    WBC 3.1 (L) 09/25/2019 10:17 AM    HGB 7.9 (L) 09/25/2019 10:17 AM    HCT 24.9 (L) 09/25/2019 10:17 AM    PLATELET 659 28/26/4434 10:17 AM    MCV 94.3 09/25/2019 10:17 AM    ABS.  NEUTROPHILS 2.6 09/25/2019 10:17 AM     Lab Results   Component Value Date/Time    Sodium 138 09/11/2019 10:18 AM    Potassium 4.0 09/11/2019 10:18 AM    Chloride 106 09/11/2019 10:18 AM    CO2 27 09/11/2019 10:18 AM    Glucose 181 (H) 09/11/2019 10:18 AM    BUN 7 09/11/2019 10:18 AM    Creatinine 0.65 09/11/2019 10:18 AM    GFR est AA >60 09/11/2019 10:18 AM    GFR est non-AA >60 09/11/2019 10:18 AM    Calcium 7.9 (L) 09/11/2019 10:18 AM    Glucose (POC) 168 (H) 04/15/2019 10:44 AM     Lab Results   Component Value Date/Time    Bilirubin, total 0.5 09/11/2019 10:18 AM    ALT (SGPT) 14 09/11/2019 10:18 AM    AST (SGOT) 13 (L) 09/11/2019 10:18 AM    Alk. phosphatase 137 (H) 09/11/2019 10:18 AM    Protein, total 5.3 (L) 09/11/2019 10:18 AM    Albumin 2.9 (L) 09/11/2019 10:18 AM    Globulin 2.4 09/11/2019 10:18 AM     10/1/18 CT abd  There is a 3.8 x 1.9 cm mass involving the uncinate process and possibly  invading the duodenum. Findings are nonspecific but typical of pancreatic  carcinoma. There is no biliary or pancreatic ductal dilatation.     There is a poorly defined irregular hypodensity in segment IVb of the liver  measuring 3.7 x 1.9 cm. There appears to be focal biliary dilatation in the left  lobe behind this abnormality. Metastatic disease is suspected. There is a small,  1 cm hypodensity in the dome of the liver, likely segment 8. There is a 1 cm  hypodensity in segment 4 of the liver as well, also likely metastasis. Small  hypodensity measuring less than 1 cm in segment 6 at the tip laterally is also  nonspecific but probable metastasis.     Spleen kidneys and adrenals are unremarkable.     No free fluid or focal fluid collection.     Lung bases are clear.     Bone windows are unremarkable.     IMPRESSION:  1. 3.8 x 1.9 cm mass involving the uncinate process of the pancreas and possibly  invading the duodenum, this likely represents pancreatic carcinoma. 2. Likely metastatic disease in the liver. There is a 3.8 x 1.9 cm mass involving the uncinate process and possibly  invading the duodenum. Findings are nonspecific but typical of pancreatic  carcinoma. There is no biliary or pancreatic ductal dilatation.     There is a poorly defined irregular hypodensity in segment IVb of the liver  measuring 3.7 x 1.9 cm. There appears to be focal biliary dilatation in the left  lobe behind this abnormality.  Metastatic disease is suspected. There is a small,  1 cm hypodensity in the dome of the liver, likely segment 8. There is a 1 cm  hypodensity in segment 4 of the liver as well, also likely metastasis. Small  hypodensity measuring less than 1 cm in segment 6 at the tip laterally is also  nonspecific but probable metastasis.     Spleen kidneys and adrenals are unremarkable.     No free fluid or focal fluid collection.     Lung bases are clear.     Bone windows are unremarkable.     IMPRESSION:  1. 3.8 x 1.9 cm mass involving the uncinate process of the pancreas and possibly  invading the duodenum, this likely represents pancreatic carcinoma. 2. Likely metastatic disease in the liver. Records reviewed and summarized above. Pathology report(s) reviewed above. Radiology report(s) reviewed above. MRI abdomen 10/22/18: IMPRESSION:       1. Pancreatic uncinate process mass suspicious for pancreatic neoplasm, possibly  adenocarcinoma. Mass abuts the superior mesenteric artery with about 20%  circumference involvement but no luminal distortion or perivascular stranding. 2. Multiple hepatic lesions suspicious for metastatic neoplasm with segment IVb  lesion showing patchy areas of surrounding steatosis likely secondary to locally  altered intrahepatic hemodynamics and likely responsible for biopsy result. 3. Cholecystolithiasis and small gallbladder polyp. CT c/a/p 12/28/18: IMPRESSION:  Interval decrease in size of pancreatic mass. Slight interval decrease in size of hepatic metastases; these now demonstrate central low attenuation suggestive of necrosis. No evidence of new metastatic disease in the chest, abdomen, or pelvis. CT c/a/p 2/25/19:  LIVER: The lesions described on the prior examination have improved in the  interval. 19 mm lesion in the dome of the liver now measures 1 cm. 2.0 x 1.5 cm  lesion in the left hepatic lobe now measures 1.3 x 1.2 cm. Other smaller lesions  are no longer visualized.   GALLBLADDER: Unremarkable. SPLEEN: No mass. PANCREAS: Mass lesion in the uncinate process now measures 1.7 x 2.0 cm,  previously measuring 2.0 x 3.0 cm. ADRENALS: Unremarkable. KIDNEYS: No mass, calculus, or hydronephrosis. STOMACH: Unremarkable. SMALL BOWEL: No dilatation or wall thickening. COLON: No dilatation or wall thickening. APPENDIX: Unremarkable. PERITONEUM: No ascites or pneumoperitoneum. RETROPERITONEUM: No lymphadenopathy or aortic aneurysm. REPRODUCTIVE ORGANS: Intrauterine device projects in satisfactory position. URINARY BLADDER: No mass or calculus. BONES: Degenerative changes are seen in the thoracic and lumbar spine. ADDITIONAL COMMENTS: N/A     IMPRESSION  IMPRESSION:  Interval decrease in the size of the hepatic metastases. Decrease in the size of  the mass lesion involving the uncinate process.     RECIST:  Pancreatic uncinate mass: Current: (68) 1.7X2.0cm     12/28/2018: (71) 3.0 x  2.0 cm   Segment 4B liver mass: Current: (56) 1.3 x 1.2 cm 12/28/2018: (57) 2.0 x 1.5 cm   Segment 5 liver mass: Current: (68) 4 x 7 mm 12/28/2018: (70) 1.4 x 0.9 cm    CXR for port study 4/15/19:   IMPRESSION:  Right internal jugular chest Port-A-Cath is seen with the catheter terminating  in the mid SVC. The port is slightly angled medially due to breast tissue. The  port is not flipped. Access needle appears to be within the port hub. Nursing  staff was able to get good blood return and flush the port without difficulty. No intervention was performed. CT a/p 4/22/19:  IMPRESSION:  1. Slight decrease in size of liver metastases, detailed above. 2. Slight decrease in size in uncinate process mass. 3. No evidence for new metastatic disease.     RECIST:  Uncinate process mass: 15 x 9 mm, image 56, previously 17 x 11 mm. Segment IVb metastasis 11 x 9 mm, image 50, previously 13 x 12 mm  Segment 5 metastasis: Barely detectable, image 62    CT c/a/p 6/17/19:   IMPRESSION:  1. Stable lesion in the uncinate process. 2. Slight interval decrease in one of the 2 small liver lesions while the other  remains stable. 3. Stable small nodules right upper lobe. 4. No new evidence of metastatic disease or acute abnormality.     RECIST:  Uncinate process mass: 15 x 9 mm, image 68, previously 15 x 9 mm, image 56  Segment IVb metastasis 11 x 9 mm, image 56, previously 11 x 9 mm, image 50  Segment 5 metastasis: No longer visualized, previously barely detectable, image  62    CT c/a/p 8/12/19:  IMPRESSION:  Stable mass lesion in the uncinate process. Stable liver lesions. Stable  subpleural nodules right upper lobe. No new evidence of metastatic disease or acute abnormality.     RECIST:  Uncinate process mass: 15 x 9 mm, image 69, previously 15 x 9 mm, image 68   Segment IVb metastasis 11 x 10 mm, image 56, previously 11 x 10 mm, image 56   Segment 5 metastasis: Not visualized     Assessment/plan:   1. Uncinate pancreatic adenocarcinoma,  mets to liver, stage IV:  cT2 cN0 pM1    Discussed that while this is treatable, it is not curable, the goal of therapy is palliative. Discussed that without therapy, life expectancy would be 3-6 months, with therapy 12-18 months on average. As an example of likely chemotherapy, we discussed the risks and benefits of Gemcitabine and Abraxane chemotherapy. Potential side effects include, but are not limited to, nausea, vomiting, diarrhea, constipation, mucositis, taste changes, myelosuppression, infection, fatigue, alopecia, skin and nail changes, pulmonary toxicity, neuropathy, allergic reactions, infertility, and rarely, death. Discussed the [id number] study and she signed informed consent. · Gemcitabine (1000 mg/m2) and Abraxane (125 mg/m2) given on days 1,8,15 every 28 days. · Labs: CBC, BMP prior to each treatment. Hepatic function panel every 4 weeks.  CA 19.9 prior to day 1  · Antiemetic prophylaxis: Dexamethasone prior to each treatment  · PRN antiemetics: Ondansetron and Prochlorperazine at home  · EMLA cream for port    On the control arm, C12d15 today, will see her back in 2 weeks for C13D1. CT a/p on 8/12/19 stable, next scans scheduled for 10/7/19. We will plan to see the patient in follow up at least once per cycle, or sooner if symptoms warrant. 2. DM2:  Holding metformin; on insulin; saw Dr. Barbara Torres; her BS are improving; she is now taking less insulin; Hgb A1C 6.8    3. Emotional well being:  She has excellent support and is coping well with her disease    4. Abdominal pain:  Intermittent, may be due to constipation;  palliative care has seen    5. Nutrition:  Karen Powell RD has met with her; holding on enzymes. She has lost 8 pounds since last visit. Reports taste changes, decreased appetite. Will have Karen Powell, [address] speak with her. 6. Insomnia: Ongoing but no worse. Currently taking melatonin. She has lunesta if needed. 7. Anemia:  Due to chemo and disease; monitor; iron profile and ferritin normal; gave injectafer 750 mg IV on 5/22/19. Hgb 8.3 today. Iron sat 18% and Ferritin 386 on 8/28/19. Will continue to monitor. 8. Neutropenia:  Due to chemo, started granix 480 mcg for 4 days following chemo with C1D15. Previously at 2 days following chemo but now only 1 day following     9. Constipation:  Resolved for now so she is holding off on her stool softener, but she continues with Miralax. 10. Sinus congestion: Intermittent but has improved. Clear/bloody sinus drainage. Recommend OTC sudafed and saline spray. She has seen Dr. Colton Bustos, ENT, s/p steroids. On flonase. 11. Dehydration: She is drinking more fluids and has not had issues with dizziness. 12. Fatigue: Due to treatment. Ongoing. [facility] information given. Received acupuncture on 8/5/19 and 9/2019. Next scheduled for 10/8/19. S/p 1L IV hydration. Will monitor. 13. LE edema: Does improve with elevation. BLE dopplers on 8/15/19 negative.    Restarted HCTZ at 6/25 mg daily.     14. Neuropathy:  Due to chemo; worse in feet; has not started 30 mg daily of cymbalta, plans on starting this tomorrow.          Signed By: Joana Merrill MD

## (undated) DEVICE — 1200 GUARD II KIT W/5MM TUBE W/O VAC TUBE: Brand: GUARDIAN

## (undated) DEVICE — NEEDLE HYPO 18GA L1.5IN PNK S STL HUB POLYPR SHLD REG BVL

## (undated) DEVICE — DEVICE LCK BILI RAP EXCHG OLPS --

## (undated) DEVICE — BW-412T DISP COMBO CLEANING BRUSH: Brand: SINGLE USE COMBINATION CLEANING BRUSH

## (undated) DEVICE — SYSTEM BX 25GA FN NDL SIX DST CUT EDG SHARKCORE

## (undated) DEVICE — SET ADMIN 16ML TBNG L100IN 2 Y INJ SITE IV PIGGY BK DISP

## (undated) DEVICE — NEONATAL-ADULT SPO2 SENSOR: Brand: NELLCOR

## (undated) DEVICE — SYRINGE MED 20ML STD CLR PLAS LUERLOCK TIP N CTRL DISP

## (undated) DEVICE — Device

## (undated) DEVICE — GARMENT,MEDLINE,DVT,INT,CALF,LG, GEN2: Brand: MEDLINE

## (undated) DEVICE — SOLIDIFIER FLUID 3000 CC ABSORB

## (undated) DEVICE — Z DISCONTINUED NO SUB IDED SET EXTN W/ 4 W STPCOCK M SPIN LOK 36IN

## (undated) DEVICE — CANNULATING SPHINCTEROTOME: Brand: JAGTOME™ REVOLUTION RX

## (undated) DEVICE — BW-400L DISP SNGL-END CLEANINGBRUSH: Brand: OLYMPUS

## (undated) DEVICE — INFLATION DEVICE: Brand: ENCORE 26

## (undated) DEVICE — KIT IV STRT W CHLORAPREP PD 1ML

## (undated) DEVICE — ENDO CARRY-ON PROCEDURE KIT INCLUDES ENZYMATIC SPONGE, GAUZE, BIOHAZARD LABEL, TRAY, LUBRICANT, DIRTY SCOPE LABEL, WATER LABEL, TRAY, DRAWSTRING PAD, AND DEFENDO 4-PIECE KIT.: Brand: ENDO CARRY-ON PROCEDURE KIT

## (undated) DEVICE — Device: Brand: SINGLE USE SOFT BRUSH

## (undated) DEVICE — CATH IV AUTOGRD BC BLU 22GA 25 -- INSYTE

## (undated) DEVICE — SYSTEM BX DIA22GA FN W/ PRE LD NDL SHARKCORE

## (undated) DEVICE — BALLOON DILATATION CATHETER: Brand: HURRICANE™ RX

## (undated) DEVICE — KENDALL RADIOLUCENT FOAM MONITORING ELECTRODE -RECTANGULAR SHAPE: Brand: KENDALL

## (undated) DEVICE — BAG BELONG PT PERS CLEAR HANDL

## (undated) DEVICE — KIT COMPLIANCE W ENDOGLDE + 11 NO BRSH ENDOKT

## (undated) DEVICE — CANN NASAL O2 CAPNOGRAPHY AD -- FILTERLINE